# Patient Record
Sex: MALE | Race: WHITE | Employment: OTHER | URBAN - METROPOLITAN AREA
[De-identification: names, ages, dates, MRNs, and addresses within clinical notes are randomized per-mention and may not be internally consistent; named-entity substitution may affect disease eponyms.]

---

## 2017-02-13 ENCOUNTER — TELEPHONE (OUTPATIENT)
Dept: INTERNAL MEDICINE CLINIC | Age: 77
End: 2017-02-13

## 2017-02-13 DIAGNOSIS — E04.1 THYROID NODULE: ICD-10-CM

## 2017-02-13 DIAGNOSIS — E78.00 PURE HYPERCHOLESTEROLEMIA: ICD-10-CM

## 2017-02-13 DIAGNOSIS — R73.01 IFG (IMPAIRED FASTING GLUCOSE): Primary | ICD-10-CM

## 2017-02-13 NOTE — TELEPHONE ENCOUNTER
Wife calling, says pt went to [facility] this morning for labs for upcoming appt. Told no labs in system. Were labs needed? If so please add labs with current date and advise pt.

## 2017-02-13 NOTE — TELEPHONE ENCOUNTER
Labs entered, pt aware. He was not happy and said that he waited at [facility] for an hour before they told him no labs were in the system. I let him know that there were labs in the system but they were , his last OV with RD was in 2015, he was due to come back 2016 so its not our fault he failed to follow up when he was supposed to. He verbalized understanding and will go back to the lab tomorrow.

## 2017-02-14 ENCOUNTER — HOSPITAL ENCOUNTER (OUTPATIENT)
Dept: LAB | Age: 77
Discharge: HOME | End: 2017-02-14
Payer: MEDICARE

## 2017-02-14 DIAGNOSIS — R73.01 IFG (IMPAIRED FASTING GLUCOSE): ICD-10-CM

## 2017-02-14 DIAGNOSIS — E78.00 PURE HYPERCHOLESTEROLEMIA: ICD-10-CM

## 2017-02-14 DIAGNOSIS — E04.1 THYROID NODULE: ICD-10-CM

## 2017-02-14 LAB
ALBUMIN SERPL BCP-MCNC: 4 G/DL (ref 3.4–5)
ALBUMIN/GLOB SERPL: 1.2 {RATIO} (ref 0.8–1.7)
ALP SERPL-CCNC: 58 U/L (ref 45–117)
ALT SERPL-CCNC: 35 U/L (ref 16–61)
ANION GAP BLD CALC-SCNC: 7 MMOL/L (ref 3–18)
AST SERPL W P-5'-P-CCNC: 16 U/L (ref 15–37)
BILIRUB SERPL-MCNC: 0.3 MG/DL (ref 0.2–1)
BUN SERPL-MCNC: 13 MG/DL (ref 7–18)
BUN/CREAT SERPL: 13 (ref 12–20)
CALCIUM SERPL-MCNC: 9.2 MG/DL (ref 8.5–10.1)
CHLORIDE SERPL-SCNC: 105 MMOL/L (ref 100–108)
CHOLEST SERPL-MCNC: 117 MG/DL
CO2 SERPL-SCNC: 28 MMOL/L (ref 21–32)
CREAT SERPL-MCNC: 1 MG/DL (ref 0.6–1.3)
EST. AVERAGE GLUCOSE BLD GHB EST-MCNC: 114 MG/DL
GLOBULIN SER CALC-MCNC: 3.3 G/DL (ref 2–4)
GLUCOSE SERPL-MCNC: 103 MG/DL (ref 74–99)
HBA1C MFR BLD: 5.6 % (ref 4.2–5.6)
HDLC SERPL-MCNC: 49 MG/DL (ref 40–60)
HDLC SERPL: 2.4 {RATIO} (ref 0–5)
LDLC SERPL CALC-MCNC: 49.4 MG/DL (ref 0–100)
LIPID PROFILE,FLP: NORMAL
POTASSIUM SERPL-SCNC: 4.2 MMOL/L (ref 3.5–5.5)
PROT SERPL-MCNC: 7.3 G/DL (ref 6.4–8.2)
SODIUM SERPL-SCNC: 140 MMOL/L (ref 136–145)
TRIGL SERPL-MCNC: 93 MG/DL (ref ?–150)
TSH SERPL DL<=0.05 MIU/L-ACNC: 4.14 UIU/ML (ref 0.36–3.74)
VLDLC SERPL CALC-MCNC: 18.6 MG/DL

## 2017-02-14 PROCEDURE — 80053 COMPREHEN METABOLIC PANEL: CPT | Performed by: INTERNAL MEDICINE

## 2017-02-14 PROCEDURE — 36415 COLL VENOUS BLD VENIPUNCTURE: CPT | Performed by: INTERNAL MEDICINE

## 2017-02-14 PROCEDURE — 84443 ASSAY THYROID STIM HORMONE: CPT | Performed by: INTERNAL MEDICINE

## 2017-02-14 PROCEDURE — 80061 LIPID PANEL: CPT | Performed by: INTERNAL MEDICINE

## 2017-02-14 PROCEDURE — 83036 HEMOGLOBIN GLYCOSYLATED A1C: CPT | Performed by: INTERNAL MEDICINE

## 2017-02-16 PROBLEM — R91.8 PULMONARY NODULES: Status: ACTIVE | Noted: 2017-02-16

## 2017-02-16 PROBLEM — E04.2 MULTINODULAR GOITER: Status: ACTIVE | Noted: 2017-02-16

## 2017-02-17 NOTE — PROGRESS NOTES
This is an subsequent Medicare Annual Wellness Exam (AWV)     I have reviewed the patient's medical history in detail and updated the computerized patient record. History     Past Medical History:   Diagnosis Date    Allergic rhinitis     Arthritis     hands    Asthma     mild obst disease on pfts Dr. Beth Aguilar Pederson's esophagus     Dr. Federico Bryan Bilateral carotid artery stenosis     12/14 BICA 50-69%    BPH (benign prostatic hyperplasia)     BPH without obstruction/lower urinary tract symptoms     CABG x 3 04/24/2014    LIMA to LAD, and saphenous vein grafts to the second diagonal of the LAD, and to the posterior descending branch of the RCA, Dr. David Bradshaw, 4/23/14.  CAD (coronary artery disease)     Cardiac catheterization 04/18/2014    Superdominant RCA. mRCA 90%. pRPDA 60%. LM (modest sized) patent.  p-mLAD 85%. CX < 50%. LVEDP 17 mmHg. EF 60%. No RWMA. CABG recommended.  Cardiac echocardiogram 06/24/2008    EF 70%. Mild conc LVH. Gr 2 DDfx. Mild LAE. Mild MR. PASP 38 mmHg.  Cardiac nuclear imaging study, abnormal 04/15/2014    Mod ischemia in lg area of anterior wall, anteroapex, anteroseptum c/w high-grade prox LAD obstruction. Mod partially transient defect likely ischemia w/very sm infarction in RCA. TID 1.31 suggests 3-vessel CAD. Mod distal anterior hypk. Mild-mod anteroseptal, inferoseptal, anterolateral hypk. EF 46%. Strongly positive EKG on max EST. Ex time 6 min.  Carotid duplex 04/06/2016    Mod 50-69% bilateral ICA stenosis. Probable significant LECA stenosis. Similar to study of 8/12/15. Multiple nodules noted in right thyroid.     Colon polyps 2007    Dr. Federico Bryan Difficulty hearing     Erectile dysfunction     FHx: heart disease     GERD (gastroesophageal reflux disease)     by EGD 2007 Dr. Federico Bryan IFG (impaired fasting glucose) 5/14    Impotence     Lower urinary tract symptoms (LUTS)     Multinodular goiter     2014; 2015; 4/16  Obesity     peak weight 202 lbs, bmi 31.4 from 4/14    Prostate cancer ([facility] [address] 8/10    Dr. Kenisha Dutton, s/p cryo Dr. Inocente Seip Pulmonary nodule 2014 2015    Sleep apnea       Past Surgical History:   Procedure Laterality Date    CARDIAC SURG PROCEDURE UNLIST  11/06    thallium negative    CARDIAC SURG PROCEDURE UNLIST  4/14    3V CABG Dr Marina Butler  2007    mild obst disease on PFTs Dr. Michelle Yoon January HX CATARACT REMOVAL  11/14    bilat Dr Maria E Perez  2004    HX COLONOSCOPY      Dr. Sarabjit Garcia 2007 negative   [facility]      Dr. Waylon Nowak HX TURP      x2 Dr. Cynthia Dubose HX UROLOGICAL  10/2012    [facility], Dr. Rhoda Russo, Cystoscopy and dilation of stricture, cryoablation of prostate    VASCULAR SURGERY PROCEDURE UNLIST  12/14    BICA 50-69% stenosis     Current Outpatient Prescriptions   Medication Sig Dispense Refill    metoprolol succinate (TOPROL-XL) 25 mg XL tablet TAKE 1 TABLET BY MOUTH EVERY DAY 30 Tab 6    HYDROcodone-acetaminophen (NORCO) 5-325 mg per tablet Take 1-2 Tabs by mouth every six (6) hours as needed for Pain. Max Daily Amount: 8 Tabs. 30 Tab 0    atorvastatin (LIPITOR) 20 mg tablet TAKE 1 TABLET BY MOUTH EVERY EVENING 90 Tab 3    nitroglycerin (NITROSTAT) 0.4 mg SL tablet 0.4 mg by SubLINGual route every five (5) minutes as needed for Chest Pain.  omeprazole (PRILOSEC OTC) 20 mg tablet Take 20 mg by mouth daily.  glucosam-chondroit-C-manganese 637-638-48-3 mg cap Take 1 Tab by mouth daily.  cyanocobalamin (VITAMIN B-12) 2,500 mcg sublingual tablet Take 2,500 mcg by mouth daily.  ascorbic acid (VITAMIN C) 1,000 mg tablet Take 1,000 mg by mouth daily.  MULTIVITAMIN W-MINERALS/LUTEIN (CENTRUM SILVER PO) Take 1 Tab by mouth daily.  aspirin 81 mg tablet Take 81 mg by mouth daily.  Indications: MYOCARDIAL INFARCTION PREVENTION       Allergies   Allergen Reactions    Albuterol Rash     seizure    Influenza Virus Vaccine, Specific Other (comments)    Percocet [Oxycodone-Acetaminophen] Other (comments)     Family History   Problem Relation Age of Onset    Heart Disease Father     Stroke Mother     Hypertension Mother     Diabetes Sister      Social History   Substance Use Topics    Smoking status: Former Smoker     Packs/day: 0.50     Years: 4.00     Quit date: 1/1/1957    Smokeless tobacco: Never Used      Comment: Quit in 1957    Alcohol use No     Depression Risk Factor Screening:     PHQ 2 / 9, over the last two weeks 2/22/2017   Little interest or pleasure in doing things Not at all   Feeling down, depressed or hopeless Not at all   Total Score PHQ 2 0     SCREENINGS  Colonoscopy last done 2007 Dr Velma Jurado  Prostate ca being followed by Dr Velma Jurado    Immunization History   Administered Date(s) Administered    Pneumococcal Conjugate (PCV-13) 08/12/2015    Pneumococcal Vaccine (Unspecified Type) 09/11/2008    TD Vaccine 09/10/2008    Tdap 08/12/2015    Zoster 04/24/2008     Alcohol Risk Factor Screening: On any occasion during the past 3 months, have you had more than 4 drinks containing alcohol? No    Do you average more than 14 drinks per week? No    Functional Ability and Level of Safety:     Hearing Loss   normal-to-mild    Vision - no acute complaints and is followed by Dr Destiny Bhatti [facility]   Self-care. Requires assistance with: no ADLs    Fall Risk     Fall Risk Assessment, last 12 mths 2/22/2017   Able to walk? Yes   Fall in past 12 months? No     Abuse Screen   Patient is not abused    Review of Systems   A comprehensive review of systems was negative except for that written in the HPI.     Physical Examination     No exam data present    Evaluation of Cognitive Function:  Mood/affect:  happy  Appearance: age appropriate, overweight, well dressed and within normal Limits  Family member/caregiver input: na    Patient Care Team:  Moreno Avina MD as PCP - General (Internal Medicine)  Saulo Thomas DO (Cardiology)    Advice/Referrals/Counseling   Education and counseling provided:  Are appropriate based on today's review and evaluation  End-of-Life planning (with patient's consent)  Pneumococcal Vaccine  Influenza Vaccine  Prostate cancer screening tests (PSA, covered annually)  Colorectal cancer screening tests  Cardiovascular screening blood test  Diabetes screening test    Assessment/Plan       ICD-10-CM ICD-9-CM    1. Routine general medical examination at a health care facility Z00.00 V70.0    2. Screening for alcoholism Z13.89 V79.1    3. Advanced directives, counseling/discussion Z71.89 V65.49 ADVANCE CARE PLANNING FIRST 30 MINS   4. Screening for depression Z13.89 V79.0 DEPRESSION SCREEN ANNUAL   5. Screen for colon cancer Z12.11 V76.51    6. Screening for diabetes mellitus Z13.1 V77.1    7. Screening for ischemic heart disease Z13.6 V81.0    8. Body mass index 31.0-31.9, adult Z68.31 V85.31    9. [facility] C61 185    10. IFG (impaired fasting glucose) E59.47 236.85 METABOLIC PANEL, COMPREHENSIVE      HEMOGLOBIN A1C W/O EAG   11. Hyperlipidemia, unspecified hyperlipidemia type E78.5 272.4 CBC W/O DIFF      LIPID PANEL   12. Coronary artery disease involving native coronary artery of native heart without angina pectoris I25.10 414.01    13. Pulmonary nodules R91.8 793.19 CT CHEST W CONT   14. Multinodular goiter 2014 E04.2 241.1 TSH 3RD GENERATION      T4, FREE   15. Bilateral carotid artery stenosis 12/14 I65.23 433.10      433.30    16. Colon polyp 2007 Dr. Dontae Shaffer K63.5 211.3    17. Pederson's esophagus without dysplasia K22.70 530.85      current treatment plan is effective, no change in therapy  lab results and schedule of future lab studies reviewed with patient  cardiovascular risk and specific lipid/LDL goals reviewed.   End of life discussion undertaken. He will work on getting medical directive in place  Flu high dose declined  Colonoscopy beyond age?   He will check w Dr Dontae Shaffer

## 2017-02-17 NOTE — ACP (ADVANCE CARE PLANNING)
Advance Care Planning    Advance Care Planning (ACP) Provider Note - Comprehensive     Date of ACP Conversation: 02/22/17  Persons included in Conversation:  patient  Length of ACP Conversation in minutes:  16 minutes    Authorized Decision Maker (if patient is incapable of making informed decisions): This person is: wife  Other Legally Authorized Decision Maker (e.g. Next of Kin)          General ACP for ALL Patients with Decision Making Capacity:   Importance of advance care planning, including choosing a healthcare agent to communicate patient's healthcare decisions if patient lost the ability to make decisions, such as after a sudden illness or accident  Understanding of the healthcare agent role was assessed and information provided  Exploration of values, goals, and preferences if recovery is not expected, even with continued medical treatment in the event of: Imminent death  Severe, permanent brain injury    Review of Existing Advance Directive:  na    For Serious or Chronic Illness:  Understanding of medical condition    Understanding of CPR, goals and expected outcomes, benefits and burdens discussed.     Interventions Provided:  Recommended completion of Advance Directive form after review of ACP materials and conversation with prospective healthcare agent   Recommended communicating the plan and making copies for the healthcare agent, personal physician, and others as appropriate (e.g., health system)  Recommended review of completed ACP document annually or upon change in health status

## 2017-02-17 NOTE — PROGRESS NOTES
This is an Initial Medicare Annual Wellness Exam (AWV) (Performed 12 months after IPPE or effective date of Medicare Part B enrollment, Once in a lifetime)    I have reviewed the patient's medical history in detail and updated the computerized patient record. History     Past Medical History   Diagnosis Date    Allergic rhinitis     Arthritis      hands    Asthma      mild obst disease on pfts Dr. Shanique Collins Pederson's esophagus      Dr. Arely Browning Bilateral carotid artery stenosis      12/14 BICA 50-69%    BPH (benign prostatic hyperplasia)     BPH without obstruction/lower urinary tract symptoms     CABG x 3 04/24/2014     LIMA to LAD, and saphenous vein grafts to the second diagonal of the LAD, and to the posterior descending branch of the RCA, Dr. Alphonso Adams, 4/23/14.  CAD (coronary artery disease)     Cardiac catheterization 04/18/2014     Superdominant RCA. mRCA 90%. pRPDA 60%. LM (modest sized) patent.  p-mLAD 85%. CX < 50%. LVEDP 17 mmHg. EF 60%. No RWMA. CABG recommended.  Cardiac echocardiogram 06/24/2008     EF 70%. Mild conc LVH. Gr 2 DDfx. Mild LAE. Mild MR. PASP 38 mmHg.  Cardiac nuclear imaging study, abnormal 04/15/2014     Mod ischemia in lg area of anterior wall, anteroapex, anteroseptum c/w high-grade prox LAD obstruction. Mod partially transient defect likely ischemia w/very sm infarction in RCA. TID 1.31 suggests 3-vessel CAD. Mod distal anterior hypk. Mild-mod anteroseptal, inferoseptal, anterolateral hypk. EF 46%. Strongly positive EKG on max EST. Ex time 6 min.  Carotid duplex 04/06/2016     Mod 50-69% bilateral ICA stenosis. Probable significant LECA stenosis. Similar to study of 8/12/15. Multiple nodules noted in right thyroid.     Colon polyps 2007     Dr. Arely Browning Difficulty hearing     Erectile dysfunction     FHx: heart disease     GERD (gastroesophageal reflux disease)      by EGD 2007 Dr. Lori Duque    IFG (impaired fasting glucose) 5/14    Impotence     Lower urinary tract symptoms (LUTS)     Obesity      peak weight 202 lbs, bmi 31.4 from 4/14    Prostate cancer ([facility] [address] 8/10     Dr. Bing Munoz, s/p cryo Dr. Kye Silver Sleep apnea       Past Surgical History   Procedure Laterality Date    Hx carpal tunnel release       Dr. Nikki Rosa Hx turp       x2 Dr. Daniel Craig Hx appendectomy      Endoscopy, colon, diagnostic  2007     Dr. Erma Copeland Pr chest surgery procedure unlisted  2007     mild obst disease on PFTs Dr. Pao Germain Hx mohs procedure       Dr. Kenan Onofre Hx cholecystectomy  2004    Hx tonsillectomy      Pr cardiac surg procedure unlist  11/06     thallium negative    Pr cardiac surg procedure unlist  4/14     3V CABG Dr Karina Salgado Vascular surgery procedure unlist  12/14     BICA 50-69% stenosis    Hx cataract removal  11/14     bilat Dr Jose A Montalvo Hx urological  10/2012     [facility], Dr. Quique Ferrell, Cystoscopy and dilation of stricture, cryoablation of prostate     Current Outpatient Prescriptions   Medication Sig Dispense Refill    metoprolol succinate (TOPROL-XL) 25 mg XL tablet TAKE 1 TABLET BY MOUTH EVERY DAY 30 Tab 6    HYDROcodone-acetaminophen (NORCO) 5-325 mg per tablet Take 1-2 Tabs by mouth every six (6) hours as needed for Pain. Max Daily Amount: 8 Tabs. 30 Tab 0    atorvastatin (LIPITOR) 20 mg tablet TAKE 1 TABLET BY MOUTH EVERY EVENING 90 Tab 3    nitroglycerin (NITROSTAT) 0.4 mg SL tablet 0.4 mg by SubLINGual route every five (5) minutes as needed for Chest Pain.  omeprazole (PRILOSEC OTC) 20 mg tablet Take 20 mg by mouth daily.  glucosam-chondroit-C-manganese 140-790-09-3 mg cap Take 1 Tab by mouth daily.  cyanocobalamin (VITAMIN B-12) 2,500 mcg sublingual tablet Take 2,500 mcg by mouth daily.  ascorbic acid (VITAMIN C) 1,000 mg tablet Take 1,000 mg by mouth daily.  MULTIVITAMIN W-MINERALS/LUTEIN (CENTRUM SILVER PO) Take 1 Tab by mouth daily.       aspirin 81 mg tablet Take 81 mg by mouth daily. Indications: MYOCARDIAL INFARCTION PREVENTION       Allergies   Allergen Reactions    Albuterol Rash     seizure    Influenza Virus Vaccine, Specific Other (comments)    Percocet [Oxycodone-Acetaminophen] Other (comments)     Family History   Problem Relation Age of Onset    Heart Disease Father     Stroke Mother     Hypertension Mother     Diabetes Sister      Social History   Substance Use Topics    Smoking status: Former Smoker     Packs/day: 0.50     Years: 4.00     Quit date: 1/1/1957    Smokeless tobacco: Never Used      Comment: Quit in 1957    Alcohol use No     Depression Risk Factor Screening:     PHQ 2 / 9, over the last two weeks 8/30/2016   Little interest or pleasure in doing things Not at all   Feeling down, depressed or hopeless Not at all   Total Score PHQ 2 0     SCREENINGS  Colonoscopy last done 2007 Dr Randa Wiley  Prostate ca being followed by Dr Randa Wiley    Immunization History   Administered Date(s) Administered    Pneumococcal Conjugate (PCV-13) 08/12/2015    Pneumococcal Vaccine (Unspecified Type) 09/11/2008    TD Vaccine 09/10/2008    Tdap 08/12/2015    Zoster 04/24/2008     Alcohol Risk Factor Screening: On any occasion during the past 3 months, have you had more than 4 drinks containing alcohol? No    Do you average more than 14 drinks per week? No    Functional Ability and Level of Safety:     Hearing Loss   normal-to-mild    Vision - no acute complaints and is followed by Dr Jennifer Simmons of [facility]   Self-care. Requires assistance with: no ADLs    Fall Risk     Fall Risk Assessment, last 12 mths 8/30/2016   Able to walk? Yes   Fall in past 12 months? No     Abuse Screen   Patient is not abused    Review of Systems   A comprehensive review of systems was negative except for that written in the HPI.     Physical Examination     No exam data present    Evaluation of Cognitive Function:  Mood/affect:  happy  Appearance: age appropriate, overweight, well dressed and within normal Limits  Family member/caregiver input: na    Patient Care Team:  Ariana Sher MD as PCP - General (Internal Medicine)  Suzan Talamantes DO (Cardiology)    Advice/Referrals/Counseling   Education and counseling provided:  Are appropriate based on today's review and evaluation  End-of-Life planning (with patient's consent)  Pneumococcal Vaccine  Influenza Vaccine  Prostate cancer screening tests (PSA, covered annually)  Colorectal cancer screening tests  Cardiovascular screening blood test  Diabetes screening test    Assessment/Plan     {No Diagnosis Found}  current treatment plan is effective, no change in therapy  lab results and schedule of future lab studies reviewed with patient  cardiovascular risk and specific lipid/LDL goals reviewed. End of life discussion undertaken.   He will work on getting medical directive in place  Flu high dose when in season - given  Tdap - given  Prevnar-13 discussed at length  Colonoscopy to be scheduled 2017

## 2017-02-17 NOTE — PROGRESS NOTES
64 y. o.  AND  male who presents for evaluation. We've not seen him in 1.5 years but he seems to be doing great    He continues to work 4days a week at [employer] more where he walks all day long also and does 'more than the other younger people there'. Pressures are in good range when he checks. Continues to see Dr Anaid Wallace at least yearly. Cardiology is following his carotids also. No neurologic complaints so far    Denies any gi or urinary complaints    Denies polyuria, polydipsia, nocturia, vision change. Not checking sugars at this time. Weight is creeping up some and he could do better w portions    Vitals 2/22/2017 9/14/2016 9/7/2016 8/30/2016 8/26/2016 8/26/2016   Weight 201 lb 197 lb 190 lb 192 lb       Vitals 8/26/2016   Weight 185 lb     No sx referable to the thyroid. The carotid us is following the thyroid nodules also    Past Medical History:   Diagnosis Date    Allergic rhinitis     Arthritis     hands    Asthma     mild obst disease on pfts Dr. Naeem Burgos Pederson's esophagus     Dr. Hilary Sinclair Bilateral carotid artery stenosis     12/14 BICA 50-69%    BPH (benign prostatic hyperplasia)     BPH without obstruction/lower urinary tract symptoms     CABG x 3 04/24/2014    LIMA to LAD, and saphenous vein grafts to the second diagonal of the LAD, and to the posterior descending branch of the RCA, Dr. Jesse Palafox, 4/23/14.  CAD (coronary artery disease)     Cardiac catheterization 04/18/2014    Superdominant RCA. mRCA 90%. pRPDA 60%. LM (modest sized) patent.  p-mLAD 85%. CX < 50%. LVEDP 17 mmHg. EF 60%. No RWMA. CABG recommended.  Cardiac echocardiogram 06/24/2008    EF 70%. Mild conc LVH. Gr 2 DDfx. Mild LAE. Mild MR. PASP 38 mmHg.  Cardiac nuclear imaging study, abnormal 04/15/2014    Mod ischemia in lg area of anterior wall, anteroapex, anteroseptum c/w high-grade prox LAD obstruction.   Mod partially transient defect likely ischemia w/very sm infarction in RCA. TID 1.31 suggests 3-vessel CAD. Mod distal anterior hypk. Mild-mod anteroseptal, inferoseptal, anterolateral hypk. EF 46%. Strongly positive EKG on max EST. Ex time 6 min.  Carotid duplex 04/06/2016    Mod 50-69% bilateral ICA stenosis. Probable significant LECA stenosis. Similar to study of 8/12/15. Multiple nodules noted in right thyroid.     Colon polyps 2007    Dr. Sahara Jones Difficulty hearing     Erectile dysfunction     FHx: heart disease     GERD (gastroesophageal reflux disease)     by EGD 2007 Dr. Sahara Jones IFG (impaired fasting glucose) 5/14    Impotence     Lower urinary tract symptoms (LUTS)     Multinodular goiter     2014; 2015; 4/16    Obesity     peak weight 202 lbs, bmi 31.4 from 4/14    Prostate cancer ([facility] [place] [address] 8/10    Dr. Porsche Maciel, s/p cryo Dr. Corea Handler Pulmonary nodule 2014 2015    Sleep apnea      Past Surgical History:   Procedure Laterality Date    CARDIAC SURG PROCEDURE UNLIST  11/06    thallium negative    CARDIAC SURG PROCEDURE UNLIST  4/14    3V CABG Dr Maricarmen Hoffmann  2007    mild obst disease on PFTs Dr. Jarred Guajardo HX CATARACT REMOVAL  11/14    bilat Dr Bobby Galvez  2004    HX COLONOSCOPY      Dr. Bonny Duckworth 2007 negative   Anant Vital HX TURP      x2 Dr. Kishore Blancas HX UROLOGICAL  10/2012    [facility] Dr. Patrizia Shin, Cystoscopy and dilation of stricture, cryoablation of prostate    VASCULAR SURGERY PROCEDURE UNLIST  12/14    BICA 50-69% stenosis     Social History     Social History    Marital status:      Spouse name: N/A    Number of children: 3    Years of education: N/A     Occupational History    ret engr [employer]     Social History Main Topics    Smoking status: Former Smoker     Packs/day: 0.50     Years: 4.00     Quit date: 1/1/1957    Smokeless tobacco: Never Used      Comment: Quit in 1957    Alcohol use No    Drug use: No    Sexual activity: Not on file     Other Topics Concern    Not on file     Social History Narrative     Allergies   Allergen Reactions    Albuterol Rash     seizure    Influenza Virus Vaccine, Specific Other (comments)    Percocet [Oxycodone-Acetaminophen] Other (comments)     Current Outpatient Prescriptions   Medication Sig    metoprolol succinate (TOPROL-XL) 25 mg XL tablet TAKE 1 TABLET BY MOUTH EVERY DAY    HYDROcodone-acetaminophen (NORCO) 5-325 mg per tablet Take 1-2 Tabs by mouth every six (6) hours as needed for Pain. Max Daily Amount: 8 Tabs.  atorvastatin (LIPITOR) 20 mg tablet TAKE 1 TABLET BY MOUTH EVERY EVENING    nitroglycerin (NITROSTAT) 0.4 mg SL tablet 0.4 mg by SubLINGual route every five (5) minutes as needed for Chest Pain.  omeprazole (PRILOSEC OTC) 20 mg tablet Take 20 mg by mouth daily.  glucosam-chondroit-C-manganese 672-841-29-3 mg cap Take 1 Tab by mouth daily.  cyanocobalamin (VITAMIN B-12) 2,500 mcg sublingual tablet Take 2,500 mcg by mouth daily.  ascorbic acid (VITAMIN C) 1,000 mg tablet Take 1,000 mg by mouth daily.  MULTIVITAMIN W-MINERALS/LUTEIN (CENTRUM SILVER PO) Take 1 Tab by mouth daily.  aspirin 81 mg tablet Take 81 mg by mouth daily. Indications: MYOCARDIAL INFARCTION PREVENTION     No current facility-administered medications for this visit.       LAST MEDICARE WELLNESS EXAM: 8/12/15, 2/22/17  ACP 2/22/17    REVIEW OF SYSTEMS:  [place] 2007 Dr Sarmad Ledesma  no vision change or eye pain  Oral  no mouth pain, tongue or tooth problems  Ears  no hearing loss, ear pain, fullness, no swallowing problems  Cardiac  no CP, PND, orthopnea, edema, palpitations or syncope  Chest  no breast masses  Resp  no wheezing, chronic coughing, dyspnea  GI  no heartburn, nausea, vomiting, change in bowel habits, bleeding, hemorrhoids  Urinary  no dysuria, hematuria, flank pain, urgency, frequency  Psych  denies any anxiety or depression symptoms, no hallucinations or violent ideation  Constitutional  no wt loss, night sweats, unexplained fevers  Neuro  no focal weakness, numbness, paresthesias, incoordination, ataxia, involuntary movements  Endo - no polyuria, polydipsia, nocturia, hot flashes    Visit Vitals    /72    Pulse 60    Temp 97.5 °F (36.4 °C) (Oral)    Ht 5' 6\" (1.676 m)    Wt 201 lb (91.2 kg)    SpO2 97%    BMI 32.44 kg/m2     A&O x3  Affect is appropriate. Mood stable  No apparent distress  Anicteric, no JVD, adenopathy or thyromegaly. No carotid bruits or radiated murmur  Lungs clear to auscultation, no wheezes or rales  Heart showed regular rate and rhythm. No murmur, rubs, gallops  Abdomen soft nontender, no hepatosplenomegaly or masses. Extremities without edema.   Pulses 1-2+ symmetrically    LABS   From 9/10 showed gluc 102, cr 1.00,    alt 41,        chol 190, tg 102, hdl 47, ldl-c 123, wbc 8.3, hb 14.6, plt 222, psa 7.60  From 6/12 showed                        psa 5.78  From 8/12 showed                     b12 400, fol>20  From 9/12 showed gluc 109, cr 1.10, gfr>60,                 wbc 8.1, hb 14.1, plt 232  From 9/13 showed                        psa 0.96  From 3/14 showed                        psa 1.78  From 4/14 showed gluc 107, cr 0.90, gfr>60, alt 25, hba1c 5.5, chol 190, tg 135, hdl 48, ldl-c 115, wbc 8.1, hb 14.2, plt 241, ua neg  From 8/14 showed gluc 97,   cr 0.90, gfr>60, alt 17, hba1c 6.0, chol 109, tg 111, hdl 44, ldl-c 43  From 2/15 showed      hba1c 6.0  From 3/15 showed                      psa 2.13  From 8/15 showed gluc 106, cr 0.98, gfr 75,  alt 20, hba1c 5.9, chol 129, tg 79,   hdl 51, ldl-c 62,   wbc 7.3, hb 14.2, plt 232    Results for orders placed or performed during the hospital encounter of 02/14/17   LIPID PANEL   Result Value Ref Range    LIPID PROFILE          Cholesterol, total 117 <200 MG/DL    Triglyceride 93 <150 MG/DL    HDL Cholesterol 49 40 - 60 MG/DL    LDL, calculated 49.4 0 - 100 MG/DL    VLDL, calculated 18.6 MG/DL    CHOL/HDL Ratio 2.4 0 - 5.0     METABOLIC PANEL, COMPREHENSIVE   Result Value Ref Range    Sodium 140 136 - 145 mmol/L    Potassium 4.2 3.5 - 5.5 mmol/L    Chloride 105 100 - 108 mmol/L    CO2 28 21 - 32 mmol/L    Anion gap 7 3.0 - 18 mmol/L    Glucose 103 (H) 74 - 99 mg/dL    BUN 13 7.0 - 18 MG/DL    Creatinine 1.00 0.6 - 1.3 MG/DL    BUN/Creatinine ratio 13 12 - 20      GFR est AA >60 >60 ml/min/1.73m2    GFR est non-AA >60 >60 ml/min/1.73m2    Calcium 9.2 8.5 - 10.1 MG/DL    Bilirubin, total 0.3 0.2 - 1.0 MG/DL    ALT (SGPT) 35 16 - 61 U/L    AST (SGOT) 16 15 - 37 U/L    Alk. phosphatase 58 45 - 117 U/L    Protein, total 7.3 6.4 - 8.2 g/dL    Albumin 4.0 3.4 - 5.0 g/dL    Globulin 3.3 2.0 - 4.0 g/dL    A-G Ratio 1.2 0.8 - 1.7     HEMOGLOBIN A1C WITH EAG   Result Value Ref Range    Hemoglobin A1c 5.6 4.2 - 5.6 %    Est. average glucose 114 mg/dL   TSH 3RD GENERATION   Result Value Ref Range    TSH 4.14 (H) 0.36 - 3.74 uIU/mL     Patient Active Problem List   Diagnosis Code    Pederson's esophagus K22.70    BPH without obstruction/lower urinary tract symptoms N40.0    Cancer of prostate ([facility] [address] C61    Colon polyp 2007 Dr. Pawan Yao K63.5    Sleep apnea G47.30    S/P CABG x 3 Z95.1    IFG (impaired fasting glucose) R73.01    Hyperlipidemia E78.5    Bilateral carotid artery stenosis 12/14 I65.23    CAD (coronary artery disease) I25.10    Multinodular goiter 2014 E04.2    Pulmonary nodules R91.8     Assessment and plan:  1. Pederson's. Lifelong PPI and f/u gi as directed  2. ED. Prn meds  3. BPH and prostate ca. F/U Dr. Michele Wood  4. Colon polyp. Fiber, colo 2017  5. Asthma. Off meds  6. CHD. F/U Dr. Chioma Arreaga. 7. IFG. Wt loss would be ideal, continue dietary and lifestyle measures, previous long discussion about typical progression of preDM  8. Bilat carotid stenosis.   Cont to follow per  Hazel  9. Lung nodule. CT to be done  10. Thyroid nodules. Follow as above, recheck full panel next draw, asymptomatic at this time        RTC 2/18    Above conditions discussed at length and patient vocalized understanding.   All questions answered to patient satifaction

## 2017-02-17 NOTE — PATIENT INSTRUCTIONS
Medicare Part B Preventive Services Limitations Recommendation Scheduled   Bone Mass Measurement  (age 72 & older, biennial) Requires diagnosis related to osteoporosis or estrogen deficiency. Biennial benefit unless patient has history of long-term glucocorticoid tx or baseline is needed because initial test was by other method     Cardiovascular Screening Blood Tests (every 5 years)  Total cholesterol, HDL, Triglycerides Order as a panel if possible     Colorectal Cancer Screening  -Fecal occult blood test (annual)  -Flexible sigmoidoscopy (5y)  -Screening colonoscopy (10y)  -Barium Enema      Counseling to Prevent Tobacco Use (up to 8 sessions per year)  - Counseling greater than 3 and up to 10 minutes  - Counseling greater than 10 minutes Patients must be asymptomatic of tobacco-related conditions to receive as preventive service     Diabetes Screening Tests (at least every 3 years, Medicare covers annually or at 6-month intervals for prediabetic patients)    Fasting blood sugar (FBS) or glucose tolerance test (GTT) Patient must be diagnosed with one of the following:  -Hypertension, Dyslipidemia, obesity, previous impaired FBS or GTT  Or any two of the following: overweight, FH of diabetes, age ? 72, history of gestational diabetes, birth of baby weighing more than 9 pounds     Diabetes Self-Management Training (DSMT) (no USPSTF recommendation) Requires referral by treating physician for patient with diabetes or renal disease. 10 hours of initial DSMT session of no less than 30 minutes each in a continuous 12-month period. 2 hours of follow-up DSMT in subsequent years.      Glaucoma Screening (no USPSTF recommendation) Diabetes mellitus, family history, , age 48 or over,  American, age 72 or over     Human Immunodeficiency Virus (HIV) Screening (annually for increased risk patients)  HIV-1 and HIV-2 by EIA, INES, rapid antibody test, or oral mucosa transudate Patient must be at increased risk for HIV infection per USPSTF guidelines or pregnant. Tests covered annually for patients at increased risk. Pregnant patients may receive up to 3 test during pregnancy. Medical Nutrition Therapy (MNT) (for diabetes or renal disease not recommended schedule) Requires referral by treating physician for patient with diabetes or renal disease. Can be provided in same year as diabetes self-management training (DSMT), and CMS recommends medical nutrition therapy take place after DSMT. Up to 3 hours for initial year and 2 hours in subsequent years. Prostate Cancer Screening (annually up to age 76)  - Digital rectal exam (MONISHA)  - Prostate specific antigen (PSA) Annually (age 48 or over), MONISHA not paid separately when covered E/M service is provided on same date     Seasonal Influenza Vaccination (annually)      Pneumococcal Vaccination (once after 72)      Hepatitis B Vaccinations (if medium/high risk) Medium/high risk factors:  End-stage renal disease,  Hemophiliacs who received Factor VIII or IX concentrates, Clients of institutions for the mentally retarded, Persons who live in the same house as a HepB virus carrier, Homosexual men, Illicit injectable drug abusers. Screening Mammography (biennial age 54-69)? Annually (age 36 or over)     Screening Pap Tests and Pelvic Examination (up to age 79 and after 79 if unknown history or abnormal study last 10 years) Every 25 months except high risk     Ultrasound Screening for Abdominal Aortic Aneurysm (AAA) (once) Patient must be referred through IPPE and not have had a screening for abdominal aortic aneurysm before under Medicare.   Limited to patients who meet one of the following criteria:  - Men who are 73-68 years old and have smoked more than 100 cigarettes in their lifetime.  -Anyone with a FH of AAA  -Anyone recommended for screening by USPSTF

## 2017-02-22 ENCOUNTER — OFFICE VISIT (OUTPATIENT)
Dept: INTERNAL MEDICINE CLINIC | Age: 77
End: 2017-02-22

## 2017-02-22 VITALS
DIASTOLIC BLOOD PRESSURE: 72 MMHG | WEIGHT: 201 LBS | TEMPERATURE: 97.5 F | OXYGEN SATURATION: 97 % | HEIGHT: 66 IN | HEART RATE: 60 BPM | SYSTOLIC BLOOD PRESSURE: 138 MMHG | BODY MASS INDEX: 32.3 KG/M2

## 2017-02-22 DIAGNOSIS — Z00.00 ROUTINE GENERAL MEDICAL EXAMINATION AT A HEALTH CARE FACILITY: Primary | ICD-10-CM

## 2017-02-22 DIAGNOSIS — Z13.31 SCREENING FOR DEPRESSION: ICD-10-CM

## 2017-02-22 DIAGNOSIS — K22.70 BARRETT'S ESOPHAGUS WITHOUT DYSPLASIA: ICD-10-CM

## 2017-02-22 DIAGNOSIS — Z13.39 SCREENING FOR ALCOHOLISM: ICD-10-CM

## 2017-02-22 DIAGNOSIS — I65.23 BILATERAL CAROTID ARTERY STENOSIS: ICD-10-CM

## 2017-02-22 DIAGNOSIS — Z71.89 ADVANCED DIRECTIVES, COUNSELING/DISCUSSION: ICD-10-CM

## 2017-02-22 DIAGNOSIS — Z12.11 SCREEN FOR COLON CANCER: ICD-10-CM

## 2017-02-22 DIAGNOSIS — Z13.6 SCREENING FOR ISCHEMIC HEART DISEASE: ICD-10-CM

## 2017-02-22 DIAGNOSIS — Z13.1 SCREENING FOR DIABETES MELLITUS: ICD-10-CM

## 2017-02-22 DIAGNOSIS — K63.5 COLON POLYP: ICD-10-CM

## 2017-02-22 DIAGNOSIS — E04.2 MULTINODULAR GOITER: ICD-10-CM

## 2017-02-22 DIAGNOSIS — R73.01 IFG (IMPAIRED FASTING GLUCOSE): ICD-10-CM

## 2017-02-22 DIAGNOSIS — C61 CANCER OF PROSTATE (HCC): ICD-10-CM

## 2017-02-22 DIAGNOSIS — I25.10 CORONARY ARTERY DISEASE INVOLVING NATIVE CORONARY ARTERY OF NATIVE HEART WITHOUT ANGINA PECTORIS: ICD-10-CM

## 2017-02-22 DIAGNOSIS — R91.8 PULMONARY NODULES: ICD-10-CM

## 2017-02-22 DIAGNOSIS — E78.5 HYPERLIPIDEMIA, UNSPECIFIED HYPERLIPIDEMIA TYPE: ICD-10-CM

## 2017-02-22 NOTE — PROGRESS NOTES
1. Have you been to the ER, urgent care clinic or hospitalized since your last visit? NO.     2. Have you seen or consulted any other health care providers outside of the [address] [place] [address] since your last visit (Include any pap smears or colon screening)? NO      Do you have an Advanced Directive? NO    Would you like information on Advanced Directives?  YES

## 2017-02-22 NOTE — MR AVS SNAPSHOT
Visit Information Date & Time Provider Department Dept. Phone Encounter #  
 2/22/2017  9:30 AM Max Martino MD Internist of [address] [place] [address] [phone] Follow-up Instructions Return in about 1 year (around 2/22/2018). Your Appointments 3/15/2017  8:20 AM  
Follow Up with Gerson Laurent DO [facility] Appt Note: 9/14/16 - Return in about 6 months - Wed morning - CB1/10 LMOM to call office for an appt. ..sb  
 [place] [address] [address] [id number] [phone] Nomi Gabriel  
  
    
 2/21/2018 10:30 AM  
PHYSICAL with Max Martino MD  
Internist of [facility]) Appt Note: rpe [address]  
 [address] [facility], [address] [address] [address]  
  
   
 [address], [address] Upcoming Health Maintenance Date Due  
 MEDICARE YEARLY EXAM 2/23/2018 GLAUCOMA SCREENING Q2Y 2/16/2019 DTaP/Tdap/Td series (2 - Td) 8/12/2025 Allergies as of 2/22/2017  Review Complete On: 2/22/2017 By: Max Martino MD  
  
 Severity Noted Reaction Type Reactions Albuterol    Rash  
 seizure Influenza Virus Vaccine, Specific  08/08/2014    Other (comments) Percocet [Oxycodone-acetaminophen]  08/08/2014    Other (comments) Current Immunizations  Reviewed on 2/16/2017 Name Date Pneumococcal Conjugate (PCV-13) 8/12/2015 Pneumococcal Vaccine (Unspecified Type) 9/11/2008 TD Vaccine 9/10/2008 Tdap 8/12/2015 Zoster 4/24/2008 Reviewed by Max Martino MD on 2/16/2017 at 10:17 PM  
You Were Diagnosed With   
  
 Codes Comments Routine general medical examination at a health care facility    -  Primary ICD-10-CM: Z00.00 ICD-9-CM: V70.0 Screening for alcoholism     ICD-10-CM: Z13.89 ICD-9-CM: V79.1 Advanced directives, counseling/discussion     ICD-10-CM: Z71.89 ICD-9-CM: V65.49 Screening for depression     ICD-10-CM: Z13.89 ICD-9-CM: V79.0 Screen for colon cancer     ICD-10-CM: Z12.11 ICD-9-CM: V76.51 Screening for diabetes mellitus     ICD-10-CM: Z13.1 ICD-9-CM: V77.1 Screening for ischemic heart disease     ICD-10-CM: Z13.6 ICD-9-CM: V81.0 Body mass index 31.0-31.9, adult     ICD-10-CM: Z68.31 
ICD-9-CM: V85.31 Vitals BP  
  
  
  
  
  
 148/78 Vitals History BMI and BSA Data Body Mass Index Body Surface Area  
 32.44 kg/m 2 2.06 m 2 Preferred Pharmacy Pharmacy Name Phone [id number] [address] [facility] [phone] Your Updated Medication List  
  
   
This list is accurate as of: 2/22/17 10:43 AM.  Always use your most recent med list.  
  
  
  
  
 aspirin 81 mg tablet Take 81 mg by mouth daily. Indications: MYOCARDIAL INFARCTION PREVENTION  
  
 atorvastatin 20 mg tablet Commonly known as:  LIPITOR  
TAKE 1 TABLET BY MOUTH EVERY EVENING  
  
 CENTRUM SILVER PO Take 1 Tab by mouth daily. glucosam-chondroit-C-manganese 771-603-49-3 mg Cap Take 1 Tab by mouth daily. HYDROcodone-acetaminophen 5-325 mg per tablet Commonly known as:  March Castles Take 1-2 Tabs by mouth every six (6) hours as needed for Pain. Max Daily Amount: 8 Tabs. metoprolol succinate 25 mg XL tablet Commonly known as:  TOPROL-XL  
TAKE 1 TABLET BY MOUTH EVERY DAY  
  
 nitroglycerin 0.4 mg SL tablet Commonly known as:  NITROSTAT  
0.4 mg by SubLINGual route every five (5) minutes as needed for Chest Pain. PriLOSEC OTC 20 mg tablet Generic drug:  omeprazole Take 20 mg by mouth daily. VITAMIN B-12 2,500 mcg sublingual tablet Generic drug:  cyanocobalamin Take 2,500 mcg by mouth daily. VITAMIN C 1,000 mg tablet Generic drug:  ascorbic acid (vitamin C) Take 1,000 mg by mouth daily. We Performed the Following ADVANCE CARE PLANNING FIRST 30 MINS [37966 CPT(R)] Georgie [address] [[id number] [facility]] Follow-up Instructions Return in about 1 year (around 2/22/2018). Patient Instructions Medicare Part B Preventive Services Limitations Recommendation Scheduled Bone Mass Measurement 
(age 72 & older, biennial) Requires diagnosis related to osteoporosis or estrogen deficiency. Biennial benefit unless patient has history of long-term glucocorticoid tx or baseline is needed because initial test was by other method Cardiovascular Screening Blood Tests (every 5 years) Total cholesterol, HDL, Triglycerides Order as a panel if possible Colorectal Cancer Screening 
-Fecal occult blood test (annual) -Flexible sigmoidoscopy (5y) 
-Screening colonoscopy (10y) -Barium Enema Counseling to Prevent Tobacco Use (up to 8 sessions per year) - Counseling greater than 3 and up to 10 minutes - Counseling greater than 10 minutes Patients must be asymptomatic of tobacco-related conditions to receive as preventive service Diabetes Screening Tests (at least every 3 years, Medicare covers annually or at 6-month intervals for prediabetic patients) Fasting blood sugar (FBS) or glucose tolerance test (GTT) Patient must be diagnosed with one of the following: 
-Hypertension, Dyslipidemia, obesity, previous impaired FBS or GTT 
Or any two of the following: overweight, FH of diabetes, age ? 72, history of gestational diabetes, birth of baby weighing more than 9 pounds Diabetes Self-Management Training (DSMT) (no USPSTF recommendation) Requires referral by treating physician for patient with diabetes or renal disease. 10 hours of initial DSMT session of no less than 30 minutes each in a continuous 12-month period. 2 hours of follow-up DSMT in subsequent years.     
Glaucoma Screening (no USPSTF recommendation) Diabetes mellitus, family history, , age 48 or over,  American, age 72 or over Human Immunodeficiency Virus (HIV) Screening (annually for increased risk patients) HIV-1 and HIV-2 by EIA, INES, rapid antibody test, or oral mucosa transudate Patient must be at increased risk for HIV infection per USPSTF guidelines or pregnant. Tests covered annually for patients at increased risk. Pregnant patients may receive up to 3 test during pregnancy. Medical Nutrition Therapy (MNT) (for diabetes or renal disease not recommended schedule) Requires referral by treating physician for patient with diabetes or renal disease. Can be provided in same year as diabetes self-management training (DSMT), and CMS recommends medical nutrition therapy take place after DSMT. Up to 3 hours for initial year and 2 hours in subsequent years. Prostate Cancer Screening (annually up to age 76) - Digital rectal exam (MONISHA) - Prostate specific antigen (PSA) Annually (age 48 or over), MONISHA not paid separately when covered E/M service is provided on same date Seasonal Influenza Vaccination (annually) Pneumococcal Vaccination (once after 65) Hepatitis B Vaccinations (if medium/high risk) Medium/high risk factors:  End-stage renal disease, Hemophiliacs who received Factor VIII or IX concentrates, Clients of institutions for the mentally retarded, Persons who live in the same house as a HepB virus carrier, Homosexual men, Illicit injectable drug abusers. Screening Mammography (biennial age 54-69)? Annually (age 36 or over) Screening Pap Tests and Pelvic Examination (up to age 79 and after 79 if unknown history or abnormal study last 10 years) Every 24 months except high risk Ultrasound Screening for Abdominal Aortic Aneurysm (AAA) (once) Patient must be referred through IPPE and not have had a screening for abdominal aortic aneurysm before under Medicare.   Limited to patients who meet one of the following criteria: 
- Men who are 73-68 years old and have smoked more than 100 cigarettes in their lifetime. 
-Anyone with a FH of AAA 
-Anyone recommended for screening by USPSTF Introducing [address]! Dear Kael Hackett: Thank you for requesting a [employer] account. Our records indicate that you have previously registered for a [employer] account but its currently inactive. Please call our [employer] support line at [phone]. Additional Information If you have questions, please visit the Frequently Asked Questions section of the [employer] website at [email or web address] Remember, [employer] is NOT to be used for urgent needs. For medical emergencies, dial 911. Now available from your iPhone and Android! Please provide this summary of care documentation to your next provider. Your primary care clinician is listed as Janell Hussein. If you have any questions after today's visit, please call [phone].

## 2017-03-01 ENCOUNTER — HOSPITAL ENCOUNTER (OUTPATIENT)
Dept: CT IMAGING | Age: 77
Discharge: HOME | End: 2017-03-01
Attending: INTERNAL MEDICINE
Payer: MEDICARE

## 2017-03-01 DIAGNOSIS — R91.8 PULMONARY NODULES: ICD-10-CM

## 2017-03-01 LAB — CREAT UR-MCNC: 1 MG/DL (ref 0.6–1.3)

## 2017-03-01 PROCEDURE — 74011636320 HC RX REV CODE- 636/320: Performed by: INTERNAL MEDICINE

## 2017-03-01 PROCEDURE — 82565 ASSAY OF CREATININE: CPT

## 2017-03-01 PROCEDURE — 71260 CT THORAX DX C+: CPT

## 2017-03-01 RX ADMIN — IOPAMIDOL 80 ML: 612 INJECTION, SOLUTION INTRAVENOUS at 08:00

## 2017-03-03 ENCOUNTER — TELEPHONE (OUTPATIENT)
Dept: INTERNAL MEDICINE CLINIC | Age: 77
End: 2017-03-03

## 2017-03-08 RX ORDER — METOPROLOL SUCCINATE 25 MG/1
TABLET, EXTENDED RELEASE ORAL
Qty: 30 TAB | Refills: 6 | Status: SHIPPED | OUTPATIENT
Start: 2017-03-08 | End: 2017-11-04 | Stop reason: SDUPTHER

## 2017-03-15 ENCOUNTER — OFFICE VISIT (OUTPATIENT)
Dept: CARDIOLOGY CLINIC | Age: 77
End: 2017-03-15

## 2017-03-15 VITALS
DIASTOLIC BLOOD PRESSURE: 84 MMHG | HEART RATE: 54 BPM | SYSTOLIC BLOOD PRESSURE: 120 MMHG | WEIGHT: 189.6 LBS | RESPIRATION RATE: 18 BRPM | HEIGHT: 66 IN | BODY MASS INDEX: 30.47 KG/M2

## 2017-03-15 DIAGNOSIS — G47.39 OTHER SLEEP APNEA: ICD-10-CM

## 2017-03-15 DIAGNOSIS — I25.10 CORONARY ARTERY DISEASE INVOLVING NATIVE CORONARY ARTERY OF NATIVE HEART WITHOUT ANGINA PECTORIS: Primary | ICD-10-CM

## 2017-03-15 DIAGNOSIS — Z95.1 S/P CABG X 3: Chronic | ICD-10-CM

## 2017-03-15 DIAGNOSIS — R73.01 IFG (IMPAIRED FASTING GLUCOSE): ICD-10-CM

## 2017-03-15 DIAGNOSIS — I65.23 BILATERAL CAROTID ARTERY STENOSIS: ICD-10-CM

## 2017-03-15 DIAGNOSIS — E78.5 HYPERLIPIDEMIA, UNSPECIFIED HYPERLIPIDEMIA TYPE: ICD-10-CM

## 2017-03-15 NOTE — PROGRESS NOTES
HPI: I saw Madonna Shabazz in my office today in cardiovascular evaluation regarding his chronic coronary artery disease.  Mr. Patel is a very pleasant 64 y.o. white male with history of gastroesophageal reflux disease with Pederson's esophagus, asthma, obstructive sleep apnea, cancer of the prostate, significant coronary artery disease with development of exertional angina over a 6-month period of time prompting a nuclear myocardial perfusion study ordered by Dr. Tonya Cordova in April 2014 which was slightly abnormal and ultimately with his symptom complex prompted a cardiac catheterization by Dr. Chavo Aaron demonstrating several three vessel coronary disease with subsequent coronary artery bypass grafting surgery x 3 by Dr. Colonel Canela with the following bypasses:       1. Left internal mammary artery to the LAD. 2. SVG to the second diagonal coronary artery off of the left anterior descending and posterior descending branch of the distal right coronary artery.      He did reasonably well postop. He did have a little sinus tachycardia initially for which we put him on beta blockers. Since the beta blockers caused fatigue with Lopressor 50 mg twice a day, it was decreased to 25 mg twice a day and then ultimately switched to Toprol XL 25 mg daily. He comes in today relates that he is continued to do quite well. He is remaining fairly active for his age and really denies any cardiovascular symptomatology at this time. Encounter Diagnoses   Name Primary?  Coronary artery disease involving native coronary artery of native heart without angina pectoris Yes    S/P CABG x 3     Hyperlipidemia, unspecified hyperlipidemia type     Bilateral carotid artery stenosis 12/14     IFG (impaired fasting glucose)     Sleep apnea        Discussion: This gentleman appears to be doing about as well as we could expect and really have no recommendations for change at this time.   He is not having any symptoms to suggest the development of symptomatic obstructive coronary artery disease or any heart failure issues. His latest lipid profile which was completed on February 13, 2017 showed total cholesterol 117 with triglycerides of 93, HDL 49, LDL of 49.4, and VLDL of 18.6 which I think is very good control on Lipitor 20 mg daily. He does have history of carotid disease and a right carotid bruit, but we got a recent carotid Dopplers completed on October 13, 2016 which demonstrated bilateral internal carotid artery stenoses in the 50-69% range which were unchanged from the study of April 6, 2016. His blood pressure is well-controlled today and his EKG appears to be stable so I will simply plan to see him again in several months or as needed if any new cardiovascular symptoms surface in the interim. PCP: Tommy Taylor MD       Past Medical History:   Diagnosis Date    Allergic rhinitis     Arthritis     hands    Asthma     mild obst disease on pfts Dr. Quynh Coleman Pederson's esophagus     Dr. Erik Price Bilateral carotid artery stenosis     12/14 BICA 50-69%    BPH (benign prostatic hyperplasia)     BPH without obstruction/lower urinary tract symptoms     CABG x 3 04/24/2014    LIMA to LAD, and saphenous vein grafts to the second diagonal of the LAD, and to the posterior descending branch of the RCA, Dr. Apollo Dove, 4/23/14.  CAD (coronary artery disease)     Cardiac catheterization 04/18/2014    Superdominant RCA. mRCA 90%. pRPDA 60%. LM (modest sized) patent.  p-mLAD 85%. CX < 50%. LVEDP 17 mmHg. EF 60%. No RWMA. CABG recommended.  Cardiac echocardiogram 06/24/2008    EF 70%. Mild conc LVH. Gr 2 DDfx. Mild LAE. Mild MR. PASP 38 mmHg.  Cardiac nuclear imaging study, abnormal 04/15/2014    Mod ischemia in lg area of anterior wall, anteroapex, anteroseptum c/w high-grade prox LAD obstruction. Mod partially transient defect likely ischemia w/very sm infarction in RCA.   TID 1.31 suggests 3-vessel CAD. Mod distal anterior hypk. Mild-mod anteroseptal, inferoseptal, anterolateral hypk. EF 46%. Strongly positive EKG on max EST. Ex time 6 min.  Carotid duplex 04/06/2016    Mod 50-69% bilateral ICA stenosis. Probable significant LECA stenosis. Similar to study of 8/12/15. Multiple nodules noted in right thyroid.  Colon polyps 2007    Dr. Izzy Browning Difficulty hearing     Erectile dysfunction     FHx: heart disease     GERD (gastroesophageal reflux disease)     by EGD 2007 Dr. Izzy Browning IFG (impaired fasting glucose) 5/14    Impotence     Lower urinary tract symptoms (LUTS)     Multinodular goiter     2014; 2015; 4/16    Obesity     peak weight 202 lbs, bmi 31.4 from 4/14    Prostate cancer ([facility] [address] 8/10    Dr. Danisha Bejarano, s/p cryo Dr. Isa Valdez Pulmonary nodule 2014 2015    Sleep apnea        Past Surgical History:   Procedure Laterality Date    CARDIAC SURG PROCEDURE UNLIST  11/06    thallium negative    CARDIAC SURG PROCEDURE UNLIST  4/14    3V CABG Dr Leigh Staff  2007    mild obst disease on PFTs Dr. Callie Aden HX CATARACT REMOVAL  11/14    bilat Dr Janet Vazquez  2004   Jeannie Foots      Dr. Krzysztof Bello 2007 negative   Yuliana Posada HX TURP      x2 Dr. Dilan Gomes HX UROLOGICAL  10/2012    [facility], Dr. Patti Graf, Cystoscopy and dilation of stricture, cryoablation of prostate    VASCULAR SURGERY PROCEDURE UNLIST  12/14    BICA 50-69% stenosis       Current Outpatient Rx   Name  Route  Sig  Dispense  Refill    atorvastatin (LIPITOR) 20 mg tablet        TAKE 1 TABLET BY MOUTH EVERY EVENING    30 Tab    1      metoprolol succinate (TOPROL-XL) 25 mg XL tablet        TAKE 1 TABLET BY MOUTH EVERY DAY    30 Tab    6      PROLENSA 0.07 % drop    Both Eyes    Administer 1 drop to both eyes daily.               DUREZOL 0.05 % ophthalmic emulsion    Both Eyes    Administer 1 drop to both eyes two (2) times a day.  nitroglycerin (NITROSTAT) 0.4 mg SL tablet    SubLINGual    0.4 mg by SubLINGual route every five (5) minutes as needed for Chest Pain.  omeprazole (PRILOSEC OTC) 20 mg tablet    Oral    Take 20 mg by mouth daily.  glucosam-chondroit-C-manganese 092-450-21-3 mg cap    Oral    Take 1 Tab by mouth daily.  cyanocobalamin (VITAMIN B-12) 2,500 mcg sublingual tablet    Oral    Take 2,500 mcg by mouth daily.  ascorbic acid (VITAMIN C) 1,000 mg tablet    Oral    Take 1,000 mg by mouth daily.  MULTIVITAMIN W-MINERALS/LUTEIN (CENTRUM SILVER PO)    Oral    Take 1 Tab by mouth daily.  aspirin 81 mg tablet    Oral    Take 81 mg by mouth daily. Indications: MYOCARDIAL INFARCTION PREVENTION                 Allergies   Allergen Reactions    Albuterol Rash     seizure    Influenza Virus Vaccine, Specific Other (comments)    Percocet [Oxycodone-Acetaminophen] Other (comments)       Social History :  Social History   Substance Use Topics    Smoking status: Former Smoker     Packs/day: 0.50     Years: 4.00     Quit date: 1/1/1957    Smokeless tobacco: Never Used      Comment: Quit in 1957    Alcohol use No        Family History: family history includes Diabetes in his sister; Heart Disease in his father; Hypertension in his mother; Stroke in his mother. Review of Systems:    Constitutional: Negative. Respiratory: Negative. Cardiovascular: Negative. Gastrointestinal: Negative. Musculoskeletal: Negative. Physical Exam:    The patient is a cooperative, alert, well developed, well nourished 64 y.o.  male who is in no acute distress at the time of the examination.   Visit Vitals    /84 (BP 1 Location: Right arm, BP Patient Position: Sitting)    Pulse (!) 54    Resp 18    Ht 5' 6\" (1.676 m)    Wt 86 kg (189 lb 9.6 oz)    BMI 30.6 kg/m2       HEENT: Conjuctiva white, mucosa moist, no pallor or cyanosis. NECK: Supple without masses, tenderness or thyromegaly. There was no jugular venous distention. Carotid are full bilaterally with soft bilateral bruits right greater than left vs. radiation of murmur to the neck. CHEST: Symmetrical with good excursion. LUNGS: Clear to auscultation in all fields. HEART: The apex is not displaced. There were no lifts, thrills or heaves. There is a normal S1 and S2. There is a grade I/VI PILAR along the LSB with radiation to the base and lower neck without appreciable diastolic murmurs, rubs, clicks, or gallops auscultated. ABDOMEN: Soft without masses, tenderness or organomegaly. EXTREMITIES: Full peripheral pulses without peripheral edema. Review of Data: See PMH and Cardiology and Imaging sections for cardiac testing  Lab Results   Component Value Date/Time    Cholesterol, total 117 02/14/2017 07:10 AM    HDL Cholesterol 49 02/14/2017 07:10 AM    LDL, calculated 49.4 02/14/2017 07:10 AM    Triglyceride 93 02/14/2017 07:10 AM    CHOL/HDL Ratio 2.4 02/14/2017 07:10 AM       Results for orders placed or performed in visit on 03/15/17   AMB POC EKG ROUTINE W/ 12 LEADS, INTER & REP     Status: None    Narrative    Sinus bradycardia rate 54. First-degree AV block. Minor nonspecific high lateral ST-T changes. Compared to the EKG of September 14, 2016 there was no significant interval change. Gerson Laurent D.O., F.A.C.C. [facility]  [facility]  [address] [place] [address]. [address]    PLEASE NOTE:  This document has been produced using voice recognition software. Unrecognized errors in transcription may be present.

## 2017-03-15 NOTE — MR AVS SNAPSHOT
Visit Information Date & Time Provider Department Dept. Phone Encounter #  
 3/15/2017  8:20 AM Estefania Mccallum DO [facility] [id number] Follow-up Instructions Return in about 6 months (around 9/15/2017), or if symptoms worsen or fail to improve. Your Appointments 2/21/2018 10:30 AM  
PHYSICAL with Brian Aaron MD  
Internist of [address]) Appt Note: rpe [address]  
 [address] [facility], [address] [address] [address]  
  
   
 [address], [address] Upcoming Health Maintenance Date Due  
 MEDICARE YEARLY EXAM 2/23/2018 GLAUCOMA SCREENING Q2Y 2/16/2019 DTaP/Tdap/Td series (2 - Td) 8/12/2025 Allergies as of 3/15/2017  Review Complete On: 3/15/2017 By: Estefania Mccallum DO Severity Noted Reaction Type Reactions Albuterol    Rash  
 seizure Influenza Virus Vaccine, Specific  08/08/2014    Other (comments) Percocet [Oxycodone-acetaminophen]  08/08/2014    Other (comments) Current Immunizations  Reviewed on 2/16/2017 Name Date Pneumococcal Conjugate (PCV-13) 8/12/2015 Pneumococcal Vaccine (Unspecified Type) 9/11/2008 TD Vaccine 9/10/2008 Tdap 8/12/2015 Zoster 4/24/2008 Not reviewed this visit You Were Diagnosed With   
  
 Codes Comments Coronary artery disease involving native coronary artery of native heart without angina pectoris    -  Primary ICD-10-CM: I25.10 ICD-9-CM: 414.01 S/P CABG x 3     ICD-10-CM: Z95.1 ICD-9-CM: V45.81 Hyperlipidemia, unspecified hyperlipidemia type     ICD-10-CM: E78.5 ICD-9-CM: 272.4 Bilateral carotid artery stenosis     ICD-10-CM: I65.23 ICD-9-CM: 433.10, 433.30 IFG (impaired fasting glucose)     ICD-10-CM: R73.01 
ICD-9-CM: 790.21 Other sleep apnea     ICD-10-CM: G47.39 
ICD-9-CM: 327.29 Vitals BP Pulse Resp Height(growth percentile) Weight(growth percentile) BMI  
 120/84 (BP 1 Location: Right arm, BP Patient Position: Sitting) (!) 54 18 5' 6\" (1.676 m) 189 lb 9.6 oz (86 kg) 30.6 kg/m2 Smoking Status Former Smoker Vitals History BMI and BSA Data Body Mass Index Body Surface Area  
 30.6 kg/m 2 2 m 2 Preferred Pharmacy Pharmacy Name Phone [id number] [address] Khan, [address] [facility] [facility] [phone] Your Updated Medication List  
  
   
This list is accurate as of: 3/15/17  9:01 AM.  Always use your most recent med list.  
  
  
  
  
 aspirin 81 mg tablet Take 81 mg by mouth daily. Indications: MYOCARDIAL INFARCTION PREVENTION  
  
 atorvastatin 20 mg tablet Commonly known as:  LIPITOR  
TAKE 1 TABLET BY MOUTH EVERY EVENING  
  
 CENTRUM SILVER PO Take 1 Tab by mouth daily. glucosam-chondroit-C-manganese 809-844-41-3 mg Cap Take 1 Tab by mouth daily. HYDROcodone-acetaminophen 5-325 mg per tablet Commonly known as:  Iven Frost Take 1-2 Tabs by mouth every six (6) hours as needed for Pain. Max Daily Amount: 8 Tabs. metoprolol succinate 25 mg XL tablet Commonly known as:  TOPROL-XL  
TAKE 1 TABLET BY MOUTH EVERY DAY  
  
 nitroglycerin 0.4 mg SL tablet Commonly known as:  NITROSTAT  
0.4 mg by SubLINGual route every five (5) minutes as needed for Chest Pain. PriLOSEC OTC 20 mg tablet Generic drug:  omeprazole Take 20 mg by mouth daily. VITAMIN B-12 2,500 mcg sublingual tablet Generic drug:  cyanocobalamin Take 2,500 mcg by mouth daily. VITAMIN C 1,000 mg tablet Generic drug:  ascorbic acid (vitamin C) Take 1,000 mg by mouth daily. We Performed the Following AMB POC EKG ROUTINE W/ 12 LEADS, INTER & REP [17814 CPT(R)] Follow-up Instructions Return in about 6 months (around 9/15/2017), or if symptoms worsen or fail to improve. Introducing [facility]! Dear Isacc Arrieta: Thank you for requesting a Gen[employer] account. Our records indicate that you have previously registered for a Genym[employer] account but its currently inactive. Please call our Genymobile support line at [phone]. Additional Information If you have questions, please visit the Frequently Asked Questions section of the Gen[employer] website at [email or web address] Remember, Genymobile is NOT to be used for urgent needs. For medical emergencies, dial 911. Now available from your iPhone and Android! Please provide this summary of care documentation to your next provider. Your primary care clinician is listed as Bobby Savage. If you have any questions after today's visit, please call [phone].

## 2017-04-06 RX ORDER — ATORVASTATIN CALCIUM 20 MG/1
TABLET, FILM COATED ORAL
Qty: 90 TAB | Refills: 3 | Status: SHIPPED | OUTPATIENT
Start: 2017-04-06 | End: 2018-05-28 | Stop reason: SDUPTHER

## 2017-04-07 ENCOUNTER — TELEPHONE (OUTPATIENT)
Dept: INTERNAL MEDICINE CLINIC | Age: 77
End: 2017-04-07

## 2017-06-30 ENCOUNTER — OFFICE VISIT (OUTPATIENT)
Dept: INTERNAL MEDICINE CLINIC | Age: 77
End: 2017-06-30

## 2017-06-30 VITALS
RESPIRATION RATE: 17 BRPM | TEMPERATURE: 98.5 F | HEIGHT: 66 IN | BODY MASS INDEX: 30.98 KG/M2 | SYSTOLIC BLOOD PRESSURE: 146 MMHG | WEIGHT: 192.8 LBS | OXYGEN SATURATION: 96 % | HEART RATE: 69 BPM | DIASTOLIC BLOOD PRESSURE: 66 MMHG

## 2017-06-30 DIAGNOSIS — M25.561 ACUTE PAIN OF RIGHT KNEE: Primary | ICD-10-CM

## 2017-06-30 NOTE — PROGRESS NOTES
64 y. o.  OR  male who presents for evaluation. Wife is here for the eval    He is here asking to have his right leg checked out. For the last week or so, it has been hurting primarily in the posterior part of the knee in the popliteal region. He does not recall any injury but he does have known arthritis there. He is concerned because a friend of theirs apparently got a DVT and had similar symptoms. He was on the road to [place] and back about 2 weeks ago, he has not really noted any distal swelling. The pain is actually much better today, pretty much resolved after he started walking around. Denies any chest pain, pleurisy, shortness of breath, erythema, warmth. He does not recall any specific injury, rom is normal to him    Past Medical History:   Diagnosis Date    Allergic rhinitis     Arthritis     hands    Asthma     mild obst disease on pfts Dr. Nancy Barr Pederson's esophagus     Dr. Talya Louis Bilateral carotid artery stenosis     12/14 BICA 50-69%    BPH (benign prostatic hyperplasia)     BPH without obstruction/lower urinary tract symptoms     CABG x 3 04/24/2014    LIMA to LAD, and saphenous vein grafts to the second diagonal of the LAD, and to the posterior descending branch of the RCA, Dr. Mela Ag, 4/23/14.  CAD (coronary artery disease)     Cardiac catheterization 04/18/2014    Superdominant RCA. mRCA 90%. pRPDA 60%. LM (modest sized) patent.  p-mLAD 85%. CX < 50%. LVEDP 17 mmHg. EF 60%. No RWMA. CABG recommended.  Cardiac echocardiogram 06/24/2008    EF 70%. Mild conc LVH. Gr 2 DDfx. Mild LAE. Mild MR. PASP 38 mmHg.  Cardiac nuclear imaging study, abnormal 04/15/2014    Mod ischemia in lg area of anterior wall, anteroapex, anteroseptum c/w high-grade prox LAD obstruction. Mod partially transient defect likely ischemia w/very sm infarction in RCA. TID 1.31 suggests 3-vessel CAD. Mod distal anterior hypk.   Mild-mod anteroseptal, inferoseptal, anterolateral hypk. EF 46%. Strongly positive EKG on max EST. Ex time 6 min.  Carotid duplex 04/06/2016    Mod 50-69% bilateral ICA stenosis. Probable significant LECA stenosis. Similar to study of 8/12/15. Multiple nodules noted in right thyroid.  Colon polyps 2007    Dr. Stephen Luna Difficulty hearing     Erectile dysfunction     FHx: heart disease     GERD (gastroesophageal reflux disease)     by EGD 2007 Dr. Stephen Luna IFG (impaired fasting glucose) 5/14    Impotence     Lower urinary tract symptoms (LUTS)     Multinodular goiter     2014; 2015; 4/16    Obesity     peak weight 202 lbs, bmi 31.4 from 4/14    Prostate cancer ([facility] [address] 8/10    Dr. Cherelle Marshall, s/p cryo Dr. Gabriel Hogue Pulmonary nodule 2014 2015    Sleep apnea      Current Outpatient Prescriptions   Medication Sig    atorvastatin (LIPITOR) 20 mg tablet TAKE 1 TABLET BY MOUTH EVERY EVENING    metoprolol succinate (TOPROL-XL) 25 mg XL tablet TAKE 1 TABLET BY MOUTH EVERY DAY    nitroglycerin (NITROSTAT) 0.4 mg SL tablet 0.4 mg by SubLINGual route every five (5) minutes as needed for Chest Pain.  omeprazole (PRILOSEC OTC) 20 mg tablet Take 20 mg by mouth daily.  glucosam-chondroit-C-manganese 394-584-43-3 mg cap Take 1 Tab by mouth daily.  cyanocobalamin (VITAMIN B-12) 2,500 mcg sublingual tablet Take 2,500 mcg by mouth daily.  ascorbic acid (VITAMIN C) 1,000 mg tablet Take 1,000 mg by mouth daily.  MULTIVITAMIN W-MINERALS/LUTEIN (CENTRUM SILVER PO) Take 1 Tab by mouth daily.  aspirin 81 mg tablet Take 81 mg by mouth daily. Indications: MYOCARDIAL INFARCTION PREVENTION    HYDROcodone-acetaminophen (NORCO) 5-325 mg per tablet Take 1-2 Tabs by mouth every six (6) hours as needed for Pain. Max Daily Amount: 8 Tabs. No current facility-administered medications for this visit.       Allergies   Allergen Reactions    Albuterol Rash     seizure    Influenza Virus Vaccine, Specific Other (comments)    Percocet [Oxycodone-Acetaminophen] Other (comments)     Visit Vitals    /66    Pulse 69    Temp 98.5 °F (36.9 °C) (Oral)    Resp 17    Ht 5' 6\" (1.676 m)    Wt 192 lb 12.8 oz (87.5 kg)    SpO2 96%    BMI 31.12 kg/m2    back showed no CVA tenderness, negative straight leg bilaterally, no pain on manipulation of the hips or palpation with the trochanters. Knee exam revealed normal rom,  no joint line tenderness anywhere. Negative Lachman and ant drawer, normal posterior cruciate test, normal valgus and varus maneuvers. No clear tender areas or fullness or warmth, no visible erythema anywhere in the affected region. He has no distal swelling, pulses were 2+ DP and PT. No bruising or rash    Assessment and plan:  1. Posterior knee pain, possibly arthritis. We discussed possibly getting d-dimer or even doppler to rule out DVT. For now with low index of suspicion, they decided to hold off especially since symptoms are much improved. Call if worsening symptoms        Above conditions discussed at length and patient vocalized understanding.   All questions answered to patient satifaction

## 2017-06-30 NOTE — MR AVS SNAPSHOT
Visit Information Date & Time Provider Department Dept. Phone Encounter #  
 6/30/2017  3:30 PM Sylvia Sneed MD Internist of [address] [place] [address] [phone] [id number] Your Appointments 2/23/2018  1:00 PM  
PHYSICAL with Sylvia Sneed MD  
Internist of [facility]) Appt Note: rpe  
 [address] [facility] [address] [place]  
  
   
 [address], [address] Upcoming Health Maintenance Date Due INFLUENZA AGE 9 TO ADULT 8/1/2017 MEDICARE YEARLY EXAM 2/23/2018 GLAUCOMA SCREENING Q2Y 2/16/2019 DTaP/Tdap/Td series (2 - Td) 8/12/2025 Allergies as of 6/30/2017  Review Complete On: 6/30/2017 By: Gustavo Coker LPN Severity Noted Reaction Type Reactions Albuterol    Rash  
 seizure Influenza Virus Vaccine, Specific  08/08/2014    Other (comments) Percocet [Oxycodone-acetaminophen]  08/08/2014    Other (comments) Current Immunizations  Reviewed on 2/16/2017 Name Date Pneumococcal Conjugate (PCV-13) 8/12/2015 Pneumococcal Vaccine (Unspecified Type) 9/11/2008 TD Vaccine 9/10/2008 Tdap 8/12/2015 Zoster 4/24/2008 Not reviewed this visit Vitals BP Pulse Temp Resp Height(growth percentile) Weight(growth percentile) 146/66 69 98.5 °F (36.9 °C) (Oral) 17 5' 6\" (1.676 m) 192 lb 12.8 oz (87.5 kg) SpO2 BMI Smoking Status 96% 31.12 kg/m2 Former Smoker Vitals History BMI and BSA Data Body Mass Index Body Surface Area  
 31.12 kg/m 2 2.02 m 2 Preferred Pharmacy Pharmacy Name Phone [id number] Rin Lau, [address] [facility] [facility] [phone] Your Updated Medication List  
  
   
This list is accurate as of: 6/30/17  3:57 PM.  Always use your most recent med list.  
  
  
  
  
 aspirin 81 mg tablet Take 81 mg by mouth daily.  Indications: MYOCARDIAL INFARCTION PREVENTION  
  
 atorvastatin 20 mg tablet Commonly known as:  LIPITOR  
TAKE 1 TABLET BY MOUTH EVERY EVENING  
  
 CENTRUM SILVER PO Take 1 Tab by mouth daily. glucosam-chondroit-C-manganese 527-477-23-3 mg Cap Take 1 Tab by mouth daily. HYDROcodone-acetaminophen 5-325 mg per tablet Commonly known as:  Carmen Staple Take 1-2 Tabs by mouth every six (6) hours as needed for Pain. Max Daily Amount: 8 Tabs. metoprolol succinate 25 mg XL tablet Commonly known as:  TOPROL-XL  
TAKE 1 TABLET BY MOUTH EVERY DAY  
  
 nitroglycerin 0.4 mg SL tablet Commonly known as:  NITROSTAT  
0.4 mg by SubLINGual route every five (5) minutes as needed for Chest Pain. PriLOSEC OTC 20 mg tablet Generic drug:  omeprazole Take 20 mg by mouth daily. VITAMIN B-12 2,500 mcg sublingual tablet Generic drug:  cyanocobalamin Take 2,500 mcg by mouth daily. VITAMIN C 1,000 mg tablet Generic drug:  ascorbic acid (vitamin C) Take 1,000 mg by mouth daily. Introducing [facility]! Dear Dc Sales: Thank you for requesting a Tr[employer]ajo[employer] account. Our records indicate that you have previously registered for a Tr[employer]ajo[employer] account but its currently inactive. Please call our Tr[employer] support line at [phone]. Additional Information If you have questions, please visit the Frequently Asked Questions section of the [employer] website at [email or web address] Remember, TrabajoPanel is NOT to be used for urgent needs. For medical emergencies, dial 911. Now available from your iPhone and Android! Please provide this summary of care documentation to your next provider. Your primary care clinician is listed as Maryam Deutsch. If you have any questions after today's visit, please call [phone].

## 2017-06-30 NOTE — PROGRESS NOTES
Chief Complaint   Patient presents with    Knee Pain     x 1 week     1. Have you been to the ER, urgent care clinic since your last visit? Hospitalized since your last visit? No    2. Have you seen or consulted any other health care providers outside of the [facility] since your last visit? Include any pap smears or colon screening.  No

## 2017-07-21 RX ORDER — HYDROGEN PEROXIDE 3 %
20 SOLUTION, NON-ORAL MISCELLANEOUS DAILY
Status: ON HOLD | COMMUNITY
End: 2017-12-19

## 2017-07-26 ENCOUNTER — ANESTHESIA EVENT (OUTPATIENT)
Dept: ENDOSCOPY | Age: 77
End: 2017-07-26
Payer: MEDICARE

## 2017-07-27 ENCOUNTER — HOSPITAL ENCOUNTER (OUTPATIENT)
Age: 77
Setting detail: OUTPATIENT SURGERY
Discharge: HOME | End: 2017-07-27
Attending: INTERNAL MEDICINE | Admitting: INTERNAL MEDICINE
Payer: MEDICARE

## 2017-07-27 ENCOUNTER — ANESTHESIA (OUTPATIENT)
Dept: ENDOSCOPY | Age: 77
End: 2017-07-27
Payer: MEDICARE

## 2017-07-27 VITALS
WEIGHT: 185 LBS | HEIGHT: 67 IN | OXYGEN SATURATION: 99 % | RESPIRATION RATE: 20 BRPM | DIASTOLIC BLOOD PRESSURE: 78 MMHG | SYSTOLIC BLOOD PRESSURE: 122 MMHG | BODY MASS INDEX: 29.03 KG/M2 | TEMPERATURE: 98.1 F | HEART RATE: 49 BPM

## 2017-07-27 PROCEDURE — 88305 TISSUE EXAM BY PATHOLOGIST: CPT | Performed by: INTERNAL MEDICINE

## 2017-07-27 PROCEDURE — 76040000007: Performed by: INTERNAL MEDICINE

## 2017-07-27 PROCEDURE — 74011250636 HC RX REV CODE- 250/636: Performed by: NURSE ANESTHETIST, CERTIFIED REGISTERED

## 2017-07-27 PROCEDURE — 76060000032 HC ANESTHESIA 0.5 TO 1 HR: Performed by: INTERNAL MEDICINE

## 2017-07-27 PROCEDURE — 77030018775 HC LIG MULTBND COOK -C: Performed by: INTERNAL MEDICINE

## 2017-07-27 PROCEDURE — 77030011640 HC PAD GRND REM COVD -A: Performed by: INTERNAL MEDICINE

## 2017-07-27 PROCEDURE — 77030013992 HC SNR POLYP ENDOSC BSC -B: Performed by: INTERNAL MEDICINE

## 2017-07-27 PROCEDURE — 74011000250 HC RX REV CODE- 250

## 2017-07-27 RX ORDER — LIDOCAINE HYDROCHLORIDE 10 MG/ML
0.1 INJECTION, SOLUTION EPIDURAL; INFILTRATION; INTRACAUDAL; PERINEURAL AS NEEDED
Status: DISCONTINUED | OUTPATIENT
Start: 2017-07-27 | End: 2017-07-27 | Stop reason: HOSPADM

## 2017-07-27 RX ORDER — SODIUM CHLORIDE, SODIUM LACTATE, POTASSIUM CHLORIDE, CALCIUM CHLORIDE 600; 310; 30; 20 MG/100ML; MG/100ML; MG/100ML; MG/100ML
INJECTION, SOLUTION INTRAVENOUS
Status: DISCONTINUED | OUTPATIENT
Start: 2017-07-27 | End: 2017-07-27 | Stop reason: HOSPADM

## 2017-07-27 RX ORDER — SODIUM CHLORIDE, SODIUM LACTATE, POTASSIUM CHLORIDE, CALCIUM CHLORIDE 600; 310; 30; 20 MG/100ML; MG/100ML; MG/100ML; MG/100ML
75 INJECTION, SOLUTION INTRAVENOUS CONTINUOUS
Status: DISCONTINUED | OUTPATIENT
Start: 2017-07-27 | End: 2017-07-27 | Stop reason: HOSPADM

## 2017-07-27 RX ORDER — SODIUM CHLORIDE 0.9 % (FLUSH) 0.9 %
5-10 SYRINGE (ML) INJECTION EVERY 8 HOURS
Status: DISCONTINUED | OUTPATIENT
Start: 2017-07-27 | End: 2017-07-27 | Stop reason: HOSPADM

## 2017-07-27 RX ORDER — INSULIN LISPRO 100 [IU]/ML
INJECTION, SOLUTION INTRAVENOUS; SUBCUTANEOUS ONCE
Status: DISCONTINUED | OUTPATIENT
Start: 2017-07-27 | End: 2017-07-27 | Stop reason: HOSPADM

## 2017-07-27 RX ORDER — LIDOCAINE HYDROCHLORIDE 20 MG/ML
INJECTION, SOLUTION EPIDURAL; INFILTRATION; INTRACAUDAL; PERINEURAL AS NEEDED
Status: DISCONTINUED | OUTPATIENT
Start: 2017-07-27 | End: 2017-07-27 | Stop reason: HOSPADM

## 2017-07-27 RX ORDER — SODIUM CHLORIDE 0.9 % (FLUSH) 0.9 %
5-10 SYRINGE (ML) INJECTION AS NEEDED
Status: DISCONTINUED | OUTPATIENT
Start: 2017-07-27 | End: 2017-07-27 | Stop reason: HOSPADM

## 2017-07-27 RX ADMIN — SODIUM CHLORIDE, SODIUM LACTATE, POTASSIUM CHLORIDE, AND CALCIUM CHLORIDE 75 ML/HR: 600; 310; 30; 20 INJECTION, SOLUTION INTRAVENOUS at 09:42

## 2017-07-27 RX ADMIN — SODIUM CHLORIDE, SODIUM LACTATE, POTASSIUM CHLORIDE, CALCIUM CHLORIDE: 600; 310; 30; 20 INJECTION, SOLUTION INTRAVENOUS at 10:43

## 2017-07-27 RX ADMIN — LIDOCAINE HYDROCHLORIDE 80 MG: 20 INJECTION, SOLUTION EPIDURAL; INFILTRATION; INTRACAUDAL; PERINEURAL at 10:54

## 2017-07-27 NOTE — ANESTHESIA POSTPROCEDURE EVALUATION
Post-Anesthesia Evaluation and Assessment    Patient: Tonya Bond MRN: [id number]  SSN: [id number]    YOB: 1940  Age: 64 y.o. Sex: male       Cardiovascular Function/Vital Signs  Visit Vitals    /77    Pulse (!) 57    Temp 36.7 °C (98.1 °F)    Resp 14    Ht 5' 7\" (1.702 m)    Wt 83.9 kg (185 lb)    SpO2 98%    BMI 28.98 kg/m2       Patient is status post MAC anesthesia for Procedure(s):  UPPER ENDOSCOPY with biopsies with EMR  COLONOSCOPY with Polypectomies. Nausea/Vomiting: None    Postoperative hydration reviewed and adequate. Pain:  Pain Scale 1: Numeric (0 - 10) (07/27/17 0934)  Pain Intensity 1: 0 (07/27/17 0934)   Managed    Neurological Status: At baseline    Mental Status and Level of Consciousness: Alert and oriented     Pulmonary Status:   O2 Device: Room air (07/27/17 1123)   Adequate oxygenation and airway patent    Complications related to anesthesia: None    Post-anesthesia assessment completed.  No concerns    Signed By: Brittany Ivan MD     July 27, 2017

## 2017-07-27 NOTE — DISCHARGE INSTRUCTIONS
Colon Polyps: Care Instructions  Your Care Instructions    Colon polyps are growths in the colon or the rectum. The cause of most colon polyps is not known, and most people who get them do not have any problems. But a certain kind can turn into cancer. For this reason, regular testing for colon polyps is important for people age 48 and older and anyone who has an increased risk for colon cancer. Polyps are usually found through routine colon cancer screening tests. Although most colon polyps are not cancerous, they are usually removed and then tested for cancer. Screening for colon cancer saves lives because the cancer can usually be cured if it is caught early. If you have a polyp that is the type that can turn into cancer, you may need more tests to examine your entire colon. The doctor will remove any other polyps that he or she finds, and you will be tested more often. Follow-up care is a key part of your treatment and safety. Be sure to make and go to all appointments, and call your doctor if you are having problems. It's also a good idea to know your test results and keep a list of the medicines you take. How can you care for yourself at home? Regular exams to look for colon polyps are the best way to prevent polyps from turning into colon cancer. These can include stool tests, sigmoidoscopy, colonoscopy, and CT colonography. Talk with your doctor about a testing schedule that is right for you. To prevent polyps  There is no home treatment that can prevent colon polyps. But these steps may help lower your risk for cancer. · Stay active. Being active can help you get to and stay at a healthy weight. Try to exercise on most days of the week. Walking is a good choice. · Eat well. Choose a variety of vegetables, fruits, legumes (such as peas and beans), fish, poultry, and whole grains. · Do not smoke. If you need help quitting, talk to your doctor about stop-smoking programs and medicines.  These can increase your chances of quitting for good. · If you drink alcohol, limit how much you drink. Limit alcohol to 2 drinks a day for men and 1 drink a day for women. When should you call for help? Call your doctor now or seek immediate medical care if:  · You have severe belly pain. · Your stools are maroon or very bloody. Watch closely for changes in your health, and be sure to contact your doctor if:  · You have a fever. · You have nausea or vomiting. · You have a change in bowel habits (new constipation or diarrhea). · Your symptoms get worse or are not improving as expected. Where can you learn more? Go to http://martine-feng.info/. Enter 95 [id number] in the search box to learn more about \"Colon Polyps: Care Instructions. \"  Current as of: May 5, 2017  Content Version: 11.3  © 2520-9690 [employer]. Care instructions adapted under license by [employer] (which disclaims liability or warranty for this information). If you have questions about a medical condition or this instruction, always ask your healthcare professional. [place] [address] any warranty or liability for your use of this information. Upper GI Endoscopy: What to Expect at [address] [place] [address]  After you have an endoscopy, you will stay at the hospital or clinic for 1 to 2 hours. This will allow the medicine to wear off. You will be able to go home after your doctor or nurse checks to make sure you are not having any problems. You may have to stay overnight if you had treatment during the test. You may have a sore throat for a day or two after the test.  This care sheet gives you a general idea about what to expect after the test.  How can you care for yourself at home? Activity  · Rest as much as you need to after you go home.   · You should be able to go back to your usual activities the day after the test.  Diet  · Follow your doctor's directions for eating after the test.  · Drink plenty of fluids (unless your doctor has told you not to). Medications  · If you have a sore throat the day after the test, use an over-the-counter spray to numb your throat. Follow-up care is a key part of your treatment and safety. Be sure to make and go to all appointments, and call your doctor if you are having problems. It's also a good idea to know your test results and keep a list of the medicines you take. When should you call for help? Call 911 anytime you think you may need emergency care. For example, call if:  · You passed out (lost consciousness). · You cough up blood. · You vomit blood or what looks like coffee grounds. · You pass maroon or very bloody stools. Call your doctor now or seek immediate medical care if:  · You have trouble swallowing. · You have belly pain. · Your stools are black and tarlike or have streaks of blood. · You are sick to your stomach or cannot keep fluids down. Watch closely for changes in your health, and be sure to contact your doctor if:  · Your throat still hurts after a day or two. · You do not get better as expected. Where can you learn more? Go to [employer]  Enter J454 in the search box to learn more about \"Upper GI Endoscopy: What to Expect at Home. \"   © 7094-3188 [employer]. Care instructions adapted under license by [facility] (which disclaims liability or warranty for this information). This care instruction is for use with your licensed healthcare professional. If you have questions about a medical condition or this instruction, always ask your healthcare professional. [address] any warranty or liability for your use of this information. Content Version: 57.6.188963; Current as of: November 14, 2014     Hiatal Hernia: Care Instructions  Your Care Instructions  A hiatal hernia occurs when part of the stomach bulges into the chest cavity.   A hiatal hernia may allow stomach acid and juices to back up into the esophagus (acid reflux). This can cause a feeling of burning, warmth, heat, or pain behind the breastbone. This feeling may often occur after you eat, soon after you lie down, or when you bend forward, and it may come and go. You also may have a sour taste in your mouth. These symptoms are commonly known as heartburn or reflux. But not all hiatal hernias cause symptoms. Follow-up care is a key part of your treatment and safety. Be sure to make and go to all appointments, and call your doctor if you are having problems. Its also a good idea to know your test results and keep a list of the medicines you take. How can you care for yourself at home? · Take your medicines exactly as prescribed. Call your doctor if you think you are having a problem with your medicine. · Do not take aspirin or other nonsteroidal anti-inflammatory drugs (NSAIDs), such as ibuprofen (Advil, Motrin) or naproxen (Aleve), unless your doctor says it is okay. Ask your doctor what you can take for pain. · Your doctor may recommend over-the-counter medicine. For mild or occasional indigestion, antacids such as Tums, Gaviscon, Maalox, or Mylanta may help. Your doctor also may recommend over-the-counter acid reducers, such as famotidine (Pepcid AC), cimetidine (Tagamet HB), ranitidine (Zantac 75 and Zantac 150), or omeprazole (Prilosec). Read and follow all instructions on the label. If you use these medicines often, talk with your doctor. · Change your eating habits. ¨ Its best to eat several small meals instead of two or three large meals. ¨ After you eat, wait 2 to 3 hours before you lie down. Late-night snacks arent a good idea. ¨ Chocolate, mint, and alcohol can make heartburn worse. They relax the valve between the esophagus and the stomach. ¨ Spicy foods, foods that have a lot of acid (like tomatoes and oranges), and coffee can make heartburn symptoms worse in some people.  If your symptoms are worse after you eat a certain food, you may want to stop eating that food to see if your symptoms get better. · Do not smoke or chew tobacco.  · If you get heartburn at night, raise the head of your bed 6 to 8 inches by putting the frame on blocks or placing a foam wedge under the head of your mattress. (Adding extra pillows does not work.)  · Do not wear tight clothing around your middle. · Lose weight if you need to. Losing just 5 to 10 pounds can help. When should you call for help? Call 911 anytime you think you may need emergency care. For example, call if:  · You have symptoms of a heart attack such as:  ¨ Chest pain or pressure. ¨ Sweating. ¨ Shortness of breath. ¨ Nausea or vomiting. ¨ Pain that spreads from the chest to the neck, jaw, or one or both shoulders or arms. ¨ Dizziness or lightheadedness. ¨ A fast or uneven pulse. After calling 911, chew 1 adult-strength aspirin. Wait for an ambulance. Do not try to drive yourself. · You vomit blood or what looks like coffee grounds. · You pass maroon or very bloody stools. Call your doctor now or seek immediate medical care if:  · You have new or different belly pain. · Your stools are black and tarlike or have streaks of blood. · Food seems to catch in your throat or chest.  Watch closely for changes in your health, and be sure to contact your doctor if:  · Your symptoms get worse. · Your symptoms have not improved after 2 days. Where can you learn more? Go to http://martine-feng.info/. Enter Y534 in the search box to learn more about \"Hiatal Hernia: Care Instructions. \"  Current as of: August 9, 2016  Content Version: 11.3  © 0504-7401 [employer]. Care instructions adapted under license by [employer] (which disclaims liability or warranty for this information).  If you have questions about a medical condition or this instruction, always ask your healthcare professional. [address]  any warranty or liability for your use of this information. Pederson's Esophagus: Care Instructions  Your Care Instructions    The esophagus is the tube that connects the throat to the stomach. Food and liquids go through this tube. In Pederson's esophagus, the cells that line the tube change. This is usually because of gastroesophageal reflux disease (GERD). GERD causes acid from your stomach to back up into the esophagus. When you have Pederson's esophagus, you are slightly more likely to get cancer of the esophagus. So regular testing is important to watch for signs of this cancer. You can treat GERD to control your symptoms and feel better. Follow-up care is a key part of your treatment and safety. Be sure to make and go to all appointments, and call your doctor if you are having problems. It's also a good idea to know your test results and keep a list of the medicines you take. How can you care for yourself at home? · Take your medicines exactly as prescribed. Call your doctor if you think you are having a problem with your medicine. · If you take over-the-counter medicine, such as antacids or acid reducers, follow all instructions on the label. If you use these medicines often, talk with your doctor. Be careful when you take over-the-counter antacid medicines. Many of these medicines have aspirin in them. Read the label to make sure that you are not taking more than the recommended dose. Too much aspirin can be harmful. · Do not smoke or chew tobacco. Smoking can make GERD worse. If you need help quitting, talk to your doctor about stop-smoking programs and medicines. These can increase your chances of quitting for good. · Chocolate, mint, and alcohol can make GERD worse. They relax the valve between the esophagus and the stomach. · Spicy foods, foods that have a lot of acid (like tomatoes and oranges), and coffee can make GERD symptoms worse in some people.  If your symptoms are worse after you eat a certain food, you may want to stop eating that food to see if your symptoms get better. · Eat smaller meals, and more often. After eating, wait 2 to 3 hours before you lie down. · Raise the head of your bed 6 to 8 inches by putting blocks under the frame or a foam wedge under the head of the mattress. · Do not wear tight clothing around your midsection. · If you are overweight, lose weight. Even losing 5 to 10 pounds can help. When should you call for help? Call 911 if you have symptoms of a heart attack. These may include:  · Chest pain or pressure. · Sweating. · Shortness of breath. · Nausea or vomiting. · Pain, pressure, or a strange feeling in the back, neck, jaw, or upper belly or in one or both shoulders or arms. · Lightheadedness or sudden weakness. · A fast or irregular heartbeat. After you call 911, the  may tell you to chew 1 adult-strength or 2 to 4 low-dose aspirin. Wait for an ambulance. Do not try to drive yourself. Call your doctor now or seek immediate medical care if:  · You have new or different belly pain. · Your stools are black and look like tar, or they have streaks of blood. Watch closely for changes in your health, and be sure to contact your doctor if:  · Your symptoms get worse or are not improving as expected. · You have any pain or difficulty swallowing. · Food seems to catch in your throat or chest.  Where can you learn more? Go to http://martine-feng.info/. Enter L146 in the search box to learn more about \"Pederson's Esophagus: Care Instructions. \"  Current as of: November 16, 2016  Content Version: 11.3  © 8529-0154 [employer]. Care instructions adapted under license by [employer] (which disclaims liability or warranty for this information).  If you have questions about a medical condition or this instruction, always ask your healthcare professional. [address] any warranty or liability for your use of this information. Colonoscopy: What to Expect at [address] [place] [address]  After you have a colonoscopy, you will stay at the clinic for 1 to 2 hours until the medicines wear off. Then you can go home. But you will need to arrange for a ride. Your doctor will tell you when you can eat and do your other usual activities. Your doctor will talk to you about when you will need your next colonoscopy. Your doctor can help you decide how often you need to be checked. This will depend on the results of your test and your risk for colorectal cancer. After the test, you may be bloated or have gas pains. You may need to pass gas. If a biopsy was done or a polyp was removed, you may have streaks of blood in your stool (feces) for a few days. This care sheet gives you a general idea about how long it will take for you to recover. But each person recovers at a different pace. Follow the steps below to get better as quickly as possible. How can you care for yourself at home? Activity  · Rest when you feel tired. · You can do your normal activities when it feels okay to do so. Diet  · Follow your doctor's directions for eating. · Unless your doctor has told you not to, drink plenty of fluids. This helps to replace the fluids that were lost during the colon prep. · Do not drink alcohol. Medicines  · If polyps were removed or a biopsy was done during the test, your doctor may tell you not to take aspirin or other anti-inflammatory medicines for a few days. These include ibuprofen (Advil, Motrin) and naproxen (Aleve). Other instructions  · For your safety, do not drive or operate machinery until the medicine wears off and you can think clearly. Your doctor may tell you not to drive or operate machinery until the day after your test.  · Do not sign legal documents or make major decisions until the medicine wears off and you can think clearly.  The anesthesia can make it hard for you to fully understand what you are agreeing to. Follow-up care is a key part of your treatment and safety. Be sure to make and go to all appointments, and call your doctor if you are having problems. It's also a good idea to know your test results and keep a list of the medicines you take. When should you call for help? Call 911 anytime you think you may need emergency care. For example, call if:  · You passed out (lost consciousness). · You pass maroon or bloody stools. · You have severe belly pain. Call your doctor now or seek immediate medical care if:  · Your stools are black and tarlike. · Your stools have streaks of blood, but you did not have a biopsy or any polyps removed. · You have belly pain, or your belly is swollen and firm. · You vomit. · You have a fever. · You are very dizzy. Watch closely for changes in your health, and be sure to contact your doctor if you have any problems. Where can you learn more? Go to [employer]bender.be  Enter E264 in the search box to learn more about \"Colonoscopy: What to Expect at Home. \"   © 0116-0823 Healthwise, Incorporated. Care instructions adapted under license by [facility] (which disclaims liability or warranty for this information). This care instruction is for use with your licensed healthcare professional. If you have questions about a medical condition or this instruction, always ask your healthcare professional. [place] [address] any warranty or liability for your use of this information. Content Version: 47.7.711376; Current as of: November 14, 2014      DISCHARGE SUMMARY from Nurse     POST-PROCEDURE INSTRUCTIONS:    Call your Physician if you:  ? Observe any excess bleeding. ? Develop a temperature over 100.5o F.  ? Experience abdominal, shoulder or chest pain. ? Notice any signs of decreased circulation or nerve impairment to an extremity such as a change in color, persistent numbness, tingling, coldness or increase in pain. ?  Vomit blood or you have nausea and vomiting lasting longer than 4 hours. ? Are unable to take medications. ? Are unable to urinate within 8 hours after discharge following general anesthesia or intravenous sedation. For the next 24 hours after receiving general anesthesia or intravenous sedation, or while taking prescription Narcotics, limit your activities:  ? Do NOT drive a motor vehicle, operate hazard machinery or power tools, or perform tasks that require coordination. The medication you received during your procedure may have some effect on your mental awareness. ? Do NOT make important personal or business decisions. The medication you received during your procedure may have some effect on your mental awareness. ? Do NOT drink alcoholic beverages. These drinks do not mix well with the medications that have been given to you. ? Upon discharge from the hospital, you must be accompanied by a responsible adult. ? Resume your diet as directed by your physician. ? Resume medications as your physician has prescribed. ? Please give a list of your current medications to your Primary Care Provider. ? Please update this list whenever your medications are discontinued, doses are changed, or new medications (including over-the-counter products) are added. ? Please carry medication information at all times in case of emergency situations. These are general instructions for a healthy lifestyle:    No smoking/ No tobacco products/ Avoid exposure to second hand smoke.  Surgeon General's Warning:  Quitting smoking now greatly reduces serious risk to your health. Obesity, smoking, and a sedentary lifestyle greatly increase your risk for illness.    A healthy diet, regular physical exercise & weight monitoring are important for maintaining a healthy lifestyle   You may be retaining fluid if you have a history of heart failure or if you experience any of the following symptoms:  Weight gain of 3 pounds or more overnight or 5 pounds in a week, increased swelling in our hands or feet or shortness of breath while lying flat in bed. Please call your doctor as soon as you notice any of these symptoms; do not wait until your next office visit. Recognize signs and symptoms of STROKE:  F  -  Face looks uneven  A  -  Arms unable to move or move unevenly  S  -  Speech slurred or non-existent  T  -  Time to call 911 - as soon as signs and symptoms begin - DO NOT go back to bed or wait to see If you get better - TIME IS BRAIN. Colorectal Screening   Colorectal cancer almost always develops from precancerous polyps (abnormal growths) in the colon or rectum. Screening tests can find precancerous polyps, so that they can be removed before they turn into cancer. Screening tests can also find colorectal cancer early, when treatment works best.  Gabby Tate Speak with your physician about when you should begin screening and how often you should be tested. Additional Information    If you have questions, please call [phone]. Remember, [employer] is NOT to be used for urgent needs. For medical emergencies, dial 911. Educational references and/or instructions provided during this visit included:    Hiatal Hernia,       APPOINTMENTS:    Please make a follow-up appointment with your physician. Discharge information has been reviewed with the patient. The patient verbalized understanding.

## 2017-07-27 NOTE — IP AVS SNAPSHOT
Faraz Isaacs 
 
 
 [address] [address] 
[phone] Patient: Rakesh Duran. MRN: [id number] OPS:9/01/[birth year]81 You are allergic to the following Allergen Reactions Albuterol Rash  
 seizure Influenza Virus Vaccine, Specific Other (comments) Percocet (Oxycodone-Acetaminophen) Other (comments) Recent Documentation Height Weight BMI Smoking Status 1.702 m 83.9 kg 28.98 kg/m2 Former Smoker Emergency Contacts Name Discharge Info Relation Home Work Mobile Eve Patel DISCHARGE CAREGIVER [3] Spouse [3] [phone] About your hospitalization You were admitted on:  July 27, 2017 You last received care in the:  [facility] You were discharged on:  July 27, 2017 Unit phone number:  [phone] Why you were hospitalized Your primary diagnosis was:  Not on File Providers Seen During Your Hospitalizations Provider Role Specialty Primary office phone Keshia Armas MD Attending Provider Gastroenterology [phone] Your Primary Care Physician (PCP) Primary Care Physician Office Phone Office Fax Patrickmarylu Curt [phone] Follow-up Information Follow up With Details Comments Contact Info Keshia Armas MD   [address] [place] [address] [address] [facility] 
[phone] Pat Merida MD   [address] [place] [address] 
[phone] Current Discharge Medication List  
  
CONTINUE these medications which have NOT CHANGED Dose & Instructions Dispensing Information Comments Morning Noon Evening Bedtime  
 aspirin 81 mg tablet Your last dose was: Your next dose is:    
   
   
 Dose:  81 mg Take 81 mg by mouth daily. Indications: MYOCARDIAL INFARCTION PREVENTION Refills:  0  
     
   
   
   
  
 atorvastatin 20 mg tablet Commonly known as:  LIPITOR Your last dose was: Your next dose is: TAKE 1 TABLET BY MOUTH EVERY EVENING Quantity:  90 Tab Refills:  3 CENTRUM SILVER PO Your last dose was: Your next dose is:    
   
   
 Dose:  1 Tab Take 1 Tab by mouth daily. Refills:  0  
     
   
   
   
  
 glucosam-chondroit-C-manganese 500-459-33-3 mg Cap Your last dose was: Your next dose is:    
   
   
 Dose:  1 Tab Take 1 Tab by mouth daily. Refills:  0 HYDROcodone-acetaminophen 5-325 mg per tablet Commonly known as:  Kristie Lozano Your last dose was: Your next dose is:    
   
   
 Dose:  1-2 Tab Take 1-2 Tabs by mouth every six (6) hours as needed for Pain. Max Daily Amount: 8 Tabs. Quantity:  30 Tab Refills:  0  
     
   
   
   
  
 metoprolol succinate 25 mg XL tablet Commonly known as:  TOPROL-XL Your last dose was: Your next dose is: TAKE 1 TABLET BY MOUTH EVERY DAY Quantity:  30 Tab Refills:  6 NexIUM 20 mg capsule Generic drug:  esomeprazole Your last dose was: Your next dose is:    
   
   
 Dose:  20 mg Take 20 mg by mouth daily. Refills:  0  
     
   
   
   
  
 nitroglycerin 0.4 mg SL tablet Commonly known as:  NITROSTAT Your last dose was: Your next dose is:    
   
   
 Dose:  0.4 mg  
0.4 mg by SubLINGual route every five (5) minutes as needed for Chest Pain. Refills:  0  
     
   
   
   
  
 VITAMIN B-12 2,500 mcg sublingual tablet Generic drug:  cyanocobalamin Your last dose was: Your next dose is:    
   
   
 Dose:  2500 mcg Take 2,500 mcg by mouth daily. Refills:  0  
     
   
   
   
  
 VITAMIN C 1,000 mg tablet Generic drug:  ascorbic acid (vitamin C) Your last dose was:     
   
Your next dose is:    
   
   
 Dose:  1000 mg  
 Take 1,000 mg by mouth daily. Refills:  0 Discharge Instructions Colon Polyps: Care Instructions Your Care Instructions Colon polyps are growths in the colon or the rectum. The cause of most colon polyps is not known, and most people who get them do not have any problems. But a certain kind can turn into cancer. For this reason, regular testing for colon polyps is important for people age 48 and older and anyone who has an increased risk for colon cancer. Polyps are usually found through routine colon cancer screening tests. Although most colon polyps are not cancerous, they are usually removed and then tested for cancer. Screening for colon cancer saves lives because the cancer can usually be cured if it is caught early. If you have a polyp that is the type that can turn into cancer, you may need more tests to examine your entire colon. The doctor will remove any other polyps that he or she finds, and you will be tested more often. Follow-up care is a key part of your treatment and safety. Be sure to make and go to all appointments, and call your doctor if you are having problems. It's also a good idea to know your test results and keep a list of the medicines you take. How can you care for yourself at home? Regular exams to look for colon polyps are the best way to prevent polyps from turning into colon cancer. These can include stool tests, sigmoidoscopy, colonoscopy, and CT colonography. Talk with your doctor about a testing schedule that is right for you. To prevent polyps There is no home treatment that can prevent colon polyps. But these steps may help lower your risk for cancer. · Stay active. Being active can help you get to and stay at a healthy weight. Try to exercise on most days of the week. Walking is a good choice. · Eat well. Choose a variety of vegetables, fruits, legumes (such as peas and beans), fish, poultry, and whole grains. · Do not smoke. If you need help quitting, talk to your doctor about stop-smoking programs and medicines. These can increase your chances of quitting for good. · If you drink alcohol, limit how much you drink. Limit alcohol to 2 drinks a day for men and 1 drink a day for women. When should you call for help? Call your doctor now or seek immediate medical care if: 
· You have severe belly pain. · Your stools are maroon or very bloody. Watch closely for changes in your health, and be sure to contact your doctor if: 
· You have a fever. · You have nausea or vomiting. · You have a change in bowel habits (new constipation or diarrhea). · Your symptoms get worse or are not improving as expected. Where can you learn more? Go to http://martine-feng.info/. Enter 95 [id number] in the search box to learn more about \"Colon Polyps: Care Instructions. \" Current as of: May 5, 2017 Content Version: 11.3 © 2554-8276 [employer]. Care instructions adapted under license by [employer] (which disclaims liability or warranty for this information). If you have questions about a medical condition or this instruction, always ask your healthcare professional. [place] [address] any warranty or liability for your use of this information. Upper GI Endoscopy: What to Expect at [facility] Your Recovery After you have an endoscopy, you will stay at the hospital or clinic for 1 to 2 hours. This will allow the medicine to wear off. You will be able to go home after your doctor or nurse checks to make sure you are not having any problems. You may have to stay overnight if you had treatment during the test. You may have a sore throat for a day or two after the test. 
This care sheet gives you a general idea about what to expect after the test. 
How can you care for yourself at home? Activity · Rest as much as you need to after you go home. · You should be able to go back to your usual activities the day after the test. 
Diet · Follow your doctor's directions for eating after the test. 
· Drink plenty of fluids (unless your doctor has told you not to). Medications · If you have a sore throat the day after the test, use an over-the-counter spray to numb your throat. Follow-up care is a key part of your treatment and safety. Be sure to make and go to all appointments, and call your doctor if you are having problems. It's also a good idea to know your test results and keep a list of the medicines you take. When should you call for help? Call 911 anytime you think you may need emergency care. For example, call if: 
· You passed out (lost consciousness). · You cough up blood. · You vomit blood or what looks like coffee grounds. · You pass maroon or very bloody stools. Call your doctor now or seek immediate medical care if: 
· You have trouble swallowing. · You have belly pain. · Your stools are black and tarlike or have streaks of blood. · You are sick to your stomach or cannot keep fluids down. Watch closely for changes in your health, and be sure to contact your doctor if: 
· Your throat still hurts after a day or two. · You do not get better as expected. Where can you learn more? Go to [employer].be Enter [phone] in the search box to learn more about \"Upper GI Endoscopy: What to Expect at Home. \"  
© 1672-7456 Healthwise, Incorporated. Care instructions adapted under license by [employer] (which disclaims liability or warranty for this information). This care instruction is for use with your licensed healthcare professional. If you have questions about a medical condition or this instruction, always ask your healthcare professional. [place] [address] any warranty or liability for your use of this information. Content Version: 90.6.231496; Current as of: November 14, 2014 Hiatal Hernia: Care Instructions Your Care Instructions A hiatal hernia occurs when part of the stomach bulges into the chest cavity. A hiatal hernia may allow stomach acid and juices to back up into the esophagus (acid reflux). This can cause a feeling of burning, warmth, heat, or pain behind the breastbone. This feeling may often occur after you eat, soon after you lie down, or when you bend forward, and it may come and go. You also may have a sour taste in your mouth. These symptoms are commonly known as heartburn or reflux. But not all hiatal hernias cause symptoms. Follow-up care is a key part of your treatment and safety. Be sure to make and go to all appointments, and call your doctor if you are having problems. Its also a good idea to know your test results and keep a list of the medicines you take. How can you care for yourself at home? · Take your medicines exactly as prescribed. Call your doctor if you think you are having a problem with your medicine. · Do not take aspirin or other nonsteroidal anti-inflammatory drugs (NSAIDs), such as ibuprofen (Advil, Motrin) or naproxen (Aleve), unless your doctor says it is okay. Ask your doctor what you can take for pain. · Your doctor may recommend over-the-counter medicine. For mild or occasional indigestion, antacids such as Tums, Gaviscon, Maalox, or Mylanta may help. Your doctor also may recommend over-the-counter acid reducers, such as famotidine (Pepcid AC), cimetidine (Tagamet HB), ranitidine (Zantac 75 and Zantac 150), or omeprazole (Prilosec). Read and follow all instructions on the label. If you use these medicines often, talk with your doctor. · Change your eating habits. ¨ Its best to eat several small meals instead of two or three large meals. ¨ After you eat, wait 2 to 3 hours before you lie down. Late-night snacks arent a good idea. ¨ Chocolate, mint, and alcohol can make heartburn worse.  They relax the valve between the esophagus and the stomach. ¨ Spicy foods, foods that have a lot of acid (like tomatoes and oranges), and coffee can make heartburn symptoms worse in some people. If your symptoms are worse after you eat a certain food, you may want to stop eating that food to see if your symptoms get better. · Do not smoke or chew tobacco. 
· If you get heartburn at night, raise the head of your bed 6 to 8 inches by putting the frame on blocks or placing a foam wedge under the head of your mattress. (Adding extra pillows does not work.) · Do not wear tight clothing around your middle. · Lose weight if you need to. Losing just 5 to 10 pounds can help. When should you call for help? Call 911 anytime you think you may need emergency care. For example, call if: 
· You have symptoms of a heart attack such as: ¨ Chest pain or pressure. ¨ Sweating. ¨ Shortness of breath. ¨ Nausea or vomiting. ¨ Pain that spreads from the chest to the neck, jaw, or one or both shoulders or arms. ¨ Dizziness or lightheadedness. ¨ A fast or uneven pulse. After calling 911, chew 1 adult-strength aspirin. Wait for an ambulance. Do not try to drive yourself. · You vomit blood or what looks like coffee grounds. · You pass maroon or very bloody stools. Call your doctor now or seek immediate medical care if: 
· You have new or different belly pain. · Your stools are black and tarlike or have streaks of blood. · Food seems to catch in your throat or chest. 
Watch closely for changes in your health, and be sure to contact your doctor if: 
· Your symptoms get worse. · Your symptoms have not improved after 2 days. Where can you learn more? Go to http://martine-feng.info/. Enter G057 in the search box to learn more about \"Hiatal Hernia: Care Instructions. \" Current as of: August 9, 2016 Content Version: 11.3 © 4537-3276 [employer].  Care instructions adapted under license by [address] (which disclaims liability or warranty for this information). If you have questions about a medical condition or this instruction, always ask your healthcare professional. [address] any warranty or liability for your use of this information. Pederson's Esophagus: Care Instructions Your Care Instructions The esophagus is the tube that connects the throat to the stomach. Food and liquids go through this tube. In Pederson's esophagus, the cells that line the tube change. This is usually because of gastroesophageal reflux disease (GERD). GERD causes acid from your stomach to back up into the esophagus. When you have Pederson's esophagus, you are slightly more likely to get cancer of the esophagus. So regular testing is important to watch for signs of this cancer. You can treat GERD to control your symptoms and feel better. Follow-up care is a key part of your treatment and safety. Be sure to make and go to all appointments, and call your doctor if you are having problems. It's also a good idea to know your test results and keep a list of the medicines you take. How can you care for yourself at home? · Take your medicines exactly as prescribed. Call your doctor if you think you are having a problem with your medicine. · If you take over-the-counter medicine, such as antacids or acid reducers, follow all instructions on the label. If you use these medicines often, talk with your doctor. Be careful when you take over-the-counter antacid medicines. Many of these medicines have aspirin in them. Read the label to make sure that you are not taking more than the recommended dose. Too much aspirin can be harmful. · Do not smoke or chew tobacco. Smoking can make GERD worse. If you need help quitting, talk to your doctor about stop-smoking programs and medicines. These can increase your chances of quitting for good. · Chocolate, mint, and alcohol can make GERD worse. They relax the valve between the esophagus and the stomach. · Spicy foods, foods that have a lot of acid (like tomatoes and oranges), and coffee can make GERD symptoms worse in some people. If your symptoms are worse after you eat a certain food, you may want to stop eating that food to see if your symptoms get better. · Eat smaller meals, and more often. After eating, wait 2 to 3 hours before you lie down. · Raise the head of your bed 6 to 8 inches by putting blocks under the frame or a foam wedge under the head of the mattress. · Do not wear tight clothing around your midsection. · If you are overweight, lose weight. Even losing 5 to 10 pounds can help. When should you call for help? Call 911 if you have symptoms of a heart attack. These may include: · Chest pain or pressure. · Sweating. · Shortness of breath. · Nausea or vomiting. · Pain, pressure, or a strange feeling in the back, neck, jaw, or upper belly or in one or both shoulders or arms. · Lightheadedness or sudden weakness. · A fast or irregular heartbeat. After you call 911, the  may tell you to chew 1 adult-strength or 2 to 4 low-dose aspirin. Wait for an ambulance. Do not try to drive yourself. Call your doctor now or seek immediate medical care if: 
· You have new or different belly pain. · Your stools are black and look like tar, or they have streaks of blood. Watch closely for changes in your health, and be sure to contact your doctor if: 
· Your symptoms get worse or are not improving as expected. · You have any pain or difficulty swallowing. · Food seems to catch in your throat or chest. 
Where can you learn more? Go to http://martine-feng.info/. Enter L146 in the search box to learn more about \"Pederson's Esophagus: Care Instructions. \" Current as of: November 16, 2016 Content Version: 11.3 © 5602-5609 Healthwise, Incorporated. Care instructions adapted under license by [employer] (which disclaims liability or warranty for this information). If you have questions about a medical condition or this instruction, always ask your healthcare professional. [address] any warranty or liability for your use of this information. Colonoscopy: What to Expect at [facility] Your Recovery After you have a colonoscopy, you will stay at the clinic for 1 to 2 hours until the medicines wear off. Then you can go home. But you will need to arrange for a ride. Your doctor will tell you when you can eat and do your other usual activities. Your doctor will talk to you about when you will need your next colonoscopy. Your doctor can help you decide how often you need to be checked. This will depend on the results of your test and your risk for colorectal cancer. After the test, you may be bloated or have gas pains. You may need to pass gas. If a biopsy was done or a polyp was removed, you may have streaks of blood in your stool (feces) for a few days. This care sheet gives you a general idea about how long it will take for you to recover. But each person recovers at a different pace. Follow the steps below to get better as quickly as possible. How can you care for yourself at home? Activity · Rest when you feel tired. · You can do your normal activities when it feels okay to do so. Diet · Follow your doctor's directions for eating. · Unless your doctor has told you not to, drink plenty of fluids. This helps to replace the fluids that were lost during the colon prep. · Do not drink alcohol. Medicines · If polyps were removed or a biopsy was done during the test, your doctor may tell you not to take aspirin or other anti-inflammatory medicines for a few days. These include ibuprofen (Advil, Motrin) and naproxen (Aleve). Other instructions · For your safety, do not drive or operate machinery until the medicine wears off and you can think clearly. Your doctor may tell you not to drive or operate machinery until the day after your test. 
· Do not sign legal documents or make major decisions until the medicine wears off and you can think clearly. The anesthesia can make it hard for you to fully understand what you are agreeing to. Follow-up care is a key part of your treatment and safety. Be sure to make and go to all appointments, and call your doctor if you are having problems. It's also a good idea to know your test results and keep a list of the medicines you take. When should you call for help? Call 911 anytime you think you may need emergency care. For example, call if: 
· You passed out (lost consciousness). · You pass maroon or bloody stools. · You have severe belly pain. Call your doctor now or seek immediate medical care if: 
· Your stools are black and tarlike. · Your stools have streaks of blood, but you did not have a biopsy or any polyps removed. · You have belly pain, or your belly is swollen and firm. · You vomit. · You have a fever. · You are very dizzy. Watch closely for changes in your health, and be sure to contact your doctor if you have any problems. Where can you learn more? Go to [employer].be Enter E264 in the search box to learn more about \"Colonoscopy: What to Expect at Home. \"  
© 2208-8454 Healthwise, Incorporated. Care instructions adapted under license by [facility] (which disclaims liability or warranty for this information). This care instruction is for use with your licensed healthcare professional. If you have questions about a medical condition or this instruction, always ask your healthcare professional. [place] [address] any warranty or liability for your use of this information. Content Version: 83.6.002727; Current as of: November 14, 2014 DISCHARGE SUMMARY from Nurse POST-PROCEDURE INSTRUCTIONS: 
 
Call your Physician if you: 
? Observe any excess bleeding. ? Develop a temperature over 100.5o F. 
? Experience abdominal, shoulder or chest pain. ? Notice any signs of decreased circulation or nerve impairment to an extremity such as a change in color, persistent numbness, tingling, coldness or increase in pain. ? Vomit blood or you have nausea and vomiting lasting longer than 4 hours. ? Are unable to take medications. ? Are unable to urinate within 8 hours after discharge following general anesthesia or intravenous sedation. For the next 24 hours after receiving general anesthesia or intravenous sedation, or while taking prescription Narcotics, limit your activities: 
? Do NOT drive a motor vehicle, operate hazard machinery or power tools, or perform tasks that require coordination. The medication you received during your procedure may have some effect on your mental awareness. ? Do NOT make important personal or business decisions. The medication you received during your procedure may have some effect on your mental awareness. ? Do NOT drink alcoholic beverages. These drinks do not mix well with the medications that have been given to you. ? Upon discharge from the hospital, you must be accompanied by a responsible adult. ? Resume your diet as directed by your physician. ? Resume medications as your physician has prescribed. ? Please give a list of your current medications to your Primary Care Provider. ? Please update this list whenever your medications are discontinued, doses are changed, or new medications (including over-the-counter products) are added. ? Please carry medication information at all times in case of emergency situations. These are general instructions for a healthy lifestyle: No smoking/ No tobacco products/ Avoid exposure to second hand smoke. ? Surgeon General's Warning:  Quitting smoking now greatly reduces serious risk to your health. Obesity, smoking, and a sedentary lifestyle greatly increase your risk for illness. ? A healthy diet, regular physical exercise & weight monitoring are important for maintaining a healthy lifestyle ? You may be retaining fluid if you have a history of heart failure or if you experience any of the following symptoms:  Weight gain of 3 pounds or more overnight or 5 pounds in a week, increased swelling in our hands or feet or shortness of breath while lying flat in bed. Please call your doctor as soon as you notice any of these symptoms; do not wait until your next office visit. Recognize signs and symptoms of STROKE: 
F  -  Face looks uneven A  -  Arms unable to move or move unevenly S  -  Speech slurred or non-existent T  -  Time to call 911 - as soon as signs and symptoms begin - DO NOT go back to bed or wait to see If you get better - TIME IS BRAIN. Colorectal Screening ? Colorectal cancer almost always develops from precancerous polyps (abnormal growths) in the colon or rectum. Screening tests can find precancerous polyps, so that they can be removed before they turn into cancer. Screening tests can also find colorectal cancer early, when treatment works best. 
? Speak with your physician about when you should begin screening and how often you should be tested. Additional Information If you have questions, please call [phone]. Remember, [employer] is NOT to be used for urgent needs. For medical emergencies, dial 911. Educational references and/or instructions provided during this visit included: 
 
Hiatal Hernia, APPOINTMENTS: 
 
Please make a follow-up appointment with your physician. Discharge information has been reviewed with the patient. The patient verbalized understanding. Discharge Orders None ACO Transitions of Care Introducing [employer] [address] Lozano offers a voluntary care coordination program to provide high quality service and care to [facility] fee-for-service beneficiaries. Aneta Nathan was designed to help you enhance your health and well-being through the following services: ? Transitions of Care  support for individuals who are transitioning from one care setting to another (example: Hospital to home). ? Chronic and Complex Care Coordination  support for individuals and caregivers of those with serious or chronic illnesses or with more than one chronic (ongoing) condition and those who take a number of different medications. If you meet specific medical criteria, a [address] [place] [address] Rd may call you directly to coordinate your care with your primary care physician and your other care providers. For questions about the [facility] (formerly [facility]) programs, please, contact your physicians office. For general questions or additional information about Accountable Care Organizations: 
Please visit www.medicare.gov/acos. html or call 1-800-MEDICARE (6-454.570.6721) TTY users should call 1-840.928.2366. Introducing [facility] & HEALTH SERVICES! Dear Mary Grace Patino: Thank you for requesting a QVIVO account. Our records indicate that you have previously registered for a QVIVO account but its currently inactive. Please call our QVIVO support line at [phone]. Additional Information If you have questions, please visit the Frequently Asked Questions section of the QVIVO website at [email or web address] Remember, QVIVO is NOT to be used for urgent needs. For medical emergencies, dial 911. Now available from your iPhone and Android! General Information Please provide this summary of care documentation to your next provider.  
  
  
    
    
 Patient Signature: ____________________________________________________________ Date:  ____________________________________________________________  
  
Connye Brod Provider Signature:  ____________________________________________________________ Date:  ____________________________________________________________

## 2017-07-27 NOTE — ANESTHESIA PREPROCEDURE EVALUATION
Anesthetic History   No history of anesthetic complications            Review of Systems / Medical History  Patient summary reviewed and pertinent labs reviewed    Pulmonary            Asthma        Neuro/Psych   Within defined limits           Cardiovascular              CAD    Exercise tolerance: >4 METS     GI/Hepatic/Renal  Within defined limits              Endo/Other      Hypothyroidism       Other Findings   Comments:   Risk Factors for Postoperative nausea/vomiting:       History of postoperative nausea/vomiting? NO       Female? NO       Motion sickness? NO       Intended opioid administration for postoperative analgesia? NO      Smoking Abstinence  Current Smoker? NO  Elective Surgery? YES  Seen preoperatively by anesthesiologist or proxy prior to day of surgery? YES  Pt abstained from smoking 24 hours prior to anesthesia?  N/A           Physical Exam    Airway  Mallampati: II  TM Distance: 4 - 6 cm  Neck ROM: normal range of motion   Mouth opening: Normal     Cardiovascular  Regular rate and rhythm,  S1 and S2 normal,  no murmur, click, rub, or gallop  Rhythm: regular  Rate: normal         Dental    Dentition: Lower dentition intact and Upper dentition intact     Pulmonary  Breath sounds clear to auscultation               Abdominal  GI exam deferred       Other Findings            Anesthetic Plan    ASA: 3  Anesthesia type: MAC          Induction: Intravenous  Anesthetic plan and risks discussed with: Patient

## 2017-07-27 NOTE — H&P
[facility]   [email or web address] With A Twist      Impression:   1.polyps  2. Pederson's       Plan:     1. [place] egd mac all risks discussed       Chief Complaint: barretts polyps       HPI:  Martha Castro is a 64 y.o. male who is being seen on consult for barretts polyps . PMH:   Past Medical History:   Diagnosis Date    Allergic rhinitis     Arthritis     hands    Asthma     mild obst disease on pfts Dr. Azul Milan Pederson's esophagus     Dr. Little Carney Bilateral carotid artery stenosis     12/14 BICA 50-69%    BPH (benign prostatic hyperplasia)     BPH without obstruction/lower urinary tract symptoms     CABG x 3 04/24/2014    LIMA to LAD, and saphenous vein grafts to the second diagonal of the LAD, and to the posterior descending branch of the RCA, Dr. Salina Chung, 4/23/14.  CAD (coronary artery disease)     Cardiac catheterization 04/18/2014    Superdominant RCA. mRCA 90%. pRPDA 60%. LM (modest sized) patent.  p-mLAD 85%. CX < 50%. LVEDP 17 mmHg. EF 60%. No RWMA. CABG recommended.  Cardiac echocardiogram 06/24/2008    EF 70%. Mild conc LVH. Gr 2 DDfx. Mild LAE. Mild MR. PASP 38 mmHg.  Cardiac nuclear imaging study, abnormal 04/15/2014    Mod ischemia in lg area of anterior wall, anteroapex, anteroseptum c/w high-grade prox LAD obstruction. Mod partially transient defect likely ischemia w/very sm infarction in RCA. TID 1.31 suggests 3-vessel CAD. Mod distal anterior hypk. Mild-mod anteroseptal, inferoseptal, anterolateral hypk. EF 46%. Strongly positive EKG on max EST. Ex time 6 min.  Carotid duplex 04/06/2016    Mod 50-69% bilateral ICA stenosis. Probable significant LECA stenosis. Similar to study of 8/12/15. Multiple nodules noted in right thyroid.     Colon polyps 2007    Dr. Little Carney Difficulty hearing     Erectile dysfunction     FHx: heart disease     GERD (gastroesophageal reflux disease)     by EGD 2007 Dr. Matthew Childress IFG (impaired fasting glucose) 5/14    Impotence     Lower urinary tract symptoms (LUTS)     Multinodular goiter     2014; 2015; 4/16    Obesity     peak weight 202 lbs, bmi 31.4 from 4/14    Prostate cancer ([facility] [address] 8/10    Dr. Carolina Sánchez, s/p cryo Dr. Radha Bush Pulmonary nodule 2014 2015    Sleep apnea        PSH:   Past Surgical History:   Procedure Laterality Date    CARDIAC SURG PROCEDURE UNLIST  11/06    thallium negative    CARDIAC SURG PROCEDURE UNLIST  4/14    3V CABG Dr Zita Tobar  2007    mild obst disease on PFTs Dr. Deangelo Garcia HX CATARACT REMOVAL  11/14    bilat Dr Joseph Nj  2004    HX COLONOSCOPY      Dr. Basilio Ramirez 2007 negative   Heddy Eagles      Dr. Brigid Gupta HX TURP      x2 Dr. Morenita Hernandez HX UROLOGICAL  10/2012    [facility], Dr. 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     Social History Main Topics    Smoking status: Former Smoker     Packs/day: 0.50     Years: 4.00     Quit date: 1/1/1957    Smokeless tobacco: Never Used      Comment: Quit in 1957    Alcohol use No    Drug use: No    Sexual activity: Not on file     Other Topics Concern    Not on file     Social History Narrative       FHX:   Family History   Problem Relation Age of Onset    Heart Disease Father     Stroke Mother     Hypertension Mother     Diabetes Sister        Allergy:   Allergies   Allergen Reactions    Albuterol Rash     seizure    Influenza Virus Vaccine, Specific Other (comments)    Percocet [Oxycodone-Acetaminophen] Other (comments)       Home Medications:     No prescriptions prior to admission. Review of Systems:     Constitutional: No fevers, chills, weight loss, fatigue. Skin: No rashes, pruritis, jaundice, ulcerations, erythema. HENT: No headaches, nosebleeds, sinus pressure, rhinorrhea, sore throat. Eyes: No visual changes, blurred vision, eye pain, photophobia, jaundice. Cardiovascular: No chest pain, heart palpitations. Respiratory: No cough, SOB, wheezing, chest discomfort, orthopnea. Gastrointestinal: Reflux    Genitourinary: No dysuria, bleeding, discharge, pyuria. Musculoskeletal: No weakness, arthralgias, wasting. Endo: No sweats. Heme: No bruising, easy bleeding. Allergies: As noted. Neurological: Cranial nerves intact. Alert and oriented. Gait not assessed. Psychiatric:  No anxiety, depression, hallucinations. Visit Vitals    Ht 5' 7\" (1.702 m)    Wt 83.9 kg (185 lb)    BMI 28.98 kg/m2       Physical Assessment:     constitutional: appearance: well developed, well nourished, normal habitus, no deformities, in no acute distress. skin: inspection: no rashes, ulcers, icterus or other lesions; no clubbing or telangiectasias. palpation: no induration or subcutaneos nodules. eyes: inspection: normal conjunctivae and lids; no jaundice pupils: symmetrical, normoreactive to light, normal accommodation and size. ENMT: mouth: normal oral mucosa,lips and gums; good dentition. oropharynx: normal tongue, hard and soft palate; posterior pharynx without erythema, exudate or lesions. neck: no masses organomegaly or tenderness. respiratory: effort: normal chest excursion; no intercostal retraction or accessory muscle use. cardiovascular: abdominal aorta: normal size and position; no bruits. palpation: PMI of normal size and position; normal rhythm; no thrill or murmurs. abdominal: abdomen: normal consistency; no tenderness or masses. hernias: no hernias appreciated.  liver: normal size and consistency. spleen: not palpable. rectal: hemoccult/guaiac: not performed. musculoskeletal: no deformities or muscle wasting   lymphatic: axilae: not palpable. groin: not palpable. neck: within normal limits. other: not palpable. neurologic: cranial nerves: II-XII normal.   psychiatric: judgement/insight: within normal limits. memory: within normal limits for recent and remote events. mood and affect: no evidence of depression, anxiety or agitation. orientation: oriented to time, space and person. Basic Metabolic Profile   No results for input(s): NA, K, CL, CO2, BUN, GLU, CA, MG, PHOS in the last 72 hours. No lab exists for component: CREAT      CBC w/Diff    No results for input(s): WBC, RBC, HGB, HCT, MCV, MCH, MCHC, RDW, PLT, HGBEXT, HCTEXT, PLTEXT in the last 72 hours. No lab exists for component: MPV No results for input(s): GRANS, LYMPH, EOS, PRO, MYELO, METAS, BLAST in the last 72 hours. No lab exists for component: MONO, BASO     Hepatic Function   No results for input(s): ALB, TP, TBILI, GPT, SGOT, AP, AML, LPSE in the last 72 hours. No lab exists for component: Courtney Ferreira MD, M.D. [facility], [address] [place] [address]  [email or web address] [facility]

## 2017-10-13 ENCOUNTER — ANESTHESIA EVENT (OUTPATIENT)
Dept: ENDOSCOPY | Age: 77
End: 2017-10-13
Payer: MEDICARE

## 2017-10-16 ENCOUNTER — ANESTHESIA (OUTPATIENT)
Dept: ENDOSCOPY | Age: 77
End: 2017-10-16
Payer: MEDICARE

## 2017-10-16 ENCOUNTER — HOSPITAL ENCOUNTER (OUTPATIENT)
Age: 77
Setting detail: OUTPATIENT SURGERY
Discharge: HOME | End: 2017-10-16
Attending: INTERNAL MEDICINE | Admitting: INTERNAL MEDICINE
Payer: MEDICARE

## 2017-10-16 VITALS
BODY MASS INDEX: 28.04 KG/M2 | SYSTOLIC BLOOD PRESSURE: 166 MMHG | WEIGHT: 185 LBS | TEMPERATURE: 98.4 F | DIASTOLIC BLOOD PRESSURE: 48 MMHG | HEART RATE: 52 BPM | RESPIRATION RATE: 14 BRPM | HEIGHT: 68 IN | OXYGEN SATURATION: 98 %

## 2017-10-16 PROCEDURE — 74011000250 HC RX REV CODE- 250

## 2017-10-16 PROCEDURE — 74011250636 HC RX REV CODE- 250/636: Performed by: NURSE ANESTHETIST, CERTIFIED REGISTERED

## 2017-10-16 PROCEDURE — 74011250636 HC RX REV CODE- 250/636

## 2017-10-16 PROCEDURE — 76060000031 HC ANESTHESIA FIRST 0.5 HR: Performed by: INTERNAL MEDICINE

## 2017-10-16 PROCEDURE — 74011000250 HC RX REV CODE- 250: Performed by: INTERNAL MEDICINE

## 2017-10-16 PROCEDURE — C1886 CATHETER, ABLATION: HCPCS | Performed by: INTERNAL MEDICINE

## 2017-10-16 PROCEDURE — 76040000019: Performed by: INTERNAL MEDICINE

## 2017-10-16 RX ORDER — SODIUM CHLORIDE, SODIUM LACTATE, POTASSIUM CHLORIDE, CALCIUM CHLORIDE 600; 310; 30; 20 MG/100ML; MG/100ML; MG/100ML; MG/100ML
INJECTION, SOLUTION INTRAVENOUS
Status: DISCONTINUED | OUTPATIENT
Start: 2017-10-16 | End: 2017-10-16 | Stop reason: HOSPADM

## 2017-10-16 RX ORDER — ACETYLCYSTEINE 200 MG/ML
3 SOLUTION ORAL; RESPIRATORY (INHALATION)
Status: DISCONTINUED | OUTPATIENT
Start: 2017-10-16 | End: 2017-10-16 | Stop reason: HOSPADM

## 2017-10-16 RX ORDER — LIDOCAINE HYDROCHLORIDE 20 MG/ML
INJECTION, SOLUTION EPIDURAL; INFILTRATION; INTRACAUDAL; PERINEURAL AS NEEDED
Status: DISCONTINUED | OUTPATIENT
Start: 2017-10-16 | End: 2017-10-16 | Stop reason: HOSPADM

## 2017-10-16 RX ORDER — HYDROMORPHONE HYDROCHLORIDE 2 MG/ML
INJECTION, SOLUTION INTRAMUSCULAR; INTRAVENOUS; SUBCUTANEOUS
Status: COMPLETED
Start: 2017-10-16 | End: 2017-10-16

## 2017-10-16 RX ORDER — HYDROMORPHONE HYDROCHLORIDE 2 MG/ML
0.5 INJECTION, SOLUTION INTRAMUSCULAR; INTRAVENOUS; SUBCUTANEOUS ONCE
Status: COMPLETED | OUTPATIENT
Start: 2017-10-16 | End: 2017-10-16

## 2017-10-16 RX ORDER — ACETYLCYSTEINE 200 MG/ML
SOLUTION ORAL; RESPIRATORY (INHALATION) AS NEEDED
Status: DISCONTINUED | OUTPATIENT
Start: 2017-10-16 | End: 2017-10-16 | Stop reason: HOSPADM

## 2017-10-16 RX ORDER — PROPOFOL 10 MG/ML
INJECTION, EMULSION INTRAVENOUS AS NEEDED
Status: DISCONTINUED | OUTPATIENT
Start: 2017-10-16 | End: 2017-10-16 | Stop reason: HOSPADM

## 2017-10-16 RX ORDER — SODIUM CHLORIDE, SODIUM LACTATE, POTASSIUM CHLORIDE, CALCIUM CHLORIDE 600; 310; 30; 20 MG/100ML; MG/100ML; MG/100ML; MG/100ML
75 INJECTION, SOLUTION INTRAVENOUS CONTINUOUS
Status: DISCONTINUED | OUTPATIENT
Start: 2017-10-16 | End: 2017-10-16 | Stop reason: HOSPADM

## 2017-10-16 RX ADMIN — HYDROMORPHONE HYDROCHLORIDE: 2 INJECTION, SOLUTION INTRAMUSCULAR; INTRAVENOUS; SUBCUTANEOUS at 14:25

## 2017-10-16 RX ADMIN — PROPOFOL 350 MG: 10 INJECTION, EMULSION INTRAVENOUS at 13:57

## 2017-10-16 RX ADMIN — SODIUM CHLORIDE, SODIUM LACTATE, POTASSIUM CHLORIDE, CALCIUM CHLORIDE: 600; 310; 30; 20 INJECTION, SOLUTION INTRAVENOUS at 13:34

## 2017-10-16 RX ADMIN — SODIUM CHLORIDE, SODIUM LACTATE, POTASSIUM CHLORIDE, AND CALCIUM CHLORIDE 75 ML/HR: 600; 310; 30; 20 INJECTION, SOLUTION INTRAVENOUS at 13:29

## 2017-10-16 RX ADMIN — LIDOCAINE HYDROCHLORIDE 80 MG: 20 INJECTION, SOLUTION EPIDURAL; INFILTRATION; INTRACAUDAL; PERINEURAL at 13:45

## 2017-10-16 NOTE — IP AVS SNAPSHOT
[address] [facility] [address] [address] [address] [address] [phone] Patient: Carlos Weeks. MRN: [id number] TYX:0/84/2998 You are allergic to the following Allergen Reactions Albuterol Rash  
 seizure Influenza Virus Vaccine, Specific Other (comments) Percocet (Oxycodone-Acetaminophen) Other (comments) Recent Documentation Height Weight BMI Smoking Status 1.727 m 83.9 kg 28.13 kg/m2 Former Smoker Emergency Contacts Name Discharge Info Relation Home Work Mobile Eve Patel DISCHARGE CAREGIVER [3] Spouse [3] [phone] About your hospitalization You were admitted on:  October 16, 2017 You last received care in the:  [facility] You were discharged on:  October 16, 2017 Unit phone number:  [phone] Why you were hospitalized Your primary diagnosis was:  Not on File Providers Seen During Your Hospitalizations Provider Role Specialty Primary office phone Bria Aburto MD Attending Provider Gastroenterology [phone] Your Primary Care Physician (PCP) Primary Care Physician Office Phone Office Fax Sarah Radha [phone] Follow-up Information Follow up With Details Comments Contact Info Campos Dumont MD   [address] [facility] 206 200 [facility] 
[phone] Current Discharge Medication List  
  
ASK your doctor about these medications Dose & Instructions Dispensing Information Comments Morning Noon Evening Bedtime  
 aspirin 81 mg tablet Your last dose was: Your next dose is:    
   
   
 Dose:  81 mg Take 81 mg by mouth daily. Indications: MYOCARDIAL INFARCTION PREVENTION Refills:  0  
     
   
   
   
  
 atorvastatin 20 mg tablet Commonly known as:  LIPITOR Your last dose was: Your next dose is: TAKE 1 TABLET BY MOUTH EVERY EVENING Quantity:  90 Tab Refills:  3 CENTRUM SILVER PO Your last dose was: Your next dose is:    
   
   
 Dose:  1 Tab Take 1 Tab by mouth daily. Refills:  0  
     
   
   
   
  
 glucosam-chondroit-C-manganese 850-977-28-3 mg Cap Your last dose was: Your next dose is:    
   
   
 Dose:  1 Tab Take 1 Tab by mouth daily. Refills:  0 HYDROcodone-acetaminophen 5-325 mg per tablet Commonly known as:  Juanetta Rasp Your last dose was: Your next dose is:    
   
   
 Dose:  1-2 Tab Take 1-2 Tabs by mouth every six (6) hours as needed for Pain. Max Daily Amount: 8 Tabs. Quantity:  30 Tab Refills:  0  
     
   
   
   
  
 metoprolol succinate 25 mg XL tablet Commonly known as:  TOPROL-XL Your last dose was: Your next dose is: TAKE 1 TABLET BY MOUTH EVERY DAY Quantity:  30 Tab Refills:  6 NexIUM 20 mg capsule Generic drug:  esomeprazole Your last dose was: Your next dose is:    
   
   
 Dose:  20 mg Take 20 mg by mouth daily. Refills:  0  
     
   
   
   
  
 nitroglycerin 0.4 mg SL tablet Commonly known as:  NITROSTAT Your last dose was: Your next dose is:    
   
   
 Dose:  0.4 mg  
0.4 mg by SubLINGual route every five (5) minutes as needed for Chest Pain. Refills:  0  
     
   
   
   
  
 VITAMIN B-12 2,500 mcg sublingual tablet Generic drug:  cyanocobalamin Your last dose was: Your next dose is:    
   
   
 Dose:  2500 mcg Take 2,500 mcg by mouth daily. Refills:  0  
     
   
   
   
  
 VITAMIN C 1,000 mg tablet Generic drug:  ascorbic acid (vitamin C) Your last dose was: Your next dose is:    
   
   
 Dose:  1000 mg Take 1,000 mg by mouth daily. Refills:  0 Discharge Instructions Upper GI Endoscopy: What to Expect at [facility] Your Recovery After you have an endoscopy, you will stay at the hospital or clinic for 1 to 2 hours. This will allow the medicine to wear off. You will be able to go home after your doctor or nurse checks to make sure you are not having any problems. You may have to stay overnight if you had treatment during the test. You may have a sore throat for a day or two after the test. 
This care sheet gives you a general idea about what to expect after the test. 
How can you care for yourself at home? Activity · Rest as much as you need to after you go home. · You should be able to go back to your usual activities the day after the test. 
Diet · Follow your doctor's directions for eating after the test. 
· Drink plenty of fluids (unless your doctor has told you not to). Medications · If you have a sore throat the day after the test, use an over-the-counter spray to numb your throat. Follow-up care is a key part of your treatment and safety. Be sure to make and go to all appointments, and call your doctor if you are having problems. It's also a good idea to know your test results and keep a list of the medicines you take. When should you call for help? Call 911 anytime you think you may need emergency care. For example, call if: 
· You passed out (lost consciousness). · You cough up blood. · You vomit blood or what looks like coffee grounds. · You pass maroon or very bloody stools. Call your doctor now or seek immediate medical care if: 
· You have trouble swallowing. · You have belly pain. · Your stools are black and tarlike or have streaks of blood. · You are sick to your stomach or cannot keep fluids down. Watch closely for changes in your health, and be sure to contact your doctor if: 
· Your throat still hurts after a day or two. · You do not get better as expected. Where can you learn more? Go to DealExplorer.be Enter [phone] in the search box to learn more about \"Upper GI Endoscopy: What to Expect at Home. \"  
© 2635-5260 Healthwise, Incorporated. Care instructions adapted under license by Swetha Sparks (which disclaims liability or warranty for this information). This care instruction is for use with your licensed healthcare professional. If you have questions about a medical condition or this instruction, always ask your healthcare professional. [place] [address] any warranty or liability for your use of this information. Content Version: 43.3.428805; Current as of: November 14, 2014 DISCHARGE SUMMARY from Nurse POST-PROCEDURE INSTRUCTIONS: 
 
Call your Physician if you: 
? Observe any excess bleeding. ? Develop a temperature over 100.5o F. 
? Experience abdominal, shoulder or chest pain. ? Notice any signs of decreased circulation or nerve impairment to an extremity such as a change in color, persistent numbness, tingling, coldness or increase in pain. ? Vomit blood or you have nausea and vomiting lasting longer than 4 hours. ? Are unable to take medications. ? Are unable to urinate within 8 hours after discharge following general anesthesia or intravenous sedation. For the next 24 hours after receiving general anesthesia or intravenous sedation, or while taking prescription Narcotics, limit your activities: 
? Do NOT drive a motor vehicle, operate hazard machinery or power tools, or perform tasks that require coordination. The medication you received during your procedure may have some effect on your mental awareness. ? Do NOT make important personal or business decisions. The medication you received during your procedure may have some effect on your mental awareness. ? Do NOT drink alcoholic beverages. These drinks do not mix well with the medications that have been given to you. ? Upon discharge from the hospital, you must be accompanied by a responsible adult. ? Resume your diet as directed by your physician. ? Resume medications as your physician has prescribed. ? Please give a list of your current medications to your Primary Care Provider. ? Please update this list whenever your medications are discontinued, doses are changed, or new medications (including over-the-counter products) are added. ? Please carry medication information at all times in case of emergency situations. These are general instructions for a healthy lifestyle: No smoking/ No tobacco products/ Avoid exposure to second hand smoke. ? Surgeon General's Warning:  Quitting smoking now greatly reduces serious risk to your health. Obesity, smoking, and a sedentary lifestyle greatly increase your risk for illness. ? A healthy diet, regular physical exercise & weight monitoring are important for maintaining a healthy lifestyle ? You may be retaining fluid if you have a history of heart failure or if you experience any of the following symptoms:  Weight gain of 3 pounds or more overnight or 5 pounds in a week, increased swelling in our hands or feet or shortness of breath while lying flat in bed. Please call your doctor as soon as you notice any of these symptoms; do not wait until your next office visit. Recognize signs and symptoms of STROKE: 
F  -  Face looks uneven A  -  Arms unable to move or move unevenly S  -  Speech slurred or non-existent T  -  Time to call 911 - as soon as signs and symptoms begin - DO NOT go back to bed or wait to see If you get better - TIME IS BRAIN. Colorectal Screening ? Colorectal cancer almost always develops from precancerous polyps (abnormal growths) in the colon or rectum. Screening tests can find precancerous polyps, so that they can be removed before they turn into cancer. Screening tests can also find colorectal cancer early, when treatment works best. 
?  Speak with your physician about when you should begin screening and how often you should be tested ? Additional Information If you have questions, please call [phone]. Remember, menschmaschine publishing is NOT to be used for urgent needs. For medical emergencies, dial 911. Discharge information has been reviewed with the patient. The patient verbalized understanding. Discharge Orders None ACO Transitions of Care Introducing [facility] [address] Molly Lozano offers a voluntary care coordination program to provide high quality service and care to [facility] fee-for-service beneficiaries. Ellen Cochran was designed to help you enhance your health and well-being through the following services: ? Transitions of Care  support for individuals who are transitioning from one care setting to another (example: Hospital to home). ? Chronic and Complex Care Coordination  support for individuals and caregivers of those with serious or chronic illnesses or with more than one chronic (ongoing) condition and those who take a number of different medications. If you meet specific medical criteria, a [address] [place] [address] Rd may call you directly to coordinate your care with your primary care physician and your other care providers. For questions about the [facility] programs, please, contact your physicians office. For general questions or additional information about Accountable Care Organizations: 
Please visit www.medicare.gov/acos. html or call 1-800-MEDICARE (7-887.512.6700) TTY users should call 1-608.484.7696. Introducing [facility] & HEALTH SERVICES! Dear Deric Zelaya: Thank you for requesting a menschmaschine publishing account. Our records indicate that you have previously registered for a menschmaschine publishing account but its currently inactive. Please call our menschmaschine publishing support line at [phone]. Additional Information If you have questions, please visit the Frequently Asked Questions section of the [employer] website at [email or web address] the [email or web address]/. Remember, MyChart is NOT to be used for urgent needs. For medical emergencies, dial 911. Now available from your iPhone and Android! General Information Please provide this summary of care documentation to your next provider. Patient Signature:  ____________________________________________________________ Date:  ____________________________________________________________  
  
Suzon Mems Provider Signature:  ____________________________________________________________ Date:  ____________________________________________________________

## 2017-10-16 NOTE — ANESTHESIA PREPROCEDURE EVALUATION
Anesthetic History   No history of anesthetic complications            Review of Systems / Medical History  Patient summary reviewed and pertinent labs reviewed    Pulmonary        Sleep apnea    Asthma        Neuro/Psych   Within defined limits           Cardiovascular              CAD    Exercise tolerance: >4 METS     GI/Hepatic/Renal     GERD           Endo/Other      Hypothyroidism       Other Findings   Comments:   Risk Factors for Postoperative nausea/vomiting:       History of postoperative nausea/vomiting? NO       Female? NO       Motion sickness? NO       Intended opioid administration for postoperative analgesia? NO      Smoking Abstinence  Current Smoker? NO  Elective Surgery? YES  Seen preoperatively by anesthesiologist or proxy prior to day of surgery? YES  Pt abstained from smoking 24 hours prior to anesthesia?  N/A           Physical Exam    Airway  Mallampati: II  TM Distance: 4 - 6 cm  Neck ROM: normal range of motion   Mouth opening: Normal     Cardiovascular  Regular rate and rhythm,  S1 and S2 normal,  no murmur, click, rub, or gallop  Rhythm: regular  Rate: normal         Dental    Dentition: Lower dentition intact and Upper dentition intact     Pulmonary  Breath sounds clear to auscultation               Abdominal  GI exam deferred       Other Findings            Anesthetic Plan    ASA: 3  Anesthesia type: MAC          Induction: Intravenous  Anesthetic plan and risks discussed with: Patient

## 2017-10-16 NOTE — ANESTHESIA POSTPROCEDURE EVALUATION
Post-Anesthesia Evaluation and Assessment    Patient: Susan Lilly MRN: [id number]  SSN: [id number]    YOB: 1940  Age: 76 y.o. Sex: male       Cardiovascular Function/Vital Signs  Visit Vitals    /74    Pulse 65    Temp 36.9 °C (98.4 °F)    Resp 23    Ht 5' 8\" (1.727 m)    Wt 83.9 kg (185 lb)    SpO2 98%    BMI 28.13 kg/m2       Patient is status post MAC anesthesia for Procedure(s):  UPPER ENDOSCOPY  /  90 Halo. Nausea/Vomiting: None    Postoperative hydration reviewed and adequate. Pain:  Pain Scale 1: Numeric (0 - 10) (10/16/17 1325)  Pain Intensity 1: 0 (10/16/17 1325)   Managed    Neurological Status: At baseline    Mental Status and Level of Consciousness: Alert and oriented     Pulmonary Status:   O2 Device: Room air (10/16/17 1359)   Adequate oxygenation and airway patent    Complications related to anesthesia: None    Post-anesthesia assessment completed.  No concerns    Signed By: Carmelo Corral MD     October 16, 2017

## 2017-10-16 NOTE — PERIOP NOTES
Pt suctioned orally by Regina BARRAGAN on arrival to recovery, pt responds to voice,although sedated

## 2017-10-16 NOTE — H&P
[facility]   [email or web address]      Impression:   1.dysplastic cunningham's      Plan:     1. egd halo mac all risks discussed       Chief Complaint: dysplastic cunningham's      HPI:  Aime Dawson is a 76 y.o. male who is being seen on consult for dysplastic barretts. PMH:   Past Medical History:   Diagnosis Date    Allergic rhinitis     Arthritis     hands    Asthma     mild obst disease on pfts Dr. Yola Carranza Cunningham's esophagus     Dr. Nabila Mullen; last 8/17, for HALO    Bilateral carotid artery stenosis     12/14 BICA 50-69%    BPH (benign prostatic hyperplasia)     BPH without obstruction/lower urinary tract symptoms     CABG x 3 04/24/2014    LIMA to LAD, and saphenous vein grafts to the second diagonal of the LAD, and to the posterior descending branch of the RCA, Dr. Víctor Casiano, 4/23/14.  CAD (coronary artery disease)     Cardiac catheterization 04/18/2014    Superdominant RCA. mRCA 90%. pRPDA 60%. LM (modest sized) patent.  p-mLAD 85%. CX < 50%. LVEDP 17 mmHg. EF 60%. No RWMA. CABG recommended.  Cardiac echocardiogram 06/24/2008    EF 70%. Mild conc LVH. Gr 2 DDfx. Mild LAE. Mild MR. PASP 38 mmHg.  Cardiac nuclear imaging study, abnormal 04/15/2014    Mod ischemia in lg area of anterior wall, anteroapex, anteroseptum c/w high-grade prox LAD obstruction. Mod partially transient defect likely ischemia w/very sm infarction in RCA. TID 1.31 suggests 3-vessel CAD. Mod distal anterior hypk. Mild-mod anteroseptal, inferoseptal, anterolateral hypk. EF 46%. Strongly positive EKG on max EST. Ex time 6 min.  Carotid duplex 04/06/2016    Mod 50-69% bilateral ICA stenosis. Probable significant LECA stenosis. Similar to study of 8/12/15. Multiple nodules noted in right thyroid.     Colon adenoma 08/2017    Dr Micheal Omer Colon polyps 2007    Dr. Micheal Omer Difficulty hearing     Erectile dysfunction     FHx: heart disease     GERD (gastroesophageal reflux disease)     by EGD 2007 Dr. Melissa Mcgee IFG (impaired fasting glucose) 5/14    Impotence     Lower urinary tract symptoms (LUTS)     Multinodular goiter     2014; 2015; 4/16    Obesity     peak weight 202 lbs, bmi 31.4 from 4/14    Prostate cancer ([facility] [address] 8/10    Dr. Fernando Mo, s/p cryo Dr. Saeed Larger Pulmonary nodule 2014 2015    Sleep apnea        PSH:   Past Surgical History:   Procedure Laterality Date    CARDIAC SURG PROCEDURE UNLIST  11/06    thallium negative    CARDIAC SURG PROCEDURE UNLIST  4/14    3V CABG Dr Katy Vallejo  2007    mild obst disease on PFTs Dr. Freddy Montes N/A 7/27/2017    Dr. Manuel Joseph 2007 negative; 8/17 adenoma     HX Ansley Planjuan josé Estes HX CATARACT REMOVAL  11/14    bilat Dr Francisco Servin  2004    HX Ángel Kym      Dr. Estevan Santos HX TURP      x2 Dr. Belkis Mares HX UROLOGICAL  10/2012    [facility], Dr. Kaylynn Robles, Cystoscopy and dilation of stricture, cryoablation of prostate    VASCULAR SURGERY PROCEDURE UNLIST  12/14    BICA 50-69% stenosis       Social HX:   Social History     Social History    Marital status:      Spouse name: N/A    Number of children: 3    Years of education: N/A     Occupational History    [employer] Ret[employer]     Social History Main Topics    Smoking status: Former Smoker     Packs/day: 0.50     Years: 4.00     Quit date: 1/1/1957    Smokeless tobacco: Never Used      Comment: Quit in 1957    Alcohol use No    Drug use: No    Sexual activity: Not on file     Other Topics Concern    Not on file     Social History Narrative       FHX:   Family History   Problem Relation Age of Onset    Heart Disease Father     Stroke Mother     Hypertension Mother     Diabetes Sister        Allergy:   Allergies   Allergen Reactions    Albuterol Rash     seizure    Influenza Virus Vaccine, Specific Other (comments)    Percocet [Oxycodone-Acetaminophen] Other (comments)       Home Medications:     No prescriptions prior to admission. Review of Systems:     Constitutional: No fevers, chills, weight loss, fatigue. Skin: No rashes, pruritis, jaundice, ulcerations, erythema. HENT: No headaches, nosebleeds, sinus pressure, rhinorrhea, sore throat. Eyes: No visual changes, blurred vision, eye pain, photophobia, jaundice. Cardiovascular: No chest pain, heart palpitations. Respiratory: No cough, SOB, wheezing, chest discomfort, orthopnea. Gastrointestinal:    Genitourinary: No dysuria, bleeding, discharge, pyuria. Musculoskeletal: No weakness, arthralgias, wasting. Endo: No sweats. Heme: No bruising, easy bleeding. Allergies: As noted. Neurological: Cranial nerves intact. Alert and oriented. Gait not assessed. Psychiatric:  No anxiety, depression, hallucinations. Visit Vitals    Ht 5' 8\" (1.727 m)    Wt 83.9 kg (185 lb)    BMI 28.13 kg/m2       Physical Assessment:     constitutional: appearance: well developed, well nourished, normal habitus, no deformities, in no acute distress. skin: inspection: no rashes, ulcers, icterus or other lesions; no clubbing or telangiectasias. palpation: no induration or subcutaneos nodules. eyes: inspection: normal conjunctivae and lids; no jaundice pupils: symmetrical, normoreactive to light, normal accommodation and size. ENMT: mouth: normal oral mucosa,lips and gums; good dentition. oropharynx: normal tongue, hard and soft palate; posterior pharynx without erythema, exudate or lesions. neck: no masses organomegaly or tenderness. respiratory: effort: normal chest excursion; no intercostal retraction or accessory muscle use. cardiovascular: abdominal aorta: normal size and position; no bruits. palpation: PMI of normal size and position; normal rhythm; no thrill or murmurs.    abdominal: abdomen: normal consistency; no tenderness or masses. hernias: no hernias appreciated. liver: normal size and consistency. spleen: not palpable. rectal: hemoccult/guaiac: not performed. musculoskeletal: no deformities or muscle wasting   lymphatic: axilae: not palpable. groin: not palpable. neck: within normal limits. other: not palpable. neurologic: cranial nerves: II-XII normal.   psychiatric: judgement/insight: within normal limits. memory: within normal limits for recent and remote events. mood and affect: no evidence of depression, anxiety or agitation. orientation: oriented to time, space and person. Basic Metabolic Profile   No results for input(s): NA, K, CL, CO2, BUN, GLU, CA, MG, PHOS in the last 72 hours. No lab exists for component: CREAT      CBC w/Diff    No results for input(s): WBC, RBC, HGB, HCT, MCV, MCH, MCHC, RDW, PLT, HGBEXT, HCTEXT, PLTEXT in the last 72 hours. No lab exists for component: MPV No results for input(s): GRANS, LYMPH, EOS, PRO, MYELO, METAS, BLAST in the last 72 hours. No lab exists for component: MONO, BASO     Hepatic Function   No results for input(s): ALB, TP, TBILI, GPT, SGOT, AP, AML, LPSE in the last 72 hours. No lab exists for component: Janet León MD, M.D. [facility], [address] [place] [address]  [email or web address]

## 2017-10-16 NOTE — DISCHARGE INSTRUCTIONS
Upper GI Endoscopy: What to Expect at [address] [place] [address]  After you have an endoscopy, you will stay at the hospital or clinic for 1 to 2 hours. This will allow the medicine to wear off. You will be able to go home after your doctor or nurse checks to make sure you are not having any problems. You may have to stay overnight if you had treatment during the test. You may have a sore throat for a day or two after the test.  This care sheet gives you a general idea about what to expect after the test.  How can you care for yourself at home? Activity  · Rest as much as you need to after you go home. · You should be able to go back to your usual activities the day after the test.  Diet  · Follow your doctor's directions for eating after the test.  · Drink plenty of fluids (unless your doctor has told you not to). Medications  · If you have a sore throat the day after the test, use an over-the-counter spray to numb your throat. Follow-up care is a key part of your treatment and safety. Be sure to make and go to all appointments, and call your doctor if you are having problems. It's also a good idea to know your test results and keep a list of the medicines you take. When should you call for help? Call 911 anytime you think you may need emergency care. For example, call if:  · You passed out (lost consciousness). · You cough up blood. · You vomit blood or what looks like coffee grounds. · You pass maroon or very bloody stools. Call your doctor now or seek immediate medical care if:  · You have trouble swallowing. · You have belly pain. · Your stools are black and tarlike or have streaks of blood. · You are sick to your stomach or cannot keep fluids down. Watch closely for changes in your health, and be sure to contact your doctor if:  · Your throat still hurts after a day or two. · You do not get better as expected. Where can you learn more?    Go to [employer]  Enter J454 in the search box to learn more about \"Upper GI Endoscopy: What to Expect at Home. \"   © 2240-4744 [employer]. Care instructions adapted under license by Linda Delatorre (which disclaims liability or warranty for this information). This care instruction is for use with your licensed healthcare professional. If you have questions about a medical condition or this instruction, always ask your healthcare professional. [address] any warranty or liability for your use of this information. Content Version: 91.5.760416; Current as of: November 14, 2014    DISCHARGE SUMMARY from Nurse     POST-PROCEDURE INSTRUCTIONS:    Call your Physician if you:  ? Observe any excess bleeding. ? Develop a temperature over 100.5o F.  ? Experience abdominal, shoulder or chest pain. ? Notice any signs of decreased circulation or nerve impairment to an extremity such as a change in color, persistent numbness, tingling, coldness or increase in pain. ? Vomit blood or you have nausea and vomiting lasting longer than 4 hours. ? Are unable to take medications. ? Are unable to urinate within 8 hours after discharge following general anesthesia or intravenous sedation. For the next 24 hours after receiving general anesthesia or intravenous sedation, or while taking prescription Narcotics, limit your activities:  ? Do NOT drive a motor vehicle, operate hazard machinery or power tools, or perform tasks that require coordination. The medication you received during your procedure may have some effect on your mental awareness. ? Do NOT make important personal or business decisions. The medication you received during your procedure may have some effect on your mental awareness. ? Do NOT drink alcoholic beverages. These drinks do not mix well with the medications that have been given to you. ? Upon discharge from the hospital, you must be accompanied by a responsible adult. ?  Resume your diet as directed by your physician. ? Resume medications as your physician has prescribed. ? Please give a list of your current medications to your Primary Care Provider. ? Please update this list whenever your medications are discontinued, doses are changed, or new medications (including over-the-counter products) are added. ? Please carry medication information at all times in case of emergency situations. These are general instructions for a healthy lifestyle:    No smoking/ No tobacco products/ Avoid exposure to second hand smoke.  Surgeon General's Warning:  Quitting smoking now greatly reduces serious risk to your health. Obesity, smoking, and a sedentary lifestyle greatly increase your risk for illness.  A healthy diet, regular physical exercise & weight monitoring are important for maintaining a healthy lifestyle   You may be retaining fluid if you have a history of heart failure or if you experience any of the following symptoms:  Weight gain of 3 pounds or more overnight or 5 pounds in a week, increased swelling in our hands or feet or shortness of breath while lying flat in bed. Please call your doctor as soon as you notice any of these symptoms; do not wait until your next office visit. Recognize signs and symptoms of STROKE:  F  -  Face looks uneven  A  -  Arms unable to move or move unevenly  S  -  Speech slurred or non-existent  T  -  Time to call 911 - as soon as signs and symptoms begin - DO NOT go back to bed or wait to see If you get better - TIME IS BRAIN. Colorectal Screening   Colorectal cancer almost always develops from precancerous polyps (abnormal growths) in the colon or rectum. Screening tests can find precancerous polyps, so that they can be removed before they turn into cancer.  Screening tests can also find colorectal cancer early, when treatment works best.  Connor Phelps Speak with your physician about when you should begin screening and how often you should be tested  Connor Phelps   Additional Information    If you have questions, please call [phone]. Remember, MyChart is NOT to be used for urgent needs. For medical emergencies, dial 911. Discharge information has been reviewed with the patient. The patient verbalized understanding.

## 2017-10-17 ENCOUNTER — TELEPHONE (OUTPATIENT)
Dept: INTERNAL MEDICINE CLINIC | Age: 77
End: 2017-10-17

## 2017-10-17 NOTE — TELEPHONE ENCOUNTER
Wife calling, says pt saw Dr. Beveryl Sanchez yesterday for surgery and is in a lot of pain. Says he needs something stronger. Says they can't get through to Dr. Beverly Sanchez office and are asking if Dr. Scot Rosales can give him something. Wants call back asap. Says she doesn't know what else to do.

## 2017-10-17 NOTE — TELEPHONE ENCOUNTER
Called and spoke to patient wife and she stated \"everytime Mr. Marlin Sparks takes a deep breathe that he has to bend down because he is in excruciating pain\"  Patient rep then stated Ludivinadomi Jean Baptiste has been trying to call Dr. Eli Owen office all day from 8:00am and the recording stated \" is on maternity leave and to dial 0 and the line just rings and rings. \"  Offered to try and call Dr. Eli Owen office. Spoke with Joi Nassar and advised her of what patient rep has stated and she stated \" she will have the endo department call the patient.

## 2017-10-17 NOTE — TELEPHONE ENCOUNTER
Can't really call anything in for him as he had procedure  The specialist needs to know if he's having pain so they can decide if this is normal or complication of the procedure

## 2017-11-06 RX ORDER — METOPROLOL SUCCINATE 25 MG/1
TABLET, EXTENDED RELEASE ORAL
Qty: 30 TAB | Refills: 11 | Status: SHIPPED | OUTPATIENT
Start: 2017-11-06 | End: 2018-02-02 | Stop reason: SDUPTHER

## 2017-11-11 ENCOUNTER — ANESTHESIA EVENT (OUTPATIENT)
Dept: ENDOSCOPY | Age: 77
End: 2017-11-11
Payer: MEDICARE

## 2017-11-13 ENCOUNTER — ANESTHESIA (OUTPATIENT)
Dept: ENDOSCOPY | Age: 77
End: 2017-11-13
Payer: MEDICARE

## 2017-11-13 ENCOUNTER — HOSPITAL ENCOUNTER (OUTPATIENT)
Age: 77
Setting detail: OUTPATIENT SURGERY
Discharge: HOME | End: 2017-11-13
Attending: INTERNAL MEDICINE | Admitting: INTERNAL MEDICINE
Payer: MEDICARE

## 2017-11-13 VITALS
WEIGHT: 180 LBS | HEIGHT: 69 IN | BODY MASS INDEX: 26.66 KG/M2 | HEART RATE: 49 BPM | RESPIRATION RATE: 15 BRPM | TEMPERATURE: 98 F | SYSTOLIC BLOOD PRESSURE: 109 MMHG | OXYGEN SATURATION: 99 % | DIASTOLIC BLOOD PRESSURE: 60 MMHG

## 2017-11-13 PROCEDURE — 76060000031 HC ANESTHESIA FIRST 0.5 HR: Performed by: INTERNAL MEDICINE

## 2017-11-13 PROCEDURE — 74011250636 HC RX REV CODE- 250/636

## 2017-11-13 PROCEDURE — 76040000019: Performed by: INTERNAL MEDICINE

## 2017-11-13 PROCEDURE — 74011250636 HC RX REV CODE- 250/636: Performed by: NURSE ANESTHETIST, CERTIFIED REGISTERED

## 2017-11-13 PROCEDURE — C1726 CATH, BAL DIL, NON-VASCULAR: HCPCS | Performed by: INTERNAL MEDICINE

## 2017-11-13 PROCEDURE — 77030031670 HC DEV INFL ENDOTEK BIG60 MRTM -B: Performed by: INTERNAL MEDICINE

## 2017-11-13 RX ORDER — SODIUM CHLORIDE, SODIUM LACTATE, POTASSIUM CHLORIDE, CALCIUM CHLORIDE 600; 310; 30; 20 MG/100ML; MG/100ML; MG/100ML; MG/100ML
INJECTION, SOLUTION INTRAVENOUS
Status: DISCONTINUED | OUTPATIENT
Start: 2017-11-13 | End: 2017-11-13 | Stop reason: HOSPADM

## 2017-11-13 RX ORDER — PROPOFOL 10 MG/ML
INJECTION, EMULSION INTRAVENOUS AS NEEDED
Status: DISCONTINUED | OUTPATIENT
Start: 2017-11-13 | End: 2017-11-13 | Stop reason: HOSPADM

## 2017-11-13 RX ORDER — INSULIN LISPRO 100 [IU]/ML
INJECTION, SOLUTION INTRAVENOUS; SUBCUTANEOUS ONCE
Status: DISCONTINUED | OUTPATIENT
Start: 2017-11-14 | End: 2017-11-13 | Stop reason: HOSPADM

## 2017-11-13 RX ORDER — SODIUM CHLORIDE, SODIUM LACTATE, POTASSIUM CHLORIDE, CALCIUM CHLORIDE 600; 310; 30; 20 MG/100ML; MG/100ML; MG/100ML; MG/100ML
25 INJECTION, SOLUTION INTRAVENOUS CONTINUOUS
Status: DISCONTINUED | OUTPATIENT
Start: 2017-11-14 | End: 2017-11-13 | Stop reason: HOSPADM

## 2017-11-13 RX ADMIN — SODIUM CHLORIDE, SODIUM LACTATE, POTASSIUM CHLORIDE, CALCIUM CHLORIDE: 600; 310; 30; 20 INJECTION, SOLUTION INTRAVENOUS at 10:39

## 2017-11-13 RX ADMIN — PROPOFOL 50 MG: 10 INJECTION, EMULSION INTRAVENOUS at 10:43

## 2017-11-13 RX ADMIN — SODIUM CHLORIDE, SODIUM LACTATE, POTASSIUM CHLORIDE, AND CALCIUM CHLORIDE 25 ML/HR: 600; 310; 30; 20 INJECTION, SOLUTION INTRAVENOUS at 10:12

## 2017-11-13 RX ADMIN — PROPOFOL 100 MG: 10 INJECTION, EMULSION INTRAVENOUS at 10:39

## 2017-11-13 NOTE — H&P
H&P is updated and reviewed, physical exam was performed and medications/allergies were reviewed immediately prior to anesthesia.     Lilia Rudolph MD

## 2017-11-13 NOTE — ANESTHESIA POSTPROCEDURE EVALUATION
Post-Anesthesia Evaluation & Assessment    Visit Vitals    BP 94/71    Pulse (!) 54    Temp 36.7 °C (98 °F)    Resp 18    Ht 5' 8.5\" (1.74 m)    Wt 81.6 kg (180 lb)    SpO2 95%    BMI 26.97 kg/m2       Post-operative hydration adequate. Pain score (VAS): 0 Pain Scale 1: Numeric (0 - 10) (11/13/17 1050)  Pain Intensity 1: 0 (11/13/17 1050)   Managed. Mental status & Level of consciousness: returned to baseline    Neurological status: returned to baseline    Pulmonary status: airway patent, oxygen given as needed. Complications related to anesthesia: none    Patient has met all discharge requirements.     Additional comments:        Thuan Sampson MD  November 13, 2017

## 2017-11-13 NOTE — ANESTHESIA PREPROCEDURE EVALUATION
Anesthetic History   No history of anesthetic complications            Review of Systems / Medical History  Patient summary reviewed and pertinent labs reviewed    Pulmonary        Sleep apnea: CPAP    Asthma : well controlled       Neuro/Psych   Within defined limits           Cardiovascular    Hypertension          CAD and CABG    Exercise tolerance: <4 METS     GI/Hepatic/Renal     GERD: poorly controlled           Endo/Other      Hypothyroidism  Morbid obesity and arthritis     Other Findings   Comments:   Risk Factors for Postoperative nausea/vomiting:       History of postoperative nausea/vomiting? NO       Female? NO       Motion sickness? NO       Intended opioid administration for postoperative analgesia? NO      Smoking Abstinence  Current Smoker? NO  Elective Surgery? YES  Seen preoperatively by anesthesiologist or proxy prior to day of surgery? YES  Pt abstained from smoking 24 hours prior to anesthesia?  N/A         Physical Exam    Airway  Mallampati: II  TM Distance: 4 - 6 cm  Neck ROM: normal range of motion   Mouth opening: Normal     Cardiovascular  Regular rate and rhythm,  S1 and S2 normal,  no murmur, click, rub, or gallop             Dental    Dentition: Poor dentition     Pulmonary  Breath sounds clear to auscultation               Abdominal  GI exam deferred       Other Findings            Anesthetic Plan    ASA: 3  Anesthesia type: MAC          Induction: Intravenous  Anesthetic plan and risks discussed with: Patient

## 2017-11-13 NOTE — DISCHARGE INSTRUCTIONS
Upper GI Endoscopy: What to Expect at [address] [place] [address]  After you have an endoscopy, you will stay at the hospital or clinic for 1 to 2 hours. This will allow the medicine to wear off. You will be able to go home after your doctor or nurse checks to make sure you are not having any problems. You may have to stay overnight if you had treatment during the test. You may have a sore throat for a day or two after the test.  This care sheet gives you a general idea about what to expect after the test.  How can you care for yourself at home? Activity  · Rest as much as you need to after you go home. · You should be able to go back to your usual activities the day after the test.  Diet  · Follow your doctor's directions for eating after the test.  · Drink plenty of fluids (unless your doctor has told you not to). Medications  · If you have a sore throat the day after the test, use an over-the-counter spray to numb your throat. Follow-up care is a key part of your treatment and safety. Be sure to make and go to all appointments, and call your doctor if you are having problems. It's also a good idea to know your test results and keep a list of the medicines you take. When should you call for help? Call 911 anytime you think you may need emergency care. For example, call if:  · You passed out (lost consciousness). · You cough up blood. · You vomit blood or what looks like coffee grounds. · You pass maroon or very bloody stools. Call your doctor now or seek immediate medical care if:  · You have trouble swallowing. · You have belly pain. · Your stools are black and tarlike or have streaks of blood. · You are sick to your stomach or cannot keep fluids down. Watch closely for changes in your health, and be sure to contact your doctor if:  · Your throat still hurts after a day or two. · You do not get better as expected. Where can you learn more?    Go to DealExplorer.be  Enter J454 in the search box to learn more about \"Upper GI Endoscopy: What to Expect at Home. \"   © 1483-5335 Health[employer]. Care instructions adapted under license by [employer] (which disclaims liability or warranty for this information). This care instruction is for use with your licensed healthcare professional. If you have questions about a medical condition or this instruction, always ask your healthcare professional. [address] any warranty or liability for your use of this information. Content Version: 44.5.406008; Current as of: November 14, 2014     Esophageal Dilation: What to Expect at [address] [facility]  After you have esophageal dilation, you will stay at the hospital or surgery center for 1 to 2 hours. This will allow the medicine to wear off. You will be able to go home after your doctor or nurse checks to make sure you are not having any problems. This care sheet gives you a general idea about how long it will take for you to recover. But each person recovers at a different pace. Follow the steps below to get better as quickly as possible. How can you care for yourself at home? Activity  ? · Rest as much as you need to after you go home. ? · You should be able to go back to your usual activities the day after the procedure. Diet  ? · Follow your doctor's directions for eating after the procedure. ? · Drink plenty of fluids (unless your doctor has told you not to). Medicines  ? · Your doctor will tell you if and when you can restart your medicines. He or she will also give you instructions about taking any new medicines. ? · If you take blood thinners, such as warfarin (Coumadin), clopidogrel (Plavix), or aspirin, be sure to talk to your doctor. He or she will tell you if and when to start taking those medicines again. Make sure that you understand exactly what your doctor wants you to do.    ? · If you have a sore throat the day after the procedure, use an over-the-counter spray to numb your throat. Sucking on throat lozenges and gargling with warm salt water may also help relieve your symptoms. Follow-up care is a key part of your treatment and safety. Be sure to make and go to all appointments, and call your doctor if you are having problems. It's also a good idea to know your test results and keep a list of the medicines you take. When should you call for help? Call 911 anytime you think you may need emergency care. For example, call if:  ? · You passed out (lost consciousness). ? · You have trouble breathing. ? · Your stools are maroon or very bloody   ? Call your doctor now or seek immediate medical care if:  ? · You have new or worse belly pain. ? · You have a fever. ? · You have new or more blood in your stools. ? · You are sick to your stomach and cannot drink fluids. ? · You cannot pass stools or gas. ? · You have pain that does not get better after you take pain medicine. ? Watch closely for changes in your health, and be sure to contact your doctor if:  ? · Your throat still hurts after a day or two. ? · You do not get better as expected. Where can you learn more? Go to http://martine-feng.info/. Enter E324 in the search box to learn more about \"Esophageal Dilation: What to Expect at Home. \"  Current as of: May 12, 2017  Content Version: 11.4  © 9034-1703 [employer]. Care instructions adapted under license by [employer] (which disclaims liability or warranty for this information). If you have questions about a medical condition or this instruction, always ask your healthcare professional. [place] [address] any warranty or liability for your use of this information. DISCHARGE SUMMARY from Nurse     POST-PROCEDURE INSTRUCTIONS:    Call your Physician if you:  ? Observe any excess bleeding. ? Develop a temperature over 100.5o F.  ?  Experience abdominal, shoulder or chest pain.  ? Notice any signs of decreased circulation or nerve impairment to an extremity such as a change in color, persistent numbness, tingling, coldness or increase in pain. ? Vomit blood or you have nausea and vomiting lasting longer than 4 hours. ? Are unable to take medications. ? Are unable to urinate within 8 hours after discharge following general anesthesia or intravenous sedation. For the next 24 hours after receiving general anesthesia or intravenous sedation, or while taking prescription Narcotics, limit your activities:  ? Do NOT drive a motor vehicle, operate hazard machinery or power tools, or perform tasks that require coordination. The medication you received during your procedure may have some effect on your mental awareness. ? Do NOT make important personal or business decisions. The medication you received during your procedure may have some effect on your mental awareness. ? Do NOT drink alcoholic beverages. These drinks do not mix well with the medications that have been given to you. ? Upon discharge from the hospital, you must be accompanied by a responsible adult. ? Resume your diet as directed by your physician. ? Resume medications as your physician has prescribed. ? Please give a list of your current medications to your Primary Care Provider. ? Please update this list whenever your medications are discontinued, doses are changed, or new medications (including over-the-counter products) are added. ? Please carry medication information at all times in case of emergency situations. These are general instructions for a healthy lifestyle:    No smoking/ No tobacco products/ Avoid exposure to second hand smoke.  Surgeon General's Warning:  Quitting smoking now greatly reduces serious risk to your health. Obesity, smoking, and a sedentary lifestyle greatly increase your risk for illness.    A healthy diet, regular physical exercise & weight monitoring are important for maintaining a healthy lifestyle   You may be retaining fluid if you have a history of heart failure or if you experience any of the following symptoms:  Weight gain of 3 pounds or more overnight or 5 pounds in a week, increased swelling in our hands or feet or shortness of breath while lying flat in bed. Please call your doctor as soon as you notice any of these symptoms; do not wait until your next office visit. Recognize signs and symptoms of STROKE:  F  -  Face looks uneven  A  -  Arms unable to move or move unevenly  S  -  Speech slurred or non-existent  T  -  Time to call 911 - as soon as signs and symptoms begin - DO NOT go back to bed or wait to see If you get better - TIME IS BRAIN. Colorectal Screening   Colorectal cancer almost always develops from precancerous polyps (abnormal growths) in the colon or rectum. Screening tests can find precancerous polyps, so that they can be removed before they turn into cancer. Screening tests can also find colorectal cancer early, when treatment works best.  Jair Young Speak with your physician about when you should begin screening and how often you should be tested     Additional Information    If you have questions, please call [phone]. Remember, [employer] is NOT to be used for urgent needs. For medical emergencies, dial 911. Educational references and/or instructions provided during this visit included:    Esophageal Dilation      APPOINTMENTS:    Please make a follow-up appointment with your physician. Discharge information has been reviewed with the patient and spouse. The patient and spouse verbalized understanding.

## 2017-12-01 ENCOUNTER — HOSPITAL ENCOUNTER (OUTPATIENT)
Dept: GENERAL RADIOLOGY | Age: 77
Discharge: HOME | End: 2017-12-01
Attending: INTERNAL MEDICINE
Payer: MEDICARE

## 2017-12-01 ENCOUNTER — HOSPITAL ENCOUNTER (OUTPATIENT)
Dept: LAB | Age: 77
Discharge: HOME | End: 2017-12-01
Attending: INTERNAL MEDICINE
Payer: MEDICARE

## 2017-12-01 DIAGNOSIS — K22.719 BARRETT'S ESOPHAGUS WITH DYSPLASIA: ICD-10-CM

## 2017-12-01 LAB
ANION GAP SERPL CALC-SCNC: 7 MMOL/L (ref 3–18)
BUN SERPL-MCNC: 14 MG/DL (ref 7–18)
BUN/CREAT SERPL: 13 (ref 12–20)
CALCIUM SERPL-MCNC: 9 MG/DL (ref 8.5–10.1)
CHLORIDE SERPL-SCNC: 105 MMOL/L (ref 100–108)
CO2 SERPL-SCNC: 28 MMOL/L (ref 21–32)
CREAT SERPL-MCNC: 1.06 MG/DL (ref 0.6–1.3)
GLUCOSE SERPL-MCNC: 101 MG/DL (ref 74–99)
POTASSIUM SERPL-SCNC: 4.2 MMOL/L (ref 3.5–5.5)
SODIUM SERPL-SCNC: 140 MMOL/L (ref 136–145)

## 2017-12-01 PROCEDURE — 74220 X-RAY XM ESOPHAGUS 1CNTRST: CPT

## 2017-12-01 PROCEDURE — 74011000250 HC RX REV CODE- 250: Performed by: INTERNAL MEDICINE

## 2017-12-01 PROCEDURE — 36415 COLL VENOUS BLD VENIPUNCTURE: CPT | Performed by: INTERNAL MEDICINE

## 2017-12-01 PROCEDURE — 80048 BASIC METABOLIC PNL TOTAL CA: CPT | Performed by: INTERNAL MEDICINE

## 2017-12-01 PROCEDURE — 74011000255 HC RX REV CODE- 255: Performed by: INTERNAL MEDICINE

## 2017-12-01 RX ADMIN — BARIUM SULFATE 135 ML: 980 POWDER, FOR SUSPENSION ORAL at 09:00

## 2017-12-01 RX ADMIN — BARIUM SULFATE 700 MG: 700 TABLET ORAL at 09:00

## 2017-12-01 RX ADMIN — ANTACID/ANTIFLATULENT 4 G: 380; 550; 10; 10 GRANULE, EFFERVESCENT ORAL at 09:00

## 2017-12-01 RX ADMIN — BARIUM SULFATE 45 G: 960 POWDER, FOR SUSPENSION ORAL at 09:00

## 2017-12-05 ENCOUNTER — ANESTHESIA EVENT (OUTPATIENT)
Dept: ENDOSCOPY | Age: 77
End: 2017-12-05
Payer: MEDICARE

## 2017-12-06 ENCOUNTER — ANESTHESIA (OUTPATIENT)
Dept: ENDOSCOPY | Age: 77
End: 2017-12-06
Payer: MEDICARE

## 2017-12-06 ENCOUNTER — HOSPITAL ENCOUNTER (OUTPATIENT)
Age: 77
Setting detail: OUTPATIENT SURGERY
Discharge: HOME | End: 2017-12-06
Attending: INTERNAL MEDICINE | Admitting: INTERNAL MEDICINE
Payer: MEDICARE

## 2017-12-06 VITALS
DIASTOLIC BLOOD PRESSURE: 58 MMHG | WEIGHT: 185.13 LBS | TEMPERATURE: 97.3 F | HEART RATE: 60 BPM | HEIGHT: 67 IN | OXYGEN SATURATION: 100 % | SYSTOLIC BLOOD PRESSURE: 121 MMHG | BODY MASS INDEX: 29.06 KG/M2 | RESPIRATION RATE: 16 BRPM

## 2017-12-06 PROCEDURE — 77030008565 HC TBNG SUC IRR ERBE -B: Performed by: INTERNAL MEDICINE

## 2017-12-06 PROCEDURE — 74011000250 HC RX REV CODE- 250: Performed by: NURSE ANESTHETIST, CERTIFIED REGISTERED

## 2017-12-06 PROCEDURE — 76060000031 HC ANESTHESIA FIRST 0.5 HR: Performed by: INTERNAL MEDICINE

## 2017-12-06 PROCEDURE — 77030019988 HC FCPS ENDOSC DISP BSC -B: Performed by: INTERNAL MEDICINE

## 2017-12-06 PROCEDURE — 74011000250 HC RX REV CODE- 250

## 2017-12-06 PROCEDURE — 77030018846 HC SOL IRR STRL H20 ICUM -A: Performed by: INTERNAL MEDICINE

## 2017-12-06 PROCEDURE — 74011250636 HC RX REV CODE- 250/636

## 2017-12-06 PROCEDURE — 74011250636 HC RX REV CODE- 250/636: Performed by: NURSE ANESTHETIST, CERTIFIED REGISTERED

## 2017-12-06 PROCEDURE — 76040000019: Performed by: INTERNAL MEDICINE

## 2017-12-06 RX ORDER — SODIUM CHLORIDE 0.9 % (FLUSH) 0.9 %
5-10 SYRINGE (ML) INJECTION AS NEEDED
Status: DISCONTINUED | OUTPATIENT
Start: 2017-12-06 | End: 2017-12-06 | Stop reason: HOSPADM

## 2017-12-06 RX ORDER — LIDOCAINE HYDROCHLORIDE 10 MG/ML
0.1 INJECTION, SOLUTION EPIDURAL; INFILTRATION; INTRACAUDAL; PERINEURAL AS NEEDED
Status: DISCONTINUED | OUTPATIENT
Start: 2017-12-06 | End: 2017-12-06 | Stop reason: HOSPADM

## 2017-12-06 RX ORDER — PROPOFOL 10 MG/ML
INJECTION, EMULSION INTRAVENOUS AS NEEDED
Status: DISCONTINUED | OUTPATIENT
Start: 2017-12-06 | End: 2017-12-06 | Stop reason: HOSPADM

## 2017-12-06 RX ORDER — FENTANYL CITRATE 50 UG/ML
25 INJECTION, SOLUTION INTRAMUSCULAR; INTRAVENOUS
Status: CANCELLED | OUTPATIENT
Start: 2017-12-06

## 2017-12-06 RX ORDER — SODIUM CHLORIDE, SODIUM LACTATE, POTASSIUM CHLORIDE, CALCIUM CHLORIDE 600; 310; 30; 20 MG/100ML; MG/100ML; MG/100ML; MG/100ML
75 INJECTION, SOLUTION INTRAVENOUS CONTINUOUS
Status: CANCELLED | OUTPATIENT
Start: 2017-12-06

## 2017-12-06 RX ORDER — SODIUM CHLORIDE 0.9 % (FLUSH) 0.9 %
5-10 SYRINGE (ML) INJECTION EVERY 8 HOURS
Status: DISCONTINUED | OUTPATIENT
Start: 2017-12-06 | End: 2017-12-06 | Stop reason: HOSPADM

## 2017-12-06 RX ORDER — LIDOCAINE HYDROCHLORIDE 20 MG/ML
INJECTION, SOLUTION EPIDURAL; INFILTRATION; INTRACAUDAL; PERINEURAL AS NEEDED
Status: DISCONTINUED | OUTPATIENT
Start: 2017-12-06 | End: 2017-12-06 | Stop reason: HOSPADM

## 2017-12-06 RX ORDER — INSULIN LISPRO 100 [IU]/ML
INJECTION, SOLUTION INTRAVENOUS; SUBCUTANEOUS ONCE
Status: DISCONTINUED | OUTPATIENT
Start: 2017-12-06 | End: 2017-12-06 | Stop reason: HOSPADM

## 2017-12-06 RX ORDER — ONDANSETRON 2 MG/ML
4 INJECTION INTRAMUSCULAR; INTRAVENOUS AS NEEDED
Status: CANCELLED | OUTPATIENT
Start: 2017-12-06

## 2017-12-06 RX ORDER — SODIUM CHLORIDE, SODIUM LACTATE, POTASSIUM CHLORIDE, CALCIUM CHLORIDE 600; 310; 30; 20 MG/100ML; MG/100ML; MG/100ML; MG/100ML
75 INJECTION, SOLUTION INTRAVENOUS CONTINUOUS
Status: DISCONTINUED | OUTPATIENT
Start: 2017-12-06 | End: 2017-12-06 | Stop reason: HOSPADM

## 2017-12-06 RX ORDER — SODIUM CHLORIDE 0.9 % (FLUSH) 0.9 %
5-10 SYRINGE (ML) INJECTION AS NEEDED
Status: CANCELLED | OUTPATIENT
Start: 2017-12-06

## 2017-12-06 RX ADMIN — PROPOFOL 30 MG: 10 INJECTION, EMULSION INTRAVENOUS at 10:54

## 2017-12-06 RX ADMIN — PROPOFOL 30 MG: 10 INJECTION, EMULSION INTRAVENOUS at 10:52

## 2017-12-06 RX ADMIN — PROPOFOL 30 MG: 10 INJECTION, EMULSION INTRAVENOUS at 10:48

## 2017-12-06 RX ADMIN — FAMOTIDINE: 10 INJECTION, SOLUTION INTRAVENOUS at 09:43

## 2017-12-06 RX ADMIN — PROPOFOL 20 MG: 10 INJECTION, EMULSION INTRAVENOUS at 10:49

## 2017-12-06 RX ADMIN — PROPOFOL 20 MG: 10 INJECTION, EMULSION INTRAVENOUS at 10:59

## 2017-12-06 RX ADMIN — PROPOFOL 30 MG: 10 INJECTION, EMULSION INTRAVENOUS at 10:47

## 2017-12-06 RX ADMIN — SODIUM CHLORIDE, SODIUM LACTATE, POTASSIUM CHLORIDE, AND CALCIUM CHLORIDE 75 ML/HR: 600; 310; 30; 20 INJECTION, SOLUTION INTRAVENOUS at 09:43

## 2017-12-06 RX ADMIN — SODIUM CHLORIDE, SODIUM LACTATE, POTASSIUM CHLORIDE, AND CALCIUM CHLORIDE: 600; 310; 30; 20 INJECTION, SOLUTION INTRAVENOUS at 10:39

## 2017-12-06 RX ADMIN — LIDOCAINE HYDROCHLORIDE 40 MG: 20 INJECTION, SOLUTION EPIDURAL; INFILTRATION; INTRACAUDAL; PERINEURAL at 10:47

## 2017-12-06 RX ADMIN — PROPOFOL 20 MG: 10 INJECTION, EMULSION INTRAVENOUS at 10:51

## 2017-12-06 RX ADMIN — PROPOFOL 30 MG: 10 INJECTION, EMULSION INTRAVENOUS at 10:56

## 2017-12-06 RX ADMIN — PROPOFOL 30 MG: 10 INJECTION, EMULSION INTRAVENOUS at 10:50

## 2017-12-06 NOTE — ANESTHESIA PREPROCEDURE EVALUATION
Anesthetic History   No history of anesthetic complications            Review of Systems / Medical History  Patient summary reviewed and pertinent labs reviewed    Pulmonary        Sleep apnea: No treatment    Asthma : well controlled       Neuro/Psych   Within defined limits           Cardiovascular              Past MI, CAD, CABG and hyperlipidemia    Exercise tolerance: <4 METS     GI/Hepatic/Renal     GERD: well controlled           Endo/Other      Hypothyroidism: well controlled  Arthritis  Pertinent negatives: No morbid obesity   Other Findings   Comments:   Risk Factors for Postoperative nausea/vomiting:       History of postoperative nausea/vomiting? NO       Female? NO       Motion sickness? NO       Intended opioid administration for postoperative analgesia? NO      Smoking Abstinence  Current Smoker? NO  Elective Surgery? YES  Seen preoperatively by anesthesiologist or proxy prior to day of surgery? YES  Pt abstained from smoking 24 hours prior to anesthesia?  N/A           Physical Exam    Airway  Mallampati: II  TM Distance: 4 - 6 cm  Neck ROM: normal range of motion   Mouth opening: Normal     Cardiovascular  Regular rate and rhythm,  S1 and S2 normal,  no murmur, click, rub, or gallop             Dental  No notable dental hx       Pulmonary  Breath sounds clear to auscultation               Abdominal  GI exam deferred       Other Findings            Anesthetic Plan    ASA: 3  Anesthesia type: MAC          Induction: Intravenous  Anesthetic plan and risks discussed with: Patient and Family

## 2017-12-06 NOTE — ANESTHESIA POSTPROCEDURE EVALUATION
Post-Anesthesia Evaluation and Assessment    Patient: Tolu Conley MRN: [id number]  SSN: [id number]    YOB: 1940  Age: 76 y.o. Sex: male       Cardiovascular Function/Vital Signs  Visit Vitals    /58 (BP 1 Location: Right arm, BP Patient Position: At rest)    Pulse 60    Temp 36.3 °C (97.3 °F)    Resp 16    Ht 5' 7\" (1.702 m)    Wt 84 kg (185 lb 2 oz)    SpO2 100%    BMI 28.99 kg/m2       Patient is status post MAC anesthesia for Procedure(s):  ENDOSCOPY WITH DILATION. Nausea/Vomiting: None    Postoperative hydration reviewed and adequate. Pain:  Pain Scale 1: Numeric (0 - 10) (12/06/17 1143)  Pain Intensity 1: 0 (12/06/17 1143)   Managed    Neurological Status: At baseline    Mental Status and Level of Consciousness: Arousable    Pulmonary Status:   O2 Device: Room air (12/06/17 1143)   Adequate oxygenation and airway patent    Complications related to anesthesia: None    Post-anesthesia assessment completed.  No concerns    Signed By: Chaparrita Adhikari MD     December 6, 2017

## 2017-12-06 NOTE — PROGRESS NOTES
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      Type of Anesthesia: MAC (Monitored anesthesia care)    Description of findings: same as post op dx    Procedure: Procedure(s):  ENDOSCOPY WITH DILATION    :  Dr. Maricarmen Khan MD    Assistant(s): Endoscopy Technician-1: Juan Officer; [address] [place] [address]  Endoscopy RN-1: Mychal Guerrero; Juli Enamorado RN    EBL:None    Specimens: * No specimens in log *    Findings: See printed and scanned procedure note    Complications: None    Dr. Maricarmen Khan MD  12/6/2017  11:18 AM

## 2017-12-06 NOTE — DISCHARGE INSTRUCTIONS
Esophageal Dilation: What to Expect at [address] [place] [address]  After you have esophageal dilation, you will stay at the hospital or surgery center for 1 to 2 hours. This will allow the medicine to wear off. You will be able to go home after your doctor or nurse checks to make sure you are not having any problems. This care sheet gives you a general idea about how long it will take for you to recover. But each person recovers at a different pace. Follow the steps below to get better as quickly as possible. How can you care for yourself at home? Activity  ? · Rest as much as you need to after you go home. ? · You should be able to go back to your usual activities the day after the procedure. Diet  ? · Follow your doctor's directions for eating after the procedure. ? · Drink plenty of fluids (unless your doctor has told you not to). Medicines  ? · Your doctor will tell you if and when you can restart your medicines. He or she will also give you instructions about taking any new medicines. ? · If you take blood thinners, such as warfarin (Coumadin), clopidogrel (Plavix), or aspirin, be sure to talk to your doctor. He or she will tell you if and when to start taking those medicines again. Make sure that you understand exactly what your doctor wants you to do. ? · If you have a sore throat the day after the procedure, use an over-the-counter spray to numb your throat. Sucking on throat lozenges and gargling with warm salt water may also help relieve your symptoms. Follow-up care is a key part of your treatment and safety. Be sure to make and go to all appointments, and call your doctor if you are having problems. It's also a good idea to know your test results and keep a list of the medicines you take. When should you call for help? Call 911 anytime you think you may need emergency care. For example, call if:  ? · You passed out (lost consciousness). ? · You have trouble breathing.    ? · Your stools are maroon or very bloody   ? Call your doctor now or seek immediate medical care if:  ? · You have new or worse belly pain. ? · You have a fever. ? · You have new or more blood in your stools. ? · You are sick to your stomach and cannot drink fluids. ? · You cannot pass stools or gas. ? · You have pain that does not get better after you take pain medicine. ? Watch closely for changes in your health, and be sure to contact your doctor if:  ? · Your throat still hurts after a day or two. ? · You do not get better as expected. Where can you learn more? Go to http://martine-feng.info/. Enter Y266 in the search box to learn more about \"Esophageal Dilation: What to Expect at Home. \"  Current as of: May 12, 2017  Content Version: 11.4  © 4733-2718 [employer]. Care instructions adapted under license by [employer] (which disclaims liability or warranty for this information). If you have questions about a medical condition or this instruction, always ask your healthcare professional. [place] [address] any warranty or liability for your use of this information. Hiatal Hernia: Care Instructions  Your Care Instructions  A hiatal hernia occurs when part of the stomach bulges into the chest cavity. A hiatal hernia may allow stomach acid and juices to back up into the esophagus (acid reflux). This can cause a feeling of burning, warmth, heat, or pain behind the breastbone. This feeling may often occur after you eat, soon after you lie down, or when you bend forward, and it may come and go. You also may have a sour taste in your mouth. These symptoms are commonly known as heartburn or reflux. But not all hiatal hernias cause symptoms. Follow-up care is a key part of your treatment and safety. Be sure to make and go to all appointments, and call your doctor if you are having problems.  It's also a good idea to know your test results and keep a list of the medicines you take. How can you care for yourself at home? · Take your medicines exactly as prescribed. Call your doctor if you think you are having a problem with your medicine. · Do not take aspirin or other nonsteroidal anti-inflammatory drugs (NSAIDs), such as ibuprofen (Advil, Motrin) or naproxen (Aleve), unless your doctor says it is okay. Ask your doctor what you can take for pain. · Your doctor may recommend over-the-counter medicine. For mild or occasional indigestion, antacids such as Tums, Gaviscon, Maalox, or Mylanta may help. Your doctor also may recommend over-the-counter acid reducers, such as famotidine (Pepcid AC), cimetidine (Tagamet HB), ranitidine (Zantac 75 and Zantac 150), or omeprazole (Prilosec). Read and follow all instructions on the label. If you use these medicines often, talk with your doctor. · Change your eating habits. ¨ It's best to eat several small meals instead of two or three large meals. ¨ After you eat, wait 2 to 3 hours before you lie down. Late-night snacks aren't a good idea. ¨ Chocolate, mint, and alcohol can make heartburn worse. They relax the valve between the esophagus and the stomach. ¨ Spicy foods, foods that have a lot of acid (like tomatoes and oranges), and coffee can make heartburn symptoms worse in some people. If your symptoms are worse after you eat a certain food, you may want to stop eating that food to see if your symptoms get better. · Do not smoke or chew tobacco.  · If you get heartburn at night, raise the head of your bed 6 to 8 inches by putting the frame on blocks or placing a foam wedge under the head of your mattress. (Adding extra pillows does not work.)  · Do not wear tight clothing around your middle. · Lose weight if you need to. Losing just 5 to 10 pounds can help. When should you call for help? Call your doctor now or seek immediate medical care if:  ? · You have new or worse belly pain. ? · You are vomiting. ? Watch closely for changes in your health, and be sure to contact your doctor if:  ? · You have new or worse symptoms of indigestion. ? · You have trouble or pain swallowing. ? · You are losing weight. ? · You do not get better as expected. Where can you learn more? Go to http://martine-feng.info/. Enter T077 in the search box to learn more about \"Hiatal Hernia: Care Instructions. \"  Current as of: May 12, 2017  Content Version: 11.4  © 1781-8657 [employer]. Care instructions adapted under license by [employer] (which disclaims liability or warranty for this information). If you have questions about a medical condition or this instruction, always ask your healthcare professional. [address] any warranty or liability for your use of this information. DISCHARGE SUMMARY from Nurse    PATIENT INSTRUCTIONS:    After general anesthesia or intravenous sedation, for 24 hours or while taking prescription Narcotics:  · Limit your activities  · Do not drive and operate hazardous machinery  · Do not make important personal or business decisions  · Do  not drink alcoholic beverages  · If you have not urinated within 8 hours after discharge, please contact your surgeon on call. Report the following to your surgeon:  · Excessive pain, swelling, redness or odor of or around the surgical area  · Temperature over 100.5  · Nausea and vomiting lasting longer than 4 hours or if unable to take medications  · Any signs of decreased circulation or nerve impairment to extremity: change in color, persistent  numbness, tingling, coldness or increase pain  · Any questions      *  Please give a list of your current medications to your Primary Care Provider. *  Please update this list whenever your medications are discontinued, doses are      changed, or new medications (including over-the-counter products) are added.     *  Please carry medication information at all times in case of emergency situations. These are general instructions for a healthy lifestyle:    No smoking/ No tobacco products/ Avoid exposure to second hand smoke  Surgeon General's Warning:  Quitting smoking now greatly reduces serious risk to your health. Obesity, smoking, and sedentary lifestyle greatly increases your risk for illness    A healthy diet, regular physical exercise & weight monitoring are important for maintaining a healthy lifestyle    You may be retaining fluid if you have a history of heart failure or if you experience any of the following symptoms:  Weight gain of 3 pounds or more overnight or 5 pounds in a week, increased swelling in our hands or feet or shortness of breath while lying flat in bed. Please call your doctor as soon as you notice any of these symptoms; do not wait until your next office visit. Recognize signs and symptoms of STROKE:    F-face looks uneven    A-arms unable to move or move unevenly    S-speech slurred or non-existent    T-time-call 911 as soon as signs and symptoms begin-DO NOT go       Back to bed or wait to see if you get better-TIME IS BRAIN. Warning Signs of HEART ATTACK     Call 911 if you have these symptoms:   Chest discomfort. Most heart attacks involve discomfort in the center of the chest that lasts more than a few minutes, or that goes away and comes back. It can feel like uncomfortable pressure, squeezing, fullness, or pain.  Discomfort in other areas of the upper body. Symptoms can include pain or discomfort in one or both arms, the back, neck, jaw, or stomach.  Shortness of breath with or without chest discomfort.  Other signs may include breaking out in a cold sweat, nausea, or lightheadedness. Don't wait more than five minutes to call 911 - MINUTES MATTER! Fast action can save your life. Calling 911 is almost always the fastest way to get lifesaving treatment.  Emergency Medical Services staff can begin treatment when they arrive -- up to an hour sooner than if someone gets to the hospital by car. The discharge information has been reviewed with the patient and spouse. The patient and spouse verbalized understanding. Discharge medications reviewed with the patient and spouse and appropriate educational materials and side effects teaching were provided.   ___________________________________________________________________________________________________________________________________

## 2017-12-06 NOTE — IP AVS SNAPSHOT
Brian Russ 
 
 
 [address] [facility] 61 [facility] Patient: Carlos Weeks. MRN: [id number] IC About your hospitalization You were admitted on:  2017 You last received care in the:  [facility] PHASE 2 RECOVERY You were discharged on:  2017 Why you were hospitalized Your primary diagnosis was:  Not on File Things You Need To Do (next 8 weeks) Follow up with Campos Dumont MD  
  
Phone:  [phone] Where:  [address] [facility], [facility], [address] Follow up with Fernando Shi MD  
Follow up as scheduled Phone:  [phone] Where:  [address] [facility], [address] [facility] [place] [address], [address] [facility]  Follow Up with Mildred Mak DO at 10:20 AM  
Where:  [facility] ([facility]) Discharge Orders None A check sobia indicates which time of day the medication should be taken. My Medications TAKE these medications as instructed Instructions Each Dose to Equal  
 Morning Noon Evening Bedtime  
 aspirin 81 mg tablet Your last dose was: Your next dose is: Take 81 mg by mouth daily. Indications: MYOCARDIAL INFARCTION PREVENTION 81 mg  
    
   
   
   
  
 atorvastatin 20 mg tablet Commonly known as:  LIPITOR Your last dose was: Your next dose is: TAKE 1 TABLET BY MOUTH EVERY EVENING  
     
   
   
   
  
 CENTRUM SILVER PO Your last dose was: Your next dose is: Take 1 Tab by mouth daily. 1 Tab  
    
   
   
   
  
 glucosam-chondroit-C-manganese 670-793-58-3 mg Cap Your last dose was: Your next dose is: Take 1 Tab by mouth daily. 1 Tab HYDROcodone-acetaminophen 5-325 mg per tablet Commonly known as:  Horowitz Minor Your last dose was: Your next dose is: Take 1-2 Tabs by mouth every six (6) hours as needed for Pain. Max Daily Amount: 8 Tabs. 1-2 Tab  
    
   
   
   
  
 metoprolol succinate 25 mg XL tablet Commonly known as:  TOPROL-XL Your last dose was: Your next dose is: TAKE 1 TABLET BY MOUTH DAILY NexIUM 20 mg capsule Generic drug:  esomeprazole Your last dose was: Your next dose is: Take 20 mg by mouth daily. 20 mg  
    
   
   
   
  
 nitroglycerin 0.4 mg SL tablet Commonly known as:  NITROSTAT Your last dose was: Your next dose is: 0.4 mg by SubLINGual route every five (5) minutes as needed for Chest Pain. 0.4 mg  
    
   
   
   
  
 VITAMIN B-12 2,500 mcg sublingual tablet Generic drug:  cyanocobalamin Your last dose was: Your next dose is: Take 2,500 mcg by mouth daily. 2500 mcg VITAMIN C 1,000 mg tablet Generic drug:  ascorbic acid (vitamin C) Your last dose was: Your next dose is: Take 1,000 mg by mouth daily. 1000 mg Discharge Instructions Esophageal Dilation: What to Expect at [facility] Your Recovery After you have esophageal dilation, you will stay at the hospital or surgery center for 1 to 2 hours. This will allow the medicine to wear off. You will be able to go home after your doctor or nurse checks to make sure you are not having any problems. This care sheet gives you a general idea about how long it will take for you to recover. But each person recovers at a different pace. Follow the steps below to get better as quickly as possible. How can you care for yourself at home? Activity ? · Rest as much as you need to after you go home. ? · You should be able to go back to your usual activities the day after the procedure. Diet ? · Follow your doctor's directions for eating after the procedure. ? · Drink plenty of fluids (unless your doctor has told you not to). Medicines ? · Your doctor will tell you if and when you can restart your medicines. He or she will also give you instructions about taking any new medicines. ? · If you take blood thinners, such as warfarin (Coumadin), clopidogrel (Plavix), or aspirin, be sure to talk to your doctor. He or she will tell you if and when to start taking those medicines again. Make sure that you understand exactly what your doctor wants you to do. ? · If you have a sore throat the day after the procedure, use an over-the-counter spray to numb your throat. Sucking on throat lozenges and gargling with warm salt water may also help relieve your symptoms. Follow-up care is a key part of your treatment and safety. Be sure to make and go to all appointments, and call your doctor if you are having problems. It's also a good idea to know your test results and keep a list of the medicines you take. When should you call for help? Call 911 anytime you think you may need emergency care. For example, call if: 
? · You passed out (lost consciousness). ? · You have trouble breathing. ? · Your stools are maroon or very bloody ? Call your doctor now or seek immediate medical care if: 
? · You have new or worse belly pain. ? · You have a fever. ? · You have new or more blood in your stools. ? · You are sick to your stomach and cannot drink fluids. ? · You cannot pass stools or gas. ? · You have pain that does not get better after you take pain medicine. ? Watch closely for changes in your health, and be sure to contact your doctor if: 
? · Your throat still hurts after a day or two. ? · You do not get better as expected. Where can you learn more? Go to http://martine-feng.info/.  
Enter L294 in the search box to learn more about \"Esophageal Dilation: What to Expect at Home. \" Current as of: May 12, 2017 Content Version: 11.4 © 3236-4038 [employer]. Care instructions adapted under license by [employer] (which disclaims liability or warranty for this information). If you have questions about a medical condition or this instruction, always ask your healthcare professional. [address] any warranty or liability for your use of this information. Hiatal Hernia: Care Instructions Your Care Instructions A hiatal hernia occurs when part of the stomach bulges into the chest cavity. A hiatal hernia may allow stomach acid and juices to back up into the esophagus (acid reflux). This can cause a feeling of burning, warmth, heat, or pain behind the breastbone. This feeling may often occur after you eat, soon after you lie down, or when you bend forward, and it may come and go. You also may have a sour taste in your mouth. These symptoms are commonly known as heartburn or reflux. But not all hiatal hernias cause symptoms. Follow-up care is a key part of your treatment and safety. Be sure to make and go to all appointments, and call your doctor if you are having problems. It's also a good idea to know your test results and keep a list of the medicines you take. How can you care for yourself at home? · Take your medicines exactly as prescribed. Call your doctor if you think you are having a problem with your medicine. · Do not take aspirin or other nonsteroidal anti-inflammatory drugs (NSAIDs), such as ibuprofen (Advil, Motrin) or naproxen (Aleve), unless your doctor says it is okay. Ask your doctor what you can take for pain. · Your doctor may recommend over-the-counter medicine. For mild or occasional indigestion, antacids such as Tums, Gaviscon, Maalox, or Mylanta may help.  Your doctor also may recommend over-the-counter acid reducers, such as famotidine (Pepcid AC), cimetidine (Tagamet HB), ranitidine (Zantac 75 and Zantac 150), or omeprazole (Prilosec). Read and follow all instructions on the label. If you use these medicines often, talk with your doctor. · Change your eating habits. ¨ It's best to eat several small meals instead of two or three large meals. ¨ After you eat, wait 2 to 3 hours before you lie down. Late-night snacks aren't a good idea. ¨ Chocolate, mint, and alcohol can make heartburn worse. They relax the valve between the esophagus and the stomach. ¨ Spicy foods, foods that have a lot of acid (like tomatoes and oranges), and coffee can make heartburn symptoms worse in some people. If your symptoms are worse after you eat a certain food, you may want to stop eating that food to see if your symptoms get better. · Do not smoke or chew tobacco. 
· If you get heartburn at night, raise the head of your bed 6 to 8 inches by putting the frame on blocks or placing a foam wedge under the head of your mattress. (Adding extra pillows does not work.) · Do not wear tight clothing around your middle. · Lose weight if you need to. Losing just 5 to 10 pounds can help. When should you call for help? Call your doctor now or seek immediate medical care if: 
? · You have new or worse belly pain. ? · You are vomiting. ? Watch closely for changes in your health, and be sure to contact your doctor if: 
? · You have new or worse symptoms of indigestion. ? · You have trouble or pain swallowing. ? · You are losing weight. ? · You do not get better as expected. Where can you learn more? Go to http://martine-feng.info/. Enter I986 in the search box to learn more about \"Hiatal Hernia: Care Instructions. \" Current as of: May 12, 2017 Content Version: 11.4 © 6847-6574 [employer]. Care instructions adapted under license by [employer] (which disclaims liability or warranty for this information).  If you have questions about a medical condition or this instruction, always ask your healthcare professional. [place] [address] any warranty or liability for your use of this information. DISCHARGE SUMMARY from Nurse PATIENT INSTRUCTIONS: 
 
 
F-face looks uneven A-arms unable to move or move unevenly S-speech slurred or non-existent T-time-call 911 as soon as signs and symptoms begin-DO NOT go Back to bed or wait to see if you get better-TIME IS BRAIN. Warning Signs of HEART ATTACK Call 911 if you have these symptoms: 
? Chest discomfort. Most heart attacks involve discomfort in the center of the chest that lasts more than a few minutes, or that goes away and comes back. It can feel like uncomfortable pressure, squeezing, fullness, or pain. ? Discomfort in other areas of the upper body. Symptoms can include pain or discomfort in one or both arms, the back, neck, jaw, or stomach. ? Shortness of breath with or without chest discomfort. ? Other signs may include breaking out in a cold sweat, nausea, or lightheadedness. Don't wait more than five minutes to call [address]! Fast action can save your life. Calling 911 is almost always the fastest way to get lifesaving treatment. Emergency Medical Services staff can begin treatment when they arrive  up to an hour sooner than if someone gets to the hospital by car. The discharge information has been reviewed with the patient and spouse. The patient and spouse verbalized understanding. Discharge medications reviewed with the patient and spouse and appropriate educational materials and side effects teaching were provided. ___________________________________________________________________________________________________________________________________ ACO Transitions of Care Introducing [employer] [address] offers a voluntary care coordination program to provide high quality service and care to [facility] fee-for-service beneficiaries. Doyle Hawk was designed to help you enhance your health and well-being through the following services: ? Transitions of Care  support for individuals who are transitioning from one care setting to another (example: Hospital to home). ? Chronic and Complex Care Coordination  support for individuals and caregivers of those with serious or chronic illnesses or with more than one chronic (ongoing) condition and those who take a number of different medications. If you meet specific medical criteria, a [address] [place] [address] Rd may call you directly to coordinate your care with your primary care physician and your other care providers. For questions about the [facility] programs, please, contact your physicians office. For general questions or additional information about Accountable Care Organizations: 
Please visit www.medicare.gov/acos. html or call 1-800-MEDICARE (1-997.404.2714) TTY users should call 4-461.460.5540. Introducing [facility]! Dear Gretel Santiago: Thank you for requesting a Porphyrio account. Our records indicate that you have previously registered for a Porphyrio account but its currently inactive. Please call our P[employer]rio support line at [phone]. Additional Information If you have questions, please visit the Frequently Asked Questions section of the [employer] website at [email or web address]. [employer]/. Remember, P[employer]rio is NOT to be used for urgent needs. For medical emergencies, dial 911. Now available from your iPhone and Android! Providers Seen During Your Hospitalization Provider Specialty Primary office phone Christine Thayer MD Gastroenterology [phone] Your Primary Care Physician (PCP) Primary Care Physician Office Phone Office Fax Monae Vaca [phone] You are allergic to the following Allergen Reactions Albuterol Rash  
 seizure Influenza Virus Vaccine, Specific Other (comments) Percocet (Oxycodone-Acetaminophen) Other (comments) Recent Documentation Height Weight BMI Smoking Status 1.702 m 84 kg 28.99 kg/m2 Former Smoker Emergency Contacts Name Discharge Info Relation Home Work Mobile Eve Patel DISCHARGE CAREGIVER [3] Spouse [3] [phone] Patient Belongings The following personal items are in your possession at time of discharge: 
  Dental Appliances: None  Visual Aid: Glasses   Hearing Aids/Status: Does not own Please provide this summary of care documentation to your next provider. Signatures-by signing, you are acknowledging that this After Visit Summary has been reviewed with you and you have received a copy. Patient Signature:  ____________________________________________________________ Date:  ____________________________________________________________  
  
Mimi  Provider Signature:  ____________________________________________________________ Date:  ____________________________________________________________

## 2017-12-06 NOTE — H&P
[email or web address]  [phone]      GASTROENTEROLOGY Pre-Procedure H and P      Impression/Plan:   1. This patient is consented for an EGD for dysphagia, chest pain      Chief Complaint: dysphagia, chest pain      HPI:  Tolu Conley is a 76 y.o. male who is being is having an EGD for dysphagia, chest pain  PMH:   Past Medical History:   Diagnosis Date    Allergic rhinitis     Arthritis     hands    Asthma     mild obst disease on pfts Dr. Sushant White Pederson's esophagus     Dr. Nancy Ackerman; last 8/17, for HALO    Bilateral carotid artery stenosis     12/14 BICA 50-69%    BPH (benign prostatic hyperplasia)     BPH without obstruction/lower urinary tract symptoms     CABG x 3 04/24/2014    LIMA to LAD, and saphenous vein grafts to the second diagonal of the LAD, and to the posterior descending branch of the RCA, Dr. Anne Solano, 4/23/14.  CAD (coronary artery disease)     Cardiac catheterization 04/18/2014    Superdominant RCA. mRCA 90%. pRPDA 60%. LM (modest sized) patent.  p-mLAD 85%. CX < 50%. LVEDP 17 mmHg. EF 60%. No RWMA. CABG recommended.  Cardiac echocardiogram 06/24/2008    EF 70%. Mild conc LVH. Gr 2 DDfx. Mild LAE. Mild MR. PASP 38 mmHg.  Cardiac nuclear imaging study, abnormal 04/15/2014    Mod ischemia in lg area of anterior wall, anteroapex, anteroseptum c/w high-grade prox LAD obstruction. Mod partially transient defect likely ischemia w/very sm infarction in RCA. TID 1.31 suggests 3-vessel CAD. Mod distal anterior hypk. Mild-mod anteroseptal, inferoseptal, anterolateral hypk. EF 46%. Strongly positive EKG on max EST. Ex time 6 min.  Carotid duplex 04/06/2016    Mod 50-69% bilateral ICA stenosis. Probable significant LECA stenosis. Similar to study of 8/12/15. Multiple nodules noted in right thyroid.     Colon adenoma 08/2017    Dr Trevor Gu Colon polyps 2007    Dr. Trevor Gu Difficulty hearing     Erectile dysfunction     FHx: heart disease     GERD (gastroesophageal reflux disease)     by EGD 2007 Dr. Melissa Mcgee IFG (impaired fasting glucose) 5/14    Impotence     Lower urinary tract symptoms (LUTS)     Multinodular goiter     2014; 2015; 4/16    Obesity     peak weight 202 lbs, bmi 31.4 from 4/14    Prostate cancer ([facility] [address] 8/10    Dr. Fernando Mo, s/p cryo Dr. Saeed Larger Pulmonary nodule 2014 2015    Sleep apnea     CPAP not used       PSH:   Past Surgical History:   Procedure Laterality Date    CARDIAC SURG PROCEDURE UNLIST  11/06    thallium negative    CARDIAC SURG PROCEDURE UNLIST  4/14    3V CABG Dr Katy Vallejo  2007    mild obst disease on PFTs Dr. Freddy Montes N/A 7/27/2017    Dr. Manuel Joseph 2007 negative; 8/17 adenoma     HX Ansley Plana      Dr. Radha Estes HX CATARACT REMOVAL  11/14    bilat Dr Francisco Servin  2004    HX Ángel Kym      Dr. Estevan Santos HX TURP      x2 Dr. Belkis Mares HX UROLOGICAL  10/2012    [facility], Dr. Kaylynn Robles, Cystoscopy and dilation of stricture, cryoablation of prostate    VASCULAR SURGERY PROCEDURE UNLIST  12/14    BICA 50-69% stenosis       Social HX:   Social History     Social History    Marital status:      Spouse name: N/A    Number of children: 3    Years of education: N/A     Occupational History    ret en[employer] Retired     Social History Main Topics    Smoking status: Former Smoker     Packs/day: 0.50     Years: 4.00     Quit date: 1/1/1957    Smokeless tobacco: Never Used      Comment: Quit in 1957    Alcohol use No    Drug use: No    Sexual activity: Not on file     Other Topics Concern    Not on file     Social History Narrative       FHX:   Family History   Problem Relation Age of Onset    Heart Disease Father     Stroke Mother     Hypertension Mother     Diabetes Sister        Allergy:   Allergies   Allergen Reactions    Albuterol Rash     seizure    Influenza Virus Vaccine, Specific Other (comments)    Percocet [Oxycodone-Acetaminophen] Other (comments)       Home Medications:     Prescriptions Prior to Admission   Medication Sig    metoprolol succinate (TOPROL-XL) 25 mg XL tablet TAKE 1 TABLET BY MOUTH DAILY (Patient taking differently: TAKE 1 TABLET BY MOUTH HS)    esomeprazole (NEXIUM) 20 mg capsule Take 20 mg by mouth daily.  atorvastatin (LIPITOR) 20 mg tablet TAKE 1 TABLET BY MOUTH EVERY EVENING    glucosam-chondroit-C-manganese 126-086-68-3 mg cap Take 1 Tab by mouth daily.  ascorbic acid (VITAMIN C) 1,000 mg tablet Take 1,000 mg by mouth daily.  MULTIVITAMIN W-MINERALS/LUTEIN (CENTRUM SILVER PO) Take 1 Tab by mouth daily.  HYDROcodone-acetaminophen (NORCO) 5-325 mg per tablet Take 1-2 Tabs by mouth every six (6) hours as needed for Pain. Max Daily Amount: 8 Tabs.  nitroglycerin (NITROSTAT) 0.4 mg SL tablet 0.4 mg by SubLINGual route every five (5) minutes as needed for Chest Pain.  cyanocobalamin (VITAMIN B-12) 2,500 mcg sublingual tablet Take 2,500 mcg by mouth daily.  aspirin 81 mg tablet Take 81 mg by mouth daily. Indications: MYOCARDIAL INFARCTION PREVENTION       Review of Systems:     Constitutional: No fevers, chills, weight loss, fatigue. Skin: No rashes, pruritis, jaundice, ulcerations, erythema. HENT: No headaches, nosebleeds, sinus pressure, rhinorrhea, sore throat. Eyes: No visual changes, blurred vision, eye pain, photophobia, jaundice. Cardiovascular: No chest pain, heart palpitations. Respiratory: No cough, SOB, wheezing, chest discomfort, orthopnea. Gastrointestinal:    Genitourinary: No dysuria, bleeding, discharge, pyuria. Musculoskeletal: No weakness, arthralgias, wasting. Endo: No sweats. Heme: No bruising, easy bleeding. Allergies: As noted. Neurological: Cranial nerves intact. Alert and oriented. Gait not assessed. Psychiatric:  No anxiety, depression, hallucinations.           Visit Vitals  BP (!) 143/97    Pulse 60    Temp 98.6 °F (37 °C)    Resp 16    Ht 5' 7\" (1.702 m)    Wt 84 kg (185 lb 2 oz)    SpO2 97%    BMI 28.99 kg/m2       Physical Assessment:     constitutional: appearance: well developed, well nourished, normal habitus, no deformities, in no acute distress. skin: inspection: no rashes, ulcers, icterus or other lesions; no clubbing or telangiectasias. palpation: no induration or subcutaneos nodules. eyes: inspection: normal conjunctivae and lids; no jaundice pupils: normal  ENMT: mouth: normal oral mucosa,lips and gums; good dentition. oropharynx: normal tongue, hard and soft palate; posterior pharynx without erithema, exudate or lesions. neck: thyroid: normal size, consistency and position; no masses or tenderness. respiratory: effort: normal chest excursion; no intercostal retraction or accessory muscle use. cardiovascular: abdominal aorta: normal size and position; no bruits. palpation: PMI of normal size and position; normal rhythm; no thrill or murmurs. abdominal: abdomen: normal consistency; no tenderness or masses. hernias: no hernias appreciated. liver: normal size and consistency. spleen: not palpable. rectal: hemoccult/guaiac: not performed. musculoskeletal: digits and nails: no clubbing, cyanosis, petechiae or other inflammatory conditions. gait: normal gait and station head and neck: normal range of motion; no pain, crepitation or contracture. spine/ribs/pelvis: normal range of motion; no pain, deformity or contracture. neurologic: cranial nerves: II-XII normal.   psychiatric: judgement/insight: within normal limits. memory: within normal limits for recent and remote events. mood and affect: no evidence of depression, anxiety or agitation. orientation: oriented to time, space and person. Basic Metabolic Profile   No results for input(s): NA, K, CL, CO2, BUN, GLU, CA, MG, PHOS in the last 72 hours.     No lab exists for component: CREAT      CBC w/Diff    No results for input(s): WBC, RBC, HGB, HCT, MCV, MCH, MCHC, RDW, PLT, HGBEXT, HCTEXT, PLTEXT in the last 72 hours. No lab exists for component: MPV No results for input(s): GRANS, LYMPH, EOS, PRO, MYELO, METAS, BLAST in the last 72 hours. No lab exists for component: MONO, BASO     Hepatic Function   No results for input(s): ALB, TP, TBILI, GPT, SGOT, AP, AML, LPSE in the last 72 hours. No lab exists for component: DBILI     Coags   No results for input(s): PTP, INR, APTT in the last 72 hours. No lab exists for component: Tatum Deras MD  [facility] [place] [address], [address] [place] [address]  Cell: [phone]  Direct pager: [phone]  [email or web address]  [email or web address]

## 2017-12-18 ENCOUNTER — ANESTHESIA EVENT (OUTPATIENT)
Dept: ENDOSCOPY | Age: 77
End: 2017-12-18
Payer: MEDICARE

## 2017-12-19 ENCOUNTER — HOSPITAL ENCOUNTER (OUTPATIENT)
Age: 77
Setting detail: OUTPATIENT SURGERY
Discharge: HOME | End: 2017-12-19
Attending: INTERNAL MEDICINE | Admitting: INTERNAL MEDICINE
Payer: MEDICARE

## 2017-12-19 ENCOUNTER — ANESTHESIA (OUTPATIENT)
Dept: ENDOSCOPY | Age: 77
End: 2017-12-19
Payer: MEDICARE

## 2017-12-19 VITALS
DIASTOLIC BLOOD PRESSURE: 67 MMHG | WEIGHT: 180 LBS | HEART RATE: 48 BPM | OXYGEN SATURATION: 98 % | SYSTOLIC BLOOD PRESSURE: 132 MMHG | RESPIRATION RATE: 20 BRPM | BODY MASS INDEX: 27.28 KG/M2 | HEIGHT: 68 IN | TEMPERATURE: 98.1 F

## 2017-12-19 PROCEDURE — 77030009426 HC FCPS BIOP ENDOSC BSC -B: Performed by: INTERNAL MEDICINE

## 2017-12-19 PROCEDURE — 76040000019: Performed by: INTERNAL MEDICINE

## 2017-12-19 PROCEDURE — 74011250636 HC RX REV CODE- 250/636

## 2017-12-19 PROCEDURE — 74011250636 HC RX REV CODE- 250/636: Performed by: NURSE ANESTHETIST, CERTIFIED REGISTERED

## 2017-12-19 PROCEDURE — 88305 TISSUE EXAM BY PATHOLOGIST: CPT | Performed by: INTERNAL MEDICINE

## 2017-12-19 PROCEDURE — 76060000031 HC ANESTHESIA FIRST 0.5 HR: Performed by: INTERNAL MEDICINE

## 2017-12-19 RX ORDER — SODIUM CHLORIDE 0.9 % (FLUSH) 0.9 %
5-10 SYRINGE (ML) INJECTION EVERY 8 HOURS
Status: DISCONTINUED | OUTPATIENT
Start: 2017-12-19 | End: 2017-12-19 | Stop reason: HOSPADM

## 2017-12-19 RX ORDER — OMEPRAZOLE 40 MG/1
40 CAPSULE, DELAYED RELEASE ORAL 2 TIMES DAILY
COMMUNITY
End: 2018-05-24

## 2017-12-19 RX ORDER — SODIUM CHLORIDE, SODIUM LACTATE, POTASSIUM CHLORIDE, CALCIUM CHLORIDE 600; 310; 30; 20 MG/100ML; MG/100ML; MG/100ML; MG/100ML
75 INJECTION, SOLUTION INTRAVENOUS CONTINUOUS
Status: DISCONTINUED | OUTPATIENT
Start: 2017-12-19 | End: 2017-12-19 | Stop reason: HOSPADM

## 2017-12-19 RX ORDER — SODIUM CHLORIDE 0.9 % (FLUSH) 0.9 %
5-10 SYRINGE (ML) INJECTION AS NEEDED
Status: DISCONTINUED | OUTPATIENT
Start: 2017-12-19 | End: 2017-12-19 | Stop reason: HOSPADM

## 2017-12-19 RX ORDER — DEXTROMETHORPHAN/PSEUDOEPHED 2.5-7.5/.8
1.2 DROPS ORAL
Status: CANCELLED | OUTPATIENT
Start: 2017-12-19

## 2017-12-19 RX ORDER — FLUMAZENIL 0.1 MG/ML
0.2 INJECTION INTRAVENOUS
Status: CANCELLED | OUTPATIENT
Start: 2017-12-19 | End: 2017-12-19

## 2017-12-19 RX ORDER — SODIUM CHLORIDE, SODIUM LACTATE, POTASSIUM CHLORIDE, CALCIUM CHLORIDE 600; 310; 30; 20 MG/100ML; MG/100ML; MG/100ML; MG/100ML
INJECTION, SOLUTION INTRAVENOUS
Status: DISCONTINUED | OUTPATIENT
Start: 2017-12-19 | End: 2017-12-19 | Stop reason: HOSPADM

## 2017-12-19 RX ORDER — SODIUM CHLORIDE 0.9 % (FLUSH) 0.9 %
5-10 SYRINGE (ML) INJECTION EVERY 8 HOURS
Status: CANCELLED | OUTPATIENT
Start: 2017-12-19 | End: 2017-12-19

## 2017-12-19 RX ORDER — NALOXONE HYDROCHLORIDE 0.4 MG/ML
0.4 INJECTION, SOLUTION INTRAMUSCULAR; INTRAVENOUS; SUBCUTANEOUS
Status: CANCELLED | OUTPATIENT
Start: 2017-12-19 | End: 2017-12-19

## 2017-12-19 RX ORDER — PROPOFOL 10 MG/ML
INJECTION, EMULSION INTRAVENOUS AS NEEDED
Status: DISCONTINUED | OUTPATIENT
Start: 2017-12-19 | End: 2017-12-19 | Stop reason: HOSPADM

## 2017-12-19 RX ORDER — EPINEPHRINE 0.1 MG/ML
1 INJECTION INTRACARDIAC; INTRAVENOUS
Status: CANCELLED | OUTPATIENT
Start: 2017-12-19 | End: 2017-12-19

## 2017-12-19 RX ORDER — SODIUM CHLORIDE 0.9 % (FLUSH) 0.9 %
5-10 SYRINGE (ML) INJECTION AS NEEDED
Status: CANCELLED | OUTPATIENT
Start: 2017-12-19 | End: 2017-12-19

## 2017-12-19 RX ORDER — ATROPINE SULFATE 0.1 MG/ML
0.5 INJECTION INTRAVENOUS
Status: CANCELLED | OUTPATIENT
Start: 2017-12-19 | End: 2017-12-19

## 2017-12-19 RX ADMIN — PROPOFOL 50 MG: 10 INJECTION, EMULSION INTRAVENOUS at 08:07

## 2017-12-19 RX ADMIN — PROPOFOL 50 MG: 10 INJECTION, EMULSION INTRAVENOUS at 08:12

## 2017-12-19 RX ADMIN — SODIUM CHLORIDE, SODIUM LACTATE, POTASSIUM CHLORIDE, CALCIUM CHLORIDE: 600; 310; 30; 20 INJECTION, SOLUTION INTRAVENOUS at 08:01

## 2017-12-19 RX ADMIN — PROPOFOL 100 MG: 10 INJECTION, EMULSION INTRAVENOUS at 08:01

## 2017-12-19 RX ADMIN — PROPOFOL 50 MG: 10 INJECTION, EMULSION INTRAVENOUS at 08:15

## 2017-12-19 NOTE — PROCEDURES
[place]  [facility], [address]      Brief Procedure Note    Mannie Living.  1940  [id number]    Date of Procedure: 12/19/2017    Preoperative diagnosis: K22.19, Pederson's esophagus with high grade dysplasia  530.3 - K22.2,  Esophageal obstruction  553.3 - K44.9,  Small hiatal hernia  787.20 - R13.19,  Swallowing painful    Postoperative diagnosis:  peptic stricture, hiatal hernia, fundal gland polyps,  barretts patch.  Dilated to 42Fr    Type of Anesthesia: MAC (monitered anesthesia care)    Description of Findings: same as post op dx    Procedure: Procedure(s):  UPPER ENDOSCOPY / dilation    :  Dr. Suzette Moody MD    Assistant(s): [unfilled]    Type of Anesthesia:MAC     EBL:None    Specimens:   ID Type Source Tests Collected by Time Destination   1 : esophagus bx Preservative Esophagus  Suzette Moody MD 12/19/2017 3292 Pathology       Findings: See printed and scanned procedure note    Complications: None    Dr. Suzette Moody MD  12/19/2017  8:16 AM

## 2017-12-19 NOTE — ANESTHESIA PREPROCEDURE EVALUATION
Anesthetic History   No history of anesthetic complications            Review of Systems / Medical History      Pulmonary        Sleep apnea: No treatment    Asthma : well controlled       Neuro/Psych   Within defined limits           Cardiovascular              CAD and CABG    Exercise tolerance: <4 METS     GI/Hepatic/Renal     GERD: poorly controlled           Endo/Other      Hypothyroidism  Arthritis  Pertinent negatives: No morbid obesity   Other Findings   Comments:   Risk Factors for Postoperative nausea/vomiting:       History of postoperative nausea/vomiting? NO       Female? NO       Motion sickness? NO       Intended opioid administration for postoperative analgesia? NO      Smoking Abstinence  Current Smoker? NO  Elective Surgery? YES  Seen preoperatively by anesthesiologist or proxy prior to day of surgery? YES  Pt abstained from smoking 24 hours prior to anesthesia?  N/A           Physical Exam    Airway  Mallampati: II  TM Distance: 4 - 6 cm  Neck ROM: normal range of motion   Mouth opening: Normal     Cardiovascular  Regular rate and rhythm,  S1 and S2 normal,  no murmur, click, rub, or gallop             Dental    Dentition: Poor dentition     Pulmonary  Breath sounds clear to auscultation               Abdominal  GI exam deferred       Other Findings            Anesthetic Plan    ASA: 3  Anesthesia type: MAC          Induction: Intravenous  Anesthetic plan and risks discussed with: Patient

## 2017-12-19 NOTE — ANESTHESIA POSTPROCEDURE EVALUATION
Post-Anesthesia Evaluation & Assessment    Visit Vitals    BP (!) 77/46    Pulse (!) 48    Temp 36.7 °C (98.1 °F)    Resp 12    Ht 5' 8\" (1.727 m)    Wt 81.6 kg (180 lb)    SpO2 96%    BMI 27.37 kg/m2       Post-operative hydration adequate. Pain score (VAS): 0 Pain Scale 1: Numeric (0 - 10) (12/19/17 0830)  Pain Intensity 1: 0 (12/19/17 0830)   Managed. Mental status & Level of consciousness: returned to baseline    Neurological status: returned to baseline    Pulmonary status: airway patent, oxygen given as needed. Complications related to anesthesia: none    Patient has met all discharge requirements.     Additional comments:        Quan Donohue MD  December 19, 2017

## 2017-12-19 NOTE — DISCHARGE INSTRUCTIONS
DISCHARGE SUMMARY from Nurse     POST-PROCEDURE INSTRUCTIONS:    Call your Physician if you:  ? Observe any excess bleeding. ? Develop a temperature over 100.5o F.  ? Experience abdominal, shoulder or chest pain. ? Notice any signs of decreased circulation or nerve impairment to an extremity such as a change in color, persistent numbness, tingling, coldness or increase in pain. ? Vomit blood or you have nausea and vomiting lasting longer than 4 hours. ? Are unable to take medications. ? Are unable to urinate within 8 hours after discharge following general anesthesia or intravenous sedation. For the next 24 hours after receiving general anesthesia or intravenous sedation, or while taking prescription Narcotics, limit your activities:  ? Do NOT drive a motor vehicle, operate hazard machinery or power tools, or perform tasks that require coordination. The medication you received during your procedure may have some effect on your mental awareness. ? Do NOT make important personal or business decisions. The medication you received during your procedure may have some effect on your mental awareness. ? Do NOT drink alcoholic beverages. These drinks do not mix well with the medications that have been given to you. ? Upon discharge from the hospital, you must be accompanied by a responsible adult. ? Resume your diet as directed by your physician. ? Resume medications as your physician has prescribed. ? Please give a list of your current medications to your Primary Care Provider. ? Please update this list whenever your medications are discontinued, doses are changed, or new medications (including over-the-counter products) are added. ? Please carry medication information at all times in case of emergency situations. These are general instructions for a healthy lifestyle:    No smoking/ No tobacco products/ Avoid exposure to second hand smoke.    Surgeon General's Warning:  Quitting smoking now greatly reduces serious risk to your health. Obesity, smoking, and a sedentary lifestyle greatly increase your risk for illness.  A healthy diet, regular physical exercise & weight monitoring are important for maintaining a healthy lifestyle   You may be retaining fluid if you have a history of heart failure or if you experience any of the following symptoms:  Weight gain of 3 pounds or more overnight or 5 pounds in a week, increased swelling in our hands or feet or shortness of breath while lying flat in bed. Please call your doctor as soon as you notice any of these symptoms; do not wait until your next office visit. Recognize signs and symptoms of STROKE:  F  -  Face looks uneven  A  -  Arms unable to move or move unevenly  S  -  Speech slurred or non-existent  T  -  Time to call 911 - as soon as signs and symptoms begin - DO NOT go back to bed or wait to see If you get better - TIME IS BRAIN. Colorectal Screening   Colorectal cancer almost always develops from precancerous polyps (abnormal growths) in the colon or rectum. Screening tests can find precancerous polyps, so that they can be removed before they turn into cancer. Screening tests can also find colorectal cancer early, when treatment works best.  [facility] Speak with your physician about when you should begin screening and how often you should be tested. Upper GI Endoscopy: What to Expect at [address] [place] [address]  After you have an endoscopy, you will stay at the hospital or clinic for 1 to 2 hours. This will allow the medicine to wear off. You will be able to go home after your doctor or nurse checks to make sure you are not having any problems. You may have to stay overnight if you had treatment during the test. You may have a sore throat for a day or two after the test.  This care sheet gives you a general idea about what to expect after the test.  How can you care for yourself at home?   Activity  · Rest as much as you need to after you go home.  · You should be able to go back to your usual activities the day after the test.  Diet  · Follow your doctor's directions for eating after the test.  · Drink plenty of fluids (unless your doctor has told you not to). Medications  · If you have a sore throat the day after the test, use an over-the-counter spray to numb your throat. Follow-up care is a key part of your treatment and safety. Be sure to make and go to all appointments, and call your doctor if you are having problems. It's also a good idea to know your test results and keep a list of the medicines you take. When should you call for help? Call 911 anytime you think you may need emergency care. For example, call if:  · You passed out (lost consciousness). · You cough up blood. · You vomit blood or what looks like coffee grounds. · You pass maroon or very bloody stools. Call your doctor now or seek immediate medical care if:  · You have trouble swallowing. · You have belly pain. · Your stools are black and tarlike or have streaks of blood. · You are sick to your stomach or cannot keep fluids down. Watch closely for changes in your health, and be sure to contact your doctor if:  · Your throat still hurts after a day or two. · You do not get better as expected. Where can you learn more? Go to Wejo.be  Enter J454 in the search box to learn more about \"Upper GI Endoscopy: What to Expect at Home. \"   © 9499-7344 Healthwise, Incorporated. Care instructions adapted under license by [facility] [employer] | [employer] [facility] (which disclaims liability or warranty for this information). This care instruction is for use with your licensed healthcare professional. If you have questions about a medical condition or this instruction, always ask your healthcare professional. [place] [address] any warranty or liability for your use of this information.   Content Version: 85.3.736446; Current as of: November 14, 2014       Esophageal Dilation: What to Expect at [address] [place] [address]  After you have esophageal dilation, you will stay at the hospital or surgery center for 1 to 2 hours. This will allow the medicine to wear off. You will be able to go home after your doctor or nurse checks to make sure you are not having any problems. This care sheet gives you a general idea about how long it will take for you to recover. But each person recovers at a different pace. Follow the steps below to get better as quickly as possible. How can you care for yourself at home? Activity  ? · Rest as much as you need to after you go home. ? · You should be able to go back to your usual activities the day after the procedure. Diet  ? · Follow your doctor's directions for eating after the procedure. ? · Drink plenty of fluids (unless your doctor has told you not to). Medicines  ? · Your doctor will tell you if and when you can restart your medicines. He or she will also give you instructions about taking any new medicines. ? · If you take blood thinners, such as warfarin (Coumadin), clopidogrel (Plavix), or aspirin, be sure to talk to your doctor. He or she will tell you if and when to start taking those medicines again. Make sure that you understand exactly what your doctor wants you to do. ? · If you have a sore throat the day after the procedure, use an over-the-counter spray to numb your throat. Sucking on throat lozenges and gargling with warm salt water may also help relieve your symptoms. Follow-up care is a key part of your treatment and safety. Be sure to make and go to all appointments, and call your doctor if you are having problems. It's also a good idea to know your test results and keep a list of the medicines you take. When should you call for help? Call 911 anytime you think you may need emergency care. For example, call if:  ? · You passed out (lost consciousness). ? · You have trouble breathing.    ? · Your stools are maroon or very bloody   ? Call your doctor now or seek immediate medical care if:  ? · You have new or worse belly pain. ? · You have a fever. ? · You have new or more blood in your stools. ? · You are sick to your stomach and cannot drink fluids. ? · You cannot pass stools or gas. ? · You have pain that does not get better after you take pain medicine. ? Watch closely for changes in your health, and be sure to contact your doctor if:  ? · Your throat still hurts after a day or two. ? · You do not get better as expected. Where can you learn more? Go to http://martine-feng.info/. Enter H511 in the search box to learn more about \"Esophageal Dilation: What to Expect at Home. \"  Current as of: May 12, 2017  Content Version: 11.4  © 3249-9638 linkedÃ¼. Care instructions adapted under license by [employer] (which disclaims liability or warranty for this information). If you have questions about a medical condition or this instruction, always ask your healthcare professional. [place] [address] any warranty or liability for your use of this information. Hiatal Hernia: Care Instructions  Your Care Instructions  A hiatal hernia occurs when part of the stomach bulges into the chest cavity. A hiatal hernia may allow stomach acid and juices to back up into the esophagus (acid reflux). This can cause a feeling of burning, warmth, heat, or pain behind the breastbone. This feeling may often occur after you eat, soon after you lie down, or when you bend forward, and it may come and go. You also may have a sour taste in your mouth. These symptoms are commonly known as heartburn or reflux. But not all hiatal hernias cause symptoms. Follow-up care is a key part of your treatment and safety. Be sure to make and go to all appointments, and call your doctor if you are having problems.  It's also a good idea to know your test results and keep a list of the medicines you take. How can you care for yourself at home? · Take your medicines exactly as prescribed. Call your doctor if you think you are having a problem with your medicine. · Do not take aspirin or other nonsteroidal anti-inflammatory drugs (NSAIDs), such as ibuprofen (Advil, Motrin) or naproxen (Aleve), unless your doctor says it is okay. Ask your doctor what you can take for pain. · Your doctor may recommend over-the-counter medicine. For mild or occasional indigestion, antacids such as Tums, Gaviscon, Maalox, or Mylanta may help. Your doctor also may recommend over-the-counter acid reducers, such as famotidine (Pepcid AC), cimetidine (Tagamet HB), ranitidine (Zantac 75 and Zantac 150), or omeprazole (Prilosec). Read and follow all instructions on the label. If you use these medicines often, talk with your doctor. · Change your eating habits. ¨ It's best to eat several small meals instead of two or three large meals. ¨ After you eat, wait 2 to 3 hours before you lie down. Late-night snacks aren't a good idea. ¨ Chocolate, mint, and alcohol can make heartburn worse. They relax the valve between the esophagus and the stomach. ¨ Spicy foods, foods that have a lot of acid (like tomatoes and oranges), and coffee can make heartburn symptoms worse in some people. If your symptoms are worse after you eat a certain food, you may want to stop eating that food to see if your symptoms get better. · Do not smoke or chew tobacco.  · If you get heartburn at night, raise the head of your bed 6 to 8 inches by putting the frame on blocks or placing a foam wedge under the head of your mattress. (Adding extra pillows does not work.)  · Do not wear tight clothing around your middle. · Lose weight if you need to. Losing just 5 to 10 pounds can help. When should you call for help? Call your doctor now or seek immediate medical care if:  ? · You have new or worse belly pain. ? · You are vomiting. ? Watch closely for changes in your health, and be sure to contact your doctor if:  ? · You have new or worse symptoms of indigestion. ? · You have trouble or pain swallowing. ? · You are losing weight. ? · You do not get better as expected. Where can you learn more? Go to http://martine-feng.info/. Enter S363 in the search box to learn more about \"Hiatal Hernia: Care Instructions. \"  Current as of: May 12, 2017  Content Version: 11.4  © 2364-9212 Healthwise, [employer]. Care instructions adapted under license by [employer] (which disclaims liability or warranty for this information). If you have questions about a medical condition or this instruction, always ask your healthcare professional. [address] any warranty or liability for your use of this information.

## 2018-01-10 ENCOUNTER — HOSPITAL ENCOUNTER (OUTPATIENT)
Dept: LAB | Age: 78
Discharge: HOME | End: 2018-01-10
Payer: MEDICARE

## 2018-01-10 DIAGNOSIS — C61 MALIGNANT NEOPLASM OF PROSTATE (HCC): ICD-10-CM

## 2018-01-10 LAB — PSA SERPL-MCNC: 7.4 NG/ML (ref 0–4)

## 2018-01-10 PROCEDURE — 84153 ASSAY OF PSA TOTAL: CPT | Performed by: UROLOGY

## 2018-01-10 PROCEDURE — 36415 COLL VENOUS BLD VENIPUNCTURE: CPT | Performed by: UROLOGY

## 2018-01-24 ENCOUNTER — OFFICE VISIT (OUTPATIENT)
Dept: CARDIOLOGY CLINIC | Age: 78
End: 2018-01-24

## 2018-01-24 VITALS
DIASTOLIC BLOOD PRESSURE: 74 MMHG | BODY MASS INDEX: 27.89 KG/M2 | WEIGHT: 184 LBS | HEIGHT: 68 IN | SYSTOLIC BLOOD PRESSURE: 130 MMHG | HEART RATE: 53 BPM | OXYGEN SATURATION: 98 %

## 2018-01-24 DIAGNOSIS — R73.01 IFG (IMPAIRED FASTING GLUCOSE): ICD-10-CM

## 2018-01-24 DIAGNOSIS — R09.89 BRUIT: ICD-10-CM

## 2018-01-24 DIAGNOSIS — G47.30 SLEEP APNEA, UNSPECIFIED TYPE: ICD-10-CM

## 2018-01-24 DIAGNOSIS — Z95.1 S/P CABG X 3: Chronic | ICD-10-CM

## 2018-01-24 DIAGNOSIS — I25.10 CORONARY ARTERY DISEASE INVOLVING NATIVE CORONARY ARTERY OF NATIVE HEART WITHOUT ANGINA PECTORIS: Primary | ICD-10-CM

## 2018-01-24 DIAGNOSIS — E78.5 HYPERLIPIDEMIA, UNSPECIFIED HYPERLIPIDEMIA TYPE: ICD-10-CM

## 2018-01-24 NOTE — PROGRESS NOTES
Review of Systems   Constitutional: Negative for chills, fever, malaise/fatigue and weight loss. Respiratory: Negative for cough, sputum production, shortness of breath and wheezing. Cardiovascular: Negative for chest pain, palpitations, orthopnea and leg swelling. Gastrointestinal: Negative. Musculoskeletal: Negative for falls, joint pain and myalgias. Neurological: Negative for dizziness.

## 2018-01-24 NOTE — PROGRESS NOTES
1. Have you been to the ER, urgent care clinic since your last visit? Hospitalized since your last visit?no    2. Have you seen or consulted any other health care providers outside of the [address] [place] [address] since your last visit? Include any pap smears or colon screening.  no

## 2018-01-24 NOTE — MR AVS SNAPSHOT
[address] [id number] [address] [id number] [phone] Patient: Shira Jay. MRN: [id number] XUY:[id number]/53/[id number] Visit Information Date & Time Provider Department Dept. Phone Encounter #  
 1/24/2018 10:20 AM Malachi An DO [facility] [id number] Follow-up Instructions Return in about 8 months (around 9/24/2018). Your Appointments 1/30/2018 11:10 AM  
Nurse Visit with [facility] N[facility]SE [facility] ([address] [place] [address]) Appt Note: psa  
 [address] [facility] [address] [facility] [address]  
[address] [facility] [address] [facility] [address]  
  
    
 2/23/2018  1:00 PM  
PHYSICAL with Josee Massey MD  
[facility] [address]) Appt Note: rpe  
 [address] [facility] [address] [address] [place]  
  
   
 [address] [facility]  
  
    
  
 2/7/2018 11:15 AM  
Any with Yeyo Montoya MD  
[facility] ([address] [place] [address]) Appt Note: annual check up/psa  
 [address] [facility] [address] [facility] [address]  
[address] [facility] [address] [facility] [address] Upcoming Health Maintenance Date Due Influenza Age 5 to Adult 8/1/2017 MEDICARE YEARLY EXAM 2/23/2018 GLAUCOMA SCREENING Q2Y 2/16/2019 DTaP/Tdap/Td series (2 - Td) 8/12/2025 Allergies as of 1/24/2018  Review Complete On: 1/24/2018 By: Malachi An DO Severity Noted Reaction Type Reactions Albuterol    Rash  
 seizure Influenza Virus Vaccine, Specific  08/08/2014    Other (comments) Percocet [Oxycodone-acetaminophen]  08/08/2014    Other (comments) Current Immunizations  Reviewed on 2/16/2017 Name Date Pneumococcal Conjugate (PCV-13) 8/12/2015 TD Vaccine 9/10/2008 Tdap 8/12/2015 ZZZ-RETIRED (DO NOT USE) Pneumococcal Vaccine (Unspecified Type) 9/11/2008 Zoster 4/24/2008 Not reviewed this visit You Were Diagnosed With   
  
 Codes Comments Coronary artery disease involving native coronary artery of native heart without angina pectoris    -  Primary ICD-10-CM: I25.10 ICD-9-CM: 414.01 S/P CABG x 3     ICD-10-CM: Z95.1 ICD-9-CM: V45.81 Hyperlipidemia, unspecified hyperlipidemia type     ICD-10-CM: E78.5 ICD-9-CM: 272.4 IFG (impaired fasting glucose)     ICD-10-CM: R73.01 
ICD-9-CM: 790.21 Sleep apnea, unspecified type     ICD-10-CM: G47.30 ICD-9-CM: 780.57 Bruit     ICD-10-CM: R09.89 ICD-9-CM: 053. 9 Vitals BP Pulse Height(growth percentile) Weight(growth percentile) SpO2 BMI  
 130/74 (!) 53 5' 8\" (1.727 m) 184 lb (83.5 kg) 98% 27.98 kg/m2 Smoking Status Former Smoker BMI and BSA Data Body Mass Index Body Surface Area  
 27.98 kg/m 2 2 m 2 Preferred Pharmacy Pharmacy Name Phone [facility] [address], [address] [facility] [address] [facility] [phone] Your Updated Medication List  
  
   
This list is accurate as of: 1/24/18 11:19 AM.  Always use your most recent med list.  
  
  
  
  
 aspirin 81 mg tablet Take 81 mg by mouth daily. Indications: MYOCARDIAL INFARCTION PREVENTION  
  
 atorvastatin 20 mg tablet Commonly known as:  LIPITOR  
TAKE 1 TABLET BY MOUTH EVERY EVENING  
  
 CENTRUM SILVER PO Take 1 Tab by mouth daily. glucosam-chondroit-C-manganese 321-360-16-3 mg Cap Take 1 Tab by mouth daily. metoprolol succinate 25 mg XL tablet Commonly known as:  TOPROL-XL  
TAKE 1 TABLET BY MOUTH DAILY  
  
 nitroglycerin 0.4 mg SL tablet Commonly known as:  NITROSTAT  
0.4 mg by SubLINGual route every five (5) minutes as needed for Chest Pain. omeprazole 40 mg capsule Commonly known as:  PRILOSEC Take 40 mg by mouth two (2) times a day. VITAMIN B-12 2,500 mcg sublingual tablet Generic drug:  cyanocobalamin Take 2,500 mcg by mouth daily. VITAMIN C 1,000 mg tablet Generic drug:  ascorbic acid (vitamin C) Take 1,000 mg by mouth daily. We Performed the Following AMB POC EKG ROUTINE W/ 12 LEADS, INTER & REP [52668 CPT(R)] Follow-up Instructions Return in about 8 months (around 9/24/2018). To-Do List   
 01/24/2018 Imaging:  DUPLEX CAROTID BILATERAL   
  
 02/24/2018 Lab:  HEPATIC FUNCTION PANEL   
  
 02/24/2018 Lab:  LIPID PANEL Introducing [facility]! Dear Destinee Quiroga: Thank you for requesting a [employer] account. Our records indicate that you have previously registered for a [employer] account but its currently inactive. Please call our [employer] support line at [phone]. Additional Information If you have questions, please visit the Frequently Asked Questions section of the [employer] website at [email or web address] Remember, [employer] is NOT to be used for urgent needs. For medical emergencies, dial 911. Now available from your iPhone and Android! Please provide this summary of care documentation to your next provider. Your primary care clinician is listed as Evelin Shaffer. If you have any questions after today's visit, please call [phone].

## 2018-01-24 NOTE — PROGRESS NOTES
HPI: I saw Ileana Roger in my office today in cardiovascular evaluation regarding his chronic coronary artery disease.  Mr. Patel is a very pleasant 76 y.o. white male with history of gastroesophageal reflux disease with Pederson's esophagus, asthma, obstructive sleep apnea, cancer of the prostate, significant coronary artery disease with development of exertional angina over a 6-month period of time prompting a nuclear myocardial perfusion study ordered by Dr. Israel Barreto in April 2014 which was slightly abnormal and ultimately with his symptom complex prompted a cardiac catheterization by Dr. Agustin Reaves demonstrating several three vessel coronary disease with subsequent coronary artery bypass grafting surgery x 3 by Dr. Michelle Atkins with the following bypasses:       1. Left internal mammary artery to the LAD. 2. SVG to the second diagonal coronary artery off of the left anterior descending and posterior descending branch of the distal right coronary artery.      He did reasonably well postop. He did have a little sinus tachycardia initially for which we put him on beta blockers. Since the beta blockers caused fatigue with Lopressor 50 mg twice a day, it was decreased to 25 mg twice a day and then ultimately switched to Toprol XL 25 mg daily. He comes in today and relates that he is doing quite well. He is staying fairly active and denies any cardiovascular symptomatology at this time. Encounter Diagnoses   Name Primary?  Coronary artery disease involving native coronary artery of native heart without angina pectoris Yes    S/P CABG x 3     Hyperlipidemia, unspecified hyperlipidemia type     IFG (impaired fasting glucose)     Sleep apnea, unspecified type        Discussion: This gentleman appears to be doing about as well as we could expect and really of no recommendations for change at this time. He is remaining fairly active and denies any cardiovascular symptomatology presently.     He does have history of carotid disease, but that has not been checked in over a year so I am going to do carotid Dopplers on him now with further recommendations after reviewing that study. His latest lipid profile was completed on February 14, 2017 showed total cholesterol 117 with triglycerides of 93, HDL of 49, LDL of 49.4, and VLDL of 18.6 which I think is excellent control toward 20 mg daily and I would recommend that we get this repeated again next month. His blood pressure is well-controlled today and his EKG appears to be stable some sublingual plan to see him again in several months or sooner if any new cardiovascular symptoms surface in the interim. PCP: Maurice Aldridge MD       Past Medical History:   Diagnosis Date    Allergic rhinitis     Arthritis     hands    Asthma     mild obst disease on pfts Dr. Eva Looney Pederson's esophagus     Dr. Juancarlos Thurston; last 8/17, for [facility]    Bilateral carotid artery stenosis     12/14 BICA 50-69%    BPH (benign prostatic hyperplasia)     BPH without obstruction/lower urinary tract symptoms     CABG x 3 04/24/2014    LIMA to LAD, and saphenous vein grafts to the second diagonal of the LAD, and to the posterior descending branch of the RCA, Dr. Ros Galindo, 4/23/14.  CAD (coronary artery disease)     Cardiac catheterization 04/18/2014    Superdominant RCA. mRCA 90%. pRPDA 60%. LM (modest sized) patent.  p-mLAD 85%. CX < 50%. LVEDP 17 mmHg. EF 60%. No RWMA. CABG recommended.  Cardiac echocardiogram 06/24/2008    EF 70%. Mild conc LVH. Gr 2 DDfx. Mild LAE. Mild MR. PASP 38 mmHg.  Cardiac nuclear imaging study, abnormal 04/15/2014    Mod ischemia in lg area of anterior wall, anteroapex, anteroseptum c/w high-grade prox LAD obstruction. Mod partially transient defect likely ischemia w/very sm infarction in RCA. TID 1.31 suggests 3-vessel CAD. Mod distal anterior hypk. Mild-mod anteroseptal, inferoseptal, anterolateral hypk. EF 46%.   Strongly positive EKG on max EST. Ex time 6 min.  Carotid duplex 04/06/2016    Mod 50-69% bilateral ICA stenosis. Probable significant LECA stenosis. Similar to study of 8/12/15. Multiple nodules noted in right thyroid.  Colon adenoma 08/2017    Dr Madi Woo Colon polyps 2007    Dr. Madi Woo Difficulty hearing     Erectile dysfunction     FHx: heart disease     GERD (gastroesophageal reflux disease)     by EGD 2007 Dr. Madi Woo IFG (impaired fasting glucose) 5/14    Impotence     Lower urinary tract symptoms (LUTS)     Multinodular goiter     2014; 2015; 4/16    Obesity     peak weight 202 lbs, bmi 31.4 from 4/14    Prostate cancer ([facility] [address] 8/10    Dr. Man Wong, s/p cryo Dr. Raymundo Rodriguez Pulmonary nodule 2014 2015    Sleep apnea     CPAP not used       Past Surgical History:   Procedure Laterality Date    CARDIAC SURG PROCEDURE UNLIST  11/06    thallium negative    CARDIAC SURG PROCEDURE UNLIST  4/14    3V CABG Dr Ellen Becker  2007    mild obst disease on PFTs Dr. Mya Cervantes N/A 7/27/2017    Dr. Alberto Muse 2007 negative; 8/17 adenoma     Lafaye Ganser Dr. Gerald Spell  11/14    bilat Dr Arden Schreiber  2004    Yaniv Jacobo HX TURP      x2 Dr. Ana Hernandez HX UROLOGICAL  10/2012    [facility], Dr. Stuart Bey, Cystoscopy and dilation of stricture, cryoablation of prostate    VASCULAR SURGERY PROCEDURE UNLIST  12/14    BICA 50-69% stenosis       Current Outpatient Rx   Name  Route  Sig  Dispense  Refill    atorvastatin (LIPITOR) 20 mg tablet        TAKE 1 TABLET BY MOUTH EVERY EVENING    30 Tab    1      metoprolol succinate (TOPROL-XL) 25 mg XL tablet        TAKE 1 TABLET BY MOUTH EVERY DAY    30 Tab    6      PROLENSA 0.07 % drop    Both Eyes    Administer 1 drop to both eyes daily.               DUREZOL 0.05 % ophthalmic emulsion Both Eyes    Administer 1 drop to both eyes two (2) times a day.  nitroglycerin (NITROSTAT) 0.4 mg SL tablet    SubLINGual    0.4 mg by SubLINGual route every five (5) minutes as needed for Chest Pain.  omeprazole (PRILOSEC OTC) 20 mg tablet    Oral    Take 20 mg by mouth daily.  glucosam-chondroit-C-manganese 509-550-57-3 mg cap    Oral    Take 1 Tab by mouth daily.  cyanocobalamin (VITAMIN B-12) 2,500 mcg sublingual tablet    Oral    Take 2,500 mcg by mouth daily.  ascorbic acid (VITAMIN C) 1,000 mg tablet    Oral    Take 1,000 mg by mouth daily.  MULTIVITAMIN W-MINERALS/LUTEIN (CENTRUM SILVER PO)    Oral    Take 1 Tab by mouth daily.  aspirin 81 mg tablet    Oral    Take 81 mg by mouth daily. Indications: MYOCARDIAL INFARCTION PREVENTION                 Allergies   Allergen Reactions    Albuterol Rash     seizure    Influenza Virus Vaccine, Specific Other (comments)    Percocet [Oxycodone-Acetaminophen] Other (comments)       Social History :  Social History   Substance Use Topics    Smoking status: Former Smoker     Packs/day: 0.50     Years: 4.00     Quit date: 1/1/1957    Smokeless tobacco: Never Used      Comment: Quit in 1957    Alcohol use No        Family History: family history includes Diabetes in his sister; Heart Disease in his father; Hypertension in his mother; Stroke in his mother. Review of Systems:     Constitutional: Negative for chills, fever, malaise/fatigue and weight loss. Respiratory: Negative for cough, sputum production, shortness of breath and wheezing. Cardiovascular: Negative for chest pain, palpitations, orthopnea and leg swelling. Gastrointestinal: Negative. Musculoskeletal: Negative for falls, joint pain and myalgias. Neurological: Negative for dizziness. Physical Exam:    The patient is a cooperative, alert, well developed, well nourished 76 y.o.   male who is in no acute distress at the time of the examination. Visit Vitals    /74    Pulse (!) 53    Ht 5' 8\" (1.727 m)    Wt 83.5 kg (184 lb)    SpO2 98%    BMI 27.98 kg/m2       HEENT: Conjuctiva white, mucosa moist, no pallor or cyanosis. NECK: Supple without masses, tenderness or thyromegaly. There was no jugular venous distention. Carotid are full bilaterally with soft bilateral bruits right greater than left vs. radiation of murmur to the neck. CHEST: Symmetrical with good excursion. LUNGS: Clear to auscultation in all fields. HEART: The apex is not displaced. There were no lifts, thrills or heaves. There is a normal S1 and S2. There is a grade I/VI PILAR along the LSB with radiation to the base and lower neck without appreciable diastolic murmurs, rubs, clicks, or gallops auscultated. ABDOMEN: Soft without masses, tenderness or organomegaly. EXTREMITIES: Full peripheral pulses with trace peripheral edema. Review of Data: See PMH and Cardiology and Imaging sections for cardiac testing  Lab Results   Component Value Date/Time    Cholesterol, total 117 02/14/2017 07:10 AM    HDL Cholesterol 49 02/14/2017 07:10 AM    LDL, calculated 49.4 02/14/2017 07:10 AM    Triglyceride 93 02/14/2017 07:10 AM    CHOL/HDL Ratio 2.4 02/14/2017 07:10 AM       Results for orders placed or performed in visit on 03/15/17   AMB POC EKG ROUTINE W/ 12 LEADS, INTER & REP     Status: None    Narrative    Sinus bradycardia rate 54. First-degree AV block. Minor nonspecific high lateral ST-T changes. Compared to the EKG of September 14, 2016 there was no significant interval change. Juan Martin D.O., F.A.C.C. [facility]  [facility] [place]  [facility] Hospitalpaul. [address] [place]    PLEASE NOTE:  This document has been produced using voice recognition software. Unrecognized errors in transcription may be present.

## 2018-01-27 ENCOUNTER — ANESTHESIA EVENT (OUTPATIENT)
Dept: ENDOSCOPY | Age: 78
End: 2018-01-27
Payer: MEDICARE

## 2018-01-29 ENCOUNTER — HOSPITAL ENCOUNTER (OUTPATIENT)
Age: 78
Setting detail: OUTPATIENT SURGERY
Discharge: HOME | End: 2018-01-29
Attending: INTERNAL MEDICINE | Admitting: INTERNAL MEDICINE
Payer: MEDICARE

## 2018-01-29 ENCOUNTER — ANESTHESIA (OUTPATIENT)
Dept: ENDOSCOPY | Age: 78
End: 2018-01-29
Payer: MEDICARE

## 2018-01-29 VITALS
DIASTOLIC BLOOD PRESSURE: 53 MMHG | BODY MASS INDEX: 28.56 KG/M2 | SYSTOLIC BLOOD PRESSURE: 128 MMHG | TEMPERATURE: 98.1 F | WEIGHT: 182 LBS | HEART RATE: 48 BPM | RESPIRATION RATE: 17 BRPM | OXYGEN SATURATION: 99 % | HEIGHT: 67 IN

## 2018-01-29 PROCEDURE — 88305 TISSUE EXAM BY PATHOLOGIST: CPT | Performed by: INTERNAL MEDICINE

## 2018-01-29 PROCEDURE — 76040000019: Performed by: INTERNAL MEDICINE

## 2018-01-29 PROCEDURE — 76060000031 HC ANESTHESIA FIRST 0.5 HR: Performed by: INTERNAL MEDICINE

## 2018-01-29 PROCEDURE — 77030009426 HC FCPS BIOP ENDOSC BSC -B: Performed by: INTERNAL MEDICINE

## 2018-01-29 PROCEDURE — 74011250636 HC RX REV CODE- 250/636: Performed by: ANESTHESIOLOGY

## 2018-01-29 PROCEDURE — C1769 GUIDE WIRE: HCPCS | Performed by: INTERNAL MEDICINE

## 2018-01-29 PROCEDURE — 74011250636 HC RX REV CODE- 250/636

## 2018-01-29 RX ORDER — INSULIN LISPRO 100 [IU]/ML
INJECTION, SOLUTION INTRAVENOUS; SUBCUTANEOUS ONCE
Status: DISCONTINUED | OUTPATIENT
Start: 2018-01-29 | End: 2018-01-29 | Stop reason: HOSPADM

## 2018-01-29 RX ORDER — DEXTROMETHORPHAN/PSEUDOEPHED 2.5-7.5/.8
1.2 DROPS ORAL
Status: DISCONTINUED | OUTPATIENT
Start: 2018-01-29 | End: 2018-01-29 | Stop reason: HOSPADM

## 2018-01-29 RX ORDER — ATROPINE SULFATE 0.1 MG/ML
0.5 INJECTION INTRAVENOUS
Status: DISCONTINUED | OUTPATIENT
Start: 2018-01-29 | End: 2018-01-29 | Stop reason: HOSPADM

## 2018-01-29 RX ORDER — EPINEPHRINE 0.1 MG/ML
1 INJECTION INTRACARDIAC; INTRAVENOUS
Status: DISCONTINUED | OUTPATIENT
Start: 2018-01-29 | End: 2018-01-29 | Stop reason: HOSPADM

## 2018-01-29 RX ORDER — SODIUM CHLORIDE 0.9 % (FLUSH) 0.9 %
5-10 SYRINGE (ML) INJECTION EVERY 8 HOURS
Status: DISCONTINUED | OUTPATIENT
Start: 2018-01-29 | End: 2018-01-29 | Stop reason: HOSPADM

## 2018-01-29 RX ORDER — FLUMAZENIL 0.1 MG/ML
0.2 INJECTION INTRAVENOUS
Status: DISCONTINUED | OUTPATIENT
Start: 2018-01-29 | End: 2018-01-29 | Stop reason: HOSPADM

## 2018-01-29 RX ORDER — SODIUM CHLORIDE 0.9 % (FLUSH) 0.9 %
5-10 SYRINGE (ML) INJECTION AS NEEDED
Status: DISCONTINUED | OUTPATIENT
Start: 2018-01-29 | End: 2018-01-29 | Stop reason: HOSPADM

## 2018-01-29 RX ORDER — SODIUM CHLORIDE, SODIUM LACTATE, POTASSIUM CHLORIDE, CALCIUM CHLORIDE 600; 310; 30; 20 MG/100ML; MG/100ML; MG/100ML; MG/100ML
75 INJECTION, SOLUTION INTRAVENOUS CONTINUOUS
Status: DISCONTINUED | OUTPATIENT
Start: 2018-01-29 | End: 2018-01-29 | Stop reason: HOSPADM

## 2018-01-29 RX ORDER — PROPOFOL 10 MG/ML
INJECTION, EMULSION INTRAVENOUS AS NEEDED
Status: DISCONTINUED | OUTPATIENT
Start: 2018-01-29 | End: 2018-01-29 | Stop reason: HOSPADM

## 2018-01-29 RX ORDER — NALOXONE HYDROCHLORIDE 0.4 MG/ML
0.4 INJECTION, SOLUTION INTRAMUSCULAR; INTRAVENOUS; SUBCUTANEOUS
Status: DISCONTINUED | OUTPATIENT
Start: 2018-01-29 | End: 2018-01-29 | Stop reason: HOSPADM

## 2018-01-29 RX ADMIN — PROPOFOL 100 MG: 10 INJECTION, EMULSION INTRAVENOUS at 08:49

## 2018-01-29 RX ADMIN — SODIUM CHLORIDE, SODIUM LACTATE, POTASSIUM CHLORIDE, AND CALCIUM CHLORIDE 75 ML/HR: 600; 310; 30; 20 INJECTION, SOLUTION INTRAVENOUS at 08:27

## 2018-01-29 RX ADMIN — PROPOFOL 50 MG: 10 INJECTION, EMULSION INTRAVENOUS at 08:55

## 2018-01-29 NOTE — PERIOP NOTES
Dr. Ye Pride in to see patient. Patient continues to deny chest pain or shortness of breath. No complaints or distress noted. Dr. Ye Pride instructed RN to discharge the patient. RN verbalized understanding.

## 2018-01-29 NOTE — PROCEDURES
[place]  [facility], [address]      Brief Procedure Note    Ling Coad  1940  [id number]    Date of Procedure: 1/29/2018    Preoperative diagnosis: K22.19, Pederson's esophagus with high grade dysplasia  530.3 - K22.2,  Esophageal obstruction  553.3 - K44.9,  Small hiatal hernia  787.20 - R13.19,  Swallowing painful    Postoperative diagnosis:  Hiatal Hernia. Barretts.  GE junction Stricture      Type of Anesthesia: MAC (monitered anesthesia care)    Description of Findings: same as post op dx, short segment/patchy Eliecer's (bx'd) dilated to 50 Fr over guidewire    Procedure: Procedure(s):  UPPER ENDOSCOPY / Dilation    :  Dr. Olivia Ann MD    Assistant(s): [unfilled]    Type of Anesthesia:MAC     EBL:None    Specimens:   ID Type Source Tests Collected by Time Destination   1 : [facility] GE [address] [place] [address],[address] GE Junction  Olivia Ann MD 1/29/2018 0869 Pathology       Findings: See printed and scanned procedure note    Complications: None    Dr. Olivia Ann MD  1/29/2018  9:07 AM

## 2018-01-29 NOTE — IP AVS SNAPSHOT
[facility] [address] [address] [address] [phone] Patient: Tiffany Cantor. MRN: [id number] WERO:[id number]/[id number] About your hospitalization You were admitted on:  January 29, 2018 You last received care in the:  [facility] ENDOSCOPY You were discharged on:  January 29, 2018 Why you were hospitalized Your primary diagnosis was:  Not on File Follow-up Information Follow up With Details Comments Contact Info Bailee Kaba MD   [address] [place] [address] [address] 706 [facility] 
[phone] Your Scheduled Appointments Tuesday January 30, 2018 11:10 AM EST Nurse Visit with [facility] NURSE [facility] ([address] [place] [address]) [facility] 78 3[address] [address] [facility]  
[phone] Wednesday February 07, 2018  8:00 AM EST  
HR DUPLEX CAROTID with HBV VASCULAR LAB 1 HBV VASCULAR LAB ([facility]) 1212 [facility] D [address] [address] [address] [phone] No preparation is required for this study. Patient can have their meals and take their medications. Patient should not wear a turtleneck or high neck shirt for this study. Please report to the main location @ [address] [place] [address] approximately 30 minutes prior to your appointment time. The entrance is located on the RIGHT side of the street, immediately adjacent to the Emergency Room. 621 [facility] [address], [address] Wednesday February 07, 2018 11:15 AM EST Any with Desiree Ruelas MD  
[facility] ([address] [place] [address]) [facility] [address] [facility]  
[phone] Friday February 23, 2018  1:00 PM EST PHYSICAL with Nilsa Torres MD  
Internists of [address]) [address], [facility] 706 [facility]  
[phone] Discharge Orders None A check sobia indicates which time of day the medication should be taken. My Medications ASK your doctor about these medications Instructions Each Dose to Equal  
 Morning Noon Evening Bedtime  
 aspirin 81 mg tablet Your last dose was: Your next dose is: Take 81 mg by mouth daily. Indications: MYOCARDIAL INFARCTION PREVENTION 81 mg  
    
   
   
   
  
 atorvastatin 20 mg tablet Commonly known as:  LIPITOR Your last dose was: Your next dose is: TAKE 1 TABLET BY MOUTH EVERY EVENING  
     
   
   
   
  
 CENTRUM SILVER PO Your last dose was: Your next dose is: Take 1 Tab by mouth daily. 1 Tab  
    
   
   
   
  
 glucosam-chondroit-C-manganese [phone] mg Cap Your last dose was: Your next dose is: Take 1 Tab by mouth daily. 1 Tab  
    
   
   
   
  
 metoprolol succinate 25 mg XL tablet Commonly known as:  TOPROL-XL Your last dose was: Your next dose is: TAKE 1 TABLET BY MOUTH DAILY  
     
   
   
   
  
 nitroglycerin 0.4 mg SL tablet Commonly known as:  NITROSTAT Your last dose was: Your next dose is: 0.4 mg by SubLINGual route every five (5) minutes as needed for Chest Pain. 0.4 mg  
    
   
   
   
  
 omeprazole 40 mg capsule Commonly known as:  PRILOSEC Your last dose was: Your next dose is: Take 40 mg by mouth two (2) times a day. 40 mg  
    
   
   
   
  
 VITAMIN B-12 2,500 mcg sublingual tablet Generic drug:  cyanocobalamin Your last dose was: Your next dose is: Take 2,500 mcg by mouth daily. 2500 mcg VITAMIN C 1,000 mg tablet Generic drug:  ascorbic acid (vitamin C) Your last dose was: Your next dose is: Take 1,000 mg by mouth daily. 1000 mg Discharge Instructions Upper GI Endoscopy: What to Expect at [facility] Your Recovery After you have an endoscopy, you will stay at the hospital or clinic for 1 to 2 hours. This will allow the medicine to wear off. You will be able to go home after your doctor or nurse checks to make sure you are not having any problems. You may have to stay overnight if you had treatment during the test. You may have a sore throat for a day or two after the test. 
This care sheet gives you a general idea about what to expect after the test. 
How can you care for yourself at home? Activity · Rest as much as you need to after you go home. · You should be able to go back to your usual activities the day after the test. 
Diet · Follow your doctor's directions for eating after the test. 
· Drink plenty of fluids (unless your doctor has told you not to). Medications · If you have a sore throat the day after the test, use an over-the-counter spray to numb your throat. Follow-up care is a key part of your treatment and safety. Be sure to make and go to all appointments, and call your doctor if you are having problems. It's also a good idea to know your test results and keep a list of the medicines you take. When should you call for help? Call 911 anytime you think you may need emergency care. For example, call if: 
· You passed out (lost consciousness). · You cough up blood. · You vomit blood or what looks like coffee grounds. · You pass maroon or very bloody stools. Call your doctor now or seek immediate medical care if: 
· You have trouble swallowing. · You have belly pain. · Your stools are black and tarlike or have streaks of blood. · You are sick to your stomach or cannot keep fluids down. Watch closely for changes in your health, and be sure to contact your doctor if: 
· Your throat still hurts after a day or two. · You do not get better as expected. Where can you learn more? Go to Cell Medica.be Enter [phone] in the search box to learn more about \"Upper GI Endoscopy: What to Expect at Home. \"  
© 6104-4188 Healthwise, Incorporated. Care instructions adapted under license by [employer] (which disclaims liability or warranty for this information). This care instruction is for use with your licensed healthcare professional. If you have questions about a medical condition or this instruction, always ask your healthcare professional. [place] [address] any warranty or liability for your use of this information. Content Version: 96.0.044104; Current as of: November 14, 2014 Hiatal Hernia: Care Instructions Your Care Instructions A hiatal hernia occurs when part of the stomach bulges into the chest cavity. A hiatal hernia may allow stomach acid and juices to back up into the esophagus (acid reflux). This can cause a feeling of burning, warmth, heat, or pain behind the breastbone. This feeling may often occur after you eat, soon after you lie down, or when you bend forward, and it may come and go. You also may have a sour taste in your mouth. These symptoms are commonly known as heartburn or reflux. But not all hiatal hernias cause symptoms. Follow-up care is a key part of your treatment and safety. Be sure to make and go to all appointments, and call your doctor if you are having problems. It's also a good idea to know your test results and keep a list of the medicines you take. How can you care for yourself at home? · Take your medicines exactly as prescribed. Call your doctor if you think you are having a problem with your medicine. · Do not take aspirin or other nonsteroidal anti-inflammatory drugs (NSAIDs), such as ibuprofen (Advil, Motrin) or naproxen (Aleve), unless your doctor says it is okay. Ask your doctor what you can take for pain. · Your doctor may recommend over-the-counter medicine.  For mild or occasional indigestion, antacids such as Tums, Gaviscon, Maalox, or Mylanta may help. Your doctor also may recommend over-the-counter acid reducers, such as famotidine (Pepcid AC), cimetidine (Tagamet HB), ranitidine (Zantac 75 and Zantac 150), or omeprazole (Prilosec). Read and follow all instructions on the label. If you use these medicines often, talk with your doctor. · Change your eating habits. ¨ It's best to eat several small meals instead of two or three large meals. ¨ After you eat, wait 2 to 3 hours before you lie down. Late-night snacks aren't a good idea. ¨ Chocolate, mint, and alcohol can make heartburn worse. They relax the valve between the esophagus and the stomach. ¨ Spicy foods, foods that have a lot of acid (like tomatoes and oranges), and coffee can make heartburn symptoms worse in some people. If your symptoms are worse after you eat a certain food, you may want to stop eating that food to see if your symptoms get better. · Do not smoke or chew tobacco. 
· If you get heartburn at night, raise the head of your bed 6 to 8 inches by putting the frame on blocks or placing a foam wedge under the head of your mattress. (Adding extra pillows does not work.) · Do not wear tight clothing around your middle. · Lose weight if you need to. Losing just 5 to 10 pounds can help. When should you call for help? Call your doctor now or seek immediate medical care if: 
? · You have new or worse belly pain. ? · You are vomiting. ? Watch closely for changes in your health, and be sure to contact your doctor if: 
? · You have new or worse symptoms of indigestion. ? · You have trouble or pain swallowing. ? · You are losing weight. ? · You do not get better as expected. Where can you learn more? Go to http://martine-feng.info/. Enter E896 in the search box to learn more about \"Hiatal Hernia: Care Instructions. \" Current as of: May 12, 2017 Content Version: 11.4 © 8218-4123 [employer]. Care instructions adapted under license by [employer] (which disclaims liability or warranty for this information). If you have questions about a medical condition or this instruction, always ask your healthcare professional. [address] any warranty or liability for your use of this information. Pederson's Esophagus: Care Instructions Your Care Instructions The esophagus is the tube that connects the throat to the stomach. Food and liquids go through this tube. In Pederson's esophagus, the cells that line the tube change. This is usually because of gastroesophageal reflux disease (GERD). GERD causes acid from your stomach to back up into the esophagus. When you have Pederson's esophagus, you are slightly more likely to get cancer of the esophagus. So regular testing is important to watch for signs of this cancer. You can treat GERD to control your symptoms and feel better. Follow-up care is a key part of your treatment and safety. Be sure to make and go to all appointments, and call your doctor if you are having problems. It's also a good idea to know your test results and keep a list of the medicines you take. How can you care for yourself at home? · Take your medicines exactly as prescribed. Call your doctor if you think you are having a problem with your medicine. · If you take over-the-counter medicine, such as antacids or acid reducers, follow all instructions on the label. If you use these medicines often, talk with your doctor. Be careful when you take over-the-counter antacid medicines. Many of these medicines have aspirin in them. Read the label to make sure that you are not taking more than the recommended dose. Too much aspirin can be harmful. · Do not smoke or chew tobacco. Smoking can make GERD worse.  If you need help quitting, talk to your doctor about stop-smoking programs and medicines. These can increase your chances of quitting for good. · Chocolate, mint, and alcohol can make GERD worse. They relax the valve between the esophagus and the stomach. · Spicy foods, foods that have a lot of acid (like tomatoes and oranges), and coffee can make GERD symptoms worse in some people. If your symptoms are worse after you eat a certain food, you may want to stop eating that food to see if your symptoms get better. · Eat smaller meals, and more often. After eating, wait 2 to 3 hours before you lie down. · Raise the head of your bed 6 to 8 inches by putting blocks under the frame or a foam wedge under the head of the mattress. · Do not wear tight clothing around your midsection. · If you are overweight, lose weight. Even losing 5 to 10 pounds can help. When should you call for help? Call your doctor now or seek immediate medical care if: 
? · You have new or worse belly pain. ? · You are vomiting. ? Watch closely for changes in your health, and be sure to contact your doctor if: 
? · You have any pain or difficulty swallowing. ? · You are losing weight. ? · You have new or worse symptoms, such as heartburn. ? · You do not get better as expected. Where can you learn more? Go to http://martine-feng.info/. Enter L146 in the search box to learn more about \"Pederson's Esophagus: Care Instructions. \" Current as of: May 12, 2017 Content Version: 11.4 © 0402-4537 Healthwise, [employer]. Care instructions adapted under license by [employer] (which disclaims liability or warranty for this information). If you have questions about a medical condition or this instruction, always ask your healthcare professional. [place] [address] any warranty or liability for your use of this information. DISCHARGE SUMMARY from Nurse POST-PROCEDURE INSTRUCTIONS: 
 
Call your Physician if you: 
? Observe any excess bleeding. ? Develop a temperature over 100.5o F. 
? Experience abdominal, shoulder or chest pain. ? Notice any signs of decreased circulation or nerve impairment to an extremity such as a change in color, persistent numbness, tingling, coldness or increase in pain. ? Vomit blood or you have nausea and vomiting lasting longer than 4 hours. ? Are unable to take medications. ? Are unable to urinate within 8 hours after discharge following general anesthesia or intravenous sedation. For the next 24 hours after receiving general anesthesia or intravenous sedation, or while taking prescription Narcotics, limit your activities: 
? Do NOT drive a motor vehicle, operate hazard machinery or power tools, or perform tasks that require coordination. The medication you received during your procedure may have some effect on your mental awareness. ? Do NOT make important personal or business decisions. The medication you received during your procedure may have some effect on your mental awareness. ? Do NOT drink alcoholic beverages. These drinks do not mix well with the medications that have been given to you. ? Upon discharge from the hospital, you must be accompanied by a responsible adult. ? Resume your diet as directed by your physician. ? Resume medications as your physician has prescribed. ? Please give a list of your current medications to your Primary Care Provider. ? Please update this list whenever your medications are discontinued, doses are changed, or new medications (including over-the-counter products) are added. ? Please carry medication information at all times in case of emergency situations. These are general instructions for a healthy lifestyle: No smoking/ No tobacco products/ Avoid exposure to second hand smoke. ? Surgeon General's Warning:  Quitting smoking now greatly reduces serious risk to your health.  
 
Obesity, smoking, and a sedentary lifestyle greatly increase your risk for illness. ? A healthy diet, regular physical exercise & weight monitoring are important for maintaining a healthy lifestyle ? You may be retaining fluid if you have a history of heart failure or if you experience any of the following symptoms:  Weight gain of 3 pounds or more overnight or 5 pounds in a week, increased swelling in our hands or feet or shortness of breath while lying flat in bed. Please call your doctor as soon as you notice any of these symptoms; do not wait until your next office visit. Recognize signs and symptoms of STROKE: 
F  -  Face looks uneven A  -  Arms unable to move or move unevenly S  -  Speech slurred or non-existent T  -  Time to call 911 - as soon as signs and symptoms begin - DO NOT go back to bed or wait to see If you get better - TIME IS BRAIN. Colorectal Screening ? Colorectal cancer almost always develops from precancerous polyps (abnormal growths) in the colon or rectum. Screening tests can find precancerous polyps, so that they can be removed before they turn into cancer. Screening tests can also find colorectal cancer early, when treatment works best. 
? Speak with your physician about when you should begin screening and how often you should be tested ? Additional Information If you have questions, please call [phone]. Remember, MyChart is NOT to be used for urgent needs. For medical emergencies, dial 911. Educational references and/or instructions provided during this visit included: 
 
Hiatal Hernia, Pederson's Esophagus APPOINTMENTS: 
 
Please make a follow-up appointment with your physician. Discharge information has been reviewed with the patient. The patient verbalized understanding. ACO Transitions of Care Introducing [employer] [address] Molly Lozano offers a voluntary care coordination program to provide high quality service and care to [facility] fee-for-service beneficiaries. Bharti Garnica was designed to help you enhance your health and well-being through the following services: ? Transitions of Care  support for individuals who are transitioning from one care setting to another (example: Hospital to home). ? Chronic and Complex Care Coordination  support for individuals and caregivers of those with serious or chronic illnesses or with more than one chronic (ongoing) condition and those who take a number of different medications. If you meet specific medical criteria, a [facility] [address] may call you directly to coordinate your care with your primary care physician and your other care providers. For questions about the [facility] CENTER programs, please, contact your physicians office. For general questions or additional information about Accountable Care Organizations: 
Please visit www.medicare.gov/acos. html or call 1-800-MEDICARE (7-921.280.1263) TTY users should call 1-376.288.9086. Introducing [facility] & HEALTH SERVICES! Dear Michele Castillo: Thank you for requesting a [employer] account. Our records indicate that you have previously registered for a [employer] account but its currently inactive. Please call our [employer] support line at [phone]. Additional Information If you have questions, please visit the Frequently Asked Questions section of the [employer] website at [email or web address] Remember, [employer]sun is NOT to be used for urgent needs. For medical emergencies, dial 911. Now available from your iPhone and Android! Providers Seen During Your Hospitalization Provider Specialty Primary office phone Bj Hansen MD Gastroenterology [phone] Your Primary Care Physician (PCP) Primary Care Physician Office Phone Office Fax Jaime Olivier [phone] You are allergic to the following Allergen Reactions Albuterol Rash  
 seizure Influenza Virus Vaccine, Specific Other (comments) Percocet (Oxycodone-Acetaminophen) Other (comments) Pt denies Recent Documentation Height Weight BMI Smoking Status 1.702 m 82.6 kg 28.51 kg/m2 Former Smoker Emergency Contacts Name Discharge Info Relation Home Work Mobile Eve Patel DISCHARGE CAREGIVER [3] Spouse [3] [phone] Enzo Norton  Spouse [3] [phone] Patient Belongings The following personal items are in your possession at time of discharge: 
  Dental Appliances: None  Visual Aid: Glasses Please provide this summary of care documentation to your next provider. Signatures-by signing, you are acknowledging that this After Visit Summary has been reviewed with you and you have received a copy. Patient Signature:  ____________________________________________________________ Date:  ____________________________________________________________  
  
[facility] Current Provider Signature:  ____________________________________________________________ Date:  ____________________________________________________________

## 2018-01-29 NOTE — ANESTHESIA POSTPROCEDURE EVALUATION
Post-Anesthesia Evaluation and Assessment    Patient: Clare Min MRN: [id number]  SSN: [id number]    YOB: 1940  Age: 76 y.o. Sex: male     VS from flow sheet    Cardiovascular Function/Vital Signs  Visit Vitals    /57    Pulse (!) 46    Temp 36.7 °C (98.1 °F)    Resp 13    Ht 5' 7\" (1.702 m)    Wt 82.6 kg (182 lb)    SpO2 97%    BMI 28.51 kg/m2       Patient is status post MAC anesthesia for Procedure(s):  UPPER ENDOSCOPY / Dilation. Nausea/Vomiting: None    Postoperative hydration reviewed and adequate. Pain:  Pain Scale 1: Numeric (0 - 10) (01/29/18 0910)  Pain Intensity 1: 0 (01/29/18 0910)   Managed    Neurological Status: At baseline    Mental Status and Level of Consciousness: Arousable    Pulmonary Status:   O2 Device: Room air (01/29/18 0910)   Adequate oxygenation and airway patent    Complications related to anesthesia: None    Post-anesthesia assessment completed.  No concerns    Signed By: Jeromy Salas MD     January 29, 2018

## 2018-01-29 NOTE — DISCHARGE INSTRUCTIONS
Upper GI Endoscopy: What to Expect at [address] [place] [address]  After you have an endoscopy, you will stay at the hospital or clinic for 1 to 2 hours. This will allow the medicine to wear off. You will be able to go home after your doctor or nurse checks to make sure you are not having any problems. You may have to stay overnight if you had treatment during the test. You may have a sore throat for a day or two after the test.  This care sheet gives you a general idea about what to expect after the test.  How can you care for yourself at home? Activity  · Rest as much as you need to after you go home. · You should be able to go back to your usual activities the day after the test.  Diet  · Follow your doctor's directions for eating after the test.  · Drink plenty of fluids (unless your doctor has told you not to). Medications  · If you have a sore throat the day after the test, use an over-the-counter spray to numb your throat. Follow-up care is a key part of your treatment and safety. Be sure to make and go to all appointments, and call your doctor if you are having problems. It's also a good idea to know your test results and keep a list of the medicines you take. When should you call for help? Call 911 anytime you think you may need emergency care. For example, call if:  · You passed out (lost consciousness). · You cough up blood. · You vomit blood or what looks like coffee grounds. · You pass maroon or very bloody stools. Call your doctor now or seek immediate medical care if:  · You have trouble swallowing. · You have belly pain. · Your stools are black and tarlike or have streaks of blood. · You are sick to your stomach or cannot keep fluids down. Watch closely for changes in your health, and be sure to contact your doctor if:  · Your throat still hurts after a day or two. · You do not get better as expected. Where can you learn more?    Go to DealExplorer.be  Enter J454 in the search box to learn more about \"Upper GI Endoscopy: What to Expect at Home. \"   © 9051-7087 Healthwise, [employer]. Care instructions adapted under license by William Aguilera (which disclaims liability or warranty for this information). This care instruction is for use with your licensed healthcare professional. If you have questions about a medical condition or this instruction, always ask your healthcare professional. [address]  any warranty or liability for your use of this information. Content Version: 63.9.408347; Current as of: November 14, 2014     Hiatal Hernia: Care Instructions  Your Care Instructions  A hiatal hernia occurs when part of the stomach bulges into the chest cavity. A hiatal hernia may allow stomach acid and juices to back up into the esophagus (acid reflux). This can cause a feeling of burning, warmth, heat, or pain behind the breastbone. This feeling may often occur after you eat, soon after you lie down, or when you bend forward, and it may come and go. You also may have a sour taste in your mouth. These symptoms are commonly known as heartburn or reflux. But not all hiatal hernias cause symptoms. Follow-up care is a key part of your treatment and safety. Be sure to make and go to all appointments, and call your doctor if you are having problems. It's also a good idea to know your test results and keep a list of the medicines you take. How can you care for yourself at home? · Take your medicines exactly as prescribed. Call your doctor if you think you are having a problem with your medicine. · Do not take aspirin or other nonsteroidal anti-inflammatory drugs (NSAIDs), such as ibuprofen (Advil, Motrin) or naproxen (Aleve), unless your doctor says it is okay. Ask your doctor what you can take for pain. · Your doctor may recommend over-the-counter medicine. For mild or occasional indigestion, antacids such as Tums, Gaviscon, Maalox, or Mylanta may help.  Your doctor also may recommend over-the-counter acid reducers, such as famotidine (Pepcid AC), cimetidine (Tagamet HB), ranitidine (Zantac 75 and Zantac 150), or omeprazole (Prilosec). Read and follow all instructions on the label. If you use these medicines often, talk with your doctor. · Change your eating habits. ¨ It's best to eat several small meals instead of two or three large meals. ¨ After you eat, wait 2 to 3 hours before you lie down. Late-night snacks aren't a good idea. ¨ Chocolate, mint, and alcohol can make heartburn worse. They relax the valve between the esophagus and the stomach. ¨ Spicy foods, foods that have a lot of acid (like tomatoes and oranges), and coffee can make heartburn symptoms worse in some people. If your symptoms are worse after you eat a certain food, you may want to stop eating that food to see if your symptoms get better. · Do not smoke or chew tobacco.  · If you get heartburn at night, raise the head of your bed 6 to 8 inches by putting the frame on blocks or placing a foam wedge under the head of your mattress. (Adding extra pillows does not work.)  · Do not wear tight clothing around your middle. · Lose weight if you need to. Losing just 5 to 10 pounds can help. When should you call for help? Call your doctor now or seek immediate medical care if:  ? · You have new or worse belly pain. ? · You are vomiting. ? Watch closely for changes in your health, and be sure to contact your doctor if:  ? · You have new or worse symptoms of indigestion. ? · You have trouble or pain swallowing. ? · You are losing weight. ? · You do not get better as expected. Where can you learn more? Go to http://martine-feng.info/. Enter Z818 in the search box to learn more about \"Hiatal Hernia: Care Instructions. \"  Current as of: May 12, 2017  Content Version: 11.4  © 9493-3364 [address].  Care instructions adapted under license by [employer] (which disclaims liability or warranty for this information). If you have questions about a medical condition or this instruction, always ask your healthcare professional. [address] any warranty or liability for your use of this information. Pederson's Esophagus: Care Instructions  Your Care Instructions    The esophagus is the tube that connects the throat to the stomach. Food and liquids go through this tube. In Pederson's esophagus, the cells that line the tube change. This is usually because of gastroesophageal reflux disease (GERD). GERD causes acid from your stomach to back up into the esophagus. When you have Pederson's esophagus, you are slightly more likely to get cancer of the esophagus. So regular testing is important to watch for signs of this cancer. You can treat GERD to control your symptoms and feel better. Follow-up care is a key part of your treatment and safety. Be sure to make and go to all appointments, and call your doctor if you are having problems. It's also a good idea to know your test results and keep a list of the medicines you take. How can you care for yourself at home? · Take your medicines exactly as prescribed. Call your doctor if you think you are having a problem with your medicine. · If you take over-the-counter medicine, such as antacids or acid reducers, follow all instructions on the label. If you use these medicines often, talk with your doctor. Be careful when you take over-the-counter antacid medicines. Many of these medicines have aspirin in them. Read the label to make sure that you are not taking more than the recommended dose. Too much aspirin can be harmful. · Do not smoke or chew tobacco. Smoking can make GERD worse. If you need help quitting, talk to your doctor about stop-smoking programs and medicines. These can increase your chances of quitting for good. · Chocolate, mint, and alcohol can make GERD worse.  They relax the valve between the esophagus and the stomach. · Spicy foods, foods that have a lot of acid (like tomatoes and oranges), and coffee can make GERD symptoms worse in some people. If your symptoms are worse after you eat a certain food, you may want to stop eating that food to see if your symptoms get better. · Eat smaller meals, and more often. After eating, wait 2 to 3 hours before you lie down. · Raise the head of your bed 6 to 8 inches by putting blocks under the frame or a foam wedge under the head of the mattress. · Do not wear tight clothing around your midsection. · If you are overweight, lose weight. Even losing 5 to 10 pounds can help. When should you call for help? Call your doctor now or seek immediate medical care if:  ? · You have new or worse belly pain. ? · You are vomiting. ? Watch closely for changes in your health, and be sure to contact your doctor if:  ? · You have any pain or difficulty swallowing. ? · You are losing weight. ? · You have new or worse symptoms, such as heartburn. ? · You do not get better as expected. Where can you learn more? Go to http://martine-feng.info/. Enter L146 in the search box to learn more about \"Pederson's Esophagus: Care Instructions. \"  Current as of: May 12, 2017  Content Version: 11.4  © 5260-0278 [employer]. Care instructions adapted under license by [employer] (which disclaims liability or warranty for this information). If you have questions about a medical condition or this instruction, always ask your healthcare professional. [place] [address] any warranty or liability for your use of this information. DISCHARGE SUMMARY from Nurse     POST-PROCEDURE INSTRUCTIONS:    Call your Physician if you:  ? Observe any excess bleeding. ? Develop a temperature over 100.5o F.  ? Experience abdominal, shoulder or chest pain. ?  Notice any signs of decreased circulation or nerve impairment to an extremity such as a change in color, persistent numbness, tingling, coldness or increase in pain. ? Vomit blood or you have nausea and vomiting lasting longer than 4 hours. ? Are unable to take medications. ? Are unable to urinate within 8 hours after discharge following general anesthesia or intravenous sedation. For the next 24 hours after receiving general anesthesia or intravenous sedation, or while taking prescription Narcotics, limit your activities:  ? Do NOT drive a motor vehicle, operate hazard machinery or power tools, or perform tasks that require coordination. The medication you received during your procedure may have some effect on your mental awareness. ? Do NOT make important personal or business decisions. The medication you received during your procedure may have some effect on your mental awareness. ? Do NOT drink alcoholic beverages. These drinks do not mix well with the medications that have been given to you. ? Upon discharge from the hospital, you must be accompanied by a responsible adult. ? Resume your diet as directed by your physician. ? Resume medications as your physician has prescribed. ? Please give a list of your current medications to your Primary Care Provider. ? Please update this list whenever your medications are discontinued, doses are changed, or new medications (including over-the-counter products) are added. ? Please carry medication information at all times in case of emergency situations. These are general instructions for a healthy lifestyle:    No smoking/ No tobacco products/ Avoid exposure to second hand smoke.  Surgeon General's Warning:  Quitting smoking now greatly reduces serious risk to your health. Obesity, smoking, and a sedentary lifestyle greatly increase your risk for illness.    A healthy diet, regular physical exercise & weight monitoring are important for maintaining a healthy lifestyle   You may be retaining fluid if you have a history of heart failure or if you experience any of the following symptoms:  Weight gain of 3 pounds or more overnight or 5 pounds in a week, increased swelling in our hands or feet or shortness of breath while lying flat in bed. Please call your doctor as soon as you notice any of these symptoms; do not wait until your next office visit. Recognize signs and symptoms of STROKE:  F  -  Face looks uneven  A  -  Arms unable to move or move unevenly  S  -  Speech slurred or non-existent  T  -  Time to call 911 - as soon as signs and symptoms begin - DO NOT go back to bed or wait to see If you get better - TIME IS BRAIN. Colorectal Screening   Colorectal cancer almost always develops from precancerous polyps (abnormal growths) in the colon or rectum. Screening tests can find precancerous polyps, so that they can be removed before they turn into cancer. Screening tests can also find colorectal cancer early, when treatment works best.  [facility] Speak with your physician about when you should begin screening and how often you should be tested     Additional Information    If you have questions, please call [phone]. Remember, [employer] is NOT to be used for urgent needs. For medical emergencies, dial 911. Educational references and/or instructions provided during this visit included:    Hiatal Hernia, Pederson's Esophagus      APPOINTMENTS:    Please make a follow-up appointment with your physician. Discharge information has been reviewed with the patient. The patient verbalized understanding.

## 2018-01-29 NOTE — PERIOP NOTES
Patient's heart rate is ranging between 45-52. Patient denies chest pain or shortness of breath. Dr Julia Fontana notified and states he will come evaluate the patient. RN verbalized understanding. RN notified patient with verbal understanding noted.

## 2018-01-29 NOTE — ANESTHESIA PREPROCEDURE EVALUATION
Anesthetic History   No history of anesthetic complications            Review of Systems / Medical History  Patient summary reviewed and pertinent labs reviewed    Pulmonary        Sleep apnea    Asthma        Neuro/Psych              Cardiovascular              CAD and CABG         GI/Hepatic/Renal     GERD           Endo/Other      Hypothyroidism  Morbid obesity and arthritis     Other Findings   Comments: Documentation of current medication  Current medications obtained, documented and obtained? YES      Risk Factors for Postoperative nausea/vomiting:       History of postoperative nausea/vomiting? NO       Female? NO       Motion sickness? NO       Intended opioid administration for postoperative analgesia? NO      Smoking Abstinence:  Current Smoker? NO  Elective Surgery? YES  Seen preoperatively by anesthesiologist or proxy prior to day of surgery? YES  Pt abstained from smoking 24 hours prior to anesthesia?  N/A    Preventive care/screening for High Blood Pressure:  Aged 18 years and older: YES  Screened for high blood pressure: YES  Patients with high blood pressure referred to primary care provider   for BP management: YES                 Physical Exam    Airway  Mallampati: III  TM Distance: 4 - 6 cm  Neck ROM: short neck   Mouth opening: Diminished (comment)     Cardiovascular    Rhythm: regular  Rate: normal         Dental    Dentition: Poor dentition     Pulmonary  Breath sounds clear to auscultation               Abdominal  GI exam deferred       Other Findings            Anesthetic Plan    ASA: 3  Anesthesia type: MAC            Anesthetic plan and risks discussed with: Patient

## 2018-01-29 NOTE — H&P
[facility]   [email or web address]      Impression: 1. Eliecer's  2. 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 EGD/Dil , possible RFA with MAC      Chief Complaint: Eliecer's and dysphagia      HPI:  Stephanie Zaidi is a 76 y.o. male who is being seen preop for Eliecer's hx with prior HGD. tx'd with RFA but has had some stricture issues/ last EGD, dilated to about 43 Fr with Nabila Covert. Gracie Paige PMH:   Past Medical History:   Diagnosis Date    Allergic rhinitis     Arthritis     hands    Asthma     mild obst disease on pfts Dr. Toño Leon Pederson's esophagus     Dr. Milagros Kessler; last 8/17, for [facility]    Bilateral carotid artery stenosis     12/14 BICA 50-69%    BPH (benign prostatic hyperplasia)     BPH without obstruction/lower urinary tract symptoms     CABG x 3 04/24/2014    LIMA to LAD, and saphenous vein grafts to the second diagonal of the LAD, and to the posterior descending branch of the RCA, Dr. Antonio Otero, 4/23/14.  CAD (coronary artery disease)     Cardiac catheterization 04/18/2014    Superdominant RCA. mRCA 90%. pRPDA 60%. LM (modest sized) patent.  p-mLAD 85%. CX < 50%. LVEDP 17 mmHg. EF 60%. No RWMA. CABG recommended.  Cardiac echocardiogram 06/24/2008    EF 70%. Mild conc LVH. Gr 2 DDfx. Mild LAE. Mild MR. PASP 38 mmHg.  Cardiac nuclear imaging study, abnormal 04/15/2014    Mod ischemia in lg area of anterior wall, anteroapex, anteroseptum c/w high-grade prox LAD obstruction. Mod partially transient defect likely ischemia w/very sm infarction in RCA. TID 1.31 suggests 3-vessel CAD. Mod distal anterior hypk. Mild-mod anteroseptal, inferoseptal, anterolateral hypk. EF 46%. Strongly positive EKG on max EST. Ex time 6 min.  Carotid duplex 04/06/2016    Mod 50-69% bilateral ICA stenosis. Probable significant LECA stenosis. Similar to study of 8/12/15. Multiple nodules noted in right thyroid.     Colon adenoma 08/2017     Oneil Almanza Colon polyps 2007    Dr. Oneil Almanza Difficulty hearing     Erectile dysfunction     FHx: heart disease     GERD (gastroesophageal reflux disease)     by EGD 2007 Dr. Oneil Almanza IFG (impaired fasting glucose) 5/14    Impotence     Lower urinary tract symptoms (LUTS)     Multinodular goiter     2014; 2015; 4/16    Obesity     peak weight 202 lbs, bmi 31.4 from 4/14    Prostate cancer ([address] 8/10    Dr. Tanvi Toussaint, s/p cryo Dr. Danielle Whittington Pulmonary nodule 2014 2015    Sleep apnea     CPAP not used       PSH:   Past Surgical History:   Procedure Laterality Date    CARDIAC SURG PROCEDURE UNLIST  11/06    thallium negative    CARDIAC SURG PROCEDURE UNLIST  4/14    3V CABG Dr Cheryle Countryman  2007    mild obst disease on PFTs Dr. Zhane Joseph N/A 7/27/2017    Dr. Audie Cabot 2007 negative; 8/17 adenoma     HX Benita Frey HX CATARACT REMOVAL  11/14    bilat Dr Merissa Otero  2004    [facility]      Dr. Barry Toribio HX TURP      x2 Dr. Rakel Heath HX UROLOGICAL  10/2012    [facility], Dr. 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     Social History Main Topics    Smoking status: Former Smoker     Packs/day: 0.50     Years: 4.00     Quit date: 1/1/1957    Smokeless tobacco: Never Used      Comment: Quit in 1957    Alcohol use No    Drug use: No    Sexual activity: Not on file     Other Topics Concern    Not on file     Social History Narrative       FHX:   Family History   Problem Relation Age of Onset    Heart Disease Father     Stroke Mother    [facility] Hypertension Mother     Diabetes Sister        Allergy:   Allergies   Allergen Reactions    Albuterol Rash     seizure    Influenza Virus Vaccine, Specific Other (comments)    Percocet [Oxycodone-Acetaminophen] Other (comments)     Pt denies       Home Medications:     Prescriptions Prior to Admission   Medication Sig    omeprazole (PRILOSEC) 40 mg capsule Take 40 mg by mouth two (2) times a day.  metoprolol succinate (TOPROL-XL) 25 mg XL tablet TAKE 1 TABLET BY MOUTH DAILY (Patient taking differently: TAKE 1 TABLET BY MOUTH HS)    atorvastatin (LIPITOR) 20 mg tablet TAKE 1 TABLET BY MOUTH EVERY EVENING    cyanocobalamin (VITAMIN B-12) 2,500 mcg sublingual tablet Take 2,500 mcg by mouth daily.  ascorbic acid (VITAMIN C) 1,000 mg tablet Take 1,000 mg by mouth daily.  MULTIVITAMIN W-MINERALS/LUTEIN (CENTRUM SILVER PO) Take 1 Tab by mouth daily.  aspirin 81 mg tablet Take 81 mg by mouth daily. Indications: MYOCARDIAL INFARCTION PREVENTION    nitroglycerin (NITROSTAT) 0.4 mg SL tablet 0.4 mg by SubLINGual route every five (5) minutes as needed for Chest Pain.  glucosam-chondroit-C-manganese 017-386-97-3 mg cap Take 1 Tab by mouth daily. Review of Systems:     Constitutional: No fevers, chills, weight loss, fatigue. Cardiovascular: No chest pain, heart palpitations. Respiratory: No cough, SOB, wheezing, chest discomfort, orthopnea. Gastrointestinal: Dysphagia       Musculoskeletal: No weakness, arthralgias, wasting. Allergies: As noted. Visit Vitals    Ht 5' 7\" (1.702 m)    Wt 82.6 kg (182 lb)    BMI 28.51 kg/m2       Physical Assessment:     constitutional: appearance: well developed, well nourished, normal habitus, no deformities, in no acute distress. ENMT: mouth: normal oral mucosa,lips and gums; good dentition. oropharynx: normal tongue, hard and soft palate; posterior pharynx without erithema, exudate or lesions.    respiratory: effort: normal chest excursion; no intercostal retraction or accessory muscle use. cardiovascular: abdominal aorta: normal size and position; no bruits. palpation: PMI of normal size and position; normal rhythm; no thrill or murmurs. abdominal: abdomen: normal consistency; no tenderness or masses. hernias: no hernias appreciated. liver: normal size and consistency. spleen: not palpable. rectal: hemoccult/guaiac: not performed. musculoskeletal: digits and nails: no clubbing, cyanosis, petechiae or other inflammatory conditions. psychiatric: orientation: oriented to time, space and person. Olivia Ann MD MCaterinaD. [facility], [address] [place] [address]  Pager [phone]  [email or web address]

## 2018-02-02 ENCOUNTER — HOSPITAL ENCOUNTER (OUTPATIENT)
Dept: LAB | Age: 78
Discharge: HOME | End: 2018-02-02
Payer: MEDICARE

## 2018-02-02 DIAGNOSIS — R73.01 IFG (IMPAIRED FASTING GLUCOSE): ICD-10-CM

## 2018-02-02 DIAGNOSIS — E78.5 HYPERLIPIDEMIA, UNSPECIFIED HYPERLIPIDEMIA TYPE: ICD-10-CM

## 2018-02-02 DIAGNOSIS — E04.2 MULTINODULAR GOITER: ICD-10-CM

## 2018-02-02 LAB
ALBUMIN SERPL-MCNC: 4.2 G/DL (ref 3.4–5)
ALBUMIN SERPL-MCNC: 4.2 G/DL (ref 3.4–5)
ALBUMIN/GLOB SERPL: 1.4 {RATIO} (ref 0.8–1.7)
ALBUMIN/GLOB SERPL: 1.5 {RATIO} (ref 0.8–1.7)
ALP SERPL-CCNC: 55 U/L (ref 45–117)
ALP SERPL-CCNC: 56 U/L (ref 45–117)
ALT SERPL-CCNC: 27 U/L (ref 16–61)
ALT SERPL-CCNC: 28 U/L (ref 16–61)
ANION GAP SERPL CALC-SCNC: 6 MMOL/L (ref 3–18)
AST SERPL-CCNC: 15 U/L (ref 15–37)
AST SERPL-CCNC: 16 U/L (ref 15–37)
BILIRUB DIRECT SERPL-MCNC: 0.2 MG/DL (ref 0–0.2)
BILIRUB SERPL-MCNC: 0.5 MG/DL (ref 0.2–1)
BILIRUB SERPL-MCNC: 0.6 MG/DL (ref 0.2–1)
BUN SERPL-MCNC: 12 MG/DL (ref 7–18)
BUN/CREAT SERPL: 12 (ref 12–20)
CALCIUM SERPL-MCNC: 8.8 MG/DL (ref 8.5–10.1)
CHLORIDE SERPL-SCNC: 108 MMOL/L (ref 100–108)
CHOLEST SERPL-MCNC: 106 MG/DL
CHOLEST SERPL-MCNC: 112 MG/DL
CO2 SERPL-SCNC: 29 MMOL/L (ref 21–32)
CREAT SERPL-MCNC: 1.01 MG/DL (ref 0.6–1.3)
ERYTHROCYTE [DISTWIDTH] IN BLOOD BY AUTOMATED COUNT: 13.3 % (ref 11.6–14.5)
GLOBULIN SER CALC-MCNC: 2.8 G/DL (ref 2–4)
GLOBULIN SER CALC-MCNC: 2.9 G/DL (ref 2–4)
GLUCOSE SERPL-MCNC: 107 MG/DL (ref 74–99)
HCT VFR BLD AUTO: 40.6 % (ref 36–48)
HDLC SERPL-MCNC: 42 MG/DL (ref 40–60)
HDLC SERPL-MCNC: 42 MG/DL (ref 40–60)
HDLC SERPL: 2.5 {RATIO} (ref 0–5)
HDLC SERPL: 2.7 {RATIO} (ref 0–5)
HGB BLD-MCNC: 13.9 G/DL (ref 13–16)
LDLC SERPL CALC-MCNC: 41.4 MG/DL (ref 0–100)
LDLC SERPL CALC-MCNC: 47.4 MG/DL (ref 0–100)
LIPID PROFILE,FLP: NORMAL
LIPID PROFILE,FLP: NORMAL
MCH RBC QN AUTO: 29.6 PG (ref 24–34)
MCHC RBC AUTO-ENTMCNC: 34.2 G/DL (ref 31–37)
MCV RBC AUTO: 86.4 FL (ref 74–97)
PLATELET # BLD AUTO: 240 K/UL (ref 135–420)
PMV BLD AUTO: 10.9 FL (ref 9.2–11.8)
POTASSIUM SERPL-SCNC: 4 MMOL/L (ref 3.5–5.5)
PROT SERPL-MCNC: 7 G/DL (ref 6.4–8.2)
PROT SERPL-MCNC: 7.1 G/DL (ref 6.4–8.2)
RBC # BLD AUTO: 4.7 M/UL (ref 4.7–5.5)
SODIUM SERPL-SCNC: 143 MMOL/L (ref 136–145)
T4 FREE SERPL-MCNC: 0.9 NG/DL (ref 0.7–1.5)
TRIGL SERPL-MCNC: 113 MG/DL (ref ?–150)
TRIGL SERPL-MCNC: 113 MG/DL (ref ?–150)
TSH SERPL DL<=0.05 MIU/L-ACNC: 3.15 UIU/ML (ref 0.36–3.74)
VLDLC SERPL CALC-MCNC: 22.6 MG/DL
VLDLC SERPL CALC-MCNC: 22.6 MG/DL
WBC # BLD AUTO: 7.9 K/UL (ref 4.6–13.2)

## 2018-02-02 PROCEDURE — 84439 ASSAY OF FREE THYROXINE: CPT | Performed by: INTERNAL MEDICINE

## 2018-02-02 PROCEDURE — 80076 HEPATIC FUNCTION PANEL: CPT | Performed by: INTERNAL MEDICINE

## 2018-02-02 PROCEDURE — 84443 ASSAY THYROID STIM HORMONE: CPT | Performed by: INTERNAL MEDICINE

## 2018-02-02 PROCEDURE — 80053 COMPREHEN METABOLIC PANEL: CPT | Performed by: INTERNAL MEDICINE

## 2018-02-02 PROCEDURE — 80061 LIPID PANEL: CPT | Performed by: INTERNAL MEDICINE

## 2018-02-02 PROCEDURE — 85027 COMPLETE CBC AUTOMATED: CPT | Performed by: INTERNAL MEDICINE

## 2018-02-02 PROCEDURE — 36415 COLL VENOUS BLD VENIPUNCTURE: CPT | Performed by: INTERNAL MEDICINE

## 2018-02-02 RX ORDER — METOPROLOL SUCCINATE 25 MG/1
TABLET, EXTENDED RELEASE ORAL
Qty: 90 TAB | Refills: 3 | Status: SHIPPED | OUTPATIENT
Start: 2018-02-02 | End: 2018-11-30 | Stop reason: SDUPTHER

## 2018-02-05 NOTE — PROGRESS NOTES
Per your last office note, \"His latest lipid profile was completed on February 14, 2017 showed total cholesterol 117 with triglycerides of 93, HDL of 49, LDL of 49.4, and VLDL of 18.6 which I think is excellent control toward 20 mg daily and I would recommend that we get this repeated again next month. \"

## 2018-02-07 ENCOUNTER — HOSPITAL ENCOUNTER (OUTPATIENT)
Dept: VASCULAR SURGERY | Age: 78
Discharge: HOME | End: 2018-02-07
Attending: INTERNAL MEDICINE
Payer: MEDICARE

## 2018-02-07 DIAGNOSIS — R09.89 BRUIT: ICD-10-CM

## 2018-02-07 PROCEDURE — 93880 EXTRACRANIAL BILAT STUDY: CPT

## 2018-02-07 NOTE — PROCEDURES
[facility]  *** FINAL REPORT ***    Name: Hilario Tobin  MRN: [id number]    Outpatient  : 28 Sep 1940  HIS Order #: [id number]  Kenny Visit #: [id number]  Date: 2018    TYPE OF TEST: Cerebrovascular Duplex    REASON FOR TEST    Right Carotid:-             Proximal               Mid                 Distal  cm/s  Systolic  Diastolic  Systolic  Diastolic  Systolic  Diastolic  CCA:    352.9      16.0      165.0      19.0      139.0      26.0  Bulb:  ECA:    142.0       9.0  ICA:    178.0      25.0      152.0      19.0       93.0      19.0  ICA/CCA:  1.3       1.0    ICA Stenosis: 50-69%    Right Vertebral:-  Finding: Antegrade  Sys:       61.0  Sherrill:       11.0    Right Subclavian: Normal    Left Carotid:-            Proximal                Mid                 Distal  cm/s  Systolic  Diastolic  Systolic  Diastolic  Systolic  Diastolic  CCA:    741.4      14.0      165.0      29.0      151.0      25.0  Bulb:  ECA:    262.0      19.0  ICA:    222.0      25.0      142.0      22.0      155.0      22.0  ICA/CCA:  1.3       0.9    ICA Stenosis: 50-69%    Left Vertebral:-  Finding: Antegrade  Sys:       56.0  Sherrill:       16.0    Left Subclavian: Normal    INTERPRETATION/FINDINGS  Duplex images were obtained using 2-D gray scale, color flow, and  spectral Doppler analysis. 1. Bilateral 50-69% stenosis of the internal carotid arteries. 2. Probable hemodynamically significant stenosis in the left external  carotid artery. 3. No significant stenosis in the right external carotid artery. 4. Antegrade flow in both vertebral arteries. 5. Normal flow in both subclavian arteries. Plaque Morphology:  1. Hyperechoic plaque in the bulb and right ICA. 2. Hyperechoic plaque in the bulb and left ICA. ADDITIONAL COMMENTS  No significant change from previous exam done in 10/12/2016. I have personally reviewed the data relevant to the interpretation of  this  study.     TECHNOLOGIST: Yisel De Leon, [facility], RVT/  Signed: 02/07/2018 08:49 AM    PHYSICIAN: Portia Simpson MD  Signed: 02/08/2018 10:05 AM

## 2018-02-08 NOTE — PROGRESS NOTES
Per your last office note, \"He does have history of carotid disease, but that has not been checked in over a year so I am going to do carotid Dopplers on him now with further recommendations after reviewing that study. \"

## 2018-02-12 NOTE — PROGRESS NOTES
This gentleman's lipid profile and LFTs look great and at goal and similar to about a year ago. Please let him know.  ES

## 2018-02-12 NOTE — PROGRESS NOTES
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 Please let the patient know.  ES

## 2018-02-13 ENCOUNTER — TELEPHONE (OUTPATIENT)
Dept: CARDIOLOGY CLINIC | Age: 78
End: 2018-02-13

## 2018-02-13 DIAGNOSIS — R09.89 CAROTID BRUIT, UNSPECIFIED LATERALITY: Primary | ICD-10-CM

## 2018-02-13 DIAGNOSIS — I65.23 BILATERAL CAROTID ARTERY STENOSIS: ICD-10-CM

## 2018-02-13 NOTE — TELEPHONE ENCOUNTER
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 Please let the patient know.  ES

## 2018-02-13 NOTE — TELEPHONE ENCOUNTER
----- Message from Aldair Damon DO sent at 2/12/2018 11:29 AM EST -----  This gentleman's lipid profile and LFTs look great and at goal and similar to about a year ago. Please let him know.  ES

## 2018-02-13 NOTE — TELEPHONE ENCOUNTER
Patient called and made aware of results. Will place order in chart to be done in 8-9 months.       Verbal order and read back per Amie Devlin, DO

## 2018-03-26 ENCOUNTER — ANESTHESIA EVENT (OUTPATIENT)
Dept: ENDOSCOPY | Age: 78
End: 2018-03-26
Payer: MEDICARE

## 2018-03-27 ENCOUNTER — HOSPITAL ENCOUNTER (OUTPATIENT)
Age: 78
Setting detail: OUTPATIENT SURGERY
Discharge: HOME | End: 2018-03-27
Attending: INTERNAL MEDICINE | Admitting: INTERNAL MEDICINE
Payer: MEDICARE

## 2018-03-27 ENCOUNTER — ANESTHESIA (OUTPATIENT)
Dept: ENDOSCOPY | Age: 78
End: 2018-03-27
Payer: MEDICARE

## 2018-03-27 VITALS
HEIGHT: 67 IN | OXYGEN SATURATION: 100 % | RESPIRATION RATE: 17 BRPM | TEMPERATURE: 97.8 F | SYSTOLIC BLOOD PRESSURE: 133 MMHG | DIASTOLIC BLOOD PRESSURE: 55 MMHG | HEART RATE: 59 BPM | BODY MASS INDEX: 29.03 KG/M2 | WEIGHT: 185 LBS

## 2018-03-27 PROCEDURE — 74011000250 HC RX REV CODE- 250: Performed by: INTERNAL MEDICINE

## 2018-03-27 PROCEDURE — 74011250636 HC RX REV CODE- 250/636: Performed by: NURSE ANESTHETIST, CERTIFIED REGISTERED

## 2018-03-27 PROCEDURE — 76060000031 HC ANESTHESIA FIRST 0.5 HR: Performed by: INTERNAL MEDICINE

## 2018-03-27 PROCEDURE — C1886 CATHETER, ABLATION: HCPCS | Performed by: INTERNAL MEDICINE

## 2018-03-27 PROCEDURE — 74011250636 HC RX REV CODE- 250/636

## 2018-03-27 PROCEDURE — 74011000250 HC RX REV CODE- 250

## 2018-03-27 PROCEDURE — 74011000250 HC RX REV CODE- 250: Performed by: NURSE ANESTHETIST, CERTIFIED REGISTERED

## 2018-03-27 PROCEDURE — 76040000019: Performed by: INTERNAL MEDICINE

## 2018-03-27 RX ORDER — SODIUM CHLORIDE, SODIUM LACTATE, POTASSIUM CHLORIDE, CALCIUM CHLORIDE 600; 310; 30; 20 MG/100ML; MG/100ML; MG/100ML; MG/100ML
75 INJECTION, SOLUTION INTRAVENOUS CONTINUOUS
Status: DISCONTINUED | OUTPATIENT
Start: 2018-03-27 | End: 2018-03-27 | Stop reason: HOSPADM

## 2018-03-27 RX ORDER — SODIUM CHLORIDE 0.9 % (FLUSH) 0.9 %
5-10 SYRINGE (ML) INJECTION AS NEEDED
Status: CANCELLED | OUTPATIENT
Start: 2018-03-27 | End: 2018-03-27

## 2018-03-27 RX ORDER — LIDOCAINE HYDROCHLORIDE 20 MG/ML
INJECTION, SOLUTION EPIDURAL; INFILTRATION; INTRACAUDAL; PERINEURAL AS NEEDED
Status: DISCONTINUED | OUTPATIENT
Start: 2018-03-27 | End: 2018-03-27 | Stop reason: HOSPADM

## 2018-03-27 RX ORDER — DEXTROMETHORPHAN/PSEUDOEPHED 2.5-7.5/.8
1.2 DROPS ORAL
Status: CANCELLED | OUTPATIENT
Start: 2018-03-27

## 2018-03-27 RX ORDER — ATROPINE SULFATE 0.1 MG/ML
0.5 INJECTION INTRAVENOUS
Status: CANCELLED | OUTPATIENT
Start: 2018-03-27 | End: 2018-03-27

## 2018-03-27 RX ORDER — GLYCOPYRROLATE 0.2 MG/ML
INJECTION INTRAMUSCULAR; INTRAVENOUS AS NEEDED
Status: DISCONTINUED | OUTPATIENT
Start: 2018-03-27 | End: 2018-03-27 | Stop reason: HOSPADM

## 2018-03-27 RX ORDER — ACETYLCYSTEINE 200 MG/ML
1 SOLUTION ORAL; RESPIRATORY (INHALATION) ONCE
Status: DISCONTINUED | OUTPATIENT
Start: 2018-03-27 | End: 2018-03-27 | Stop reason: HOSPADM

## 2018-03-27 RX ORDER — FLUMAZENIL 0.1 MG/ML
0.2 INJECTION INTRAVENOUS
Status: CANCELLED | OUTPATIENT
Start: 2018-03-27 | End: 2018-03-27

## 2018-03-27 RX ORDER — SODIUM CHLORIDE 0.9 % (FLUSH) 0.9 %
5-10 SYRINGE (ML) INJECTION EVERY 8 HOURS
Status: DISCONTINUED | OUTPATIENT
Start: 2018-03-27 | End: 2018-03-27 | Stop reason: HOSPADM

## 2018-03-27 RX ORDER — NALOXONE HYDROCHLORIDE 0.4 MG/ML
0.4 INJECTION, SOLUTION INTRAMUSCULAR; INTRAVENOUS; SUBCUTANEOUS
Status: CANCELLED | OUTPATIENT
Start: 2018-03-27 | End: 2018-03-27

## 2018-03-27 RX ORDER — SODIUM CHLORIDE, SODIUM LACTATE, POTASSIUM CHLORIDE, CALCIUM CHLORIDE 600; 310; 30; 20 MG/100ML; MG/100ML; MG/100ML; MG/100ML
INJECTION, SOLUTION INTRAVENOUS
Status: DISCONTINUED | OUTPATIENT
Start: 2018-03-27 | End: 2018-03-27 | Stop reason: HOSPADM

## 2018-03-27 RX ORDER — SODIUM CHLORIDE 0.9 % (FLUSH) 0.9 %
5-10 SYRINGE (ML) INJECTION EVERY 8 HOURS
Status: CANCELLED | OUTPATIENT
Start: 2018-03-27 | End: 2018-03-27

## 2018-03-27 RX ORDER — ACETYLCYSTEINE 100 MG/ML
SOLUTION ORAL; RESPIRATORY (INHALATION) AS NEEDED
Status: DISCONTINUED | OUTPATIENT
Start: 2018-03-27 | End: 2018-03-27 | Stop reason: HOSPADM

## 2018-03-27 RX ORDER — PROPOFOL 10 MG/ML
INJECTION, EMULSION INTRAVENOUS AS NEEDED
Status: DISCONTINUED | OUTPATIENT
Start: 2018-03-27 | End: 2018-03-27 | Stop reason: HOSPADM

## 2018-03-27 RX ORDER — EPINEPHRINE 0.1 MG/ML
1 INJECTION INTRACARDIAC; INTRAVENOUS
Status: CANCELLED | OUTPATIENT
Start: 2018-03-27 | End: 2018-03-27

## 2018-03-27 RX ORDER — SODIUM CHLORIDE 0.9 % (FLUSH) 0.9 %
5-10 SYRINGE (ML) INJECTION AS NEEDED
Status: DISCONTINUED | OUTPATIENT
Start: 2018-03-27 | End: 2018-03-27 | Stop reason: HOSPADM

## 2018-03-27 RX ADMIN — PROPOFOL 20 MG: 10 INJECTION, EMULSION INTRAVENOUS at 08:47

## 2018-03-27 RX ADMIN — PROPOFOL 20 MG: 10 INJECTION, EMULSION INTRAVENOUS at 08:40

## 2018-03-27 RX ADMIN — PROPOFOL 50 MG: 10 INJECTION, EMULSION INTRAVENOUS at 08:38

## 2018-03-27 RX ADMIN — PROPOFOL 50 MG: 10 INJECTION, EMULSION INTRAVENOUS at 08:34

## 2018-03-27 RX ADMIN — SODIUM CHLORIDE, SODIUM LACTATE, POTASSIUM CHLORIDE, CALCIUM CHLORIDE: 600; 310; 30; 20 INJECTION, SOLUTION INTRAVENOUS at 08:30

## 2018-03-27 RX ADMIN — SODIUM CHLORIDE, SODIUM LACTATE, POTASSIUM CHLORIDE, AND CALCIUM CHLORIDE 75 ML/HR: 600; 310; 30; 20 INJECTION, SOLUTION INTRAVENOUS at 08:10

## 2018-03-27 RX ADMIN — FAMOTIDINE 20 MG: 10 INJECTION INTRAVENOUS at 08:10

## 2018-03-27 RX ADMIN — LIDOCAINE HYDROCHLORIDE 60 MG: 20 INJECTION, SOLUTION EPIDURAL; INFILTRATION; INTRACAUDAL; PERINEURAL at 08:30

## 2018-03-27 RX ADMIN — PROPOFOL 20 MG: 10 INJECTION, EMULSION INTRAVENOUS at 08:43

## 2018-03-27 RX ADMIN — GLYCOPYRROLATE 0.2 MG: 0.2 INJECTION INTRAMUSCULAR; INTRAVENOUS at 08:30

## 2018-03-27 NOTE — PROCEDURES
[place]  [address] [facility], [address]      Brief Procedure Note    Gavin Muse  1940  [id number]    Date of Procedure: 3/27/2018    Preoperative diagnosis: K22.19, Pederson's esophagus with high grade dysplasia  530.3 - K22.2,  Esophageal obstruction  553.3 - K44.9,  Small hiatal hernia  787.20 - R13.19,  Swallowing painful    Postoperative diagnosis:  Esophageal Stricture, Hiatal Hernia, Pederson's Esophagus tx'd with TTS RFA, 12 applications.     Type of Anesthesia: MAC (monitered anesthesia care)    Description of Findings: same as post op dx    Procedure: Procedure(s):  UPPER ENDOSCOPY / TTS Halo, Dilation 45, 48, 51    :  Dr. Dinesh Burns MD    Assistant(s): [unfilled]    Type of Anesthesia:MAC     EBL:None    Specimens: * No specimens in log *    Findings: See printed and scanned procedure note    Complications: None    Dr. Dinesh Burns MD  3/27/2018  8:53 AM

## 2018-03-27 NOTE — ANESTHESIA PREPROCEDURE EVALUATION
Anesthetic History   No history of anesthetic complications            Review of Systems / Medical History  Patient summary reviewed, nursing notes reviewed and pertinent labs reviewed    Pulmonary                   Neuro/Psych   Within defined limits           Cardiovascular              CAD         GI/Hepatic/Renal                Endo/Other             Other Findings   Comments: Documentation of current medication  Current medications obtained, documented and obtained? YES      Risk Factors for Postoperative nausea/vomiting:       History of postoperative nausea/vomiting? NO       Female? NO       Motion sickness? NO       Intended opioid administration for postoperative analgesia? NO      Smoking Abstinence:  Current Smoker? NO  Elective Surgery? YES  Seen preoperatively by anesthesiologist or proxy prior to day of surgery? YES  Pt abstained from smoking 24 hours prior to anesthesia?  YES    Preventive care/screening for High Blood Pressure:  Aged 18 years and older: YES  Screened for high blood pressure: YES  Patients with high blood pressure referred to primary care provider   for BP management: YES                 Physical Exam    Airway  Mallampati: II  TM Distance: 4 - 6 cm    Mouth opening: Normal     Cardiovascular    Rhythm: regular  Rate: normal         Dental  No notable dental hx       Pulmonary                 Abdominal  GI exam deferred       Other Findings            Anesthetic Plan    ASA: 3  Anesthesia type: MAC          Induction: Intravenous  Anesthetic plan and risks discussed with: Patient

## 2018-03-27 NOTE — IP AVS SNAPSHOT
Chauncey Bautista 
 
 
 [address] [address] [address] [address] [phone] Patient: Julieta Leahy MRN: [id number] WIE:[id number] About your hospitalization You were admitted on:  March 27, 2018 You last received care in the:  [facility] You were discharged on:  March 27, 2018 Why you were hospitalized Your primary diagnosis was:  Not on File Follow-up Information Follow up With Details Comments Contact Info Stefano Roberts MD   [address] [place] [address] 
[phone] Your Scheduled Appointments Tuesday April 03, 2018  2:30 PM EDT Any with Sveta Pedraza MD  
[facility] ([facility]) [address] [place] [address]  
[phone] Thursday April 12, 2018  3:00 PM EDT PHYSICAL with Stefano Roberts MD  
[facility] of [address]  
[phone] Wednesday April 18, 2018 10:00 AM EDT Any with Sveta Pedraza MD  
[facility] ([facility]) [address] [place] [address]  
[phone] Discharge Orders None A check sobia indicates which time of day the medication should be taken. My Medications CONTINUE taking these medications Instructions Each Dose to Equal  
 Morning Noon Evening Bedtime  
 aspirin 81 mg tablet Your last dose was: Your next dose is: Take 81 mg by mouth daily. Indications: MYOCARDIAL INFARCTION PREVENTION 81 mg  
    
   
   
   
  
 atorvastatin 20 mg tablet Commonly known as:  LIPITOR Your last dose was: Your next dose is: TAKE 1 TABLET BY MOUTH EVERY EVENING  
     
   
   
   
  
 CENTRUM SILVER PO Your last dose was: Your next dose is: Take 1 Tab by mouth daily. 1 Tab  
    
   
   
   
  
 glucosam-chondroit-C-manganese 295-892-45-3 mg Cap Your last dose was: Your next dose is: Take 1 Tab by mouth daily. 1 Tab  
    
   
   
   
  
 levoFLOXacin 750 mg tablet Commonly known as:  Kenzie Stalling Your last dose was: Your next dose is: Take 1 Tab by mouth daily. 750 mg  
    
   
   
   
  
 metoprolol succinate 25 mg XL tablet Commonly known as:  TOPROL-XL Your last dose was: Your next dose is: TAKE 1 TABLET BY MOUTH DAILY  
     
   
   
   
  
 nitroglycerin 0.4 mg SL tablet Commonly known as:  NITROSTAT Your last dose was: Your next dose is: 0.4 mg by SubLINGual route every five (5) minutes as needed for Chest Pain. 0.4 mg  
    
   
   
   
  
 * omeprazole 40 mg capsule Commonly known as:  PRILOSEC Your last dose was: Your next dose is: Take 40 mg by mouth two (2) times a day. 40 mg  
    
   
   
   
  
 * omeprazole 20 mg capsule Commonly known as:  PRILOSEC Your last dose was: Your next dose is: VITAMIN B-12 2,500 mcg sublingual tablet Generic drug:  cyanocobalamin Your last dose was: Your next dose is: Take 2,500 mcg by mouth daily. 2500 mcg VITAMIN C 1,000 mg tablet Generic drug:  ascorbic acid (vitamin C) Your last dose was: Your next dose is: Take 1,000 mg by mouth daily. 1000 mg * Notice: This list has 2 medication(s) that are the same as other medications prescribed for you. Read the directions carefully, and ask your doctor or other care provider to review them with you. Opioid Education  Prescription Opioids: What You Need to Know: 
 
Prescription opioids can be used to help relieve moderate-to-severe pain and are often prescribed following a surgery or injury, or for certain health conditions. These medications can be an important part of treatment but also come with serious risks. Opioids are strong pain medicines. Examples include hydrocodone, oxycodone, fentanyl, and morphine. Heroin is an example of an illegal opioid. It is important to work with your health care provider to make sure you are getting the safest, most effective care. WHAT ARE THE RISKS AND SIDE EFFECTS OF OPIOID USE? Prescription opioids carry serious risks of addiction and overdose, especially with prolonged use. An opioid overdose, often marked by slow breathing, can cause sudden death. The use of prescription opioids can have a number of side effects as well, even when taken as directed. · Tolerance-meaning you might need to take more of a medication for the same pain relief · Physical dependence-meaning you have symptoms of withdrawal when the medication is stopped. Withdrawal symptoms can include nausea, sweating, chills, diarrhea, stomach cramps, and muscle aches. Withdrawal can last up to several weeks, depending on which drug you took and how long you took it. · Increased sensitivity to pain · Constipation · Nausea, vomiting, and dry mouth · Sleepiness and dizziness · Confusion · Depression · Low levels of testosterone that can result in lower sex drive, energy, and strength · Itching and sweating RISKS ARE GREATER WITH:      
· History of drug misuse, substance use disorder, or overdose · Mental health conditions (such as depression or anxiety) · Sleep apnea · Older age (72 years or older) · Pregnancy Avoid alcohol while taking prescription opioids. Also, unless specifically advised by your health care provider, medications to avoid include: · Benzodiazepines (such as Xanax or Valium) · Muscle relaxants (such as Soma or Flexeril) · Hypnotics (such as Ambien or Lunesta) · Other prescription opioids KNOW YOUR OPTIONS Talk to your health care provider about ways to manage your pain that don't involve prescription opioids. Some of these options may actually work better and have fewer risks and side effects. Options may include: 
· Pain relievers such as acetaminophen, ibuprofen, and naproxen · Some medications that are also used for depression or seizures · Physical therapy and exercise · Counseling to help patients learn how to cope better with triggers of pain and stress. · Application of heat or cold compress · Massage therapy · Relaxation techniques Be Informed Make sure you know the name of your medication, how much and how often to take it, and its potential risks & side effects. IF YOU ARE PRESCRIBED OPIOIDS FOR PAIN: 
· Never take opioids in greater amounts or more often than prescribed. Remember the goal is not to be pain-free but to manage your pain at a tolerable level. · Follow up with your primary care provider to: · Work together to create a plan on how to manage your pain. · Talk about ways to help manage your pain that don't involve prescription opioids. · Talk about any and all concerns and side effects. · Help prevent misuse and abuse. · Never sell or share prescription opioids · Help prevent misuse and abuse. · Store prescription opioids in a secure place and out of reach of others (this may include visitors, children, friends, and family). · Safely dispose of unused/unwanted prescription opioids: Find your community drug take-back program or your pharmacy mail-back program, or flush them down the toilet, following guidance from the Food and Drug Administration (www.fda.gov/Drugs/ResourcesForYou). · Visit www.cdc.gov/drugoverdose to learn about the risks of opioid abuse and overdose. · If you believe you may be struggling with addiction, tell your health care provider and ask for guidance or call [employer] at 4-844-379-SODB. Discharge Instructions Upper GI Endoscopy: What to Expect at [facility] Your Recovery After you have an endoscopy, you will stay at the hospital or clinic for 1 to 2 hours. This will allow the medicine to wear off. You will be able to go home after your doctor or nurse checks to make sure you are not having any problems. You may have to stay overnight if you had treatment during the test. You may have a sore throat for a day or two after the test. 
This care sheet gives you a general idea about what to expect after the test. 
How can you care for yourself at home? Activity · Rest as much as you need to after you go home. · You should be able to go back to your usual activities the day after the test. 
Diet · Follow your doctor's directions for eating after the test. 
· Drink plenty of fluids (unless your doctor has told you not to). Medications · If you have a sore throat the day after the test, use an over-the-counter spray to numb your throat. Follow-up care is a key part of your treatment and safety. Be sure to make and go to all appointments, and call your doctor if you are having problems. It's also a good idea to know your test results and keep a list of the medicines you take. When should you call for help? Call 911 anytime you think you may need emergency care. For example, call if: 
· You passed out (lost consciousness). · You cough up blood. · You vomit blood or what looks like coffee grounds. · You pass maroon or very bloody stools. Call your doctor now or seek immediate medical care if: 
· You have trouble swallowing. · You have belly pain. · Your stools are black and tarlike or have streaks of blood. · You are sick to your stomach or cannot keep fluids down. Watch closely for changes in your health, and be sure to contact your doctor if: 
· Your throat still hurts after a day or two. · You do not get better as expected. Where can you learn more? Go to Interviewstreet.be Enter [phone] in the search box to learn more about \"Upper GI Endoscopy: What to Expect at Home. \"  
© 3287-1731 Healthwise, Incorporated. Care instructions adapted under license by Breanne Miner (which disclaims liability or warranty for this information). This care instruction is for use with your licensed healthcare professional. If you have questions about a medical condition or this instruction, always ask your healthcare professional. [address] any warranty or liability for your use of this information. Content Version: 23.2.485959; Current as of: November 14, 2014 DISCHARGE SUMMARY from Nurse POST-PROCEDURE INSTRUCTIONS: 
 
Call your Physician if you: 
? Observe any excess bleeding. ? Develop a temperature over 100.5o F. 
? Experience abdominal, shoulder or chest pain. ? Notice any signs of decreased circulation or nerve impairment to an extremity such as a change in color, persistent numbness, tingling, coldness or increase in pain. ? Vomit blood or you have nausea and vomiting lasting longer than 4 hours. ? Are unable to take medications. ? Are unable to urinate within 8 hours after discharge following general anesthesia or intravenous sedation. For the next 24 hours after receiving general anesthesia or intravenous sedation, or while taking prescription Narcotics, limit your activities: 
? Do NOT drive a motor vehicle, operate hazard machinery or power tools, or perform tasks that require coordination. The medication you received during your procedure may have some effect on your mental awareness. ? Do NOT make important personal or business decisions. The medication you received during your procedure may have some effect on your mental awareness. ? Do NOT drink alcoholic beverages.   These drinks do not mix well with the medications that have been given to you. ? Upon discharge from the hospital, you must be accompanied by a responsible adult. ? Resume your diet as directed by your physician. ? Resume medications as your physician has prescribed. ? Please give a list of your current medications to your Primary Care Provider. ? Please update this list whenever your medications are discontinued, doses are changed, or new medications (including over-the-counter products) are added. ? Please carry medication information at all times in case of emergency situations. These are general instructions for a healthy lifestyle: No smoking/ No tobacco products/ Avoid exposure to second hand smoke. ? Surgeon General's Warning:  Quitting smoking now greatly reduces serious risk to your health. Obesity, smoking, and a sedentary lifestyle greatly increase your risk for illness. ? A healthy diet, regular physical exercise & weight monitoring are important for maintaining a healthy lifestyle ? You may be retaining fluid if you have a history of heart failure or if you experience any of the following symptoms:  Weight gain of 3 pounds or more overnight or 5 pounds in a week, increased swelling in our hands or feet or shortness of breath while lying flat in bed. Please call your doctor as soon as you notice any of these symptoms; do not wait until your next office visit. Recognize signs and symptoms of STROKE: 
F  -  Face looks uneven A  -  Arms unable to move or move unevenly S  -  Speech slurred or non-existent T  -  Time to call 911 - as soon as signs and symptoms begin - DO NOT go back to bed or wait to see If you get better - TIME IS BRAIN. Colorectal Screening ? Colorectal cancer almost always develops from precancerous polyps (abnormal growths) in the colon or rectum.   Screening tests can find precancerous polyps, so that they can be removed before they turn into cancer. Screening tests can also find colorectal cancer early, when treatment works best. 
? Speak with your physician about when you should begin screening and how often you should be tested ? Additional Information If you have questions, please call [phone]. Remember, MyChart is NOT to be used for urgent needs. For medical emergencies, dial 911. Educational references and/or instructions provided during this visit included: 
 
Barretts esophagus Pederson's Esophagus: Care Instructions Your Care Instructions The esophagus is the tube that connects the throat to the stomach. Food and liquids go through this tube. In Pederson's esophagus, the cells that line the tube change. This is usually because of gastroesophageal reflux disease (GERD). GERD causes acid from your stomach to back up into the esophagus. When you have Pederson's esophagus, you are slightly more likely to get cancer of the esophagus. So regular testing is important to watch for signs of this cancer. You can treat GERD to control your symptoms and feel better. Follow-up care is a key part of your treatment and safety. Be sure to make and go to all appointments, and call your doctor if you are having problems. It's also a good idea to know your test results and keep a list of the medicines you take. How can you care for yourself at home? · Take your medicines exactly as prescribed. Call your doctor if you think you are having a problem with your medicine. · If you take over-the-counter medicine, such as antacids or acid reducers, follow all instructions on the label. If you use these medicines often, talk with your doctor. Be careful when you take over-the-counter antacid medicines. Many of these medicines have aspirin in them. Read the label to make sure that you are not taking more than the recommended dose. Too much aspirin can be harmful. · Do not smoke or chew tobacco. Smoking can make GERD worse.  If you need help quitting, talk to your doctor about stop-smoking programs and medicines. These can increase your chances of quitting for good. · Chocolate, mint, and alcohol can make GERD worse. They relax the valve between the esophagus and the stomach. · Spicy foods, foods that have a lot of acid (like tomatoes and oranges), and coffee can make GERD symptoms worse in some people. If your symptoms are worse after you eat a certain food, you may want to stop eating that food to see if your symptoms get better. · Eat smaller meals, and more often. After eating, wait 2 to 3 hours before you lie down. · Raise the head of your bed 6 to 8 inches by putting blocks under the frame or a foam wedge under the head of the mattress. · Do not wear tight clothing around your midsection. · If you are overweight, lose weight. Even losing 5 to 10 pounds can help. When should you call for help? Call your doctor now or seek immediate medical care if: 
? · You have new or worse belly pain. ? · You are vomiting. ? Watch closely for changes in your health, and be sure to contact your doctor if: 
? · You have any pain or difficulty swallowing. ? · You are losing weight. ? · You have new or worse symptoms, such as heartburn. ? · You do not get better as expected. Where can you learn more? Go to http://martine-feng.info/. Enter L146 in the search box to learn more about \"Pederson's Esophagus: Care Instructions. \" Current as of: May 12, 2017 Content Version: 11.4 © 6553-3508 Healthwise, Incorporated. Care instructions adapted under license by [employer] (which disclaims liability or warranty for this information). If you have questions about a medical condition or this instruction, always ask your healthcare professional. [place] [address] any warranty or liability for your use of this information. Discharge information has been reviewed with the patient.   The patient verbalized understanding. ACO Transitions of Care Introducing [employer] [address] offers a voluntary care coordination program to provide high quality service and care to [facility] fee-for-service beneficiaries. Jose Handy was designed to help you enhance your health and well-being through the following services: ? Transitions of Care  support for individuals who are transitioning from one care setting to another (example: Hospital to home). ? Chronic and Complex Care Coordination  support for individuals and caregivers of those with serious or chronic illnesses or with more than one chronic (ongoing) condition and those who take a number of different medications. If you meet specific medical criteria, a [address] [place] [address] Rd may call you directly to coordinate your care with your primary care physician and your other care providers. For questions about the [facility] programs, please, contact your physicians office. For general questions or additional information about Accountable Care Organizations: 
Please visit www.medicare.gov/acos. html or call 1-800-MEDICARE (9-171.734.7087) TTY users should call 5-822.600.4608. Introducing hospitals & HEALTH SERVICES! Dear Bassam Scott: Thank you for requesting a [employer] account. Our records indicate that you have previously registered for a [employer] account but its currently inactive. Please call our [employer] support line at [phone]. Additional Information If you have questions, please visit the Frequently Asked Questions section of the [employer] website at [email or web address] Remember, [employer] is NOT to be used for urgent needs. For medical emergencies, dial 911. Now available from your iPhone and Android! Introducing Christ Marrufo As a [facility] patient, I wanted to make you aware of our electronic visit tool called Christ Marrufo. Grouper 24/7 allows you to connect within minutes with a medical provider 24 hours a day, seven days a week via a mobile device or tablet or logging into a secure website from your computer. You can access Linkoveryedvinfin from anywhere in the [place]. A virtual visit might be right for you when you have a simple condition and feel like you just dont want to get out of bed, or cant get away from work for an appointment, when your regular [facility] provider is not available (evenings, weekends or holidays), or when youre out of town and need minor care. Electronic visits cost only $49 and if the [facility] provider determines a prescription is needed to treat your condition, one can be electronically transmitted to a nearby pharmacy*. Please take a moment to enroll today if you have not already done so. The enrollment process is free and takes just a few minutes. To enroll, please download the Anywhere to Go priya to your tablet or phone, or visit [email or web address] to enroll on your computer. And, as an [address] [place] [address] patient with a [employer] account, the results of your visits will be scanned into your electronic medical record and your primary care provider will be able to view the scanned results. We urge you to continue to see your regular [employer] provider for your ongoing medical care. And while your primary care provider may not be the one available when you seek a Christ [employer]als[employer] virtual visit, the peace of mind you get from getting a real diagnosis real time can be priceless. For more information on Christ [employer] Metalssatish, view our Frequently Asked Questions (FAQs) at [email or web address]. Sincerely, 
 
Sierra Polk MD 
Chief Medical Officer [address] *:  certain medications cannot be prescribed via Christ Marrufo Providers Seen During Your Hospitalization Provider Specialty Primary office phone Vance Fitzgerald MD Gastroenterology [phone] Your Primary Care Physician (PCP) Primary Care Physician Office Phone Office Fax Izabel Stanley [phone] You are allergic to the following Allergen Reactions Albuterol Rash  
 seizure Influenza Virus Vaccine, Specific Other (comments) Percocet (Oxycodone-Acetaminophen) Other (comments) Pt denies Recent Documentation Height Weight BMI Smoking Status 1.689 m 83.9 kg 29.41 kg/m2 Former Smoker Emergency Contacts Name Discharge Info Relation Home Work Mobile Eve Patel DISCHARGE CAREGIVER [3] Spouse [3] [phone] Pk Agrawal  Spouse [3] [phone] Patient Belongings The following personal items are in your possession at time of discharge: 
  Dental Appliances: None Please provide this summary of care documentation to your next provider. Signatures-by signing, you are acknowledging that this After Visit Summary has been reviewed with you and you have received a copy. Patient Signature:  ____________________________________________________________ Date:  ____________________________________________________________  
  
[facility] Provider Signature:  ____________________________________________________________ Date:  ____________________________________________________________

## 2018-03-27 NOTE — DISCHARGE INSTRUCTIONS
Upper GI Endoscopy: What to Expect at [address] [place] [address]  After you have an endoscopy, you will stay at the hospital or clinic for 1 to 2 hours. This will allow the medicine to wear off. You will be able to go home after your doctor or nurse checks to make sure you are not having any problems. You may have to stay overnight if you had treatment during the test. You may have a sore throat for a day or two after the test.  This care sheet gives you a general idea about what to expect after the test.  How can you care for yourself at home? Activity  · Rest as much as you need to after you go home. · You should be able to go back to your usual activities the day after the test.  Diet  · Follow your doctor's directions for eating after the test.  · Drink plenty of fluids (unless your doctor has told you not to). Medications  · If you have a sore throat the day after the test, use an over-the-counter spray to numb your throat. Follow-up care is a key part of your treatment and safety. Be sure to make and go to all appointments, and call your doctor if you are having problems. It's also a good idea to know your test results and keep a list of the medicines you take. When should you call for help? Call 911 anytime you think you may need emergency care. For example, call if:  · You passed out (lost consciousness). · You cough up blood. · You vomit blood or what looks like coffee grounds. · You pass maroon or very bloody stools. Call your doctor now or seek immediate medical care if:  · You have trouble swallowing. · You have belly pain. · Your stools are black and tarlike or have streaks of blood. · You are sick to your stomach or cannot keep fluids down. Watch closely for changes in your health, and be sure to contact your doctor if:  · Your throat still hurts after a day or two. · You do not get better as expected. Where can you learn more?    Go to DealExplorer.be  Enter J454 in the search box to learn more about \"Upper GI Endoscopy: What to Expect at Home. \"   © 2892-4985 Healthwise, Incorporated. Care instructions adapted under license by Nicolas Hickman (which disclaims liability or warranty for this information). This care instruction is for use with your licensed healthcare professional. If you have questions about a medical condition or this instruction, always ask your healthcare professional. [address] any warranty or liability for your use of this information. Content Version: 17.4.223917; Current as of: November 14, 2014    DISCHARGE SUMMARY from Nurse     POST-PROCEDURE INSTRUCTIONS:    Call your Physician if you:  ? Observe any excess bleeding. ? Develop a temperature over 100.5o F.  ? Experience abdominal, shoulder or chest pain. ? Notice any signs of decreased circulation or nerve impairment to an extremity such as a change in color, persistent numbness, tingling, coldness or increase in pain. ? Vomit blood or you have nausea and vomiting lasting longer than 4 hours. ? Are unable to take medications. ? Are unable to urinate within 8 hours after discharge following general anesthesia or intravenous sedation. For the next 24 hours after receiving general anesthesia or intravenous sedation, or while taking prescription Narcotics, limit your activities:  ? Do NOT drive a motor vehicle, operate hazard machinery or power tools, or perform tasks that require coordination. The medication you received during your procedure may have some effect on your mental awareness. ? Do NOT make important personal or business decisions. The medication you received during your procedure may have some effect on your mental awareness. ? Do NOT drink alcoholic beverages. These drinks do not mix well with the medications that have been given to you. ? Upon discharge from the hospital, you must be accompanied by a responsible adult. ?  Resume your diet as directed by your physician. ? Resume medications as your physician has prescribed. ? Please give a list of your current medications to your Primary Care Provider. ? Please update this list whenever your medications are discontinued, doses are changed, or new medications (including over-the-counter products) are added. ? Please carry medication information at all times in case of emergency situations. These are general instructions for a healthy lifestyle:    No smoking/ No tobacco products/ Avoid exposure to second hand smoke.  Surgeon General's Warning:  Quitting smoking now greatly reduces serious risk to your health. Obesity, smoking, and a sedentary lifestyle greatly increase your risk for illness.  A healthy diet, regular physical exercise & weight monitoring are important for maintaining a healthy lifestyle   You may be retaining fluid if you have a history of heart failure or if you experience any of the following symptoms:  Weight gain of 3 pounds or more overnight or 5 pounds in a week, increased swelling in our hands or feet or shortness of breath while lying flat in bed. Please call your doctor as soon as you notice any of these symptoms; do not wait until your next office visit. Recognize signs and symptoms of STROKE:  F  -  Face looks uneven  A  -  Arms unable to move or move unevenly  S  -  Speech slurred or non-existent  T  -  Time to call 911 - as soon as signs and symptoms begin - DO NOT go back to bed or wait to see If you get better - TIME IS BRAIN. Colorectal Screening   Colorectal cancer almost always develops from precancerous polyps (abnormal growths) in the colon or rectum. Screening tests can find precancerous polyps, so that they can be removed before they turn into cancer.  Screening tests can also find colorectal cancer early, when treatment works best.  Mellisa Lozano Speak with your physician about when you should begin screening and how often you should be tested  Mellisa Lozano   Additional Information    If you have questions, please call [phone]. Remember, MyChart is NOT to be used for urgent needs. For medical emergencies, dial 911. Educational references and/or instructions provided during this visit included:    Barretts esophagus         Pederson's Esophagus: Care Instructions  Your Care Instructions    The esophagus is the tube that connects the throat to the stomach. Food and liquids go through this tube. In Pedersno's esophagus, the cells that line the tube change. This is usually because of gastroesophageal reflux disease (GERD). GERD causes acid from your stomach to back up into the esophagus. When you have Pederson's esophagus, you are slightly more likely to get cancer of the esophagus. So regular testing is important to watch for signs of this cancer. You can treat GERD to control your symptoms and feel better. Follow-up care is a key part of your treatment and safety. Be sure to make and go to all appointments, and call your doctor if you are having problems. It's also a good idea to know your test results and keep a list of the medicines you take. How can you care for yourself at home? · Take your medicines exactly as prescribed. Call your doctor if you think you are having a problem with your medicine. · If you take over-the-counter medicine, such as antacids or acid reducers, follow all instructions on the label. If you use these medicines often, talk with your doctor. Be careful when you take over-the-counter antacid medicines. Many of these medicines have aspirin in them. Read the label to make sure that you are not taking more than the recommended dose. Too much aspirin can be harmful. · Do not smoke or chew tobacco. Smoking can make GERD worse. If you need help quitting, talk to your doctor about stop-smoking programs and medicines. These can increase your chances of quitting for good. · Chocolate, mint, and alcohol can make GERD worse.  They relax the valve between the esophagus and the stomach. · Spicy foods, foods that have a lot of acid (like tomatoes and oranges), and coffee can make GERD symptoms worse in some people. If your symptoms are worse after you eat a certain food, you may want to stop eating that food to see if your symptoms get better. · Eat smaller meals, and more often. After eating, wait 2 to 3 hours before you lie down. · Raise the head of your bed 6 to 8 inches by putting blocks under the frame or a foam wedge under the head of the mattress. · Do not wear tight clothing around your midsection. · If you are overweight, lose weight. Even losing 5 to 10 pounds can help. When should you call for help? Call your doctor now or seek immediate medical care if:  ? · You have new or worse belly pain. ? · You are vomiting. ? Watch closely for changes in your health, and be sure to contact your doctor if:  ? · You have any pain or difficulty swallowing. ? · You are losing weight. ? · You have new or worse symptoms, such as heartburn. ? · You do not get better as expected. Where can you learn more? Go to http://martine-feng.info/. Enter L146 in the search box to learn more about \"Pederson's Esophagus: Care Instructions. \"  Current as of: May 12, 2017  Content Version: 11.4  © 6122-5573 Healthwise, Incorporated. Care instructions adapted under license by [employer] (which disclaims liability or warranty for this information). If you have questions about a medical condition or this instruction, always ask your healthcare professional. [place] [address] any warranty or liability for your use of this information. Discharge information has been reviewed with the patient. The patient verbalized understanding.

## 2018-03-27 NOTE — H&P
[facility]   [email or web address]      Impression: 1. Eliecer's esophagus  2. Reflux  3. Stricture. Plan:     1. EGD with RFA and dialtion  With MAC      Chief Complaint: Eliecer's. HPI:  Ileana Knott is a 76 y.o. male who is being seen preop for Eliecer's management. He has had prior HGD , treated with HALO. Subsequent dilations have been performed to address his stricture. Last EGD was in January. Will consider dilation as well as RFA of any residual patches. PMH:   Past Medical History:   Diagnosis Date    Allergic rhinitis     Arthritis     hands    Asthma     mild obst disease on pfts Dr. Lynda De Los Santos Pederson's esophagus     Dr. Mcarthur Bosworth; last 8/17, for HALO    Bilateral carotid artery stenosis     12/14 BICA 50-69%    BPH (benign prostatic hyperplasia)     BPH without obstruction/lower urinary tract symptoms     CABG x 3 04/24/2014    LIMA to LAD, and saphenous vein grafts to the second diagonal of the LAD, and to the posterior descending branch of the RCA, Dr. Louie Covarrubias, 4/23/14.  CAD (coronary artery disease)     Cardiac catheterization 04/18/2014    Superdominant RCA. mRCA 90%. pRPDA 60%. LM (modest sized) patent.  p-mLAD 85%. CX < 50%. LVEDP 17 mmHg. EF 60%. No RWMA. CABG recommended.  Cardiac echocardiogram 06/24/2008    EF 70%. Mild conc LVH. Gr 2 DDfx. Mild LAE. Mild MR. PASP 38 mmHg.  Cardiac nuclear imaging study, abnormal 04/15/2014    Mod ischemia in lg area of anterior wall, anteroapex, anteroseptum c/w high-grade prox LAD obstruction. Mod partially transient defect likely ischemia w/very sm infarction in RCA. TID 1.31 suggests 3-vessel CAD. Mod distal anterior hypk. Mild-mod anteroseptal, inferoseptal, anterolateral hypk. EF 46%. Strongly positive EKG on max EST. Ex time 6 min.  Carotid duplex 04/06/2016    Mod 50-69% bilateral ICA stenosis. Probable significant LECA stenosis.   Similar to study of 8/12/15. Multiple nodules noted in right thyroid.     Colon adenoma 08/2017    Dr Purvi Bal Colon polyps 2007    Dr. Purvi Bal Difficulty hearing     Erectile dysfunction     FHx: heart disease     GERD (gastroesophageal reflux disease)     by EGD 2007 Dr. Purvi Bal IFG (impaired fasting glucose) 5/14    Impotence     Lower urinary tract symptoms (LUTS)     Multinodular goiter     2014; 2015; 4/16    Obesity     peak weight 202 lbs, bmi 31.4 from 4/14    Prostate cancer ([facility] [address] 8/10    Dr. Tati Benitez, s/p cryo Dr. Abner Light Pulmonary nodule 2014 2015    Sleep apnea     CPAP not used       PSH:   Past Surgical History:   Procedure Laterality Date    CARDIAC SURG PROCEDURE UNLIST  11/06    thallium negative    CARDIAC SURG PROCEDURE UNLIST  4/14    3V CABG Dr Poli Liu  2007    mild obst disease on PFTs Dr. Aiden Dotson N/A 7/27/2017    Dr. Santo Mariee 2007 negative; 8/17 adenoma     HX Brand Janice      Dr. Kayla Younger HX CATARACT REMOVAL  11/14    bilat Dr Kaylah Olivier  2004    Sonja Punch      Dr. Matt Delaney HX TURP      x2 Dr. Palmer Levine HX UROLOGICAL  10/2012    [facility], Dr. 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     Social History Main Topics    Smoking status: Former Smoker     Packs/day: 0.50     Years: 4.00     Quit date: 1/1/1957    Smokeless tobacco: Never Used      Comment: Quit in 1957    Alcohol use No    Drug use: No    Sexual activity: Not on file     Other Topics Concern    Not on file     Social History Narrative       FHX:   Family History   Problem Relation Age of Onset    Heart Disease Father     Stroke Mother     Hypertension Mother     Diabetes Sister        Allergy:   Allergies   Allergen Reactions    Albuterol Rash     seizure    Influenza Virus Vaccine, Specific Other (comments)    Percocet [Oxycodone-Acetaminophen] Other (comments)     Pt denies       Home Medications:     Prescriptions Prior to Admission   Medication Sig    levoFLOXacin (LEVAQUIN) 750 mg tablet Take 1 Tab by mouth daily.  metoprolol succinate (TOPROL-XL) 25 mg XL tablet TAKE 1 TABLET BY MOUTH DAILY    omeprazole (PRILOSEC) 40 mg capsule Take 40 mg by mouth two (2) times a day.  atorvastatin (LIPITOR) 20 mg tablet TAKE 1 TABLET BY MOUTH EVERY EVENING    glucosam-chondroit-C-manganese 157-022-56-3 mg cap Take 1 Tab by mouth daily.  cyanocobalamin (VITAMIN B-12) 2,500 mcg sublingual tablet Take 2,500 mcg by mouth daily.  ascorbic acid (VITAMIN C) 1,000 mg tablet Take 1,000 mg by mouth daily.  MULTIVITAMIN W-MINERALS/LUTEIN (CENTRUM SILVER PO) Take 1 Tab by mouth daily.  aspirin 81 mg tablet Take 81 mg by mouth daily. Indications: MYOCARDIAL INFARCTION PREVENTION    omeprazole (PRILOSEC) 20 mg capsule     nitroglycerin (NITROSTAT) 0.4 mg SL tablet 0.4 mg by SubLINGual route every five (5) minutes as needed for Chest Pain. Review of Systems:     Constitutional: No fevers, chills, weight loss, fatigue. Cardiovascular: No chest pain, heart palpitations. Respiratory: No cough, SOB, wheezing, chest discomfort, orthopnea. Gastrointestinal: Reflux and dysphagia       Musculoskeletal: No weakness, arthralgias, wasting. Allergies: As noted.                          Visit Vitals    /73    Pulse (!) 55    Temp 97.8 °F (36.6 °C)    Resp 16    Ht 5' 6.5\" (1.689 m)    Wt 83.9 kg (185 lb)    SpO2 100%    BMI 29.41 kg/m2       Physical Assessment:     constitutional: appearance: well developed, well nourished, normal habitus, no deformities, in no acute distress. ENMT: mouth: normal oral mucosa,lips and gums; good dentition. oropharynx: normal tongue, hard and soft palate; posterior pharynx without erithema, exudate or lesions. respiratory: effort: normal chest excursion; no intercostal retraction or accessory muscle use. cardiovascular: abdominal aorta: normal size and position; no bruits. palpation: PMI of normal size and position; normal rhythm; no thrill or murmurs. abdominal: abdomen: normal consistency; no tenderness or masses. hernias: no hernias appreciated. liver: normal size and consistency. spleen: not palpable. rectal: hemoccult/guaiac: not performed. musculoskeletal: digits and nails: no clubbing, cyanosis, petechiae or other inflammatory conditions. psychiatric: orientation: oriented to time, space and person. Mandy Valdez MD, M.D. [facility], [address] [place] [address]  Pager [phone]  [email or web address]

## 2018-03-27 NOTE — ANESTHESIA POSTPROCEDURE EVALUATION
Post-Anesthesia Evaluation and Assessment    Patient: Amberly Davila. MRN: [id number]  SSN: [id number]    YOB: 1940  Age: 76 y.o. Sex: male       Cardiovascular Function/Vital Signs  Visit Vitals    /54    Pulse 62    Temp 36.6 °C (97.8 °F)    Resp 14    Ht 5' 6.5\" (1.689 m)    Wt 83.9 kg (185 lb)    SpO2 100%    BMI 29.41 kg/m2       Patient is status post MAC anesthesia for Procedure(s):  UPPER ENDOSCOPY / TTS Halo, Dilation 45, 48, 51. Nausea/Vomiting: None    Postoperative hydration reviewed and adequate. Pain:  Pain Scale 1: Numeric (0 - 10) (03/27/18 0801)  Pain Intensity 1: 0 (03/27/18 0801)   Managed    Neurological Status: At baseline    Mental Status and Level of Consciousness: Arousable    Pulmonary Status:   O2 Device: Room air (03/27/18 0900)   Adequate oxygenation and airway patent    Complications related to anesthesia: None    Post-anesthesia assessment completed.  No concerns    Signed By: Hoa Sánchez MD     March 27, 2018

## 2018-04-12 ENCOUNTER — OFFICE VISIT (OUTPATIENT)
Dept: INTERNAL MEDICINE CLINIC | Age: 78
End: 2018-04-12

## 2018-04-12 VITALS
HEIGHT: 67 IN | HEART RATE: 56 BPM | SYSTOLIC BLOOD PRESSURE: 160 MMHG | WEIGHT: 191 LBS | BODY MASS INDEX: 29.98 KG/M2 | TEMPERATURE: 97.4 F | OXYGEN SATURATION: 98 % | DIASTOLIC BLOOD PRESSURE: 68 MMHG

## 2018-04-12 DIAGNOSIS — Z13.39 SCREENING FOR ALCOHOLISM: ICD-10-CM

## 2018-04-12 DIAGNOSIS — Z13.1 SCREENING FOR DIABETES MELLITUS: ICD-10-CM

## 2018-04-12 DIAGNOSIS — C61 CANCER OF PROSTATE (HCC): ICD-10-CM

## 2018-04-12 DIAGNOSIS — R73.01 IFG (IMPAIRED FASTING GLUCOSE): ICD-10-CM

## 2018-04-12 DIAGNOSIS — Z71.89 ADVANCED DIRECTIVES, COUNSELING/DISCUSSION: ICD-10-CM

## 2018-04-12 DIAGNOSIS — Z00.00 MEDICARE ANNUAL WELLNESS VISIT, SUBSEQUENT: Primary | ICD-10-CM

## 2018-04-12 DIAGNOSIS — Z95.1 S/P CABG X 3: ICD-10-CM

## 2018-04-12 DIAGNOSIS — K22.719 BARRETT'S ESOPHAGUS WITH DYSPLASIA: ICD-10-CM

## 2018-04-12 DIAGNOSIS — E04.2 MULTINODULAR GOITER: ICD-10-CM

## 2018-04-12 DIAGNOSIS — Z13.6 SCREENING FOR ISCHEMIC HEART DISEASE: ICD-10-CM

## 2018-04-12 DIAGNOSIS — I10 ESSENTIAL HYPERTENSION: ICD-10-CM

## 2018-04-12 DIAGNOSIS — I65.23 BILATERAL CAROTID ARTERY STENOSIS: ICD-10-CM

## 2018-04-12 DIAGNOSIS — R91.8 PULMONARY NODULES: ICD-10-CM

## 2018-04-12 DIAGNOSIS — E78.5 HYPERLIPIDEMIA, UNSPECIFIED HYPERLIPIDEMIA TYPE: ICD-10-CM

## 2018-04-12 DIAGNOSIS — G47.33 OBSTRUCTIVE SLEEP APNEA SYNDROME: ICD-10-CM

## 2018-04-12 DIAGNOSIS — Z12.11 SCREEN FOR COLON CANCER: ICD-10-CM

## 2018-04-12 DIAGNOSIS — I25.10 CORONARY ARTERY DISEASE INVOLVING NATIVE CORONARY ARTERY OF NATIVE HEART WITHOUT ANGINA PECTORIS: ICD-10-CM

## 2018-04-12 DIAGNOSIS — Z13.31 SCREENING FOR DEPRESSION: ICD-10-CM

## 2018-04-12 PROBLEM — E66.9 OBESITY (BMI 30-39.9): Status: ACTIVE | Noted: 2018-04-12

## 2018-04-12 RX ORDER — AMLODIPINE BESYLATE 5 MG/1
5 TABLET ORAL DAILY
Qty: 30 TAB | Refills: 5 | Status: SHIPPED | OUTPATIENT
Start: 2018-04-12 | End: 2018-05-07 | Stop reason: SDUPTHER

## 2018-04-12 NOTE — PROGRESS NOTES
1. Have you been to the ER, urgent care clinic or hospitalized since your last visit? NO.     2. Have you seen or consulted any other health care providers outside of the [address] [place] [address] since your last visit (Include any pap smears or colon screening)? NO      Do you have an Advanced Directive? NO    Would you like information on Advanced Directives?  NO      Chief Complaint   Patient presents with    Cholesterol Problem     physical with labs

## 2018-04-12 NOTE — ACP (ADVANCE CARE PLANNING)
Advance Care Planning    Advance Care Planning (ACP) Provider Note - Comprehensive     Date of ACP Conversation: 04/12/18  Persons included in Conversation:  patient  Length of ACP Conversation in minutes:  16 minutes    Authorized Decision Maker (if patient is incapable of making informed decisions): This person is: wife  Other Legally Authorized Decision Maker (e.g. Next of Kin)          General ACP for ALL Patients with Decision Making Capacity:   Importance of advance care planning, including choosing a healthcare agent to communicate patient's healthcare decisions if patient lost the ability to make decisions, such as after a sudden illness or accident  Understanding of the healthcare agent role was assessed and information provided  Exploration of values, goals, and preferences if recovery is not expected, even with continued medical treatment in the event of: Imminent death  Severe, permanent brain injury    Review of Existing Advance Directive:  na    For Serious or Chronic Illness:  Understanding of medical condition    Understanding of CPR, goals and expected outcomes, benefits and burdens discussed.     Interventions Provided:  Recommended completion of Advance Directive form after review of ACP materials and conversation with prospective healthcare agent   Recommended communicating the plan and making copies for the healthcare agent, personal physician, and others as appropriate (e.g., health system)  Recommended review of completed ACP document annually or upon change in health status

## 2018-04-12 NOTE — MR AVS SNAPSHOT
[address] [facility] 
 
 
 [address] [facility], [facility] 706 [facility] 
[phone] Patient: Fariba Galloway. MRN: [id number] USP:6[birth year] Visit Information Date & Time Provider Department Dept. Phone Encounter #  
 4/12/2018  3:00 PM Andre Evangelista MD [facility] [phone] [id number] Your Appointments 4/13/2018 10:00 AM  
PROCEDURE with Carolin Peres MD  
[facility] ([facility]) Appt Note: TRUS BX Veronica Ves  
 [address] [facility] [address]  
[address] [id number]  
  
    
 8/15/2018 10:40 AM  
Office Visit with Andre Evangelista MD  
[facility]) Appt Note: ov 4mo.; rd  
 [address] [place]  
  
   
 [address], [address]  
  
    
 9/25/2018  9:20 AM  
Follow Up with Phyllis Thompson DO [facility]\"" ([facility]) Appt Note: 8 month follow up [place] [address] [address] [address]  
[phone] [address] [address]  
  
    
  
 5/9/2018  2:15 PM  
Any with Carolin Peres MD  
[facility] ([facility]) Appt Note: 2 wk FU BX results 7[address] [facility] [address]  
  
   
 [address] [facility] [address] [address] [address] Upcoming Health Maintenance Date Due Influenza Age 5 to Adult 8/1/2017 MEDICARE YEARLY EXAM 3/14/2018 GLAUCOMA SCREENING Q2Y 2/16/2019 DTaP/Tdap/Td series (2 - Td) 8/12/2025 Allergies as of 4/12/2018  Review Complete On: 4/12/2018 By: Andre Evangelista MD  
  
 Severity Noted Reaction Type Reactions Albuterol    Rash  
 seizure Influenza Virus Vaccine, Specific  08/08/2014    Other (comments) Percocet [Oxycodone-acetaminophen]  08/08/2014    Other (comments) Pt denies Current Immunizations  Reviewed on 2/16/2017 Name Date Pneumococcal Conjugate (PCV-13) 8/12/2015 TD Vaccine 9/10/2008 Tdap 8/12/2015 ZZZ-RETIRED (DO NOT USE) Pneumococcal Vaccine (Unspecified Type) 9/11/2008 Zoster 4/24/2008 Not reviewed this visit You Were Diagnosed With   
  
 Codes Comments Cancer of prostate [facility])    -  Primary ICD-10-CM: Z71 ICD-9-CM: 896 Multinodular goiter     ICD-10-CM: E04.2 ICD-9-CM: 241.1 Pulmonary nodules     ICD-10-CM: R91.8 ICD-9-CM: 793.19 Coronary artery disease involving native coronary artery of native heart without angina pectoris     ICD-10-CM: I25.10 ICD-9-CM: 414.01 Bilateral carotid artery stenosis     ICD-10-CM: I65.23 ICD-9-CM: 433.10, 433.30 Hyperlipidemia, unspecified hyperlipidemia type     ICD-10-CM: E78.5 ICD-9-CM: 272.4 IFG (impaired fasting glucose)     ICD-10-CM: R73.01 
ICD-9-CM: 790.21 S/P CABG x 3     ICD-10-CM: Z95.1 ICD-9-CM: V45.81 Pederson's esophagus with dysplasia     ICD-10-CM: U24.440 ICD-9-CM: 530.85 Obstructive sleep apnea syndrome     ICD-10-CM: G47.33 
ICD-9-CM: 327.23 Essential hypertension     ICD-10-CM: I10 
ICD-9-CM: 401.9 Vitals BP Pulse Temp Height(growth percentile) Weight(growth percentile) SpO2  
 160/68 (BP 1 Location: Right arm, BP Patient Position: Sitting) (!) 56 97.4 °F (36.3 °C) (Oral) 5' 6.5\" (1.689 m) 191 lb (86.6 kg) 98% BMI Smoking Status 30.37 kg/m2 Former Smoker Vitals History BMI and BSA Data Body Mass Index Body Surface Area  
 30.37 kg/m 2 2.02 m 2 Preferred Pharmacy Pharmacy Name Phone [facility] [address] [facility] [address] [phone] Your Updated Medication List  
  
   
This list is accurate as of 4/12/18  4:00 PM.  Always use your most recent med list.  
  
  
 amLODIPine 5 mg tablet Commonly known as:  Unknown [place] Take 1 Tab by mouth daily. aspirin 81 mg tablet Take 81 mg by mouth daily. Indications: MYOCARDIAL INFARCTION PREVENTION  
  
 atorvastatin 20 mg tablet Commonly known as:  LIPITOR  
TAKE 1 TABLET BY MOUTH EVERY EVENING  
  
 CENTRUM SILVER PO Take 1 Tab by mouth daily. glucosam-chondroit-C-manganese 760-187-11-3 mg Cap Take 1 Tab by mouth daily. levoFLOXacin 750 mg tablet Commonly known as:  Shellia Havers Take 1 Tab by mouth daily. metoprolol succinate 25 mg XL tablet Commonly known as:  TOPROL-XL  
TAKE 1 TABLET BY MOUTH DAILY  
  
 nitroglycerin 0.4 mg SL tablet Commonly known as:  NITROSTAT  
0.4 mg by SubLINGual route every five (5) minutes as needed for Chest Pain. * omeprazole 40 mg capsule Commonly known as:  PRILOSEC Take 40 mg by mouth two (2) times a day. * omeprazole 20 mg capsule Commonly known as:  PRILOSEC  
  
 VITAMIN B-12 2,500 mcg sublingual tablet Generic drug:  cyanocobalamin Take 2,500 mcg by mouth daily. VITAMIN C 1,000 mg tablet Generic drug:  ascorbic acid (vitamin C) Take 1,000 mg by mouth daily. * Notice: This list has 2 medication(s) that are the same as other medications prescribed for you. Read the directions carefully, and ask your doctor or other care provider to review them with you. Prescriptions Sent to Pharmacy Refills  
 amLODIPine (NORVASC) 5 mg tablet 5 Sig: Take 1 Tab by mouth daily. Class: Normal  
 Pharmacy: Arina Virk at [address] [place] [address], [address] [place] [address] Ph #: [phone] Route: Oral  
  
To-Do List   
 09/19/2018 8:00 AM  
  Appointment with [facility] at [facility] ([phone]) No preparation is required for this study. Patient can have their meals and take their medications.   Patient should not wear a turtleneck or high neck shirt for this study. Please report to the main location @ [facility] [place] approximately 30 minutes prior to your appointment time. The entrance is located on the RIGHT side of the street, immediately adjacent to the Emergency Room. Introducing [facility] & HEALTH SERVICES! [employer] introduces [employer] patient portal. Now you can access parts of your medical record, email your doctor's office, and request medication refills online. 1. In your internet browser, go to [email or web address] 2. Click on the First Time User? Click Here link in the Sign In box. You will see the New Member Sign Up page. 3. Enter your [employer] Access Code exactly as it appears below. You will not need to use this code after youve completed the sign-up process. If you do not sign up before the expiration date, you must request a new code. · [employer] Access Code: [email or web address] Expires: 7/11/2018  2:59 PM 
 
4. Enter the last four digits of your Social Security Number (xxxx) and Date of Birth (mm/dd/yyyy) as indicated and click Submit. You will be taken to the next sign-up page. 5. Create a [employer] ID. This will be your [employer] login ID and cannot be changed, so think of one that is secure and easy to remember. 6. Create a [employer] password. You can change your password at any time. 7. Enter your Password Reset Question and Answer. This can be used at a later time if you forget your password. 8. Enter your e-mail address. You will receive e-mail notification when new information is available in [address] 9. Click Sign Up. You can now view and download portions of your medical record. 10. Click the Download Summary menu link to download a portable copy of your medical information. If you have questions, please visit the Frequently Asked Questions section of the [employer] website. Remember, [employer] is NOT to be used for urgent needs. For medical emergencies, dial 911. Now available from your iPhone and Android! Please provide this summary of care documentation to your next provider. Your primary care clinician is listed as Wilian Blood. If you have any questions after today's visit, please call [phone].

## 2018-04-13 PROBLEM — R39.89 HARD, FIRM PROSTATE: Status: ACTIVE | Noted: 2018-04-13

## 2018-04-13 PROBLEM — R97.20 ELEVATED PSA: Status: ACTIVE | Noted: 2018-04-13

## 2018-04-13 NOTE — PATIENT INSTRUCTIONS

## 2018-04-13 NOTE — PROGRESS NOTES
This is an subsequent Medicare Annual Wellness Exam (AWV)     I have reviewed the patient's medical history in detail and updated the computerized patient record. History     Past Medical History:   Diagnosis Date    Allergic rhinitis     Arthritis     hands    Asthma     mild obst disease on pfts Dr. Joaquín Canales Pederson's esophagus     Dr. Ish Harper; last 8/17, for [facility]    Bilateral carotid artery stenosis     12/14 BICA 50-69%    BPH with obstruction/lower urinary tract symptoms     BPH without obstruction/lower urinary tract symptoms     CABG x 3 04/24/2014    LIMA to LAD, and saphenous vein grafts to the second diagonal of the LAD, and to the posterior descending branch of the RCA, Dr. Julieta Velez, 4/23/14.  CAD (coronary artery disease)     Cardiac catheterization 04/18/2014    Superdominant RCA. mRCA 90%. pRPDA 60%. LM (modest sized) patent.  p-mLAD 85%. CX < 50%. LVEDP 17 mmHg. EF 60%. No RWMA. CABG recommended.  Cardiac echocardiogram 06/24/2008    EF 70%. Mild conc LVH. Gr 2 DDfx. Mild LAE. Mild MR. PASP 38 mmHg.  Cardiac nuclear imaging study, abnormal 04/15/2014    Mod ischemia in lg area of anterior wall, anteroapex, anteroseptum c/w high-grade prox LAD obstruction. Mod partially transient defect likely ischemia w/very sm infarction in RCA. TID 1.31 suggests 3-vessel CAD. Mod distal anterior hypk. Mild-mod anteroseptal, inferoseptal, anterolateral hypk. EF 46%. Strongly positive EKG on max EST. Ex time 6 min.  Carotid duplex 04/06/2016    Mod 50-69% bilateral ICA stenosis.   Probable significant LECA stenosis; no change from 2015;  no change 2/17;    Colon adenoma 08/2017    Dr Kan Tran Colon polyps 2007    Dr. Kan Tran Difficulty hearing     Erectile dysfunction     FHx: heart disease     GERD (gastroesophageal reflux disease)     by EGD 2007 Dr. Kan Tran IFG (impaired fasting glucose) 5/14    Multinodular goiter     2014; 2015; 4/16    Obesity peak weight 202 lbs, bmi 31.4 from 4/14    Prostate cancer ([address] 8/10    Dr. Carlos Eduardo León, s/p cryo Dr. Ish Samano Pulmonary nodule 2014    no change 2015, 3/17    Sleep apnea     CPAP not used      Past Surgical History:   Procedure Laterality Date    CARDIAC SURG PROCEDURE UNLIST  11/06    thallium negative    CARDIAC SURG PROCEDURE UNLIST  4/14    3V CABG Dr Dennie Channel  2007    mild obst disease on PFTs Dr. Yeyo Vasquez N/A 7/27/2017    Dr. Bradly Arenas 2007 negative; 8/17 adenoma     HX Catheline Mangalisha Mueller Mock  11/14    bilat Dr Shaina Paul  2004    HX Lawson Ethete      Dr. Xavier Bolton HX TURP      x2 Dr. Love Degree HX UROLOGICAL  10/2012    [facility], Dr. Enzo Wellington, Cystoscopy and dilation of stricture, cryoablation of prostate    VASCULAR SURGERY PROCEDURE UNLIST  12/14    BICA 50-69% stenosis     Current Outpatient Prescriptions   Medication Sig Dispense Refill    amLODIPine (NORVASC) 5 mg tablet Take 1 Tab by mouth daily. 30 Tab 5    metoprolol succinate (TOPROL-XL) 25 mg XL tablet TAKE 1 TABLET BY MOUTH DAILY 90 Tab 3    omeprazole (PRILOSEC) 40 mg capsule Take 40 mg by mouth two (2) times a day.  atorvastatin (LIPITOR) 20 mg tablet TAKE 1 TABLET BY MOUTH EVERY EVENING 90 Tab 3    nitroglycerin (NITROSTAT) 0.4 mg SL tablet 0.4 mg by SubLINGual route every five (5) minutes as needed for Chest Pain.  glucosam-chondroit-C-manganese 479-325-31-3 mg cap Take 1 Tab by mouth daily.  ascorbic acid (VITAMIN C) 1,000 mg tablet Take 1,000 mg by mouth daily.  aspirin 81 mg tablet Take 81 mg by mouth daily. Indications: MYOCARDIAL INFARCTION PREVENTION      omeprazole (PRILOSEC) 20 mg capsule       MULTIVITAMIN W-MINERALS/LUTEIN (CENTRUM SILVER PO) Take 1 Tab by mouth daily.        Allergies   Allergen Reactions    Albuterol Rash     seizure    Influenza Virus Vaccine, Specific Other (comments)    Percocet [Oxycodone-Acetaminophen] Other (comments)     Pt denies     Family History   Problem Relation Age of Onset    Heart Disease Father     Stroke Mother     Hypertension Mother     Diabetes Sister      Social History   Substance Use Topics    Smoking status: Former Smoker     Packs/day: 0.50     Years: 4.00     Quit date: 1/1/1957    Smokeless tobacco: Never Used      Comment: Quit in 1957    Alcohol use No     Depression Risk Factor Screening:     PHQ over the last two weeks 4/12/2018   Little interest or pleasure in doing things Not at all   Feeling down, depressed or hopeless Not at all   Total Score PHQ 2 0     SCREENINGS  Colonoscopy last done 2007 Dr Burt Lewis  Prostate ca being followed by Dr Burt Lewis    Immunization History   Administered Date(s) Administered    Pneumococcal Conjugate (PCV-13) 08/12/2015    TD Vaccine 09/10/2008    Tdap 08/12/2015    ZZZ-RETIRED (DO NOT USE) Pneumococcal Vaccine (Unspecified Type) 09/11/2008    Zoster 04/24/2008     Alcohol Risk Factor Screening: On any occasion during the past 3 months, have you had more than 4 drinks containing alcohol? No    Do you average more than 14 drinks per week? No    Functional Ability and Level of Safety:     Hearing Loss   normal-to-mild    Vision - no acute complaints and is followed by Dr Lukas Waters [facility]   Self-care. Requires assistance with: no ADLs    Fall Risk     Fall Risk Assessment, last 12 mths 4/12/2018   Able to walk? Yes   Fall in past 12 months? No     Abuse Screen   Patient is not abused    Review of Systems   A comprehensive review of systems was negative except for that written in the HPI.     Physical Examination     No exam data present    Evaluation of Cognitive Function:  Mood/affect:  happy  Appearance: age appropriate, overweight, well dressed and within normal Limits  Family member/caregiver input: na    Patient Care Team:  Greg Johnston MD as PCP - General (Internal Medicine)  Debra Sprague DO (Cardiology)    Advice/Referrals/Counseling   Education and counseling provided:  Are appropriate based on today's review and evaluation  End-of-Life planning (with patient's consent)  Pneumococcal Vaccine  Influenza Vaccine  Prostate cancer screening tests (PSA, covered annually)  Colorectal cancer screening tests  Cardiovascular screening blood test  Diabetes screening test    Assessment/Plan       ICD-10-CM ICD-9-CM    1. Medicare annual wellness visit, subsequent Z00.00 V70.0    2. Cancer of prostate ([facility] [address] C61 185    3. Multinodular goiter 2014 E04.2 241.1    4. Pulmonary nodules R91.8 793.19    5. Coronary artery disease involving native coronary artery of native heart without angina pectoris I25.10 414.01    6. Bilateral carotid artery stenosis 12/14 I65.23 433.10      433.30    7. Hyperlipidemia, unspecified hyperlipidemia type E78.5 272.4    8. IFG (impaired fasting glucose) R73.01 790.21    9. S/P CABG x 3 Z95.1 V45.81    10. Pederson's esophagus with dysplasia K22.719 530.85    11. Obstructive sleep apnea syndrome G47.33 327.23    12. Essential hypertension I10 401.9 amLODIPine (NORVASC) 5 mg tablet   13. Advanced directives, counseling/discussion Z71.89 V65.49 ADVANCE CARE PLANNING FIRST 30 MINS   14. Screening for alcoholism Z13.89 V79.1 [place] ANNUAL ALCOHOL SCREEN 15 MIN   15. Screening for depression Z13.89 V79.0 [facility] 68   16. Screen for colon cancer Z12.11 V76.51    17. Screening for diabetes mellitus Z13.1 V77.1    25. Screening for ischemic heart disease Z13.6 V81.0    19. Body mass index 30.0-30.9, adult Z68.30 V85.30      current treatment plan is effective  lab results and schedule of future lab studies reviewed with patient  cardiovascular risk and specific lipid/LDL goals reviewed. End of life discussion undertaken.   He will work on getting medical directive in place  Flu high dose declined repeatedly  Colonoscopy beyond age

## 2018-04-13 NOTE — PROGRESS NOTES
76 y. o.  OR  male who presents for evaluation. He seems to be doing well    He continues to work 4days a week at [employer]. Continues to see Dr Dorisann Curling at least yearly. Cardiology is following his carotids also. He has not been checking his bp but review shows elevated readings although asymptomatic    GI is treating the Pederson's with dilation and RFA per Dr Karlene Gull Dr Denver Kay will be doing biopsy of the prostate for rising psa in the next week or so    Denies polyuria, polydipsia, nocturia, vision change. Not checking sugars at this time. No sx referable to the thyroid. LAST MEDICARE WELLNESS EXAM: 8/12/15, 2/22/17, 4/12/18  ACP 2/22/17, 4/12/18    Past Medical History:   Diagnosis Date    Allergic rhinitis     Arthritis     hands    Asthma     mild obst disease on pfts Dr. Bobby Taylor Pederson's esophagus     Dr. Boom Noe; last 8/17, for HALO    Bilateral carotid artery stenosis     12/14 BICA 50-69%    BPH (benign prostatic hyperplasia)     BPH without obstruction/lower urinary tract symptoms     CABG x 3 04/24/2014    LIMA to LAD, and saphenous vein grafts to the second diagonal of the LAD, and to the posterior descending branch of the RCA, Dr. Joselyn Stephenson, 4/23/14.  CAD (coronary artery disease)     Cardiac catheterization 04/18/2014    Superdominant RCA. mRCA 90%. pRPDA 60%. LM (modest sized) patent.  p-mLAD 85%. CX < 50%. LVEDP 17 mmHg. EF 60%. No RWMA. CABG recommended.  Cardiac echocardiogram 06/24/2008    EF 70%. Mild conc LVH. Gr 2 DDfx. Mild LAE. Mild MR. PASP 38 mmHg.  Cardiac nuclear imaging study, abnormal 04/15/2014    Mod ischemia in lg area of anterior wall, anteroapex, anteroseptum c/w high-grade prox LAD obstruction. Mod partially transient defect likely ischemia w/very sm infarction in RCA. TID 1.31 suggests 3-vessel CAD. Mod distal anterior hypk. Mild-mod anteroseptal, inferoseptal, anterolateral hypk.   EF 46%.  Strongly positive EKG on max EST. Ex time 6 min.  Carotid duplex 04/06/2016    Mod 50-69% bilateral ICA stenosis. Probable significant LECA stenosis. Similar to study of 8/12/15. Multiple nodules noted in right thyroid.     Colon adenoma 08/2017    Dr Mendez Luther Colon polyps 2007    Dr. Mendez Luther Difficulty hearing     Erectile dysfunction     FHx: heart disease     GERD (gastroesophageal reflux disease)     by EGD 2007 Dr. Mendez Luther IFG (impaired fasting glucose) 5/14    Impotence     Lower urinary tract symptoms (LUTS)     Multinodular goiter     2014; 2015; 4/16    Obesity     peak weight 202 lbs, bmi 31.4 from 4/14    Prostate cancer ([facility] [address] 8/10    Dr. Tavo Lutz, s/p cryo Dr. Lanie Ribeiro Pulmonary nodule 2014 2015    Sleep apnea     CPAP not used     Past Surgical History:   Procedure Laterality Date    CARDIAC SURG PROCEDURE UNLIST  11/06    thallium negative    CARDIAC SURG PROCEDURE UNLIST  4/14    3V CABG Dr Fredrick Loaiza  2007    mild obst disease on PFTs Dr. Cayden Mcdermott N/A 7/27/2017    Dr. Daniel Salas 2007 negative; 8/17 adenoma     HX [place] Jesus Manuel Burt HX CATARACT REMOVAL  11/14    bilat Dr Brandon Huntley  2004    Amelie Knappus      Dr. Perez Noe HX TURP      x2 Dr. Lia Mendez HX UROLOGICAL  10/2012    [facility], Dr. Austin Steve, Cystoscopy and dilation of stricture, cryoablation of prostate    VASCULAR SURGERY PROCEDURE UNLIST  12/14    BICA 50-69% stenosis     Social History     Social History    Marital status:      Spouse name: N/A    Number of children: 3    Years of education: N/A     Occupational History    ret engr Valerie Salgado Retired     Social History Main Topics    Smoking status: Former Smoker     Packs/day: 0.50     Years: 4.00     Quit date: 1/1/1957    Smokeless tobacco: Never Used      Comment: Quit in [address] Alcohol use No    Drug use: No    Sexual activity: Not on file     Other Topics Concern    Not on file     Social History Narrative     Allergies   Allergen Reactions    Albuterol Rash     seizure    Influenza Virus Vaccine, Specific Other (comments)    Percocet [Oxycodone-Acetaminophen] Other (comments)     Pt denies     Current Outpatient Prescriptions   Medication Sig    amLODIPine (NORVASC) 5 mg tablet Take 1 Tab by mouth daily.  metoprolol succinate (TOPROL-XL) 25 mg XL tablet TAKE 1 TABLET BY MOUTH DAILY    omeprazole (PRILOSEC) 40 mg capsule Take 40 mg by mouth two (2) times a day.  atorvastatin (LIPITOR) 20 mg tablet TAKE 1 TABLET BY MOUTH EVERY EVENING    nitroglycerin (NITROSTAT) 0.4 mg SL tablet 0.4 mg by SubLINGual route every five (5) minutes as needed for Chest Pain.  glucosam-chondroit-C-manganese 536-714-11-3 mg cap Take 1 Tab by mouth daily.  ascorbic acid (VITAMIN C) 1,000 mg tablet Take 1,000 mg by mouth daily.  aspirin 81 mg tablet Take 81 mg by mouth daily. Indications: MYOCARDIAL INFARCTION PREVENTION    omeprazole (PRILOSEC) 20 mg capsule     levoFLOXacin (LEVAQUIN) 750 mg tablet Take 1 Tab by mouth daily.  cyanocobalamin (VITAMIN B-12) 2,500 mcg sublingual tablet Take 2,500 mcg by mouth daily.  MULTIVITAMIN W-MINERALS/LUTEIN (CENTRUM SILVER PO) Take 1 Tab by mouth daily. No current facility-administered medications for this visit.       REVIEW OF SYSTEMS:  [place] 2017 Dr Tamie Edwards  no vision change or eye pain  Oral  no mouth pain, tongue or tooth problems  Ears  no hearing loss, ear pain, fullness, no swallowing problems  Cardiac  no CP, PND, orthopnea, edema, palpitations or syncope  Chest  no breast masses  Resp  no wheezing, chronic coughing, dyspnea  GI  no heartburn, nausea, vomiting, change in bowel habits, bleeding, hemorrhoids  Urinary  no dysuria, hematuria, flank pain, urgency, frequency  Psych  denies any anxiety or depression symptoms, no hallucinations or violent ideation  Constitutional  no wt loss, night sweats, unexplained fevers  Neuro  no focal weakness, numbness, paresthesias, incoordination, ataxia, involuntary movements  Endo - no polyuria, polydipsia, nocturia, hot flashes    Visit Vitals    /68 (BP 1 Location: Right arm, BP Patient Position: Sitting)    Pulse (!) 56    Temp 97.4 °F (36.3 °C) (Oral)    Ht 5' 6.5\" (1.689 m)    Wt 191 lb (86.6 kg)    SpO2 98%    BMI 30.37 kg/m2     A&O x3  Affect is appropriate. Mood stable  No apparent distress  Anicteric, no JVD, adenopathy or thyromegaly. No carotid bruits or radiated murmur  Lungs clear to auscultation, no wheezes or rales  Heart showed regular rate and rhythm. No murmur, rubs, gallops  Abdomen soft nontender, no hepatosplenomegaly or masses. Extremities without edema.   Pulses 1-2+ symmetrically    LABS   From 9/10 showed gluc 102, cr 1.00,    alt 41,        chol 190, tg 102, hdl 47, ldl-c 123, wbc 8.3, hb 14.6, plt 222, psa 7.60  From 6/12 showed                        psa 5.78  From 8/12 showed                                    b12 400, fol>20  From 9/12 showed gluc 109, cr 1.10, gfr>60,                 wbc 8.1, hb 14.1, plt 232  From 9/13 showed                        psa 0.96  From 3/14 showed                        psa 1.78  From 4/14 showed gluc 107, cr 0.90, gfr>60, alt 25, hba1c 5.5, chol 190, tg 135, hdl 48, ldl-c 115, wbc 8.1, hb 14.2, plt 241, ua neg  From 8/14 showed gluc 97,   cr 0.90, gfr>60, alt 17, hba1c 6.0, chol 109, tg 111, hdl 44, ldl-c 43  From 2/15 showed      hba1c 6.0  From 3/15 showed                      psa 2.13  From 8/15 showed gluc 106, cr 0.98, gfr 75,  alt 20, hba1c 5.9, chol 129, tg 79,   hdl 51, ldl-c 62,   wbc 7.3, hb 14.2, plt 232  From 3/16 showed                      psa 2.90  From 4/16 showed      alt 37, hba1c 5.6, chol 108, tg 103, hdl 46, ldl-c 41  From 2/17 showed gluc 103, cr 1.00, gfr>60, alt 36, hba1c 5.6, chol 117, tg 93, hdl 49, ldl-c 59,                tsh 4.14  From 1/18 showed                      psa 7.40  From 2/18 showed gluc 107, cr 1.01, gfr>60, alt 27,         chol 106, tg 113, hdl 42, ldl-c 41,   wbc 7.9, hb 13.8, plt 240,             tsh 3.15, ft4 0.90    Patient Active Problem List   Diagnosis Code    Pederson's esophagus K22.70    BPH without obstruction/lower urinary tract symptoms N40.0    Cancer of prostate ([facility] [address] C61    Colon polyp 2007 Dr. Tamie Oneal K63.5    Sleep apnea G47.30    S/P CABG x 3 Z95.1    IFG (impaired fasting glucose) R73.01    Hyperlipidemia E78.5    Bilateral carotid artery stenosis 12/14 I65.23    CAD (coronary artery disease) I25.10    Multinodular goiter 2014 E04.2    Pulmonary nodules R91.8    Essential hypertension I10     Assessment and plan:  1. Pederson's. Lifelong PPI and further plans per gi  2. ED. Prn meds  3. BPH and prostate ca. F/U Dr. Mary Ellen Sabillon  4. Colon polyp. Fiber, colo 2022? 5. Asthma. Off meds  6. CHD. F/U Dr. Rebel Prieto. 7. IFG. Wt loss would be ideal, continue dietary and lifestyle measures  8. Bilat carotid stenosis. Cont to follow per Dr Josselin Duvall  9. HTN. Add norvasc 5qd after discussing possible sfx. F/u 4 mos  10. Obesity. Lifestyle and dietary measures. Portion control reiterated. RTC 8/18    Above conditions discussed at length and patient vocalized understanding.   All questions answered to patient satifaction

## 2018-05-07 DIAGNOSIS — I10 ESSENTIAL HYPERTENSION: ICD-10-CM

## 2018-05-07 RX ORDER — AMLODIPINE BESYLATE 5 MG/1
5 TABLET ORAL DAILY
Qty: 90 TAB | Refills: 1 | Status: SHIPPED | OUTPATIENT
Start: 2018-05-07 | End: 2018-11-21

## 2018-05-07 NOTE — TELEPHONE ENCOUNTER
Insurance requires a minimum fill for 90 days. If appropriate, please sign pended medication order. If not, please notify me. Last Visit: 04/12/2018 with MD Starla Lema    Next Appointment: 08/15/2018 with MD Starla Lema     Requested Prescriptions     Pending Prescriptions Disp Refills    amLODIPine (NORVASC) 5 mg tablet 90 Tab 1     Sig: Take 1 Tab by mouth daily.

## 2018-05-28 RX ORDER — ATORVASTATIN CALCIUM 20 MG/1
TABLET, FILM COATED ORAL
Qty: 90 TAB | Refills: 2 | Status: SHIPPED | OUTPATIENT
Start: 2018-05-28 | End: 2019-01-31 | Stop reason: SDUPTHER

## 2018-06-07 ENCOUNTER — HOSPITAL ENCOUNTER (OUTPATIENT)
Dept: CT IMAGING | Age: 78
Discharge: HOME | End: 2018-06-07
Attending: UROLOGY
Payer: MEDICARE

## 2018-06-07 ENCOUNTER — HOSPITAL ENCOUNTER (OUTPATIENT)
Dept: NUCLEAR MEDICINE | Age: 78
Discharge: HOME | End: 2018-06-07
Attending: UROLOGY
Payer: MEDICARE

## 2018-06-07 DIAGNOSIS — C61 CANCER OF PROSTATE (HCC): ICD-10-CM

## 2018-06-07 LAB — CREAT UR-MCNC: 1.2 MG/DL (ref 0.6–1.3)

## 2018-06-07 PROCEDURE — 74011636320 HC RX REV CODE- 636/320: Performed by: UROLOGY

## 2018-06-07 PROCEDURE — 74177 CT ABD & PELVIS W/CONTRAST: CPT

## 2018-06-07 PROCEDURE — 78306 BONE IMAGING WHOLE BODY: CPT

## 2018-06-07 PROCEDURE — 82565 ASSAY OF CREATININE: CPT

## 2018-06-07 RX ADMIN — IOPAMIDOL 80 ML: 612 INJECTION, SOLUTION INTRAVENOUS at 12:44

## 2018-06-20 ENCOUNTER — HOSPITAL ENCOUNTER (OUTPATIENT)
Dept: RADIATION THERAPY | Age: 78
Discharge: HOME | End: 2018-06-20
Payer: MEDICARE

## 2018-06-20 PROCEDURE — 99211 OFF/OP EST MAY X REQ PHY/QHP: CPT

## 2018-06-25 DIAGNOSIS — C61 MALIGNANT NEOPLASM OF PROSTATE (HCC): ICD-10-CM

## 2018-06-25 DIAGNOSIS — R35.89 URINE OUTPUT HIGH: ICD-10-CM

## 2018-06-25 DIAGNOSIS — Z01.818 PRE-OP EXAM: ICD-10-CM

## 2018-06-26 ENCOUNTER — HOSPITAL ENCOUNTER (OUTPATIENT)
Dept: LAB | Age: 78
Discharge: HOME | End: 2018-06-26
Payer: MEDICARE

## 2018-06-26 ENCOUNTER — HOSPITAL ENCOUNTER (OUTPATIENT)
Dept: RADIATION THERAPY | Age: 78
Discharge: HOME | End: 2018-06-26
Payer: MEDICARE

## 2018-06-26 DIAGNOSIS — Z01.818 PRE-OP EXAM: ICD-10-CM

## 2018-06-26 DIAGNOSIS — C61 MALIGNANT NEOPLASM OF PROSTATE (HCC): ICD-10-CM

## 2018-06-26 DIAGNOSIS — R35.89 URINE OUTPUT HIGH: ICD-10-CM

## 2018-06-26 LAB
ANION GAP SERPL CALC-SCNC: 5 MMOL/L (ref 3–18)
ATRIAL RATE: 55 BPM
BASOPHILS # BLD: 0.1 K/UL (ref 0–0.06)
BASOPHILS NFR BLD: 1 % (ref 0–2)
BUN SERPL-MCNC: 12 MG/DL (ref 7–18)
BUN/CREAT SERPL: 11 (ref 12–20)
CALCIUM SERPL-MCNC: 9 MG/DL (ref 8.5–10.1)
CALCULATED P AXIS, ECG09: 0 DEGREES
CALCULATED R AXIS, ECG10: -16 DEGREES
CALCULATED T AXIS, ECG11: 47 DEGREES
CHLORIDE SERPL-SCNC: 108 MMOL/L (ref 100–108)
CO2 SERPL-SCNC: 28 MMOL/L (ref 21–32)
CREAT SERPL-MCNC: 1.13 MG/DL (ref 0.6–1.3)
CREAT UR-MCNC: 134 MG/DL (ref 30–125)
DIAGNOSIS, 93000: NORMAL
DIFFERENTIAL METHOD BLD: ABNORMAL
EOSINOPHIL # BLD: 0.2 K/UL (ref 0–0.4)
EOSINOPHIL NFR BLD: 2 % (ref 0–5)
ERYTHROCYTE [DISTWIDTH] IN BLOOD BY AUTOMATED COUNT: 13.6 % (ref 11.6–14.5)
GLUCOSE SERPL-MCNC: 99 MG/DL (ref 74–99)
HCT VFR BLD AUTO: 39.5 % (ref 36–48)
HGB BLD-MCNC: 13.5 G/DL (ref 13–16)
LYMPHOCYTES # BLD: 2.8 K/UL (ref 0.9–3.6)
LYMPHOCYTES NFR BLD: 32 % (ref 21–52)
MCH RBC QN AUTO: 30 PG (ref 24–34)
MCHC RBC AUTO-ENTMCNC: 34.2 G/DL (ref 31–37)
MCV RBC AUTO: 87.8 FL (ref 74–97)
MICROALBUMIN UR-MCNC: 2.91 MG/DL (ref 0–3)
MICROALBUMIN/CREAT UR-RTO: 22 MG/G (ref 0–30)
MONOCYTES # BLD: 0.9 K/UL (ref 0.05–1.2)
MONOCYTES NFR BLD: 10 % (ref 3–10)
NEUTS SEG # BLD: 4.9 K/UL (ref 1.8–8)
NEUTS SEG NFR BLD: 55 % (ref 40–73)
P-R INTERVAL, ECG05: 222 MS
PLATELET # BLD AUTO: 220 K/UL (ref 135–420)
PMV BLD AUTO: 10.9 FL (ref 9.2–11.8)
POTASSIUM SERPL-SCNC: 3.9 MMOL/L (ref 3.5–5.5)
Q-T INTERVAL, ECG07: 476 MS
QRS DURATION, ECG06: 88 MS
QTC CALCULATION (BEZET), ECG08: 455 MS
RBC # BLD AUTO: 4.5 M/UL (ref 4.7–5.5)
SODIUM SERPL-SCNC: 141 MMOL/L (ref 136–145)
VENTRICULAR RATE, ECG03: 55 BPM
WBC # BLD AUTO: 8.8 K/UL (ref 4.6–13.2)

## 2018-06-26 PROCEDURE — 87086 URINE CULTURE/COLONY COUNT: CPT | Performed by: RADIOLOGY

## 2018-06-26 PROCEDURE — 84154 ASSAY OF PSA FREE: CPT | Performed by: RADIOLOGY

## 2018-06-26 PROCEDURE — 76873 ECHOGRAP TRANS R PROS STUDY: CPT

## 2018-06-26 PROCEDURE — 80048 BASIC METABOLIC PNL TOTAL CA: CPT | Performed by: RADIOLOGY

## 2018-06-26 PROCEDURE — 93005 ELECTROCARDIOGRAM TRACING: CPT

## 2018-06-26 PROCEDURE — 85025 COMPLETE CBC W/AUTO DIFF WBC: CPT | Performed by: RADIOLOGY

## 2018-06-26 PROCEDURE — 36415 COLL VENOUS BLD VENIPUNCTURE: CPT | Performed by: RADIOLOGY

## 2018-06-26 PROCEDURE — 84403 ASSAY OF TOTAL TESTOSTERONE: CPT | Performed by: RADIOLOGY

## 2018-06-26 PROCEDURE — 82043 UR ALBUMIN QUANTITATIVE: CPT | Performed by: RADIOLOGY

## 2018-06-27 LAB
BACTERIA SPEC CULT: NORMAL
PSA FREE MFR SERPL: 5.1 %
PSA FREE SERPL-MCNC: 0.39 NG/ML
PSA SERPL-MCNC: 7.6 NG/ML (ref 0–4)
SERVICE CMNT-IMP: NORMAL
TESTOST SERPL-MCNC: 432 NG/DL (ref 264–916)

## 2018-06-29 ENCOUNTER — TELEPHONE (OUTPATIENT)
Dept: CARDIOLOGY CLINIC | Age: 78
End: 2018-06-29

## 2018-06-29 NOTE — LETTER
7/3/2018 9:31 AM 
 
Mr. Sophia Patel [address] [id number] [place]. was seen in our office on 01/24/2018 for cardiac evaluation. From a cardiac standpoint he is cleared for upcoming prostate surgery on 7/5/18. Please feel free to contact our office if you have any questions regarding this patient.   
 
 
 
 
 
Sincerely, 
 
 
 
Darion Salcedo, DO

## 2018-06-29 NOTE — TELEPHONE ENCOUNTER
Emilie Aguilera with Urology called and states that patient is scheduled to have prostate cancer surgery on 7/5/18. Patient is on Aspirin 81mg daily. Urology needs clearance for general anesthesia. Will forward to Dr Harman Or for review. Emilie Aguilera is aware that it may not be addressed before procedure date and it may have to be rescheduled.

## 2018-07-02 NOTE — TELEPHONE ENCOUNTER
This patient had CABG a little over 4 years ago and no recurrent angina when I saw him in January. If he remains asymptomatic without angina he should be OK for urologic surgery on 7/5/2018. Please just confirm with patient that he is not having chest pain issues or any marked decrease in functional capacity recently. If the answer is no he is OK for surgery and if yes then would need to put off surgery and do a screening Pharm NUC.  ES

## 2018-07-03 NOTE — TELEPHONE ENCOUNTER
Pt called this morning. Reviewed note. . Pt says he has not been having any symptoms of chest pain. Pt denied SOB, dizziness, swelling and palpitations. Per Dr. Rigoberto Joseph, if patient wasn't having any chest pain, he was cleared for surgery. Will fax over or send letter over to Urology.

## 2018-07-05 ENCOUNTER — ANESTHESIA (OUTPATIENT)
Dept: RADIATION THERAPY | Age: 78
End: 2018-07-05
Payer: MEDICARE

## 2018-07-05 ENCOUNTER — ANESTHESIA EVENT (OUTPATIENT)
Dept: RADIATION THERAPY | Age: 78
End: 2018-07-05
Payer: MEDICARE

## 2018-07-05 ENCOUNTER — HOSPITAL ENCOUNTER (OUTPATIENT)
Dept: RADIATION THERAPY | Age: 78
Discharge: HOME | End: 2018-07-05
Payer: MEDICARE

## 2018-07-05 VITALS
TEMPERATURE: 97.4 F | RESPIRATION RATE: 14 BRPM | WEIGHT: 188.06 LBS | OXYGEN SATURATION: 100 % | DIASTOLIC BLOOD PRESSURE: 56 MMHG | SYSTOLIC BLOOD PRESSURE: 134 MMHG | HEART RATE: 57 BPM | BODY MASS INDEX: 29.52 KG/M2 | HEIGHT: 67 IN

## 2018-07-05 PROCEDURE — 77030034850

## 2018-07-05 PROCEDURE — 77030008683 HC TU ET CUF COVD -A: Performed by: RADIOLOGY

## 2018-07-05 PROCEDURE — 74011250636 HC RX REV CODE- 250/636: Performed by: RADIOLOGY

## 2018-07-05 PROCEDURE — C1717 BRACHYTX, NON-STR,HDR IR-192: HCPCS

## 2018-07-05 PROCEDURE — 77030008477 HC STYL SATN SLP COVD -A: Performed by: RADIOLOGY

## 2018-07-05 PROCEDURE — 77772 HDR RDNCL NTRSTL/ICAV BRCHTX: CPT

## 2018-07-05 PROCEDURE — 77030032490 HC SLV COMPR SCD KNE COVD -B

## 2018-07-05 PROCEDURE — 77318 BRACHYTX ISODOSE COMPLEX: CPT

## 2018-07-05 PROCEDURE — 77030013079 HC BLNKT BAIR HGGR 3M -A: Performed by: RADIOLOGY

## 2018-07-05 PROCEDURE — 77030015736 HC BLLN ENDOCAV CIVC -B

## 2018-07-05 PROCEDURE — 73290000071 HC RAD ONC TIME 2 TO 2.5 HR

## 2018-07-05 PROCEDURE — 77030018846 HC SOL IRR STRL H20 ICUM -A

## 2018-07-05 PROCEDURE — 74011250637 HC RX REV CODE- 250/637: Performed by: RADIOLOGY

## 2018-07-05 PROCEDURE — 74011250636 HC RX REV CODE- 250/636

## 2018-07-05 PROCEDURE — 77332 RADIATION TREATMENT AID(S): CPT

## 2018-07-05 PROCEDURE — 74011000250 HC RX REV CODE- 250

## 2018-07-05 PROCEDURE — 76060000035 HC ANESTHESIA 2 TO 2.5 HR

## 2018-07-05 RX ORDER — CEFAZOLIN SODIUM 2 G/50ML
2 SOLUTION INTRAVENOUS ONCE
Status: COMPLETED | OUTPATIENT
Start: 2018-07-05 | End: 2018-07-05

## 2018-07-05 RX ORDER — SUCCINYLCHOLINE CHLORIDE 20 MG/ML
INJECTION INTRAMUSCULAR; INTRAVENOUS AS NEEDED
Status: DISCONTINUED | OUTPATIENT
Start: 2018-07-05 | End: 2018-07-05 | Stop reason: HOSPADM

## 2018-07-05 RX ORDER — ONDANSETRON 2 MG/ML
4 INJECTION INTRAMUSCULAR; INTRAVENOUS
Status: CANCELLED | OUTPATIENT
Start: 2018-07-05 | End: 2018-07-06

## 2018-07-05 RX ORDER — LIDOCAINE HYDROCHLORIDE 20 MG/ML
INJECTION, SOLUTION EPIDURAL; INFILTRATION; INTRACAUDAL; PERINEURAL AS NEEDED
Status: DISCONTINUED | OUTPATIENT
Start: 2018-07-05 | End: 2018-07-05 | Stop reason: HOSPADM

## 2018-07-05 RX ORDER — SODIUM CHLORIDE, SODIUM LACTATE, POTASSIUM CHLORIDE, CALCIUM CHLORIDE 600; 310; 30; 20 MG/100ML; MG/100ML; MG/100ML; MG/100ML
75 INJECTION, SOLUTION INTRAVENOUS CONTINUOUS
Status: CANCELLED | OUTPATIENT
Start: 2018-07-06 | End: 2018-07-06

## 2018-07-05 RX ORDER — SODIUM CHLORIDE, SODIUM LACTATE, POTASSIUM CHLORIDE, CALCIUM CHLORIDE 600; 310; 30; 20 MG/100ML; MG/100ML; MG/100ML; MG/100ML
75 INJECTION, SOLUTION INTRAVENOUS CONTINUOUS
Status: DISCONTINUED | OUTPATIENT
Start: 2018-07-05 | End: 2018-07-05 | Stop reason: ALTCHOICE

## 2018-07-05 RX ORDER — FENTANYL CITRATE 50 UG/ML
INJECTION, SOLUTION INTRAMUSCULAR; INTRAVENOUS AS NEEDED
Status: DISCONTINUED | OUTPATIENT
Start: 2018-07-05 | End: 2018-07-05 | Stop reason: HOSPADM

## 2018-07-05 RX ORDER — SODIUM CHLORIDE 0.9 % (FLUSH) 0.9 %
5-10 SYRINGE (ML) INJECTION ONCE
Status: COMPLETED | OUTPATIENT
Start: 2018-07-05 | End: 2018-07-05

## 2018-07-05 RX ORDER — FAMOTIDINE 20 MG/1
20 TABLET, FILM COATED ORAL ONCE
Status: COMPLETED | OUTPATIENT
Start: 2018-07-05 | End: 2018-07-05

## 2018-07-05 RX ORDER — EPHEDRINE SULFATE 50 MG/ML
INJECTION, SOLUTION INTRAVENOUS AS NEEDED
Status: DISCONTINUED | OUTPATIENT
Start: 2018-07-05 | End: 2018-07-05 | Stop reason: HOSPADM

## 2018-07-05 RX ORDER — PROPOFOL 10 MG/ML
INJECTION, EMULSION INTRAVENOUS AS NEEDED
Status: DISCONTINUED | OUTPATIENT
Start: 2018-07-05 | End: 2018-07-05 | Stop reason: HOSPADM

## 2018-07-05 RX ORDER — VECURONIUM BROMIDE FOR INJECTION 1 MG/ML
INJECTION, POWDER, LYOPHILIZED, FOR SOLUTION INTRAVENOUS AS NEEDED
Status: DISCONTINUED | OUTPATIENT
Start: 2018-07-05 | End: 2018-07-05 | Stop reason: HOSPADM

## 2018-07-05 RX ORDER — FAMOTIDINE 20 MG/1
20 TABLET, FILM COATED ORAL ONCE
Status: CANCELLED | OUTPATIENT
Start: 2018-07-05 | End: 2018-07-05

## 2018-07-05 RX ORDER — FENTANYL CITRATE 50 UG/ML
25 INJECTION, SOLUTION INTRAMUSCULAR; INTRAVENOUS AS NEEDED
Status: CANCELLED | OUTPATIENT
Start: 2018-07-05

## 2018-07-05 RX ORDER — SODIUM CHLORIDE, SODIUM LACTATE, POTASSIUM CHLORIDE, CALCIUM CHLORIDE 600; 310; 30; 20 MG/100ML; MG/100ML; MG/100ML; MG/100ML
50 INJECTION, SOLUTION INTRAVENOUS CONTINUOUS
Status: CANCELLED | OUTPATIENT
Start: 2018-07-05

## 2018-07-05 RX ADMIN — VECURONIUM BROMIDE FOR INJECTION 2 MG: 1 INJECTION, POWDER, LYOPHILIZED, FOR SOLUTION INTRAVENOUS at 09:27

## 2018-07-05 RX ADMIN — CEFAZOLIN SODIUM 2 G: 2 SOLUTION INTRAVENOUS at 08:46

## 2018-07-05 RX ADMIN — Medication 10 ML: at 07:36

## 2018-07-05 RX ADMIN — FENTANYL CITRATE 50 MCG: 50 INJECTION, SOLUTION INTRAMUSCULAR; INTRAVENOUS at 09:26

## 2018-07-05 RX ADMIN — EPHEDRINE SULFATE 10 MG: 50 INJECTION, SOLUTION INTRAVENOUS at 10:27

## 2018-07-05 RX ADMIN — SODIUM CHLORIDE, SODIUM LACTATE, POTASSIUM CHLORIDE, AND CALCIUM CHLORIDE 75 ML/HR: 600; 310; 30; 20 INJECTION, SOLUTION INTRAVENOUS at 07:36

## 2018-07-05 RX ADMIN — VECURONIUM BROMIDE FOR INJECTION 5 MG: 1 INJECTION, POWDER, LYOPHILIZED, FOR SOLUTION INTRAVENOUS at 08:57

## 2018-07-05 RX ADMIN — FAMOTIDINE 20 MG: 20 TABLET, FILM COATED ORAL at 07:43

## 2018-07-05 RX ADMIN — VECURONIUM BROMIDE FOR INJECTION 1 MG: 1 INJECTION, POWDER, LYOPHILIZED, FOR SOLUTION INTRAVENOUS at 10:26

## 2018-07-05 RX ADMIN — VECURONIUM BROMIDE FOR INJECTION 1 MG: 1 INJECTION, POWDER, LYOPHILIZED, FOR SOLUTION INTRAVENOUS at 10:02

## 2018-07-05 RX ADMIN — SUCCINYLCHOLINE CHLORIDE 100 MG: 20 INJECTION INTRAMUSCULAR; INTRAVENOUS at 08:44

## 2018-07-05 RX ADMIN — EPHEDRINE SULFATE 10 MG: 50 INJECTION, SOLUTION INTRAVENOUS at 09:22

## 2018-07-05 RX ADMIN — PROPOFOL 150 MG: 10 INJECTION, EMULSION INTRAVENOUS at 08:44

## 2018-07-05 RX ADMIN — LIDOCAINE HYDROCHLORIDE 40 MG: 20 INJECTION, SOLUTION EPIDURAL; INFILTRATION; INTRACAUDAL; PERINEURAL at 08:44

## 2018-07-05 NOTE — PROGRESS NOTES
PROCEDURE NOTE    Pt arrived to procedure room at 0840, pt placed supine, monitors placed, stout inserted with 0.5ml Optiray & 9.5ml NS in balloon, Drain & then clamped w/ 150ml Sterile water instilled in bladder.      Body aligned SCDs placed HERMES LE, Pt arms on armboard with eggcrate for pressure support, head remains aligned Right & Left Leg placed in Yellow Fin Stirrups, eggcrates & gelpads for pressure points    Time-out performed:0901    Procedure [id number]:3002    Procedure stop:1050

## 2018-07-05 NOTE — IP AVS SNAPSHOT
[address] [place] [id number] 
[phone] Patient: Suzie Vera. MRN: [id number] AOI:[birth year] You are allergic to the following Allergen Reactions Albuterol Rash  
 seizure Influenza Virus Vaccine, Specific Other (comments) Percocet (Oxycodone-Acetaminophen) Other (comments) Pt denies Recent Documentation Height Weight BMI Smoking Status 1.689 m 85.3 kg 29.9 kg/m2 Former Smoker Emergency Contacts  (Rel.) Home Phone Work Phone Mobile Phone Eve Patel (Spouse) [phone] -- --  
 Bao Sneed (Spouse) [phone] -- -- About your hospitalization You were admitted on:  July 5, 2018 [facility] You were discharged on:  July 5, 2018 Why you were hospitalized Your primary diagnosis was:  Not on File Your Primary Care Physician (PCP) Primary Care Physician Office Phone Office Fax Risa David [phone] Follow-up Information None Appointments for Next 14 days 7/19/2018 10:00 AM  [facility] RADIATION THERAPY  
 7/19/2018 10:00 AM  [facility] ANESTHESIA My Medications ASK your physician about these medications Instructions Each Dose to Equal  
 Morning Noon Evening Bedtime  
 amLODIPine 5 mg tablet Commonly known as:  Tien Alstrom Your last dose was: Your next dose is: Take 1 Tab by mouth daily. 5 mg  
    
   
   
   
  
 aspirin 81 mg tablet Your last dose was: Your next dose is: Take 81 mg by mouth daily. Indications: MYOCARDIAL INFARCTION PREVENTION 81 mg  
    
   
   
   
  
 atorvastatin 20 mg tablet Commonly known as:  LIPITOR Your last dose was: Your next dose is: TAKE 1 TABLET BY MOUTH EVERY NIGHT AT BEDTIME CENTRUM SILVER PO Your last dose was: Your next dose is: Take 1 Tab by mouth daily. 1 Tab  
    
   
   
   
  
 glucosam-chondroit-C-manganese 790-892-88-3 mg Cap Your last dose was: Your next dose is: Take 1 Tab by mouth daily. 1 Tab  
    
   
   
   
  
 lisinopril 20 mg tablet Commonly known as:  Cierra Hanna Your last dose was: Your next dose is: Take  by mouth daily. metoprolol succinate 25 mg XL tablet Commonly known as:  TOPROL-XL Your last dose was: Your next dose is: TAKE 1 TABLET BY MOUTH DAILY  
     
   
   
   
  
 nitroglycerin 0.4 mg SL tablet Commonly known as:  NITROSTAT Your last dose was: Your next dose is: 0.4 mg by SubLINGual route every five (5) minutes as needed for Chest Pain. 0.4 mg  
    
   
   
   
  
 omeprazole 20 mg capsule Commonly known as:  PRILOSEC Your last dose was: Your next dose is: VITAMIN C 1,000 mg tablet Generic drug:  ascorbic acid (vitamin C) Your last dose was: Your next dose is: Take 1,000 mg by mouth daily. 1000 mg Discharge Instructions Post-Operative HDR Brachytherapy Instructions During the first few days you may have some bruising on the perineum (the place between your anus and your scrotum) or on the scrotum itself. This is caused by the needles (usually 20-30) which are inserted into the prostate. This will resolve on its own and usually does not cause any discomfort. For about a week after treatment, you may have some pain or swelling in the area between your scrotum and rectum, and your urine may be reddish-brown.   Rarely a larger hematoma may form on the perineum and this will cause some discomfort with sitting. This should be brought to the doctors attention, but it will resolve on its own. You can alternate between an ice pack and heat pack for 10 minutes interval to assist with the reduction of inflammation and pain to perineum. Side Effects Immediately post procedure: blood in the urine, bruising/tenderness/discoloration at the implant site, and/or swelling at the implant site. These symptoms should subside after a few days. Some patients will have trouble passing urine after the catheter is removed due to swelling in the prostate. You should call Dr. Johana Jurado or go to Emergency Room if you cannot pass urine within 6 hours of catheter removal or any time if you are having trouble passing your urine. Other: The following side effects are most likely to occur over the first two months following the procedure: Frequency and/or urgency with urination, burning with urination, a weaker urine stream, as well as frequency and /or urgency of bowel movements. After the initial two months, you should notice a steady decline in these symptoms. Your symptoms should subside completely by the end of six month to a year. Diet:   
Regular, unless you are on a special diet for other reasons. Activity: 
  
Avoid heavy lifting or strenuous physical activity for the first two days after the procedure. At that time you may return to your normal activity level if the urine is clear and you feel fine. If the urine is still bloody you should rest and drink plenty of fluids until it is clear, and then you may resume normal activity. Avoid heavy lifting for 4 to 5 days. Avoid sitting on a hard seat (such as a bicycle) for 2 months. Avoid long periods of sitting, such as in a car or on an airplane without taking leg stretching 
breaks.  
 
Depending on the demands of your job, you may return to work any time during the week after the procedure, noting the precaution about prolonged sitting. You may return to a regular diet as tolerated following the procedure. Do not drink excessive amounts of fluids, as they can make side effects worse. You will experience a decrease in ejaculate following the procedures. This is normal, as the prostate gland is responsible for generating over 80% of the fluid disseminated during 
ejaculation. You will most likely experience a reddish color in your ejaculate for a few weeks following the procedure. This is normal and will improve as time 
passes, if it persists, see your doctor. Follow up Your next appointment is on July 19,2018 at 07:00 am. 
 
Please call us if you have any questions: [phone] Radiation Therapy DISCHARGE SUMMARY from Nurse PATIENT INSTRUCTIONS: 
 
 
F-face looks uneven A-arms unable to move or move unevenly S-speech slurred or non-existent T-time-call 911 as soon as signs and symptoms begin-DO NOT go Back to bed or wait to see if you get better-TIME IS BRAIN. Warning Signs of HEART ATTACK Call 911 if you have these symptoms: 
? Chest discomfort. Most heart attacks involve discomfort in the center of the chest that lasts more than a few minutes, or that goes away and comes back. It can feel like uncomfortable pressure, squeezing, fullness, or pain. ? Discomfort in other areas of the upper body. Symptoms can include pain or discomfort in one or both arms, the back, neck, jaw, or stomach. ? Shortness of breath with or without chest discomfort. ? Other signs may include breaking out in a cold sweat, nausea, or lightheadedness. Don't wait more than five minutes to call [address]! Fast action can save your life. Calling 911 is almost always the fastest way to get lifesaving treatment. Emergency Medical Services staff can begin treatment when they arrive  up to an hour sooner than if someone gets to the hospital by car. The discharge information has been reviewed with the patient and spouse. The patient and spouse verbalized understanding. Discharge medications reviewed with the patient and spouse and appropriate educational materials and side effects teaching were provided. ___________________________________________________________________________________________________________________________________ Discharge Instructions Attachments/References URINARY RETENTION (ENGLISH) CIPROFLOXACIN (BY MOUTH) (ENGLISH) IBUPROFEN (BY MOUTH) (ENGLISH) TAMSULOSIN (BY MOUTH) (ENGLISH) Discharge Orders None ACO Transitions of Care Introducing [employer] [address] offers a voluntary care coordination program to provide high quality service and care to [facility] fee-for-service beneficiaries. Sukhwinder Shaffer was designed to help you enhance your health and well-being through the following services: ? Transitions of Care  support for individuals who are transitioning from one care setting to another (example: Hospital to home). ? Chronic and Complex Care Coordination  support for individuals and caregivers of those with serious or chronic illnesses or with more than one chronic (ongoing) condition and those who take a number of different medications. If you meet specific medical criteria, a [address] [place] [address] Rd may call you directly to coordinate your care with your primary care physician and your other care providers. For questions about the [facility] programs, please, contact your physicians office. For general questions or additional information about Accountable Care Organizations: 
Please visit www.medicare.gov/acos. html or call 1-800-MEDICARE (9-787.503.7201) TTY users should call 8-253.260.6922. Introducing [facility]! Zariadriane Stanley introduces [employer] patient portal. Now you can access parts of your medical record, email your doctor's office, and request medication refills online. 1. In your internet browser, go to [email or web address] 2. Click on the First Time User? Click Here link in the Sign In box. You will see the New Member Sign Up page. 3. Enter your [employer] Access Code exactly as it appears below. You will not need to use this code after youve completed the sign-up process. If you do not sign up before the expiration date, you must request a new code. · [employer] Access Code: [email or web address] Expires: 7/11/2018  2:59 PM 
 
4. Enter the last four digits of your Social Security Number (xxxx) and Date of Birth (mm/dd/yyyy) as indicated and click Submit. You will be taken to the next sign-up page. 5. Create a [employer] ID. This will be your [employer] login ID and cannot be changed, so think of one that is secure and easy to remember. 6. Create a [employer] password. You can change your password at any time. 7. Enter your Password Reset Question and Answer. This can be used at a later time if you forget your password. 8. Enter your e-mail address. You will receive e-mail notification when new information is available in [address]. 9. Click Sign Up. You can now view and download portions of your medical record. 10. Click the Download Summary menu link to download a portable copy of your medical information. If you have questions, please visit the Frequently Asked Questions section of the [employer] website. Remember, [employer] is NOT to be used for urgent needs. For medical emergencies, dial 911. Now available from your iPhone and Android! General Information Please provide this summary of care documentation to your next provider. Patient Signature:  ____________________________________________________________ Date:  ____________________________________________________________  
  
Burlene Brian Provider Signature:  ____________________________________________________________ Date:  ____________________________________________________________

## 2018-07-05 NOTE — ANESTHESIA PREPROCEDURE EVALUATION
Anesthetic History   No history of anesthetic complications            Review of Systems / Medical History  Patient summary reviewed, nursing notes reviewed and pertinent labs reviewed    Pulmonary        Sleep apnea: No treatment    Asthma : well controlled       Neuro/Psych   Within defined limits           Cardiovascular    Hypertension: well controlled          CAD    Exercise tolerance: >4 METS     GI/Hepatic/Renal     GERD: well controlled           Endo/Other      Hypothyroidism: well controlled  Morbid obesity and arthritis     Other Findings              Physical Exam    Airway  Mallampati: III  TM Distance: 4 - 6 cm  Neck ROM: normal range of motion   Mouth opening: Normal     Cardiovascular  Regular rate and rhythm,  S1 and S2 normal,  no murmur, click, rub, or gallop  Rhythm: regular  Rate: normal         Dental      Comments: Missing several molars upper and lower   Pulmonary  Breath sounds clear to auscultation               Abdominal  GI exam deferred       Other Findings            Anesthetic Plan    ASA: 3  Anesthesia type: general          Induction: Intravenous  Anesthetic plan and risks discussed with: Patient and Spouse

## 2018-07-05 NOTE — PROGRESS NOTES
0700: Patient arrived for procedure. A&Ox4. Per patients report bowel prep complete. NPO since midnight. Reviewed Allergies and PTA medications. Consent verified and in chart. Denies pain or any other discomfort. Denies delusions, hallucinations, mood swings, depression, euphoria or suicidal ideation.

## 2018-07-05 NOTE — PROGRESS NOTES
POST PROCEDURE NOTE    Pt arrived to post procedure area A at 1105, pt in supine with head of bed elevated, monitors placed, stout catheter instilled with 150 ml of tad colored urine. Patient voided 150 ml of tad colored urine with light bloody streaks. Bladder scan performed with the patient having 0 ml of residual urine in the bladder.      Patient discharged from procedure area A:1225

## 2018-07-05 NOTE — ANESTHESIA POSTPROCEDURE EVALUATION
Post-Anesthesia Evaluation & Assessment    Visit Vitals    /52 (BP 1 Location: Right arm, BP Patient Position: Head of bed elevated (Comment degrees))    Pulse 62    Temp 36.3 °C (97.4 °F)    Resp 15    Ht 5' 6.5\" (1.689 m)    Wt 85.3 kg (188 lb 1 oz)    SpO2 100%    BMI 29.9 kg/m2       Nausea/Vomiting: no nausea    Post-operative hydration adequate.     Pain score (VAS): 0    Mental status & Level of consciousness: alert and oriented x 3    Neurological status: moves all extremities, sensation grossly intact    Pulmonary status: airway patent, no supplemental oxygen required    Complications related to anesthesia: none    Additional comments:

## 2018-07-05 NOTE — DISCHARGE INSTRUCTIONS
Post-Operative HDR Brachytherapy Instructions    During the first few days you may have some bruising on the perineum (the place between your anus and your scrotum) or on the scrotum itself. This is caused by the needles (usually 20-30) which are inserted into the prostate. This will resolve on its own and usually does not cause any discomfort. For about a week after treatment, you may have some pain or swelling in the area between your scrotum and rectum, and your urine may be reddish-brown. Rarely a larger hematoma may form on the perineum and this will cause some discomfort with sitting. This should be brought to the doctors attention, but it will resolve on its own. You can alternate between an ice pack and heat pack for 10 minutes interval to assist with the reduction of inflammation and pain to perineum. Side Effects    Immediately post procedure: blood in the urine, bruising/tenderness/discoloration at the implant site, and/or swelling at the implant site. These symptoms should subside after a few days. Some patients will have trouble passing urine after the catheter is removed due to swelling in the prostate. You should call Dr. Annabella Lopez or go to Emergency Room if you cannot pass urine within 6 hours of catheter removal or any time if you are having trouble passing your urine. Other: The following side effects are most likely to occur over the first two months following the procedure: Frequency and/or urgency with urination, burning with urination, a weaker urine stream, as well as frequency and /or urgency of bowel movements. After the initial two months, you should notice a steady decline in these symptoms. Your symptoms should subside completely by the end of six month to a year. Diet:    Regular, unless you are on a special diet for other reasons. Activity:     Avoid heavy lifting or strenuous physical activity for the first two days after the procedure.   At that time you may return to your normal activity level if the urine is clear and you feel fine. If the urine is still bloody you should rest and drink plenty of fluids until it is clear, and then you may resume normal activity. Avoid heavy lifting for 4 to 5 days. Avoid sitting on a hard seat (such as a bicycle) for 2 months. Avoid long periods of sitting, such as in a car or on an airplane without taking leg stretching  breaks. Depending on the demands of your job, you may return to work any time during the week after the procedure, noting the precaution about prolonged sitting. You may return to a regular diet as tolerated following the procedure. Do not drink excessive amounts of fluids, as they can make side effects worse. You will experience a decrease in ejaculate following the procedures. This is normal, as the prostate gland is responsible for generating over 80% of the fluid disseminated during  ejaculation. You will most likely experience a reddish color in your ejaculate for a few weeks following the procedure. This is normal and will improve as time  passes, if it persists, see your doctor. Follow up     Your next appointment is on July 19,2018 at 07:00 am.    Please call us if you have any questions: [phone]    Radiation Therapy     DISCHARGE SUMMARY from Nurse    PATIENT INSTRUCTIONS:    After general anesthesia or intravenous sedation, for 24 hours or while taking prescription Narcotics:  · Limit your activities  · Do not drive and operate hazardous machinery  · Do not make important personal or business decisions  · Do  not drink alcoholic beverages  · If you have not urinated within 8 hours after discharge, please contact your surgeon on call.     Report the following to your surgeon:  · Excessive pain, swelling, redness or odor of or around the surgical area  · Temperature over 100.5  · Nausea and vomiting lasting longer than 4 hours or if unable to take medications  · Any signs of decreased circulation or nerve impairment to extremity: change in color, persistent  numbness, tingling, coldness or increase pain  · Any questions    What to do at Home:  Recommended activity: Activity as tolerated and no driving for today,     If you experience any of the following symptoms urinary retention, please follow up with emergency room. *  Please give a list of your current medications to your Primary Care Provider. *  Please update this list whenever your medications are discontinued, doses are      changed, or new medications (including over-the-counter products) are added. *  Please carry medication information at all times in case of emergency situations. These are general instructions for a healthy lifestyle:    No smoking/ No tobacco products/ Avoid exposure to second hand smoke  Surgeon General's Warning:  Quitting smoking now greatly reduces serious risk to your health. Obesity, smoking, and sedentary lifestyle greatly increases your risk for illness    A healthy diet, regular physical exercise & weight monitoring are important for maintaining a healthy lifestyle    You may be retaining fluid if you have a history of heart failure or if you experience any of the following symptoms:  Weight gain of 3 pounds or more overnight or 5 pounds in a week, increased swelling in our hands or feet or shortness of breath while lying flat in bed. Please call your doctor as soon as you notice any of these symptoms; do not wait until your next office visit. Recognize signs and symptoms of STROKE:    F-face looks uneven    A-arms unable to move or move unevenly    S-speech slurred or non-existent    T-time-call 911 as soon as signs and symptoms begin-DO NOT go       Back to bed or wait to see if you get better-TIME IS BRAIN. Warning Signs of HEART ATTACK     Call 911 if you have these symptoms:   Chest discomfort.  Most heart attacks involve discomfort in the center of the chest that lasts more than a few minutes, or that goes away and comes back. It can feel like uncomfortable pressure, squeezing, fullness, or pain.  Discomfort in other areas of the upper body. Symptoms can include pain or discomfort in one or both arms, the back, neck, jaw, or stomach.  Shortness of breath with or without chest discomfort.  Other signs may include breaking out in a cold sweat, nausea, or lightheadedness. Don't wait more than five minutes to call 911 - MINUTES MATTER! Fast action can save your life. Calling 911 is almost always the fastest way to get lifesaving treatment. Emergency Medical Services staff can begin treatment when they arrive -- up to an hour sooner than if someone gets to the hospital by car. The discharge information has been reviewed with the patient and spouse. The patient and spouse verbalized understanding. Discharge medications reviewed with the patient and spouse and appropriate educational materials and side effects teaching were provided.   ___________________________________________________________________________________________________________________________________

## 2018-07-19 ENCOUNTER — ANESTHESIA (OUTPATIENT)
Dept: RADIATION THERAPY | Age: 78
End: 2018-07-19
Payer: MEDICARE

## 2018-07-19 ENCOUNTER — HOSPITAL ENCOUNTER (OUTPATIENT)
Dept: RADIATION THERAPY | Age: 78
Discharge: HOME | End: 2018-07-19
Payer: MEDICARE

## 2018-07-19 ENCOUNTER — ANESTHESIA EVENT (OUTPATIENT)
Dept: RADIATION THERAPY | Age: 78
End: 2018-07-19
Payer: MEDICARE

## 2018-07-19 VITALS
OXYGEN SATURATION: 99 % | DIASTOLIC BLOOD PRESSURE: 65 MMHG | SYSTOLIC BLOOD PRESSURE: 146 MMHG | HEIGHT: 67 IN | RESPIRATION RATE: 18 BRPM | HEART RATE: 62 BPM | BODY MASS INDEX: 29.82 KG/M2 | TEMPERATURE: 97.3 F | WEIGHT: 190 LBS

## 2018-07-19 PROCEDURE — 77332 RADIATION TREATMENT AID(S): CPT

## 2018-07-19 PROCEDURE — 74011250636 HC RX REV CODE- 250/636

## 2018-07-19 PROCEDURE — 77030008683 HC TU ET CUF COVD -A: Performed by: RADIOLOGY

## 2018-07-19 PROCEDURE — 77030032490 HC SLV COMPR SCD KNE COVD -B

## 2018-07-19 PROCEDURE — 76060000035 HC ANESTHESIA 2 TO 2.5 HR

## 2018-07-19 PROCEDURE — C1717 BRACHYTX, NON-STR,HDR IR-192: HCPCS

## 2018-07-19 PROCEDURE — 77030008477 HC STYL SATN SLP COVD -A: Performed by: RADIOLOGY

## 2018-07-19 PROCEDURE — 77030018846 HC SOL IRR STRL H20 ICUM -A

## 2018-07-19 PROCEDURE — 74011250637 HC RX REV CODE- 250/637

## 2018-07-19 PROCEDURE — 74011000250 HC RX REV CODE- 250

## 2018-07-19 PROCEDURE — 77030034850

## 2018-07-19 PROCEDURE — 77772 HDR RDNCL NTRSTL/ICAV BRCHTX: CPT

## 2018-07-19 PROCEDURE — 74011250636 HC RX REV CODE- 250/636: Performed by: NURSE ANESTHETIST, CERTIFIED REGISTERED

## 2018-07-19 PROCEDURE — 77318 BRACHYTX ISODOSE COMPLEX: CPT

## 2018-07-19 PROCEDURE — 73290000072 HC RAD ONC TIME 2.5 TO 3 HR

## 2018-07-19 PROCEDURE — 74011250636 HC RX REV CODE- 250/636: Performed by: RADIOLOGY

## 2018-07-19 PROCEDURE — 77030013079 HC BLNKT BAIR HGGR 3M -A: Performed by: RADIOLOGY

## 2018-07-19 PROCEDURE — 77030015736 HC BLLN ENDOCAV CIVC -B

## 2018-07-19 RX ORDER — METHYLPREDNISOLONE ACETATE 80 MG/ML
80 INJECTION, SUSPENSION INTRA-ARTICULAR; INTRALESIONAL; INTRAMUSCULAR; SOFT TISSUE ONCE
Status: DISPENSED | OUTPATIENT
Start: 2018-07-19 | End: 2018-07-19

## 2018-07-19 RX ORDER — VECURONIUM BROMIDE FOR INJECTION 1 MG/ML
INJECTION, POWDER, LYOPHILIZED, FOR SOLUTION INTRAVENOUS AS NEEDED
Status: DISCONTINUED | OUTPATIENT
Start: 2018-07-19 | End: 2018-07-19 | Stop reason: HOSPADM

## 2018-07-19 RX ORDER — FAMOTIDINE 20 MG/1
20 TABLET, FILM COATED ORAL ONCE
Status: COMPLETED | OUTPATIENT
Start: 2018-07-19 | End: 2018-07-19

## 2018-07-19 RX ORDER — GLYCOPYRROLATE 0.2 MG/ML
INJECTION INTRAMUSCULAR; INTRAVENOUS AS NEEDED
Status: DISCONTINUED | OUTPATIENT
Start: 2018-07-19 | End: 2018-07-19 | Stop reason: HOSPADM

## 2018-07-19 RX ORDER — LIDOCAINE HYDROCHLORIDE 20 MG/ML
INJECTION, SOLUTION EPIDURAL; INFILTRATION; INTRACAUDAL; PERINEURAL AS NEEDED
Status: DISCONTINUED | OUTPATIENT
Start: 2018-07-19 | End: 2018-07-19 | Stop reason: HOSPADM

## 2018-07-19 RX ORDER — CEFAZOLIN SODIUM 2 G/50ML
SOLUTION INTRAVENOUS
Status: COMPLETED
Start: 2018-07-19 | End: 2018-07-19

## 2018-07-19 RX ORDER — SODIUM CHLORIDE 0.9 % (FLUSH) 0.9 %
5-10 SYRINGE (ML) INJECTION ONCE
Status: COMPLETED | OUTPATIENT
Start: 2018-07-19 | End: 2018-07-19

## 2018-07-19 RX ORDER — PROPOFOL 10 MG/ML
INJECTION, EMULSION INTRAVENOUS AS NEEDED
Status: DISCONTINUED | OUTPATIENT
Start: 2018-07-19 | End: 2018-07-19 | Stop reason: HOSPADM

## 2018-07-19 RX ORDER — CEFAZOLIN SODIUM 2 G/50ML
2 SOLUTION INTRAVENOUS ONCE
Status: COMPLETED | OUTPATIENT
Start: 2018-07-19 | End: 2018-07-19

## 2018-07-19 RX ORDER — SODIUM CHLORIDE, SODIUM LACTATE, POTASSIUM CHLORIDE, CALCIUM CHLORIDE 600; 310; 30; 20 MG/100ML; MG/100ML; MG/100ML; MG/100ML
75 INJECTION, SOLUTION INTRAVENOUS CONTINUOUS
Status: DISPENSED | OUTPATIENT
Start: 2018-07-20 | End: 2018-07-20

## 2018-07-19 RX ORDER — DEXAMETHASONE SODIUM PHOSPHATE 4 MG/ML
INJECTION, SOLUTION INTRA-ARTICULAR; INTRALESIONAL; INTRAMUSCULAR; INTRAVENOUS; SOFT TISSUE AS NEEDED
Status: DISCONTINUED | OUTPATIENT
Start: 2018-07-19 | End: 2018-07-19 | Stop reason: HOSPADM

## 2018-07-19 RX ORDER — FAMOTIDINE 20 MG/1
TABLET, FILM COATED ORAL
Status: COMPLETED
Start: 2018-07-19 | End: 2018-07-19

## 2018-07-19 RX ORDER — FENTANYL CITRATE 50 UG/ML
INJECTION, SOLUTION INTRAMUSCULAR; INTRAVENOUS AS NEEDED
Status: DISCONTINUED | OUTPATIENT
Start: 2018-07-19 | End: 2018-07-19 | Stop reason: HOSPADM

## 2018-07-19 RX ORDER — EPHEDRINE SULFATE 50 MG/ML
INJECTION, SOLUTION INTRAVENOUS AS NEEDED
Status: DISCONTINUED | OUTPATIENT
Start: 2018-07-19 | End: 2018-07-19 | Stop reason: HOSPADM

## 2018-07-19 RX ORDER — SUCCINYLCHOLINE CHLORIDE 20 MG/ML
INJECTION INTRAMUSCULAR; INTRAVENOUS AS NEEDED
Status: DISCONTINUED | OUTPATIENT
Start: 2018-07-19 | End: 2018-07-19 | Stop reason: HOSPADM

## 2018-07-19 RX ORDER — ONDANSETRON 2 MG/ML
INJECTION INTRAMUSCULAR; INTRAVENOUS AS NEEDED
Status: DISCONTINUED | OUTPATIENT
Start: 2018-07-19 | End: 2018-07-19 | Stop reason: HOSPADM

## 2018-07-19 RX ADMIN — Medication 10 ML: at 07:35

## 2018-07-19 RX ADMIN — FAMOTIDINE 20 MG: 20 TABLET, FILM COATED ORAL at 07:36

## 2018-07-19 RX ADMIN — EPHEDRINE SULFATE 10 MG: 50 INJECTION, SOLUTION INTRAVENOUS at 09:00

## 2018-07-19 RX ADMIN — SUCCINYLCHOLINE CHLORIDE 100 MG: 20 INJECTION INTRAMUSCULAR; INTRAVENOUS at 08:52

## 2018-07-19 RX ADMIN — VECURONIUM BROMIDE FOR INJECTION 1 MG: 1 INJECTION, POWDER, LYOPHILIZED, FOR SOLUTION INTRAVENOUS at 09:38

## 2018-07-19 RX ADMIN — LIDOCAINE HYDROCHLORIDE 40 MG: 20 INJECTION, SOLUTION EPIDURAL; INFILTRATION; INTRACAUDAL; PERINEURAL at 08:52

## 2018-07-19 RX ADMIN — PROPOFOL 150 MG: 10 INJECTION, EMULSION INTRAVENOUS at 08:52

## 2018-07-19 RX ADMIN — FENTANYL CITRATE 100 MCG: 50 INJECTION, SOLUTION INTRAMUSCULAR; INTRAVENOUS at 08:52

## 2018-07-19 RX ADMIN — VECURONIUM BROMIDE FOR INJECTION 1 MG: 1 INJECTION, POWDER, LYOPHILIZED, FOR SOLUTION INTRAVENOUS at 08:52

## 2018-07-19 RX ADMIN — ONDANSETRON 4 MG: 2 INJECTION INTRAMUSCULAR; INTRAVENOUS at 10:56

## 2018-07-19 RX ADMIN — VECURONIUM BROMIDE FOR INJECTION 4 MG: 1 INJECTION, POWDER, LYOPHILIZED, FOR SOLUTION INTRAVENOUS at 09:00

## 2018-07-19 RX ADMIN — GLYCOPYRROLATE 0.2 MG: 0.2 INJECTION INTRAMUSCULAR; INTRAVENOUS at 08:56

## 2018-07-19 RX ADMIN — EPHEDRINE SULFATE 5 MG: 50 INJECTION, SOLUTION INTRAVENOUS at 09:58

## 2018-07-19 RX ADMIN — CEFAZOLIN SODIUM 2 G: 2 SOLUTION INTRAVENOUS at 08:56

## 2018-07-19 RX ADMIN — SODIUM CHLORIDE, SODIUM LACTATE, POTASSIUM CHLORIDE, AND CALCIUM CHLORIDE 75 ML/HR: 600; 310; 30; 20 INJECTION, SOLUTION INTRAVENOUS at 07:34

## 2018-07-19 RX ADMIN — DEXAMETHASONE SODIUM PHOSPHATE 4 MG: 4 INJECTION, SOLUTION INTRA-ARTICULAR; INTRALESIONAL; INTRAMUSCULAR; INTRAVENOUS; SOFT TISSUE at 08:52

## 2018-07-19 NOTE — ANESTHESIA POSTPROCEDURE EVALUATION
Post-Anesthesia Evaluation & Assessment    Visit Vitals    /65 (BP 1 Location: Right arm, BP Patient Position: Sitting)    Pulse 62    Temp 36.3 °C (97.3 °F)    Resp 18    Ht 5' 6.5\" (1.689 m)    Wt 86.2 kg (190 lb)    SpO2 99%    BMI 30.21 kg/m2       Nausea/Vomiting controlled    Post-operative hydration adequate. Mental status & Level of consciousness: alert and oriented x 3    Neurological status: moves all extremities, sensation grossly intact    Pulmonary status: airway patent, no supplemental oxygen required    Complications related to anesthesia: none    Patient has met all discharge requirements.     Additional comments:        Sania Tesfaye, CRNA

## 2018-07-19 NOTE — DISCHARGE INSTRUCTIONS
Post-Operative HDR Brachytherapy Instructions    During the first few days you may have some bruising on the perineum (the place between your anus and your scrotum) or on the scrotum itself. This is caused by the needles (usually 20-30) which are inserted into the prostate. This will resolve on its own and usually does not cause any discomfort. For about a week after treatment, you may have some pain or swelling in the area between your scrotum and rectum, and your urine may be reddish-brown. Rarely a larger hematoma may form on the perineum and this will cause some discomfort with sitting. This should be brought to the doctors attention, but it will resolve on its own. You can alternate between an ice pack and heat pack for 10 minutes interval to assist with the reduction of inflammation and pain to perineum. Side Effects    Immediately post procedure: blood in the urine, bruising/tenderness/discoloration at the implant site, and/or swelling at the implant site. These symptoms should subside after a few days. Some patients will have trouble passing urine after the catheter is removed due to swelling in the prostate. You should call Dr. Guillermina Montes or go to Emergency Room if you cannot pass urine within 6 hours of catheter removal or any time if you are having trouble passing your urine. Other: The following side effects are most likely to occur over the first two months following the procedure: Frequency and/or urgency with urination, burning with urination, a weaker urine stream, as well as frequency and /or urgency of bowel movements. After the initial two months, you should notice a steady decline in these symptoms. Your symptoms should subside completely by the end of six month to a year. Diet:    Regular, unless you are on a special diet for other reasons. Activity:     Avoid heavy lifting or strenuous physical activity for the first two days after the procedure.   At that time you may return to your normal activity level if the urine is clear and you feel fine. If the urine is still bloody you should rest and drink plenty of fluids until it is clear, and then you may resume normal activity. Avoid heavy lifting for 4 to 5 days. Avoid sitting on a hard seat (such as a bicycle) for 2 months. Avoid long periods of sitting, such as in a car or on an airplane without taking leg stretching  breaks. Depending on the demands of your job, you may return to work any time during the week after the procedure, noting the precaution about prolonged sitting. You may return to a regular diet as tolerated following the procedure. Do not drink excessive amounts of fluids, as they can make side effects worse. You will experience a decrease in ejaculate following the procedures. This is normal, as the prostate gland is responsible for generating over 80% of the fluid disseminated during  ejaculation. You will most likely experience a reddish color in your ejaculate for a few weeks following the procedure. This is normal and will improve as time  passes, if it persists, see your doctor. Follow up     Your follow up imaging will be at [facility]/[address]  on ___________at ___________ am/pm.    Please call us if you have any questions: [phone]    Radiation Therapy       DISCHARGE SUMMARY from Nurse    PATIENT INSTRUCTIONS:    After general anesthesia or intravenous sedation, for 24 hours or while taking prescription Narcotics:  · Limit your activities  · Do not drive and operate hazardous machinery  · Do not make important personal or business decisions  · Do  not drink alcoholic beverages  · If you have not urinated within 8 hours after discharge, please contact your surgeon on call.     Report the following to your surgeon:  · Excessive pain, swelling, redness or odor of or around the surgical area  · Temperature over 100.5  · Nausea and vomiting lasting longer than 4 hours or if unable to take medications  · Any signs of decreased circulation or nerve impairment to extremity: change in color, persistent  numbness, tingling, coldness or increase pain  · Any questions    What to do at Home:  Recommended activity: Activity as tolerated and no driving for today,     If you experience any of the following symptoms, please follow up with Emergency Department. *  Please give a list of your current medications to your Primary Care Provider. *  Please update this list whenever your medications are discontinued, doses are      changed, or new medications (including over-the-counter products) are added. *  Please carry medication information at all times in case of emergency situations. These are general instructions for a healthy lifestyle:    No smoking/ No tobacco products/ Avoid exposure to second hand smoke  Surgeon General's Warning:  Quitting smoking now greatly reduces serious risk to your health. Obesity, smoking, and sedentary lifestyle greatly increases your risk for illness    A healthy diet, regular physical exercise & weight monitoring are important for maintaining a healthy lifestyle    You may be retaining fluid if you have a history of heart failure or if you experience any of the following symptoms:  Weight gain of 3 pounds or more overnight or 5 pounds in a week, increased swelling in our hands or feet or shortness of breath while lying flat in bed. Please call your doctor as soon as you notice any of these symptoms; do not wait until your next office visit. Recognize signs and symptoms of STROKE:    F-face looks uneven    A-arms unable to move or move unevenly    S-speech slurred or non-existent    T-time-call 911 as soon as signs and symptoms begin-DO NOT go       Back to bed or wait to see if you get better-TIME IS BRAIN. Warning Signs of HEART ATTACK     Call 911 if you have these symptoms:   Chest discomfort.  Most heart attacks involve discomfort in the center of the chest that lasts more than a few minutes, or that goes away and comes back. It can feel like uncomfortable pressure, squeezing, fullness, or pain.  Discomfort in other areas of the upper body. Symptoms can include pain or discomfort in one or both arms, the back, neck, jaw, or stomach.  Shortness of breath with or without chest discomfort.  Other signs may include breaking out in a cold sweat, nausea, or lightheadedness. Don't wait more than five minutes to call 911 - MINUTES MATTER! Fast action can save your life. Calling 911 is almost always the fastest way to get lifesaving treatment. Emergency Medical Services staff can begin treatment when they arrive -- up to an hour sooner than if someone gets to the hospital by car. The discharge information has been reviewed with the patient and spouse. The patient and spouse verbalized understanding. Discharge medications reviewed with the patient and spouse and appropriate educational materials and side effects teaching were provided.   ___________________________________________________________________________________________________________________________________

## 2018-07-19 NOTE — ANESTHESIA PREPROCEDURE EVALUATION
Anesthetic History   No history of anesthetic complications            Review of Systems / Medical History  Patient summary reviewed, nursing notes reviewed and pertinent labs reviewed    Pulmonary        Sleep apnea: No treatment      Pertinent negatives: No asthma     Neuro/Psych   Within defined limits           Cardiovascular    Hypertension: well controlled          CAD    Exercise tolerance: >4 METS     GI/Hepatic/Renal     GERD: well controlled           Endo/Other  Within defined limits           Other Findings              Physical Exam    Airway  Mallampati: II  TM Distance: 4 - 6 cm  Neck ROM: normal range of motion   Mouth opening: Normal     Cardiovascular    Rhythm: regular  Rate: normal         Dental  No notable dental hx       Pulmonary  Breath sounds clear to auscultation               Abdominal  GI exam deferred       Other Findings            Anesthetic Plan    ASA: 3  Anesthesia type: general          Induction: Intravenous  Anesthetic plan and risks discussed with: Patient

## 2018-07-19 NOTE — PROGRESS NOTES
7057: Patient arrived for procedure. A&Ox4. Per patients report bowel prep complete. NPO since midnight. Reviewed Allergies and PTA medications. Consent verified and in chart. Denies pain or any other discomfort. Denies delusions, hallucinations, mood swings, depression, euphoria or suicidal ideation.

## 2018-07-19 NOTE — PROGRESS NOTES
PROCEDURE NOTE    Pt arrived to procedure room at 0848, pt placed supine, monitors placed, stout inserted with 10ml NS in balloon.   Body aligned SCDs placed HERMES LE, Pt arms on armboard with eggcrate for pressure support, head remains aligned Right & Left Leg placed in Yellow Fin Stirrups, eggcrates & gelpads for pressure points    Time-out performed: 0909    Procedure Start: 0910    Procedure stop: 4831

## 2018-07-19 NOTE — PROGRESS NOTES
POST PROCEDURE NOTE    Pt arrived to post procedure area A at 1115, pt in supine with head of bed elevated and monitors placed. Patient voided 50ml of reddish tad colored urine. Bladder scan performed with the patient having 158ml of residual urine in the bladder. Patient given water and requested to void one more time prior to discharge. Patient was unable to urinate, 16 Fr. Urinary catheter placed, patient voided 900ml of reddish urine. Given a leg bag and patient discharged home to return tomorrow for post void study.       Patient discharged from procedure area A: 1540

## 2018-07-23 ENCOUNTER — OFFICE VISIT (OUTPATIENT)
Dept: CARDIOLOGY CLINIC | Age: 78
End: 2018-07-23

## 2018-07-23 VITALS
HEART RATE: 56 BPM | SYSTOLIC BLOOD PRESSURE: 165 MMHG | HEIGHT: 67 IN | BODY MASS INDEX: 30.29 KG/M2 | DIASTOLIC BLOOD PRESSURE: 60 MMHG | WEIGHT: 193 LBS | OXYGEN SATURATION: 98 %

## 2018-07-23 DIAGNOSIS — Z95.1 S/P CABG X 3: ICD-10-CM

## 2018-07-23 DIAGNOSIS — I25.10 CORONARY ARTERY DISEASE INVOLVING NATIVE CORONARY ARTERY OF NATIVE HEART WITHOUT ANGINA PECTORIS: Primary | ICD-10-CM

## 2018-07-23 DIAGNOSIS — E78.5 HYPERLIPIDEMIA, UNSPECIFIED HYPERLIPIDEMIA TYPE: ICD-10-CM

## 2018-07-23 DIAGNOSIS — R73.01 IFG (IMPAIRED FASTING GLUCOSE): ICD-10-CM

## 2018-07-23 DIAGNOSIS — R09.89 BILATERAL CAROTID BRUITS: ICD-10-CM

## 2018-07-23 RX ORDER — CIPROFLOXACIN 500 MG/1
TABLET ORAL
COMMUNITY
Start: 2018-07-11 | End: 2018-08-22 | Stop reason: ALTCHOICE

## 2018-07-23 RX ORDER — TAMSULOSIN HYDROCHLORIDE 0.4 MG/1
CAPSULE ORAL
COMMUNITY
Start: 2018-07-19 | End: 2019-05-08

## 2018-07-23 NOTE — PROGRESS NOTES
1. Have you been to the ER, urgent care clinic since your last visit? Hospitalized since your last visit? No    2. Have you seen or consulted any other health care providers outside of the [address] [place] [address] since your last visit? Include any pap smears or colon screening.  No

## 2018-07-23 NOTE — OP NOTES
[facility]  Op Note  Date: 2018          Patient Name: Dunia Yuan  MRN: [id number]      Age, Birth Date, Gender: [address], 1940, M      Surgeon(s): Payam Fair MD      Radiation Oncologist: Natalie Louise MD      Pre-operative Diagnosis:   Prostate cancer      Post-operative Diagnosis: same as preoperative diagnosis      Procedure(s) Performed:  Transrectal ultrasound guided Transperineal HDR Interstitial Prostate Brachytherapy, with Flexible cystoscopy.  Fraction #1.      Anesthesia:  GETA      Findings:  prostate volume -  26.39 cc, 14 needles, V100 of 97.84%, for Activitiy,  dwell positions and times please see Dr. Tiffanie Dickerson report.      Complications: none      Estimated Blood Loss:  Less than 20 cc.      Tubes and Drains: none, 16 fr stout taken out post procedure.      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 He was then positioned in the dorsal  lithotomy position at tail end of the operating table with legs  appropriately positioned in [address] [place] [address]. Care was taken to align the  patient and avoid all pressure points. All pressure points were  appropriately padded with egg crate. The patient hen underwent 16 fr stout catheter placement. The BK transrectal ultrasound probe and stepper attachments were then placed to the operating table, and then the ultrasound transducer probe was carefully  introduced into the rectum.  Appropriate volumetric imaging was performed of the prostate. and a preplanned placement of the needle sources were then generated onto  the preplanned imaging in real time contouring on real time ultrasounography. . Once this was performed, we placed the  high-dose brachytherapy needles in the coordinance on the [employer] ultrasound  grid system. Once this was performed, we were able to place all the needles (16)  appropriately and then switched to the longitudinal position and place the needles at the appropriate depth   At the base of the prostate.  We the locked  the needles in the appropriate position. After this was completed, we then  performed flexible cystoscopy and this was performed with the Olympus  flexible cystoscope and we were performing the cystoscope to rule out any  penetration of the urethra, prostate or bladder mucosa by the brachytherapy  needles. Doing this, I saw no urethral or prostatic encroachment. The needles were locked and prepared to be loaded for the Iridium  vitesse  radiation. At this point, my portion of the procedure was  completed. The patient underwent the high-dose brachytherapy and then the  needles and brachytherapy guide were removed en block  and the procedure was terminated.  The  patient was taken to recovery room in stable condition with stout out for immediate trial of voiding.          Ami De La Fuente MD

## 2018-07-23 NOTE — PROGRESS NOTES
HPI:  I saw Stella Rosario in my office today in cardiovascular evaluation regarding his chronic coronary artery disease.  Mr. Patel is a very pleasant 77 y. o. white male with history of gastroesophageal reflux disease with Pederson's esophagus, asthma, obstructive sleep apnea, cancer of the prostate, significant coronary artery disease with development of exertional angina over a 6-month period of time prompting a nuclear myocardial perfusion study ordered by Dr. Kimberlee Perrin in April 2014 which was slightly abnormal and ultimately with his symptom complex prompted a cardiac catheterization by Dr. Thierry Jean Baptiste demonstrating several three vessel coronary disease with subsequent coronary artery bypass grafting surgery x 3 by Dr. Samantha Sher with the following bypasses:       1. Left internal mammary artery to the LAD. 2. SVG to the second diagonal coronary artery off of the left anterior descending and posterior descending branch of the distal right coronary artery.      He did reasonably well postop. He did have a little sinus tachycardia initially for which we put him on beta blockers. Since the beta blockers caused fatigue with Lopressor 50 mg twice a day, it was decreased to 25 mg twice a day and then ultimately switched to Toprol XL 25 mg daily. He comes in today and relates that he is doing quite well. He apparently has precancerous lesion which is being followed by Dr. Rosie Shi in his esophagus and he is recently undergone brachii therapy for his prostate cancer he is also going to be having a surgery by Dr. Daryn Cardenas for a trigger finger release in the near future. However, from a cardiovascular vantage she is doing quite well without any cardiovascular complaints at this time. Encounter Diagnoses   Name Primary?     Coronary artery disease involving native coronary artery of native heart without angina pectoris Yes    S/P CABG x 3     Hyperlipidemia, unspecified hyperlipidemia type     IFG (impaired fasting glucose)     Bilateral carotid bruits        Discussion: This gentleman appears to be doing about as well as we could expect and really of no recommendations for change at this time. He is not having any symptoms to suggest the development of recurrent symptomatic obstructive coronary artery disease or any heart failure issues. 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 We are going to get that repeated again in September of this year. His latest lipid profile which was completed on February 2, 2018 was really quite good with total cholesterol of 112, triglycerides 113, HDL 42, LDL of 47.4, and VLDL of 22.6 which I think is excellent control on Lipitor 20 mg daily. His blood pressure was a little elevated today and I checked again myself and got 165/60. He is complaining a little bit of his Toprol which he thinks is slowing his heart rate too much and I told him that that medication could potentially be adjusted or discontinued, but I am going to let him follow-up with Dr. Cody Quevedo in for further adjustment of his blood pressure medications moving forward he is currently taking Norvasc 5 mg daily Zestril 20 mg daily and the Toprol-XL 25 mg daily for treatment of this problem. Since he is otherwise doing well I will plan to see him again in several months.     PCP: Moreno Avina MD       Past Medical History:   Diagnosis Date    Allergic rhinitis     Arthritis     hands    Asthma     mild obst disease on pfts Dr. Elton Meigs Pederson's esophagus     Dr. Dontae Shaffer; last 8/17, for [facility]    Bilateral carotid artery stenosis     12/14 BICA 50-69%    BPH with obstruction/lower urinary tract symptoms     BPH without obstruction/lower urinary tract symptoms     CABG x 3 04/24/2014    LIMA to LAD, and saphenous vein grafts to the second diagonal of the LAD, and to the posterior descending branch of the RCA, Dr. Anoop Stauffer, 4/23/14.  CAD (coronary artery disease)     Cardiac catheterization 04/18/2014    Superdominant RCA. mRCA 90%. pRPDA 60%. LM (modest sized) patent.  p-mLAD 85%. CX < 50%. LVEDP 17 mmHg. EF 60%. No RWMA. CABG recommended.  Cardiac echocardiogram 06/24/2008    EF 70%. Mild conc LVH. Gr 2 DDfx. Mild LAE. Mild MR. PASP 38 mmHg.  Cardiac nuclear imaging study, abnormal 04/15/2014    Mod ischemia in lg area of anterior wall, anteroapex, anteroseptum c/w high-grade prox LAD obstruction. Mod partially transient defect likely ischemia w/very sm infarction in RCA. TID 1.31 suggests 3-vessel CAD. Mod distal anterior hypk. Mild-mod anteroseptal, inferoseptal, anterolateral hypk. EF 46%. Strongly positive EKG on max EST. Ex time 6 min.  Carotid duplex 04/06/2016    Mod 50-69% bilateral ICA stenosis.   Probable significant LECA stenosis; no change from 2015;  no change 2/17;    Colon adenoma 08/2017    Dr Rogena Kayser Colon polyps 2007    Dr. Rogena Kayser Difficulty hearing     Erectile dysfunction     FHx: heart disease     GERD (gastroesophageal reflux disease)     by EGD 2007 Dr. Rogena Kayser IFG (impaired fasting glucose) 5/14    Multinodular goiter     2014; 2015; 4/16    Obesity     peak weight 202 lbs, bmi 31.4 from 4/14    Prostate cancer ([facility] [address] 8/10    Dr. Nba Darnell, s/p cryo Dr. Baron Cervantes Pulmonary nodule 2014    no change 2015, 3/17    Sleep apnea     CPAP not used       Past Surgical History:   Procedure Laterality Date    CARDIAC SURG PROCEDURE UNLIST  11/06    thallium negative    CARDIAC SURG PROCEDURE UNLIST  4/14    3V CABG Dr Trever Albarado  2007    mild obst disease on PFTs Dr. Denia Coker N/A 7/27/2017    Dr. Kamilah Reyes 2007 negative; 8/17 adenoma     HX Erenest Pane      Dr. Shannan Bello  HX CATARACT REMOVAL  11/14    bilat Dr Lamine Moralez CHOLECYSTECTOMY  2004   Jfefry Santos HX TURP      x2 Dr. Belkis Mares HX UROLOGICAL  10/2012    [facility], Dr. Kaylynn Robles, Cystoscopy and dilation of stricture, cryoablation of prostate    VASCULAR SURGERY PROCEDURE UNLIST  12/14    BICA 50-69% stenosis       Current Outpatient Rx   Name  Route  Sig  Dispense  Refill    atorvastatin (LIPITOR) 20 mg tablet        TAKE 1 TABLET BY MOUTH EVERY EVENING    30 Tab    1      metoprolol succinate (TOPROL-XL) 25 mg XL tablet        TAKE 1 TABLET BY MOUTH EVERY DAY    30 Tab    6      PROLENSA 0.07 % drop    Both Eyes    Administer 1 drop to both eyes daily.  DUREZOL 0.05 % ophthalmic emulsion    Both Eyes    Administer 1 drop to both eyes two (2) times a day.  nitroglycerin (NITROSTAT) 0.4 mg SL tablet    SubLINGual    0.4 mg by SubLINGual route every five (5) minutes as needed for Chest Pain.  omeprazole (PRILOSEC OTC) 20 mg tablet    Oral    Take 20 mg by mouth daily.  glucosam-chondroit-C-manganese 188-600-77-3 mg cap    Oral    Take 1 Tab by mouth daily.  cyanocobalamin (VITAMIN B-12) 2,500 mcg sublingual tablet    Oral    Take 2,500 mcg by mouth daily.  ascorbic acid (VITAMIN C) 1,000 mg tablet    Oral    Take 1,000 mg by mouth daily.  MULTIVITAMIN W-MINERALS/LUTEIN (CENTRUM SILVER PO)    Oral    Take 1 Tab by mouth daily.  aspirin 81 mg tablet    Oral    Take 81 mg by mouth daily.  Indications: MYOCARDIAL INFARCTION PREVENTION                 Allergies   Allergen Reactions    Albuterol Rash     seizure    Influenza Virus Vaccine, Specific Other (comments)    Percocet [Oxycodone-Acetaminophen] Other (comments)     Pt denies       Social History :  Social History   Substance Use Topics    Smoking status: Former Smoker     Packs/day: 0.50     Years: 4.00 Quit date: 1/1/1957    Smokeless tobacco: Never Used      Comment: Quit in 1957    Alcohol use No        Family History: family history includes Diabetes in his sister; Heart Disease in his father; Hypertension in his mother; Stroke in his mother. Review of Systems:       Constitutional: Negative. Respiratory: Positive for shortness of breath. Negative for cough, hemoptysis and wheezing. Cardiovascular: Negative. Gastrointestinal: Negative. Musculoskeletal: Negative. Neurological: Negative for dizziness. Physical Exam:    The patient is a cooperative, alert, well developed, well nourished 76 y.o.  male who is in no acute distress at the time of the examination. Visit Vitals    /60 (BP 1 Location: Left arm, BP Patient Position: Sitting)    Pulse (!) 56    Ht 5' 6.5\" (1.689 m)    Wt 87.5 kg (193 lb)    SpO2 98%    BMI 30.68 kg/m2       HEENT: Conjuctiva white, mucosa moist, no pallor or cyanosis. NECK: Supple without masses, tenderness or thyromegaly. There was no jugular venous distention. Carotid are full bilaterally with soft bilateral bruits right greater than left vs. radiation of murmur to the neck. CHEST: Symmetrical with good excursion. LUNGS: Clear to auscultation in all fields. HEART: The apex is not displaced. There were no lifts, thrills or heaves. There is a normal S1 and S2. There is a grade I/VI PILAR along the LSB with radiation to the base and lower neck without appreciable diastolic murmurs, rubs, clicks, or gallops auscultated. ABDOMEN: Soft without masses, tenderness or organomegaly. EXTREMITIES: Full peripheral pulses with trace peripheral edema.       Review of Data: See PMH and Cardiology and Imaging sections for cardiac testing  Lab Results   Component Value Date/Time    Cholesterol, total 112 02/02/2018 06:45 AM    Cholesterol, total 106 02/02/2018 06:45 AM    HDL Cholesterol 42 02/02/2018 06:45 AM    HDL Cholesterol 42 02/02/2018 06:45 AM    LDL, calculated 47.4 02/02/2018 06:45 AM    LDL, calculated 41.4 02/02/2018 06:45 AM    Triglyceride 113 02/02/2018 06:45 AM    Triglyceride 113 02/02/2018 06:45 AM    CHOL/HDL Ratio 2.7 02/02/2018 06:45 AM    CHOL/HDL Ratio 2.5 02/02/2018 06:45 AM       Results for orders placed or performed in visit on 01/24/18   AMB POC EKG ROUTINE W/ 12 LEADS, INTER & REP     Status: None    Narrative    Sinus bradycardia rate 53. This EKG is otherwise within normal limits and similar to the EKG of March 15, 2017. Josiane Alberts D.O., F.A.C.C. [facility]  [facility]  [address] [place] [address]. [address]    PLEASE NOTE:  This document has been produced using voice recognition software. Unrecognized errors in transcription may be present.

## 2018-08-10 ENCOUNTER — TELEPHONE (OUTPATIENT)
Dept: INTERNAL MEDICINE CLINIC | Age: 78
End: 2018-08-10

## 2018-08-10 RX ORDER — AMLODIPINE BESYLATE 10 MG/1
10 TABLET ORAL DAILY
Qty: 90 TAB | Refills: 3 | Status: SHIPPED | OUTPATIENT
Start: 2018-08-10 | End: 2019-07-09 | Stop reason: SDUPTHER

## 2018-08-10 NOTE — TELEPHONE ENCOUNTER
Spoke with patients wife she stated patient is requesting an increase on amlodipine to 10mg because BP has been running in the high 140s. Pt's wife stated Kimi Laughter is aware of patients BP running high. If an appointment is needed patient is available only on Wed to come in for OV.

## 2018-08-15 ENCOUNTER — HOSPITAL ENCOUNTER (OUTPATIENT)
Dept: LAB | Age: 78
Discharge: HOME | End: 2018-08-15
Payer: MEDICARE

## 2018-08-15 DIAGNOSIS — Z01.818 PRE-OP EVALUATION: ICD-10-CM

## 2018-08-15 LAB
ALBUMIN SERPL-MCNC: 3.9 G/DL (ref 3.4–5)
ALBUMIN/GLOB SERPL: 1.3 {RATIO} (ref 0.8–1.7)
ALP SERPL-CCNC: 54 U/L (ref 45–117)
ALT SERPL-CCNC: 28 U/L (ref 16–61)
ANION GAP SERPL CALC-SCNC: 8 MMOL/L (ref 3–18)
AST SERPL-CCNC: 16 U/L (ref 15–37)
BILIRUB SERPL-MCNC: 0.3 MG/DL (ref 0.2–1)
BUN SERPL-MCNC: 12 MG/DL (ref 7–18)
BUN/CREAT SERPL: 12 (ref 12–20)
CALCIUM SERPL-MCNC: 8.3 MG/DL (ref 8.5–10.1)
CHLORIDE SERPL-SCNC: 107 MMOL/L (ref 100–108)
CO2 SERPL-SCNC: 26 MMOL/L (ref 21–32)
CREAT SERPL-MCNC: 1.01 MG/DL (ref 0.6–1.3)
ERYTHROCYTE [DISTWIDTH] IN BLOOD BY AUTOMATED COUNT: 13.6 % (ref 11.6–14.5)
GLOBULIN SER CALC-MCNC: 2.9 G/DL (ref 2–4)
GLUCOSE SERPL-MCNC: 97 MG/DL (ref 74–99)
HCT VFR BLD AUTO: 35.6 % (ref 36–48)
HGB BLD-MCNC: 12.2 G/DL (ref 13–16)
MCH RBC QN AUTO: 30.3 PG (ref 24–34)
MCHC RBC AUTO-ENTMCNC: 34.3 G/DL (ref 31–37)
MCV RBC AUTO: 88.3 FL (ref 74–97)
PLATELET # BLD AUTO: 206 K/UL (ref 135–420)
PMV BLD AUTO: 10.6 FL (ref 9.2–11.8)
POTASSIUM SERPL-SCNC: 3.9 MMOL/L (ref 3.5–5.5)
PROT SERPL-MCNC: 6.8 G/DL (ref 6.4–8.2)
RBC # BLD AUTO: 4.03 M/UL (ref 4.7–5.5)
SODIUM SERPL-SCNC: 141 MMOL/L (ref 136–145)
WBC # BLD AUTO: 7.6 K/UL (ref 4.6–13.2)

## 2018-08-15 PROCEDURE — 80053 COMPREHEN METABOLIC PANEL: CPT

## 2018-08-15 PROCEDURE — 93005 ELECTROCARDIOGRAM TRACING: CPT

## 2018-08-15 PROCEDURE — 36415 COLL VENOUS BLD VENIPUNCTURE: CPT

## 2018-08-15 PROCEDURE — 85027 COMPLETE CBC AUTOMATED: CPT

## 2018-08-16 LAB
ATRIAL RATE: 56 BPM
CALCULATED P AXIS, ECG09: 24 DEGREES
CALCULATED R AXIS, ECG10: 11 DEGREES
CALCULATED T AXIS, ECG11: 59 DEGREES
DIAGNOSIS, 93000: NORMAL
P-R INTERVAL, ECG05: 218 MS
Q-T INTERVAL, ECG07: 460 MS
QRS DURATION, ECG06: 90 MS
QTC CALCULATION (BEZET), ECG08: 443 MS
VENTRICULAR RATE, ECG03: 56 BPM

## 2018-08-19 NOTE — PROGRESS NOTES
76 y.o. male who presents for evaluation. He seems to be doing well. Dr Nichole Hill referred him to Dr Sebastian Rosen and he's now getting radiation therapy    Continues to work 4days a week at [employer]. Continues to see Dr Juan Stein at least yearly. Cardiology is following his carotids also. BP has been better controlled    GI is treating the Pederson's with dilation and RFA per Dr Margoth May    Denies polyuria, polydipsia, nocturia, vision change. Not checking sugars at this time. No sx referable to the thyroid. He is asking for an inhaler. It's been about 10 years since he saw Anthony Gayle, Dr CANDELARIO was the last one. He had some issues with albuterol, wants something for rescue. He has some mild sob 2x/mo, usually happening at nighttime. He does not recall exactly what they gave him last time, no records in [facility] and no paper chart available for review    LAST MEDICARE WELLNESS EXAM: 8/12/15, 2/22/17, 4/12/18         ACP 2/22/17, 4/12/18    Past Medical History:   Diagnosis Date    Allergic rhinitis     Arthritis     hands    Asthma     mild obst disease on pfts Dr. Ashton Flick Pederson's esophagus     Dr. Theda Bence; last 8/17, for [facility]    Bilateral carotid artery stenosis     12/14 BICA 50-69%    BPH with obstruction/lower urinary tract symptoms     BPH without obstruction/lower urinary tract symptoms     CABG x 3 04/24/2014    LIMA to LAD, and saphenous vein grafts to the second diagonal of the LAD, and to the posterior descending branch of the RCA, Dr. Marci Finney, 4/23/14.  CAD (coronary artery disease)     Cardiac catheterization 04/18/2014    Superdominant RCA. mRCA 90%. pRPDA 60%. LM (modest sized) patent.  p-mLAD 85%. CX < 50%. LVEDP 17 mmHg. EF 60%. No RWMA. CABG recommended.  Cardiac echocardiogram 06/24/2008    EF 70%. Mild conc LVH. Gr 2 DDfx. Mild LAE. Mild MR. PASP 38 mmHg.       Cardiac nuclear imaging study, abnormal 04/15/2014    Mod ischemia in lg area of anterior wall, anteroapex, anteroseptum c/w high-grade prox LAD obstruction. Mod partially transient defect likely ischemia w/very sm infarction in RCA. TID 1.31 suggests 3-vessel CAD. Mod distal anterior hypk. Mild-mod anteroseptal, inferoseptal, anterolateral hypk. EF 46%. Strongly positive EKG on max EST. Ex time 6 min.  Carotid duplex 04/06/2016    Mod 50-69% bilateral ICA stenosis.   Probable significant LECA stenosis; no change from 2015;  no change 2/17;    Colon adenoma 08/2017    Dr Sixto Cowden Colon polyps 2007    Dr. Sixto Cowden Difficulty hearing     Erectile dysfunction     FHx: heart disease     GERD (gastroesophageal reflux disease)     by EGD 2007 Dr. Sixto Cowden IFG (impaired fasting glucose) 5/14    Multinodular goiter     2014; 2015; 4/16    Obesity     peak weight 202 lbs, bmi 31.4 from 4/14    Prostate CA ([facility] [address] 04/2018    Dr Hillary Rinne stage 2B G1hF3T1, Jj 3+4, P,10, G2; brachytherapy Dr Kj Winston [facility]) 06/2010    Dr. Josee Jorgensen [id number] Jj 3+3, 1/9 cores up to 10% 1 core; cryo Oct 2012 psa went from 7/1 to torsten 1.78    Pulmonary nodule 2014    no change 2015, 3/17    Sleep apnea     CPAP not used     Past Surgical History:   Procedure Laterality Date    CARDIAC SURG PROCEDURE UNLIST  11/06    thallium negative    CARDIAC SURG PROCEDURE UNLIST  4/14    3V CABG Dr Inessa Larsen  2007    mild obst disease on PFTs Dr. Sung Shaffer N/A 7/27/2017    Dr. Haley Garcia 2007 negative; 8/17 adenoma     HX Deneise Hakim      Dr. Ladarius Stevenson  11/14    bilat Dr Rudolph Smith  2004    eYsy Brown HX TURP      x2 Dr. Deric Rosario HX UROLOGICAL  10/2012    [facility], Dr. April Nuñez, Cystoscopy and dilation of stricture, cryoablation of prostate    VASCULAR SURGERY PROCEDURE UNLIST  12/14    BICA 50-69% stenosis     Social History Social History    Marital status:      Spouse name: N/A    Number of children: 3    Years of education: N/A     Occupational History    [employer]     Social History Main Topics    Smoking status: Former Smoker     Packs/day: 0.50     Years: 4.00     Quit date: 1/1/1957    Smokeless tobacco: Never Used      Comment: Quit in 1957    Alcohol use No    Drug use: No    Sexual activity: Not on file     Other Topics Concern    Not on file     Social History Narrative     Allergies   Allergen Reactions    Albuterol Other (comments)    Influenza Virus Vaccine, Specific Other (comments)     Current Outpatient Prescriptions   Medication Sig    levalbuterol tartrate (XOPENEX) 45 mcg/actuation inhaler Take 2 Puffs by inhalation every six (6) hours as needed for Wheezing.  amLODIPine (NORVASC) 10 mg tablet Take 1 Tab by mouth daily.  atorvastatin (LIPITOR) 20 mg tablet TAKE 1 TABLET BY MOUTH EVERY NIGHT AT BEDTIME    lisinopril (PRINIVIL, ZESTRIL) 20 mg tablet Take  by mouth daily.  omeprazole (PRILOSEC) 20 mg capsule     metoprolol succinate (TOPROL-XL) 25 mg XL tablet TAKE 1 TABLET BY MOUTH DAILY    nitroglycerin (NITROSTAT) 0.4 mg SL tablet 0.4 mg by SubLINGual route every five (5) minutes as needed for Chest Pain.  glucosam-chondroit-C-manganese 835-146-52-3 mg cap Take 1 Tab by mouth daily.  ascorbic acid (VITAMIN C) 1,000 mg tablet Take 1,000 mg by mouth daily.  MULTIVITAMIN W-MINERALS/LUTEIN (CENTRUM SILVER PO) Take 1 Tab by mouth daily.  aspirin 81 mg tablet Take 81 mg by mouth daily. Indications: MYOCARDIAL INFARCTION PREVENTION    tamsulosin (FLOMAX) 0.4 mg capsule     amLODIPine (NORVASC) 5 mg tablet Take 1 Tab by mouth daily. No current facility-administered medications for this visit.       REVIEW OF SYSTEMS:  colo 2017 Dr Yamila Scott  Ophtho  no vision change or eye pain  Oral  no mouth pain, tongue or tooth problems  Ears  no hearing loss, ear pain, fullness, no swallowing problems  Cardiac  no CP, PND, orthopnea, edema, palpitations or syncope  Chest  no breast masses  Resp  no wheezing, chronic coughing, dyspnea  GI  no heartburn, nausea, vomiting, change in bowel habits, bleeding, hemorrhoids  Urinary  no dysuria, hematuria, flank pain, urgency, frequency  Psych  denies any anxiety or depression symptoms, no hallucinations or violent ideation  Constitutional  no wt loss, night sweats, unexplained fevers  Neuro  no focal weakness, numbness, paresthesias, incoordination, ataxia, involuntary movements  Endo - no polyuria, polydipsia, nocturia, hot flashes    Visit Vitals    /48    Pulse 60    Temp 98.5 °F (36.9 °C) (Oral)    Resp 14    Ht 5' 6.5\" (1.689 m)    Wt 193 lb (87.5 kg)    SpO2 98%    BMI 30.68 kg/m2     A&O x3  Affect is appropriate. Mood stable  No apparent distress  Anicteric, no JVD, adenopathy or thyromegaly. No carotid bruits or radiated murmur  Lungs clear to auscultation, no wheezes or rales  Heart showed regular rate and rhythm. No murmur, rubs, gallops  Abdomen soft nontender, no hepatosplenomegaly or masses. Extremities without edema.   Pulses 1-2+ symmetrically    LABS   From 9/10 showed gluc 102, cr 1.00,    alt 41,        chol 190, tg 102, hdl 47, ldl-c 123, wbc 8.3, hb 14.6, plt 222, psa 7.60  From 6/12 showed                        psa 5.78  From 8/12 showed                                    b12 400, fol>20  From 9/12 showed gluc 109, cr 1.10, gfr>60,                 wbc 8.1, hb 14.1, plt 232  From 9/13 showed                        psa 0.96  From 3/14 showed                        psa 1.78  From 4/14 showed gluc 107, cr 0.90, gfr>60, alt 25, hba1c 5.5, chol 190, tg 135, hdl 48, ldl-c 115, wbc 8.1, hb 14.2, plt 241, ua neg  From 8/14 showed gluc 97,   cr 0.90, gfr>60, alt 17, hba1c 6.0, chol 109, tg 111, hdl 44, ldl-c 43  From 2/15 showed      hba1c 6.0  From 3/15 showed                      psa 2.13  From 8/15 showed gluc 106, cr 0.98, gfr 75,  alt 20, hba1c 5.9, chol 129, tg 79,   hdl 51, ldl-c 62,   wbc 7.3, hb 14.2, plt 232  From 3/16 showed                      psa 2.90  From 4/16 showed      alt 37, hba1c 5.6, chol 108, tg 103, hdl 46, ldl-c 41  From 2/17 showed gluc 103, cr 1.00, gfr>60, alt 36, hba1c 5.6, chol 117, tg 93,   hdl 49, ldl-c 59,                tsh 4.14  From 1/18 showed                      psa 7.40  From 2/18 showed gluc 107, cr 1.01, gfr>60, alt 27,        chol 106, tg 113, hdl 42, ldl-c 41,   wbc 7.9, hb 13.8, plt 240,             tsh 3.15, ft4 0.90  From 8/18 showed gluc 97,   cr 1.01, gfr>60, alt 28,        wbc 7.6, hb 12.2, plt 206    Patient Active Problem List   Diagnosis Code    Pederson's esophagus K22.70    BPH without obstruction/lower urinary tract symptoms N40.0    Cancer of prostate ([facility] [address] C61    Colon polyp 2007 Dr. Nabila Mullen K63.5    Sleep apnea G47.30    S/P CABG x 3 Z95.1    IFG (impaired fasting glucose) R73.01    Hyperlipidemia E78.5    Bilateral carotid artery stenosis 12/14 I65.23    CAD (coronary artery disease) I25.10    Multinodular goiter 2014 E04.2    Pulmonary nodules R91.8    Essential hypertension I10    Obesity (BMI 30-39. 9) E66.9    Hard, firm prostate R39.89    Elevated PSA R97.20     Assessment and plan:  1. Pederson's. Lifelong PPI and further plans per gi  2. ED. Prn meds  3. BPH and prostate ca. F/U Dr. Andrew Mesa and Dr Darshan Arreguin  4. Colon polyp. Fiber, colo 2022? 5. Asthma. Trial xopenex although no guarantees he won't have similar issues  6. CHD. F/U Dr. Shazia Hair. 7. IFG. Wt loss would be ideal, continue dietary and lifestyle measures  8. Bilat carotid stenosis. Cont to follow per Dr Florencia Martinez  9. HTN. Continue current regimen. 10.  Obesity. Lifestyle and dietary measures. Portion control reiterated. 11.  Anemia. Proceed with labs      RTC 2/19    Above conditions discussed at length and patient vocalized understanding.   All questions answered to patient satisfaction

## 2018-08-22 ENCOUNTER — HOSPITAL ENCOUNTER (OUTPATIENT)
Dept: LAB | Age: 78
Discharge: HOME | End: 2018-08-22
Payer: MEDICARE

## 2018-08-22 ENCOUNTER — OFFICE VISIT (OUTPATIENT)
Dept: INTERNAL MEDICINE CLINIC | Age: 78
End: 2018-08-22

## 2018-08-22 VITALS
HEART RATE: 60 BPM | DIASTOLIC BLOOD PRESSURE: 48 MMHG | BODY MASS INDEX: 30.29 KG/M2 | RESPIRATION RATE: 14 BRPM | HEIGHT: 67 IN | OXYGEN SATURATION: 98 % | SYSTOLIC BLOOD PRESSURE: 120 MMHG | WEIGHT: 193 LBS | TEMPERATURE: 98.5 F

## 2018-08-22 DIAGNOSIS — C61 CANCER OF PROSTATE (HCC): ICD-10-CM

## 2018-08-22 DIAGNOSIS — R73.01 IFG (IMPAIRED FASTING GLUCOSE): ICD-10-CM

## 2018-08-22 DIAGNOSIS — E78.5 HYPERLIPIDEMIA, UNSPECIFIED HYPERLIPIDEMIA TYPE: ICD-10-CM

## 2018-08-22 DIAGNOSIS — G47.33 OBSTRUCTIVE SLEEP APNEA SYNDROME: ICD-10-CM

## 2018-08-22 DIAGNOSIS — I10 ESSENTIAL HYPERTENSION: ICD-10-CM

## 2018-08-22 DIAGNOSIS — R06.2 WHEEZING: ICD-10-CM

## 2018-08-22 DIAGNOSIS — E04.2 MULTINODULAR GOITER: ICD-10-CM

## 2018-08-22 DIAGNOSIS — Z95.1 S/P CABG X 3: ICD-10-CM

## 2018-08-22 DIAGNOSIS — E53.8 B12 DEFICIENCY: ICD-10-CM

## 2018-08-22 DIAGNOSIS — D64.9 ANEMIA, UNSPECIFIED TYPE: Primary | ICD-10-CM

## 2018-08-22 DIAGNOSIS — D64.9 ANEMIA, UNSPECIFIED TYPE: ICD-10-CM

## 2018-08-22 PROBLEM — R39.89 HARD, FIRM PROSTATE: Status: RESOLVED | Noted: 2018-04-13 | Resolved: 2018-08-22

## 2018-08-22 PROBLEM — R97.20 ELEVATED PSA: Status: RESOLVED | Noted: 2018-04-13 | Resolved: 2018-08-22

## 2018-08-22 LAB
FERRITIN SERPL-MCNC: 83 NG/ML (ref 8–388)
FOLATE SERPL-MCNC: >20 NG/ML (ref 3.1–17.5)
IRON SATN MFR SERPL: 32 %
IRON SERPL-MCNC: 94 UG/DL (ref 50–175)
RETICS/RBC NFR AUTO: 1 % (ref 0.5–2.3)
TIBC SERPL-MCNC: 296 UG/DL (ref 250–450)
VIT B12 SERPL-MCNC: 332 PG/ML (ref 211–911)

## 2018-08-22 PROCEDURE — 85045 AUTOMATED RETICULOCYTE COUNT: CPT | Performed by: INTERNAL MEDICINE

## 2018-08-22 PROCEDURE — 83540 ASSAY OF IRON: CPT | Performed by: INTERNAL MEDICINE

## 2018-08-22 PROCEDURE — 82728 ASSAY OF FERRITIN: CPT | Performed by: INTERNAL MEDICINE

## 2018-08-22 PROCEDURE — 82607 VITAMIN B-12: CPT | Performed by: INTERNAL MEDICINE

## 2018-08-22 PROCEDURE — 84155 ASSAY OF PROTEIN SERUM: CPT | Performed by: INTERNAL MEDICINE

## 2018-08-22 RX ORDER — LEVALBUTEROL TARTRATE 45 UG/1
2 AEROSOL, METERED ORAL
Qty: 1 INHALER | Refills: 5 | Status: SHIPPED | OUTPATIENT
Start: 2018-08-22 | End: 2020-08-11 | Stop reason: SDUPTHER

## 2018-08-22 NOTE — PROGRESS NOTES
1. Have you been to the ER, urgent care clinic or hospitalized since your last visit? NO.     2. Have you seen or consulted any other health care providers outside of the [address] [place] [address] since your last visit (Include any pap smears or colon screening)? NO      Do you have an Advanced Directive? NO    Would you like information on Advanced Directives?  NO    Chief Complaint   Patient presents with    Hypertension     4 month follow up

## 2018-08-23 LAB
ALBUMIN SERPL ELPH-MCNC: 4 G/DL (ref 2.9–4.4)
ALBUMIN/GLOB SERPL: 1.4 {RATIO} (ref 0.7–1.7)
ALPHA1 GLOB SERPL ELPH-MCNC: 0.2 G/DL (ref 0–0.4)
ALPHA2 GLOB SERPL ELPH-MCNC: 0.7 G/DL (ref 0.4–1)
B-GLOBULIN SERPL ELPH-MCNC: 0.9 G/DL (ref 0.7–1.3)
GAMMA GLOB SERPL ELPH-MCNC: 1 G/DL (ref 0.4–1.8)
GLOBULIN SER CALC-MCNC: 2.9 G/DL (ref 2.2–3.9)
M PROTEIN SERPL ELPH-MCNC: NORMAL G/DL
PROT SERPL-MCNC: 6.9 G/DL (ref 6–8.5)

## 2018-08-27 ENCOUNTER — TELEPHONE (OUTPATIENT)
Dept: INTERNAL MEDICINE CLINIC | Age: 78
End: 2018-08-27

## 2018-08-28 NOTE — TELEPHONE ENCOUNTER
pls call    Results for orders placed or performed during the hospital encounter of 08/22/18   IRON PROFILE   Result Value Ref Range    Iron 94 50 - 175 ug/dL    TIBC 296 250 - 450 ug/dL    Iron % saturation 32 %   FERRITIN   Result Value Ref Range    Ferritin 83 8 - 388 NG/ML   VITAMIN B12 & FOLATE   Result Value Ref Range    Vitamin B12 332 211 - 911 pg/mL    Folate >20.0 (H) 3.10 - 17.50 ng/mL   PROTEIN ELECTROPHORESIS   Result Value Ref Range    Protein, total 6.9 6.0 - 8.5 g/dL    Albumin 4.0 2.9 - 4.4 g/dL    Alpha-1-globulin 0.2 0.0 - 0.4 g/dL    ALPHA-2 GLOBULIN 0.7 0.4 - 1.0 g/dL    Beta globulin 0.9 0.7 - 1.3 g/dL    Gamma globulin 1.0 0.4 - 1.8 g/dL    M-Branden Not Observed Not Observed g/dL    Globulin, total 2.9 2.2 - 3.9 g/dL    A/G ratio 1.4 0.7 - 1.7     RETICULOCYTE COUNT   Result Value Ref Range    Reticulocyte count 1.0 0.5 - 2.3 %     Anemia w/u neg  Will just recheck at the next visit

## 2018-08-28 NOTE — TELEPHONE ENCOUNTER
Called to speak with patient he was unavailable. Spoke with patients wife and gave message below. She verbalized understanding with no additional questions.

## 2018-08-29 ENCOUNTER — HOSPITAL ENCOUNTER (OUTPATIENT)
Dept: RADIATION THERAPY | Age: 78
Discharge: HOME | End: 2018-08-29
Payer: MEDICARE

## 2018-08-29 PROCEDURE — 99211 OFF/OP EST MAY X REQ PHY/QHP: CPT

## 2018-09-10 ENCOUNTER — ANESTHESIA EVENT (OUTPATIENT)
Dept: ENDOSCOPY | Age: 78
End: 2018-09-10
Payer: MEDICARE

## 2018-09-11 ENCOUNTER — ANESTHESIA (OUTPATIENT)
Dept: ENDOSCOPY | Age: 78
End: 2018-09-11
Payer: MEDICARE

## 2018-09-11 ENCOUNTER — HOSPITAL ENCOUNTER (OUTPATIENT)
Age: 78
Setting detail: OUTPATIENT SURGERY
Discharge: HOME | End: 2018-09-11
Attending: INTERNAL MEDICINE | Admitting: INTERNAL MEDICINE
Payer: MEDICARE

## 2018-09-11 VITALS
RESPIRATION RATE: 17 BRPM | SYSTOLIC BLOOD PRESSURE: 114 MMHG | TEMPERATURE: 97.8 F | WEIGHT: 185 LBS | HEART RATE: 49 BPM | BODY MASS INDEX: 29.03 KG/M2 | OXYGEN SATURATION: 99 % | DIASTOLIC BLOOD PRESSURE: 39 MMHG | HEIGHT: 67 IN

## 2018-09-11 PROCEDURE — 74011250636 HC RX REV CODE- 250/636: Performed by: NURSE ANESTHETIST, CERTIFIED REGISTERED

## 2018-09-11 PROCEDURE — 76060000031 HC ANESTHESIA FIRST 0.5 HR: Performed by: INTERNAL MEDICINE

## 2018-09-11 PROCEDURE — 74011000250 HC RX REV CODE- 250: Performed by: NURSE ANESTHETIST, CERTIFIED REGISTERED

## 2018-09-11 PROCEDURE — 76040000019: Performed by: INTERNAL MEDICINE

## 2018-09-11 PROCEDURE — 74011250636 HC RX REV CODE- 250/636

## 2018-09-11 RX ORDER — PROPOFOL 10 MG/ML
INJECTION, EMULSION INTRAVENOUS AS NEEDED
Status: DISCONTINUED | OUTPATIENT
Start: 2018-09-11 | End: 2018-09-11 | Stop reason: HOSPADM

## 2018-09-11 RX ORDER — NALOXONE HYDROCHLORIDE 0.4 MG/ML
0.4 INJECTION, SOLUTION INTRAMUSCULAR; INTRAVENOUS; SUBCUTANEOUS
Status: DISCONTINUED | OUTPATIENT
Start: 2018-09-11 | End: 2018-09-11 | Stop reason: HOSPADM

## 2018-09-11 RX ORDER — SODIUM CHLORIDE, SODIUM LACTATE, POTASSIUM CHLORIDE, CALCIUM CHLORIDE 600; 310; 30; 20 MG/100ML; MG/100ML; MG/100ML; MG/100ML
75 INJECTION, SOLUTION INTRAVENOUS CONTINUOUS
Status: DISCONTINUED | OUTPATIENT
Start: 2018-09-11 | End: 2018-09-11 | Stop reason: HOSPADM

## 2018-09-11 RX ORDER — DEXTROMETHORPHAN/PSEUDOEPHED 2.5-7.5/.8
1.2 DROPS ORAL
Status: DISCONTINUED | OUTPATIENT
Start: 2018-09-11 | End: 2018-09-11 | Stop reason: HOSPADM

## 2018-09-11 RX ORDER — ACETYLCYSTEINE 200 MG/ML
600 SOLUTION ORAL; RESPIRATORY (INHALATION)
Status: DISCONTINUED | OUTPATIENT
Start: 2018-09-11 | End: 2018-09-11 | Stop reason: HOSPADM

## 2018-09-11 RX ORDER — FLUMAZENIL 0.1 MG/ML
0.2 INJECTION INTRAVENOUS
Status: DISCONTINUED | OUTPATIENT
Start: 2018-09-11 | End: 2018-09-11 | Stop reason: HOSPADM

## 2018-09-11 RX ORDER — SODIUM CHLORIDE 0.9 % (FLUSH) 0.9 %
5-10 SYRINGE (ML) INJECTION AS NEEDED
Status: DISCONTINUED | OUTPATIENT
Start: 2018-09-11 | End: 2018-09-11 | Stop reason: HOSPADM

## 2018-09-11 RX ORDER — EPINEPHRINE 0.1 MG/ML
1 INJECTION INTRACARDIAC; INTRAVENOUS
Status: DISCONTINUED | OUTPATIENT
Start: 2018-09-11 | End: 2018-09-11 | Stop reason: HOSPADM

## 2018-09-11 RX ORDER — ATROPINE SULFATE 0.1 MG/ML
0.5 INJECTION INTRAVENOUS
Status: DISCONTINUED | OUTPATIENT
Start: 2018-09-11 | End: 2018-09-11 | Stop reason: HOSPADM

## 2018-09-11 RX ORDER — SODIUM CHLORIDE 0.9 % (FLUSH) 0.9 %
5-10 SYRINGE (ML) INJECTION EVERY 8 HOURS
Status: DISCONTINUED | OUTPATIENT
Start: 2018-09-11 | End: 2018-09-11 | Stop reason: HOSPADM

## 2018-09-11 RX ORDER — LIDOCAINE HYDROCHLORIDE 20 MG/ML
INJECTION, SOLUTION EPIDURAL; INFILTRATION; INTRACAUDAL; PERINEURAL AS NEEDED
Status: DISCONTINUED | OUTPATIENT
Start: 2018-09-11 | End: 2018-09-11 | Stop reason: HOSPADM

## 2018-09-11 RX ORDER — ACETYLCYSTEINE 100 MG/ML
4 SOLUTION ORAL; RESPIRATORY (INHALATION) ONCE
Status: DISCONTINUED | OUTPATIENT
Start: 2018-09-11 | End: 2018-09-11

## 2018-09-11 RX ADMIN — SODIUM CHLORIDE, SODIUM LACTATE, POTASSIUM CHLORIDE, AND CALCIUM CHLORIDE: 600; 310; 30; 20 INJECTION, SOLUTION INTRAVENOUS at 07:50

## 2018-09-11 RX ADMIN — PROPOFOL 20 MG: 10 INJECTION, EMULSION INTRAVENOUS at 07:58

## 2018-09-11 RX ADMIN — PROPOFOL 100 MG: 10 INJECTION, EMULSION INTRAVENOUS at 07:53

## 2018-09-11 RX ADMIN — LIDOCAINE HYDROCHLORIDE 60 MG: 20 INJECTION, SOLUTION EPIDURAL; INFILTRATION; INTRACAUDAL; PERINEURAL at 07:50

## 2018-09-11 RX ADMIN — PROPOFOL 20 MG: 10 INJECTION, EMULSION INTRAVENOUS at 08:04

## 2018-09-11 RX ADMIN — FAMOTIDINE 20 MG: 10 INJECTION, SOLUTION INTRAVENOUS at 07:11

## 2018-09-11 NOTE — ANESTHESIA POSTPROCEDURE EVALUATION
Post-Anesthesia Evaluation and Assessment Patient: Kristie Garduno MRN: [id number]  SSN: [id number] YOB: 1940  Age: 76 y.o. Sex: male Cardiovascular Function/Vital Signs Visit Vitals  BP (!) 83/38  Pulse (!) 48  Temp 36.6 °C (97.8 °F)  Resp 16  
 Ht 5' 7\" (1.702 m)  Wt 83.9 kg (185 lb)  SpO2 96%  BMI 28.98 kg/m2 Patient is status post MAC anesthesia for Procedure(s): UPPER ENDOSCOPY dilation. Nausea/Vomiting: None Postoperative hydration reviewed and adequate. Pain: 
Pain Scale 1: Numeric (0 - 10) (09/11/18 3868) Pain Intensity 1: 0 (09/11/18 9482) Managed Neurological Status: At baseline Mental Status and Level of Consciousness: Arousable Pulmonary Status:  
O2 Device: Room air (09/11/18 0813) Adequate oxygenation and airway patent Complications related to anesthesia: None Post-anesthesia assessment completed. No concerns Signed By: Sharyle Jock, MD   
 September 11, 2018

## 2018-09-11 NOTE — PROCEDURES
[facility]  [address] [facility], [address]      Brief Procedure Note    Dioni Kelly  1940  [id number]    Date of Procedure: 9/11/2018    Preoperative diagnosis: K22.19, Pederson's esophagus with high grade dysplasia  553.3 - K44.9,  Diaphragmatic hernia without obsruction or gangrene  787.29 - R13.19,  Dysphagia since HALO  V12.72 - Z86.010, Personal history of colonic polyps    Postoperative diagnosis:  esophageal stricture, barretts appears ablated, dilated with Savary to 51 Fr    Type of Anesthesia: MAC (monitered anesthesia care)    Description of Findings: same as post op dx    Procedure: Procedure(s):  UPPER ENDOSCOPY dilation    :  Dr. Suzette Moody MD    Assistant(s): [unfilled]    Type of Anesthesia:MAC     EBL:None    Specimens: * No specimens in log *    Findings: See printed and scanned procedure note    Complications: None    Dr. Suzette Moody MD  9/11/2018  8:16 AM

## 2018-09-11 NOTE — DISCHARGE INSTRUCTIONS
DISCHARGE SUMMARY from Nurse     POST-PROCEDURE INSTRUCTIONS:    Call your Physician if you:  ? Observe any excess bleeding. ? Develop a temperature over 100.5o F.  ? Experience abdominal, shoulder or chest pain. ? Notice any signs of decreased circulation or nerve impairment to an extremity such as a change in color, persistent numbness, tingling, coldness or increase in pain. ? Vomit blood or you have nausea and vomiting lasting longer than 4 hours. ? Are unable to take medications. ? Are unable to urinate within 8 hours after discharge following general anesthesia or intravenous sedation. For the next 24 hours after receiving general anesthesia or intravenous sedation, or while taking prescription Narcotics, limit your activities:  ? Do NOT drive a motor vehicle, operate hazard machinery or power tools, or perform tasks that require coordination. The medication you received during your procedure may have some effect on your mental awareness. ? Do NOT make important personal or business decisions. The medication you received during your procedure may have some effect on your mental awareness. ? Do NOT drink alcoholic beverages. These drinks do not mix well with the medications that have been given to you. ? Upon discharge from the hospital, you must be accompanied by a responsible adult. ? Resume your diet as directed by your physician. ? Resume medications as your physician has prescribed. ? Please give a list of your current medications to your Primary Care Provider. ? Please update this list whenever your medications are discontinued, doses are changed, or new medications (including over-the-counter products) are added. ? Please carry medication information at all times in case of emergency situations. These are general instructions for a healthy lifestyle:    No smoking/ No tobacco products/ Avoid exposure to second hand smoke.    Surgeon General's Warning:  Quitting smoking now greatly reduces serious risk to your health. Obesity, smoking, and a sedentary lifestyle greatly increase your risk for illness.  A healthy diet, regular physical exercise & weight monitoring are important for maintaining a healthy lifestyle   You may be retaining fluid if you have a history of heart failure or if you experience any of the following symptoms:  Weight gain of 3 pounds or more overnight or 5 pounds in a week, increased swelling in our hands or feet or shortness of breath while lying flat in bed. Please call your doctor as soon as you notice any of these symptoms; do not wait until your next office visit. Recognize signs and symptoms of STROKE:  F  -  Face looks uneven  A  -  Arms unable to move or move unevenly  S  -  Speech slurred or non-existent  T  -  Time to call 911 - as soon as signs and symptoms begin - DO NOT go back to bed or wait to see If you get better - TIME IS BRAIN. Colorectal Screening   Colorectal cancer almost always develops from precancerous polyps (abnormal growths) in the colon or rectum. Screening tests can find precancerous polyps, so that they can be removed before they turn into cancer. Screening tests can also find colorectal cancer early, when treatment works best.  Alem Amaya Speak with your physician about when you should begin screening and how often you should be tested. Upper GI Endoscopy: What to Expect at [address] [place] [address]  After you have an endoscopy, you will stay at the hospital or clinic for 1 to 2 hours. This will allow the medicine to wear off. You will be able to go home after your doctor or nurse checks to make sure you are not having any problems. You may have to stay overnight if you had treatment during the test. You may have a sore throat for a day or two after the test.  This care sheet gives you a general idea about what to expect after the test.  How can you care for yourself at home?   Activity  · Rest as much as you need to after you go home.  · You should be able to go back to your usual activities the day after the test.  Diet  · Follow your doctor's directions for eating after the test.  · Drink plenty of fluids (unless your doctor has told you not to). Medications  · If you have a sore throat the day after the test, use an over-the-counter spray to numb your throat. Follow-up care is a key part of your treatment and safety. Be sure to make and go to all appointments, and call your doctor if you are having problems. It's also a good idea to know your test results and keep a list of the medicines you take. When should you call for help? Call 911 anytime you think you may need emergency care. For example, call if:  · You passed out (lost consciousness). · You cough up blood. · You vomit blood or what looks like coffee grounds. · You pass maroon or very bloody stools. Call your doctor now or seek immediate medical care if:  · You have trouble swallowing. · You have belly pain. · Your stools are black and tarlike or have streaks of blood. · You are sick to your stomach or cannot keep fluids down. Watch closely for changes in your health, and be sure to contact your doctor if:  · Your throat still hurts after a day or two. · You do not get better as expected. Where can you learn more? Go to [employer]be  Enter J454 in the search box to learn more about \"Upper GI Endoscopy: What to Expect at Home. \"   © 2158-8858 Healthwise, [employer]orporated. Care instructions adapted under license by [facility] [employer] [facility] (which disclaims liability or warranty for this information). This care instruction is for use with your licensed healthcare professional. If you have questions about a medical condition or this instruction, always ask your healthcare professional. [place] [address] any warranty or liability for your use of this information.   Content Version: 46.8.079133; Current as of: November 14, 2014

## 2018-09-11 NOTE — H&P
[facility], [email or web address] Impression: 1. Eliecer's Esophagus. Plan: 1. EGD with MAC and HALO Chief Complaint: Eliecer's HPI: 
Vera Cobb is a 76 y.o. male who is being seen preop for Eliecer's with previous HGD and dysphagia . He has undergone several EGD's, The last was in March. F/u study to assess for any residual Eliecer's. PMH:  
Past Medical History:  
Diagnosis Date  Allergic rhinitis  Arthritis   
 hands  Asthma   
 mild obst disease on pfts Dr. 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 Romayne Schroeder, 4/23/14.  CAD (coronary artery disease)  Cardiac catheterization 04/18/2014 Superdominant RCA. mRCA 90%. pRPDA 60%. LM (modest sized) patent.  p-mLAD 85%. CX < 50%. LVEDP 17 mmHg. EF 60%. No RWMA. CABG recommended.  Cardiac echocardiogram 06/24/2008 EF 70%. Mild conc LVH. Gr 2 DDfx. Mild LAE. Mild MR. PASP 38 mmHg.  Cardiac nuclear imaging study, abnormal 04/15/2014 Mod ischemia in lg area of anterior wall, anteroapex, anteroseptum c/w high-grade prox LAD obstruction. Mod partially transient defect likely ischemia w/very sm infarction in RCA. TID 1.31 suggests 3-vessel CAD. Mod distal anterior hypk. Mild-mod anteroseptal, inferoseptal, anterolateral hypk. EF 46%. Strongly positive EKG on max EST. Ex time 6 min.  Carotid duplex 04/06/2016 Mod 50-69% bilateral ICA stenosis. Probable significant LECA stenosis; no change from 2015;  no change 2/17;  
 Colon adenoma 08/2017 Dr Pawan Yao  Colon polyps 2007 Dr. Babs Eckert  Difficulty hearing  Erectile dysfunction  FHx: heart disease  GERD (gastroesophageal reflux disease) by EGD 2007 Dr. Babs Eckert  IFG (impaired fasting glucose) 5/14  Multinodular goiter 2014; 2015; 4/16  Obesity   
 peak weight 202 lbs, bmi 31.4 from 4/14  Prostate CA ([facility] 04/2018 Dr Sakina Grant stage 2B M3cN3G6, [place] 3+4, P,10, G2; brachytherapy Dr Jayesh Ramos  Prostate cancer ([facility] 06/2010 Dr. Hermelinda Butler P4wMzOx [place] 3+3, 1/9 cores up to 10% 1 core; cryo Oct 2012 psa went from 7/1 to torsten 1.78  
 Pulmonary nodule 2014  
 no change 2015, 3/17  Sleep apnea CPAP not used PSH:  
Past Surgical History:  
Procedure Laterality Date  CARDIAC SURG PROCEDURE UNLIST  11/06  
 thallium negative  CARDIAC SURG PROCEDURE UNLIST  4/14  
 3V CABG Dr Rod Shoulder  CHEST SURGERY PROCEDURE UNLISTED  2007  
 mild obst disease on PFTs Dr. Kaylin Rodriguez  COLONOSCOPY N/A 7/27/2017 Dr. Babs Eckert 2007 negative; 8/17 adenoma  HX APPENDECTOMY  HX CARPAL TUNNEL RELEASE Dr. Desean Salguero  HX CATARACT REMOVAL  11/14  
 bilat Dr Eliezer Long  HX CHOLECYSTECTOMY  2004  HX MOHS PROCEDURES Dr. Faraz Murray  HX TONSILLECTOMY  HX TURP    
 x2 Dr. Hermelinda Butler  HX UROLOGICAL  10/2012 [facility], Dr. Destini Yepez, Cystoscopy and dilation of stricture, cryoablation of prostate  VASCULAR SURGERY PROCEDURE UNLIST  12/14 BICA 50-69% stenosis Social HX:  
Social History Social History  Marital status:  Spouse name: N/A  
 Number of children: 3  
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  
 Drug use: No  
 Sexual activity: Not on file Other Topics Concern  Not on file Social History Narrative FHX:  
Family History Problem Relation Age of Onset  Heart Disease Father  Stroke Mother  Hypertension Mother  Diabetes Sister Allergy: Allergies Allergen Reactions  Albuterol Other (comments)  Influenza Virus Vaccine, Specific Other (comments) Home Medications:  
 
Prescriptions Prior to Admission Medication Sig  levalbuterol tartrate (XOPENEX) 45 mcg/actuation inhaler Take 2 Puffs by inhalation every six (6) hours as needed for Wheezing.  amLODIPine (NORVASC) 10 mg tablet Take 1 Tab by mouth daily.  tamsulosin (FLOMAX) 0.4 mg capsule  atorvastatin (LIPITOR) 20 mg tablet TAKE 1 TABLET BY MOUTH EVERY NIGHT AT BEDTIME  amLODIPine (NORVASC) 5 mg tablet Take 1 Tab by mouth daily.  lisinopril (PRINIVIL, ZESTRIL) 20 mg tablet Take  by mouth daily.  omeprazole (PRILOSEC) 20 mg capsule  metoprolol succinate (TOPROL-XL) 25 mg XL tablet TAKE 1 TABLET BY MOUTH DAILY  glucosam-chondroit-C-manganese 400-469-96-3 mg cap Take 1 Tab by mouth daily.  ascorbic acid (VITAMIN C) 1,000 mg tablet Take 1,000 mg by mouth daily.  MULTIVITAMIN W-MINERALS/LUTEIN (CENTRUM SILVER PO) Take 1 Tab by mouth daily.  nitroglycerin (NITROSTAT) 0.4 mg SL tablet 0.4 mg by SubLINGual route every five (5) minutes as needed for Chest Pain.  aspirin 81 mg tablet Take 81 mg by mouth daily. Indications: MYOCARDIAL INFARCTION PREVENTION Review of Systems:  
 
Constitutional: No fevers, chills, weight loss, fatigue. Cardiovascular: No chest pain, heart palpitations. Respiratory: No cough, SOB, wheezing, chest discomfort, orthopnea. Gastrointestinal: No GI or oabdominal complaints Musculoskeletal: No weakness, arthralgias, wasting. Allergies: As noted. Visit Vitals  /61  Pulse 60  Temp 97.8 °F (36.6 °C)  Resp 18  Ht 5' 7\" (1.702 m)  Wt 83.9 kg (185 lb)  SpO2 99%  BMI 28.98 kg/m2 Physical Assessment:  
 
constitutional: appearance: well developed, well nourished, normal habitus, no deformities, in no acute distress. ENMT: mouth: normal oral mucosa,lips and gums; good dentition. oropharynx: normal tongue, hard and soft palate; posterior pharynx without erithema, exudate or lesions. respiratory: effort: normal chest excursion; no intercostal retraction or accessory muscle use. cardiovascular: abdominal aorta: normal size and position; no bruits. palpation: PMI of normal size and position; normal rhythm; no thrill or murmurs. abdominal: abdomen: normal consistency; no tenderness or masses. hernias: no hernias appreciated. liver: normal size and consistency. spleen: not palpable. rectal: hemoccult/guaiac: not performed. musculoskeletal: digits and nails: no clubbing, cyanosis, petechiae or other inflammatory conditions. psychiatric: orientation: oriented to time, space and person. Alyson Almeida MD, M.D. [facility], [address] [place] [address] Pager [phone] 
[email or web address]

## 2018-09-11 NOTE — IP AVS SNAPSHOT
[address] [place] [address] [place] [address] [address] [address] [phone] Patient: Nara Cabrera MRN: [id number] DSF:4/86/4471 A check sobia indicates which time of day the medication should be taken. My Medications ASK your doctor about these medications Instructions Each Dose to Equal  
 Morning Noon Evening Bedtime * amLODIPine 5 mg tablet Commonly known as:  [place] Your last dose was: Your next dose is: Take 1 Tab by mouth daily. 5 mg * amLODIPine 10 mg tablet Commonly known as:  [place] Your last dose was: Your next dose is: Take 1 Tab by mouth daily. 10 mg  
    
   
   
   
  
 aspirin 81 mg tablet Your last dose was: Your next dose is: Take 81 mg by mouth daily. Indications: MYOCARDIAL INFARCTION PREVENTION 81 mg  
    
   
   
   
  
 atorvastatin 20 mg tablet Commonly known as:  LIPITOR Your last dose was: Your next dose is: TAKE 1 TABLET BY MOUTH EVERY NIGHT AT BEDTIME CENTRUM SILVER PO Your last dose was: Your next dose is: Take 1 Tab by mouth daily. 1 Tab  
    
   
   
   
  
 glucosam-chondroit-C-manganese 648-258-50-3 mg Cap Your last dose was: Your next dose is: Take 1 Tab by mouth daily. 1 Tab  
    
   
   
   
  
 levalbuterol tartrate 45 mcg/actuation inhaler Commonly known as:  Suyapa Cotter Your last dose was: Your next dose is: Take 2 Puffs by inhalation every six (6) hours as needed for Wheezing. 2 Puff  
    
   
   
   
  
 lisinopril 20 mg tablet Commonly known as:  Thervilma Monique Your last dose was: Your next dose is: Take  by mouth daily. metoprolol succinate 25 mg XL tablet Commonly known as:  TOPROL-XL  
   
 Your last dose was: Your next dose is: TAKE 1 TABLET BY MOUTH DAILY  
     
   
   
   
  
 nitroglycerin 0.4 mg SL tablet Commonly known as:  NITROSTAT Your last dose was: Your next dose is: 0.4 mg by SubLINGual route every five (5) minutes as needed for Chest Pain. 0.4 mg  
    
   
   
   
  
 omeprazole 20 mg capsule Commonly known as:  PRILOSEC Your last dose was: Your next dose is:    
   
   
      
   
   
   
  
 tamsulosin 0.4 mg capsule Commonly known as:  FLOMAX Your last dose was: Your next dose is: VITAMIN C 1,000 mg tablet Generic drug:  ascorbic acid (vitamin C) Your last dose was: Your next dose is: Take 1,000 mg by mouth daily. 1000 mg * Notice: This list has 2 medication(s) that are the same as other medications prescribed for you. Read the directions carefully, and ask your doctor or other care provider to review them with you.

## 2018-09-11 NOTE — IP AVS SNAPSHOT
[facility] 
 
 
 [address] [facility] Lucio Clark [id number] [phone] Patient: Justin Roth. MRN: [id number] TOYA:[id number] About your hospitalization You were admitted on:  September 11, 2018 You last received care in the:  [facility] ENDOSCOPY You were discharged on:  September 11, 2018 Why you were hospitalized Your primary diagnosis was:  Not on File Follow-up Information Follow up With Details Comments Contact Info Patricia Summers MD   [address] [place] [address] [address] [facility] 
[phone] Your Scheduled Appointments Wednesday October 03, 2018  8:00 AM EDT  
(Arrive by 7:30 AM) HR DUPLEX CAROTID with [facility] 1 [facility] ([facility]) [address] [facility] Yareli Clark [id number] [phone] No preparation is required for this study. Patient can have their meals and take their medications. Patient should not wear a turtleneck or high neck shirt for this study. Please report to the main location @ [address] [place] [address] approximately 30 minutes prior to your appointment time. The entrance is located on the RIGHT side of the street, immediately adjacent to the Emergency Room. [address] [facility] [address], [address] Discharge Orders None A check sobia indicates which time of day the medication should be taken. My Medications ASK your doctor about these medications Instructions Each Dose to Equal  
 Morning Noon Evening Bedtime * amLODIPine 5 mg tablet Commonly known as:  Don Sanchez Your last dose was: Your next dose is: Take 1 Tab by mouth daily. 5 mg * amLODIPine 10 mg tablet Commonly known as:  Suzon Salle Your last dose was: Your next dose is: Take 1 Tab by mouth daily. 10 mg  
    
   
   
   
  
 aspirin 81 mg tablet Your last dose was: Your next dose is: Take 81 mg by mouth daily. Indications: MYOCARDIAL INFARCTION PREVENTION 81 mg  
    
   
   
   
  
 atorvastatin 20 mg tablet Commonly known as:  LIPITOR Your last dose was: Your next dose is: TAKE 1 TABLET BY MOUTH EVERY NIGHT AT BEDTIME CENTRUM SILVER PO Your last dose was: Your next dose is: Take 1 Tab by mouth daily. 1 Tab  
    
   
   
   
  
 glucosam-chondroit-C-manganese 814-312-92-3 mg Cap Your last dose was: Your next dose is: Take 1 Tab by mouth daily. 1 Tab  
    
   
   
   
  
 levalbuterol tartrate 45 mcg/actuation inhaler Commonly known as:  Aura Ramírez Your last dose was: Your next dose is: Take 2 Puffs by inhalation every six (6) hours as needed for Wheezing. 2 Puff  
    
   
   
   
  
 lisinopril 20 mg tablet Commonly known as:  Khanh Wellington Your last dose was: Your next dose is: Take  by mouth daily. metoprolol succinate 25 mg XL tablet Commonly known as:  TOPROL-XL Your last dose was: Your next dose is: TAKE 1 TABLET BY MOUTH DAILY  
     
   
   
   
  
 nitroglycerin 0.4 mg SL tablet Commonly known as:  NITROSTAT Your last dose was: Your next dose is: 0.4 mg by SubLINGual route every five (5) minutes as needed for Chest Pain. 0.4 mg  
    
   
   
   
  
 omeprazole 20 mg capsule Commonly known as:  PRILOSEC Your last dose was: Your next dose is:    
   
   
      
   
   
   
  
 tamsulosin 0.4 mg capsule Commonly known as:  FLOMAX Your last dose was: Your next dose is: VITAMIN C 1,000 mg tablet Generic drug:  ascorbic acid (vitamin C) Your last dose was: Your next dose is: Take 1,000 mg by mouth daily. 1000 mg * Notice: This list has 2 medication(s) that are the same as other medications prescribed for you. Read the directions carefully, and ask your doctor or other care provider to review them with you. Discharge Instructions DISCHARGE SUMMARY from Nurse POST-PROCEDURE INSTRUCTIONS: 
 
Call your Physician if you: 
? Observe any excess bleeding. ? Develop a temperature over 100.5o F. 
? Experience abdominal, shoulder or chest pain. ? Notice any signs of decreased circulation or nerve impairment to an extremity such as a change in color, persistent numbness, tingling, coldness or increase in pain. ? Vomit blood or you have nausea and vomiting lasting longer than 4 hours. ? Are unable to take medications. ? Are unable to urinate within 8 hours after discharge following general anesthesia or intravenous sedation. For the next 24 hours after receiving general anesthesia or intravenous sedation, or while taking prescription Narcotics, limit your activities: 
? Do NOT drive a motor vehicle, operate hazard machinery or power tools, or perform tasks that require coordination. The medication you received during your procedure may have some effect on your mental awareness. ? Do NOT make important personal or business decisions. The medication you received during your procedure may have some effect on your mental awareness. ? Do NOT drink alcoholic beverages. These drinks do not mix well with the medications that have been given to you. ? Upon discharge from the hospital, you must be accompanied by a responsible adult. ? Resume your diet as directed by your physician. ? Resume medications as your physician has prescribed. ? Please give a list of your current medications to your Primary Care Provider. ? Please update this list whenever your medications are discontinued, doses are changed, or new medications (including over-the-counter products) are added. ? Please carry medication information at all times in case of emergency situations. These are general instructions for a healthy lifestyle: No smoking/ No tobacco products/ Avoid exposure to second hand smoke. ? Surgeon General's Warning:  Quitting smoking now greatly reduces serious risk to your health. Obesity, smoking, and a sedentary lifestyle greatly increase your risk for illness. ? A healthy diet, regular physical exercise & weight monitoring are important for maintaining a healthy lifestyle ? You may be retaining fluid if you have a history of heart failure or if you experience any of the following symptoms:  Weight gain of 3 pounds or more overnight or 5 pounds in a week, increased swelling in our hands or feet or shortness of breath while lying flat in bed. Please call your doctor as soon as you notice any of these symptoms; do not wait until your next office visit. Recognize signs and symptoms of STROKE: 
F  -  Face looks uneven A  -  Arms unable to move or move unevenly S  -  Speech slurred or non-existent T  -  Time to call 911 - as soon as signs and symptoms begin - DO NOT go back to bed or wait to see If you get better - TIME IS BRAIN. Colorectal Screening ? Colorectal cancer almost always develops from precancerous polyps (abnormal growths) in the colon or rectum. Screening tests can find precancerous polyps, so that they can be removed before they turn into cancer. Screening tests can also find colorectal cancer early, when treatment works best. 
? Speak with your physician about when you should begin screening and how often you should be tested. Upper GI Endoscopy: What to Expect at [facility] Your Recovery After you have an endoscopy, you will stay at the hospital or clinic for 1 to 2 hours. This will allow the medicine to wear off. You will be able to go home after your doctor or nurse checks to make sure you are not having any problems. You may have to stay overnight if you had treatment during the test. You may have a sore throat for a day or two after the test. 
This care sheet gives you a general idea about what to expect after the test. 
How can you care for yourself at home? Activity · Rest as much as you need to after you go home. · You should be able to go back to your usual activities the day after the test. 
Diet · Follow your doctor's directions for eating after the test. 
· Drink plenty of fluids (unless your doctor has told you not to). Medications · If you have a sore throat the day after the test, use an over-the-counter spray to numb your throat. Follow-up care is a key part of your treatment and safety. Be sure to make and go to all appointments, and call your doctor if you are having problems. It's also a good idea to know your test results and keep a list of the medicines you take. When should you call for help? Call 911 anytime you think you may need emergency care. For example, call if: 
· You passed out (lost consciousness). · You cough up blood. · You vomit blood or what looks like coffee grounds. · You pass maroon or very bloody stools. Call your doctor now or seek immediate medical care if: 
· You have trouble swallowing. · You have belly pain. · Your stools are black and tarlike or have streaks of blood. · You are sick to your stomach or cannot keep fluids down. Watch closely for changes in your health, and be sure to contact your doctor if: 
· Your throat still hurts after a day or two. · You do not get better as expected. Where can you learn more? Go to [employer] Enter [phone] in the search box to learn more about \"Upper GI Endoscopy: What to Expect at Home. \"  
 © 1781-7219 Healthwise, Incorporated. Care instructions adapted under license by Kina Krishnan (which disclaims liability or warranty for this information). This care instruction is for use with your licensed healthcare professional. If you have questions about a medical condition or this instruction, always ask your healthcare professional. [address] any warranty or liability for your use of this information. Content Version: 44.8.974215; Current as of: November 14, 2014 ACO Transitions of Care Introducing [employer] [address] Molly Lozano offers a voluntary care coordination program to provide high quality service and care to [facility] fee-for-service beneficiaries. Georgiana Bocanegra was designed to help you enhance your health and well-being through the following services: ? Transitions of Care  support for individuals who are transitioning from one care setting to another (example: Hospital to home). ? Chronic and Complex Care Coordination  support for individuals and caregivers of those with serious or chronic illnesses or with more than one chronic (ongoing) condition and those who take a number of different medications. If you meet specific medical criteria, a [address] [place] [address] Rd may call you directly to coordinate your care with your primary care physician and your other care providers. For questions about the [facility] programs, please, contact your physicians office. For general questions or additional information about Accountable Care Organizations: 
Please visit www.medicare.gov/acos. html or call 1-800-MEDICARE (7-781.607.8215) TTY users should call 4-849.394.2254. Introducing hospitals & HEALTH SERVICES!    
 Kina Krishnan introduces [employer] patient portal. Now you can access parts of your medical record, email your doctor's office, and request medication refills online. 1. In your internet browser, go to [email or web address] 2. Click on the First Time User? Click Here link in the Sign In box. You will see the New Member Sign Up page. 3. Enter your Epoq Access Code exactly as it appears below. You will not need to use this code after youve completed the sign-up process. If you do not sign up before the expiration date, you must request a new code. · Epoq Access Code: [email or web address] Expires: 10/17/2018  6:50 AM 
 
4. Enter the last four digits of your Social Security Number (xxxx) and Date of Birth (mm/dd/yyyy) as indicated and click Submit. You will be taken to the next sign-up page. 5. Create a Epoq ID. This will be your Epoq login ID and cannot be changed, so think of one that is secure and easy to remember. 6. Create a Epoq password. You can change your password at any time. 7. Enter your Password Reset Question and Answer. This can be used at a later time if you forget your password. 8. Enter your e-mail address. You will receive e-mail notification when new information is available in [address] 9. Click Sign Up. You can now view and download portions of your medical record. 10. Click the Download Summary menu link to download a portable copy of your medical information. If you have questions, please visit the Frequently Asked Questions section of the Epoq website. Remember, Epoq is NOT to be used for urgent needs. For medical emergencies, dial 911. Now available from your iPhone and Android! Introducing Christ Marrufo As a [facility] patient, I wanted to make you aware of our electronic visit tool called Christ Marrufo. [facility] 24/7 allows you to connect within minutes with a medical provider 24 hours a day, seven days a week via a mobile device or tablet or logging into a secure website from your computer. You can access [employer] from anywhere in the [place]. A virtual visit might be right for you when you have a simple condition and feel like you just dont want to get out of bed, or cant get away from work for an appointment, when your [facility] provider is not available (evenings, weekends or holidays), or when youre out of town and need minor care. Electronic visits cost only $49 and if the [facility] 24/7 provider determines a prescription is needed to treat your condition, one can be electronically transmitted to a nearby pharmacy*. Please take a moment to enroll today if you have not already done so. The enrollment process is free and takes just a few minutes. To enroll, please download the [facility] 24/7 priya to your tablet or phone, or visit [email or web address] to enroll on your computer. And, as an [address] [place] [address] patient with a First[employer] account, the results of your visits will be scanned into your electronic medical record and your primary care provider will be able to view the scanned results. We urge you to continue to see your regular [facility] provider for your ongoing medical care. And while your primary care provider may not be the one available when you seek a [employer] virtual visit, the peace of mind you get from getting a real diagnosis real time can be priceless. For more information on [employer], view our Frequently Asked Questions (FAQs) at [email or web address]. Sincerely, 
 
Ofelia Martins MD 
Chief Medical Officer [facility] Molly Lozano *:  certain medications cannot be prescribed via [employer] Providers Seen During Your Hospitalization Provider Specialty Primary office phone Suzette Moody MD Gastroenterology [phone] Your Primary Care Physician (PCP) Primary Care Physician Office Phone Office [facility]ax [facility] [phone] You are allergic to the following Allergen Reactions Albuterol Other (comments) Influenza Virus Vaccine, Specific Other (comments) Recent Documentation Height Weight BMI Smoking Status 1.702 m 83.9 kg 28.98 kg/m2 Former Smoker Emergency Contacts Name Discharge Info Relation Home Work Mobile Eve Patel DISCHARGE CAREGIVER [3] Spouse [3] [phone] Yahaira Guthrie  Spouse [3] [phone] Patient Belongings The following personal items are in your possession at time of discharge: 
  Dental Appliances: None Please provide this summary of care documentation to your next provider. Signatures-by signing, you are acknowledging that this After Visit Summary has been reviewed with you and you have received a copy. Patient Signature:  ____________________________________________________________ Date:  ____________________________________________________________  
  
Michellensmanuel Wil Provider Signature:  ____________________________________________________________ Date:  ____________________________________________________________

## 2018-09-11 NOTE — ANESTHESIA PREPROCEDURE EVALUATION
Anesthetic History No history of anesthetic complications Review of Systems / Medical History Patient summary reviewed, nursing notes reviewed and pertinent labs reviewed Pulmonary Sleep apnea Asthma Neuro/Psych Cardiovascular Hypertension CAD 
 
 
  
GI/Hepatic/Renal 
  
GERD Endo/Other Hypothyroidism Morbid obesity and arthritis Other Findings Comments: Documentation of current medication Current medications obtained, documented and obtained? YES Risk Factors for Postoperative nausea/vomiting: 
     History of postoperative nausea/vomiting? NO Female? NO Motion sickness? NO Intended opioid administration for postoperative analgesia? NO Smoking Abstinence: 
Current Smoker? NO Elective Surgery? YES Seen preoperatively by anesthesiologist or proxy prior to day of surgery? YES Pt abstained from smoking 24 hours prior to anesthesia? YES Preventive care/screening for High Blood Pressure: 
Aged 18 years and older: YES Screened for high blood pressure: YES Patients with high blood pressure referred to primary care provider 
 for BP management: YES Physical Exam 
 
Airway Mallampati: II 
TM Distance: 4 - 6 cm Neck ROM: normal range of motion Mouth opening: Normal 
 
 Cardiovascular Rhythm: regular Rate: normal 
 
 
 
 Dental 
No notable dental hx Pulmonary Breath sounds clear to auscultation Abdominal 
GI exam deferred Other Findings Anesthetic Plan ASA: 3 Anesthesia type: MAC Induction: Intravenous Anesthetic plan and risks discussed with: Patient

## 2018-10-03 ENCOUNTER — HOSPITAL ENCOUNTER (OUTPATIENT)
Dept: VASCULAR SURGERY | Age: 78
Discharge: HOME | End: 2018-10-03
Attending: INTERNAL MEDICINE
Payer: MEDICARE

## 2018-10-03 DIAGNOSIS — R09.89 CAROTID BRUIT, UNSPECIFIED LATERALITY: ICD-10-CM

## 2018-10-03 DIAGNOSIS — I65.23 BILATERAL CAROTID ARTERY STENOSIS: ICD-10-CM

## 2018-10-03 PROCEDURE — 93880 EXTRACRANIAL BILAT STUDY: CPT

## 2018-10-04 LAB
LEFT CCA DIST DIAS: 22.27 CM/S
LEFT CCA DIST SYS: 132.66 CM/S
LEFT CCA MID DIAS: 19.87 CM/S
LEFT CCA MID SYS: 111.94 CM/S
LEFT CCA PROX DIAS: 22.39 CM/S
LEFT CCA PROX SYS: 112.98 CM/S
LEFT ICA DIST DIAS: 39.26 CM/S
LEFT ICA DIST SYS: 143.81 CM/S
LEFT ICA MID DIAS: 25.32 CM/S
LEFT ICA MID SYS: 132.2 CM/S
LEFT ICA PROX DIAS: 16.96 CM/S
LEFT ICA PROX SYS: 170.3 CM/S
LEFT ICA/CCA SYS: 1.28
LEFT OUTFLOW VESSEL EDV: 0 CM/S
LEFT OUTFLOW VESSEL EDV: 0 CM/S
LEFT SUBCLAVIAN SYS: 155 CM/S
LEFT VERTEBRAL DIAS: 17.15 CM/S
LEFT VERTEBRAL SYS: 78.7 CM/S
RIGHT CCA DIST DIAS: 9.07 CM/S
RIGHT CCA DIST SYS: 123.39 CM/S
RIGHT CCA MID DIAS: 10.66 CM/S
RIGHT CCA MID SYS: 130.19 CM/S
RIGHT CCA PROX DIAS: 10.66 CM/S
RIGHT CCA PROX SYS: 114.03 CM/S
RIGHT ICA DIST DIAS: 24.63 CM/S
RIGHT ICA DIST SYS: 108.27 CM/S
RIGHT ICA MID DIAS: 24.63 CM/S
RIGHT ICA MID SYS: 136.15 CM/S
RIGHT ICA PROX DIAS: 24.63 CM/S
RIGHT ICA PROX SYS: 177.97 CM/S
RIGHT ICA/CCA SYS: 1.44
RIGHT SUBCLAVIAN SYS: 400 CM/S
RIGHT VERTEBRAL DIAS: 10.61 CM/S
RIGHT VERTEBRAL SYS: 48.33 CM/S

## 2018-10-08 NOTE — PROGRESS NOTES
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 We are going to get that repeated again in September of this year.

## 2018-10-13 NOTE — PROGRESS NOTES
He has moderate carotid stenoses bilaterally similar to the study back in February 2018. Please let him know and get him scheduled for repeat studies in 6 months.  ES

## 2018-10-18 ENCOUNTER — APPOINTMENT (OUTPATIENT)
Dept: GENERAL RADIOLOGY | Age: 78
End: 2018-10-18
Attending: EMERGENCY MEDICINE
Payer: MEDICARE

## 2018-10-18 ENCOUNTER — TELEPHONE (OUTPATIENT)
Dept: CARDIOLOGY CLINIC | Age: 78
End: 2018-10-18

## 2018-10-18 ENCOUNTER — HOSPITAL ENCOUNTER (EMERGENCY)
Age: 78
Discharge: HOME | End: 2018-10-18
Attending: EMERGENCY MEDICINE
Payer: MEDICARE

## 2018-10-18 VITALS
TEMPERATURE: 98.7 F | DIASTOLIC BLOOD PRESSURE: 59 MMHG | BODY MASS INDEX: 29.03 KG/M2 | HEIGHT: 67 IN | SYSTOLIC BLOOD PRESSURE: 175 MMHG | RESPIRATION RATE: 18 BRPM | OXYGEN SATURATION: 100 % | WEIGHT: 185 LBS | HEART RATE: 61 BPM

## 2018-10-18 DIAGNOSIS — M79.645 FINGER PAIN, LEFT: ICD-10-CM

## 2018-10-18 DIAGNOSIS — I25.10 CORONARY ARTERY DISEASE INVOLVING NATIVE CORONARY ARTERY OF NATIVE HEART WITHOUT ANGINA PECTORIS: Primary | ICD-10-CM

## 2018-10-18 DIAGNOSIS — S61.209A AVULSION OF FINGER TIP, INITIAL ENCOUNTER: Primary | ICD-10-CM

## 2018-10-18 PROCEDURE — 96372 THER/PROPH/DIAG INJ SC/IM: CPT

## 2018-10-18 PROCEDURE — 90471 IMMUNIZATION ADMIN: CPT

## 2018-10-18 PROCEDURE — 99284 EMERGENCY DEPT VISIT MOD MDM: CPT

## 2018-10-18 PROCEDURE — 74011000250 HC RX REV CODE- 250: Performed by: EMERGENCY MEDICINE

## 2018-10-18 PROCEDURE — 74011250636 HC RX REV CODE- 250/636: Performed by: EMERGENCY MEDICINE

## 2018-10-18 PROCEDURE — 90715 TDAP VACCINE 7 YRS/> IM: CPT | Performed by: EMERGENCY MEDICINE

## 2018-10-18 PROCEDURE — 74011250637 HC RX REV CODE- 250/637: Performed by: EMERGENCY MEDICINE

## 2018-10-18 PROCEDURE — 73140 X-RAY EXAM OF FINGER(S): CPT

## 2018-10-18 RX ORDER — AMOXICILLIN AND CLAVULANATE POTASSIUM 875; 125 MG/1; MG/1
1 TABLET, FILM COATED ORAL 2 TIMES DAILY
Qty: 14 TAB | Refills: 0 | Status: SHIPPED | OUTPATIENT
Start: 2018-10-18 | End: 2018-10-25

## 2018-10-18 RX ORDER — OXYCODONE AND ACETAMINOPHEN 5; 325 MG/1; MG/1
1 TABLET ORAL
Status: COMPLETED | OUTPATIENT
Start: 2018-10-18 | End: 2018-10-18

## 2018-10-18 RX ORDER — BACITRACIN ZINC 500 UNIT/G
OINTMENT (GRAM) TOPICAL
Status: COMPLETED | OUTPATIENT
Start: 2018-10-18 | End: 2018-10-18

## 2018-10-18 RX ORDER — CEFAZOLIN SODIUM 1 G/3ML
500 INJECTION, POWDER, FOR SOLUTION INTRAMUSCULAR; INTRAVENOUS
Status: COMPLETED | OUTPATIENT
Start: 2018-10-18 | End: 2018-10-18

## 2018-10-18 RX ORDER — OXYCODONE AND ACETAMINOPHEN 5; 325 MG/1; MG/1
1 TABLET ORAL
Qty: 12 TAB | Refills: 0 | Status: SHIPPED | OUTPATIENT
Start: 2018-10-18 | End: 2019-01-05

## 2018-10-18 RX ADMIN — CEFAZOLIN 500 MG: 1 INJECTION, POWDER, FOR SOLUTION INTRAMUSCULAR; INTRAVENOUS at 08:29

## 2018-10-18 RX ADMIN — BACITRACIN ZINC: 500 OINTMENT TOPICAL at 08:49

## 2018-10-18 RX ADMIN — OXYCODONE HYDROCHLORIDE AND ACETAMINOPHEN 1 TABLET: 5; 325 TABLET ORAL at 08:29

## 2018-10-18 RX ADMIN — TETANUS TOXOID, REDUCED DIPHTHERIA TOXOID AND ACELLULAR PERTUSSIS VACCINE, ADSORBED 0.5 ML: 5; 2.5; 8; 8; 2.5 SUSPENSION INTRAMUSCULAR at 08:29

## 2018-10-18 NOTE — DISCHARGE INSTRUCTIONS
Hand Pain: Care Instructions  Your Care Instructions    Common causes of hand pain are overuse and injuries, such as might happen during sports or home repair projects. Everyday wear and tear, especially as you get older, also can cause hand pain. Most minor hand injuries will heal on their own, and home treatment is usually all you need to do. If you have sudden and severe pain, you may need tests and treatment. Follow-up care is a key part of your treatment and safety. Be sure to make and go to all appointments, and call your doctor if you are having problems. It's also a good idea to know your test results and keep a list of the medicines you take. How can you care for yourself at home? · Take pain medicines exactly as directed. ? If the doctor gave you a prescription medicine for pain, take it as prescribed. ? If you are not taking a prescription pain medicine, ask your doctor if you can take an over-the-counter medicine. · Rest and protect your hand. Take a break from any activity that may cause pain. · Put ice or a cold pack on your hand for 10 to 20 minutes at a time. Put a thin cloth between the ice and your skin. · Prop up the sore hand on a pillow when you ice it or anytime you sit or lie down during the next 3 days. Try to keep it above the level of your heart. This will help reduce swelling. · If your doctor recommends a sling, splint, or elastic bandage to support your hand, wear it as directed. When should you call for help? Call 911 anytime you think you may need emergency care. For example, call if:    · Your hand turns cool or pale or changes color.    Call your doctor now or seek immediate medical care if:    · You cannot move your hand.     · Your hand pops, moves out of its normal position, and then returns to its normal position.     · You have signs of infection, such as:  ? Increased pain, swelling, warmth, or redness. ? Red streaks leading from the sore area.   ? Pus draining from a place on your hand. ? A fever.     · Your hand feels numb or tingly.    Watch closely for changes in your health, and be sure to contact your doctor if:    · Your hand feels unstable when you try to use it.     · You do not get better as expected.     · You have any new symptoms, such as swelling.     · Bruises from an injury to your hand last longer than 2 weeks. Where can you learn more? Go to http://martine-feng.info/. Enter R273 in the search box to learn more about \"Hand Pain: Care Instructions. \"  Current as of: November 20, 2017  Content Version: 11.8  © 0235-7372 [employer]. Care instructions adapted under license by [facility] (which disclaims liability or warranty for this information). If you have questions about a medical condition or this instruction, always ask your healthcare professional. [place] [address] any warranty or liability for your use of this information. Cuts: Care Instructions  Your Care Instructions  A cut can happen anywhere on your body. Stitches, staples, skin adhesives, or pieces of tape called Steri-Strips are sometimes used to keep the edges of a cut together and help it heal. Steri-Strips can be used by themselves or with stitches or staples. Sometimes cuts are left open. If the cut went deep and through the skin, the doctor may have closed the cut in two layers. A deeper layer of stitches brings the deep part of the cut together. These stitches will dissolve and don't need to be removed. The upper layer closure, which could be stitches, staples, Steri-Strips, or adhesive, is what you see on the cut. A cut is often covered by a bandage. The doctor has checked you carefully, but problems can develop later. If you notice any problems or new symptoms, get medical treatment right away. Follow-up care is a key part of your treatment and safety.  Be sure to make and go to all appointments, and call your doctor if you are having problems. It's also a good idea to know your test results and keep a list of the medicines you take. How can you care for yourself at home? If a cut is open or closed  · Prop up the sore area on a pillow anytime you sit or lie down during the next 3 days. Try to keep it above the level of your heart. This will help reduce swelling. · Keep the cut dry for the first 24 to 48 hours. After this, you can shower if your doctor okays it. Pat the cut dry. · Don't soak the cut, such as in a bathtub. Your doctor will tell you when it's safe to get the cut wet. · After the first 24 to 48 hours, clean the cut with soap and water 2 times a day unless your doctor gives you different instructions. ? Don't use hydrogen peroxide or alcohol, which can slow healing. ? You may cover the cut with a thin layer of petroleum jelly and a nonstick bandage. ? If the doctor put a bandage over the cut, put on a new bandage after cleaning the cut or if the bandage gets wet or dirty. · Avoid any activity that could cause your cut to reopen. · Be safe with medicines. Read and follow all instructions on the label. ? If the doctor gave you a prescription medicine for pain, take it as prescribed. ? If you are not taking a prescription pain medicine, ask your doctor if you can take an over-the-counter medicine. If the cut is closed with stitches, staples, or Steri-Strips  · Follow the above instructions for open or closed cuts. · Do not remove the stitches or staples on your own. Your doctor will tell you when to come back to have the stitches or staples removed. · Leave Steri-Strips on until they fall off. If the cut is closed with a skin adhesive  · Follow the above instructions for open or closed cuts. · Leave the skin adhesive on your skin until it falls off on its own. This may take 5 to 10 days. · Do not scratch, rub, or pick at the adhesive.   · Do not put the sticky part of a bandage directly on the adhesive. · Do not put any kind of ointment, cream, or lotion over the area. This can make the adhesive fall off too soon. Do not use hydrogen peroxide or alcohol, which can slow healing. When should you call for help? Call your doctor now or seek immediate medical care if:    · You have new pain, or your pain gets worse.     · The skin near the cut is cold or pale or changes color.     · You have tingling, weakness, or numbness near the cut.     · The cut starts to bleed, and blood soaks through the bandage. Oozing small amounts of blood is normal.     · You have trouble moving the area near the cut.     · You have symptoms of infection, such as:  ? Increased pain, swelling, warmth, or redness around the cut.  ? Red streaks leading from the cut.  ? Pus draining from the cut.  ? A fever.    Watch closely for changes in your health, and be sure to contact your doctor if:    · The cut reopens.     · You do not get better as expected. Where can you learn more? Go to http://martine-feng.info/. Enter M735 in the search box to learn more about \"Cuts: Care Instructions. \"  Current as of: November 20, 2017  Content Version: 11.8  © 4072-4459 [employer]. Care instructions adapted under license by [employer] (which disclaims liability or warranty for this information). If you have questions about a medical condition or this instruction, always ask your healthcare professional. [place] [address] any warranty or liability for your use of this information.

## 2018-10-18 NOTE — ED PROVIDER NOTES
EMERGENCY DEPARTMENT HISTORY AND PHYSICAL EXAM 
 
7:43 AM 
 
Date: 10/18/2018 Patient Name: Adán Lopez. History of Presenting Illness Chief Complaint Patient presents with  Laceration History Provided By: Patient Chief Complaint: Finger Pain Duration: \"this morning\" Hours Timing:  Acute Location: Left finger Quality: Aching Severity: Moderate Modifying Factors: worse after injury Associated Symptoms: wound Additional History (Context): Adán Parker is a [de-identified] y.o. male with PMHx of GERD, asthma, cardiac cath, CABG, CAD, pulmonary nodule, and prostate cancer who presents with an acute onset of aching moderate left finger pain that started \"this morning\". The pain is worse after the injury and pt has a wound on his finger. Pt states he accidentally cut his left index finger tip with a saw. States it \"slipped\". He came to the ED because he is \"not UTD on Tetanus injection\". Pt is right handed. He was followed by a hand surgeon I the past, Dr. Peg Watts (Plastic surgeon). Denies nausea, numbness, any other injury. Denies any further complaints or symptoms at the moment. PCP: Vicente Tran MD 
 
 
Past History Past Medical History: 
Past Medical History:  
Diagnosis Date  Allergic rhinitis  Arthritis   
 hands  Asthma   
 mild obst disease on pfts Dr. Tacy Crigler  Pederson's esophagus Dr. Luzmaria Payton; last 8/17, for HALO  Bilateral carotid artery stenosis 12/14 BICA 50-69%  BPH with obstruction/lower urinary tract symptoms  BPH without obstruction/lower urinary tract symptoms  CABG x 3 04/24/2014 LIMA to LAD, and saphenous vein grafts to the second diagonal of the LAD, and to the posterior descending branch of the RCA, Dr. Sarah Lopez, 4/23/14.  CAD (coronary artery disease)  Cardiac catheterization 04/18/2014 Superdominant RCA. mRCA 90%. pRPDA 60%. LM (modest sized) patent. p-mLAD 85%. CX < 50%. LVEDP 17 mmHg. EF 60%. No RWMA. CABG recommended.  Cardiac echocardiogram 06/24/2008 EF 70%. Mild conc LVH. Gr 2 DDfx. Mild LAE. Mild MR. PASP 38 mmHg.  Cardiac nuclear imaging study, abnormal 04/15/2014 Mod ischemia in lg area of anterior wall, anteroapex, anteroseptum c/w high-grade prox LAD obstruction. Mod partially transient defect likely ischemia w/very sm infarction in RCA. TID 1.31 suggests 3-vessel CAD. Mod distal anterior hypk. Mild-mod anteroseptal, inferoseptal, anterolateral hypk. EF 46%. Strongly positive EKG on max EST. Ex time 6 min.  Carotid duplex 04/06/2016 Mod 50-69% bilateral ICA stenosis. Probable significant LECA stenosis; no change from 2015;  no change 2/17;  
 Colon adenoma 08/2017 Dr Tafoya Speak  Colon polyps 2007 Dr. Lottie King  Difficulty hearing  Erectile dysfunction  FHx: heart disease  GERD (gastroesophageal reflux disease) by EGD 2007 Dr. Tafoya Speak  IFG (impaired fasting glucose) 5/14  Multinodular goiter 2014; 2015; 4/16  Obesity   
 peak weight 202 lbs, bmi 31.4 from 4/14  Prostate CA ([facility] [address] 04/2018 Dr Francesco Patricio stage 2B [id number], Jj 3+4, P,10, G2; brachytherapy Dr Rosy Aaron  Prostate cancer ([facility] [address] 06/2010 Dr. Casey Encarnacion [id number] Jj 3+3, 1/9 cores up to 10% 1 core; cryo Oct 2012 psa went from 7/1 to torsten 1.78  
 Pulmonary nodule 2014  
 no change 2015, 3/17  Sleep apnea CPAP not used Past Surgical History: 
Past Surgical History:  
Procedure Laterality Date  CARDIAC SURG PROCEDURE UNLIST  11/06  
 thallium negative  CARDIAC SURG PROCEDURE UNLIST  4/14  
 3V CABG Dr Mona Mayo  CHEST SURGERY PROCEDURE UNLISTED  2007  
 mild obst disease on PFTs Dr. Roni Loja  HX APPENDECTOMY  HX CARPAL TUNNEL RELEASE Dr. Quinn Milton  HX CATARACT REMOVAL  11/14  
 bilat Dr Rowdy Santana  HX CHOLECYSTECTOMY  2004  HX MOHS PROCEDURES Dr. Micaela Dakin  HX TONSILLECTOMY  HX TURP    
 x2 Dr. Idris Saldaña  HX UROLOGICAL  10/2012 [facility], Dr. Reynold Hartmann, Cystoscopy and dilation of stricture, cryoablation of prostate  VASCULAR SURGERY PROCEDURE UNLIST   BICA 50-69% stenosis Family History: 
Family History Problem Relation Age of Onset  Heart Disease Father  Stroke Mother  Hypertension Mother  Diabetes Sister Social History: 
Social History Tobacco Use  Smoking status: Former Smoker Packs/day: 0.50 Years: 4.00 Pack years: 2.00 Last attempt to quit: 1957 Years since quittin.8  Smokeless tobacco: Never Used  Tobacco comment: Quit in  Substance Use Topics  Alcohol use: No  
 Drug use: No  
 
 
Allergies: Allergies Allergen Reactions  Albuterol Other (comments)  Influenza Virus Vaccine, Specific Other (comments) Review of Systems Review of Systems Constitutional: Negative for fever. HENT: Negative for sore throat. Eyes: Negative for redness and visual disturbance. Respiratory: Negative for shortness of breath and wheezing. Cardiovascular: Negative for chest pain. Gastrointestinal: Negative for abdominal pain and nausea. Endocrine: Negative for polyuria. Genitourinary: Negative for dysuria. Musculoskeletal: Negative for arthralgias and neck stiffness. Positive for finger pain Skin: Positive for wound. Negative for rash. Neurological: Negative for numbness and headaches. All other systems reviewed and are negative. Physical Exam  
 
Visit Vitals /59 (BP 1 Location: Left arm, BP Patient Position: At rest) Pulse 61 Temp 98.7 °F (37.1 °C) Resp 18 Ht 5' 7\" (1.702 m) Wt 83.9 kg (185 lb) SpO2 100% BMI 28.98 kg/m² Physical Exam  
Constitutional: He is oriented to person, place, and time. He appears well-developed and well-nourished. No distress. HENT:  
Head: Normocephalic and atraumatic. Mouth/Throat: Oropharynx is clear and moist.  
Eyes: Conjunctivae are normal. Pupils are equal, round, and reactive to light. No scleral icterus. Neck: Normal range of motion. Neck supple. Cardiovascular: Normal rate and intact distal pulses. Capillary refill < 2 seconds Good radial pulse. Pulmonary/Chest: Effort normal and breath sounds normal. No respiratory distress. He has no wheezes. Abdominal: Soft. Bowel sounds are normal. He exhibits no distension. There is no tenderness. Musculoskeletal: Normal range of motion. He exhibits no edema. Pt has injury to pulp of the finger. No bone is seen. Lymphadenopathy:  
  He has no cervical adenopathy. Neurological: He is alert and oriented to person, place, and time. No cranial nerve deficit. Hand strength is 5/5. Pt can bend DIP of injured finger. Full ROM. Sensation is intact. Skin: Skin is warm and dry. He is not diaphoretic. The distal tip of the left index finger is avulsed. The bleeding is controlled. The fingernail is intact. Nursing note and vitals reviewed. Diagnostic Study Results Labs - No results found for this or any previous visit (from the past 12 hour(s)). Radiologic Studies -  
XR 2ND FINGER LT MIN 2 V Final Result IMPRESSION: 
As discussed. Injury to distal tip noted. No foreign body or bony tip noted. Medical Decision Making I am the first provider for this patient. I reviewed the vital signs, available nursing notes, past medical history, past surgical history, family history and social history. Vital Signs-Reviewed the patient's vital signs. Pulse Oximetry Analysis -  100 % on RA, normal  
 
Records Reviewed: Nursing Notes (Time of Review: 7:43 AM) Provider Notes (Medical Decision Making): finger tip pulp avulsion. Nail intact. Bleeding controlled. Cleaned wound thouroghly, give tdap, analgesia, ancef, get xray. Nothing to repair. Tip of skin is gone.  
 
 
MDM 
 Number of Diagnoses or Management Options Avulsion of finger tip, initial encounter:  
Finger pain, left:  
 
 
Medications diph,Pertuss(AC),Tet Vac-PF (BOOSTRIX) suspension 0.5 mL (0.5 mL IntraMUSCular Given 10/18/18 0829) oxyCODONE-acetaminophen (PERCOCET) 5-325 mg per tablet 1 Tab (1 Tab Oral Given 10/18/18 0829) ceFAZolin (ANCEF) injection 500 mg (500 mg IntraMUSCular Given 10/18/18 0829)  
bacitracin zinc (BACITRACIN) 500 unit/gram ointment ( Topical Given 10/18/18 0849) ED Course: Progress Notes, Reevaluation, and Consults: 
Apply dressing. He wants to fu his plastic surgeon who does hand. I have reassessed the patient. I have discussed the workup, results and plan with the patient and patient is in agreement. Patient is feeling better. Patient will be prescribed percocet, augmentin. Patient was discharge in stable condition. Patient was given outpatient follow up. Patient is to return to emergency department if any new or worsening condition. Diagnosis Clinical Impression: 1. Avulsion of finger tip, initial encounter 2. Finger pain, left Disposition: discharged. Follow-up Information Follow up With Specialties Details Why Contact Info Екатерина Oreilly MD Plastic Surgery Schedule an appointment as soon as possible for a visit today  [address] [facility] [address] 1949 [address] [facility] 
[phone] [address] [place] EMERGENCY DEPT Emergency Medicine  As needed, If symptoms worsen [address] \[facility] [place] [facility]\"" [id number] [phone] Medication List  
  
START taking these medications   
amoxicillin-clavulanate 875-125 mg per tablet Commonly known as:  AUGMENTIN Take 1 Tab by mouth two (2) times a day for 7 days. oxyCODONE-acetaminophen 5-325 mg per tablet Commonly known as:  PERCOCET Take 1 Tab by mouth every six (6) hours as needed for Pain. Max Daily Amount: 4 Tabs. CONTINUE taking these medications * amLODIPine 5 mg tablet Commonly known as:  [place] Take 1 Tab by mouth daily. * amLODIPine 10 mg tablet Commonly known as:  [place] Take 1 Tab by mouth daily. atorvastatin 20 mg tablet Commonly known as:  LIPITOR 
TAKE 1 TABLET BY MOUTH EVERY NIGHT AT BEDTIME 
  
levalbuterol tartrate 45 mcg/actuation inhaler Commonly known as:  [place] Take 2 Puffs by inhalation every six (6) hours as needed for Wheezing. omeprazole 20 mg capsule Commonly known as:  PRILOSEC 
  
tamsulosin 0.4 mg capsule Commonly known as:  FLOMAX * This list has 2 medication(s) that are the same as other medications prescribed for you. Read the directions carefully, and ask your doctor or other care provider to review them with you. ASK your doctor about these medications   
aspirin 81 mg tablet CENTRUM SILVER PO 
  
glucosam-chondroit-C-manganese 098-426-26-3 mg Cap 
  
lisinopril 20 mg tablet Commonly known as:  PRINIVIL, ZESTRIL 
  
metoprolol succinate 25 mg XL tablet Commonly known as:  TOPROL-XL 
TAKE 1 TABLET BY MOUTH DAILY 
  
nitroglycerin 0.4 mg SL tablet Commonly known as:  NITROSTAT 
  
VITAMIN C 1,000 mg tablet Generic drug:  ascorbic acid (vitamin C) Where to Get Your Medications Information about where to get these medications is not yet available Ask your nurse or doctor about these medications · amoxicillin-clavulanate 875-125 mg per tablet · oxyCODONE-acetaminophen 5-325 mg per tablet 
  
 
_______________________________ Attestations: 
Scribe Attestation Curt Mcclendon, acting as a scribe for and in the presence of Neftali Ireland DO October 18, 2018 at 7:43 AM 
    
Provider Attestation:     
I personally performed the services described in the documentation, reviewed the documentation, as recorded by the scribe in my presence, and it accurately and completely records my words and actions.  October 18, 2018 at 7:43  - Steffen Vega, DO   
_______________________________

## 2018-10-18 NOTE — ED TRIAGE NOTES
Patient states he was putting his saw away yesterday and the base of the saw landed on his left index finger, cutting part of the tip of his finger off. He has had continued bleeding this morning. No active bleeding at this time.

## 2018-10-18 NOTE — TELEPHONE ENCOUNTER
----- Message from Kailyn Yi DO sent at 10/13/2018 12:31 PM EDT ----- He has moderate carotid stenoses bilaterally similar to the study back in February 2018. Please let him know and get him scheduled for repeat studies in 6 months.  ES

## 2018-11-21 ENCOUNTER — OFFICE VISIT (OUTPATIENT)
Dept: CARDIOLOGY CLINIC | Age: 78
End: 2018-11-21

## 2018-11-21 VITALS
HEIGHT: 67 IN | SYSTOLIC BLOOD PRESSURE: 124 MMHG | DIASTOLIC BLOOD PRESSURE: 66 MMHG | HEART RATE: 61 BPM | OXYGEN SATURATION: 98 % | BODY MASS INDEX: 30.29 KG/M2 | WEIGHT: 193 LBS

## 2018-11-21 DIAGNOSIS — Z95.1 S/P CABG X 3: ICD-10-CM

## 2018-11-21 DIAGNOSIS — I10 ESSENTIAL HYPERTENSION: ICD-10-CM

## 2018-11-21 DIAGNOSIS — E78.5 HYPERLIPIDEMIA, UNSPECIFIED HYPERLIPIDEMIA TYPE: ICD-10-CM

## 2018-11-21 DIAGNOSIS — I25.10 CORONARY ARTERY DISEASE INVOLVING NATIVE CORONARY ARTERY OF NATIVE HEART WITHOUT ANGINA PECTORIS: Primary | ICD-10-CM

## 2018-11-21 DIAGNOSIS — I65.23 BILATERAL CAROTID ARTERY STENOSIS: ICD-10-CM

## 2018-11-21 NOTE — PROGRESS NOTES
Review of Systems Constitutional: Negative. Respiratory: Positive for shortness of breath. Negative for cough, hemoptysis, sputum production and wheezing. Cardiovascular: Positive for leg swelling. Negative for chest pain, palpitations, orthopnea, claudication and PND. Gastrointestinal: Negative. Musculoskeletal: Negative.

## 2018-11-21 NOTE — PROGRESS NOTES
HPI:  I saw Carlyn Santos in my office today in cardiovascular evaluation regarding his chronic coronary artery disease.  Mr. Ptael is a very pleasant 78 y. o. white male with history of gastroesophageal reflux disease with Pederson's esophagus, asthma, obstructive sleep apnea, cancer of the prostate, significant coronary artery disease with development of exertional angina over a 6-month period of time prompting a nuclear myocardial perfusion study ordered by Dr. Alicia Monsalve in April 2014 which was slightly abnormal and ultimately with his symptom complex prompted a cardiac catheterization by Dr. Maico Nick demonstrating several three vessel coronary disease with subsequent coronary artery bypass grafting surgery x 3 by Dr. Danial Gannon with the following bypasses:  
   
1. Left internal mammary artery to the LAD. 2. SVG to the second diagonal coronary artery off of the left anterior descending and posterior descending branch of the distal right coronary artery. 
   
He did reasonably well postop. He did have a little sinus tachycardia initially for which we put him on beta blockers. Since the beta blockers caused fatigue with Lopressor 50 mg twice a day, it was decreased to 25 mg twice a day and then ultimately switched to Toprol XL 25 mg daily. 
  
He comes in today and relates that he is doing reasonably well except for the fact that he has some mild shortness of breath with exertion which is been stable and a little peripheral edema which seems to come on throughout the day and go away overnight to suggest is from venous insufficiency. Landry Slaughter Encounter Diagnoses Name Primary?  Coronary artery disease involving native coronary artery of native heart without angina pectoris Yes  S/P CABG x 3   
 Essential hypertension  Hyperlipidemia, unspecified hyperlipidemia type  Bilateral carotid artery stenosis 12/14 Discussion:  This gentleman appears to be doing about as well as we could expect and I really have no recommendations for change at this time. He is not having any symptoms to suggest the development of recurrent symptomatic obstructive coronary artery disease or any heart failure issues. He does have carotid disease with bilateral carotid bruits but we have been following his carotids with serial carotid duplex studies last of which we got on October 3, 2018 and that demonstrated that he had moderate 50-69% internal carotid artery obstructions which were stable and I would like to repeat this in 6-8 months. His lipid profile has been doing very well over the years on only Lipitor 20 mg daily and his latest lipid profile which was done on February 2, 2018 was really excellent with total cholesterol 112, triglycerides of 113, HDL of 42, LDL of 47.4, and VLDL of 22.6 which I think is excellent control and I would make any recommendations for change. His blood pressure is well controlled today and his EKG though showing one PVC is otherwise stable so I am simply going to plan to see him again in several months or sooner if any new cardiovascular symptoms should develop in the interim. PCP: Conor Chawla MD 
 
  
Past Medical History:  
Diagnosis Date  Allergic rhinitis  Arthritis   
 hands  Asthma   
 mild obst disease on pfts Dr. Melissa Sampson  Pederson's esophagus Dr. Radha Rogers; last 8/17, for HALO  Bilateral carotid artery stenosis 12/14 BICA 50-69%  BPH with obstruction/lower urinary tract symptoms  BPH without obstruction/lower urinary tract symptoms  CABG x 3 04/24/2014 LIMA to LAD, and saphenous vein grafts to the second diagonal of the LAD, and to the posterior descending branch of the RCA, Dr. Ezequiel Roach, 4/23/14.  CAD (coronary artery disease)  Cardiac catheterization 04/18/2014 Superdominant RCA. mRCA 90%. pRPDA 60%. LM (modest sized) patent.  p-mLAD 85%. CX < 50%. LVEDP 17 mmHg. EF 60%. No RWMA.   CABG recommended.  Cardiac echocardiogram 06/24/2008 EF 70%. Mild conc LVH. Gr 2 DDfx. Mild LAE. Mild MR. PASP 38 mmHg.  Cardiac nuclear imaging study, abnormal 04/15/2014 Mod ischemia in lg area of anterior wall, anteroapex, anteroseptum c/w high-grade prox LAD obstruction. Mod partially transient defect likely ischemia w/very sm infarction in RCA. TID 1.31 suggests 3-vessel CAD. Mod distal anterior hypk. Mild-mod anteroseptal, inferoseptal, anterolateral hypk. EF 46%. Strongly positive EKG on max EST. Ex time 6 min.  Carotid duplex 04/06/2016 Mod 50-69% bilateral ICA stenosis. Probable significant LECA stenosis; no change from 2015;  no change 2/17;  
 Colon adenoma 08/2017 Dr Bandar Aguirre  Colon polyps 2007 Dr. Bandar Aguirre  Difficulty hearing  Erectile dysfunction  FHx: heart disease  GERD (gastroesophageal reflux disease) by EGD 2007 Dr. Bandar Aguirre  IFG (impaired fasting glucose) 5/14  Multinodular goiter 2014; 2015; 4/16  Obesity   
 peak weight 202 lbs, bmi 31.4 from 4/14  Prostate CA ([facility] [address] 04/2018 Dr Stephane Fuller stage 2B [id number], Jj 3+4, P,10, G2; brachytherapy Dr Toni Valle  Prostate cancer ([facility] [address] 06/2010 Dr. Ivett Rizzo [id number] Jj 3+3, 1/9 cores up to 10% 1 core; cryo Oct 2012 psa went from 7/1 to torsten 1.78  
 Pulmonary nodule 2014  
 no change 2015, 3/17  Sleep apnea CPAP not used Past Surgical History:  
Procedure Laterality Date  CARDIAC SURG PROCEDURE UNLIST  11/06  
 thallium negative  CARDIAC SURG PROCEDURE UNLIST  4/14  
 3V CABG Dr Denzel Mock  CHEST SURGERY PROCEDURE UNLISTED  2007  
 mild obst disease on PFTs Dr. Duy Dominguez  HX APPENDECTOMY  HX CARPAL TUNNEL RELEASE Dr. Gopi Ratliff  HX CATARACT REMOVAL  11/14  
 bilat Dr Linda Irving  HX CHOLECYSTECTOMY  2004  HX MOHS PROCEDURES Dr. Florentin Maria  HX TONSILLECTOMY  HX TURP    
 x2 Dr. Ivett Rizzo  HX UROLOGICAL  10/2012 [facility], Dr. Lola García, Cystoscopy and dilation of stricture, cryoablation of prostate  VASCULAR SURGERY PROCEDURE UNLIST   BICA 50-69% stenosis Current Outpatient Medications Medication Sig  
 oxyCODONE-acetaminophen (PERCOCET) 5-325 mg per tablet Take 1 Tab by mouth every six (6) hours as needed for Pain. Max Daily Amount: 4 Tabs.  levalbuterol tartrate (XOPENEX) 45 mcg/actuation inhaler Take 2 Puffs by inhalation every six (6) hours as needed for Wheezing.  amLODIPine (NORVASC) 10 mg tablet Take 1 Tab by mouth daily.  tamsulosin (FLOMAX) 0.4 mg capsule  atorvastatin (LIPITOR) 20 mg tablet TAKE 1 TABLET BY MOUTH EVERY NIGHT AT BEDTIME  lisinopril (PRINIVIL, ZESTRIL) 20 mg tablet Take  by mouth daily.  omeprazole (PRILOSEC) 20 mg capsule  metoprolol succinate (TOPROL-XL) 25 mg XL tablet TAKE 1 TABLET BY MOUTH DAILY  nitroglycerin (NITROSTAT) 0.4 mg SL tablet 0.4 mg by SubLINGual route every five (5) minutes as needed for Chest Pain.  glucosam-chondroit-C-manganese 167-548-20-3 mg cap Take 1 Tab by mouth daily.  ascorbic acid (VITAMIN C) 1,000 mg tablet Take 1,000 mg by mouth daily.  MULTIVITAMIN W-MINERALS/LUTEIN (CENTRUM SILVER PO) Take 1 Tab by mouth daily.  aspirin 81 mg tablet Take 81 mg by mouth daily. Indications: MYOCARDIAL INFARCTION PREVENTION No current facility-administered medications for this visit. Allergies Allergen Reactions  Albuterol Other (comments)  Influenza Virus Vaccine, Specific Other (comments) Social History : 
Social History Tobacco Use  Smoking status: Former Smoker Packs/day: 0.50 Years: 4.00 Pack years: 2.00 Last attempt to quit: 1957 Years since quittin.9  Smokeless tobacco: Never Used  Tobacco comment: Quit in  Substance Use Topics  Alcohol use: No  
  
 
Family History: family history includes Diabetes in his sister;  Heart Disease in his father; Hypertension in his mother; Stroke in his mother. Review of Systems: 
Constitutional: Negative. Respiratory: Positive for shortness of breath. Negative for cough, hemoptysis, sputum production and wheezing. Cardiovascular: Positive for leg swelling. Negative for chest pain, palpitations, orthopnea, claudication and PND. Gastrointestinal: Negative. Musculoskeletal: Negative. Physical Exam:   
The patient is a cooperative, alert, well developed, well nourished [de-identified] y.o.  male who is in no acute distress at the time of the examination. Visit Vitals /66 Pulse 61 Ht 5' 7\" (1.702 m) Wt 87.5 kg (193 lb) SpO2 98% BMI 30.23 kg/m² HEENT: Conjuctiva white, mucosa moist, no pallor or cyanosis. NECK: Supple without masses, tenderness or thyromegaly. There was no jugular venous distention. Carotid are full bilaterally with soft bilateral bruits right greater than left vs. radiation of murmur to the neck. CHEST: Symmetrical with good excursion. LUNGS: Clear to auscultation in all fields. HEART: The apex is not displaced. There were no lifts, thrills or heaves. There is a normal S1 and S2. There is a grade I/VI PILAR along the LSB with radiation to the base and lower neck without appreciable diastolic murmurs, rubs, clicks, or gallops auscultated. ABDOMEN: Soft without masses, tenderness or organomegaly. EXTREMITIES: Full peripheral pulses with trace peripheral edema. Review of Data: See PMH and Cardiology and Imaging sections for cardiac testing Lab Results Component Value Date/Time  Cholesterol, total 112 02/02/2018 06:45 AM  
 Cholesterol, total 106 02/02/2018 06:45 AM  
 HDL Cholesterol 42 02/02/2018 06:45 AM  
 HDL Cholesterol 42 02/02/2018 06:45 AM  
 LDL, calculated 47.4 02/02/2018 06:45 AM  
 LDL, calculated 41.4 02/02/2018 06:45 AM  
 Triglyceride 113 02/02/2018 06:45 AM  
 Triglyceride 113 02/02/2018 06:45 AM  
 CHOL/HDL Ratio 2.7 02/02/2018 06:45 AM  
 CHOL/HDL Ratio 2.5 02/02/2018 06:45 AM  
 
 
Results for orders placed or performed in visit on 11/21/18 AMB POC EKG ROUTINE W/ 12 LEADS, INTER & REP     Status: None Narrative Normal sinus rhythm rate 61. First-degree AV block. One interpolated PVC. Minor nonspecific inferior and high lateral ST changes. Compared to the EKG of August 15, 2018 there was little interval change except that the PVC is new. Shelly Robles D.O., F.A.C.C. [facility] [place] [facility] [address] [facility] [address] [place] [phone] PLEASE NOTE:  This document has been produced using voice recognition software. Unrecognized errors in transcription may be present.

## 2018-11-26 ENCOUNTER — OFFICE VISIT (OUTPATIENT)
Dept: INTERNAL MEDICINE CLINIC | Age: 78
End: 2018-11-26

## 2018-11-26 VITALS
OXYGEN SATURATION: 98 % | HEART RATE: 74 BPM | BODY MASS INDEX: 29.98 KG/M2 | HEIGHT: 67 IN | DIASTOLIC BLOOD PRESSURE: 66 MMHG | SYSTOLIC BLOOD PRESSURE: 132 MMHG | RESPIRATION RATE: 14 BRPM | TEMPERATURE: 98.1 F | WEIGHT: 191 LBS

## 2018-11-26 DIAGNOSIS — J30.9 ALLERGIC RHINITIS, UNSPECIFIED SEASONALITY, UNSPECIFIED TRIGGER: Primary | ICD-10-CM

## 2018-11-26 RX ORDER — FLUTICASONE PROPIONATE 50 MCG
SPRAY, SUSPENSION (ML) NASAL
Qty: 1 BOTTLE | Refills: 12 | Status: ON HOLD | OUTPATIENT
Start: 2018-11-26 | End: 2019-10-29

## 2018-11-26 NOTE — PROGRESS NOTES
[de-identified] y. o.  OR  male who presents for evaluation. About 2 weeks ago, he was working in the shop and he quickly turned around and managed to rj his right chest into the end of 2 x 4. There has been bruising and swelling there since although seems to be resolving, the pain is actually much less now. For the last 2-3 weeks, he has been having issues with mostly sinus congestion. Clear drainage is noted, no fevers, facial pain, ear pain, minimal sore throat. Coughing minimally and bringing up mostly clear material, no increased wheezing or dyspnea. He has not used any meds outside of (?) Actifed as needed Past Medical History:  
Diagnosis Date  Allergic rhinitis  Arthritis   
 hands  Asthma   
 mild obst disease on pfts Dr. Arabella Rhodes  Pederson's esophagus Dr. Malcolm Xavier; last 8/17, for HALO  Bilateral carotid artery stenosis 12/14 BICA 50-69%  BPH with obstruction/lower urinary tract symptoms  CABG x 3 04/24/2014 LIMA to LAD, and saphenous vein grafts to the second diagonal of the LAD, and to the posterior descending branch of the RCA, Dr. Judy Ojeda, 4/23/14.  CAD (coronary artery disease)  Cardiac catheterization 04/18/2014 Superdominant RCA. mRCA 90%. pRPDA 60%. LM (modest sized) patent.  p-mLAD 85%. CX < 50%. LVEDP 17 mmHg. EF 60%. No RWMA. CABG recommended.  Cardiac echocardiogram 06/24/2008 EF 70%. Mild conc LVH. Gr 2 DDfx. Mild LAE. Mild MR. PASP 38 mmHg.  Cardiac nuclear imaging study, abnormal 04/15/2014 Mod ischemia in lg area of anterior wall, anteroapex, anteroseptum c/w high-grade prox LAD obstruction. Mod partially transient defect likely ischemia w/very sm infarction in RCA. TID 1.31 suggests 3-vessel CAD. Mod distal anterior hypk. Mild-mod anteroseptal, inferoseptal, anterolateral hypk. EF 46%. Strongly positive EKG on max EST. Ex time 6 min.  Carotid duplex 04/06/2016 Mod 50-69% bilateral ICA stenosis. Probable significant LECA stenosis; no change from 2015;  no change 2/17;  
 Colon adenoma 08/2017 Dr Bernardo Stahl  Colon polyps 2007 Dr. Bernardo Stahl  Difficulty hearing  Erectile dysfunction  FHx: heart disease  GERD (gastroesophageal reflux disease) by EGD 2007 Dr. Bernardo Stahl  IFG (impaired fasting glucose) 5/14  Multinodular goiter 2014; 2015; 4/16  Obesity   
 peak weight 202 lbs, bmi 31.4 from 4/14  Prostate CA ([facility] 04/2018 Dr Ada Wylie stage 2B [id number], [place] 3+4, P,10, G2; brachytherapy Dr Reagan Fischer  [facility] 06/2010 Dr. Balta Vicente [id number] Lenora 3+3, 1/9 cores up to 10% 1 core; cryo Oct 2012 psa went from 7/1 to torsten 1.78  
 Pulmonary nodule 2014  
 no change 2015, 3/17  Sleep apnea CPAP not used Current Outpatient Medications Medication Sig  
 fluticasone (FLONASE) 50 mcg/actuation nasal spray 2 sprays each nostril daily  oxyCODONE-acetaminophen (PERCOCET) 5-325 mg per tablet Take 1 Tab by mouth every six (6) hours as needed for Pain. Max Daily Amount: 4 Tabs.  levalbuterol tartrate (XOPENEX) 45 mcg/actuation inhaler Take 2 Puffs by inhalation every six (6) hours as needed for Wheezing.  amLODIPine (NORVASC) 10 mg tablet Take 1 Tab by mouth daily.  tamsulosin (FLOMAX) 0.4 mg capsule  atorvastatin (LIPITOR) 20 mg tablet TAKE 1 TABLET BY MOUTH EVERY NIGHT AT BEDTIME  lisinopril (PRINIVIL, ZESTRIL) 20 mg tablet Take  by mouth daily.  omeprazole (PRILOSEC) 20 mg capsule  metoprolol succinate (TOPROL-XL) 25 mg XL tablet TAKE 1 TABLET BY MOUTH DAILY  nitroglycerin (NITROSTAT) 0.4 mg SL tablet 0.4 mg by SubLINGual route every five (5) minutes as needed for Chest Pain.  glucosam-chondroit-C-manganese 129-276-73-3 mg cap Take 1 Tab by mouth daily.  ascorbic acid (VITAMIN C) 1,000 mg tablet Take 1,000 mg by mouth daily.  MULTIVITAMIN W-MINERALS/LUTEIN (CENTRUM SILVER PO) Take 1 Tab by mouth daily.  aspirin 81 mg tablet Take 81 mg by mouth daily. Indications: MYOCARDIAL INFARCTION PREVENTION No current facility-administered medications for this visit. Allergies Allergen Reactions  Albuterol Other (comments)  Influenza Virus Vaccine, Specific Other (comments) Visit Vitals /66 Pulse 74 Temp 98.1 °F (36.7 °C) (Oral) Resp 14 Ht 5' 7\" (1.702 m) Wt 191 lb (86.6 kg) SpO2 98% BMI 29.91 kg/m² Tympanic membranes were normal.  Sinuses were nontender with mildly boggy mucosa, oropharynx otherwise benign, no adenopathy. Lungs are clear with good breath sounds. Heart showed regular rate and rhythm. There was a 1.25 inch hematoma in the right anterior upper chest wall, bruising appears to be improved and resolving just inferior to that Assessment and plan: 1. Chest wall hematoma. Resolving 2. Allergies versus URI. We will give Flonase, told him to buy OTC antihistamine. Call with an update 3. Overweight. Lifestyle and dietary measures. Portion control reiterated. Above conditions discussed at length and patient vocalized understanding. All questions answered to patient satisfaction

## 2018-11-26 NOTE — PROGRESS NOTES
1. Have you been to the ER, urgent care clinic or hospitalized since your last visit? NO.  
 
2. Have you seen or consulted any other health care providers outside of the [address] [place] [address] since your last visit (Include any pap smears or colon screening)? NO Do you have an Advanced Directive? NO Would you like information on Advanced Directives? NO Chief Complaint Patient presents with  Cold Symptoms  
  drainage and congestion  Other  
  pt stated he walked into a 2x4 that his him in shoulder. hard raised area on right side of chest x 2 weeks

## 2018-11-30 RX ORDER — METOPROLOL SUCCINATE 25 MG/1
TABLET, EXTENDED RELEASE ORAL
Qty: 90 TAB | Refills: 3 | Status: SHIPPED | OUTPATIENT
Start: 2018-11-30 | End: 2019-12-14 | Stop reason: SDUPTHER

## 2019-01-05 ENCOUNTER — HOSPITAL ENCOUNTER (EMERGENCY)
Age: 79
Discharge: HOME | End: 2019-01-05
Attending: EMERGENCY MEDICINE | Admitting: EMERGENCY MEDICINE
Payer: MEDICARE

## 2019-01-05 ENCOUNTER — APPOINTMENT (OUTPATIENT)
Dept: GENERAL RADIOLOGY | Age: 79
End: 2019-01-05
Attending: EMERGENCY MEDICINE
Payer: MEDICARE

## 2019-01-05 VITALS
HEIGHT: 66 IN | BODY MASS INDEX: 29.25 KG/M2 | WEIGHT: 182 LBS | SYSTOLIC BLOOD PRESSURE: 145 MMHG | HEART RATE: 68 BPM | RESPIRATION RATE: 18 BRPM | OXYGEN SATURATION: 98 % | TEMPERATURE: 99.4 F | DIASTOLIC BLOOD PRESSURE: 60 MMHG

## 2019-01-05 DIAGNOSIS — J10.1 INFLUENZA A: Primary | ICD-10-CM

## 2019-01-05 LAB
FLUAV AG NPH QL IA: NEGATIVE
FLUBV AG NOSE QL IA: NEGATIVE

## 2019-01-05 PROCEDURE — 87804 INFLUENZA ASSAY W/OPTIC: CPT

## 2019-01-05 PROCEDURE — 71046 X-RAY EXAM CHEST 2 VIEWS: CPT

## 2019-01-05 PROCEDURE — 99282 EMERGENCY DEPT VISIT SF MDM: CPT

## 2019-01-05 RX ORDER — OSELTAMIVIR PHOSPHATE 75 MG/1
75 CAPSULE ORAL 2 TIMES DAILY
Qty: 10 CAP | Refills: 0 | Status: SHIPPED | OUTPATIENT
Start: 2019-01-05 | End: 2019-01-10

## 2019-01-05 NOTE — ED PROVIDER NOTES
EMERGENCY DEPARTMENT HISTORY AND PHYSICAL EXAM 
 
7:18 AM 
 
 
Date: 1/5/2019 Patient Name: Nicole Wilkinson. History of Presenting Illness Chief Complaint Patient presents with  Cough  Generalized Body Aches History Provided By: Patient Chief Complaint: Cough; Generalized Body Aches Duration:  1 Day Timing:  Constant Location: N/A Quality: N/A Severity: N/A Modifying Factors: None Associated Symptoms: denies any other associated signs or symptoms Additional History (Context): Nicole Lozano is a [de-identified] y.o. male with PMHx of CAD, asthma, and prostate cancer presenting to the ED c/o constant cough and generalized body aches since yesterday. Reports no modifying factors for his symptoms. Denies smoking, alcohol use, fever, and any other symptoms or complaints. Patient wife was dx with influenza A this am. 
 
PCP: Asif Anders MD 
 
Current Outpatient Medications Medication Sig Dispense Refill  oseltamivir (TAMIFLU) 75 mg capsule Take 1 Cap by mouth two (2) times a day for 5 days. 10 Cap 0  
 metoprolol succinate (TOPROL-XL) 25 mg XL tablet TAKE 1 TABLET BY MOUTH DAILY 90 Tab 3  
 fluticasone (FLONASE) 50 mcg/actuation nasal spray 2 sprays each nostril daily 1 Bottle 12  
 levalbuterol tartrate (XOPENEX) 45 mcg/actuation inhaler Take 2 Puffs by inhalation every six (6) hours as needed for Wheezing. 1 Inhaler 5  
 amLODIPine (NORVASC) 10 mg tablet Take 1 Tab by mouth daily. 90 Tab 3  
 tamsulosin (FLOMAX) 0.4 mg capsule  atorvastatin (LIPITOR) 20 mg tablet TAKE 1 TABLET BY MOUTH EVERY NIGHT AT BEDTIME 90 Tab 2  
 lisinopril (PRINIVIL, ZESTRIL) 20 mg tablet Take  by mouth daily.  omeprazole (PRILOSEC) 20 mg capsule  nitroglycerin (NITROSTAT) 0.4 mg SL tablet 0.4 mg by SubLINGual route every five (5) minutes as needed for Chest Pain.  glucosam-chondroit-C-manganese 454-665-19-3 mg cap Take 1 Tab by mouth daily.  ascorbic acid (VITAMIN C) 1,000 mg tablet Take 1,000 mg by mouth daily.  MULTIVITAMIN W-MINERALS/LUTEIN (CENTRUM SILVER PO) Take 1 Tab by mouth daily.  aspirin 81 mg tablet Take 81 mg by mouth daily. Indications: MYOCARDIAL INFARCTION PREVENTION Past History Past Medical History: 
Past Medical History:  
Diagnosis Date  Allergic rhinitis  Arthritis   
 hands  Asthma   
 mild obst disease on pfts Dr. Hensley Siemens  Pederson's esophagus Dr. Levi Camarillo; last 8/17, for HALO  Bilateral carotid artery stenosis 12/14 BICA 50-69%  BPH with obstruction/lower urinary tract symptoms  CABG x 3 04/24/2014 LIMA to LAD, and saphenous vein grafts to the second diagonal of the LAD, and to the posterior descending branch of the RCA, Dr. Fermin Lin, 4/23/14.  CAD (coronary artery disease)  Cardiac catheterization 04/18/2014 Superdominant RCA. mRCA 90%. pRPDA 60%. LM (modest sized) patent.  p-mLAD 85%. CX < 50%. LVEDP 17 mmHg. EF 60%. No RWMA. CABG recommended.  Cardiac echocardiogram 06/24/2008 EF 70%. Mild conc LVH. Gr 2 DDfx. Mild LAE. Mild MR. PASP 38 mmHg.  Cardiac nuclear imaging study, abnormal 04/15/2014 Mod ischemia in lg area of anterior wall, anteroapex, anteroseptum c/w high-grade prox LAD obstruction. Mod partially transient defect likely ischemia w/very sm infarction in RCA. TID 1.31 suggests 3-vessel CAD. Mod distal anterior hypk. Mild-mod anteroseptal, inferoseptal, anterolateral hypk. EF 46%. Strongly positive EKG on max EST. Ex time 6 min.  Carotid duplex 04/06/2016 Mod 50-69% bilateral ICA stenosis. Probable significant LECA stenosis; no change from 2015;  no change 2/17;  
 Colon adenoma 08/2017 Dr Levi Camarillo  Colon polyps 2007 Dr. Levi Camarillo  Difficulty hearing  Erectile dysfunction  FHx: heart disease  GERD (gastroesophageal reflux disease) by EGD 2007 Dr. Levi Camarillo  IFG (impaired fasting glucose)   Multinodular goiter ; ;   Obesity   
 peak weight 202 lbs, bmi 31.4 from   Prostate CA ([facility] 2018 Dr Nikolas David stage 2B [id number], [place] 3+4, P,10, G2; brachytherapy Dr Karon Pitts  [facility] 2010 Dr. Tavo Lutz [id number] [place] 3+3, 1/9 cores up to 10% 1 core; cryo Oct 2012 psa went from  to torsten 1.78  
 Pulmonary nodule   
 no change , 3/17  Sleep apnea CPAP not used Past Surgical History: 
Past Surgical History:  
Procedure Laterality Date  CARDIAC SURG PROCEDURE UNLIST    
 thallium negative  CARDIAC SURG PROCEDURE UNLIST    
 3V CABG Dr Munira Anaya  CHEST SURGERY PROCEDURE UNLISTED    
 mild obst disease on PFTs Dr. Abraham Pugh  COLONOSCOPY N/A 2017 Dr. Daniel Salas  negative;  adenoma  HX APPENDECTOMY  HX CARPAL TUNNEL RELEASE Dr. Michael Eagle  HX CATARACT REMOVAL    
 bilat Dr Carla Ackerman  HX CHOLECYSTECTOMY  2004  HX MOHS PROCEDURES Dr. Johnnie Mcgee  HX TONSILLECTOMY  HX TURP    
 x2 Dr. Tavo Lutz  HX UROLOGICAL  10/2012 [facility], Dr. Austin Steve, Cystoscopy and dilation of stricture, cryoablation of prostate  VASCULAR SURGERY PROCEDURE UNLIST   BICA 50-69% stenosis Family History: 
Family History Problem Relation Age of Onset  Heart Disease Father  Stroke Mother  Hypertension Mother  Diabetes Sister Social History: 
Social History Tobacco Use  Smoking status: Former Smoker Packs/day: 0.50 Years: 4.00 Pack years: 2.00 Last attempt to quit: 1957 Years since quittin.0  Smokeless tobacco: Never Used  Tobacco comment: Quit in  Substance Use Topics  Alcohol use: No  
 Drug use: No  
 
 
Allergies: Allergies Allergen Reactions  Albuterol Other (comments)  Influenza Virus Vaccine, Specific Other (comments) Review of Systems Review of Systems Constitutional: Negative. Negative for fever. HENT: Negative. Eyes: Negative. Respiratory: Positive for cough. Cardiovascular: Negative. Gastrointestinal: Negative. Endocrine: Negative. Genitourinary: Negative. Musculoskeletal:  
     + Generalized body aches Skin: Negative. Allergic/Immunologic: Negative. Neurological: Negative. Hematological: Negative. Psychiatric/Behavioral: Negative. All other systems reviewed and are negative. Physical Exam  
 
Visit Vitals /60 (BP 1 Location: Left arm, BP Patient Position: At rest) Pulse 68 Temp 99.4 °F (37.4 °C) Resp 18 Ht 5' 6\" (1.676 m) Wt 82.6 kg (182 lb) SpO2 98% BMI 29.38 kg/m² Physical Exam  
Constitutional: He is oriented to person, place, and time. He appears well-developed and well-nourished. No distress. HENT:  
Head: Normocephalic. Mouth/Throat: Oropharynx is clear and moist.  
Eyes: Conjunctivae and EOM are normal. Pupils are equal, round, and reactive to light. Neck: Normal range of motion. Neck supple. Cardiovascular: Normal rate, regular rhythm, normal heart sounds and intact distal pulses. No murmur heard. Pulmonary/Chest: Effort normal and breath sounds normal. No respiratory distress. He has no wheezes. He has no rales. He exhibits no tenderness. Abdominal: Soft. Bowel sounds are normal. He exhibits no distension. There is no tenderness. There is no rebound. Musculoskeletal: Normal range of motion. He exhibits no edema or tenderness. Neurological: He is alert and oriented to person, place, and time. No cranial nerve deficit. He exhibits normal muscle tone. Coordination normal.  
Skin: Skin is warm and dry. No rash noted. Psychiatric: He has a normal mood and affect. His behavior is normal. Judgment and thought content normal.  
Nursing note and vitals reviewed. Diagnostic Study Results Labs - Recent Results (from the past 12 hour(s)) INFLUENZA A & B AG (RAPID TEST) Collection Time: 01/05/19  6:15 AM  
Result Value Ref Range Influenza A Antigen NEGATIVE  NEG Influenza B Antigen NEGATIVE  NEG Radiologic Studies -  
XR CHEST PA LAT Final Result IMPRESSION:  
  
No evidence of acute pulmonary disease. Medical Decision Making I am the first provider for this patient. I reviewed the vital signs, available nursing notes, past medical history, past surgical history, family history and social history. Vital Signs-Reviewed the patient's vital signs. Records Reviewed: Nursing Notes and Old Medical Records (Time of Review: 7:18 AM) ED Course: Progress Notes, Reevaluation, and Consults:  Patient remained stable. Provider Notes (Medical Decision Making): flu, viral syndrome, flu exposure Diagnosis Clinical Impression:  
1. Influenza A Disposition: Discharged Follow-up Information Follow up With Specialties Details Why Contact Info Bethel Amanda MD Internal Medicine Schedule an appointment as soon as possible for a visit in 2 days  [address] [facility] [address] [place] [address] 
[phone] [address] [facility] EMERGENCY DEPT Emergency Medicine  As needed, If symptoms worsen [address] [address] [phone] Medication List  
  
START taking these medications   
oseltamivir 75 mg capsule Commonly known as:  TAMIFLU Take 1 Cap by mouth two (2) times a day for 5 days. ASK your doctor about these medications   
amLODIPine 10 mg tablet Commonly known as:  Orval Raya Take 1 Tab by mouth daily. aspirin 81 mg tablet 
  
atorvastatin 20 mg tablet Commonly known as:  LIPITOR 
TAKE 1 TABLET BY MOUTH EVERY NIGHT AT BEDTIME CENTRUM SILVER PO 
  
fluticasone 50 mcg/actuation nasal spray Commonly known as:  FLONASE 
2 sprays each nostril daily 
  
glucosam-chondroit-C-manganese 181-768-35-3 mg Cap levalbuterol tartrate 45 mcg/actuation inhaler Commonly known as:  [place] Octavio Take 2 Puffs by inhalation every six (6) hours as needed for Wheezing. lisinopril 20 mg tablet Commonly known as:  PRINIVIL, ZESTRIL 
  
metoprolol succinate 25 mg XL tablet Commonly known as:  TOPROL-XL 
TAKE 1 TABLET BY MOUTH DAILY 
  
nitroglycerin 0.4 mg SL tablet Commonly known as:  NITROSTAT 
  
omeprazole 20 mg capsule Commonly known as:  PRILOSEC 
  
tamsulosin 0.4 mg capsule Commonly known as:  FLOMAX 
  
VITAMIN C 1,000 mg tablet Generic drug:  ascorbic acid (vitamin C) Where to Get Your Medications Information about where to get these medications is not yet available Ask your nurse or doctor about these medications · oseltamivir 75 mg capsule 
  
 
_______________________________ Scribe Attestation Cora Auguste acting as a scribe for and in the presence of Maame Erazo MD     
January 05, 2019 at 7:18 AM 
    
Provider Attestation:     
I personally performed the services described in the documentation, reviewed the documentation, as recorded by the scribe in my presence, and it accurately and completely records my words and actions. January 05, 2019 at 7:18 AM - Maame Erazo MD   
 
_______________________________

## 2019-01-05 NOTE — DISCHARGE INSTRUCTIONS
Patient Education        Influenza (Flu): Care Instructions  Your Care Instructions    Influenza (flu) is an infection in the lungs and breathing passages. It is caused by the influenza virus. There are different strains, or types, of the flu virus from year to year. Unlike the common cold, the flu comes on suddenly and the symptoms, such as a cough, congestion, fever, chills, fatigue, aches, and pains, are more severe. These symptoms may last up to 10 days. Although the flu can make you feel very sick, it usually doesn't cause serious health problems. Home treatment is usually all you need for flu symptoms. But your doctor may prescribe antiviral medicine to prevent other health problems, such as pneumonia, from developing. Older people and those who have a long-term health condition, such as lung disease, are most at risk for having pneumonia or other health problems. Follow-up care is a key part of your treatment and safety. Be sure to make and go to all appointments, and call your doctor if you are having problems. It's also a good idea to know your test results and keep a list of the medicines you take. How can you care for yourself at home? · Get plenty of rest.  · Drink plenty of fluids, enough so that your urine is light yellow or clear like water. If you have kidney, heart, or liver disease and have to limit fluids, talk with your doctor before you increase the amount of fluids you drink. · Take an over-the-counter pain medicine if needed, such as acetaminophen (Tylenol), ibuprofen (Advil, Motrin), or naproxen (Aleve), to relieve fever, headache, and muscle aches. Read and follow all instructions on the label. No one younger than 20 should take aspirin. It has been linked to Reye syndrome, a serious illness. · Do not smoke. Smoking can make the flu worse. If you need help quitting, talk to your doctor about stop-smoking programs and medicines.  These can increase your chances of quitting for good.  · Breathe moist air from a hot shower or from a sink filled with hot water to help clear a stuffy nose. · Before you use cough and cold medicines, check the label. These medicines may not be safe for young children or for people with certain health problems. · If the skin around your nose and lips becomes sore, put some petroleum jelly on the area. · To ease coughing:  ? Drink fluids to soothe a scratchy throat. ? Suck on cough drops or plain hard candy. ? Take an over-the-counter cough medicine that contains dextromethorphan to help you get some sleep. Read and follow all instructions on the label. ? Raise your head at night with an extra pillow. This may help you rest if coughing keeps you awake. · Take any prescribed medicine exactly as directed. Call your doctor if you think you are having a problem with your medicine. To avoid spreading the flu  · Wash your hands regularly, and keep your hands away from your face. · Stay home from school, work, and other public places until you are feeling better and your fever has been gone for at least 24 hours. The fever needs to have gone away on its own without the help of medicine. · Ask people living with you to talk to their doctors about preventing the flu. They may get antiviral medicine to keep from getting the flu from you. · To prevent the flu in the future, get a flu vaccine every fall. Encourage people living with you to get the vaccine. · Cover your mouth when you cough or sneeze. When should you call for help? Call 911 anytime you think you may need emergency care.  For example, call if:    · You have severe trouble breathing.    Call your doctor now or seek immediate medical care if:    · You have new or worse trouble breathing.     · You seem to be getting much sicker.     · You feel very sleepy or confused.     · You have a new or higher fever.     · You get a new rash.    Watch closely for changes in your health, and be sure to contact your doctor if:    · You begin to get better and then get worse.     · You are not getting better after 1 week. Where can you learn more? Go to http://martine-feng.info/. Enter L662 in the search box to learn more about \"Influenza (Flu): Care Instructions. \"  Current as of: December 6, 2017  Content Version: 11.8  © 2370-2858 [employer]. Care instructions adapted under license by [employer] (which disclaims liability or warranty for this information). If you have questions about a medical condition or this instruction, always ask your healthcare professional. [place] [address] any warranty or liability for your use of this information.

## 2019-01-05 NOTE — ED NOTES
Assumed care of pt. Report received from [facility]. Dr. Anthony Niño in to see pt

## 2019-01-05 NOTE — ED TRIAGE NOTES
Pr. Complains of a cough since yesterday, he reports a member in his [facility] had pneumonia and  as a result. Pt denies any fever or difficulty breathing and states he wants to be checked for pneumonia, he also states he would like his blood pressure checked for high blood pressure. Pt also reports body aches and flu like symptoms.

## 2019-01-19 ENCOUNTER — HOSPITAL ENCOUNTER (OUTPATIENT)
Dept: LAB | Age: 79
Discharge: HOME | End: 2019-01-19
Payer: MEDICARE

## 2019-01-19 DIAGNOSIS — C61 MALIGNANT NEOPLASM OF PROSTATE (HCC): ICD-10-CM

## 2019-01-19 LAB — PSA SERPL-MCNC: 1.5 NG/ML (ref 0–4)

## 2019-01-19 PROCEDURE — 36415 COLL VENOUS BLD VENIPUNCTURE: CPT

## 2019-01-19 PROCEDURE — 84154 ASSAY OF PSA FREE: CPT

## 2019-01-19 PROCEDURE — 84403 ASSAY OF TOTAL TESTOSTERONE: CPT

## 2019-01-20 LAB — TESTOST SERPL-MCNC: 435 NG/DL (ref 264–916)

## 2019-01-22 LAB
PSA FREE MFR SERPL: 2.9 %
PSA FREE SERPL-MCNC: 0.04 NG/ML
PSA SERPL-MCNC: 1.4 NG/ML (ref 0–4)

## 2019-01-23 ENCOUNTER — HOSPITAL ENCOUNTER (OUTPATIENT)
Dept: RADIATION THERAPY | Age: 79
Discharge: HOME | End: 2019-01-23
Payer: MEDICARE

## 2019-01-23 PROCEDURE — 99211 OFF/OP EST MAY X REQ PHY/QHP: CPT

## 2019-05-08 ENCOUNTER — HOSPITAL ENCOUNTER (OUTPATIENT)
Dept: LAB | Age: 79
Discharge: HOME | End: 2019-05-08
Payer: MEDICARE

## 2019-05-08 ENCOUNTER — OFFICE VISIT (OUTPATIENT)
Dept: INTERNAL MEDICINE CLINIC | Age: 79
End: 2019-05-08

## 2019-05-08 VITALS
DIASTOLIC BLOOD PRESSURE: 82 MMHG | WEIGHT: 191 LBS | HEART RATE: 65 BPM | HEIGHT: 66 IN | TEMPERATURE: 96.5 F | BODY MASS INDEX: 30.7 KG/M2 | SYSTOLIC BLOOD PRESSURE: 145 MMHG | OXYGEN SATURATION: 96 % | RESPIRATION RATE: 14 BRPM

## 2019-05-08 DIAGNOSIS — Z13.1 SCREENING FOR DIABETES MELLITUS: ICD-10-CM

## 2019-05-08 DIAGNOSIS — Z71.89 ADVANCED DIRECTIVES, COUNSELING/DISCUSSION: ICD-10-CM

## 2019-05-08 DIAGNOSIS — C61 CANCER OF PROSTATE (HCC): ICD-10-CM

## 2019-05-08 DIAGNOSIS — D64.9 ANEMIA, UNSPECIFIED TYPE: ICD-10-CM

## 2019-05-08 DIAGNOSIS — I10 ESSENTIAL HYPERTENSION: ICD-10-CM

## 2019-05-08 DIAGNOSIS — R60.9 EDEMA, UNSPECIFIED TYPE: ICD-10-CM

## 2019-05-08 DIAGNOSIS — Z95.1 S/P CABG X 3: ICD-10-CM

## 2019-05-08 DIAGNOSIS — Z13.39 SCREENING FOR ALCOHOLISM: ICD-10-CM

## 2019-05-08 DIAGNOSIS — R60.0 PEDAL EDEMA: ICD-10-CM

## 2019-05-08 DIAGNOSIS — Z13.31 SCREENING FOR DEPRESSION: ICD-10-CM

## 2019-05-08 DIAGNOSIS — Z00.00 MEDICARE ANNUAL WELLNESS VISIT, SUBSEQUENT: Primary | ICD-10-CM

## 2019-05-08 LAB
ALBUMIN SERPL-MCNC: 4.4 G/DL (ref 3.4–5)
ALBUMIN/GLOB SERPL: 1.5 {RATIO} (ref 0.8–1.7)
ALP SERPL-CCNC: 66 U/L (ref 45–117)
ALT SERPL-CCNC: 29 U/L (ref 16–61)
ANION GAP SERPL CALC-SCNC: 9 MMOL/L (ref 3–18)
AST SERPL-CCNC: 19 U/L (ref 15–37)
BASOPHILS # BLD: 0.1 K/UL (ref 0–0.1)
BASOPHILS NFR BLD: 1 % (ref 0–2)
BILIRUB SERPL-MCNC: 0.4 MG/DL (ref 0.2–1)
BILIRUB UR QL STRIP: NEGATIVE
BUN SERPL-MCNC: 12 MG/DL (ref 7–18)
BUN/CREAT SERPL: 12 (ref 12–20)
CALCIUM SERPL-MCNC: 9.3 MG/DL (ref 8.5–10.1)
CHLORIDE SERPL-SCNC: 105 MMOL/L (ref 100–108)
CO2 SERPL-SCNC: 25 MMOL/L (ref 21–32)
CREAT SERPL-MCNC: 1 MG/DL (ref 0.6–1.3)
DIFFERENTIAL METHOD BLD: ABNORMAL
EOSINOPHIL # BLD: 0.4 K/UL (ref 0–0.4)
EOSINOPHIL NFR BLD: 5 % (ref 0–5)
ERYTHROCYTE [DISTWIDTH] IN BLOOD BY AUTOMATED COUNT: 14 % (ref 11.6–14.5)
GLOBULIN SER CALC-MCNC: 3 G/DL (ref 2–4)
GLUCOSE SERPL-MCNC: 100 MG/DL (ref 74–99)
GLUCOSE UR-MCNC: NEGATIVE MG/DL
HCT VFR BLD AUTO: 40.1 % (ref 36–48)
HGB BLD-MCNC: 13.2 G/DL (ref 13–16)
KETONES P FAST UR STRIP-MCNC: NEGATIVE MG/DL
LYMPHOCYTES # BLD: 2.5 K/UL (ref 0.9–3.6)
LYMPHOCYTES NFR BLD: 27 % (ref 21–52)
MCH RBC QN AUTO: 30 PG (ref 24–34)
MCHC RBC AUTO-ENTMCNC: 32.9 G/DL (ref 31–37)
MCV RBC AUTO: 91.1 FL (ref 74–97)
MONOCYTES # BLD: 0.9 K/UL (ref 0.05–1.2)
MONOCYTES NFR BLD: 10 % (ref 3–10)
NEUTS SEG # BLD: 5.4 K/UL (ref 1.8–8)
NEUTS SEG NFR BLD: 57 % (ref 40–73)
PH UR STRIP: 6 [PH] (ref 4.6–8)
PLATELET # BLD AUTO: 214 K/UL (ref 135–420)
PMV BLD AUTO: 10.1 FL (ref 9.2–11.8)
POTASSIUM SERPL-SCNC: 4.1 MMOL/L (ref 3.5–5.5)
PROT SERPL-MCNC: 7.4 G/DL (ref 6.4–8.2)
PROT UR QL STRIP: NEGATIVE
RBC # BLD AUTO: 4.4 M/UL (ref 4.7–5.5)
SODIUM SERPL-SCNC: 139 MMOL/L (ref 136–145)
SP GR UR STRIP: 1.01 (ref 1–1.03)
UA UROBILINOGEN AMB POC: NORMAL (ref 0.2–1)
URINALYSIS CLARITY POC: CLEAR
URINALYSIS COLOR POC: YELLOW
URINE BLOOD POC: NEGATIVE
URINE LEUKOCYTES POC: NEGATIVE
URINE NITRITES POC: NEGATIVE
WBC # BLD AUTO: 9.4 K/UL (ref 4.6–13.2)

## 2019-05-08 PROCEDURE — 80053 COMPREHEN METABOLIC PANEL: CPT

## 2019-05-08 PROCEDURE — 85025 COMPLETE CBC W/AUTO DIFF WBC: CPT

## 2019-05-08 RX ORDER — TRIAMTERENE AND HYDROCHLOROTHIAZIDE 37.5; 25 MG/1; MG/1
1 CAPSULE ORAL
Qty: 30 CAP | Refills: 3 | Status: SHIPPED | OUTPATIENT
Start: 2019-05-08 | End: 2019-07-12 | Stop reason: SDUPTHER

## 2019-05-08 NOTE — PROGRESS NOTES
Nyla Kiley. presents today for Chief Complaint Patient presents with  Swelling  
  bilateral ankle and feet swelling that comes and goes. x 1 year no redness or warmth. Patient spoke with John Alvarado who told him the swelling was normal, wife wanted another opinion Depression Screening: 
3 most recent PHQ Screens 5/8/2019 Little interest or pleasure in doing things Not at all Feeling down, depressed, irritable, or hopeless Not at all Total Score PHQ 2 0 Learning Assessment: 
Learning Assessment 5/8/2019 PRIMARY LEARNER Patient HIGHEST LEVEL OF EDUCATION - PRIMARY LEARNER  SOME COLLEGE  
BARRIERS PRIMARY LEARNER HEARING  
CO-LEARNER CAREGIVER No  
PRIMARY LANGUAGE ENGLISH  
LEARNER PREFERENCE PRIMARY DEMONSTRATION  
ANSWERED BY patient RELATIONSHIP SELF Abuse Screening: 
Abuse Screening Questionnaire 5/8/2019 Do you ever feel afraid of your partner? Danika Ores Are you in a relationship with someone who physically or mentally threatens you? Danika Ores Is it safe for you to go home? Meir Guardian Fall Risk Fall Risk Assessment, last 12 mths 5/8/2019 Able to walk? Yes Fall in past 12 months? No  
 
 
 
 
Coordination of Care: 1. Have you been to the ER, urgent care clinic since your last visit? Hospitalized since your last visit? NO 
 
2. Have you seen or consulted any other health care providers outside of the [address] [place] [address] since your last visit? Include any pap smears or colon screening.  NO

## 2019-05-08 NOTE — PATIENT INSTRUCTIONS
Medicare Wellness Visit, Male The best way to live healthy is to have a lifestyle where you eat a well-balanced diet, exercise regularly, limit alcohol use, and quit all forms of tobacco/nicotine, if applicable. Regular preventive services are another way to keep healthy. Preventive services (vaccines, screening tests, monitoring & exams) can help personalize your care plan, which helps you manage your own care. Screening tests can find health problems at the earliest stages, when they are easiest to treat. [address] [facility] follows the current, evidence-based guidelines published by the [facility] Kenan Nora ([facility]) when recommending preventive services for our patients. Because we follow these guidelines, sometimes recommendations change over time as research supports it. (For example, a prostate screening blood test is no longer routinely recommended for men with no symptoms.) Of course, you and your doctor may decide to screen more often for some diseases, based on your risk and co-morbidities (chronic disease you are already diagnosed with). Preventive services for you include: - Medicare offers their members a free annual wellness visit, which is time for you and your primary care provider to discuss and plan for your preventive service needs. Take advantage of this benefit every year! 
-All adults over age 72 should receive the recommended pneumonia vaccines. Current USPSTF guidelines recommend a series of two vaccines for the best pneumonia protection.  
-All adults should have a flu vaccine yearly and an ECG.  All adults age 61 and older should receive a shingles vaccine once in their lifetime.   
-All adults age 38-68 who are overweight should have a diabetes screening test once every three years.  
-Other screening tests & preventive services for persons with diabetes include: an eye exam to screen for diabetic retinopathy, a kidney function test, a foot exam, and stricter control over your cholesterol.  
-Cardiovascular screening for adults with routine risk involves an electrocardiogram (ECG) at intervals determined by the provider.  
-Colorectal cancer screening should be done for adults age 54-65 with no increased risk factors for colorectal cancer. There are a number of acceptable methods of screening for this type of cancer. Each test has its own benefits and drawbacks. Discuss with your provider what is most appropriate for you during your annual wellness visit. The different tests include: colonoscopy (considered the best screening method), a fecal occult blood test, a fecal DNA test, and sigmoidoscopy. 
-All adults born between [facility] should be screened once for Hepatitis C. 
-An Abdominal Aortic Aneurysm (AAA) Screening is recommended for men age 73-68 who has ever smoked in their lifetime. Here is a list of your current Health Maintenance items (your personalized list of preventive services) with a due date: 
Health Maintenance Due Topic Date Due  Shingles Vaccine (1 of 2) 09/28/1988  Glaucoma Screening   02/16/2019 [facility] Annual Well Visit  04/13/2019

## 2019-05-08 NOTE — ACP (ADVANCE CARE PLANNING)
Advance Directive:  1. Do you have an advance directive in place? Patient Reply:NO    2. If not, would you like material regarding how to put one in place? Patient Reply: NO    Pt is working on advance directive.

## 2019-05-08 NOTE — ACP (ADVANCE CARE PLANNING)
Advance Care Planning    Advance Care Planning (ACP) Provider Note - Comprehensive     Date of ACP Conversation: 05/08/19  Persons included in Conversation:  patient  Length of ACP Conversation in minutes:  16 minutes    Authorized Decision Maker (if patient is incapable of making informed decisions): This person is: wife  Other Legally Authorized Decision Maker (e.g. Next of Kin)          General ACP for ALL Patients with Decision Making Capacity:   Importance of advance care planning, including choosing a healthcare agent to communicate patient's healthcare decisions if patient lost the ability to make decisions, such as after a sudden illness or accident  Understanding of the healthcare agent role was assessed and information provided    Review of Existing Advance Directive:  na    For Serious or Chronic Illness:  Understanding of medical condition    Understanding of CPR, goals and expected outcomes, benefits and burdens discussed.     Interventions Provided:  Recommended completion of Advance Directive form after review of ACP materials and conversation with prospective healthcare agent   Recommended communicating the plan and making copies for the healthcare agent, personal physician, and others as appropriate (e.g., health system)  Recommended review of completed ACP document annually or upon change in health status

## 2019-05-08 NOTE — PROGRESS NOTES
[de-identified] y.o. male who presents for evaluation. He is here primarily because his wife wanted him checked out for edema. He actually mentioned it to Dr Florencia Martinez at their last visit 11/18 who felt it was more from CVI and no specific recs given. Still no sob, pnd, orthopnea and he continues to work 4days a week at [employer] everybody'. No cp, sob, he is off the inhaler LAST MEDICARE WELLNESS EXAM: 8/12/15, 2/22/17, 4/12/18, 5/8/19 Past Medical History:  
Diagnosis Date  Allergic rhinitis  Arthritis   
 hands  Asthma   
 mild obst disease on pfts Dr. Lima Mcneal  Pederson's esophagus Dr. Nabila Mullen; last 8/17, for HALO  Bilateral carotid artery stenosis 12/14 BICA 50-69%  BPH with obstruction/lower urinary tract symptoms  CABG x 3 04/24/2014 LIMA to LAD, and saphenous vein grafts to the second diagonal of the LAD, and to the posterior descending branch of the RCA, Dr. Víctor Casiano, 4/23/14.  Cardiac catheterization 04/18/2014 Superdominant RCA. mRCA 90%. pRPDA 60%. LM (modest sized) patent.  p-mLAD 85%. CX < 50%. LVEDP 17 mmHg. EF 60%. No RWMA. CABG recommended.  Cardiac echocardiogram 06/24/2008 EF 70%. Mild conc LVH. Gr 2 DDfx. Mild LAE. Mild MR. PASP 38 mmHg.  Cardiac nuclear imaging study, abnormal 04/15/2014 Mod ischemia in lg area of anterior wall, anteroapex, anteroseptum c/w high-grade prox LAD obstruction. Mod partially transient defect likely ischemia w/very sm infarction in RCA. TID 1.31 suggests 3-vessel CAD. Mod distal anterior hypk. Mild-mod anteroseptal, inferoseptal, anterolateral hypk. EF 46%. Strongly positive EKG on max EST. Ex time 6 min.  Carotid duplex 04/06/2016 Mod 50-69% bilateral ICA stenosis. Probable significant LECA stenosis; no change from 2015;  no change 2/17;  
 Colon adenoma 08/2017 Dr Nabila Mullen  Colon polyps 2007 Dr. Nabila Mullen  Difficulty hearing  Erectile dysfunction  GERD (gastroesophageal reflux disease) by EGD  Dr. Kamilah Reyes  IFG (impaired fasting glucose)   Multinodular goiter ; ;   Obesity   
 peak weight 202 lbs, bmi 31.4 from   Prostate CA ([facility] 2018 Dr Kristyn Hollins stage 2B [id number], [place] 3+4, P,10, G2; brachytherapy Dr Kerry Kingsley  Prostate cancer ([facility] 2010 Dr. Nba Darnell [id number] Jj 3+3, 1/9 cores up to 10% 1 core; cryo Oct 2012 psa went from  to torsten 1.78  
 Pulmonary nodule   
 no change , 3/17  Sleep apnea CPAP not used Past Surgical History:  
Procedure Laterality Date  CARDIAC SURG PROCEDURE UNLIST    
 thallium negative  CARDIAC SURG PROCEDURE UNLIST    
 3V CABG Dr Anoop Stauffer  CHEST SURGERY PROCEDURE UNLISTED    
 mild obst disease on PFTs Dr. Adrianna Carranza  COLONOSCOPY N/A 2017 Dr. Kamilah Reyes  negative;  adenoma  HX APPENDECTOMY  HX CARPAL TUNNEL RELEASE Dr. Shannan Bello  HX CATARACT REMOVAL    
 bilat Dr Radha Zee  HX CHOLECYSTECTOMY  2004  HX MOHS PROCEDURES Dr. Carlos Mendez  HX TONSILLECTOMY  HX TURP    
 x2 Dr. Nba Darnell  HX UROLOGICAL  10/2012 [facility], Dr. Reyna Tam, Cystoscopy and dilation of stricture, cryoablation of prostate  VASCULAR SURGERY PROCEDURE UNLIST   BICA 50-69% stenosis Social History Socioeconomic History  Marital status:  Spouse name: Not on file  Number of children: 3  
 Years of education: Not on file  Highest education level: Not on file Occupational History  Occupation: [employer] Employer: RETIRED Social Needs  Financial resource strain: Not on file  Food insecurity:  
  Worry: Not on file Inability: Not on file  Transportation needs:  
  Medical: Not on file Non-medical: Not on file Tobacco Use  Smoking status: Former Smoker Packs/day: 0.50 Years: 4.00 Pack years: 2.00 Last attempt to quit: 1957 Years since quittin.3  Smokeless tobacco: Never Used  Tobacco comment: Quit in  Substance and Sexual Activity  Alcohol use: No  
 Drug use: No  
 Sexual activity: Not on file Lifestyle  Physical activity:  
  Days per week: Not on file Minutes per session: Not on file  Stress: Not on file Relationships  Social connections:  
  Talks on phone: Not on file Gets together: Not on file Attends [employer]ism service: Not on file Active member of club or organization: Not on file Attends meetings of clubs or organizations: Not on file Relationship status: Not on file  Intimate partner violence:  
  Fear of current or ex partner: Not on file Emotionally abused: Not on file Physically abused: Not on file Forced sexual activity: Not on file Other Topics Concern  Not on file Social History Narrative  Not on file Allergies Allergen Reactions  Albuterol Other (comments)  Influenza Virus Vaccine, Specific Other (comments) Current Outpatient Medications Medication Sig  
 triamterene-hydroCHLOROthiazide (DYAZIDE) 37.5-25 mg per capsule Take 1 Cap by mouth daily as needed (for swelling).  atorvastatin (LIPITOR) 20 mg tablet TAKE 1 TABLET BY MOUTH EVERY NIGHT AT BEDTIME  metoprolol succinate (TOPROL-XL) 25 mg XL tablet TAKE 1 TABLET BY MOUTH DAILY  fluticasone (FLONASE) 50 mcg/actuation nasal spray 2 sprays each nostril daily  amLODIPine (NORVASC) 10 mg tablet Take 1 Tab by mouth daily.  omeprazole (PRILOSEC) 20 mg capsule  nitroglycerin (NITROSTAT) 0.4 mg SL tablet 0.4 mg by SubLINGual route every five (5) minutes as needed for Chest Pain.  glucosam-chondroit-C-manganese 425-399-05-3 mg cap Take 1 Tab by mouth daily.  ascorbic acid (VITAMIN C) 1,000 mg tablet Take 1,000 mg by mouth daily.  MULTIVITAMIN W-MINERALS/LUTEIN (CENTRUM SILVER PO) Take 1 Tab by mouth daily.  levalbuterol tartrate (XOPENEX) 45 mcg/actuation inhaler Take 2 Puffs by inhalation every six (6) hours as needed for Wheezing. (Patient taking differently: Take 2 Puffs by inhalation every six (6) hours as needed for Wheezing. Indications: NOT TAKING) No current facility-administered medications for this visit. REVIEW OF SYSTEMS:  colo 2017 Dr Kayleigh Chase Visit Vitals /82 Pulse 65 Temp 96.5 °F (35.8 °C) (Oral) Resp 14 Ht 5' 6\" (1.676 m) Wt 191 lb (86.6 kg) SpO2 96% BMI 30.83 kg/m² A&O x3 Affect is appropriate. Mood stable No apparent distress Anicteric, no JVD, adenopathy or thyromegaly. No carotid bruits or radiated murmur Lungs clear to auscultation, no wheezes or rales Heart showed regular rate and rhythm. No murmur, rubs, gallops Abdomen soft nontender, no hepatosplenomegaly or masses. Extremities with trace pedal edema. Venous varicosities noted. Pulses 1-2+ symmetrically LABS From 9/10 showed gluc 102, cr 1.00,    alt 41,        chol 190, tg 102, hdl 47, ldl-c 123, wbc 8.3, hb 14.6, plt 222, psa 7.60 From 6/12 showed                        psa 5.78 From 8/12 showed                                    b12 400, fol>20 From 9/12 showed gluc 109, cr 1.10, gfr>60,                 wbc 8.1, hb 14.1, plt 232 From 9/13 showed                        psa 0.96 From 3/14 showed                        psa 1.78 From 4/14 showed gluc 107, cr 0.90, gfr>60, alt 25, hba1c 5.5, chol 190, tg 135, hdl 48, ldl-c 115, wbc 8.1, hb 14.2, plt 241, ua neg From 8/14 showed gluc 97,   cr 0.90, gfr>60, alt 17, hba1c 6.0, chol 109, tg 111, hdl 44, ldl-c 43 From 2/15 showed      hba1c 6.0 From 3/15 showed                      psa 2.13 From 8/15 showed gluc 106, cr 0.98, gfr 75,  alt 20, hba1c 5.9, chol 129, tg 79,   hdl 51, ldl-c 62,   wbc 7.3, hb 14.2, plt 232 From 3/16 showed                      psa 2.90 From 4/16 showed      alt 37, hba1c 5.6, chol 108, tg 103, hdl 46, ldl-c 41 
From 2/17 showed gluc 103, cr 1.00, gfr>60, alt 36, hba1c 5.6, chol 117, tg 93,   hdl 49, ldl-c 59,                tsh 4.14 From 1/18 showed                      psa 7.40 From 2/18 showed gluc 107, cr 1.01, gfr>60, alt 27,        chol 106, tg 113, hdl 42, ldl-c 41,   wbc 7.9, hb 13.8, plt 240,             tsh 3.15, ft4 0.90 From 8/18 showed gluc 97,   cr 1.01, gfr>60, alt 28,        wbc 7.6, hb 12.2, plt 206, fe 94, %sat 32, ferritin 83, b12 332, fol>20, spep neg, retic 1.0 Results for orders placed or performed in visit on 05/08/19 AMB POC URINALYSIS DIP STICK AUTO W/O MICRO Result Value Ref Range Color (UA POC) Yellow Clarity (UA POC) Clear Glucose (UA POC) Negative Negative Bilirubin (UA POC) Negative Negative Ketones (UA POC) Negative Negative Specific gravity (UA POC) 1.010 1.001 - 1.035 Blood (UA POC) Negative Negative pH (UA POC) 6.0 4.6 - 8.0 Protein (UA POC) Negative Negative Urobilinogen (UA POC) 0.2 mg/dL 0.2 - 1 Nitrites (UA POC) Negative Negative Leukocyte esterase (UA POC) Negative Negative Patient Active Problem List  
Diagnosis Code  Pederson's esophagus K22.70  BPH without obstruction/lower urinary tract symptoms N40.0  Cancer of prostate [facility]) C61  
 Colon polyp 2007 Dr. Lori Duque K63.5  Sleep apnea G47.30  
 S/P CABG x 3 Z95.1  IFG (impaired fasting glucose) R73.01  
 Hyperlipidemia E78.5  Bilateral carotid artery stenosis 12/14 I65.23  
 CAD (coronary artery disease) I25.10  Multinodular goiter 2014 E04.2  Pulmonary nodules R91.8  Essential hypertension I10  
 Obesity (BMI 30-39. 9) E66.9 Assessment and plan: 1. Pederson's. Lifelong PPI and further plans per gi 2. ED. Prn meds 3. BPH and prostate ca. F/U Dr. Abi Garcia and Dr Cat Ortiz 4. Colon polyp. Fiber, colo 2022? 5. Asthma. Off meds at this time 6. CHD. F/U Dr. Dorota Mahajan 7. IFG. Wt loss would be ideal, continue dietary and lifestyle measures 8. Bilat carotid stenosis. Cont to follow per Dr Umang Huertas 9. HTN. Continue current regimen. 10.  Obesity. Lifestyle and dietary measures. Portion control reiterated. 11.  Anemia. eval neg at last visit, will recheck 12. Edema. Probable dependent edema and we discussed the pathophysiology of that. Check labs, elevation, discussed stockings, trtial dyazide prn, f/u in August. We'll decide on amlodipine at that visit and depending on his response to measures above RTC 8/19 Above conditions discussed at length and patient vocalized understanding. All questions answered to patient satisfaction

## 2019-05-08 NOTE — PROGRESS NOTES
This is an subsequent Gerard Exam (AWV) I have reviewed the patient's medical history in detail and updated the computerized patient record. History Past Medical History:  
Diagnosis Date  Allergic rhinitis  Arthritis   
 hands  Asthma   
 mild obst disease on pfts Dr. Kita Gomez  Pederson's esophagus Dr. Audrey Arriaga; last 8/17, for HALO  Bilateral carotid artery stenosis 12/14 BICA 50-69%  BPH with obstruction/lower urinary tract symptoms  CABG x 3 04/24/2014 LIMA to LAD, and saphenous vein grafts to the second diagonal of the LAD, and to the posterior descending branch of the RCA, Dr. Kevin Torres, 4/23/14.  Cardiac catheterization 04/18/2014 Superdominant RCA. mRCA 90%. pRPDA 60%. LM (modest sized) patent.  p-mLAD 85%. CX < 50%. LVEDP 17 mmHg. EF 60%. No RWMA. CABG recommended.  Cardiac echocardiogram 06/24/2008 EF 70%. Mild conc LVH. Gr 2 DDfx. Mild LAE. Mild MR. PASP 38 mmHg.  Cardiac nuclear imaging study, abnormal 04/15/2014 Mod ischemia in lg area of anterior wall, anteroapex, anteroseptum c/w high-grade prox LAD obstruction. Mod partially transient defect likely ischemia w/very sm infarction in RCA. TID 1.31 suggests 3-vessel CAD. Mod distal anterior hypk. Mild-mod anteroseptal, inferoseptal, anterolateral hypk. EF 46%. Strongly positive EKG on max EST. Ex time 6 min.  Carotid duplex 04/06/2016 Mod 50-69% bilateral ICA stenosis. Probable significant LECA stenosis; no change from 2015;  no change 2/17;  
 Colon adenoma 08/2017 Dr Audrey Arriaga  Colon polyps 2007 Dr. Audrey Arriaga  Difficulty hearing  Erectile dysfunction  GERD (gastroesophageal reflux disease) by EGD 2007 Dr. Audrey Arriaga  IFG (impaired fasting glucose) 5/14  Multinodular goiter 2014; 2015; 4/16  Obesity   
 peak weight 202 lbs, bmi 31.4 from 4/14  Prostate CA ([facility] 04/2018 Dr Osmar Crump stage 2B [id number], Jj 3+4, P,10, G2; brachytherapy Dr Brian Odom  Prostate cancer ([facility] 06/2010 Dr. Renay Stanley [id number] Cobb 3+3, 1/9 cores up to 10% 1 core; cryo Oct 2012 psa went from 7/1 to torsten 1.78  
 Pulmonary nodule 2014  
 no change 2015, 3/17  Sleep apnea CPAP not used Past Surgical History:  
Procedure Laterality Date  CARDIAC SURG PROCEDURE UNLIST  11/06  
 thallium negative  CARDIAC SURG PROCEDURE UNLIST  4/14  
 3V CABG Dr Susan Guevara  CHEST SURGERY PROCEDURE UNLISTED  2007  
 mild obst disease on PFTs Dr. Gildardo Barakat  COLONOSCOPY N/A 7/27/2017 Dr. Tami Godoy 2007 negative; 8/17 adenoma  HX APPENDECTOMY  HX CARPAL TUNNEL RELEASE Dr. Adriana Barton  HX CATARACT REMOVAL  11/14  
 bilat Dr Bethany Myrick  HX CHOLECYSTECTOMY  2004  HX MOHS PROCEDURES Dr. Elise Rdz  HX TONSILLECTOMY  HX TURP    
 x2 Dr. Renay Stanley  HX UROLOGICAL  10/2012  [facility], Dr. Lima Chen, Cystoscopy and dilation of stricture, cryoablation of prostate  VASCULAR SURGERY PROCEDURE UNLIST  12/14 BICA 50-69% stenosis Current Outpatient Medications Medication Sig Dispense Refill  triamterene-hydroCHLOROthiazide (DYAZIDE) 37.5-25 mg per capsule Take 1 Cap by mouth daily as needed (for swelling). 30 Cap 3  
 atorvastatin (LIPITOR) 20 mg tablet TAKE 1 TABLET BY MOUTH EVERY NIGHT AT BEDTIME 90 Tab 3  
 metoprolol succinate (TOPROL-XL) 25 mg XL tablet TAKE 1 TABLET BY MOUTH DAILY 90 Tab 3  
 fluticasone (FLONASE) 50 mcg/actuation nasal spray 2 sprays each nostril daily 1 Bottle 12  
 amLODIPine (NORVASC) 10 mg tablet Take 1 Tab by mouth daily. 90 Tab 3  
 omeprazole (PRILOSEC) 20 mg capsule  nitroglycerin (NITROSTAT) 0.4 mg SL tablet 0.4 mg by SubLINGual route every five (5) minutes as needed for Chest Pain.  glucosam-chondroit-C-manganese 575-426-73-3 mg cap Take 1 Tab by mouth daily.  ascorbic acid (VITAMIN C) 1,000 mg tablet Take 1,000 mg by mouth daily.  MULTIVITAMIN W-MINERALS/LUTEIN (CENTRUM SILVER PO) Take 1 Tab by mouth daily.  levalbuterol tartrate (XOPENEX) 45 mcg/actuation inhaler Take 2 Puffs by inhalation every six (6) hours as needed for Wheezing. (Patient taking differently: Take 2 Puffs by inhalation every six (6) hours as needed for Wheezing. Indications: NOT TAKING) 1 Inhaler 5 Allergies Allergen Reactions  Albuterol Other (comments)  Influenza Virus Vaccine, Specific Other (comments) Family History Problem Relation Age of Onset  Heart Disease Father  Stroke Mother  Hypertension Mother  Diabetes Sister Social History Tobacco Use  Smoking status: Former Smoker Packs/day: 0.50 Years: 4.00 Pack years: 2.00 Last attempt to quit: 1957 Years since quittin.3  Smokeless tobacco: Never Used  Tobacco comment: Quit in  Substance Use Topics  Alcohol use: No  
 
Depression Risk Factor Screening:  
 
3 most recent PHQ Screens 2019 Little interest or pleasure in doing things Not at all Feeling down, depressed, irritable, or hopeless Not at all Total Score PHQ 2 0 SCREENINGS Colonoscopy last done  Dr Sakina Grant Prostate ca being followed by Dr Sakina Grant Immunization History Administered Date(s) Administered  (RETIRED) Pneumococcal Vaccine (Unspecified Type) 2008  Pneumococcal Conjugate (PCV-13) 2015  TD Vaccine 09/10/2008  Tdap 2015, 10/18/2018  Zoster 2008 Alcohol Risk Factor Screening: On any occasion during the past 3 months, have you had more than 4 drinks containing alcohol? No 
 
Do you average more than 14 drinks per week? No 
 
Functional Ability and Level of Safety:  
 
Hearing Loss  
normal-to-mild Vision - no acute complaints and is followed by Dr Eliezer Long Activities of Daily Living Self-care. Requires assistance with: no ADLs Fall Risk Fall Risk Assessment, last 12 mths 5/8/2019 Able to walk? Yes Fall in past 12 months? No  
 
Abuse Screen Patient is not abused Review of Systems A comprehensive review of systems was negative except for that written in the HPI. Physical Examination No exam data present Evaluation of Cognitive Function: 
Mood/affect:  happy Appearance: age appropriate, overweight, well dressed and within normal Limits Family member/caregiver input: na 
 
Patient Care Team: 
Hira Garcia MD as PCP - General (Internal Medicine) Timothy Osborne DO (Cardiology) Prem Blancas MD (Radiation Oncology) Advice/Referrals/Counseling Education and counseling provided: 
Are appropriate based on today's review and evaluation End-of-Life planning (with patient's consent) Pneumococcal Vaccine Influenza Vaccine Prostate cancer screening tests (PSA, covered annually) Colorectal cancer screening tests Cardiovascular screening blood test 
Diabetes screening test 
 
Assessment/Plan ICD-10-CM ICD-9-CM 1. Medicare annual wellness visit, subsequent Z00.00 V70.0 2. Anemia, unspecified type Y31.1 363.6 METABOLIC PANEL, COMPREHENSIVE  
   CBC WITH AUTOMATED DIFF AMB POC URINALYSIS DIP STICK AUTO W/O MICRO 3. Edema, unspecified type H88.9 396.6 METABOLIC PANEL, COMPREHENSIVE  
   CBC WITH AUTOMATED DIFF AMB POC URINALYSIS DIP STICK AUTO W/O MICRO 4. S/P CABG x 3 Z95.1 V45.81   
5. Essential hypertension I10 401.9 6. Cancer of prostate ([facility] [address] [facility] C61 185 7. Pedal edema R60.0 782.3 triamterene-hydroCHLOROthiazide (DYAZIDE) 37.5-25 mg per capsule 8. Advanced directives, counseling/discussion Z71.89 V65.49 ADVANCE CARE PLANNING FIRST 30 MINS 9. Screening for alcoholism Z13.39 V79.1 PA ANNUAL ALCOHOL SCREEN 15 MIN 10. Screening for depression Z13.31 V79.0 Georgie Etienne 11. Screening for diabetes mellitus Z13.1 V77.1 current treatment plan is effective 
lab results and schedule of future lab studies reviewed with patient 
cardiovascular risk and specific lipid/LDL goals reviewed. End of life discussion undertaken. He will work on getting medical directive in place Flu high dose declined repeatedly Colonoscopy beyond age 
shingrix advocated

## 2019-06-19 ENCOUNTER — HOSPITAL ENCOUNTER (OUTPATIENT)
Dept: LAB | Age: 79
Discharge: HOME | End: 2019-06-19
Payer: MEDICARE

## 2019-06-19 DIAGNOSIS — C61 MALIGNANT NEOPLASM OF PROSTATE (HCC): ICD-10-CM

## 2019-06-19 PROCEDURE — 36415 COLL VENOUS BLD VENIPUNCTURE: CPT

## 2019-06-19 PROCEDURE — 84154 ASSAY OF PSA FREE: CPT

## 2019-06-19 PROCEDURE — 84403 ASSAY OF TOTAL TESTOSTERONE: CPT

## 2019-06-20 LAB
PSA FREE MFR SERPL: 1.7 %
PSA FREE SERPL-MCNC: 0.01 NG/ML
PSA SERPL-MCNC: 0.6 NG/ML (ref 0–4)
TESTOST SERPL-MCNC: 391 NG/DL (ref 264–916)

## 2019-07-10 ENCOUNTER — HOSPITAL ENCOUNTER (OUTPATIENT)
Dept: VASCULAR SURGERY | Age: 79
Discharge: HOME | End: 2019-07-10
Attending: INTERNAL MEDICINE
Payer: MEDICARE

## 2019-07-10 DIAGNOSIS — I25.10 CORONARY ARTERY DISEASE INVOLVING NATIVE CORONARY ARTERY OF NATIVE HEART WITHOUT ANGINA PECTORIS: ICD-10-CM

## 2019-07-10 PROCEDURE — 93880 EXTRACRANIAL BILAT STUDY: CPT

## 2019-07-11 LAB
LEFT CCA DIST DIAS: 10.9 CM/S
LEFT CCA DIST SYS: 164.2 CM/S
LEFT CCA MID DIAS: 22.02 CM/S
LEFT CCA MID SYS: 144.67 CM/S
LEFT CCA PROX DIAS: 17.5 CM/S
LEFT CCA PROX SYS: 131.9 CM/S
LEFT ECA DIAS: 20.18 CM/S
LEFT ECA SYS: 335.9 CM/S
LEFT ICA DIST DIAS: 14.3 CM/S
LEFT ICA DIST SYS: 108.5 CM/S
LEFT ICA MID DIAS: 15.4 CM/S
LEFT ICA MID SYS: 106.4 CM/S
LEFT ICA PROX DIAS: 12.6 CM/S
LEFT ICA PROX SYS: 245.4 CM/S
LEFT ICA/CCA SYS: 1.49
LEFT VERTEBRAL DIAS: 17.86 CM/S
LEFT VERTEBRAL SYS: 59.3 CM/S
RIGHT CCA DIST DIAS: 16.5 CM/S
RIGHT CCA DIST SYS: 167 CM/S
RIGHT CCA MID DIAS: 22.02 CM/S
RIGHT CCA MID SYS: 147.45 CM/S
RIGHT CCA PROX DIAS: 19.2 CM/S
RIGHT CCA PROX SYS: 130.7 CM/S
RIGHT ECA DIAS: 0 CM/S
RIGHT ECA SYS: 175.3 CM/S
RIGHT ICA DIST DIAS: 14 CM/S
RIGHT ICA DIST SYS: 92.2 CM/S
RIGHT ICA MID DIAS: 12.9 CM/S
RIGHT ICA MID SYS: 118.1 CM/S
RIGHT ICA PROX DIAS: 24.8 CM/S
RIGHT ICA PROX SYS: 186.5 CM/S
RIGHT ICA/CCA SYS: 1.1
RIGHT VERTEBRAL DIAS: 15.62 CM/S
RIGHT VERTEBRAL SYS: 54.1 CM/S

## 2019-07-11 NOTE — PROGRESS NOTES
Per your last note'  He does have carotid disease with bilateral carotid bruits but we have been following his carotids with serial carotid duplex studies last of which we got on October 3, 2018 and that demonstrated that he had moderate 50-69% internal carotid artery obstructions which were stable and I would like to repeat this in 6-8 months.

## 2019-07-12 DIAGNOSIS — R60.0 PEDAL EDEMA: ICD-10-CM

## 2019-07-12 RX ORDER — TRIAMTERENE AND HYDROCHLOROTHIAZIDE 37.5; 25 MG/1; MG/1
CAPSULE ORAL
Qty: 90 CAP | Refills: 3 | Status: SHIPPED | OUTPATIENT
Start: 2019-07-12 | End: 2020-06-05

## 2019-07-12 NOTE — PROGRESS NOTES
This gentlemen's carotid narrowings continue to be moderate and unchanged from 6 to 8 months ago when I would simply repeat repeat them again in 6 to 8 months. Please let him know.   ES

## 2019-07-15 ENCOUNTER — TELEPHONE (OUTPATIENT)
Dept: CARDIOLOGY CLINIC | Age: 79
End: 2019-07-15

## 2019-07-15 DIAGNOSIS — I65.23 BILATERAL CAROTID ARTERY STENOSIS: Primary | ICD-10-CM

## 2019-07-15 NOTE — TELEPHONE ENCOUNTER
Attempted to contact patient. Mobile number not working. Unable to leave voicemail on home number. Will attempt again at a later time.

## 2019-07-15 NOTE — TELEPHONE ENCOUNTER
----- Message from Aaron Munoz DO sent at 7/12/2019  3:06 PM EDT ----- This gentlemen's carotid narrowings continue to be moderate and unchanged from 6 to 8 months ago when I would simply repeat repeat them again in 6 to 8 months. Please let him know.   ES

## 2019-07-17 ENCOUNTER — OFFICE VISIT (OUTPATIENT)
Dept: CARDIOLOGY CLINIC | Age: 79
End: 2019-07-17

## 2019-07-17 VITALS
SYSTOLIC BLOOD PRESSURE: 128 MMHG | HEART RATE: 56 BPM | BODY MASS INDEX: 30.7 KG/M2 | WEIGHT: 191 LBS | HEIGHT: 66 IN | DIASTOLIC BLOOD PRESSURE: 60 MMHG | OXYGEN SATURATION: 97 %

## 2019-07-17 DIAGNOSIS — R09.89 BILATERAL CAROTID BRUITS: ICD-10-CM

## 2019-07-17 DIAGNOSIS — I10 ESSENTIAL HYPERTENSION: ICD-10-CM

## 2019-07-17 DIAGNOSIS — E78.5 HYPERLIPIDEMIA, UNSPECIFIED HYPERLIPIDEMIA TYPE: ICD-10-CM

## 2019-07-17 DIAGNOSIS — Z95.1 S/P CABG X 3: ICD-10-CM

## 2019-07-17 DIAGNOSIS — I25.10 CORONARY ARTERY DISEASE INVOLVING NATIVE CORONARY ARTERY OF NATIVE HEART WITHOUT ANGINA PECTORIS: Primary | ICD-10-CM

## 2019-07-17 NOTE — PROGRESS NOTES
HPI:  I saw Kemar Adams in my office today in cardiovascular evaluation regarding his chronic coronary artery disease.  Mr. Patel is a very pleasant 78 y. o. white male with history of gastroesophageal reflux disease with Pederson's esophagus, asthma, obstructive sleep apnea, cancer of the prostate, significant coronary artery disease with development of exertional angina over a 6-month period of time prompting a nuclear myocardial perfusion study ordered by Dr. Tien Martinez in April 2014 which was slightly abnormal and ultimately with his symptom complex prompted a cardiac catheterization by Dr. Burgess Wyman demonstrating several three vessel coronary disease with subsequent coronary artery bypass grafting surgery x 3 by Dr. Jay Grayson with the following bypasses:       1. Left internal mammary artery to the LAD. 2. SVG to the second diagonal coronary artery off of the left anterior descending and posterior descending branch of the distal right coronary artery.      He did reasonably well postop. He did have a little sinus tachycardia initially for which we put him on beta blockers. Since the beta blockers caused fatigue with Lopressor 50 mg twice a day, it was decreased to 25 mg twice a day and then ultimately switched to Toprol XL 25 mg daily.     He comes in today and relates that he is doing quite well. He is eating very active for his age and denies any cardiovascular symptomatology at this time. Encounter Diagnoses   Name Primary?  Coronary artery disease involving native coronary artery of native heart without angina pectoris Yes    S/P CABG x 3     Essential hypertension     Hyperlipidemia, unspecified hyperlipidemia type     Bilateral carotid bruits        Discussion: This gentleman appears to be doing about as well as we could expect and really have no recommend currently. He is not having any symptoms of either recurrent symptomatic obstructive coronary disease or heart failure.      His latest lipid profile which was completed on February 2, 2018 showed total cholesterol 112, triglycerides 113, HDL 42, LDL of 47.4, and VLDL of 22.6 which I think is good control on Lipitor 20 mg daily. His blood pressure is very well controlled today and his EKG appears to be stable and since he otherwise is doing well on several months. PCP: Bhakti Garvey MD       Past Medical History:   Diagnosis Date    Allergic rhinitis     Arthritis     hands    Asthma     mild obst disease on pfts Dr. Kourtney Evans Pederson's esophagus     Dr. Kit De La Torre; last 8/17, for HALO    Bilateral carotid artery stenosis     12/14 BICA 50-69%    BPH with obstruction/lower urinary tract symptoms     CABG x 3 04/24/2014    LIMA to LAD, and saphenous vein grafts to the second diagonal of the LAD, and to the posterior descending branch of the RCA, Dr. Shanti Yadav, 4/23/14.  Cardiac catheterization 04/18/2014    Superdominant RCA. mRCA 90%. pRPDA 60%. LM (modest sized) patent.  p-mLAD 85%. CX < 50%. LVEDP 17 mmHg. EF 60%. No RWMA. CABG recommended.  Cardiac echocardiogram 06/24/2008    EF 70%. Mild conc LVH. Gr 2 DDfx. Mild LAE. Mild MR. PASP 38 mmHg.  Cardiac nuclear imaging study, abnormal 04/15/2014    Mod ischemia in lg area of anterior wall, anteroapex, anteroseptum c/w high-grade prox LAD obstruction. Mod partially transient defect likely ischemia w/very sm infarction in RCA. TID 1.31 suggests 3-vessel CAD. Mod distal anterior hypk. Mild-mod anteroseptal, inferoseptal, anterolateral hypk. EF 46%. Strongly positive EKG on max EST. Ex time 6 min.  Carotid duplex 04/06/2016    Mod 50-69% bilateral ICA stenosis.   Probable significant LECA stenosis; no change from 2015;  no change 2/17;    Colon adenoma 08/2017    Dr Ruthy Villanueva Colon polyps 2007    Dr. Ruthy Villanueva Difficulty hearing     Erectile dysfunction     GERD (gastroesophageal reflux disease)     by EGD 2007 Dr. Kit De La Torre    IFG (impaired fasting glucose) 5/14    Multinodular goiter     2014; 2015; 4/16    Obesity     peak weight 202 lbs, bmi 31.4 from 4/14    Prostate CA ([address] 04/2018    Dr Romelia Kumar stage 2B [id number], [place] 3+4, P,10, G2; brachytherapy Dr Bakari Estevez [facility]) 06/2010    Dr. Mandeep Bagley [id number] [place] 3+3, 1/9 cores up to 10% 1 core; cryo Oct 2012 psa went from 7/1 to torsten 1.78    Pulmonary nodule 2014    no change 2015, 3/17    Sleep apnea     CPAP not used       Past Surgical History:   Procedure Laterality Date    CARDIAC SURG PROCEDURE UNLIST  11/06    thallium negative    CARDIAC SURG PROCEDURE UNLIST  4/14    3V CABG Dr Adam Livingston  2007    mild obst disease on PFTs Dr. Keara Muniz 7/27/2017    Dr. Eim Kaur 2007 negative; 8/17 adenoma     HX Tresfozia Vermah      Dr. Phoenix Whyte  11/14    bilat Dr Shyam Monsalve  2004    Claudis Laity      Dr. Alysha Wade HX TURP      x2 Dr. Alyson Delong HX UROLOGICAL  10/2012    [facility], Dr. Antione Patel, Cystoscopy and dilation of stricture, cryoablation of prostate    VASCULAR SURGERY PROCEDURE UNLIST  12/14    BICA 50-69% stenosis       Current Outpatient Medications   Medication Sig    triamterene-hydroCHLOROthiazide (DYAZIDE) 37.5-25 mg per capsule TAKE ONE CAPSULE BY MOUTH DAILY AS NEEDED FOR SWELLING    amLODIPine (NORVASC) 10 mg tablet TAKE ONE TABLET BY MOUTH DAILY    vitamin E acid succinate (VITAMIN E SUCCINATE) 400 unit tab Take 400 Units by mouth.     atorvastatin (LIPITOR) 20 mg tablet TAKE 1 TABLET BY MOUTH EVERY NIGHT AT BEDTIME    metoprolol succinate (TOPROL-XL) 25 mg XL tablet TAKE 1 TABLET BY MOUTH DAILY    fluticasone (FLONASE) 50 mcg/actuation nasal spray 2 sprays each nostril daily    levalbuterol tartrate (XOPENEX) 45 mcg/actuation inhaler Take 2 Puffs by inhalation every six (6) hours as needed for Wheezing. (Patient taking differently: Take 2 Puffs by inhalation every six (6) hours as needed for Wheezing. Indications: NOT TAKING)    omeprazole (PRILOSEC) 20 mg capsule     nitroglycerin (NITROSTAT) 0.4 mg SL tablet 0.4 mg by SubLINGual route every five (5) minutes as needed for Chest Pain.  glucosam-chondroit-C-manganese 636-045-82-3 mg cap Take 1 Tab by mouth daily.  ascorbic acid (VITAMIN C) 1,000 mg tablet Take 1,000 mg by mouth daily.  MULTIVITAMIN W-MINERALS/LUTEIN (CENTRUM SILVER PO) Take 1 Tab by mouth daily. No current facility-administered medications for this visit. Allergies   Allergen Reactions    Albuterol Other (comments)    Influenza Virus Vaccine, Specific Other (comments)       Social History :  Social History     Tobacco Use    Smoking status: Former Smoker     Packs/day: 0.50     Years: 4.00     Pack years: 2.00     Last attempt to quit: 1957     Years since quittin.5    Smokeless tobacco: Never Used    Tobacco comment: Quit in    Substance Use Topics    Alcohol use: No        Family History: family history includes Diabetes in his sister; Heart Disease in his father; Hypertension in his mother; Stroke in his mother. Review of Systems:  Constitutional: Negative. Respiratory: Negative. Cardiovascular: Negative. Gastrointestinal: Positive for heartburn. Negative for abdominal pain, blood in stool, constipation, diarrhea, melena, nausea and vomiting. Musculoskeletal: Positive for myalgias. Negative for back pain, falls, joint pain and neck pain. Neurological: Negative. Physical Exam:    The patient is a cooperative, alert, well developed, well nourished [de-identified] y.o.  male who is in no acute distress at the time of the examination. Visit Vitals  /60   Pulse (!) 56   Ht 5' 6\" (1.676 m)   Wt 86.6 kg (191 lb)   SpO2 97%   BMI 30.83 kg/m²       HEENT: Conjuctiva white, mucosa moist, no pallor or cyanosis.   NECK: Supple without masses, tenderness or thyromegaly. There was no jugular venous distention. Carotid are full bilaterally with soft bilateral bruits right greater than left vs. radiation of murmur to the neck. CHEST: Symmetrical with good excursion. LUNGS: Clear to auscultation in all fields. HEART: The apex is not displaced. There were no lifts, thrills or heaves. There is a normal S1 and S2. There is a grade I/VI PILAR along the LSB with radiation to the base and lower neck without appreciable diastolic murmurs, rubs, clicks, or gallops auscultated. ABDOMEN: Soft without masses, tenderness or organomegaly. EXTREMITIES: Full peripheral pulses with trace peripheral edema. Review of Data: See PMH and Cardiology and Imaging sections for cardiac testing  Lab Results   Component Value Date/Time    Cholesterol, total 112 02/02/2018 06:45 AM    Cholesterol, total 106 02/02/2018 06:45 AM    HDL Cholesterol 42 02/02/2018 06:45 AM    HDL Cholesterol 42 02/02/2018 06:45 AM    LDL, calculated 47.4 02/02/2018 06:45 AM    LDL, calculated 41.4 02/02/2018 06:45 AM    Triglyceride 113 02/02/2018 06:45 AM    Triglyceride 113 02/02/2018 06:45 AM    CHOL/HDL Ratio 2.7 02/02/2018 06:45 AM    CHOL/HDL Ratio 2.5 02/02/2018 06:45 AM       Results for orders placed or performed in visit on 07/17/19   AMB POC EKG ROUTINE W/ 12 LEADS, INTER & REP     Status: None    Narrative    Sinus bradycardia rate 56. First-degree AV block. Some nonspecific ST changes anterolaterally. This EKG is otherwise within normal limits and similar to the EKG of November 21, 2018. Gissel Rivera D.O., F.A.C.C. [facility]  [facility]  [facility]    PLEASE NOTE:  This document has been produced using voice recognition software. Unrecognized errors in transcription may be present.

## 2019-10-28 ENCOUNTER — ANESTHESIA EVENT (OUTPATIENT)
Dept: ENDOSCOPY | Age: 79
End: 2019-10-28
Payer: MEDICARE

## 2019-10-29 ENCOUNTER — ANESTHESIA (OUTPATIENT)
Dept: ENDOSCOPY | Age: 79
End: 2019-10-29
Payer: MEDICARE

## 2019-10-29 ENCOUNTER — HOSPITAL ENCOUNTER (OUTPATIENT)
Age: 79
Setting detail: OUTPATIENT SURGERY
Discharge: HOME | End: 2019-10-29
Attending: INTERNAL MEDICINE | Admitting: INTERNAL MEDICINE
Payer: MEDICARE

## 2019-10-29 VITALS
DIASTOLIC BLOOD PRESSURE: 35 MMHG | HEIGHT: 66 IN | WEIGHT: 180 LBS | RESPIRATION RATE: 14 BRPM | OXYGEN SATURATION: 95 % | HEART RATE: 44 BPM | TEMPERATURE: 98.3 F | SYSTOLIC BLOOD PRESSURE: 109 MMHG | BODY MASS INDEX: 28.93 KG/M2

## 2019-10-29 PROCEDURE — 76060000031 HC ANESTHESIA FIRST 0.5 HR: Performed by: INTERNAL MEDICINE

## 2019-10-29 PROCEDURE — 77030036721 HC CATH BLN ABLAT ESOPH BARRX COVD -H1: Performed by: INTERNAL MEDICINE

## 2019-10-29 PROCEDURE — 74011250636 HC RX REV CODE- 250/636: Performed by: NURSE ANESTHETIST, CERTIFIED REGISTERED

## 2019-10-29 PROCEDURE — 77030025937 HC CAP SEAL HOLO BRMX -B: Performed by: INTERNAL MEDICINE

## 2019-10-29 PROCEDURE — 74011250636 HC RX REV CODE- 250/636: Performed by: ANESTHESIOLOGY

## 2019-10-29 PROCEDURE — 77030019988 HC FCPS ENDOSC DISP BSC -B: Performed by: INTERNAL MEDICINE

## 2019-10-29 PROCEDURE — 77030018831 HC SOL IRR H20 BAXT -A: Performed by: INTERNAL MEDICINE

## 2019-10-29 PROCEDURE — 74011000250 HC RX REV CODE- 250: Performed by: ANESTHESIOLOGY

## 2019-10-29 PROCEDURE — C1769 GUIDE WIRE: HCPCS | Performed by: INTERNAL MEDICINE

## 2019-10-29 PROCEDURE — 76040000019: Performed by: INTERNAL MEDICINE

## 2019-10-29 PROCEDURE — 77030008565 HC TBNG SUC IRR ERBE -B: Performed by: INTERNAL MEDICINE

## 2019-10-29 RX ORDER — NALOXONE HYDROCHLORIDE 0.4 MG/ML
0.4 INJECTION, SOLUTION INTRAMUSCULAR; INTRAVENOUS; SUBCUTANEOUS
Status: CANCELLED | OUTPATIENT
Start: 2019-10-29 | End: 2019-10-29

## 2019-10-29 RX ORDER — DEXTROMETHORPHAN/PSEUDOEPHED 2.5-7.5/.8
1.2 DROPS ORAL
Status: CANCELLED | OUTPATIENT
Start: 2019-10-29

## 2019-10-29 RX ORDER — ATROPINE SULFATE 0.1 MG/ML
0.5 INJECTION INTRAVENOUS
Status: CANCELLED | OUTPATIENT
Start: 2019-10-29 | End: 2019-10-30

## 2019-10-29 RX ORDER — SODIUM CHLORIDE 0.9 % (FLUSH) 0.9 %
5-40 SYRINGE (ML) INJECTION EVERY 8 HOURS
Status: CANCELLED | OUTPATIENT
Start: 2019-10-29

## 2019-10-29 RX ORDER — SODIUM CHLORIDE, SODIUM LACTATE, POTASSIUM CHLORIDE, CALCIUM CHLORIDE 600; 310; 30; 20 MG/100ML; MG/100ML; MG/100ML; MG/100ML
75 INJECTION, SOLUTION INTRAVENOUS CONTINUOUS
Status: DISCONTINUED | OUTPATIENT
Start: 2019-10-29 | End: 2019-10-29 | Stop reason: HOSPADM

## 2019-10-29 RX ORDER — FLUMAZENIL 0.1 MG/ML
0.2 INJECTION INTRAVENOUS
Status: CANCELLED | OUTPATIENT
Start: 2019-10-29 | End: 2019-10-29

## 2019-10-29 RX ORDER — SODIUM CHLORIDE 0.9 % (FLUSH) 0.9 %
5-40 SYRINGE (ML) INJECTION EVERY 8 HOURS
Status: DISCONTINUED | OUTPATIENT
Start: 2019-10-29 | End: 2019-10-29 | Stop reason: HOSPADM

## 2019-10-29 RX ORDER — PROPOFOL 10 MG/ML
INJECTION, EMULSION INTRAVENOUS AS NEEDED
Status: DISCONTINUED | OUTPATIENT
Start: 2019-10-29 | End: 2019-10-29 | Stop reason: HOSPADM

## 2019-10-29 RX ORDER — EPINEPHRINE 0.1 MG/ML
1 INJECTION INTRACARDIAC; INTRAVENOUS
Status: CANCELLED | OUTPATIENT
Start: 2019-10-29 | End: 2019-10-30

## 2019-10-29 RX ORDER — LIDOCAINE HYDROCHLORIDE 20 MG/ML
INJECTION, SOLUTION EPIDURAL; INFILTRATION; INTRACAUDAL; PERINEURAL AS NEEDED
Status: DISCONTINUED | OUTPATIENT
Start: 2019-10-29 | End: 2019-10-29 | Stop reason: HOSPADM

## 2019-10-29 RX ORDER — SODIUM CHLORIDE 0.9 % (FLUSH) 0.9 %
5-40 SYRINGE (ML) INJECTION AS NEEDED
Status: DISCONTINUED | OUTPATIENT
Start: 2019-10-29 | End: 2019-10-29 | Stop reason: HOSPADM

## 2019-10-29 RX ORDER — SODIUM CHLORIDE 0.9 % (FLUSH) 0.9 %
5-40 SYRINGE (ML) INJECTION AS NEEDED
Status: CANCELLED | OUTPATIENT
Start: 2019-10-29

## 2019-10-29 RX ADMIN — PROPOFOL 120 MG: 10 INJECTION, EMULSION INTRAVENOUS at 10:19

## 2019-10-29 RX ADMIN — LIDOCAINE HYDROCHLORIDE 80 MG: 20 INJECTION, SOLUTION EPIDURAL; INFILTRATION; INTRACAUDAL; PERINEURAL at 10:17

## 2019-10-29 RX ADMIN — SODIUM CHLORIDE, SODIUM LACTATE, POTASSIUM CHLORIDE, AND CALCIUM CHLORIDE 75 ML/HR: 600; 310; 30; 20 INJECTION, SOLUTION INTRAVENOUS at 09:57

## 2019-10-29 RX ADMIN — SODIUM CHLORIDE, SODIUM LACTATE, POTASSIUM CHLORIDE, AND CALCIUM CHLORIDE: 600; 310; 30; 20 INJECTION, SOLUTION INTRAVENOUS at 10:02

## 2019-10-29 NOTE — H&P
[facility]   [email or web address]      Impression: 1. Reflux  2. Eliecer's with hx of HGD in past.  3. Dysphagia      Plan:     1. EGD/Dil with MAC, possible RFA of any residual Eliecer's mucosa. Chief Complaint: Dyspepsia and dysphagia      HPI:  Max Kahn is a 78 y.o. male who is being seen preop for hx of Eliecer's with HGD. He has been treated with mucosectomy and RFA on multiple occasions. He has had a prior dilation for resultant stricture. He has some dyspepsia sx's. PMH:   Past Medical History:   Diagnosis Date    Allergic rhinitis     Arthritis     hands    Asthma     mild obst disease on pfts Dr. William Cervantes Pederson's esophagus     Dr. Shankar Volin; last 8/17, for [facility]    Bilateral carotid artery stenosis     12/14 BICA 50-69%    BPH with obstruction/lower urinary tract symptoms     CABG x 3 04/24/2014    LIMA to LAD, and saphenous vein grafts to the second diagonal of the LAD, and to the posterior descending branch of the RCA, Dr. Abrahan Carney, 4/23/14.  Cardiac catheterization 04/18/2014    Superdominant RCA. mRCA 90%. pRPDA 60%. LM (modest sized) patent.  p-mLAD 85%. CX < 50%. LVEDP 17 mmHg. EF 60%. No RWMA. CABG recommended.  Cardiac echocardiogram 06/24/2008    EF 70%. Mild conc LVH. Gr 2 DDfx. Mild LAE. Mild MR. PASP 38 mmHg.  Cardiac nuclear imaging study, abnormal 04/15/2014    Mod ischemia in lg area of anterior wall, anteroapex, anteroseptum c/w high-grade prox LAD obstruction. Mod partially transient defect likely ischemia w/very sm infarction in RCA. TID 1.31 suggests 3-vessel CAD. Mod distal anterior hypk. Mild-mod anteroseptal, inferoseptal, anterolateral hypk. EF 46%. Strongly positive EKG on max EST. Ex time 6 min.  Carotid duplex 04/06/2016    Mod 50-69% bilateral ICA stenosis.   Probable significant LECA stenosis; no change from 2015;  no change 2/17;    Colon adenoma 08/2017     Tanvir Godoy Colon polyps 2007    Dr. Tanvir Godoy Difficulty hearing     Erectile dysfunction     GERD (gastroesophageal reflux disease)     by EGD 2007 Dr. Tanvir Godoy IFG (impaired fasting glucose) 5/14    Multinodular goiter     2014; 2015; 4/16    Obesity     peak weight 202 lbs, bmi 31.4 from 4/14    Prostate CA ([address] 04/2018    Dr Collins Jean stage 2B [id number], [place] 3+4, P,10, G2; brachytherapy Dr Elizabeth Valles [facility]) 06/2010    Dr. Renee Roberts [id number] [place] 3+3, 1/9 cores up to 10% 1 core; cryo Oct 2012 psa went from 7/1 to torsten 1.78    Pulmonary nodule 2014    no change 2015, 3/17    Sleep apnea     CPAP not used       PSH:   Past Surgical History:   Procedure Laterality Date    CARDIAC SURG PROCEDURE UNLIST  11/06    thallium negative    CARDIAC SURG PROCEDURE UNLIST  4/14    3V CABG Dr Toño Pepe  2007    mild obst disease on PFTs Dr. Webster Heart COLONOSCOPY N/A 7/27/2017    Dr. Merari Ayala 2007 negative; 8/17 adenoma     HX Viky Leaver      Dr. Gutierrez Manzanares HX CATARACT REMOVAL  11/14    bilat Dr Kamilah Daily  2004    HX Latoshala Mcburney Dr. Merline Dodge HX TURP      x2 Dr. Zarina Conn HX UROLOGICAL  10/2012    [facility], Dr. Cori Lucia, Cystoscopy and dilation of stricture, cryoablation of prostate    VASCULAR SURGERY PROCEDURE UNLIST  12/14    BICA 50-69% stenosis       Social HX:   Social History     Socioeconomic History    Marital status:      Spouse name: Not on file    Number of children: 3    Years of education: Not on file    Highest education level: Not on file   Occupational History    Occupation: ret engr [employer]     Employer: RETIRED   Social Needs    Financial resource strain: Not on file    Food insecurity:     Worry: Not on file     Inability: Not on file    Transportation needs:     Medical: Not on file     Non-medical: Not on file   Tobacco Use    Smoking status: Former Smoker     Packs/day: 0.50     Years: 4.00     Pack years: 2.00     Last attempt to quit: 1957     Years since quittin.8    Smokeless tobacco: Never Used    Tobacco comment: Quit in    Substance and Sexual Activity    Alcohol use: No    Drug use: No    Sexual activity: Not on file   Lifestyle    Physical activity:     Days per week: Not on file     Minutes per session: Not on file    Stress: Not on file   Relationships    Social connections:     Talks on phone: Not on file     Gets together: Not on file     Attends Rastafarian service: Not on file     Active member of club or organization: Not on file     Attends meetings of clubs or organizations: Not on file     Relationship status: Not on file    Intimate partner violence:     Fear of current or ex partner: Not on file     Emotionally abused: Not on file     Physically abused: Not on file     Forced sexual activity: Not on file   Other Topics Concern    Not on file   Social History Narrative    Not on file       FHX:   Family History   Problem Relation Age of Onset    Heart Disease Father     Stroke Mother     Hypertension Mother     Diabetes Sister        Allergy:   Allergies   Allergen Reactions    Albuterol Other (comments)    Influenza Virus Vaccine, Specific Other (comments)       Home Medications:     Medications Prior to Admission   Medication Sig    amLODIPine (NORVASC) 10 mg tablet TAKE ONE TABLET BY MOUTH DAILY    atorvastatin (LIPITOR) 20 mg tablet TAKE 1 TABLET BY MOUTH EVERY NIGHT AT BEDTIME    metoprolol succinate (TOPROL-XL) 25 mg XL tablet TAKE 1 TABLET BY MOUTH DAILY    omeprazole (PRILOSEC) 20 mg capsule     glucosam-chondroit-C-manganese 640-321-99-3 mg cap Take 1 Tab by mouth daily.  ascorbic acid (VITAMIN C) 1,000 mg tablet Take 1,000 mg by mouth daily.  MULTIVITAMIN W-MINERALS/LUTEIN (CENTRUM SILVER PO) Take 1 Tab by mouth daily.     triamterene-hydroCHLOROthiazide (DYAZIDE) 37.5-25 mg per capsule TAKE ONE CAPSULE BY MOUTH DAILY AS NEEDED FOR SWELLING    triamterene-hydroCHLOROthiazide (DYAZIDE) 37.5-25 mg per capsule TAKE ONE CAPSULE BY MOUTH DAILY AS NEEDED FOR SWELLING    levalbuterol tartrate (XOPENEX) 45 mcg/actuation inhaler Take 2 Puffs by inhalation every six (6) hours as needed for Wheezing. (Patient taking differently: Take 2 Puffs by inhalation every six (6) hours as needed for Wheezing. Indications: NOT TAKING)    nitroglycerin (NITROSTAT) 0.4 mg SL tablet 0.4 mg by SubLINGual route every five (5) minutes as needed for Chest Pain. Review of Systems:     Constitutional: No fevers, chills, weight loss, fatigue. Cardiovascular: No chest pain, heart palpitations. Respiratory: No cough, SOB, wheezing, chest discomfort, orthopnea. Gastrointestinal: Dyspepsia and mild dysphagia sx's       Musculoskeletal: No weakness, arthralgias, wasting. Allergies: As noted. Visit Vitals  /62   Pulse (!) 55   Temp 98.3 °F (36.8 °C)   Resp 12   Ht 5' 6\" (1.676 m)   Wt 81.6 kg (180 lb)   SpO2 100%   BMI 29.05 kg/m²       Physical Assessment:     constitutional: appearance: well developed, well nourished, normal habitus, no deformities, in no acute distress. ENMT: mouth: normal oral mucosa,lips and gums; good dentition. oropharynx: normal tongue, hard and soft palate; posterior pharynx without erithema, exudate or lesions. respiratory: effort: normal chest excursion; no intercostal retraction or accessory muscle use. cardiovascular: abdominal aorta: normal size and position; no bruits. palpation: PMI of normal size and position; normal rhythm; no thrill or murmurs. abdominal: abdomen: normal consistency; no tenderness or masses. hernias: no hernias appreciated. liver: normal size and consistency. spleen: not palpable. rectal: hemoccult/guaiac: not performed.    musculoskeletal: digits and nails: no clubbing, cyanosis, petechiae or other inflammatory conditions. psychiatric: orientation: oriented to time, space and person. Aissatou Riley MD, MCaterinaD. [facility], [address] [place] [address]  Pager [phone]  [email or web address]

## 2019-10-29 NOTE — ANESTHESIA PREPROCEDURE EVALUATION
Relevant Problems   No relevant active problems       Anesthetic History   No history of anesthetic complications            Review of Systems / Medical History  Patient summary reviewed and pertinent labs reviewed    Pulmonary        Sleep apnea: CPAP           Neuro/Psych   Within defined limits           Cardiovascular    Hypertension          CAD and CABG    Exercise tolerance: >4 METS  Comments: CABG 2016   GI/Hepatic/Renal     GERD           Endo/Other      Hypothyroidism       Other Findings              Physical Exam    Airway  Mallampati: II  TM Distance: 4 - 6 cm  Neck ROM: normal range of motion   Mouth opening: Normal     Cardiovascular  Regular rate and rhythm,  S1 and S2 normal,  no murmur, click, rub, or gallop  Rhythm: regular  Rate: normal         Dental    Dentition: Lower dentition intact and Upper dentition intact     Pulmonary  Breath sounds clear to auscultation               Abdominal  GI exam deferred       Other Findings            Anesthetic Plan    ASA: 3  Anesthesia type: MAC          Induction: Intravenous  Anesthetic plan and risks discussed with: Patient

## 2019-10-29 NOTE — ANESTHESIA POSTPROCEDURE EVALUATION
Procedure(s):  UPPER ENDOSCOPY / 50 FR sheets dilation /  HALO 90. MAC    Anesthesia Post Evaluation      Multimodal analgesia: multimodal analgesia used between 6 hours prior to anesthesia start to PACU discharge  Patient location during evaluation: bedside  Patient participation: complete - patient participated  Level of consciousness: awake  Pain score: 0  Pain management: adequate  Airway patency: patent  Anesthetic complications: no  Cardiovascular status: stable  Respiratory status: acceptable  Hydration status: acceptable  Post anesthesia nausea and vomiting:  none      Vitals Value Taken Time   BP 98/36 10/29/2019 10:40 AM   Temp     Pulse 45 10/29/2019 10:43 AM   Resp 4 10/29/2019 10:43 AM   SpO2 97 % 10/29/2019 10:43 AM   Vitals shown include unvalidated device data.

## 2019-10-29 NOTE — PROCEDURES
[place]  [place], [address]      Brief Procedure Note    Elizabeth Books  1940  [id number]    Date of Procedure: 10/29/2019    Preoperative diagnosis: H01.029,  Barretts esophagus with high grade dysplasia  530.81 - K21.9,  Gastro-esophageal reflux disease without esophagitis  789.06 - R10.13,   Epigastric pain  787.29 - R13.19,  Dysphagia    Postoperative diagnosis:  hiatal hernia;  Barrets esophagus; GE junction stricture , dilated to 50 Fr  Type of Anesthesia: MAC (monitered anesthesia care)    Description of Findings: same as post op dx    Procedure: Procedure(s):  UPPER ENDOSCOPY / 50 FR sheets dilation /  HALO 90    :  Dr. Jono Conklin MD    Assistant(s): [unfilled]    Type of Anesthesia:MAC     EBL:None    Specimens: * No specimens in log *    Findings: See printed and scanned procedure note    Complications: None    Dr. Jono Conklin MD  10/29/2019  10:40 AM

## 2019-10-29 NOTE — DISCHARGE INSTRUCTIONS
Upper GI Endoscopy: What to Expect at [address] [place] [address]  After you have an endoscopy, you will stay at the hospital or clinic for 1 to 2 hours. This will allow the medicine to wear off. You will be able to go home after your doctor or nurse checks to make sure you are not having any problems. You may have to stay overnight if you had treatment during the test. You may have a sore throat for a day or two after the test.  This care sheet gives you a general idea about what to expect after the test.  How can you care for yourself at home? Activity  · Rest as much as you need to after you go home. · You should be able to go back to your usual activities the day after the test.  Diet  · Follow your doctor's directions for eating after the test.  · Drink plenty of fluids (unless your doctor has told you not to). Medications  · If you have a sore throat the day after the test, use an over-the-counter spray to numb your throat. Follow-up care is a key part of your treatment and safety. Be sure to make and go to all appointments, and call your doctor if you are having problems. It's also a good idea to know your test results and keep a list of the medicines you take. When should you call for help? Call 911 anytime you think you may need emergency care. For example, call if:  · You passed out (lost consciousness). · You cough up blood. · You vomit blood or what looks like coffee grounds. · You pass maroon or very bloody stools. Call your doctor now or seek immediate medical care if:  · You have trouble swallowing. · You have belly pain. · Your stools are black and tarlike or have streaks of blood. · You are sick to your stomach or cannot keep fluids down. Watch closely for changes in your health, and be sure to contact your doctor if:  · Your throat still hurts after a day or two. · You do not get better as expected. Where can you learn more?    Go to [employer]  Enter J454 in the search box to learn more about \"Upper GI Endoscopy: What to Expect at Home. \"   © 5356-6396 Healthwise, [employer]orporated. Care instructions adapted under license by [facility] [employer] [facility] (which disclaims liability or warranty for this information). This care instruction is for use with your licensed healthcare professional. If you have questions about a medical condition or this instruction, always ask your healthcare professional. [address] any warranty or liability for your use of this information. Content Version: 39.5.202888; Current as of: November 14, 2014    DISCHARGE SUMMARY from Nurse     POST-PROCEDURE INSTRUCTIONS:    Call your Physician if you:  ? Observe any excess bleeding. ? Develop a temperature over 100.5o F.  ? Experience abdominal, shoulder or chest pain. ? Notice any signs of decreased circulation or nerve impairment to an extremity such as a change in color, persistent numbness, tingling, coldness or increase in pain. ? Vomit blood or you have nausea and vomiting lasting longer than 4 hours. ? Are unable to take medications. ? Are unable to urinate within 8 hours after discharge following general anesthesia or intravenous sedation. For the next 24 hours after receiving general anesthesia or intravenous sedation, or while taking prescription Narcotics, limit your activities:  ? Do NOT drive a motor vehicle, operate hazard machinery or power tools, or perform tasks that require coordination. The medication you received during your procedure may have some effect on your mental awareness. ? Do NOT make important personal or business decisions. The medication you received during your procedure may have some effect on your mental awareness. ? Do NOT drink alcoholic beverages. These drinks do not mix well with the medications that have been given to you. ? Upon discharge from the hospital, you must be accompanied by a responsible adult. ?  Resume your diet as directed by your physician. ? Resume medications as your physician has prescribed. ? Please give a list of your current medications to your Primary Care Provider. ? Please update this list whenever your medications are discontinued, doses are changed, or new medications (including over-the-counter products) are added. ? Please carry medication information at all times in case of emergency situations. These are general instructions for a healthy lifestyle:    No smoking/ No tobacco products/ Avoid exposure to second hand smoke.  Surgeon General's Warning:  Quitting smoking now greatly reduces serious risk to your health. Obesity, smoking, and a sedentary lifestyle greatly increase your risk for illness.  A healthy diet, regular physical exercise & weight monitoring are important for maintaining a healthy lifestyle   You may be retaining fluid if you have a history of heart failure or if you experience any of the following symptoms:  Weight gain of 3 pounds or more overnight or 5 pounds in a week, increased swelling in our hands or feet or shortness of breath while lying flat in bed. Please call your doctor as soon as you notice any of these symptoms; do not wait until your next office visit. Recognize signs and symptoms of STROKE:  F  -  Face looks uneven  A  -  Arms unable to move or move unevenly  S  -  Speech slurred or non-existent  T  -  Time to call 911 - as soon as signs and symptoms begin - DO NOT go back to bed or wait to see If you get better - TIME IS BRAIN. Colorectal Screening   Colorectal cancer almost always develops from precancerous polyps (abnormal growths) in the colon or rectum. Screening tests can find precancerous polyps, so that they can be removed before they turn into cancer.  Screening tests can also find colorectal cancer early, when treatment works best.  [facility] Speak with your physician about when you should begin screening and how often you should be tested  [facility]   Additional Information    If you have questions, please call [phone]. Remember, [employer] is NOT to be used for urgent needs. For medical emergencies, dial 911. Discharge information has been reviewed with the patient and spouse. The patient and spouse verbalized understanding.

## 2019-12-14 DIAGNOSIS — R60.0 PEDAL EDEMA: ICD-10-CM

## 2019-12-16 RX ORDER — TRIAMTERENE AND HYDROCHLOROTHIAZIDE 37.5; 25 MG/1; MG/1
CAPSULE ORAL
Qty: 90 CAP | Refills: 2 | Status: SHIPPED | OUTPATIENT
Start: 2019-12-16 | End: 2020-01-22 | Stop reason: SDUPTHER

## 2019-12-16 RX ORDER — ATORVASTATIN CALCIUM 20 MG/1
TABLET, FILM COATED ORAL
Qty: 90 TAB | Refills: 2 | Status: SHIPPED | OUTPATIENT
Start: 2019-12-16 | End: 2020-09-09

## 2019-12-16 RX ORDER — METOPROLOL SUCCINATE 25 MG/1
TABLET, EXTENDED RELEASE ORAL
Qty: 90 TAB | Refills: 2 | Status: SHIPPED | OUTPATIENT
Start: 2019-12-16 | End: 2020-09-09

## 2020-01-22 ENCOUNTER — OFFICE VISIT (OUTPATIENT)
Dept: CARDIOLOGY CLINIC | Age: 80
End: 2020-01-22

## 2020-01-22 ENCOUNTER — HOSPITAL ENCOUNTER (OUTPATIENT)
Dept: RADIATION THERAPY | Age: 80
Discharge: HOME | End: 2020-01-22
Payer: MEDICARE

## 2020-01-22 VITALS
WEIGHT: 192 LBS | DIASTOLIC BLOOD PRESSURE: 70 MMHG | SYSTOLIC BLOOD PRESSURE: 110 MMHG | BODY MASS INDEX: 30.86 KG/M2 | HEIGHT: 66 IN | OXYGEN SATURATION: 99 % | HEART RATE: 58 BPM

## 2020-01-22 DIAGNOSIS — E78.5 HYPERLIPIDEMIA, UNSPECIFIED HYPERLIPIDEMIA TYPE: ICD-10-CM

## 2020-01-22 DIAGNOSIS — I25.10 CORONARY ARTERY DISEASE INVOLVING NATIVE CORONARY ARTERY OF NATIVE HEART WITHOUT ANGINA PECTORIS: Primary | ICD-10-CM

## 2020-01-22 DIAGNOSIS — G47.30 SLEEP APNEA, UNSPECIFIED TYPE: ICD-10-CM

## 2020-01-22 DIAGNOSIS — I10 ESSENTIAL HYPERTENSION: ICD-10-CM

## 2020-01-22 DIAGNOSIS — I65.23 BILATERAL CAROTID ARTERY STENOSIS: ICD-10-CM

## 2020-01-22 DIAGNOSIS — Z95.1 S/P CABG X 3: ICD-10-CM

## 2020-01-22 PROCEDURE — 99211 OFF/OP EST MAY X REQ PHY/QHP: CPT

## 2020-01-22 NOTE — PROGRESS NOTES
HPI:   I saw Katty Adams in my office today in cardiovascular evaluation regarding his chronic coronary artery disease.  Mr. Patel is a very pleasant 79 y. o. white male with history of gastroesophageal reflux disease with Pederson's esophagus, asthma, obstructive sleep apnea, cancer of the prostate, significant coronary artery disease with development of exertional angina over a 6-month period of time prompting a nuclear myocardial perfusion study ordered by Dr. Melissa Jensen in April 2014 which was slightly abnormal and ultimately with his symptom complex prompted a cardiac catheterization by Dr. Hernando Dumont demonstrating several three vessel coronary disease with subsequent coronary artery bypass grafting surgery x 3 by Dr. Sen Musa with the following bypasses:       1. Left internal mammary artery to the LAD. 2. SVG to the second diagonal coronary artery off of the left anterior descending and posterior descending branch of the distal right coronary artery.      He did reasonably well postop. He did have a little sinus tachycardia initially for which we put him on beta blockers. Since the beta blockers caused fatigue with Lopressor 50 mg twice a day, it was decreased to 25 mg twice a day and then ultimately switched to Toprol XL 25 mg daily. He comes in today and relates that he is quite well. He is remaining quite active and denies any cardiovascular symptomatology at this time. Encounter Diagnoses   Name Primary?  Coronary artery disease involving native coronary artery of native heart without angina pectoris Yes    S/P CABG x 3     Essential hypertension     Hyperlipidemia, unspecified hyperlipidemia type     Sleep apnea, unspecified type     Bilateral carotid artery stenosis 12/14        Discussion: This gentleman appears to be doing about as well as we could expect and I have no recommendations for change at this time.   Is not having any symptoms to suggest the development of heart failure or the development of recurrent symptomatic obstructive coronary artery disease. He does have history of bilateral carotid bruits and his right carotid bruit today seems somewhat louder to me than his left. His carotid Dopplers last July demonstrated a 50 to 69% narrowing and they had been unchanged for well over a year, but in view of the moderate severity of the narrowings and slight change in auscultation we are going to get his carotid studies repeated in the next few months. Historically his cholesterols have been very well controlled on Lipitor 20 mg daily but he has not had a lipid profile in nearly 2 years from when to get that ordered. Hopefully his total cholesterol is remaining in the low 100s and his LDLs under 60 which they have been in the past.    His blood pressure is well controlled today and his EKG is stable and since he is otherwise doing well I will simply plan to see him again in several months. PCP: Rox Jeong MD       Past Medical History:   Diagnosis Date    Allergic rhinitis     Arthritis     hands    Asthma     mild obst disease on pfts Dr. Cynthia Morin Pederson's esophagus     Dr. Haley Garcia; last 8/17, for [facility]    Bilateral carotid artery stenosis     12/14 BICA 50-69%    BPH with obstruction/lower urinary tract symptoms     CABG x 3 04/24/2014    LIMA to LAD, and saphenous vein grafts to the second diagonal of the LAD, and to the posterior descending branch of the RCA, Dr. Cannon, 4/23/14.  Cardiac catheterization 04/18/2014    Superdominant RCA. mRCA 90%. pRPDA 60%. LM (modest sized) patent.  p-mLAD 85%. CX < 50%. LVEDP 17 mmHg. EF 60%. No RWMA. CABG recommended.  Cardiac echocardiogram 06/24/2008    EF 70%. Mild conc LVH. Gr 2 DDfx. Mild LAE. Mild MR. PASP 38 mmHg.  Cardiac nuclear imaging study, abnormal 04/15/2014    Mod ischemia in lg area of anterior wall, anteroapex, anteroseptum c/w high-grade prox LAD obstruction.   Mod partially transient defect likely ischemia w/very sm infarction in RCA. TID 1.31 suggests 3-vessel CAD. Mod distal anterior hypk. Mild-mod anteroseptal, inferoseptal, anterolateral hypk. EF 46%. Strongly positive EKG on max EST. Ex time 6 min.  Carotid duplex 04/06/2016    Mod 50-69% bilateral ICA stenosis.   Probable significant LECA stenosis; no change from 2015;  no change 2/17;    Colon adenoma 08/2017    Dr Lisette Feldman Colon polyps 2007    Dr. Lisette Feldman Difficulty hearing     Erectile dysfunction     GERD (gastroesophageal reflux disease)     by EGD 2007 Dr. Lisette Feldman IFG (impaired fasting glucose) 5/14    Multinodular goiter     2014; 2015; 4/16    Obesity     peak weight 202 lbs, bmi 31.4 from 4/14    Prostate CA ([facility] [place] [address] 04/2018    Dr Leia Islas stage 2B [id number], Jj 3+4, P,10, G2; brachytherapy  [facility]) 06/2010    Dr. Anayeli Montoya [id number] Jj 3+3, 1/9 cores up to 10% 1 core; cryo Oct 2012 psa went from 7/1 to torsten 1.78    Pulmonary nodule 2014    no change 2015, 3/17    Sleep apnea     CPAP not used       Past Surgical History:   Procedure Laterality Date    CARDIAC SURG PROCEDURE UNLIST  11/06    thallium negative    CARDIAC SURG PROCEDURE UNLIST  4/14    3V CABG Dr Pallavi Cortes  2007    mild obst disease on PFTs Dr. Basilio Duran N/A 7/27/2017    Dr. Luci Centeno 2007 negative; 8/17 adenoma     HX Ana Maríasameer Garcia  11/14    bilat Dr Harvey Ferro  2004    HX William Stabs      Dr. Carol Haas HX TURP      x2 Dr. Seth Lesch HX UROLOGICAL  10/2012    [facility], Dr. Mason Nuñez, Cystoscopy and dilation of stricture, cryoablation of prostate    VASCULAR SURGERY PROCEDURE UNLIST  12/14    BICA 50-69% stenosis       Current Outpatient Medications   Medication Sig    metoprolol succinate (TOPROL-XL) 25 mg XL tablet TAKE ONE TABLET BY MOUTH DAILY    atorvastatin (LIPITOR) 20 mg tablet TAKE ONE TABLET BY MOUTH EVERY NIGHT AT BEDTIME    amLODIPine (NORVASC) 10 mg tablet TAKE ONE TABLET BY MOUTH DAILY    omeprazole (PRILOSEC) 20 mg capsule     nitroglycerin (NITROSTAT) 0.4 mg SL tablet 0.4 mg by SubLINGual route every five (5) minutes as needed for Chest Pain.  glucosam-chondroit-C-manganese 857-288-15-3 mg cap Take 1 Tab by mouth daily.  ascorbic acid (VITAMIN C) 1,000 mg tablet Take 1,000 mg by mouth daily.  MULTIVITAMIN W-MINERALS/LUTEIN (CENTRUM SILVER PO) Take 1 Tab by mouth daily.  triamterene-hydroCHLOROthiazide (DYAZIDE) 37.5-25 mg per capsule TAKE ONE CAPSULE BY MOUTH DAILY AS NEEDED FOR SWELLING    levalbuterol tartrate (XOPENEX) 45 mcg/actuation inhaler Take 2 Puffs by inhalation every six (6) hours as needed for Wheezing. (Patient taking differently: Take 2 Puffs by inhalation every six (6) hours as needed for Wheezing. Indications: NOT TAKING)     No current facility-administered medications for this visit. Allergies   Allergen Reactions    Albuterol Other (comments)    Influenza Virus Vaccine, Specific Other (comments)       Social History :  Social History     Tobacco Use    Smoking status: Former Smoker     Packs/day: 0.50     Years: 4.00     Pack years: 2.00     Last attempt to quit: 1957     Years since quittin.0    Smokeless tobacco: Never Used    Tobacco comment: Quit in    Substance Use Topics    Alcohol use: No        Family History: family history includes Diabetes in his sister; Heart Disease in his father; Hypertension in his mother; Stroke in his mother. Review of Systems:      Physical Exam:    The patient is a cooperative, alert, well developed, well nourished 78 y.o.  male who is in no acute distress at the time of the examination.   Visit Vitals  /70 (BP 1 Location: Right arm, BP Patient Position: Sitting)   Pulse (!) 58   Ht 5' 6\" (1.676 m)   Wt 87.1 kg (192 lb)   SpO2 99%   BMI 30.99 kg/m²       HEENT: Conjuctiva white, mucosa moist, no pallor or cyanosis. NECK: Supple without masses, tenderness or thyromegaly. There was no jugular venous distention. Carotid are full bilaterally with bilateral bruits right greater than left vs. radiation of murmur to the neck. CHEST: Symmetrical with good excursion. LUNGS: Clear to auscultation in all fields. HEART: The apex is not displaced. There were no lifts, thrills or heaves. There is a normal S1 and S2. There is a grade I/VI PILAR along the LSB with radiation to the base and lower neck without appreciable diastolic murmurs, rubs, clicks, or gallops auscultated. ABDOMEN: Soft without masses, tenderness or organomegaly. EXTREMITIES: Full peripheral pulses with trace peripheral edema. Review of Data: See PMH and Cardiology and Imaging sections for cardiac testing  Lab Results   Component Value Date/Time    Cholesterol, total 112 02/02/2018 06:45 AM    Cholesterol, total 106 02/02/2018 06:45 AM    HDL Cholesterol 42 02/02/2018 06:45 AM    HDL Cholesterol 42 02/02/2018 06:45 AM    LDL, calculated 47.4 02/02/2018 06:45 AM    LDL, calculated 41.4 02/02/2018 06:45 AM    Triglyceride 113 02/02/2018 06:45 AM    Triglyceride 113 02/02/2018 06:45 AM    CHOL/HDL Ratio 2.7 02/02/2018 06:45 AM    CHOL/HDL Ratio 2.5 02/02/2018 06:45 AM       Results for orders placed or performed in visit on 01/22/20   AMB POC EKG ROUTINE W/ 12 LEADS, INTER & REP     Status: None    Narrative    Sinus bradycardia, rate 58. First-degree AV block. Mild diffuse anterolateral and high lateral ST-T flattening. Appears EKG of July 17, 2019 there was little interval change. Pipo Lemos D.O., F.A.C.C. [facility]  [address] [place] [address] and [facility]  [address] [place] [address]. [address] [address]    PLEASE NOTE:  This document has been produced using voice recognition software. Unrecognized errors in transcription may be present.

## 2020-01-22 NOTE — PATIENT INSTRUCTIONS
If you have not heard from the central scheduler to schedule your testing July, 2020, please call [phone].

## 2020-01-30 ENCOUNTER — HOSPITAL ENCOUNTER (OUTPATIENT)
Dept: VASCULAR SURGERY | Age: 80
Discharge: HOME | End: 2020-01-30
Attending: INTERNAL MEDICINE
Payer: MEDICARE

## 2020-01-30 DIAGNOSIS — I65.23 BILATERAL CAROTID ARTERY STENOSIS: ICD-10-CM

## 2020-01-30 LAB
LEFT CCA DIST DIAS: 17.7 CM/S
LEFT CCA DIST SYS: 154.7 CM/S
LEFT CCA MID DIAS: 20.68 CM/S
LEFT CCA MID SYS: 178.66 CM/S
LEFT CCA PROX DIAS: 19.2 CM/S
LEFT CCA PROX SYS: 117.8 CM/S
LEFT ECA DIAS: 16.33 CM/S
LEFT ECA SYS: 302.1 CM/S
LEFT ICA DIST DIAS: 24 CM/S
LEFT ICA DIST SYS: 96.4 CM/S
LEFT ICA MID DIAS: 18.4 CM/S
LEFT ICA MID SYS: 121.5 CM/S
LEFT ICA PROX DIAS: 25.6 CM/S
LEFT ICA PROX SYS: 274.2 CM/S
LEFT ICA/CCA SYS: 1.77
LEFT VERTEBRAL DIAS: 10.77 CM/S
LEFT VERTEBRAL SYS: 72.8 CM/S
RIGHT CCA DIST DIAS: 18.4 CM/S
RIGHT CCA DIST SYS: 146.6 CM/S
RIGHT CCA MID DIAS: 18.4 CM/S
RIGHT CCA MID SYS: 149.4 CM/S
RIGHT CCA PROX DIAS: 10 CM/S
RIGHT CCA PROX SYS: 113.2 CM/S
RIGHT ECA DIAS: 9.74 CM/S
RIGHT ECA SYS: 174.8 CM/S
RIGHT ICA DIST DIAS: 15.2 CM/S
RIGHT ICA DIST SYS: 72 CM/S
RIGHT ICA MID DIAS: 21.2 CM/S
RIGHT ICA MID SYS: 129.9 CM/S
RIGHT ICA PROX DIAS: 29.6 CM/S
RIGHT ICA PROX SYS: 185.6 CM/S
RIGHT ICA/CCA SYS: 1.3
RIGHT VERTEBRAL DIAS: 12.45 CM/S
RIGHT VERTEBRAL SYS: 58.5 CM/S

## 2020-01-30 PROCEDURE — 93880 EXTRACRANIAL BILAT STUDY: CPT

## 2020-01-31 ENCOUNTER — TELEPHONE (OUTPATIENT)
Dept: CARDIOLOGY CLINIC | Age: 80
End: 2020-01-31

## 2020-01-31 DIAGNOSIS — R09.89 CAROTID BRUIT, UNSPECIFIED LATERALITY: Primary | ICD-10-CM

## 2020-01-31 NOTE — TELEPHONE ENCOUNTER
Verbal order and read back per Gerson Laurent, DO 
Carotid dopplers ordered for July. Central scheduling will contact patient to schedule.

## 2020-01-31 NOTE — TELEPHONE ENCOUNTER
Called and talked to the patient's wife and let her know that the patient's carotid Dopplers demonstrated moderate bilateral internal carotid artery stenoses, but they were unchanged from the study back in July and at this time I would simply recommend that we repeat them again in 6 to 8 months. Please get them rescheduled in July, August or September.  ES

## 2020-02-07 ENCOUNTER — HOSPITAL ENCOUNTER (OUTPATIENT)
Dept: LAB | Age: 80
Discharge: HOME | End: 2020-02-07
Payer: MEDICARE

## 2020-02-07 DIAGNOSIS — E78.5 HYPERLIPIDEMIA, UNSPECIFIED HYPERLIPIDEMIA TYPE: ICD-10-CM

## 2020-02-07 DIAGNOSIS — Z95.1 S/P CABG X 3: ICD-10-CM

## 2020-02-07 DIAGNOSIS — I25.10 CORONARY ARTERY DISEASE INVOLVING NATIVE CORONARY ARTERY OF NATIVE HEART WITHOUT ANGINA PECTORIS: ICD-10-CM

## 2020-02-07 LAB
ALBUMIN SERPL-MCNC: 4 G/DL (ref 3.4–5)
ALBUMIN/GLOB SERPL: 1.3 {RATIO} (ref 0.8–1.7)
ALP SERPL-CCNC: 58 U/L (ref 45–117)
ALT SERPL-CCNC: 29 U/L (ref 16–61)
AST SERPL-CCNC: 17 U/L (ref 10–38)
BILIRUB DIRECT SERPL-MCNC: 0.2 MG/DL (ref 0–0.2)
BILIRUB SERPL-MCNC: 0.7 MG/DL (ref 0.2–1)
CHOLEST SERPL-MCNC: 116 MG/DL
GLOBULIN SER CALC-MCNC: 3 G/DL (ref 2–4)
HDLC SERPL-MCNC: 43 MG/DL (ref 40–60)
HDLC SERPL: 2.7 {RATIO} (ref 0–5)
LDLC SERPL CALC-MCNC: 57.2 MG/DL (ref 0–100)
LIPID PROFILE,FLP: NORMAL
PROT SERPL-MCNC: 7 G/DL (ref 6.4–8.2)
TRIGL SERPL-MCNC: 79 MG/DL (ref ?–150)
VLDLC SERPL CALC-MCNC: 15.8 MG/DL

## 2020-02-07 PROCEDURE — 80061 LIPID PANEL: CPT

## 2020-02-07 PROCEDURE — 36415 COLL VENOUS BLD VENIPUNCTURE: CPT

## 2020-02-07 PROCEDURE — 80076 HEPATIC FUNCTION PANEL: CPT

## 2020-02-07 NOTE — PROGRESS NOTES
This gentlemen's lipid profile looks excellent as to his LFTs. I would make any changes in his medications. Please let him know.  ES

## 2020-02-10 ENCOUNTER — TELEPHONE (OUTPATIENT)
Dept: CARDIOLOGY CLINIC | Age: 80
End: 2020-02-10

## 2020-02-10 DIAGNOSIS — E78.5 HYPERLIPIDEMIA, UNSPECIFIED HYPERLIPIDEMIA TYPE: Primary | ICD-10-CM

## 2020-02-10 NOTE — TELEPHONE ENCOUNTER
----- Message from Mariusz Sinha DO sent at 2/7/2020  2:12 PM EST ----- This gentlemen's lipid profile looks excellent as to his LFTs. I would make any changes in his medications. Please let him know.  ES

## 2020-02-13 ENCOUNTER — TELEPHONE (OUTPATIENT)
Dept: CARDIOLOGY CLINIC | Age: 80
End: 2020-02-13

## 2020-02-13 NOTE — TELEPHONE ENCOUNTER
----- Message from Iraida Chamorro DO sent at 2/7/2020  2:12 PM EST ----- This gentlemen's lipid profile looks excellent as to his LFTs. I would make any changes in his medications. Please let him know.  ES

## 2020-06-06 ENCOUNTER — ANESTHESIA EVENT (OUTPATIENT)
Dept: ENDOSCOPY | Age: 80
End: 2020-06-06
Payer: MEDICARE

## 2020-06-06 RX ORDER — FAMOTIDINE 20 MG/1
20 TABLET, FILM COATED ORAL ONCE
Status: CANCELLED | OUTPATIENT
Start: 2020-06-06 | End: 2020-06-06

## 2020-06-08 ENCOUNTER — HOSPITAL ENCOUNTER (OUTPATIENT)
Age: 80
Setting detail: OUTPATIENT SURGERY
Discharge: HOME | End: 2020-06-08
Attending: INTERNAL MEDICINE | Admitting: INTERNAL MEDICINE
Payer: MEDICARE

## 2020-06-08 ENCOUNTER — ANESTHESIA (OUTPATIENT)
Dept: ENDOSCOPY | Age: 80
End: 2020-06-08
Payer: MEDICARE

## 2020-06-08 VITALS
SYSTOLIC BLOOD PRESSURE: 137 MMHG | RESPIRATION RATE: 16 BRPM | WEIGHT: 185.8 LBS | BODY MASS INDEX: 29.16 KG/M2 | OXYGEN SATURATION: 98 % | HEART RATE: 56 BPM | TEMPERATURE: 96.9 F | DIASTOLIC BLOOD PRESSURE: 56 MMHG | HEIGHT: 67 IN

## 2020-06-08 PROCEDURE — 74011250636 HC RX REV CODE- 250/636: Performed by: NURSE ANESTHETIST, CERTIFIED REGISTERED

## 2020-06-08 PROCEDURE — 88305 TISSUE EXAM BY PATHOLOGIST: CPT

## 2020-06-08 PROCEDURE — 77030008565 HC TBNG SUC IRR ERBE -B: Performed by: INTERNAL MEDICINE

## 2020-06-08 PROCEDURE — 76040000019: Performed by: INTERNAL MEDICINE

## 2020-06-08 PROCEDURE — 77030018846 HC SOL IRR STRL H20 ICUM -A: Performed by: INTERNAL MEDICINE

## 2020-06-08 PROCEDURE — 76060000031 HC ANESTHESIA FIRST 0.5 HR: Performed by: INTERNAL MEDICINE

## 2020-06-08 PROCEDURE — 77030019988 HC FCPS ENDOSC DISP BSC -B: Performed by: INTERNAL MEDICINE

## 2020-06-08 PROCEDURE — 74011000250 HC RX REV CODE- 250: Performed by: NURSE ANESTHETIST, CERTIFIED REGISTERED

## 2020-06-08 RX ORDER — SODIUM CHLORIDE 0.9 % (FLUSH) 0.9 %
5-40 SYRINGE (ML) INJECTION AS NEEDED
Status: DISCONTINUED | OUTPATIENT
Start: 2020-06-08 | End: 2020-06-08 | Stop reason: HOSPADM

## 2020-06-08 RX ORDER — PROPOFOL 10 MG/ML
INJECTION, EMULSION INTRAVENOUS AS NEEDED
Status: DISCONTINUED | OUTPATIENT
Start: 2020-06-08 | End: 2020-06-08 | Stop reason: HOSPADM

## 2020-06-08 RX ORDER — SODIUM CHLORIDE, SODIUM LACTATE, POTASSIUM CHLORIDE, CALCIUM CHLORIDE 600; 310; 30; 20 MG/100ML; MG/100ML; MG/100ML; MG/100ML
75 INJECTION, SOLUTION INTRAVENOUS CONTINUOUS
Status: DISCONTINUED | OUTPATIENT
Start: 2020-06-08 | End: 2020-06-08 | Stop reason: HOSPADM

## 2020-06-08 RX ORDER — SODIUM CHLORIDE 0.9 % (FLUSH) 0.9 %
5-40 SYRINGE (ML) INJECTION EVERY 8 HOURS
Status: DISCONTINUED | OUTPATIENT
Start: 2020-06-08 | End: 2020-06-08 | Stop reason: HOSPADM

## 2020-06-08 RX ORDER — FAMOTIDINE 10 MG/ML
20 INJECTION INTRAVENOUS ONCE
Status: COMPLETED | OUTPATIENT
Start: 2020-06-08 | End: 2020-06-08

## 2020-06-08 RX ORDER — LIDOCAINE HYDROCHLORIDE 10 MG/ML
0.1 INJECTION, SOLUTION EPIDURAL; INFILTRATION; INTRACAUDAL; PERINEURAL AS NEEDED
Status: DISCONTINUED | OUTPATIENT
Start: 2020-06-08 | End: 2020-06-08 | Stop reason: HOSPADM

## 2020-06-08 RX ORDER — SODIUM CHLORIDE, SODIUM LACTATE, POTASSIUM CHLORIDE, CALCIUM CHLORIDE 600; 310; 30; 20 MG/100ML; MG/100ML; MG/100ML; MG/100ML
50 INJECTION, SOLUTION INTRAVENOUS CONTINUOUS
Status: DISCONTINUED | OUTPATIENT
Start: 2020-06-08 | End: 2020-06-08 | Stop reason: HOSPADM

## 2020-06-08 RX ORDER — LIDOCAINE HYDROCHLORIDE 20 MG/ML
INJECTION, SOLUTION EPIDURAL; INFILTRATION; INTRACAUDAL; PERINEURAL AS NEEDED
Status: DISCONTINUED | OUTPATIENT
Start: 2020-06-08 | End: 2020-06-08 | Stop reason: HOSPADM

## 2020-06-08 RX ADMIN — PROPOFOL 80 MG: 10 INJECTION, EMULSION INTRAVENOUS at 08:46

## 2020-06-08 RX ADMIN — PROPOFOL 70 MG: 10 INJECTION, EMULSION INTRAVENOUS at 08:48

## 2020-06-08 RX ADMIN — SODIUM CHLORIDE, SODIUM LACTATE, POTASSIUM CHLORIDE, AND CALCIUM CHLORIDE 75 ML/HR: 600; 310; 30; 20 INJECTION, SOLUTION INTRAVENOUS at 07:56

## 2020-06-08 RX ADMIN — FAMOTIDINE 20 MG: 10 INJECTION, SOLUTION INTRAVENOUS at 07:58

## 2020-06-08 RX ADMIN — LIDOCAINE HYDROCHLORIDE 100 MG: 20 INJECTION, SOLUTION EPIDURAL; INFILTRATION; INTRACAUDAL; PERINEURAL at 08:48

## 2020-06-08 RX ADMIN — Medication 10 ML: at 07:59

## 2020-06-08 NOTE — ANESTHESIA PREPROCEDURE EVALUATION
Relevant Problems   No relevant active problems       Anesthetic History   No history of anesthetic complications            Review of Systems / Medical History  Patient summary reviewed and nursing notes reviewed    Pulmonary    COPD: mild    Sleep apnea: No treatment    Asthma : well controlled       Neuro/Psych              Cardiovascular    Hypertension: well controlled          CAD         GI/Hepatic/Renal     GERD           Endo/Other      Hypothyroidism  Arthritis     Other Findings              Physical Exam    Airway  Mallampati: II  TM Distance: 4 - 6 cm  Neck ROM: normal range of motion   Mouth opening: Normal     Cardiovascular    Rhythm: regular  Rate: normal         Dental  No notable dental hx       Pulmonary  Breath sounds clear to auscultation               Abdominal         Other Findings            Anesthetic Plan    ASA: 3  Anesthesia type: MAC          Induction: Intravenous  Anesthetic plan and risks discussed with: Patient

## 2020-06-08 NOTE — H&P
[facility]    [email or web address]      Impression: 1. Pederson's HGD      Plan:     1. egd bx cautery thermal therapy mac all risks benfits and aalt discussed       Chief Complaint: Pederson's      HPI:  Barbara Gant is a 78 y.o. male who is being seen on consult for Pederson's. PMH:   Past Medical History:   Diagnosis Date    Allergic rhinitis     Arthritis     hands    Asthma     mild obst disease on pfts Dr. Franck Castanon Pederson's esophagus     Dr. Anne Waller; last 8/17, for [facility]    Bilateral carotid artery stenosis     12/14 BICA 50-69%    BPH with obstruction/lower urinary tract symptoms     CABG x 3 04/24/2014    LIMA to LAD, and saphenous vein grafts to the second diagonal of the LAD, and to the posterior descending branch of the RCA, Dr. Oral Garrett, 4/23/14.  Cardiac catheterization 04/18/2014    Superdominant RCA. mRCA 90%. pRPDA 60%. LM (modest sized) patent.  p-mLAD 85%. CX < 50%. LVEDP 17 mmHg. EF 60%. No RWMA. CABG recommended.  Cardiac echocardiogram 06/24/2008    EF 70%. Mild conc LVH. Gr 2 DDfx. Mild LAE. Mild MR. PASP 38 mmHg.  Cardiac nuclear imaging study, abnormal 04/15/2014    Mod ischemia in lg area of anterior wall, anteroapex, anteroseptum c/w high-grade prox LAD obstruction. Mod partially transient defect likely ischemia w/very sm infarction in RCA. TID 1.31 suggests 3-vessel CAD. Mod distal anterior hypk. Mild-mod anteroseptal, inferoseptal, anterolateral hypk. EF 46%. Strongly positive EKG on max EST. Ex time 6 min.  Carotid duplex 04/06/2016    Mod 50-69% bilateral ICA stenosis.   Probable significant LECA stenosis; no change from 2015;  no change 2/17;    Chronic obstructive pulmonary disease ([address]     Colon adenoma 08/2017    Dr Mark Aguilar Colon polyps 2007    Dr. Mark Aguilar Difficulty hearing     Erectile dysfunction     GERD (gastroesophageal reflux disease)     by EGD 2007 Dr. Mark Aguilar Hypertension     IFG (impaired fasting glucose)     Multinodular goiter     ; ;     Obesity     peak weight 202 lbs, bmi 31.4 from     Prostate CA ([address] 2018    Dr Fredi Hobson stage 2B [id number], [place] 3+4, P,10, G2; brachytherapy Dr Calloway [facility]) 2010    Dr. John Wilkes W2oSsWg Jj 3+3, 1/9 cores up to 10% 1 core; cryo Oct 2012 psa went from  to torsten 1.78    Pulmonary nodule     no change , 3/17    Sleep apnea     CPAP not used       PSH:   Past Surgical History:   Procedure Laterality Date    CARDIAC SURG PROCEDURE UNLIST      thallium negative    CARDIAC SURG PROCEDURE UNLIST      3V CABG Dr Anneliese Lombardi      mild obst disease on PFTs Dr. Nomi Pena N/A 2017    Dr. Medardo Belcher  negative;  adenoma     HX Torri Juan A      Dr. Marissa Padron HX CATARACT REMOVAL      bilat Dr Zachary Matthews  2004    HX Nilsa Justice      Dr. Conner Nieves HX TURP      x2 Dr. Claudio Haynes HX UROLOGICAL  10/2012    [facility], Dr. Sanchez Left, Cystoscopy and dilation of stricture, cryoablation of prostate    VASCULAR SURGERY PROCEDURE UNLIST      BICA 50-69% stenosis       Social HX:   Social History     Socioeconomic History    Marital status:      Spouse name: Not on file    Number of children: 3    Years of education: Not on file    Highest education level: Not on file   Occupational History    Occupation: ret engr [employer]     Employer: RETIRED   Social Needs    Financial resource strain: Not on file    Food insecurity     Worry: Not on file     Inability: Not on file   [employer] needs     Medical: Not on file     Non-medical: Not on file   Tobacco Use    Smoking status: Former Smoker     Packs/day: 0.50     Years: 4.00     Pack years: 2.00     Last attempt to quit: 1957     Years since quittin.4  Smokeless tobacco: Never Used    Tobacco comment: Quit in 1957   Substance and Sexual Activity    Alcohol use: No    Drug use: No    Sexual activity: Not on file   Lifestyle    Physical activity     Days per week: Not on file     Minutes per session: Not on file    Stress: Not on file   Relationships    Social connections     Talks on phone: Not on file     Gets together: Not on file     Attends Yazidism service: Not on file     Active member of club or organization: Not on file     Attends meetings of clubs or organizations: Not on file     Relationship status: Not on file    Intimate partner violence     Fear of current or ex partner: Not on file     Emotionally abused: Not on file     Physically abused: Not on file     Forced sexual activity: Not on file   Other Topics Concern    Not on file   Social History Narrative    Not on file       FHX:   Family History   Problem Relation Age of Onset    Heart Disease Father     Stroke Mother     Hypertension Mother     Diabetes Sister        Allergy:   Allergies   Allergen Reactions    Albuterol Other (comments)    Influenza Virus Vaccine, Specific Other (comments)       Home Medications:     Medications Prior to Admission   Medication Sig    metoprolol succinate (TOPROL-XL) 25 mg XL tablet TAKE ONE TABLET BY MOUTH DAILY    atorvastatin (LIPITOR) 20 mg tablet TAKE ONE TABLET BY MOUTH EVERY NIGHT AT BEDTIME    amLODIPine (NORVASC) 10 mg tablet TAKE ONE TABLET BY MOUTH DAILY    levalbuterol tartrate (XOPENEX) 45 mcg/actuation inhaler Take 2 Puffs by inhalation every six (6) hours as needed for Wheezing. (Patient taking differently: Take 2 Puffs by inhalation every six (6) hours as needed for Wheezing. Indications: NOT TAKING)    omeprazole (PRILOSEC) 20 mg capsule     glucosam-chondroit-C-manganese 413-326-95-3 mg cap Take 1 Tab by mouth daily.  ascorbic acid (VITAMIN C) 1,000 mg tablet Take 1,000 mg by mouth daily.     MULTIVITAMIN W-MINERALS/LUTEIN (CENTRUM SILVER PO) Take 1 Tab by mouth daily.  nitroglycerin (NITROSTAT) 0.4 mg SL tablet 0.4 mg by SubLINGual route every five (5) minutes as needed for Chest Pain. Review of Systems:     Constitutional: No fevers, chills, weight loss, fatigue. Skin: No rashes, pruritis, jaundice, ulcerations, erythema. HENT: No headaches, nosebleeds, sinus pressure, rhinorrhea, sore throat. Eyes: No visual changes, blurred vision, eye pain, photophobia, jaundice. Cardiovascular: No chest pain, heart palpitations. Respiratory: No cough, SOB, wheezing, chest discomfort, orthopnea. Gastrointestinal:    Genitourinary: No dysuria, bleeding, discharge, pyuria. Musculoskeletal: No weakness, arthralgias, wasting. Endo: No sweats. Heme: No bruising, easy bleeding. Allergies: As noted. Neurological: Cranial nerves intact. Alert and oriented. Gait not assessed. Psychiatric:  No anxiety, depression, hallucinations. Visit Vitals  Ht 5' 7\" (1.702 m)   Wt 83.5 kg (184 lb)   BMI 28.82 kg/m²       Physical Assessment:     constitutional: appearance: well developed, well nourished, normal habitus, no deformities, in no acute distress. skin: inspection: no rashes, ulcers, icterus or other lesions; no clubbing or telangiectasias. palpation: no induration or subcutaneos nodules. eyes: inspection: normal conjunctivae and lids; no jaundice pupils: symmetrical, normoreactive to light, normal accommodation and size. ENMT: mouth: normal oral mucosa,lips and gums; good dentition. oropharynx: normal tongue, hard and soft palate; posterior pharynx without erythema, exudate or lesions. neck: no masses organomegaly or tenderness. respiratory: effort: normal chest excursion; no intercostal retraction or accessory muscle use. cardiovascular: abdominal aorta: normal size and position; no bruits. palpation: PMI of normal size and position; normal rhythm; no thrill or murmurs. abdominal: abdomen: normal consistency; no tenderness or masses. hernias: no hernias appreciated. liver: normal size and consistency. spleen: not palpable. rectal: hemoccult/guaiac: not performed. musculoskeletal: no deformities or muscle wasting   lymphatic: axilae: not palpable. groin: not palpable. neck: within normal limits. other: not palpable. neurologic: cranial nerves: II-XII normal.   psychiatric: judgement/insight: within normal limits. memory: within normal limits for recent and remote events. mood and affect: no evidence of depression, anxiety or agitation. orientation: oriented to time, space and person. Basic Metabolic Profile   No results for input(s): NA, K, CL, CO2, BUN, GLU, CA, MG, PHOS in the last 72 hours. No lab exists for component: CREAT      CBC w/Diff    No results for input(s): WBC, RBC, HGB, HCT, MCV, MCH, MCHC, RDW, PLT, HGBEXT, HCTEXT, PLTEXT in the last 72 hours. No lab exists for component: MPV No results for input(s): GRANS, LYMPH, EOS, PRO, MYELO, METAS, BLAST in the last 72 hours. No lab exists for component: MONO, BASO     Hepatic Function   No results for input(s): ALB, TP, TBILI, AP, AML, LPSE in the last 72 hours. No lab exists for component: DBILI, GPT, SGOT       Berenice Shane MD, M.D. [facility], [address] [place] [address]  [email or web address]. [email or web address]

## 2020-06-08 NOTE — ANESTHESIA POSTPROCEDURE EVALUATION
Procedure(s):  UPPER ENDOSCOPY w bx's. MAC    Anesthesia Post Evaluation      Multimodal analgesia: multimodal analgesia used between 6 hours prior to anesthesia start to PACU discharge  Patient location during evaluation: PACU  Patient participation: complete - patient participated  Level of consciousness: awake and alert  Pain management: adequate  Airway patency: patent  Anesthetic complications: no  Cardiovascular status: acceptable  Respiratory status: acceptable  Hydration status: acceptable  Post anesthesia nausea and vomiting:  controlled  Final Post Anesthesia Temperature Assessment:  Normothermia (36.0-37.5 degrees C)      INITIAL Post-op Vital signs:   Vitals Value Taken Time   /56 6/8/2020  9:13 AM   Temp 36.4 °C (97.6 °F) 6/8/2020  9:03 AM   Pulse 46 6/8/2020  9:18 AM   Resp 11 6/8/2020  9:18 AM   SpO2 100 % 6/8/2020  9:18 AM   Vitals shown include unvalidated device data.

## 2020-06-08 NOTE — DISCHARGE INSTRUCTIONS
Patient Education        Hiatal Hernia: Care Instructions  Your Care Instructions  A hiatal hernia occurs when part of the stomach bulges into the chest cavity. A hiatal hernia may allow stomach acid and juices to back up into the esophagus (acid reflux). This can cause a feeling of burning, warmth, heat, or pain behind the breastbone. This feeling may often occur after you eat, soon after you lie down, or when you bend forward, and it may come and go. You also may have a sour taste in your mouth. These symptoms are commonly known as heartburn or reflux. But not all hiatal hernias cause symptoms. Follow-up care is a key part of your treatment and safety. Be sure to make and go to all appointments, and call your doctor if you are having problems. It's also a good idea to know your test results and keep a list of the medicines you take. How can you care for yourself at home? · Take your medicines exactly as prescribed. Call your doctor if you think you are having a problem with your medicine. · Do not take aspirin or other nonsteroidal anti-inflammatory drugs (NSAIDs), such as ibuprofen (Advil, Motrin) or naproxen (Aleve), unless your doctor says it is okay. Ask your doctor what you can take for pain. · Your doctor may recommend over-the-counter medicine. For mild or occasional indigestion, antacids such as Tums, Gaviscon, Maalox, or Mylanta may help. Your doctor also may recommend over-the-counter acid reducers, such as famotidine (Pepcid AC), cimetidine (Tagamet HB), or omeprazole (Prilosec). Read and follow all instructions on the label. If you use these medicines often, talk with your doctor. · Change your eating habits. ? It's best to eat several small meals instead of two or three large meals. ? After you eat, wait 2 to 3 hours before you lie down. Late-night snacks aren't a good idea. ? Chocolate, mint, and alcohol can make heartburn worse.  They relax the valve between the esophagus and the stomach. ? Spicy foods, foods that have a lot of acid (like tomatoes and oranges), and coffee can make heartburn symptoms worse in some people. If your symptoms are worse after you eat a certain food, you may want to stop eating that food to see if your symptoms get better. · Do not smoke or chew tobacco.  · If you get heartburn at night, raise the head of your bed 6 to 8 inches by putting the frame on blocks or placing a foam wedge under the head of your mattress. (Adding extra pillows does not work.)  · Do not wear tight clothing around your middle. · Lose weight if you need to. Losing just 5 to 10 pounds can help. When should you call for help? Call your doctor now or seek immediate medical care if:  · You have new or worse belly pain. · You are vomiting. Watch closely for changes in your health, and be sure to contact your doctor if:  · You have new or worse symptoms of indigestion. · You have trouble or pain swallowing. · You are losing weight. · You do not get better as expected. Where can you learn more? Go to http://martine-feng.info/  Enter T074 in the search box to learn more about \"Hiatal Hernia: Care Instructions. \"  Current as of: August 12, 2019               Content Version: 12.5  © 7292-1344 Healthwise, [employer]. Care instructions adapted under license by [employer] (which disclaims liability or warranty for this information). If you have questions about a medical condition or this instruction, always ask your healthcare professional. [place] [address] any warranty or liability for your use of this information. Upper GI Endoscopy: What to Expect at [facility] had an upper GI endoscopy. Your doctor used a thin, lighted tube that bends to look at the inside of your esophagus, your stomach, and the first part of the small intestine, called the duodenum.   After you have an endoscopy, you will stay at the hospital or clinic for 1 to 2 hours. This will allow the medicine to wear off. You will be able to go home after your doctor or nurse checks to make sure that you're not having any problems. You may have to stay overnight if you had treatment during the test. You may have a sore throat for a day or two after the test.  This care sheet gives you a general idea about what to expect after the test.  How can you care for yourself at home? Activity   · Rest as much as you need to after you go home. · You should be able to go back to your usual activities the day after the test.  Diet   · Follow your doctor's directions for eating after the test.  · Drink plenty of fluids (unless your doctor has told you not to). Medications   · If you have a sore throat the day after the test, use an over-the-counter spray to numb your throat. Follow-up care is a key part of your treatment and safety. Be sure to make and go to all appointments, and call your doctor if you are having problems. It's also a good idea to know your test results and keep a list of the medicines you take. When should you call for help? [id number] anytime you think you may need emergency care. For example, call if:  · You passed out (loses consciousness). · You have trouble breathing. · You pass maroon or bloody stools. Call your doctor now or seek immediate medical care if:  · You have pain that does not get better after your take pain medicine. · You have new or worse belly pain. · You have blood in your stools. · You are sick to your stomach and cannot keep fluids down. · You have a fever. · You cannot pass stools or gas. Watch closely for changes in your health, and be sure to contact your doctor if:  · Your throat still hurts after a day or two. · You do not get better as expected. Where can you learn more?   Go to http://martine-feng.info/  Enter J454 in the search box to learn more about \"Upper GI Endoscopy: What to Expect at Home.\"  Current as of: August 12, 2019               Content Version: 12.5  © 1091-5780 [employer]. Care instructions adapted under license by [employer] (which disclaims liability or warranty for this information). If you have questions about a medical condition or this instruction, always ask your healthcare professional. [address] any warranty or liability for your use of this information. DISCHARGE SUMMARY from Nurse    PATIENT INSTRUCTIONS:    After general anesthesia or intravenous sedation, for 24 hours or while taking prescription Narcotics:  · Limit your activities  · Do not drive and operate hazardous machinery  · Do not make important personal or business decisions  · Do  not drink alcoholic beverages  · If you have not urinated within 8 hours after discharge, please contact your surgeon on call. Report the following to your surgeon:  · Excessive pain, swelling, redness or odor of or around the surgical area  · Temperature over 100.5  · Nausea and vomiting lasting longer than 4 hours or if unable to take medications  · Any signs of decreased circulation or nerve impairment to extremity: change in color, persistent  numbness, tingling, coldness or increase pain  · Any questions    What to do at Home:  Recommended activity: Activity as tolerated and no driving for today. *  Please give a list of your current medications to your Primary Care Provider. *  Please update this list whenever your medications are discontinued, doses are      changed, or new medications (including over-the-counter products) are added. *  Please carry medication information at all times in case of emergency situations. These are general instructions for a healthy lifestyle:    No smoking/ No tobacco products/ Avoid exposure to second hand smoke  Surgeon General's Warning:  Quitting smoking now greatly reduces serious risk to your health.     Obesity, smoking, and sedentary lifestyle greatly increases your risk for illness    A healthy diet, regular physical exercise & weight monitoring are important for maintaining a healthy lifestyle    You may be retaining fluid if you have a history of heart failure or if you experience any of the following symptoms:  Weight gain of 3 pounds or more overnight or 5 pounds in a week, increased swelling in our hands or feet or shortness of breath while lying flat in bed. Please call your doctor as soon as you notice any of these symptoms; do not wait until your next office visit. The discharge information has been reviewed with the patient. The patient verbalized understanding. Discharge medications reviewed with the patient and appropriate educational materials and side effects teaching were provided.   ___________________________________________________________________________________________________________________________________

## 2020-07-21 ENCOUNTER — HOSPITAL ENCOUNTER (OUTPATIENT)
Dept: VASCULAR SURGERY | Age: 80
Discharge: HOME | End: 2020-07-21
Attending: INTERNAL MEDICINE
Payer: MEDICARE

## 2020-07-21 DIAGNOSIS — R09.89 CAROTID BRUIT, UNSPECIFIED LATERALITY: ICD-10-CM

## 2020-07-21 LAB
LEFT CCA DIST DIAS: 20.7 CM/S
LEFT CCA DIST SYS: 120.6 CM/S
LEFT CCA MID DIAS: 20.68 CM/S
LEFT CCA MID SYS: 139.17 CM/S
LEFT CCA PROX DIAS: 18.4 CM/S
LEFT CCA PROX SYS: 109 CM/S
LEFT ECA DIAS: 0 CM/S
LEFT ECA SYS: 203.4 CM/S
LEFT ICA DIST DIAS: 17.6 CM/S
LEFT ICA DIST SYS: 98.6 CM/S
LEFT ICA MID DIAS: 18.4 CM/S
LEFT ICA MID SYS: 132.2 CM/S
LEFT ICA PROX DIAS: 22.3 CM/S
LEFT ICA PROX SYS: 190.4 CM/S
LEFT ICA/CCA SYS: 1.58
LEFT SUBCLAVIAN DIAS: 10.05 CM/S
LEFT SUBCLAVIAN SYS: 100.3 CM/S
LEFT VERTEBRAL DIAS: 17.99 CM/S
LEFT VERTEBRAL SYS: 87.9 CM/S
RIGHT CCA DIST DIAS: 16 CM/S
RIGHT CCA DIST SYS: 127.6 CM/S
RIGHT CCA MID DIAS: 16.03 CM/S
RIGHT CCA MID SYS: 136.84 CM/S
RIGHT CCA PROX DIAS: 0 CM/S
RIGHT CCA PROX SYS: 104.8 CM/S
RIGHT ECA DIAS: 0 CM/S
RIGHT ECA SYS: 139 CM/S
RIGHT ICA DIST DIAS: 17.2 CM/S
RIGHT ICA DIST SYS: 79 CM/S
RIGHT ICA MID DIAS: 17.2 CM/S
RIGHT ICA MID SYS: 127.2 CM/S
RIGHT ICA PROX DIAS: 22.3 CM/S
RIGHT ICA PROX SYS: 203.4 CM/S
RIGHT ICA/CCA SYS: 1.6
RIGHT SUBCLAVIAN DIAS: 0 CM/S
RIGHT SUBCLAVIAN SYS: 96.6 CM/S
RIGHT VERTEBRAL DIAS: 4.19 CM/S
RIGHT VERTEBRAL SYS: 37.5 CM/S

## 2020-07-21 PROCEDURE — 93880 EXTRACRANIAL BILAT STUDY: CPT

## 2020-07-21 NOTE — PROGRESS NOTES
Per your last results note \" Called and talked to the patient's wife and let her know that the patient's carotid Dopplers demonstrated moderate bilateral internal carotid artery stenoses, but they were unchanged from the study back in July and at this time I would simply recommend that we repeat them again in 6 to 8 months. Please get them rescheduled in July, August or September.  ES

## 2020-07-27 ENCOUNTER — OFFICE VISIT (OUTPATIENT)
Dept: CARDIOLOGY CLINIC | Age: 80
End: 2020-07-27

## 2020-07-27 VITALS
SYSTOLIC BLOOD PRESSURE: 124 MMHG | OXYGEN SATURATION: 98 % | DIASTOLIC BLOOD PRESSURE: 56 MMHG | HEART RATE: 52 BPM | HEIGHT: 67 IN | WEIGHT: 193 LBS | BODY MASS INDEX: 30.29 KG/M2

## 2020-07-27 DIAGNOSIS — E78.5 HYPERLIPIDEMIA, UNSPECIFIED HYPERLIPIDEMIA TYPE: ICD-10-CM

## 2020-07-27 DIAGNOSIS — I25.10 CORONARY ARTERY DISEASE INVOLVING NATIVE CORONARY ARTERY OF NATIVE HEART WITHOUT ANGINA PECTORIS: Primary | ICD-10-CM

## 2020-07-27 DIAGNOSIS — Z95.1 S/P CABG X 3: ICD-10-CM

## 2020-07-27 DIAGNOSIS — I10 ESSENTIAL HYPERTENSION: ICD-10-CM

## 2020-07-27 DIAGNOSIS — R09.89 BILATERAL CAROTID BRUITS: ICD-10-CM

## 2020-07-27 NOTE — PROGRESS NOTES
HPI:   I saw Poli Mari in my office today in cardiovascular evaluation regarding his chronic coronary artery disease.  Mr. Patel is a very pleasant 79 y. o. white male with history of gastroesophageal reflux disease with Pederson's esophagus, asthma, obstructive sleep apnea, cancer of the prostate, significant coronary artery disease with development of exertional angina over a 6-month period of time prompting a nuclear myocardial perfusion study ordered by Dr. Andreia Strickland in April 2014 which was slightly abnormal and ultimately with his symptom complex prompted a cardiac catheterization by Dr. Natalie Lynch demonstrating severe three vessel coronary disease with subsequent coronary artery bypass grafting surgery x 3 by Dr. Sawyer Mckenna with the following bypasses:       1. Left internal mammary artery to the LAD. 2. SVG to the second diagonal coronary artery off of the left anterior descending and posterior descending branch of the distal right coronary artery.      He did reasonably well postop. He did have a little sinus tachycardia initially for which we put him on beta blockers. Since the beta blockers caused fatigue with Lopressor 50 mg twice a day, it was decreased to 25 mg twice a day and then ultimately switched to Toprol XL 25 mg daily. He comes in today and relates that he is quite well. He is remaining quite active and his only new problem is he has had a little shortness of breath with exertion from time to time which he thinks might be related to pulmonary issues. Encounter Diagnoses   Name Primary?  Coronary artery disease involving native coronary artery of native heart without angina pectoris Yes    S/P CABG x 3     Essential hypertension     Hyperlipidemia, unspecified hyperlipidemia type     Bilateral carotid bruits        Discussion:  This gentleman appears to be doing well from a cardiovascular vantage except for some very mild shortness of breath with exertion which is begun to develop recently. He is going to follow this and if it does not appear to be lung related to his primary care team that he will keep us posted and he may need to be considered for a pharmacologic myocardial perfusion study to rule out coronary ischemia as the etiology. He does have history of bilateral carotid bruits and we recently did carotid Dopplers completed on July 21, 2020 showing moderate 50 to 69% stenosis in both the right and left internal carotid arteries. There was some right vertebral resistance suggesting a distal occlusion but the vertebrals were both antegrade. This was similar to past studies and I would just recommend that we repeat this in 6 to 8 months. His latest lipid profile which was completed on February 7, 2020 showed total cholesterol of 116 with triglycerides of 79, HDL of 43, LDL of 57.2 and VLDL of 15.8 which I think is good control on Lipitor 20 mg daily. His blood pressure is well controlled today and his EKG is stable so I am simply get a plan to see him again in several months. PCP: Silas Carrion MD       Past Medical History:   Diagnosis Date    Allergic rhinitis     Arthritis     hands    Asthma     mild obst disease on pfts Dr. Troncoso Sin Pederson's esophagus     Dr. Stephanie Andino; last 8/17, for [facility]    Bilateral carotid artery stenosis     12/14 BICA 50-69%    BPH with obstruction/lower urinary tract symptoms     CABG x 3 04/24/2014    LIMA to LAD, and saphenous vein grafts to the second diagonal of the LAD, and to the posterior descending branch of the RCA, Dr. Ander Jj, 4/23/14.  Cardiac catheterization 04/18/2014    Superdominant RCA. mRCA 90%. pRPDA 60%. LM (modest sized) patent.  p-mLAD 85%. CX < 50%. LVEDP 17 mmHg. EF 60%. No RWMA. CABG recommended.  Cardiac echocardiogram 06/24/2008    EF 70%. Mild conc LVH. Gr 2 DDfx. Mild LAE. Mild MR. PASP 38 mmHg.       Cardiac nuclear imaging study, abnormal 04/15/2014    Mod ischemia in lg area of anterior wall, anteroapex, anteroseptum c/w high-grade prox LAD obstruction. Mod partially transient defect likely ischemia w/very sm infarction in RCA. TID 1.31 suggests 3-vessel CAD. Mod distal anterior hypk. Mild-mod anteroseptal, inferoseptal, anterolateral hypk. EF 46%. Strongly positive EKG on max EST. Ex time 6 min.  Carotid duplex 04/06/2016    Mod 50-69% bilateral ICA stenosis.   Probable significant LECA stenosis; no change from 2015;  no change 2/17;    Chronic obstructive pulmonary disease ([facility] [address]     Colon adenoma 08/2017    Dr Jaida Simeon Colon polyps 2007    Dr. Jaida Simeon Difficulty hearing     Erectile dysfunction     GERD (gastroesophageal reflux disease)     by EGD 2007 Dr. Jaida Simeon Hypertension     IFG (impaired fasting glucose) 5/14    Multinodular goiter     2014; 2015; 4/16    Obesity     peak weight 202 lbs, bmi 31.4 from 4/14    Pro[facility] CA ([facility] [place] 04/2018    Dr Herrera Jensen stage 2B [id number], Jj 3+4, P,10, G2; brachytherapy Dr Álvarez  [facility]) 06/2010    Dr. Alma Rosa Navarro [id number] [place] 3+3, 1/9 cores up to 10% 1 core; cryo Oct 2012 psa went from 7/1 to torsten 1.78    Pulmonary nodule 2014    no change 2015, 3/17    Sleep apnea     CPAP not used       Past Surgical History:   Procedure Laterality Date    CARDIAC SURG PROCEDURE UNLIST  11/06    thallium negative    CARDIAC SURG PROCEDURE UNLIST  4/14    3V CABG Dr Kary Stokes  2007    mild obst disease on PFTs Dr. Josesito Peters N/A 7/27/2017    Dr. Edmar Dixon 2007 negative; 8/17 adenoma     HX Atilio Eppersoner      Dr. Emi Quiroga  11/14    bilat Dr Marce Corcoran  2004    Marval Drown      Dr. Risa Barajas HX TURP      x2 Dr. Shashi Morales HX UROLOGICAL  10/2012    [facility], Dr. Lorena Wiggins, Cystoscopy and dilation of stricture, cryoablation of prostate    VASCULAR SURGERY PROCEDURE UNLIST      BICA 50-69% stenosis       Current Outpatient Medications   Medication Sig    amLODIPine (NORVASC) 10 mg tablet TAKE ONE TABLET BY MOUTH DAILY    metoprolol succinate (TOPROL-XL) 25 mg XL tablet TAKE ONE TABLET BY MOUTH DAILY    atorvastatin (LIPITOR) 20 mg tablet TAKE ONE TABLET BY MOUTH EVERY NIGHT AT BEDTIME    levalbuterol tartrate (XOPENEX) 45 mcg/actuation inhaler Take 2 Puffs by inhalation every six (6) hours as needed for Wheezing. (Patient taking differently: Take 2 Puffs by inhalation every six (6) hours as needed for Wheezing. Indications: NOT TAKING)    omeprazole (PRILOSEC) 20 mg capsule     nitroglycerin (NITROSTAT) 0.4 mg SL tablet 0.4 mg by SubLINGual route every five (5) minutes as needed for Chest Pain.  glucosam-chondroit-C-manganese 188-960-35-3 mg cap Take 1 Tab by mouth daily.  ascorbic acid (VITAMIN C) 1,000 mg tablet Take 1,000 mg by mouth daily.  MULTIVITAMIN W-MINERALS/LUTEIN (CENTRUM SILVER PO) Take 1 Tab by mouth daily. No current facility-administered medications for this visit. Allergies   Allergen Reactions    Albuterol Other (comments)    Influenza Virus Vaccine, Specific Other (comments)       Social History :  Social History     Tobacco Use    Smoking status: Former Smoker     Packs/day: 0.50     Years: 4.00     Pack years: 2.00     Last attempt to quit: 1957     Years since quittin.6    Smokeless tobacco: Never Used    Tobacco comment: Quit in    Substance Use Topics    Alcohol use: No        Family History: family history includes Diabetes in his sister; Heart Disease in his father; Hypertension in his mother; Stroke in his mother. Review of Systems:      Physical Exam:    The patient is a cooperative, alert, well developed, well nourished 78 y.o.  male who is in no acute distress at the time of the examination.   Visit Vitals  /56   Pulse (!) 52   Ht 5' 7\" (1.702 m)   Wt 193 lb (87.5 kg)   SpO2 98%   BMI 30.23 kg/m²       HEENT: Conjuctiva white, mucosa moist, no pallor or cyanosis. NECK: Supple without masses, tenderness or thyromegaly. There was no jugular venous distention. Carotid are full bilaterally with bilateral bruits right greater than left vs. radiation of murmur to the neck. CHEST: Symmetrical with good excursion. LUNGS: Clear to auscultation in all fields. HEART: The apex is not displaced. There were no lifts, thrills or heaves. There is a normal S1 and S2. There is a grade I/VI PILAR along the LSB with radiation to the base and lower neck without appreciable diastolic murmurs, rubs, clicks, or gallops auscultated. ABDOMEN: Soft without masses, tenderness or organomegaly. EXTREMITIES: Full peripheral pulses with trace peripheral edema. Review of Data: See PMH and Cardiology and Imaging sections for cardiac testing  Lab Results   Component Value Date/Time    Cholesterol, total 116 02/07/2020 06:45 AM    HDL Cholesterol 43 02/07/2020 06:45 AM    LDL, calculated 57.2 02/07/2020 06:45 AM    Triglyceride 79 02/07/2020 06:45 AM    CHOL/HDL Ratio 2.7 02/07/2020 06:45 AM       Results for orders placed or performed in visit on 07/27/20   AMB POC EKG ROUTINE W/ 12 LEADS, INTER & REP     Status: None    Narrative    Sinus bradycardia rate 52. First-degree AV block. This EKG is otherwise within normal limits and similar to the EKG of January 22, 2020. Luis Lee D.O., F.A.C.C. Cardiovascular Specialists  [facility] [place]  [address] [place]    PLEASE NOTE:  This document has been produced using voice recognition software. Unrecognized errors in transcription may be present.

## 2020-07-30 ENCOUNTER — HOSPITAL ENCOUNTER (OUTPATIENT)
Dept: LAB | Age: 80
Discharge: HOME | End: 2020-07-30
Payer: MEDICARE

## 2020-07-30 DIAGNOSIS — E78.5 HYPERLIPIDEMIA, UNSPECIFIED HYPERLIPIDEMIA TYPE: ICD-10-CM

## 2020-07-30 LAB
ALBUMIN SERPL-MCNC: 4 G/DL (ref 3.4–5)
ALBUMIN/GLOB SERPL: 1.3 {RATIO} (ref 0.8–1.7)
ALP SERPL-CCNC: 63 U/L (ref 45–117)
ALT SERPL-CCNC: 34 U/L (ref 16–61)
AST SERPL-CCNC: 17 U/L (ref 10–38)
BILIRUB DIRECT SERPL-MCNC: 0.1 MG/DL (ref 0–0.2)
BILIRUB SERPL-MCNC: 0.6 MG/DL (ref 0.2–1)
CHOLEST SERPL-MCNC: 123 MG/DL
GLOBULIN SER CALC-MCNC: 3.2 G/DL (ref 2–4)
HDLC SERPL-MCNC: 44 MG/DL (ref 40–60)
HDLC SERPL: 2.8 {RATIO} (ref 0–5)
LDLC SERPL CALC-MCNC: 56.6 MG/DL (ref 0–100)
LIPID PROFILE,FLP: NORMAL
PROT SERPL-MCNC: 7.2 G/DL (ref 6.4–8.2)
TRIGL SERPL-MCNC: 112 MG/DL (ref ?–150)
VLDLC SERPL CALC-MCNC: 22.4 MG/DL

## 2020-07-30 PROCEDURE — 80061 LIPID PANEL: CPT

## 2020-07-30 PROCEDURE — 36415 COLL VENOUS BLD VENIPUNCTURE: CPT

## 2020-07-30 PROCEDURE — 80076 HEPATIC FUNCTION PANEL: CPT

## 2020-08-11 ENCOUNTER — TELEPHONE (OUTPATIENT)
Dept: INTERNAL MEDICINE CLINIC | Age: 80
End: 2020-08-11

## 2020-08-11 DIAGNOSIS — R06.2 WHEEZING: ICD-10-CM

## 2020-08-11 NOTE — TELEPHONE ENCOUNTER
Last Visit: 5/8/19 with MD Geremias Denson Next Appointment: Advised to follow-up 8/19 Previous Refill Encounter(s): 8/22/18 #1 with 5 refills Requested Prescriptions Pending Prescriptions Disp Refills  levalbuterol tartrate (XOPENEX) 45 mcg/actuation inhaler 1 Inhaler 5 Sig: Take 2 Puffs by inhalation every six (6) hours as needed for Wheezing.

## 2020-08-12 RX ORDER — LEVALBUTEROL TARTRATE 45 UG/1
2 AEROSOL, METERED ORAL
Qty: 1 INHALER | Refills: 5 | Status: SHIPPED | OUTPATIENT
Start: 2020-08-12

## 2020-10-19 ENCOUNTER — OFFICE VISIT (OUTPATIENT)
Dept: CARDIOLOGY CLINIC | Age: 80
End: 2020-10-19
Payer: MEDICARE

## 2020-10-19 VITALS
WEIGHT: 191 LBS | HEIGHT: 67 IN | BODY MASS INDEX: 29.98 KG/M2 | DIASTOLIC BLOOD PRESSURE: 68 MMHG | HEART RATE: 50 BPM | SYSTOLIC BLOOD PRESSURE: 160 MMHG | OXYGEN SATURATION: 98 %

## 2020-10-19 DIAGNOSIS — I25.10 CORONARY ARTERY DISEASE INVOLVING NATIVE CORONARY ARTERY OF NATIVE HEART WITHOUT ANGINA PECTORIS: Primary | ICD-10-CM

## 2020-10-19 DIAGNOSIS — R09.89 BILATERAL CAROTID BRUITS: ICD-10-CM

## 2020-10-19 DIAGNOSIS — I25.10 CORONARY ARTERY DISEASE INVOLVING NATIVE CORONARY ARTERY OF NATIVE HEART WITHOUT ANGINA PECTORIS: ICD-10-CM

## 2020-10-19 DIAGNOSIS — E78.5 HYPERLIPIDEMIA, UNSPECIFIED HYPERLIPIDEMIA TYPE: ICD-10-CM

## 2020-10-19 DIAGNOSIS — Z95.1 S/P CABG X 3: ICD-10-CM

## 2020-10-19 DIAGNOSIS — R06.02 SOB (SHORTNESS OF BREATH) ON EXERTION: ICD-10-CM

## 2020-10-19 DIAGNOSIS — I10 ESSENTIAL HYPERTENSION: ICD-10-CM

## 2020-10-19 DIAGNOSIS — G47.30 SLEEP APNEA, UNSPECIFIED TYPE: ICD-10-CM

## 2020-10-19 PROCEDURE — G8753 SYS BP > OR = 140: HCPCS | Performed by: INTERNAL MEDICINE

## 2020-10-19 PROCEDURE — G8536 NO DOC ELDER MAL SCRN: HCPCS | Performed by: INTERNAL MEDICINE

## 2020-10-19 PROCEDURE — G8754 DIAS BP LESS 90: HCPCS | Performed by: INTERNAL MEDICINE

## 2020-10-19 PROCEDURE — 99214 OFFICE O/P EST MOD 30 MIN: CPT | Performed by: INTERNAL MEDICINE

## 2020-10-19 PROCEDURE — G8417 CALC BMI ABV UP PARAM F/U: HCPCS | Performed by: INTERNAL MEDICINE

## 2020-10-19 PROCEDURE — G8432 DEP SCR NOT DOC, RNG: HCPCS | Performed by: INTERNAL MEDICINE

## 2020-10-19 PROCEDURE — 93000 ELECTROCARDIOGRAM COMPLETE: CPT | Performed by: INTERNAL MEDICINE

## 2020-10-19 PROCEDURE — G8427 DOCREV CUR MEDS BY ELIG CLIN: HCPCS | Performed by: INTERNAL MEDICINE

## 2020-10-19 RX ORDER — NITROGLYCERIN 0.4 MG/1
0.4 TABLET SUBLINGUAL
Qty: 1 BOTTLE | Refills: 3 | Status: SHIPPED | OUTPATIENT
Start: 2020-10-19

## 2020-10-19 RX ORDER — GUAIFENESIN 100 MG/5ML
81 LIQUID (ML) ORAL DAILY
COMMUNITY

## 2020-10-19 RX ORDER — ASCORBIC ACID 125 MG
TABLET,CHEWABLE ORAL
COMMUNITY
End: 2021-05-07

## 2020-10-19 RX ORDER — MELATONIN
DAILY
COMMUNITY
End: 2021-05-07

## 2020-10-19 NOTE — PATIENT INSTRUCTIONS
If you have not heard from the central scheduler to schedule your testing in 48 hours, please call [phone].

## 2020-10-19 NOTE — PROGRESS NOTES
HPI:   I saw Katelynn Escobar in my office today in cardiovascular evaluation regarding his chronic coronary artery disease.  Mr. Patel is a very pleasant 80 y. o. white male with history of gastroesophageal reflux disease with Pederson's esophagus, asthma, obstructive sleep apnea, cancer of the prostate, significant coronary artery disease with development of exertional angina over a 6-month period of time prompting a nuclear myocardial perfusion study ordered by Dr. Bailee Riley in April 2014 which was slightly abnormal and ultimately with his symptom complex prompted a cardiac catheterization by Dr. Feli Boo demonstrating severe three vessel coronary disease with subsequent coronary artery bypass grafting surgery x 3 by Dr. Mag Ryan with the following bypasses:       1. Left internal mammary artery to the LAD. 2. SVG to the second diagonal coronary artery off of the left anterior descending and posterior descending branch of the distal right coronary artery.      He did reasonably well postop. He did have a little sinus tachycardia initially for which we put him on beta blockers. Since the beta blockers caused fatigue with Lopressor 50 mg twice a day, it was decreased to 25 mg twice a day and then ultimately switched to Toprol XL 25 mg daily. He comes in today and relates that he is doing fair, but he has been bothered by shortness of breath with exertion and some fatigue now for a few months and he mentioned this to me about 3 months ago but it is getting worse. He denies any anginal type chest discomfort or really any failure symptomatology. He does have some component of COPD but has not been evaluated for this problem recently. Encounter Diagnoses   Name Primary?     SOB (shortness of breath) on exertion, possible anginal equivalent     Coronary artery disease involving native coronary artery of native heart without angina pectoris Yes    S/P CABG x 3     Hyperlipidemia, unspecified hyperlipidemia type     Essential hypertension     Bilateral carotid bruits     Sleep apnea, unspecified type        Discussion: This gentleman appears to be doing fair but his shortness of breath with exertion and fatigue issues are worsening and he is concerned that maybe he is having some issues with his bypasses as am I, so I am going to order a pharmacologic myocardial perfusion study to rule out any ongoing ischemia. He does have history of bilateral carotid bruits and we recently did carotid Dopplers completed on July 21, 2020 showing moderate 50 to 69% stenosis in both the right and left internal carotid arteries. There was some right vertebral resistance suggesting a distal occlusion but the vertebrals were both antegrade. This was similar to past studies and I am going to get this repeated in March of 2021. His latest lipid profile which was completed on July 30, 2020 and demonstrated total cholesterol 123, triglycerides 112, HDL of 44, LDL of 56.6, and VLDL of 22.4 which I think is excellent control on Lipitor 20 mg daily. His EKG showing a sinus bradycardia, but is stable. Blood pressure was somewhat elevated at 160/62 when checked again myself and got 150/60 so I have asked him to check his blood pressure 3 or 4 times a week for a few weeks and if it staying above 140 primarily to follow-up Dr. Jeff Mathews. Since he is otherwise doing well, I will simply have him follow-up with my associate Dr. Cecilia Rivers in 6 months.     PCP: Gentry Wilkinson MD       Past Medical History:   Diagnosis Date    Allergic rhinitis     Arthritis     hands    Asthma     mild obst disease on pfts Dr. Garcias Eis Pederson's esophagus     Dr. Giancarlo Reyes; last 8/17, for [facility]    Bilateral carotid artery stenosis     12/14 BICA 50-69%    BPH with obstruction/lower urinary tract symptoms     CABG x 3 04/24/2014    LIMA to LAD, and saphenous vein grafts to the second diagonal of the LAD, and to the posterior descending branch of the RCA, Dr. Michelle Black, 4/23/14.  Cardiac catheterization 04/18/2014    Superdominant RCA. mRCA 90%. pRPDA 60%. LM (modest sized) patent.  p-mLAD 85%. CX < 50%. LVEDP 17 mmHg. EF 60%. No RWMA. CABG recommended.  Cardiac echocardiogram 06/24/2008    EF 70%. Mild conc LVH. Gr 2 DDfx. Mild LAE. Mild MR. PASP 38 mmHg.  Cardiac nuclear imaging study, abnormal 04/15/2014    Mod ischemia in lg area of anterior wall, anteroapex, anteroseptum c/w high-grade prox LAD obstruction. Mod partially transient defect likely ischemia w/very sm infarction in RCA. TID 1.31 suggests 3-vessel CAD. Mod distal anterior hypk. Mild-mod anteroseptal, inferoseptal, anterolateral hypk. EF 46%. Strongly positive EKG on max EST. Ex time 6 min.  Carotid duplex 04/06/2016    Mod 50-69% bilateral ICA stenosis.   Probable significant LECA stenosis; no change from 2015;  no change 2/17;    Chronic obstructive pulmonary disease ([facility] [address]     Colon adenoma 08/2017    Dr Mary Boo Colon polyps 2007    Dr. Mary Boo Difficulty hearing     Erectile dysfunction     GERD (gastroesophageal reflux disease)     by EGD 2007 Dr. Mary Boo Hypertension     IFG (impaired fasting glucose) 5/14    Multinodular goiter     2014; 2015; 4/16    Obesity     peak weight 202 lbs, bmi 31.4 from 4/14    Prostate CA ([facility] [address] 04/2018    Dr Hawk Frias stage 2B [id number], [place] 3+4, P,10, G2; brachytherapy Dr [facility]) 06/2010    Dr. John Hobbs [id number] Jj 3+3, 1/9 cores up to 10% 1 core; cryo Oct 2012 psa went from 7/1 to torsten 1.78    Pulmonary nodule 2014    no change 2015, 3/17    Sleep apnea     CPAP not used       Past Surgical History:   Procedure Laterality Date    CARDIAC SURG PROCEDURE UNLIST  11/06    thallium negative    CARDIAC SURG PROCEDURE UNLIST  4/14    3V CABG Dr Analy rBiggs  2007    mild obst disease on PFTs Dr. Macy Norton N/A 7/27/2017     Evan Barajas  negative;  adenoma     HX APPENDECTOMY      HX Manuel Jimenes HX CATARACT REMOVAL      bilat Dr John Bedoya     [facility]      Dr. Dorene Mckeon HX TURP      x2 Dr. Janie Franklin HX UROLOGICAL  10/2012    [address], Dr. Halle Page, Cystoscopy and dilation of stricture, cryoablation of prostate    VASCULAR SURGERY PROCEDURE UNLIST      BICA 50-69% stenosis       Current Outpatient Medications   Medication Sig    aspirin 81 mg chewable tablet Take 81 mg by mouth daily.  cholecalciferol (Vitamin D3) (1000 Units /25 mcg) tablet Take  by mouth daily.  cyanocobalamin, vitamin B-12, 5,000 mcg cap Take  by mouth.  nitroglycerin (NITROSTAT) 0.4 mg SL tablet 1 Tab by SubLINGual route every five (5) minutes as needed for Chest Pain.  metoprolol succinate (TOPROL-XL) 25 mg XL tablet TAKE ONE TABLET BY MOUTH DAILY    atorvastatin (LIPITOR) 20 mg tablet TAKE ONE TABLET BY MOUTH EVERY NIGHT AT BEDTIME    levalbuterol tartrate (XOPENEX) 45 mcg/actuation inhaler Take 2 Puffs by inhalation every six (6) hours as needed for Wheezing.  amLODIPine (NORVASC) 10 mg tablet TAKE ONE TABLET BY MOUTH DAILY    omeprazole (PRILOSEC) 20 mg capsule     glucosam-chondroit-C-manganese 225-516-40-3 mg cap Take 1 Tab by mouth daily.  ascorbic acid (VITAMIN C) 1,000 mg tablet Take 1,000 mg by mouth daily.  MULTIVITAMIN W-MINERALS/LUTEIN (CENTRUM SILVER PO) Take 1 Tab by mouth daily. No current facility-administered medications for this visit.           Allergies   Allergen Reactions    Albuterol Other (comments)    Influenza Virus Vaccine, Specific Other (comments)       Social History :  Social History     Tobacco Use    Smoking status: Former Smoker     Packs/day: 0.50     Years: 4.00     Pack years: 2.00     Last attempt to quit: 1957     Years since quittin.8    Smokeless tobacco: Never Used    Tobacco comment: Quit in 1957   Substance Use Topics    Alcohol use: No        Family History: family history includes Diabetes in his sister; Heart Disease in his father; Hypertension in his mother; Stroke in his mother. Review of Systems:      Physical Exam:    The patient is a cooperative, alert, well developed, well nourished 78 y.o.  male who is in no acute distress at the time of the examination. Visit Vitals  BP (!) 160/68   Pulse (!) 50   Ht 5' 7\" (1.702 m)   Wt 86.6 kg (191 lb)   SpO2 98%   BMI 29.91 kg/m²       HEENT: Conjuctiva white, mucosa moist, no pallor or cyanosis. NECK: Supple without masses, tenderness or thyromegaly. There was no jugular venous distention. Carotid are full bilaterally with bilateral bruits right greater than left vs. radiation of murmur to the neck. CHEST: Symmetrical with good excursion. LUNGS: Clear to auscultation in all fields. HEART: The apex is not displaced. There were no lifts, thrills or heaves. There is a normal S1 and S2. There is a grade I/VI PILAR along the LSB with radiation to the base and lower neck without appreciable diastolic murmurs, rubs, clicks, or gallops auscultated. ABDOMEN: Soft without masses, tenderness or organomegaly. EXTREMITIES: Full peripheral pulses with trace peripheral edema. Review of Data: See PMH and Cardiology and Imaging sections for cardiac testing  Lab Results   Component Value Date/Time    Cholesterol, total 123 07/30/2020 08:41 AM    HDL Cholesterol 44 07/30/2020 08:41 AM    LDL, calculated 56.6 07/30/2020 08:41 AM    Triglyceride 112 07/30/2020 08:41 AM    CHOL/HDL Ratio 2.8 07/30/2020 08:41 AM       Results for orders placed or performed in visit on 10/19/20   AMB POC EKG ROUTINE W/ 12 LEADS, INTER & REP     Status: None    Narrative    Sinus bradycardia, rate 50. First-degree AV block. EKG is otherwise within normal limits and similar to the EKG of July 27, 2020.          Nancy Mcneal D.O., ALICIA MOBLEY. Cardiovascular Specialists  [facility] [place]  [address]    PLEASE NOTE:  This document has been produced using voice recognition software. Unrecognized errors in transcription may be present.

## 2020-11-02 ENCOUNTER — TELEPHONE (OUTPATIENT)
Dept: CARDIOLOGY CLINIC | Age: 80
End: 2020-11-02

## 2020-11-02 DIAGNOSIS — R06.02 SOB (SHORTNESS OF BREATH) ON EXERTION: Primary | ICD-10-CM

## 2020-11-02 LAB
STRESS BASELINE DIAS BP: 60 MMHG
STRESS BASELINE HR: 51 BPM
STRESS BASELINE SYS BP: 110 MMHG
STRESS PEAK DIAS BP: 60 MMHG
STRESS PEAK SYS BP: 120 MMHG
STRESS PERCENT HR ACHIEVED: 40 %
STRESS POST PEAK HR: 55 BPM
STRESS RATE PRESSURE PRODUCT: 6600 BPM*MMHG
STRESS ST DEPRESSION: 0 MM
STRESS ST ELEVATION: 0 MM
STRESS STAGE 1 BP: NORMAL MMHG
STRESS STAGE 1 DURATION: 3 MIN:SEC
STRESS STAGE 1 HR: 55 BPM
STRESS STAGE RECOVERY 1 BP: NORMAL MMHG
STRESS STAGE RECOVERY 1 DURATION: 1 MIN:SEC
STRESS STAGE RECOVERY 1 HR: 69 BPM
STRESS TARGET HR: 138 BPM

## 2020-11-02 NOTE — TELEPHONE ENCOUNTER
Called and talked to the patient about his nuclear myocardial perfusion study. His nuclear myocardial perfusion study did suggest that he had ischemia in the inferior wall. In view of the fact that he has ischemia in the inferior wall and has a sequential bypass to the second diagonal coronary artery and his right coronary artery, it would appear that he has some blockage in this graft and I would recommend a cardiac catheterization. Please schedule him for cardiac catheterization with Dr. Kojo Olsen later this week or early next week. Please call and let the patient know tomorrow regarding the timing.  ES

## 2020-11-03 ENCOUNTER — TELEPHONE (OUTPATIENT)
Dept: CARDIOLOGY CLINIC | Age: 80
End: 2020-11-03

## 2020-11-03 DIAGNOSIS — R06.02 SOB (SHORTNESS OF BREATH): ICD-10-CM

## 2020-11-03 DIAGNOSIS — I25.10 CORONARY ARTERY DISEASE INVOLVING NATIVE CORONARY ARTERY OF NATIVE HEART WITHOUT ANGINA PECTORIS: Primary | ICD-10-CM

## 2020-11-03 DIAGNOSIS — I25.10 CORONARY ARTERY DISEASE INVOLVING NATIVE CORONARY ARTERY OF NATIVE HEART WITHOUT ANGINA PECTORIS: ICD-10-CM

## 2020-11-03 RX ORDER — LORAZEPAM 2 MG/ML
1 INJECTION INTRAMUSCULAR ONCE
Status: CANCELLED | OUTPATIENT
Start: 2020-11-03 | End: 2020-11-03

## 2020-11-03 RX ORDER — SODIUM CHLORIDE 0.9 % (FLUSH) 0.9 %
5-40 SYRINGE (ML) INJECTION AS NEEDED
Status: CANCELLED | OUTPATIENT
Start: 2020-11-03

## 2020-11-03 RX ORDER — SODIUM CHLORIDE 0.9 % (FLUSH) 0.9 %
5-40 SYRINGE (ML) INJECTION EVERY 8 HOURS
Status: CANCELLED | OUTPATIENT
Start: 2020-11-03

## 2020-11-03 RX ORDER — DEXTROSE MONOHYDRATE AND SODIUM CHLORIDE 5; .45 G/100ML; G/100ML
150 INJECTION, SOLUTION INTRAVENOUS CONTINUOUS
Status: CANCELLED | OUTPATIENT
Start: 2020-11-03 | End: 2020-11-03

## 2020-11-03 RX ORDER — GUAIFENESIN 100 MG/5ML
162 LIQUID (ML) ORAL
Status: CANCELLED | OUTPATIENT
Start: 2020-11-03 | End: 2020-11-03

## 2020-11-03 NOTE — TELEPHONE ENCOUNTER
Patient is scheduled for a Left Cath on 11/9/20 @ 9:15am with Dr. Ruben Spicer. Karoline Navarrete can you please call him with his instructions. Quinton Galicia. Male, 78 y.o., 1940 Weight:  
191 lb (86.6 kg) MRN:  
[id number] Phone:  
[phone] Danilo Cuello PCP:  
Cristina Keller MD 
Primary Cvg:  
[facility] (MEDICARE)/[facility]: [place] GBA - ALL OTHER COUNT Last Appt With Me Next Appt With Me 
None Next Appt 
12/21/20 - [facility] NURSE - [facility] PM [place] 
 schedule cath Received: Today Message Contents PRAVIN Diego Caller: Unspecified (Yesterday,  5:53 PM)   
  
    
Previous Messages Patient Call Erika Post LPN routed conversation to You 39 minutes ago (8:58 AM) Horace Forde DO routed conversation to Erika Post LPN; Tod Santana, RN 15 hours ago (5:58 PM) Horace Forde DO 15 hours ago (5:58 PM) Called and talked to the patient about his nuclear myocardial perfusion study. His nuclear myocardial perfusion study did suggest that he had ischemia in the inferior wall. In view of the fact that he has ischemia in the inferior wall and has a sequential bypass to the second diagonal coronary artery and his right coronary artery, it would appear that he has some blockage in this graft and I would recommend a cardiac catheterization. Please schedule him for cardiac catheterization with Dr. Ruben Spicer later this week or early next week. Please call and let the patient know tomorrow regarding the timing. ES Documentation Larraine Cea, DO  Tariq Patel.

## 2020-11-03 NOTE — TELEPHONE ENCOUNTER
Instructions Patients Name:  Kim Hernandez are scheduled to have a Left heart cath on 11/9/2020  at 09:15am Please check in at 08:15am  . 2. [facility] to [facility] and park in the outpatient parking lot that is located around to the back of the hospital and enter through the [facility] building. Once you enter through the [facility] check in with the  there. The  will either give you directions or assist you in getting to the cath holding area. 3. You are not to eat or drink anything after midnight the night before your procedure. Small sips of water to take your medications is ok. 4. If you are diabetic, do not take your insulin/sugar pill the morning of the procedure. 5. MEDICATION INSTRUCTIONS:   Please take your morning medications with the following special instructions: 
 
[x]          Please make sure to take your Blood pressure medication  
 
[x]          Take your Aspirin Ricka Dist[address] 6. We encourage families to wait in the waiting room on the first floor while the procedure is being done. The Doctor will come out and talk with you as soon as the procedure is over. 7. There is the possibility that you may spend the night in the hospital, depending on the results of the procedure. This will be determined after the procedure is done. If angioplasty or stent is planned, you will stay at least one day. 8. If you or your family have any questions, please call our office Monday Friday, 9:00 a. m.4:30 p.m.,  At [phone], and ask to speak to one of the nurses. Patient call and instructions for heart cath given. Aware of need for lab work and chest x-ray today or tomorrow. This has been fully explained to the patient, who indicates understanding.

## 2020-11-04 ENCOUNTER — HOSPITAL ENCOUNTER (OUTPATIENT)
Dept: GENERAL RADIOLOGY | Age: 80
Discharge: HOME | End: 2020-11-04
Payer: MEDICARE

## 2020-11-04 ENCOUNTER — HOSPITAL ENCOUNTER (OUTPATIENT)
Dept: LAB | Age: 80
Discharge: HOME | End: 2020-11-04
Payer: MEDICARE

## 2020-11-04 DIAGNOSIS — R06.02 SOB (SHORTNESS OF BREATH): ICD-10-CM

## 2020-11-04 DIAGNOSIS — I25.10 CORONARY ARTERY DISEASE INVOLVING NATIVE CORONARY ARTERY OF NATIVE HEART WITHOUT ANGINA PECTORIS: ICD-10-CM

## 2020-11-04 LAB
ALBUMIN SERPL-MCNC: 4.1 G/DL (ref 3.4–5)
ALBUMIN/GLOB SERPL: 1.3 {RATIO} (ref 0.8–1.7)
ALP SERPL-CCNC: 65 U/L (ref 45–117)
ALT SERPL-CCNC: 28 U/L (ref 16–61)
ANION GAP SERPL CALC-SCNC: 4 MMOL/L (ref 3–18)
AST SERPL-CCNC: 16 U/L (ref 10–38)
BASOPHILS # BLD: 0.1 K/UL (ref 0–0.1)
BASOPHILS NFR BLD: 1 % (ref 0–2)
BILIRUB SERPL-MCNC: 0.4 MG/DL (ref 0.2–1)
BUN SERPL-MCNC: 15 MG/DL (ref 7–18)
BUN/CREAT SERPL: 15 (ref 12–20)
CALCIUM SERPL-MCNC: 9.5 MG/DL (ref 8.5–10.1)
CHLORIDE SERPL-SCNC: 109 MMOL/L (ref 100–111)
CO2 SERPL-SCNC: 29 MMOL/L (ref 21–32)
CREAT SERPL-MCNC: 1.03 MG/DL (ref 0.6–1.3)
DIFFERENTIAL METHOD BLD: ABNORMAL
EOSINOPHIL # BLD: 0.3 K/UL (ref 0–0.4)
EOSINOPHIL NFR BLD: 4 % (ref 0–5)
ERYTHROCYTE [DISTWIDTH] IN BLOOD BY AUTOMATED COUNT: 13.8 % (ref 11.6–14.5)
GLOBULIN SER CALC-MCNC: 3.1 G/DL (ref 2–4)
GLUCOSE SERPL-MCNC: 108 MG/DL (ref 74–99)
HCT VFR BLD AUTO: 40.1 % (ref 36–48)
HGB BLD-MCNC: 13.6 G/DL (ref 13–16)
INR PPP: 1 (ref 0.8–1.2)
LYMPHOCYTES # BLD: 3.3 K/UL (ref 0.9–3.6)
LYMPHOCYTES NFR BLD: 40 % (ref 21–52)
MCH RBC QN AUTO: 30 PG (ref 24–34)
MCHC RBC AUTO-ENTMCNC: 33.9 G/DL (ref 31–37)
MCV RBC AUTO: 88.3 FL (ref 74–97)
MONOCYTES # BLD: 0.8 K/UL (ref 0.05–1.2)
MONOCYTES NFR BLD: 10 % (ref 3–10)
NEUTS SEG # BLD: 3.8 K/UL (ref 1.8–8)
NEUTS SEG NFR BLD: 45 % (ref 40–73)
PLATELET # BLD AUTO: 253 K/UL (ref 135–420)
PMV BLD AUTO: 10.6 FL (ref 9.2–11.8)
POTASSIUM SERPL-SCNC: 3.9 MMOL/L (ref 3.5–5.5)
PROT SERPL-MCNC: 7.2 G/DL (ref 6.4–8.2)
PROTHROMBIN TIME: 13.4 SEC (ref 11.5–15.2)
RBC # BLD AUTO: 4.54 M/UL (ref 4.7–5.5)
SODIUM SERPL-SCNC: 142 MMOL/L (ref 136–145)
WBC # BLD AUTO: 8.2 K/UL (ref 4.6–13.2)

## 2020-11-04 PROCEDURE — 36415 COLL VENOUS BLD VENIPUNCTURE: CPT

## 2020-11-04 PROCEDURE — 85610 PROTHROMBIN TIME: CPT

## 2020-11-04 PROCEDURE — 80053 COMPREHEN METABOLIC PANEL: CPT

## 2020-11-04 PROCEDURE — 85025 COMPLETE CBC W/AUTO DIFF WBC: CPT

## 2020-11-04 PROCEDURE — 71046 X-RAY EXAM CHEST 2 VIEWS: CPT

## 2020-11-06 RX ORDER — LORAZEPAM 2 MG/ML
1 INJECTION INTRAMUSCULAR ONCE
Status: CANCELLED | OUTPATIENT
Start: 2020-11-06 | End: 2020-11-06

## 2020-11-06 RX ORDER — SODIUM CHLORIDE 0.9 % (FLUSH) 0.9 %
5-40 SYRINGE (ML) INJECTION AS NEEDED
Status: CANCELLED | OUTPATIENT
Start: 2020-11-06

## 2020-11-06 RX ORDER — SODIUM CHLORIDE 0.9 % (FLUSH) 0.9 %
5-40 SYRINGE (ML) INJECTION EVERY 8 HOURS
Status: CANCELLED | OUTPATIENT
Start: 2020-11-06

## 2020-11-06 RX ORDER — DEXTROSE MONOHYDRATE AND SODIUM CHLORIDE 5; .45 G/100ML; G/100ML
150 INJECTION, SOLUTION INTRAVENOUS CONTINUOUS
Status: CANCELLED | OUTPATIENT
Start: 2020-11-06 | End: 2020-11-06

## 2020-11-06 RX ORDER — GUAIFENESIN 100 MG/5ML
162 LIQUID (ML) ORAL
Status: CANCELLED | OUTPATIENT
Start: 2020-11-06 | End: 2020-11-06

## 2020-11-09 ENCOUNTER — HOSPITAL ENCOUNTER (OUTPATIENT)
Age: 80
Discharge: HOME | End: 2020-11-10
Attending: INTERNAL MEDICINE | Admitting: INTERNAL MEDICINE
Payer: MEDICARE

## 2020-11-09 DIAGNOSIS — R94.39 ABNORMAL NUCLEAR STRESS TEST: ICD-10-CM

## 2020-11-09 DIAGNOSIS — I25.10 CORONARY ARTERY DISEASE INVOLVING NATIVE CORONARY ARTERY OF NATIVE HEART WITHOUT ANGINA PECTORIS: ICD-10-CM

## 2020-11-09 PROCEDURE — 74011250636 HC RX REV CODE- 250/636: Performed by: INTERNAL MEDICINE

## 2020-11-09 PROCEDURE — C1725 CATH, TRANSLUMIN NON-LASER: HCPCS | Performed by: INTERNAL MEDICINE

## 2020-11-09 PROCEDURE — 74011000258 HC RX REV CODE- 258: Performed by: INTERNAL MEDICINE

## 2020-11-09 PROCEDURE — C1769 GUIDE WIRE: HCPCS | Performed by: INTERNAL MEDICINE

## 2020-11-09 PROCEDURE — C1874 STENT, COATED/COV W/DEL SYS: HCPCS | Performed by: INTERNAL MEDICINE

## 2020-11-09 PROCEDURE — 93458 L HRT ARTERY/VENTRICLE ANGIO: CPT | Performed by: INTERNAL MEDICINE

## 2020-11-09 PROCEDURE — 99153 MOD SED SAME PHYS/QHP EA: CPT | Performed by: INTERNAL MEDICINE

## 2020-11-09 PROCEDURE — C1760 CLOSURE DEV, VASC: HCPCS | Performed by: INTERNAL MEDICINE

## 2020-11-09 PROCEDURE — 77030013744: Performed by: INTERNAL MEDICINE

## 2020-11-09 PROCEDURE — 77030028790 HC ACC ST CTRL VLV COPLT ABBT -B: Performed by: INTERNAL MEDICINE

## 2020-11-09 PROCEDURE — 74011000250 HC RX REV CODE- 250: Performed by: INTERNAL MEDICINE

## 2020-11-09 PROCEDURE — 99218 HC RM OBSERVATION: CPT

## 2020-11-09 PROCEDURE — 74011250637 HC RX REV CODE- 250/637: Performed by: INTERNAL MEDICINE

## 2020-11-09 PROCEDURE — 99152 MOD SED SAME PHYS/QHP 5/>YRS: CPT | Performed by: INTERNAL MEDICINE

## 2020-11-09 PROCEDURE — 77030004558 HC CATH ANGI DX SUPR TORQ CARD -A: Performed by: INTERNAL MEDICINE

## 2020-11-09 PROCEDURE — 99218 PR INITIAL OBSERVATION CARE/DAY 30 MINUTES: CPT | Performed by: INTERNAL MEDICINE

## 2020-11-09 PROCEDURE — 92928 PRQ TCAT PLMT NTRAC ST 1 LES: CPT | Performed by: INTERNAL MEDICINE

## 2020-11-09 PROCEDURE — 92929 HC PLC DE STNT +/-PTA MAJOR COR VESL/BRNCH  ADD RC: CPT | Performed by: INTERNAL MEDICINE

## 2020-11-09 PROCEDURE — C1894 INTRO/SHEATH, NON-LASER: HCPCS | Performed by: INTERNAL MEDICINE

## 2020-11-09 PROCEDURE — 74011000636 HC RX REV CODE- 636: Performed by: INTERNAL MEDICINE

## 2020-11-09 PROCEDURE — 77030013797 HC KT TRNSDUC PRSSR EDWD -A: Performed by: INTERNAL MEDICINE

## 2020-11-09 PROCEDURE — C1887 CATHETER, GUIDING: HCPCS | Performed by: INTERNAL MEDICINE

## 2020-11-09 DEVICE — XIENCE SIERRA™ EVEROLIMUS ELUTING CORONARY STENT SYSTEM 2.25 MM X 08 MM / RAPID-EXCHANGE
Type: IMPLANTABLE DEVICE | Status: FUNCTIONAL
Brand: XIENCE SIERRA™

## 2020-11-09 DEVICE — XIENCE SIERRA™ EVEROLIMUS ELUTING CORONARY STENT SYSTEM 2.75 MM X 18 MM / RAPID-EXCHANGE
Type: IMPLANTABLE DEVICE | Status: FUNCTIONAL
Brand: XIENCE SIERRA™

## 2020-11-09 DEVICE — XIENCE SIERRA™ EVEROLIMUS ELUTING CORONARY STENT SYSTEM 3.00 MM X 23 MM / RAPID-EXCHANGE
Type: IMPLANTABLE DEVICE | Status: FUNCTIONAL
Brand: XIENCE SIERRA™

## 2020-11-09 DEVICE — XIENCE SIERRA™ EVEROLIMUS ELUTING CORONARY STENT SYSTEM 3.25 MM X 18 MM / RAPID-EXCHANGE
Type: IMPLANTABLE DEVICE | Status: FUNCTIONAL
Brand: XIENCE SIERRA™

## 2020-11-09 RX ORDER — SODIUM CHLORIDE 450 MG/100ML
150 INJECTION, SOLUTION INTRAVENOUS CONTINUOUS
Status: DISCONTINUED | OUTPATIENT
Start: 2020-11-09 | End: 2020-11-09

## 2020-11-09 RX ORDER — HEPARIN SODIUM 1000 [USP'U]/ML
INJECTION, SOLUTION INTRAVENOUS; SUBCUTANEOUS AS NEEDED
Status: DISCONTINUED | OUTPATIENT
Start: 2020-11-09 | End: 2020-11-09 | Stop reason: HOSPADM

## 2020-11-09 RX ORDER — SODIUM CHLORIDE 0.9 % (FLUSH) 0.9 %
5-40 SYRINGE (ML) INJECTION EVERY 8 HOURS
Status: DISCONTINUED | OUTPATIENT
Start: 2020-11-09 | End: 2020-11-09 | Stop reason: HOSPADM

## 2020-11-09 RX ORDER — GUAIFENESIN 100 MG/5ML
81 LIQUID (ML) ORAL
Status: COMPLETED | OUTPATIENT
Start: 2020-11-09 | End: 2020-11-09

## 2020-11-09 RX ORDER — GUAIFENESIN 100 MG/5ML
162 LIQUID (ML) ORAL
Status: DISCONTINUED | OUTPATIENT
Start: 2020-11-09 | End: 2020-11-09

## 2020-11-09 RX ORDER — SODIUM CHLORIDE 450 MG/100ML
150 INJECTION, SOLUTION INTRAVENOUS CONTINUOUS
Status: DISPENSED | OUTPATIENT
Start: 2020-11-09 | End: 2020-11-09

## 2020-11-09 RX ORDER — ACETAMINOPHEN 325 MG/1
325 TABLET ORAL ONCE
Status: COMPLETED | OUTPATIENT
Start: 2020-11-09 | End: 2020-11-09

## 2020-11-09 RX ORDER — ONDANSETRON 2 MG/ML
4 INJECTION INTRAMUSCULAR; INTRAVENOUS ONCE
Status: COMPLETED | OUTPATIENT
Start: 2020-11-09 | End: 2020-11-09

## 2020-11-09 RX ORDER — NITROGLYCERIN 400 UG/1
1 SPRAY ORAL
Status: ACTIVE | OUTPATIENT
Start: 2020-11-09 | End: 2020-11-10

## 2020-11-09 RX ORDER — THERA TABS 400 MCG
1 TAB ORAL DAILY
Status: DISCONTINUED | OUTPATIENT
Start: 2020-11-10 | End: 2020-11-10 | Stop reason: HOSPADM

## 2020-11-09 RX ORDER — LIDOCAINE HYDROCHLORIDE 10 MG/ML
INJECTION, SOLUTION EPIDURAL; INFILTRATION; INTRACAUDAL; PERINEURAL AS NEEDED
Status: DISCONTINUED | OUTPATIENT
Start: 2020-11-09 | End: 2020-11-09 | Stop reason: HOSPADM

## 2020-11-09 RX ORDER — DEXTROSE MONOHYDRATE AND SODIUM CHLORIDE 5; .45 G/100ML; G/100ML
150 INJECTION, SOLUTION INTRAVENOUS CONTINUOUS
Status: DISPENSED | OUTPATIENT
Start: 2020-11-09 | End: 2020-11-09

## 2020-11-09 RX ORDER — LORAZEPAM 2 MG/ML
1 INJECTION INTRAMUSCULAR ONCE
Status: ACTIVE | OUTPATIENT
Start: 2020-11-09 | End: 2020-11-09

## 2020-11-09 RX ORDER — GUAIFENESIN 100 MG/5ML
81 LIQUID (ML) ORAL DAILY
Status: DISCONTINUED | OUTPATIENT
Start: 2020-11-10 | End: 2020-11-10 | Stop reason: HOSPADM

## 2020-11-09 RX ORDER — AMLODIPINE BESYLATE 10 MG/1
10 TABLET ORAL DAILY
Status: DISCONTINUED | OUTPATIENT
Start: 2020-11-10 | End: 2020-11-10 | Stop reason: HOSPADM

## 2020-11-09 RX ORDER — SODIUM CHLORIDE 0.9 % (FLUSH) 0.9 %
5-40 SYRINGE (ML) INJECTION AS NEEDED
Status: DISCONTINUED | OUTPATIENT
Start: 2020-11-09 | End: 2020-11-09 | Stop reason: HOSPADM

## 2020-11-09 RX ORDER — FENTANYL CITRATE 50 UG/ML
INJECTION, SOLUTION INTRAMUSCULAR; INTRAVENOUS AS NEEDED
Status: DISCONTINUED | OUTPATIENT
Start: 2020-11-09 | End: 2020-11-09 | Stop reason: HOSPADM

## 2020-11-09 RX ORDER — NITROGLYCERIN 0.4 MG/1
0.4 TABLET SUBLINGUAL
Status: DISCONTINUED | OUTPATIENT
Start: 2020-11-09 | End: 2020-11-10 | Stop reason: HOSPADM

## 2020-11-09 RX ORDER — SODIUM CHLORIDE 0.9 % (FLUSH) 0.9 %
5-40 SYRINGE (ML) INJECTION AS NEEDED
Status: DISCONTINUED | OUTPATIENT
Start: 2020-11-09 | End: 2020-11-10 | Stop reason: HOSPADM

## 2020-11-09 RX ORDER — LEVALBUTEROL TARTRATE 45 UG/1
2 AEROSOL, METERED ORAL
Status: DISCONTINUED | OUTPATIENT
Start: 2020-11-09 | End: 2020-11-10 | Stop reason: HOSPADM

## 2020-11-09 RX ORDER — SODIUM CHLORIDE 0.9 % (FLUSH) 0.9 %
5-40 SYRINGE (ML) INJECTION EVERY 8 HOURS
Status: DISCONTINUED | OUTPATIENT
Start: 2020-11-09 | End: 2020-11-10 | Stop reason: HOSPADM

## 2020-11-09 RX ORDER — MIDAZOLAM HYDROCHLORIDE 1 MG/ML
INJECTION, SOLUTION INTRAMUSCULAR; INTRAVENOUS AS NEEDED
Status: DISCONTINUED | OUTPATIENT
Start: 2020-11-09 | End: 2020-11-09 | Stop reason: HOSPADM

## 2020-11-09 RX ORDER — METOPROLOL SUCCINATE 25 MG/1
25 TABLET, EXTENDED RELEASE ORAL DAILY
Status: DISCONTINUED | OUTPATIENT
Start: 2020-11-10 | End: 2020-11-10 | Stop reason: HOSPADM

## 2020-11-09 RX ORDER — PANTOPRAZOLE SODIUM 20 MG/1
20 TABLET, DELAYED RELEASE ORAL
Status: DISCONTINUED | OUTPATIENT
Start: 2020-11-10 | End: 2020-11-10 | Stop reason: HOSPADM

## 2020-11-09 RX ORDER — BIVALIRUDIN 250 MG/5ML
INJECTION, POWDER, LYOPHILIZED, FOR SOLUTION INTRAVENOUS AS NEEDED
Status: DISCONTINUED | OUTPATIENT
Start: 2020-11-09 | End: 2020-11-09 | Stop reason: HOSPADM

## 2020-11-09 RX ORDER — ATORVASTATIN CALCIUM 20 MG/1
20 TABLET, FILM COATED ORAL
Status: DISCONTINUED | OUTPATIENT
Start: 2020-11-09 | End: 2020-11-10 | Stop reason: HOSPADM

## 2020-11-09 RX ADMIN — TICAGRELOR 90 MG: 90 TABLET ORAL at 23:28

## 2020-11-09 RX ADMIN — ATORVASTATIN CALCIUM 20 MG: 20 TABLET, FILM COATED ORAL at 21:48

## 2020-11-09 RX ADMIN — ASPIRIN 81 MG CHEWABLE TABLET 81 MG: 81 TABLET CHEWABLE at 09:00

## 2020-11-09 RX ADMIN — ACETAMINOPHEN 325 MG: 325 TABLET ORAL at 13:48

## 2020-11-09 RX ADMIN — Medication 10 ML: at 22:00

## 2020-11-09 RX ADMIN — SODIUM CHLORIDE 150 ML/HR: 450 INJECTION, SOLUTION INTRAVENOUS at 12:00

## 2020-11-09 RX ADMIN — ONDANSETRON 4 MG: 2 INJECTION INTRAMUSCULAR; INTRAVENOUS at 14:58

## 2020-11-09 NOTE — PROGRESS NOTES
Patient provided with beverage and snack. Discussed bedrest with patient. Patient verbalized understanding.

## 2020-11-09 NOTE — Clinical Note
Lesion: Located in the Mid RCA. Stent inserted. Stent deployed. First inflation pressure = 17 neelam; inflation time: 20 sec.

## 2020-11-09 NOTE — Clinical Note
Lesion located in the Distal RCA. Balloon balloon re-inflated. Lesion #1: Pressure = 10 neelam; Duration = 6 sec.

## 2020-11-09 NOTE — PROGRESS NOTES
TRANSFER - IN REPORT:    Verbal report received from ISIDORO Whelan RN(name) on Quinton Patel .  being received from cath lab(unit) for routine post - op      Report consisted of patients Situation, Background, Assessment and   Recommendations(SBAR). Information from the following report(s) SBAR, Procedure Summary, MAR, Accordion and Recent Results was reviewed with the receiving nurse. Opportunity for questions and clarification was provided. Assessment completed upon patients arrival to unit and care assumed.      r groin angioseal

## 2020-11-09 NOTE — Clinical Note
Lesion located in the R PDA. Balloon inserted. Balloon inflated using multiple inflations inflation technique. Lesion #1: Pressure = 6 neelam; Duration = 5 sec. Inflation 2: Pressure = 6 neelam; Duration = 3 sec. Inflation 3: Pressure = 8 neelam; Duration = 7 sec.

## 2020-11-09 NOTE — ROUTINE PROCESS
Cath holding summary 
  
Patient escorted to cath holding from waiting area ambulatory, alert and oriented x 4, voicing no complaints.  Changed into gown and placed on monitor.   NPO since MN.  Lab results, med rec and H&P reviewed on chart.  PIV x 2 inserted without difficulty.

## 2020-11-09 NOTE — Clinical Note
Lesion: Located in the [place] RCA. Stent inserted. Stent deployed. Multiple inflations used. First inflation pressure = 14 neelam; inflation time: 19 sec. Second inflation pressure: 20 neelam; inflation time: 10 sec. Third inflation pressure: 13 neelam; inflation time: 10 sec.

## 2020-11-09 NOTE — Clinical Note
Lesion located in the Mid RCA. Balloon balloon re-inflated. Lesion #1: Pressure = 10 neelam; Duration = 5 sec.

## 2020-11-09 NOTE — Clinical Note
TRANSFER - OUT REPORT:     Verbal report given to: Renee García, . Report consisted of patient's Situation, Background, Assessment and   Recommendations(SBAR). Opportunity for questions and clarification was provided. Patient transported with a Cardiac Cath Tech / Patient Care Tech. Patient transported to: [address]

## 2020-11-09 NOTE — Clinical Note
TRANSFER - IN REPORT:     Verbal report received from: Evangelina Keller RN. Report consisted of patient's Situation, Background, Assessment and   Recommendations(SBAR). Opportunity for questions and clarification was provided. Assessment completed upon patient's arrival to unit and care assumed. Patient transported with a Cardiac Cath Tech / Patient Care Tech.

## 2020-11-09 NOTE — H&P
Addendum:    Patient with known history of CAD with bypass surgery in 2014 including LIMA to LAD, SVG to second diagonal, sequential to RPDA. His nuclear scan in 10/30/2020 showed EF of 63% with mostly reversible inferior defect. We will proceed with coronary angiography. Santino Galarza  11/9/2020    HPI:   I saw April Rogers in my office today in cardiovascular evaluation regarding his chronic coronary artery disease.  Mr. Patel is a very pleasant 80 y. o. white male with history of gastroesophageal reflux disease with Pederson's esophagus, asthma, obstructive sleep apnea, cancer of the prostate, significant coronary artery disease with development of exertional angina over a 6-month period of time prompting a nuclear myocardial perfusion study ordered by Dr. Destiny Vicente in April 2014 which was slightly abnormal and ultimately with his symptom complex prompted a cardiac catheterization by Dr. Ramy Tsai demonstrating severe three vessel coronary disease with subsequent coronary artery bypass grafting surgery x 3 by Dr. Ricardo Mcclain with the following bypasses:       1. Left internal mammary artery to the LAD. 2. SVG to the second diagonal coronary artery off of the left anterior descending and posterior descending branch of the distal right coronary artery.      He did reasonably well postop. He did have a little sinus tachycardia initially for which we put him on beta blockers. Since the beta blockers caused fatigue with Lopressor 50 mg twice a day, it was decreased to 25 mg twice a day and then ultimately switched to Toprol XL 25 mg daily.       He comes in today and relates that he is doing fair, but he has been bothered by shortness of breath with exertion and some fatigue now for a few months and he mentioned this to me about 3 months ago but it is getting worse. He denies any anginal type chest discomfort or really any failure symptomatology.   He does have some component of COPD but has not been evaluated for this problem recently.          Encounter Diagnoses   Name Primary?  SOB (shortness of breath) on exertion, possible anginal equivalent      Coronary artery disease involving native coronary artery of native heart without angina pectoris Yes    S/P CABG x 3      Hyperlipidemia, unspecified hyperlipidemia type      Essential hypertension      Bilateral carotid bruits      Sleep apnea, unspecified type           Discussion: This gentleman appears to be doing fair but his shortness of breath with exertion and fatigue issues are worsening and he is concerned that maybe he is having some issues with his bypasses as am I, so I am going to order a pharmacologic myocardial perfusion study to rule out any ongoing ischemia.       He does have history of bilateral carotid bruits and we recently did carotid Dopplers completed on July 21, 2020 showing moderate 50 to 69% stenosis in both the right and left internal carotid arteries. There was some right vertebral resistance suggesting a distal occlusion but the vertebrals were both antegrade. This was similar to past studies and I am going to get this repeated in March of 2021.      His latest lipid profile which was completed on July 30, 2020 and demonstrated total cholesterol 123, triglycerides 112, HDL of 44, LDL of 56.6, and VLDL of 22.4 which I think is excellent control on Lipitor 20 mg daily.     His EKG showing a sinus bradycardia, but is stable.   Blood pressure was somewhat elevated at 160/62 when checked again myself and got 150/60 so I have asked him to check his blood pressure 3 or 4 times a week for a few weeks and if it staying above 140 primarily to follow-up Dr. Brandi Ba.       Since he is otherwise doing well, I will simply have him follow-up with my associate Dr. Saurabh White in 6 months.     PCP: Rick Ramos MD             Past Medical History:   Diagnosis Date    Allergic rhinitis      Arthritis       hands    Asthma       mild obst disease on pfts Dr. Wisam Pederson's Dyllan Manifold Dr. Anne Perez; last 8/17, for [facility]    Bilateral carotid artery stenosis       12/14 BICA 50-69%    BPH with obstruction/lower urinary tract symptoms      CABG x 3 04/24/2014     LIMA to LAD, and saphenous vein grafts to the second diagonal of the LAD, and to the posterior descending branch of the RCA, Dr. Barber Flow, 4/23/14.  Cardiac catheterization 04/18/2014     Superdominant RCA. mRCA 90%. pRPDA 60%. LM (modest sized) patent.  p-mLAD 85%. CX < 50%. LVEDP 17 mmHg. EF 60%. No RWMA. CABG recommended.  Cardiac echocardiogram 06/24/2008     EF 70%. Mild conc LVH. Gr 2 DDfx. Mild LAE. Mild MR. PASP 38 mmHg.  Cardiac nuclear imaging study, abnormal 04/15/2014     Mod ischemia in lg area of anterior wall, anteroapex, anteroseptum c/w high-grade prox LAD obstruction. Mod partially transient defect likely ischemia w/very sm infarction in RCA. TID 1.31 suggests 3-vessel CAD. Mod distal anterior hypk. Mild-mod anteroseptal, inferoseptal, anterolateral hypk. EF 46%. Strongly positive EKG on max EST. Ex time 6 min.  Carotid duplex 04/06/2016     Mod 50-69% bilateral ICA stenosis.   Probable significant LECA stenosis; no change from 2015;  no change 2/17;    Chronic obstructive pulmonary disease [facility])      Colon adenoma 08/2017     Dr Jurado Medicine Colon polyps 2007     Dr. Jurado Medicine Difficulty hearing      Erectile dysfunction      GERD (gastroesophageal reflux disease)       by EGD 2007 Dr. Jurado Medicine Hypertension      IFG (impaired fasting glucose) 5/14    Multinodular goiter       2014; 2015; 4/16    Obesity       peak weight 202 lbs, bmi 31.4 from 4/14    Prostate CA ([address] 04/2018     Dr Grant Malone stage 2B [id number], [place] 3+4, P,10, G2; brachytherapy Dr Melissa De León [facility]) 06/2010     Dr. Marquita Escoto [id number] Jj 3+3, 1/9 cores up to 10% 1 core; cryo Oct 2012 psa went from 7/1 to torsten 1.78    Pulmonary nodule 2014     no change 2015, 3/17    Sleep apnea       CPAP not used               Past Surgical History:   Procedure Laterality Date    CARDIAC SURG PROCEDURE UNLIST   11/06     thallium negative    CARDIAC SURG PROCEDURE UNLIST   4/14     3V CABG Dr Melony Zapata   2007     mild obst disease on PFTs Dr. Wisam Contreras COLONOSCOPY N/A 7/27/2017     Dr. Anne Perez 2007 negative; 8/17 adenoma     HX APPENDECTOMY        HX Nathaneil Degree         Dr. Valencia Abts    HX CATARACT REMOVAL   11/14     bilat Dr Wilber Olvera   2004    [facility]IL[facility]        HX TURP         x2 Dr. Derrick Roland HX UROLOGICAL   10/2012     [facility], Dr. Carina Galvez, Cystoscopy and dilation of stricture, cryoablation of prostate    VASCULAR SURGERY PROCEDURE UNLIST   12/14     BICA 50-69% stenosis              Current Outpatient Medications   Medication Sig    aspirin 81 mg chewable tablet Take 81 mg by mouth daily.  cholecalciferol (Vitamin D3) (1000 Units /25 mcg) tablet Take  by mouth daily.  cyanocobalamin, vitamin B-12, 5,000 mcg cap Take  by mouth.  nitroglycerin (NITROSTAT) 0.4 mg SL tablet 1 Tab by SubLINGual route every five (5) minutes as needed for Chest Pain.  metoprolol succinate (TOPROL-XL) 25 mg XL tablet TAKE ONE TABLET BY MOUTH DAILY    atorvastatin (LIPITOR) 20 mg tablet TAKE ONE TABLET BY MOUTH EVERY NIGHT AT BEDTIME    levalbuterol tartrate (XOPENEX) 45 mcg/actuation inhaler Take 2 Puffs by inhalation every six (6) hours as needed for Wheezing.  amLODIPine (NORVASC) 10 mg tablet TAKE ONE TABLET BY MOUTH DAILY    omeprazole (PRILOSEC) 20 mg capsule      glucosam-chondroit-C-manganese 562-138-74-3 mg cap Take 1 Tab by mouth daily.  ascorbic acid (VITAMIN C) 1,000 mg tablet Take 1,000 mg by mouth daily.     MULTIVITAMIN W-MINERALS/LUTEIN (CENTRUM SILVER PO) Take 1 Tab by mouth daily.      No current facility-administered medications for this visit.                  Allergies   Allergen Reactions    Albuterol Other (comments)    Influenza Virus Vaccine, Specific Other (comments)         Social History :  Social History            Tobacco Use    Smoking status: Former Smoker       Packs/day: 0.50       Years: 4.00       Pack years: 2.00       Last attempt to quit: 1957       Years since quittin.8    Smokeless tobacco: Never Used    Tobacco comment: Quit in    Substance Use Topics    Alcohol use: No         Family History: family history includes Diabetes in his sister; Heart Disease in his father; Hypertension in his mother; Stroke in his mother.       Review of Systems:        Physical Exam:    The patient is a cooperative, alert, well developed, well nourished 78 y.o.  male who is in no acute distress at the time of the examination. Visit Vitals  BP (!) 160/68   Pulse (!) 50   Ht 5' 7\" (1.702 m)   Wt 86.6 kg (191 lb)   SpO2 98%   BMI 29.91 kg/m²       HEENT: Conjuctiva white, mucosa moist, no pallor or cyanosis. NECK: Supple without masses, tenderness or thyromegaly. There was no jugular venous distention. Carotid are full bilaterally with bilateral bruits right greater than left vs. radiation of murmur to the neck. CHEST: Symmetrical with good excursion. LUNGS: Clear to auscultation in all fields. HEART: The apex is not displaced. There were no lifts, thrills or heaves. There is a normal S1 and S2. There is a grade I/VI PILAR along the LSB with radiation to the base and lower neck without appreciable diastolic murmurs, rubs, clicks, or gallops auscultated. ABDOMEN: Soft without masses, tenderness or organomegaly.   EXTREMITIES: Full peripheral pulses with trace peripheral edema.        Review of Data: See PMH and Cardiology and Imaging sections for cardiac testing        Lab Results   Component Value Date/Time     Cholesterol, total 123 2020 08:41 AM     HDL Cholesterol 44 2020 08:41 AM   LDL, calculated 56.6 07/30/2020 08:41 AM     Triglyceride 112 07/30/2020 08:41 AM     CHOL/HDL Ratio 2.8 07/30/2020 08:41 AM             Results for orders placed or performed in visit on 10/19/20   AMB POC EKG ROUTINE W/ 12 LEADS, INTER & REP     Status: None     Narrative     Sinus bradycardia, rate 50. First-degree AV block. EKG is otherwise within normal limits and similar to the EKG of July 27, 2020.

## 2020-11-09 NOTE — PROGRESS NOTES
TRANSFER - OUT REPORT:    Verbal report given to Emelia RN(name) on [facility].  being transferred to CVT SD(unit) for routine progression of care       Report consisted of patients Situation, Background, Assessment and   Recommendations(SBAR). Information from the following report(s) SBAR, Kardex, Procedure Summary, MAR, Recent Results, Pre Procedure Checklist and Procedure Verification was reviewed with the receiving nurse. Lines:   Peripheral IV 11/09/20 Right Antecubital (Active)        Opportunity for questions and clarification was provided.       Patient transported with:   [place]

## 2020-11-09 NOTE — Clinical Note
Lesion: Located in the Distal RCA. Stent inserted. Stent deployed. Multiple inflations used. First inflation pressure = 10 neelam; inflation time: 12 sec. Second inflation pressure: 15 neelam; inflation time: 9 sec. Third inflation pressure: 18 neelam; inflation time: 7 sec.

## 2020-11-09 NOTE — Clinical Note
Lesion: Located in the R PDA. Stent inserted. Stent deployed. Multiple inflations used. First inflation pressure = 13 neelam; inflation time: 19 sec. Second inflation pressure: 16 neelam; inflation time: 14 sec.

## 2020-11-09 NOTE — Clinical Note
Contrast Dose Calculator:   Patient's age: 78.   Patient's sex: Male. Patient weight (kg) = 87. Creatinine level (mg/dL) = 1.03.   Creatinine clearance (mL/min): 68.04. Contrast concentration (mg/mL) = 300. MACD = 300 mL. Max Contrast dose per Creatinine Cl calculator = 153.09 mL.

## 2020-11-09 NOTE — PROGRESS NOTES
TRANSFER - OUT REPORT:    Verbal report given to jr(name) on Quinton Patel .  being transferred to cath lab(unit) for ordered procedure       Report consisted of patients Situation, Background, Assessment and   Recommendations(SBAR). Information from the following report(s) SBAR, Intake/Output, MAR, Recent Results, Pre Procedure Checklist and Procedure Verification was reviewed with the receiving nurse. Lines:       Opportunity for questions and clarification was provided.       Patient transported with:   [place]

## 2020-11-10 VITALS
SYSTOLIC BLOOD PRESSURE: 129 MMHG | BODY MASS INDEX: 29.25 KG/M2 | HEIGHT: 66 IN | WEIGHT: 182 LBS | DIASTOLIC BLOOD PRESSURE: 64 MMHG | OXYGEN SATURATION: 96 % | RESPIRATION RATE: 16 BRPM | HEART RATE: 54 BPM | TEMPERATURE: 98.8 F

## 2020-11-10 LAB
ATRIAL RATE: 62 BPM
CALCULATED P AXIS, ECG09: 77 DEGREES
CALCULATED R AXIS, ECG10: -19 DEGREES
CALCULATED T AXIS, ECG11: 58 DEGREES
DIAGNOSIS, 93000: NORMAL
P-R INTERVAL, ECG05: 224 MS
Q-T INTERVAL, ECG07: 460 MS
QRS DURATION, ECG06: 94 MS
QTC CALCULATION (BEZET), ECG08: 466 MS
VENTRICULAR RATE, ECG03: 62 BPM

## 2020-11-10 PROCEDURE — 93005 ELECTROCARDIOGRAM TRACING: CPT

## 2020-11-10 PROCEDURE — 99218 HC RM OBSERVATION: CPT

## 2020-11-10 PROCEDURE — 74011250637 HC RX REV CODE- 250/637: Performed by: INTERNAL MEDICINE

## 2020-11-10 PROCEDURE — 99238 HOSP IP/OBS DSCHRG MGMT 30/<: CPT | Performed by: INTERNAL MEDICINE

## 2020-11-10 RX ADMIN — METOPROLOL SUCCINATE 25 MG: 25 TABLET, EXTENDED RELEASE ORAL at 09:44

## 2020-11-10 RX ADMIN — TICAGRELOR 90 MG: 90 TABLET ORAL at 09:44

## 2020-11-10 RX ADMIN — THERA TABS 1 TABLET: TAB at 09:44

## 2020-11-10 RX ADMIN — Medication 10 ML: at 06:00

## 2020-11-10 RX ADMIN — AMLODIPINE BESYLATE 10 MG: 10 TABLET ORAL at 09:44

## 2020-11-10 RX ADMIN — Medication 10 ML: at 13:21

## 2020-11-10 RX ADMIN — PANTOPRAZOLE SODIUM 20 MG: 20 TABLET, DELAYED RELEASE ORAL at 09:45

## 2020-11-10 RX ADMIN — ASPIRIN 81 MG CHEWABLE TABLET 81 MG: 81 TABLET CHEWABLE at 09:44

## 2020-11-10 NOTE — ROUTINE PROCESS
Bedside and Verbal shift change report given to LAUREL Kapoor (oncoming nurse) by Alexus Anguiano RN (offgoing nurse). Report included the following information Kardex, Intake/Output and MAR.

## 2020-11-10 NOTE — DISCHARGE SUMMARY
[facility] - Discharge Summary      Discharge Summary     Patient: Reginald Moya MRN: [id number]  SSN: [id number]    YOB: 1940  Age: 78 y.o. Sex: male       Admit Date: 11/9/2020    Discharge Date: 11/10/2020      Admission Diagnoses: Abnormal nuclear stress test [R94.39]  CAD (coronary artery disease) [I25.10]    Discharge Diagnoses:   Problem List as of 11/10/2020 Date Reviewed: 10/19/2020          Codes Class Noted - Resolved    Essential hypertension ICD-10-CM: I10  ICD-9-CM: 401.9  4/12/2018 - Present        Obesity (BMI 30-39. 9) ICD-10-CM: E66.9  ICD-9-CM: 278.00  4/12/2018 - Present        Multinodular goiter 2014 ICD-10-CM: E04.2  ICD-9-CM: 241.1  2/16/2017 - Present        Pulmonary nodules ICD-10-CM: R91.8  ICD-9-CM: 793.19  2/16/2017 - Present        CAD (coronary artery disease) ICD-10-CM: I25.10  ICD-9-CM: 414.00  Unknown - Present        Bilateral carotid artery stenosis 12/14 ICD-10-CM: I65.23  ICD-9-CM: 433.10, 433.30  2/10/2015 - Present        Hyperlipidemia ICD-10-CM: E78.5  ICD-9-CM: 272.4  8/8/2014 - Present        IFG (impaired fasting glucose) ICD-10-CM: R73.01  ICD-9-CM: 790.21  5/5/2014 - Present        S/P CABG x 3 ICD-10-CM: Z95.1  ICD-9-CM: V45.81  4/24/2014 - Present    Overview Signed 4/24/2014  1:46 PM by ALMAS Goins to LAD, and saphenous vein grafts to the second diagonal of the LAD, and to the posterior descending branch of the RCA, Dr. Floyd Samson, 4/23/14.                Sleep apnea ICD-10-CM: G47.30  ICD-9-CM: 780.57  Unknown - Present        Colon polyp 2007 Dr. Little Horne ICD-10-CM: K63.5  ICD-9-CM: 211.3  8/16/2012 - Present        Cancer of prostate ([facility] [address] ICD-10-CM: Swetha Dennison  ICD-9-CM: 727  8/15/2012 - Present        Pederson's esophagus ICD-10-CM: K22.70  ICD-9-CM: 530.85  8/14/2012 - Present        Hypertrophy of prostate without urinary obstruction and other lower urinary tract symptoms (LUTS) ICD-10-CM: N40.0  ICD-9-CM: 600.00 5/29/2008 - Present    Overview Signed 12/27/2019  1:59 PM by Thaddeus Winter     Overview:   IDENTIFIED 12/28/2005             Elevated prostate specific antigen (PSA) ICD-10-CM: R97.20  ICD-9-CM: 790.93  5/29/2008 - Present    Overview Signed 12/27/2019  1:59 PM by Thaddeus Winter     Overview:   IDENTIFIED 02/27/2001  Overview:   IDENTIFIED 07/02/2004             Acute cystitis ICD-10-CM: N30.00  ICD-9-CM: 595.0  5/29/2008 - Present    Overview Signed 12/27/2019  1:59 PM by Thaddeus Winter     Overview:   IDENTIFIED 01/06/2006             Impotence of organic origin ICD-10-CM: N52.9  ICD-9-CM: 607.84  5/29/2008 - Present    Overview Signed 12/27/2019  1:59 PM by Thaddeus Winter     Overview:   IDENTIFIED 05/21/2003             Slowing of urinary stream ICD-10-CM: R39.198  ICD-9-CM: 788.62  5/29/2008 - Present    Overview Signed 12/27/2019  1:59 PM by Thaddeus Winter     Overview:   IDENTIFIED 12/07/2005             Urinary symptom or sign ICD-10-CM: R39.9  ICD-9-CM: 788.99  5/29/2008 - Present    Overview Signed 12/27/2019  1:59 PM by Thaddeus Winter     Overview:   IDENTIFIED 02/27/2002             RESOLVED: Hard, firm prostate ICD-10-CM: R39.89  ICD-9-CM: 600.10  4/13/2018 - 8/22/2018               I saw, evaluated, interviewed and examined the patient personally. I agree with the findings and plan of care as documented below with PA-C note  Patient underwent placement of 4 separate coronary stent to his native RCA without any complication  Right groin access site without any hematoma or bleeding. Discussed with patient regarding importance of dual antiplatelet agent.   Continue statin, beta-blocker  Cardiac rehabilitation discussed with the patient and will refer as outpatient  Follow-up with primary cardiologist in 3 weeks    Nabil Rothman MD         Discharge Condition: Stable    Hospital Course: Tati Pryor. is a 78 y.o. male who presented to the hospital for coronary angiography due to known Hx CAD with recent nuclear stress test that revealed EF 63% with mostly reversible inferior defect. He is now s/p planned procedure, which revealed super dominant RCA RCA with ostial/proximal 95% stenosis followed by mid segment 90-95% stenosis followed by distal segment long 85-95% stenosis, and RPDA proximal 95% stenosis. He underwent PCI of 4 separate RCA lesions and stenting using LENNOX ranging in size from 3.25 mm at ostium and 2.25 mm in RPDA with 0% residual stenosis post-intervention. He has been monitored overnight and is stable for discharge today. Consults: None    Significant Diagnostic Studies: angiography:   Diagnostic   Dominance: Right   Left Anterior Descending    Prox LAD to Mid LAD lesion, 99% stenosed. Second Diagonal Branch    2nd Diag lesion, 95% stenosed. Right Coronary Artery    Ost RCA to Prox RCA lesion, 95% stenosed. Lesion is the culprit lesion. The lesion is eccentric. The lesion is severely calcified. The lesion was not previously treated. The stenosis was measured by a visual reading. Prox RCA to Mid RCA lesion, 90% stenosed. Lesion is the culprit lesion. The lesion is concentric. The lesion is moderately calcified. The stenosis was measured by a visual reading. Dist RCA lesion, 90% stenosed. Lesion is the culprit lesion. The lesion is calcified. The stenosis was measured by a visual reading. Right Posterior Descending Artery    RPDA lesion, 95% stenosed. Lesion is the culprit lesion. The lesion is concentric. The lesion is moderately calcified. The stenosis was measured by a visual reading. Sequential Graft to 2nd Diag, RPDA    Origin to Prox Graft lesion between 2nd Diag and RPDA, 100% stenosed. LIMA Graft to Dist LAD    Intervention     Ost RCA to Prox RCA lesion    Stent    A single stent was placed. Drug-eluting stent was successfully placed. The stent used was a STENT COR L18MM DIA3. 25MM RAP EXCHG EVEROLIMUS ELUT XIENCE. The strut is well apposed. Post-Intervention Lesion Assessment    The intervention was successful. The guidewire crossed the lesion. Device was deployed. The pre-interventional distal flow is normal (MARY 3). Post-intervention MARY flow is 3. There were no complications. There is a 0% residual stenosis post intervention. Prox RCA to Mid RCA lesion    Stent    A single stent was placed. Drug-eluting stent was successfully placed. The stent used was a STENT COR L23MM DIA3MM RAP EXCHG EVEROLIMUS ELUT XIENCE. The strut is well apposed. Post-Intervention Lesion Assessment    The intervention was successful. The guidewire crossed the lesion. Device was deployed. The pre-interventional distal flow is normal (MARY 3). Post-intervention MARY flow is 3. There were no complications. There is a 0% residual stenosis post intervention. Dist RCA lesion    Stent    A single stent was placed. Drug-eluting stent was successfully placed. The stent used was a STENT CORONARY RX 2.75X18 MM COAT DRUG ELUT XIENCE NAOMIE. The strut is well apposed. Post-Intervention Lesion Assessment    The intervention was successful. The guidewire crossed the lesion. Device was deployed. The pre-interventional distal flow is normal (MARY 3). Post-intervention MARY flow is 3. There were no complications. There is a 0% residual stenosis post intervention. RPDA lesion    Stent    A single stent was placed. Drug-eluting stent was successfully placed. The stent used was a STENT  Se [address]. 25MM EVEROLIMUS ELUT SLIM FLX. The strut is well apposed. Post-Intervention Lesion Assessment    The intervention was successful. The guidewire crossed the lesion. Device was deployed. The pre-interventional distal flow is normal (MARY 3). Post-intervention MARY flow is 3. There were no complications. There is a 0% residual stenosis post intervention.           Disposition: home    Discharge Medications:      My Medications      START taking these medications Instructions Each Dose to Equal Morning Noon Evening Bedtime   ticagrelor 90 mg tablet  Commonly known as:  BRILINTA    Your last dose was: Your next dose is: Take 1 Tab by mouth two (2) times a day. 90 mg                    CONTINUE taking these medications      Instructions Each Dose to Equal Morning Noon Evening Bedtime   amLODIPine 10 mg tablet  Commonly known as:  NORVASC    Your last dose was: Your next dose is:          TAKE ONE TABLET BY MOUTH DAILY                  aspirin 81 mg chewable tablet    Your last dose was: Your next dose is: Take 81 mg by mouth daily. 81 mg                 atorvastatin 20 mg tablet  Commonly known as:  LIPITOR    Your last dose was: Your next dose is:          TAKE ONE TABLET BY MOUTH EVERY NIGHT AT BEDTIME                  CENTRUM SILVER PO    Your last dose was: Your next dose is: Take 1 Tab by mouth daily. 1 Tab                 cyanocobalamin (vitamin B-12) 5,000 mcg Cap    Your last dose was: Your next dose is: Take  by mouth. glucosam-chondroit-C-manganese 039-726-27-3 mg Cap    Your last dose was: Your next dose is: Take 1 Tab by mouth daily. 1 Tab                 levalbuterol tartrate 45 mcg/actuation inhaler  Commonly known as:  Shi Fruits    Your last dose was: Your next dose is: Take 2 Puffs by inhalation every six (6) hours as needed for Wheezing. 2 Puff                 metoprolol succinate 25 mg XL tablet  Commonly known as:  TOPROL-XL    Your last dose was: Your next dose is:          TAKE ONE TABLET BY MOUTH DAILY                  nitroglycerin 0.4 mg SL tablet  Commonly known as:  NITROSTAT    Your last dose was: Your next dose is:          1 Tab by SubLINGual route every five (5) minutes as needed for Chest Pain. 0.4 mg                 omeprazole 20 mg capsule  Commonly known as:  PRILOSEC    Your last dose was:       Your next dose is: Vitamin C 1,000 mg tablet  Generic drug:  ascorbic acid (vitamin C)    Your last dose was: Your next dose is: Take 1,000 mg by mouth daily. 1,000 mg                 Vitamin D3 (1000 Units /25 mcg) tablet  Generic drug:  cholecalciferol    Your last dose was: Your next dose is: Take  by mouth daily. Where to Get Your Medications      These medications were sent to [address], [address] [id number]    Phone:  [phone]   · ticagrelor 90 mg tablet         Activity: As directed  Diet: Cardiac Diet  Wound Care: As directed    Follow-up Appointments   Procedures    FOLLOW UP VISIT Appointment in: Other (Specify) Follow-up with Dr. Jeromy Tucker in 4 weeks. Follow-up with Dr. Jeromy Tucker in 4 weeks. Standing Status:   Standing     Number of Occurrences:   1     Order Specific Question:   Appointment in     Answer:    Other (Specify)       Signed By: Marisol Reinoso PA-C     November 10, 2020

## 2020-11-10 NOTE — PROGRESS NOTES
Discharge order noted for today. Orders reviewed. No needs identified at this time.  remains available if needed. Pt to be transported home by his wife. Observation notice provided in writing to patient and/or caregiver as well as verbal explanation of the policy. Patients who are in outpatient status also receive the Observation notice. Reason for Admission:  Abnormal nuclear stress test [R94.39]  CAD (coronary artery disease) [I25.10]                 RUR Score:    N/a             Plan for utilizing home health:    no                      Likelihood of Readmission:   LOW                         Transition of Care Plan:              Initial assessment completed with patient. Cognitive status of patient: oriented to time, place, person and situation. Face sheet information confirmed:  yes. The patient designates his wife Albert Robledo to participate in his discharge plan and to receive any needed information. This patient lives in a single family home with wife. Patient is able to navigate steps as needed. Prior to hospitalization, patient was considered to be independent with ADLs/IADLS : yes . Patient has a current ACP document on file: no  The wife will be available to transport patient home upon discharge. The patient already has no medical equipment available in the home. Patient is not currently active with home health. Patient has not stayed in a skilled nursing facility or rehab. This patient is on dialysis :no    Currently, the discharge plan is Home. The patient states that he can obtain his medications from the pharmacy, and take his medications as directed. Patient's current insurance is Medicare and [employer]. Care Management Interventions  PCP Verified by CM: Yes  Mode of Transport at Discharge:  Other (see comment)(wife will take home)  Transition of Care Consult (CM Consult): Discharge Planning  Current Support Network: Lives with Spouse  Confirm Follow Up Transport: Family  Discharge Location  Discharge Placement: Home with family assistance        Thaddeus Shaw RN BSN  Care Manager  [phone]

## 2020-11-10 NOTE — PROGRESS NOTES
Bedside shift change report given to Emelia RN (oncoming nurse) by Prince Malagon RN (offgoing nurse). Report included the following information SBAR, Kardex, Intake/Output, MAR, Recent Results and Cardiac Rhythm Sinus Dougie Payne. Groin site clean, dry, intact.

## 2020-11-10 NOTE — PROGRESS NOTES
Problem: Falls - Risk of  Goal: *Absence of Falls  Description: Document Inocencio Real Fall Risk and appropriate interventions in the flowsheet. Outcome: Progressing Towards Goal  Note: Fall Risk Interventions:            Medication Interventions: Assess postural VS orthostatic hypotension, Teach patient to arise slowly    Elimination Interventions: Call light in reach, Patient to call for help with toileting needs              Problem: Patient Education: Go to Patient Education Activity  Goal: Patient/Family Education  Outcome: Progressing Towards Goal     Problem: Pressure Injury - Risk of  Goal: *Prevention of pressure injury  Description: Document Cesar Scale and appropriate interventions in the flowsheet.   Outcome: Progressing Towards Goal  Note: Pressure Injury Interventions:  Sensory Interventions: Assess changes in LOC         Activity Interventions: Pressure redistribution bed/mattress(bed type), Increase time out of bed    Mobility Interventions: HOB 30 degrees or less    Nutrition Interventions: Document food/fluid/supplement intake    Friction and Shear Interventions: HOB 30 degrees or less                Problem: Patient Education: Go to Patient Education Activity  Goal: Patient/Family Education  Outcome: Progressing Towards Goal

## 2020-11-10 NOTE — PROGRESS NOTES
Bedside shift change report given to Charly Pope RN (oncoming nurse) by Chucho Glover RN (offgoing nurse). Report included the following information SBAR, Kardex, Intake/Output, MAR, Recent Results and Cardiac Rhythm NSR.

## 2020-11-10 NOTE — PROGRESS NOTES
Problem: Falls - Risk of  Goal: *Absence of Falls  Description: Document Suzie Barrera Fall Risk and appropriate interventions in the flowsheet. 11/10/2020 0953 by Frankey Brooke  Outcome: Resolved/Met  11/10/2020 0951 by Frankey Brooke  Outcome: Progressing Towards Goal  Note: Fall Risk Interventions:            Medication Interventions: Assess postural VS orthostatic hypotension, Teach patient to arise slowly    Elimination Interventions: Call light in reach, Patient to call for help with toileting needs              Problem: Patient Education: Go to Patient Education Activity  Goal: Patient/Family Education  11/10/2020 0953 by Frankey Brooke  Outcome: Resolved/Met  11/10/2020 0951 by Frankey Brooke  Outcome: Progressing Towards Goal     Problem: Pressure Injury - Risk of  Goal: *Prevention of pressure injury  Description: Document Cesar Scale and appropriate interventions in the flowsheet.   11/10/2020 0953 by Frankey Brooke  Outcome: Resolved/Met  11/10/2020 0951 by Frankey Brooke  Outcome: Progressing Towards Goal  Note: Pressure Injury Interventions:  Sensory Interventions: Assess changes in LOC         Activity Interventions: Pressure redistribution bed/mattress(bed type), Increase time out of bed    Mobility Interventions: HOB 30 degrees or less    Nutrition Interventions: Document food/fluid/supplement intake    Friction and Shear Interventions: HOB 30 degrees or less                Problem: Patient Education: Go to Patient Education Activity  Goal: Patient/Family Education  11/10/2020 0953 by Frankey Brooke  Outcome: Resolved/Met  11/10/2020 0951 by Frankey Brooke  Outcome: Progressing Towards Goal

## 2020-11-11 ENCOUNTER — PATIENT OUTREACH (OUTPATIENT)
Dept: CASE MANAGEMENT | Age: 80
End: 2020-11-11

## 2020-11-11 NOTE — PROGRESS NOTES
11/11/2020 11:58 AM     CTN attempted to contact patient for hospital follow up. Patient admitted to [facility] on 11/9/2020 and discharged on 11/10/2020 for CAD- PCI with stent placement. Message unable to be left. One number just rang and rang- no voice mail attached and the other number did not have voice mail set up yet. Emergency contact number was same as patient. Will call patient back at a later time. Will follow.

## 2020-11-12 ENCOUNTER — PATIENT OUTREACH (OUTPATIENT)
Dept: CASE MANAGEMENT | Age: 80
End: 2020-11-12

## 2020-11-12 NOTE — PROGRESS NOTES
This is an subsequent Medicare Annual Wellness Exam (AWV)     I have reviewed the patient's medical history in detail and updated the computerized patient record. History     Past Medical History:   Diagnosis Date    Allergic rhinitis     Asthma     mild obst disease on pfts Dr. Despina Denver Pederson's esophagus     Dr. Delia Domingo; last 8/17, for HALO    BPH with obstruction/lower urinary tract symptoms     CABG x 3 04/24/2014    LIMA to LAD, and saphenous vein grafts to the second diagonal of the LAD, and to the posterior descending branch of the RCA, Dr. Michelle Black, 4/23/14.  Cardiac catheterization 04/18/2014    Superdominant RCA. mRCA 90%. pRPDA 60%. LM (modest sized) patent.  p-mLAD 85%. CX < 50%. LVEDP 17 mmHg. EF 60%. No RWMA. CABG recommended.  Carotid stenosis 12/2014    BICA 50-69%; no change 2016, 4/16, 2/17, 10/18, 7/19, 7/20    Chronic obstructive pulmonary disease ([facility]     Colon adenoma 08/2017    Dr Mary Boo Colon polyps 2007    Dr. Mary Boo Erectile dysfunction     GERD (gastroesophageal reflux disease)     by EGD 2007 Dr. Mary Boo H/O cardiovascular stress test     Mod ischemia in lg area of anterior wall, anteroapex, anteroseptum c/w high-grade prox LAD obstruction. Mod partially transient defect likely ischemia w/very sm infarction in RCA. TID 1.31 suggests 3-vessel CAD. Mod distal anterior hypk. Mild-mod anteroseptal, inferoseptal, anterolateral hypk. EF 46% (4/14); NST showed ef 63%, TID 1.08, mod basal/inf reversible defect c/w isch (10/20)    H/O echocardiogram 06/24/2008    EF 70%. Mild conc LVH. Gr 2 DDfx. Mild LAE. Mild MR. PASP 38 mmHg.       Hearing loss     Hypertension     IFG (impaired fasting glucose) 5/14    Multinodular goiter     2014; 2015; 4/16    Obesity     peak weight 202 lbs, bmi 31.4 from 4/14    BLAIR (obstructive sleep apnea)     CPAP not used    Osteoarthritis     hands    Prostate cancer ([facility] [address] [address] 06/2010    Dr. John Hobbs [id number] Jj 3+3, 1/9 cores up to 10% 1 core; cryo Oct 2012 psa went from 7/1 to torsten 1.78    Prostate cancer ([facility] [address] 04/2018    Dr Mag Allen stage 2B [id number], Jj 3+4, P,10, G2; brachytherapy Dr Ellie Tirado Pulmonary nodule 2014    no change 2015, 3/17    S/P cardiac cath 10/2020    Dr Jeromy Tucker patent LIMA toLAD and SVG to DMs, occluded sequential to RPDA; 4 separate lesions RCA which were stented      Past Surgical History:   Procedure Laterality Date    CARDIAC SURG PROCEDURE UNLIST  11/06    thallium negative    CARDIAC SURG PROCEDURE UNLIST  04/2014    3V CABG; Dr Selin LEAVITT to LAD, SVG to DM2 and sequential to RPDA    CHEST SURGERY PROCEDURE UNLISTED  2007    mild obst disease on PFTs Dr. Chioma Gutierrez N/A 7/27/2017    Dr. Orion San 2007 negative; 8/17 adenoma     Zohrehold Olrandos      Dr. Michele Rankin    HX CATARACT REMOVAL  11/2014    Dr Keyona De La Rosa HX CHOLECYSTECTOMY  2004   Arsh Fontaine HX TURP      x2 Dr. Dayna Maynard HX UROLOGICAL  10/2012    [facility], Dr. Jm Pearson, Cystoscopy and dilation of stricture, cryoablation of prostate     Current Outpatient Medications   Medication Sig Dispense Refill    benazepriL (LOTENSIN) 10 mg tablet Take 1 Tab by mouth daily. 30 Tab 5    ticagrelor (BRILINTA) 90 mg tablet Take 1 Tab by mouth two (2) times a day. 60 Tab 11    aspirin 81 mg chewable tablet Take 81 mg by mouth daily.  cholecalciferol (Vitamin D3) (1000 Units /25 mcg) tablet Take  by mouth daily.  cyanocobalamin, vitamin B-12, 5,000 mcg cap Take  by mouth.  nitroglycerin (NITROSTAT) 0.4 mg SL tablet 1 Tab by SubLINGual route every five (5) minutes as needed for Chest Pain.  1 Bottle 3    metoprolol succinate (TOPROL-XL) 25 mg XL tablet TAKE ONE TABLET BY MOUTH DAILY 90 Tab 2    atorvastatin (LIPITOR) 20 mg tablet TAKE ONE TABLET BY MOUTH EVERY NIGHT AT BEDTIME 90 Tab 3    levalbuterol tartrate (XOPENEX) 45 mcg/actuation inhaler Take 2 Puffs by inhalation every six (6) hours as needed for Wheezing. 1 Inhaler 5    amLODIPine (NORVASC) 10 mg tablet TAKE ONE TABLET BY MOUTH DAILY 90 Tab 3    omeprazole (PRILOSEC) 20 mg capsule       glucosam-chondroit-C-manganese 707-067-48-3 mg cap Take 1 Tab by mouth daily.  ascorbic acid (VITAMIN C) 1,000 mg tablet Take 1,000 mg by mouth daily.  MULTIVITAMIN W-MINERALS/LUTEIN (CENTRUM SILVER PO) Take 1 Tab by mouth daily. Allergies   Allergen Reactions    Albuterol Other (comments)    Influenza Virus Vaccine, Specific Other (comments)     Family History   Problem Relation Age of Onset    Heart Disease Father     Stroke Mother     Hypertension Mother     Diabetes Sister      Social History     Tobacco Use    Smoking status: Former Smoker     Packs/day: 0.50     Years: 4.00     Pack years: 2.00     Last attempt to quit: 1957     Years since quittin.9    Smokeless tobacco: Never Used    Tobacco comment: Quit in    Substance Use Topics    Alcohol use: No     Depression Risk Factor Screening:     3 most recent PHQ Screens 2020   Little interest or pleasure in doing things Not at all   Feeling down, depressed, irritable, or hopeless Not at all   Total Score PHQ 2 0     SCREENINGS  Colonoscopy last done  Dr Celestino Boogie  Prostate ca being followed by Dr Celestino Boogie    Immunization History   Administered Date(s) Administered    (RETIRED) Pneumococcal Vaccine (Unspecified Type) 2008    Pneumococcal Conjugate (PCV-13) 2015    TD Vaccine 09/10/2008    Tdap 2015, 10/18/2018    Zoster 2008     Alcohol Risk Factor Screening: On any occasion during the past 3 months, have you had more than 4 drinks containing alcohol? No    Do you average more than 14 drinks per week? No    Functional Ability and Level of Safety:     Hearing Loss   normal-to-mild    Vision - no acute complaints and is followed by Dr Katy Rosales [facility]   Self-care. Requires assistance with: no ADLs    Fall Risk     Fall Risk Assessment, last 12 mths 1/22/2020   Able to walk? Yes   Fall in past 12 months? No     Abuse Screen   Patient is not abused    Review of Systems   A comprehensive review of systems was negative except for that written in the HPI. Physical Examination     No exam data present    Evaluation of Cognitive Function:  Mood/affect:  happy  Appearance: age appropriate, overweight, well dressed and within normal Limits  Family member/caregiver input: na    Patient Care Team:  Luh Camarillo MD as PCP - General (Internal Medicine)  Luh Camarillo MD as PCP - [address] [place] [address]  [facility] Provider  Mike Hollis DO (Cardiology)  Mark Anthony Garcia MD (Radiation Oncology)  Amira Howe RN as Care Transitions Nurse    Advice/Referrals/Counseling   Education and counseling provided:  Are appropriate based on today's review and evaluation  End-of-Life planning (with patient's consent)  Pneumococcal Vaccine  Influenza Vaccine  Prostate cancer screening tests (PSA, covered annually)  Colorectal cancer screening tests  Cardiovascular screening blood test  Diabetes screening test    Assessment/Plan       ICD-10-CM ICD-9-CM    1. Medicare annual wellness visit, subsequent  Z00.00 V70.0    2. Essential hypertension  I10 401.9 benazepriL (LOTENSIN) 10 mg tablet      METABOLIC PANEL, BASIC   3. Bilateral carotid artery stenosis 12/14  I65.23 433.10      433.30    4. Coronary artery disease involving native coronary artery of native heart without angina pectoris  I25.10 414.01    5. Pederson's esophagus with dysplasia  K22.719 530.85    6. Hyperplastic colonic polyp, unspecified part of colon  K63.5 211.3    7. IFG (impaired fasting glucose)  Q99.34 664.41 METABOLIC PANEL, BASIC      HEMOGLOBIN A1C W/O EAG   8. Obstructive sleep apnea syndrome  G47.33 327.23    9. S/P CABG x 3  Z95.1 V45.81    10.  Hyperlipidemia, unspecified hyperlipidemia type  E78.5 272.4 LIPID PANEL   11. Advanced directives, counseling/discussion  Z71.89 V65.49    12. Screening for depression  Z13.31 V79.0 CANCELED: Georgie Etienne   13. Screening for diabetes mellitus  Z13.1 V77.1      current treatment plan is effective  lab results and schedule of future lab studies reviewed with patient  cardiovascular risk and specific lipid/LDL goals reviewed. End of life discussion undertaken.   He will work on getting medical directive in place  Flu high dose declined repeatedly  Colonoscopy beyond age  shingrix advocated

## 2020-11-12 NOTE — PROGRESS NOTES
Patient was admitted to [facility] on 2020 and discharged on 11/10/2020 for CAD- PCI and stent placement. Outreach made within 2 business days of discharge: Yes    Top Discharge Challenges to be reviewed by the provider   Additional needs identified to be addressed with provider no  none  Discussed COVID-19 related testing which was not done at this time. Test results were not done. Patient informed of results, if available? n/a   Method of communication with provider : none       Advance Care Planning:   Does patient have an Advance Directive:  health care decision makers updated    Inpatient Readmission Risk score: 3%  Was this a readmission? no   Patient stated reason for the admission: abnormal stress test- requiring intervention  Patients top risk factors for readmission: medical condition and support system  Interventions to address risk factors: patient will have follow up appointments    Care Transition Nurse (CTN) contacted the patient by telephone to perform post hospital discharge assessment. Verified name and  with patient as identifiers. Provided introduction to self, and explanation of the CTN role. CTN reviewed discharge instructions, medical action plan and red flags with patient who verbalized understanding. Patient given an opportunity to ask questions and does not have any further questions or concerns at this time. The patient agrees to contact the PCP office for questions related to their healthcare. Medication reconciliation was performed with patient, who verbalizes understanding of administration of home medications. Advised obtaining a 90-day supply of all daily and as-needed medications.    Referral to Pharm D needed: no     Home Health/Outpatient orders at discharge: [address]: n/a  Date of initial visit: [facility] ordered at discharge: [facility] Vickeykatina Schradervard received: n/a    Covid Risk Education    Patient has following risk factors of: HTn, obesity, CAD, pulmonary nodules, ^lipids, hx CABG x3; Age. Education provided regarding infection prevention, and signs and symptoms of COVID-19 and when to seek medical attention with patient who verbalized understanding. Discussed exposure protocols and quarantine From CDC: Are you at higher risk for severe illness?  and given an opportunity for questions and concerns. The patient agrees to contact the COVID-19 hotline [phone] or PCP office for questions related to COVID-19. For more information on steps you can take to protect yourself, see CDC's How to Protect Yourself     Patient/family/caregiver given information for GetWell Loop and agrees to enroll yes  Patient's preferred e-mail: [email or web address]  Patient's preferred phone number: [phone]    Discussed follow-up appointments. If no appointment was previously scheduled, appointment scheduling offered: yes  [facility]   Future Appointments   Date Time Provider Kristian German   11/17/2020  9:20 AM Dalila Lezama MD [facility] BS AMB   12/14/2020  8:40 AM Daniella Kim DO [facility] BS AMB   12/21/2020  8:40 AM [facility] NURSE [facility]   1/4/2021  9:30 AM Loco Velasquez MD [facility]   1/20/2021  9:15 AM [facility]   4/20/2021  9:20 AM Michelle Merida MD [facility] BS AMB     Non-[facility] follow up appointment(s): n/a  Plan for follow-up call in 5-7 days based on severity of symptoms and risk factors. CTN provided contact information for future needs. Goals Addressed                 This Visit's Progress     Prevent complications post hospitalization. 1. CTN will monitor X 4 weeks    2. Ensure provider appt is scheduled within 7 days post-discharge 11/12/2020 Patient stated that he missed his appt. He will call to reschedule. 3. Confirm patient attended post-discharge provider apt     4.  Complete post-visit call to confirm attendance and update care needs  11/12/2020 Patient stated that he is feeling good. No care needs at present. 5. Review/educate common or potential \"red flags\" of condition worsening 11/12/2020 Reviewed signs and symptoms of fatigue, weakness, shortness of breath, light headed, chest pain, irregular heart beat or bluish color of hands and feet. Patient stated that he is aware of symptoms and when to seek medical attention. 6. Evaluate adherence to medications and priority barriers to resolve   11/12/2020 Patient has all meds and is taking as prescribed. 7. Instruct on adherence to medications as ordered and assess for therapeutic response and side-effects   11/12/2020 patient is aware of  why he takes each med and knows risks to look for and when to call doctor. 8. Discuss and evaluate ADL performance. Provide recommendations on energy conservation, particularly related to transition home from an inpatient admission. 11/12/2020 patient is feeling good and he is doing well enough to be back to work today.

## 2020-11-12 NOTE — PROGRESS NOTES
78 y.o. male who presents for evaluation. He was admitted to [facility] electively for cath after he presented with dodge and stress test came back abn showing reversible inf defect. Cath by Dr Luciana William resulted in PCI of 4 separate RCA lesions and stenting using LENNOX w 0 residual stenosis. He has done well since then and sx resolved. He has noted persistently elev bp and is open to starting another med    Prior to above, he was doing well. No gi or gu sx    LAST MEDICARE WELLNESS EXAM: 8/12/15, 2/22/17, 4/12/18, 5/8/19, 11/17/20    Past Medical History:   Diagnosis Date    Allergic rhinitis     Asthma     mild obst disease on pfts Dr. Eva Garza Pederson's esophagus     Dr. Bryan Rider; last 8/17, for HALO    BPH with obstruction/lower urinary tract symptoms     CABG x 3 04/24/2014    LIMA to LAD, and saphenous vein grafts to the second diagonal of the LAD, and to the posterior descending branch of the RCA, Dr. Jesús Reeder, 4/23/14.  Cardiac catheterization 04/18/2014    Superdominant RCA. mRCA 90%. pRPDA 60%. LM (modest sized) patent.  p-mLAD 85%. CX < 50%. LVEDP 17 mmHg. EF 60%. No RWMA. CABG recommended.  Carotid stenosis 12/2014    BICA 50-69%; no change 2016, 4/16, 2/17, 10/18, 7/19, 7/20    Chronic obstructive pulmonary disease ([place] [address]     Colon adenoma 08/2017    Dr Jennifer Kramer Colon polyps 2007    Dr. Jennifer Kramer Erectile dysfunction     GERD (gastroesophageal reflux disease)     by EGD 2007 Dr. Jennifer Kramer H/O cardiovascular stress test     Mod ischemia in lg area of anterior wall, anteroapex, anteroseptum c/w high-grade prox LAD obstruction. Mod partially transient defect likely ischemia w/very sm infarction in RCA. TID 1.31 suggests 3-vessel CAD. Mod distal anterior hypk. Mild-mod anteroseptal, inferoseptal, anterolateral hypk. EF 46% (4/14); NST showed ef 63%, TID 1.08, mod basal/inf reversible defect c/w isch (10/20)    H/O echocardiogram 06/24/2008    EF 70%. Mild conc LVH. Gr 2 DDfx. Mild LAE. Mild MR. PASP 38 mmHg.       Hearing loss     Hypertension     IFG (impaired fasting glucose) 5/14    Multinodular goiter     2014; 2015; 4/16    Obesity     peak weight 202 lbs, bmi 31.4 from 4/14    BLAIR (obstructive sleep apnea)     CPAP not used    Osteoarthritis     hands    [facility] 06[facility]    Dr. Sauer Devoid [id number] [place] 3+3, 1/9 cores up to 10% 1 core; cryo Oct 2012 psa went from 7/1 to torsten 1.78    Prostate cancer ([facility] 04/2018    Dr Freddie Garnica stage 2B [id number], [place] 3+4, P,10, G2; brachytherapy Dr Sarah Day Pulmonary nodule 2014    no change 2015, 3/17     Past Surgical History:   Procedure Laterality Date    CARDIAC SURG PROCEDURE UNLIST  11/06    thallium negative    CARDIAC SURG PROCEDURE UNLIST  04/2014    3V CABG; Dr Ilana LEAVITT to LAD, SVG to DM2 and sequential to RPDA    CHEST SURGERY PROCEDURE UNLISTED  2007    mild obst disease on PFTs Dr. Torri Early COLONOSCOPY N/A 7/27/2017    Dr. Landry Necessary 2007 negative; 8/17 adenoma     HX APPENDECTOMY      HX Joel Cabrera    HX CATARACT REMOVAL  11/2014    Dr Silas Lamar HX CHOLECYSTECTOMY  2004   Sandra Lieberman HX TURP      x2 Dr. Mike Rodrigez HX UROLOGICAL  10/2012     [facility], Dr. Sacha Dorsey, Cystoscopy and dilation of stricture, cryoablation of prostate     Social History     Socioeconomic History    Marital status:      Spouse name: Not on file    Number of children: 3    Years of education: Not on file    Highest education level: Not on file   Occupational History    Occupation: ret engr [employer]     Employer: RETIRED   Social Needs    Financial resource strain: Not on file    Food insecurity     Worry: Not on file     Inability: Not on file    Transportation needs     Medical: Not on file     Non-medical: Not on file   Tobacco Use    Smoking status: Former Smoker     Packs/day: 0.50     Years: 4.00     Pack years: 2.00     Last attempt to quit: 1/1/1957 Years since quittin.8    Smokeless tobacco: Never Used    Tobacco comment: Quit in 34   Substance and Sexual Activity    Alcohol use: No    Drug use: No    Sexual activity: Not on file   Lifestyle    Physical activity     Days per week: Not on file     Minutes per session: Not on file    Stress: Not on file   Relationships    Social connections     Talks on phone: Not on file     Gets together: Not on file     Attends Congregation service: Not on file     Active member of club or organization: Not on file     Attends meetings of clubs or organizations: Not on file     Relationship status: Not on file    Intimate partner violence     Fear of current or ex partner: Not on file     Emotionally abused: Not on file     Physically abused: Not on file     Forced sexual activity: Not on file   Other Topics Concern    Not on file   Social History Narrative    Not on file     Allergies   Allergen Reactions    Albuterol Other (comments)    Influenza Virus Vaccine, Specific Other (comments)     Current Outpatient Medications   Medication Sig    benazepriL (LOTENSIN) 10 mg tablet Take 1 Tab by mouth daily.  ticagrelor (BRILINTA) 90 mg tablet Take 1 Tab by mouth two (2) times a day.  aspirin 81 mg chewable tablet Take 81 mg by mouth daily.  cholecalciferol (Vitamin D3) (1000 Units /25 mcg) tablet Take  by mouth daily.  cyanocobalamin, vitamin B-12, 5,000 mcg cap Take  by mouth.  nitroglycerin (NITROSTAT) 0.4 mg SL tablet 1 Tab by SubLINGual route every five (5) minutes as needed for Chest Pain.  metoprolol succinate (TOPROL-XL) 25 mg XL tablet TAKE ONE TABLET BY MOUTH DAILY    atorvastatin (LIPITOR) 20 mg tablet TAKE ONE TABLET BY MOUTH EVERY NIGHT AT BEDTIME    levalbuterol tartrate (XOPENEX) 45 mcg/actuation inhaler Take 2 Puffs by inhalation every six (6) hours as needed for Wheezing.     amLODIPine (NORVASC) 10 mg tablet TAKE ONE TABLET BY MOUTH DAILY    omeprazole (PRILOSEC) 20 mg capsule     glucosam-chondroit-C-manganese 975-604-30-3 mg cap Take 1 Tab by mouth daily.  ascorbic acid (VITAMIN C) 1,000 mg tablet Take 1,000 mg by mouth daily.  MULTIVITAMIN W-MINERALS/LUTEIN (CENTRUM SILVER PO) Take 1 Tab by mouth daily. No current facility-administered medications for this visit. REVIEW OF SYSTEMS:  colo 2017 Dr Lenny Sandy    Visit Vitals  BP (!) 159/74   Pulse (!) 55   Temp 97.9 °F (36.6 °C) (Temporal)   Resp 16   Ht 5' 6\" (1.676 m)   Wt 185 lb (83.9 kg)   SpO2 99%   BMI 29.86 kg/m²     A&O x3  Affect is appropriate. Mood stable  No apparent distress  Anicteric, no JVD, adenopathy or thyromegaly. No carotid bruits or radiated murmur  Lungs clear to auscultation, no wheezes or rales  Heart showed regular rate and rhythm. No murmur, rubs, gallops  Abdomen soft nontender, no hepatosplenomegaly or masses. Extremities with trace pedal edema. Venous varicosities noted.  Pulses 1-2+ symmetrically    LABS   From 9/10 showed gluc 102, cr 1.00,    alt 41,        chol 190, tg 102, hdl 47, ldl-c 123, wbc 8.3, hb 14.6, plt 222, psa 7.60  From 6/12 showed                        psa 5.78  From 8/12 showed                                            b12 400, fol>20  From 9/12 showed gluc 109, cr 1.10, gfr>60,                 wbc 8.1, hb 14.1, plt 232  From 9/13 showed                        psa 0.96  From 3/14 showed                        psa 1.78  From 4/14 showed gluc 107, cr 0.90, gfr>60, alt 25, hba1c 5.5, chol 190, tg 135, hdl 48, ldl-c 115, wbc 8.1, hb 14.2, plt 241, ua neg  From 8/14 showed gluc 97,   cr 0.90, gfr>60, alt 17, hba1c 6.0, chol 109, tg 111, hdl 44, ldl-c 43  From 2/15 showed      hba1c 6.0  From 3/15 showed                      psa 2.13  From 8/15 showed gluc 106, cr 0.98, gfr 75,  alt 20, hba1c 5.9, chol 129, tg 79,   hdl 51, ldl-c 62,   wbc 7.3, hb 14.2, plt 232  From 3/16 showed                      psa 2.90  From 4/16 showed      alt 37, hba1c 5.6, chol 108, tg 103, hdl 46, ldl-c 41  From 2/17 showed gluc 103, cr 1.00, gfr>60, alt 36, hba1c 5.6, chol 117, tg 93,   hdl 49, ldl-c 59,                tsh 4.14  From 1/18 showed                      psa 7.40  From 2/18 showed gluc 107, cr 1.01, gfr>60, alt 27,        chol 106, tg 113, hdl 42, ldl-c 41,   wbc 7.9, hb 13.8, plt 240,             tsh 3.15, ft4 0.90  From 8/18 showed gluc 97,   cr 1.01, gfr>60, alt 28,        wbc 7.6, hb 12.2, plt 206,             fe 94, %sat 32, ferritin 83, b12 332, fol>20, spep neg, retic 1.0  From 5/19 showed gluc 100, cr 1.00, gfr>60, alt 29,        wbc 9.4, hb 13.4, plt 214  From 1/20 showed                      psa 0.23  From 2/20 showed             chol 116, tg 79,   hdl 43, ldl-c 57  From 7/20 showed     alt 34,         chol 123, tg 112, hdl 44, ldl-c 57  From 11/20 showed glu 108, cr 1.03, gfr>60, alt 28,        wbc 8.2, hb 13.6, plt 253    Patient Active Problem List   Diagnosis Code    Pederson's esophagus K22.70    Hypertrophy of prostate without urinary obstruction and other lower urinary tract symptoms (LUTS) N40.0    Cancer of prostate ([facility] C61    Colon polyp 2007 Dr. Orion San K63.5    Sleep apnea G47.30    S/P CABG x 3 Z95.1    IFG (impaired fasting glucose) R73.01    Hyperlipidemia E78.5    Bilateral carotid artery stenosis 12/14 I65.23    CAD (coronary artery disease) I25.10    Multinodular goiter 2014 E04.2    Pulmonary nodules R91.8    Essential hypertension I10    Obesity (BMI 30-39. 9) E66.9    Elevated prostate specific antigen (PSA) R97.20    Impotence of organic origin N52.9     Assessment and plan:  1. Pederson's. Lifelong PPI and further plans per gi  2. ED. Prn meds  3. BPH and prostate ca. F/U Dr. Mag Allen and Dr Isha Cruz  4. Colon polyp. Fiber  5. Asthma. Prn inhalers  6. CHD. F/U Dr. Migdalia Claudio   7. IFG. Wt loss would be ideal, continue dietary and lifestyle measures  8. Bilat carotid stenosis. Cont to follow per Dr Migdalia Claudio  9. HTN.   Will add benazepril after discussing possible sfx, he will call in readings  10. Obesity. Lifestyle and dietary measures. Portion control reiterated. RTC 2/21    Above conditions discussed at length and patient vocalized understanding.   All questions answered to patient satisfaction

## 2020-11-17 ENCOUNTER — OFFICE VISIT (OUTPATIENT)
Dept: INTERNAL MEDICINE CLINIC | Age: 80
End: 2020-11-17
Payer: MEDICARE

## 2020-11-17 VITALS
HEART RATE: 55 BPM | OXYGEN SATURATION: 99 % | WEIGHT: 185 LBS | DIASTOLIC BLOOD PRESSURE: 74 MMHG | HEIGHT: 66 IN | RESPIRATION RATE: 16 BRPM | TEMPERATURE: 97.9 F | SYSTOLIC BLOOD PRESSURE: 159 MMHG | BODY MASS INDEX: 29.73 KG/M2

## 2020-11-17 DIAGNOSIS — Z13.1 SCREENING FOR DIABETES MELLITUS: ICD-10-CM

## 2020-11-17 DIAGNOSIS — G47.33 OBSTRUCTIVE SLEEP APNEA SYNDROME: ICD-10-CM

## 2020-11-17 DIAGNOSIS — R73.01 IFG (IMPAIRED FASTING GLUCOSE): ICD-10-CM

## 2020-11-17 DIAGNOSIS — Z00.00 MEDICARE ANNUAL WELLNESS VISIT, SUBSEQUENT: Primary | ICD-10-CM

## 2020-11-17 DIAGNOSIS — I10 ESSENTIAL HYPERTENSION: ICD-10-CM

## 2020-11-17 DIAGNOSIS — Z13.31 SCREENING FOR DEPRESSION: ICD-10-CM

## 2020-11-17 DIAGNOSIS — K22.719 BARRETT'S ESOPHAGUS WITH DYSPLASIA: ICD-10-CM

## 2020-11-17 DIAGNOSIS — K63.5 HYPERPLASTIC COLONIC POLYP, UNSPECIFIED PART OF COLON: ICD-10-CM

## 2020-11-17 DIAGNOSIS — I65.23 BILATERAL CAROTID ARTERY STENOSIS: ICD-10-CM

## 2020-11-17 DIAGNOSIS — E78.5 HYPERLIPIDEMIA, UNSPECIFIED HYPERLIPIDEMIA TYPE: ICD-10-CM

## 2020-11-17 DIAGNOSIS — Z95.1 S/P CABG X 3: ICD-10-CM

## 2020-11-17 DIAGNOSIS — Z71.89 ADVANCED DIRECTIVES, COUNSELING/DISCUSSION: ICD-10-CM

## 2020-11-17 DIAGNOSIS — I25.10 CORONARY ARTERY DISEASE INVOLVING NATIVE CORONARY ARTERY OF NATIVE HEART WITHOUT ANGINA PECTORIS: ICD-10-CM

## 2020-11-17 PROCEDURE — 99497 ADVNCD CARE PLAN 30 MIN: CPT | Performed by: INTERNAL MEDICINE

## 2020-11-17 PROCEDURE — 99214 OFFICE O/P EST MOD 30 MIN: CPT | Performed by: INTERNAL MEDICINE

## 2020-11-17 PROCEDURE — G8417 CALC BMI ABV UP PARAM F/U: HCPCS | Performed by: INTERNAL MEDICINE

## 2020-11-17 PROCEDURE — G8432 DEP SCR NOT DOC, RNG: HCPCS | Performed by: INTERNAL MEDICINE

## 2020-11-17 PROCEDURE — G8754 DIAS BP LESS 90: HCPCS | Performed by: INTERNAL MEDICINE

## 2020-11-17 PROCEDURE — G8536 NO DOC ELDER MAL SCRN: HCPCS | Performed by: INTERNAL MEDICINE

## 2020-11-17 PROCEDURE — 1101F PT FALLS ASSESS-DOCD LE1/YR: CPT | Performed by: INTERNAL MEDICINE

## 2020-11-17 PROCEDURE — G8427 DOCREV CUR MEDS BY ELIG CLIN: HCPCS | Performed by: INTERNAL MEDICINE

## 2020-11-17 PROCEDURE — G0439 PPPS, SUBSEQ VISIT: HCPCS | Performed by: INTERNAL MEDICINE

## 2020-11-17 PROCEDURE — G0463 HOSPITAL OUTPT CLINIC VISIT: HCPCS | Performed by: INTERNAL MEDICINE

## 2020-11-17 PROCEDURE — G8753 SYS BP > OR = 140: HCPCS | Performed by: INTERNAL MEDICINE

## 2020-11-17 RX ORDER — BENAZEPRIL HYDROCHLORIDE 10 MG/1
10 TABLET ORAL DAILY
Qty: 30 TAB | Refills: 5 | Status: SHIPPED | OUTPATIENT
Start: 2020-11-17 | End: 2020-11-20 | Stop reason: SDUPTHER

## 2020-11-17 NOTE — PROGRESS NOTES
Brook Epperson. presents today for   Chief Complaint   Patient presents with    Coronary Artery Disease     Admitted on 11-9-20 discharged on 11-10-20 [address]                  Coordination of Care:  1. Have you been to the ER, urgent care clinic since your last visit? Hospitalized since your last visit? YES    2. Have you seen or consulted any other health care providers outside of the [address] [place] [address] since your last visit? Include any pap smears or colon screening.  NO

## 2020-11-18 NOTE — PATIENT INSTRUCTIONS
Medicare Wellness Visit, Male The best way to live healthy is to have a lifestyle where you eat a well-balanced diet, exercise regularly, limit alcohol use, and quit all forms of tobacco/nicotine, if applicable. Regular preventive services are another way to keep healthy. Preventive services (vaccines, screening tests, monitoring & exams) can help personalize your care plan, which helps you manage your own care. Screening tests can find health problems at the earliest stages, when they are easiest to treat. [facility] follows the current, evidence-based guidelines published by the [facility] Kenan Nora ([facility]) when recommending preventive services for our patients. Because we follow these guidelines, sometimes recommendations change over time as research supports it. (For example, a prostate screening blood test is no longer routinely recommended for men with no symptoms). Of course, you and your doctor may decide to screen more often for some diseases, based on your risk and co-morbidities (chronic disease you are already diagnosed with). Preventive services for you include: - Medicare offers their members a free annual wellness visit, which is time for you and your primary care provider to discuss and plan for your preventive service needs. Take advantage of this benefit every year! 
-All adults over age 72 should receive the recommended pneumonia vaccines. Current USPSTF guidelines recommend a series of two vaccines for the best pneumonia protection.  
-All adults should have a flu vaccine yearly and tetanus vaccine every 10 years. 
-All adults age 48 and older should receive the shingles vaccines (series of two vaccines).       
-All adults age 38-68 who are overweight should have a diabetes screening test once every three years.  
-Other screening tests & preventive services for persons with diabetes include: an eye exam to screen for diabetic retinopathy, a kidney function test, a foot exam, and stricter control over your cholesterol.  
-Cardiovascular screening for adults with routine risk involves an electrocardiogram (ECG) at intervals determined by the provider.  
-Colorectal cancer screening should be done for adults age 54-65 with no increased risk factors for colorectal cancer. There are a number of acceptable methods of screening for this type of cancer. Each test has its own benefits and drawbacks. Discuss with your provider what is most appropriate for you during your annual wellness visit. The different tests include: colonoscopy (considered the best screening method), a fecal occult blood test, a fecal DNA test, and sigmoidoscopy. 
-All adults born between [facility] should be screened once for Hepatitis C. 
-An Abdominal Aortic Aneurysm (AAA) Screening is recommended for men age 73-68 who has ever smoked in their lifetime. Here is a list of your current Health Maintenance items (your personalized list of preventive services) with a due date: 
Health Maintenance Due Topic Date Due  Shingles Vaccine (1 of 2) 09/28/1988  Glaucoma Screening   02/16/2019 Ramya Vicente Annual Well Visit  05/08/2020

## 2020-11-18 NOTE — ACP (ADVANCE CARE PLANNING)
Advance Care Planning       Advance Care Planning (ACP) Physician/NP/PA (Provider) Conversation        Date of ACP Conversation: 11/17/2020    Conversation Conducted with:   Patient with Decision Making [address] Maker:    Current Designated Health Care Decision Maker:   Primary Decision Maker: Eve Patel - Spouse - [phone]  (If there is a [address] named in the [address] Choice Makers\" box in the ACP activity, but it is not visible above, be sure to open that field and then select the health care decision maker relationship (ie \"primary\") in the blank space to the right of the name.)    Note: Assess and validate information in current ACP documents, as indicated. If no Authorized Decision Maker has previously been identified, then patient chooses [place]:  \"Who would you like to name as your primary health care decision-maker? \"    Name: Derian Murcia   Relationship: wife Phone number:   Jae Abts this person be reached easily? \" YES  \"Who would you like to name as your back-up decision maker? \"   Name:   Relationship:  Phone number:   \"Can this person be reached easily? \" NO    Note: If the relationship of these Decision-Makers to the patient does NOT follow your state's Next of Kin hierarchy, recommend that patient complete ACP document that meets state-specific requirements to allow them to act on the patient's behalf when appropriate. Care Preferences:    Hospitalization: \"If your health worsens and it becomes clear that your chance of recovery is unlikely, what would your preference be regarding hospitalization? \"  The patient would prefer hospitalization. Ventilation: \"If you were in your present state of health and suddenly became very ill and were unable to breathe on your own, what would your preference be about the use of a ventilator (breathing machine) if it was available to you? \"    The patient would desire the use of a ventilator. \"If your health worsens and it becomes clear that your chance of recovery is unlikely, what would your preference be about the use of a ventilator (breathing machine) if it was available to you? \"   yes      Resuscitation:  \"CPR works best to restart the heart when there is a sudden event, like a heart attack, in someone who is otherwise healthy. Unfortunately, CPR does not typically restart the heart for people who have serious health conditions or who are very sick. \"    \"In the event your heart stopped as a result of an underlying serious health condition, would you want attempts to be made to restart your heart (answer \"yes\" for attempt to resuscitate) or would you prefer a natural death (answer \"no\" for do not attempt to resuscitate)? \"   Yes, attempt to resuscitate. NOTE: If the patient has a valid advance directive AND provides care preference(s) that are inconsistent with that prior directive, advise the patient to consider either: creating a new advance directive that complies with state-specific requirements; or, if that is not possible, orally revoking that prior directive in accordance with state-specific requirements, which must be documented in the EHR.     Conversation Outcomes / Follow-Up Plan:   ACP in process - information provided, considering goals and options      Length of Voluntary ACP Conversation in minutes:  16 minutes      Masoud Soto MD

## 2020-11-19 ENCOUNTER — PATIENT OUTREACH (OUTPATIENT)
Dept: CASE MANAGEMENT | Age: 80
End: 2020-11-19

## 2020-11-19 NOTE — PROGRESS NOTES
Transitions of Care Coordination  Follow-Up    Date/Time:  2020 10:49 AM     CTN (Care Transitions Nurse) contacted patient for Transitions of Care Coordination  follow up. Verified 2 patient identifiers (name and ). Spoke to patient. Introduced self/role and reason for call. Patient reported:   Patient is doing well. He has all meds and is taking as directed. He is back to work and doing well. No complaints. Pertinent negatives:  None at present. Specialist appointments since last outreach? no  If so, specialist and date: n/a    Medications:   New medications since last outreach: no  Does patient need refills on any medications: no  Medication changes since last outreach (dose adjustments or discontinued meds): no    Home Health company: none  Date of discharge: n/a    Barriers to care? medical condition, support system        Patient reminded that there are physicians on call 24 hours a day / 7 days a week (M-F 5pm to 8am and from Friday 5pm until Monday 8a for the weekend) should the patient have questions or concerns. Future Appointments   Date Time Provider Kristian German   2020  8:40 AM [facility] NURSE [facility]   2020  8:20 AM Gerri Halsted, DO [facility] BS AMB   2020  9:35 AM [facility] NURSE VISIT [facility] BS AMB   2021  9:30 AM Peter Mckeon MD [facility]   2021  8:00 AM Merlinda Radon, MD [facility] BS AMB   2021  9:15 AM [facility]   2021  9:20 AM Gurpreet Lewis MD [facility] AMB        Other upcoming appointments:  n/a    Goals      Prevent complications post hospitalization. 1. CTN will monitor X 4 weeks    2. Ensure provider appt is scheduled within 7 days post-discharge 2020 Patient stated that he missed his appt. He will call to reschedule. Rescheduled to 2020 at 9:20 AM    3. Confirm patient attended post-discharge provider apt 2020 Patient saw  2020    4.  Complete post-visit call to confirm attendance and update care needs  11/12/2020 Patient stated that he is feeling good. No care needs at present. 11/19/2020 Patient continues to do well. No care needs at present. 5. Review/educate common or potential \"red flags\" of condition worsening 11/12/2020 Reviewed signs and symptoms of fatigue, weakness, shortness of breath, light headed, chest pain, irregular heart beat or bluish color of hands and feet. Patient stated that he is aware of symptoms and when to seek medical attention. 6. Evaluate adherence to medications and priority barriers to resolve   11/12/2020 Patient has all meds and is taking as prescribed. 11/19/2020 Patient stated that he has all meds and is taking as directed. 7. Instruct on adherence to medications as ordered and assess for therapeutic response and side-effects   11/12/2020 patient is aware of  why he takes each med and knows risks to look for and when to call doctor. 11/19/2020 Patient has no concerns about meds. 8. Discuss and evaluate ADL performance. Provide recommendations on energy conservation, particularly related to transition home from an inpatient admission. 11/12/2020 patient is feeling good and he is doing well enough to be back to work today. 11/19/2020 Patient continues to do well. No complaints.

## 2020-11-20 DIAGNOSIS — I10 ESSENTIAL HYPERTENSION: ICD-10-CM

## 2020-11-20 RX ORDER — BENAZEPRIL HYDROCHLORIDE 10 MG/1
10 TABLET ORAL DAILY
Qty: 30 TAB | Refills: 5 | Status: SHIPPED | OUTPATIENT
Start: 2020-11-20 | End: 2021-05-07

## 2020-11-20 NOTE — TELEPHONE ENCOUNTER
Pt wife called she said at pt appt 11/17- pt said Dr mcraecribed patient BENAZEPRIL  It was called into wrong pharmacy it was [facility] [place] [facility]   Please resend

## 2020-11-28 ENCOUNTER — PATIENT OUTREACH (OUTPATIENT)
Dept: CASE MANAGEMENT | Age: 80
End: 2020-11-28

## 2020-11-28 NOTE — PROGRESS NOTES
Transitions of Care Coordination  Follow-Up    Date/Time:  2020 12:54 PM     CTN (Care Transitions Nurse) contacted patient for Transitions of Care Coordination  follow up. Verified 2 patient identifiers (name and ). Spoke to patient. Introduced self/role and reason for call. Patient reported:   Doing well. Back to all activities without difficulty. Patient has all meds and is taking as prescribed. Pertinent negatives:  None at present. Specialist appointments since last outreach? no  If so, specialist and date: n/a    Medications:   New medications since last outreach: no  Does patient need refills on any medications: no  Medication changes since last outreach (dose adjustments or discontinued meds): no    Home Health company: n/a  Date of discharge: n/a    Barriers to care? medical condition, support system        Patient reminded that there are physicians on call 24 hours a day / 7 days a week (M-F 5pm to 8am and from Friday 5pm until Monday 8a for the weekend) should the patient have questions or concerns. Future Appointments   Date Time Provider Kristian German   2020  8:40 AM [facility] NURSE [facility]   2020  8:20 AM Gerri Halsted, DO [facility] BS AMB   2020  9:35 AM [facility] NURSE VISIT [facility] BS AMB   2021  9:30 AM Peter Mckeon MD [facility] Open[facility]   2021  8:00 AM Merlinda Radon, MD [facility] BS AMB   2021  9:15 AM [facility]   2021  9:20 AM Gurpreet Lewis MD [facility] BS AMB        Other upcoming appointments:  n/a    Goals      Prevent complications post hospitalization. 1. CTN will monitor X 4 weeks    2. Ensure provider appt is scheduled within 7 days post-discharge 2020 Patient stated that he missed his appt. He will call to reschedule. Rescheduled to 2020 at 9:20 AM    3. Confirm patient attended post-discharge provider apt 2020 Patient saw  2020    4.  Complete post-visit call to confirm attendance and update care needs  11/12/2020 Patient stated that he is feeling good. No care needs at present. 11/19/2020 Patient continues to do well. No care needs at present. 11/28/2020 Patient doing well. No care needs    5. Review/educate common or potential \"red flags\" of condition worsening 11/12/2020 Reviewed signs and symptoms of fatigue, weakness, shortness of breath, light headed, chest pain, irregular heart beat or bluish color of hands and feet. Patient stated that he is aware of symptoms and when to seek medical attention. 6. Evaluate adherence to medications and priority barriers to resolve   11/12/2020 Patient has all meds and is taking as prescribed. 11/19/2020 Patient stated that he has all meds and is taking as directed. 11/28/2020 Patient continues to have all meds and is taking per doctor orders. 7. Instruct on adherence to medications as ordered and assess for therapeutic response and side-effects   11/12/2020 patient is aware of  why he takes each med and knows risks to look for and when to call doctor. 11/19/2020 Patient has no concerns about meds. 11/28/2020 No medication concerns today. 8. Discuss and evaluate ADL performance. Provide recommendations on energy conservation, particularly related to transition home from an inpatient admission. 11/12/2020 patient is feeling good and he is doing well enough to be back to work today. 11/19/2020 Patient continues to do well. No complaints. 11/28/2020 No complaints. Doing well. Back to all activities.

## 2020-11-30 ENCOUNTER — TELEPHONE (OUTPATIENT)
Dept: CARDIAC REHAB | Age: 80
End: 2020-11-30

## 2020-11-30 NOTE — TELEPHONE ENCOUNTER
Card[facility] Rehab called patient and left a message on his home phone. Additional attempts at contact will be made. Thank you, Alireza Alanis

## 2020-12-03 ENCOUNTER — PATIENT OUTREACH (OUTPATIENT)
Dept: CASE MANAGEMENT | Age: 80
End: 2020-12-03

## 2020-12-03 NOTE — PROGRESS NOTES
12/2/2020 3:35 PM    Attempted to call patient for routine follow up. He was unavailable at time of call. Left a voicemail message for him to return my call. CTN contact information given in message. Will follow.

## 2020-12-11 ENCOUNTER — PATIENT OUTREACH (OUTPATIENT)
Dept: CASE MANAGEMENT | Age: 80
End: 2020-12-11

## 2020-12-11 ENCOUNTER — TELEPHONE (OUTPATIENT)
Dept: CARDIOLOGY CLINIC | Age: 80
End: 2020-12-11

## 2020-12-11 RX ORDER — CLOPIDOGREL BISULFATE 75 MG/1
TABLET ORAL
Qty: 34 TAB | Refills: 6 | Status: SHIPPED | OUTPATIENT
Start: 2020-12-11 | End: 2021-02-16 | Stop reason: SDUPTHER

## 2020-12-11 NOTE — TELEPHONE ENCOUNTER
Patient was recently in hospital and had a cath by Dr Thuy Alejandro , was discharged with Christine Porter and now it is costly copay of 200 . Patient has one pill left and is going out of town tomorrow Verbal order and read back per Dr Taylor Otoole in absence of Dr Crissy Omer Ok to switch to Plavix , will need to take Plavix 300 mg on first day and then 75 mg daily thereafter

## 2020-12-11 NOTE — PROGRESS NOTES
Patient has graduated from the Transitions of Care Coordination  program on 12/11/2020. Patient's symptoms are stable at this time. Patient/family has the ability to self-manage. Care management goals have been completed at this time. No further care transitions nurse follow up scheduled. Goals Addressed                 This Visit's Progress     Prevent complications post hospitalization. 1. CTN will monitor X 4 weeks    2. Ensure provider appt is scheduled within 7 days post-discharge 11/12/2020 Patient stated that he missed his appt. He will call to reschedule. Rescheduled to 11/17/2020 at 9:20 AM    3. Confirm patient attended post-discharge provider apt 11/19/2020 Patient saw  11/17/2020    4. Complete post-visit call to confirm attendance and update care needs  11/12/2020 Patient stated that he is feeling good. No care needs at present. 11/19/2020 Patient continues to do well. No care needs at present. 11/28/2020 Patient doing well. No care needs 12/11/2020 Patient continues to do well. No care needs noted    5. Review/educate common or potential \"red flags\" of condition worsening 11/12/2020 Reviewed signs and symptoms of fatigue, weakness, shortness of breath, light headed, chest pain, irregular heart beat or bluish color of hands and feet. Patient stated that he is aware of symptoms and when to seek medical attention. 6. Evaluate adherence to medications and priority barriers to resolve   11/12/2020 Patient has all meds and is taking as prescribed. 11/19/2020 Patient stated that he has all meds and is taking as directed. 11/28/2020 Patient continues to have all meds and is taking per doctor orders. 12/11/2020 Patient has all meds and is taking as he was told. 7. Instruct on adherence to medications as ordered and assess for therapeutic response and side-effects   11/12/2020 patient is aware of  why he takes each med and knows risks to look for and when to call doctor.  11/19/2020 Patient has no concerns about meds. 11/28/2020 No medication concerns today. 12/11/2020 No medication concerns. 8. Discuss and evaluate ADL performance. Provide recommendations on energy conservation, particularly related to transition home from an inpatient admission. 11/12/2020 patient is feeling good and he is doing well enough to be back to work today. 11/19/2020 Patient continues to do well. No complaints. 11/28/2020 No complaints. Doing well. Back to all activities. 12/11/2020 Patient is back to all normal activities. No complaints. Patient has care transitions nurse contact information for any further questions, concerns, or needs.   Patient's upcoming visits:    Future Appointments   Date Time Provider Kristian German   12/21/2020  8:40 AM [facility] NURSE [facility] Open[facility]   12/22/2020  8:20 AM Singh Juarez DO [facility] BS AMB   12/31/2020  9:35 AM [facility] NURSE VISIT [facility] BS AMB   1/4/2021  9:30 AM Donovan May MD [facility] OpenPM   1/14/2021  8:00 AM Brando Epps MD [facility] BS AMB   1/20/2021  9:15 AM [facility]   4/20/2021  9:20 AM Priyanka Kenyon MD [facility] BS AMB

## 2020-12-15 ENCOUNTER — TELEPHONE (OUTPATIENT)
Dept: CARDIAC REHAB | Age: 80
End: 2020-12-15

## 2020-12-15 NOTE — TELEPHONE ENCOUNTER
Cardiac Rehab called patient, but was unable to reach or leave a message on his home or cell numbers. Additional attempts at contact will be made. Thank you, Nidhi Norton

## 2020-12-16 NOTE — IP AVS SNAPSHOT
[address] [place] [id number] [phone] Patient: Julio C Woodall. MRN: [id number]:[id number] You are allergic to the following Allergen Reactions Albuterol Rash  
 seizure Influenza Virus Vaccine, Specific Other (comments) Percocet (Oxycodone-Acetaminophen) Other (comments) Pt denies Recent Documentation Height Weight BMI Smoking Status 1.689 m 86.2 kg 30.21 kg/m2 Former Smoker Emergency Contacts  (Rel.) Home Phone Work Phone Mobile Phone Eve Patel (Spouse) [phone] -- --  
 Rachel Hal (Spouse) [phone] -- -- About your hospitalization You were admitted on:  July 19, 2018 [facility] You were discharged on:  July 19, 2018 Why you were hospitalized Your primary diagnosis was:  Not on File Your Primary Care Physician (PCP) Primary Care Physician Office Phone Office Fax Roman Torres [phone] Follow-up Information None Appointments for Next 14 days 7/23/2018  9:20 AM  [facility] My Medications ASK your physician about these medications Instructions Each Dose to Equal  
 Morning Noon Evening Bedtime  
 amLODIPine 5 mg tablet Commonly known as:  Voncile [place] Your last dose was: Your next dose is: Take 1 Tab by mouth daily. 5 mg  
    
   
   
   
  
 aspirin 81 mg tablet Your last dose was: Your next dose is: Take 81 mg by mouth daily. Indications: MYOCARDIAL INFARCTION PREVENTION 81 mg  
    
   
   
   
  
 atorvastatin 20 mg tablet Commonly known as:  LIPITOR Your last dose was: Your next dose is:  TAKE 1 TABLET BY MOUTH EVERY NIGHT AT BEDTIME  
     
   
 [FreeTextEntry1] : These images and findings were reviewed and discussed with the patient.  It was recommended that the patient have an MRI of the lumbar spine to better evaluate for stenosis.  He is to return to the office to see Dr. Darren Monteiro with the images for review. CENTRUM SILVER PO Your last dose was: Your next dose is: Take 1 Tab by mouth daily. 1 Tab  
    
   
   
   
  
 glucosam-chondroit-C-manganese 035-760-55-3 mg Cap Your last dose was: Your next dose is: Take 1 Tab by mouth daily. 1 Tab  
    
   
   
   
  
 lisinopril 20 mg tablet Commonly known as:  Price Lacie Your last dose was: Your next dose is: Take  by mouth daily. metoprolol succinate 25 mg XL tablet Commonly known as:  TOPROL-XL Your last dose was: Your next dose is: TAKE 1 TABLET BY MOUTH DAILY  
     
   
   
   
  
 nitroglycerin 0.4 mg SL tablet Commonly known as:  NITROSTAT Your last dose was: Your next dose is: 0.4 mg by SubLINGual route every five (5) minutes as needed for Chest Pain. 0.4 mg  
    
   
   
   
  
 omeprazole 20 mg capsule Commonly known as:  PRILOSEC Your last dose was: Your next dose is: VITAMIN C 1,000 mg tablet Generic drug:  ascorbic acid (vitamin C) Your last dose was: Your next dose is: Take 1,000 mg by mouth daily. 1000 mg Discharge Instructions Post-Operative HDR Brachytherapy Instructions During the first few days you may have some bruising on the perineum (the place between your anus and your scrotum) or on the scrotum itself. This is caused by the needles (usually 20-30) which are inserted into the prostate. This will resolve on its own and usually does not cause any discomfort. For about a week after treatment, you may have some pain or swelling in the area between your scrotum and rectum, and your urine may be reddish-brown. Rarely a larger hematoma may form on the perineum and this will cause some discomfort with sitting.  This should be brought to the doctors attention, but it will resolve on its own. You can alternate between an ice pack and heat pack for 10 minutes interval to assist with the reduction of inflammation and pain to perineum. Side Effects Immediately post procedure: blood in the urine, bruising/tenderness/discoloration at the implant site, and/or swelling at the implant site. These symptoms should subside after a few days. Some patients will have trouble passing urine after the catheter is removed due to swelling in the prostate. You should call Dr. Guillermina Montes or go to Emergency Room if you cannot pass urine within 6 hours of catheter removal or any time if you are having trouble passing your urine. Other: The following side effects are most likely to occur over the first two months following the procedure: Frequency and/or urgency with urination, burning with urination, a weaker urine stream, as well as frequency and /or urgency of bowel movements. After the initial two months, you should notice a steady decline in these symptoms. Your symptoms should subside completely by the end of six month to a year. Diet:   
Regular, unless you are on a special diet for other reasons. Activity: 
  
Avoid heavy lifting or strenuous physical activity for the first two days after the procedure. At that time you may return to your normal activity level if the urine is clear and you feel fine. If the urine is still bloody you should rest and drink plenty of fluids until it is clear, and then you may resume normal activity. Avoid heavy lifting for 4 to 5 days. Avoid sitting on a hard seat (such as a bicycle) for 2 months. Avoid long periods of sitting, such as in a car or on an airplane without taking leg stretching 
breaks. Depending on the demands of your job, you may return to work any time during the week after the procedure, noting the precaution about prolonged sitting. You may return to a regular diet as tolerated following the procedure. Do not drink excessive amounts of fluids, as they can make side effects worse. You will experience a decrease in ejaculate following the procedures. This is normal, as the prostate gland is responsible for generating over 80% of the fluid disseminated during 
ejaculation. You will most likely experience a reddish color in your ejaculate for a few weeks following the procedure. This is normal and will improve as time 
passes, if it persists, see your doctor. Follow up Your follow up imaging will be at [address] [place] [address] Dr ricki [employer] 
on ___________at ___________ am/pm. 
 
Please call us if you have any questions: [phone] Radiation Therapy DISCHARGE SUMMARY from Nurse PATIENT INSTRUCTIONS: 
 
After general anesthesia or intravenous sedation, for 24 hours or while taking prescription Narcotics: · Limit your activities · Do not drive and operate hazardous machinery · Do not make important personal or business decisions · Do  not drink alcoholic beverages · If you have not urinated within 8 hours after discharge, please contact your surgeon on call. Report the following to your surgeon: 
· Excessive pain, swelling, redness or odor of or around the surgical area · Temperature over 100.5 · Nausea and vomiting lasting longer than 4 hours or if unable to take medications · Any signs of decreased circulation or nerve impairment to extremity: change in color, persistent  numbness, tingling, coldness or increase pain · Any questions What to do at Home: 
Recommended activity: Activity as tolerated and no driving for today, If you experience any of the following symptoms, please follow up with Emergency Department. *  Please give a list of your current medications to your Primary Care Provider.  
 
*  Please update this list whenever your medications are discontinued, doses are 
 changed, or new medications (including over-the-counter products) are added. *  Please carry medication information at all times in case of emergency situations. These are general instructions for a healthy lifestyle: No smoking/ No tobacco products/ Avoid exposure to second hand smoke Surgeon General's Warning:  Quitting smoking now greatly reduces serious risk to your health. Obesity, smoking, and sedentary lifestyle greatly increases your risk for illness A healthy diet, regular physical exercise & weight monitoring are important for maintaining a healthy lifestyle You may be retaining fluid if you have a history of heart failure or if you experience any of the following symptoms:  Weight gain of 3 pounds or more overnight or 5 pounds in a week, increased swelling in our hands or feet or shortness of breath while lying flat in bed. Please call your doctor as soon as you notice any of these symptoms; do not wait until your next office visit. Recognize signs and symptoms of STROKE: 
 
F-face looks uneven A-arms unable to move or move unevenly S-speech slurred or non-existent T-time-call 911 as soon as signs and symptoms begin-DO NOT go Back to bed or wait to see if you get better-TIME IS BRAIN. Warning Signs of HEART ATTACK Call 911 if you have these symptoms: 
? Chest discomfort. Most heart attacks involve discomfort in the center of the chest that lasts more than a few minutes, or that goes away and comes back. It can feel like uncomfortable pressure, squeezing, fullness, or pain. ? Discomfort in other areas of the upper body. Symptoms can include pain or discomfort in one or both arms, the back, neck, jaw, or stomach. ? Shortness of breath with or without chest discomfort. ? Other signs may include breaking out in a cold sweat, nausea, or lightheadedness. Don't wait more than five minutes to call [address]!  Fast action can save your life. Calling 911 is almost always the fastest way to get lifesaving treatment. Emergency Medical Services staff can begin treatment when they arrive  up to an hour sooner than if someone gets to the hospital by car. The discharge information has been reviewed with the patient and spouse. The patient and spouse verbalized understanding. Discharge medications reviewed with the patient and spouse and appropriate educational materials and side effects teaching were provided. ___________________________________________________________________________________________________________________________________ Discharge Instructions Attachments/References CIPROFLOXACIN (BY MOUTH) (ENGLISH) IBUPROFEN (BY MOUTH) (ENGLISH) TAMSULOSIN (BY MOUTH) (ENGLISH) URINARY RETENTION (ENGLISH) Discharge Orders None ACO Transitions of Care Introducing [employer] [address] offers a voluntary care coordination program to provide high quality service and care to [facility] fee-for-service beneficiaries. Dayne Noyolau was designed to help you enhance your health and well-being through the following services: ? Transitions of Care  support for individuals who are transitioning from one care setting to another (example: Hospital to home). ? Chronic and Complex Care Coordination  support for individuals and caregivers of those with serious or chronic illnesses or with more than one chronic (ongoing) condition and those who take a number of different medications. If you meet specific medical criteria, a [address] [place] [address] Rd may call you directly to coordinate your care with your primary care physician and your other care providers. For questions about the [facility] programs, please, contact your physicians office. For general questions or additional information about Accountable Care Organizations: Please visit www.medicare.gov/acos. html or call 1-800-MEDICARE (8-189.139.3264) TTY users should call 5-378.279.2228. Introducing [facility] SERVICES! [employer] introduces [employer] patient portal. Now you can access parts of your medical record, email your doctor's office, and request medication refills online. 1. In your internet browser, go to [email or web address] 2. Click on the First Time User? Click Here link in the Sign In box. You will see the New Member Sign Up page. 3. Enter your [employer] Access Code exactly as it appears below. You will not need to use this code after youve completed the sign-up process. If you do not sign up before the expiration date, you must request a new code. · [employer] Access Code: [email or web address] Expires: 10/17/2018  6:50 AM 
 
4. Enter the last four digits of your Social Security Number (xxxx) and Date of Birth (mm/dd/yyyy) as indicated and click Submit. You will be taken to the next sign-up page. 5. Create a [employer] ID. This will be your [employer] login ID and cannot be changed, so think of one that is secure and easy to remember. 6. Create a [employer] password. You can change your password at any time. 7. Enter your Password Reset Question and Answer. This can be used at a later time if you forget your password. 8. Enter your e-mail address. You will receive e-mail notification when new information is available in [address] 9. Click Sign Up. You can now view and download portions of your medical record. 10. Click the Download Summary menu link to download a portable copy of your medical information. If you have questions, please visit the Frequently Asked Questions section of the [employer] website. Remember, [employer] is NOT to be used for urgent needs. For medical emergencies, dial 911. Now available from your iPhone and Android! General Information Please provide this summary of care documentation to your next provider. Patient Signature:  ____________________________________________________________ Date:  ____________________________________________________________  
  
Daylin Moulding Provider Signature:  ____________________________________________________________ Date:  ____________________________________________________________

## 2020-12-22 ENCOUNTER — OFFICE VISIT (OUTPATIENT)
Dept: CARDIOLOGY CLINIC | Age: 80
End: 2020-12-22
Payer: MEDICARE

## 2020-12-22 VITALS
OXYGEN SATURATION: 98 % | DIASTOLIC BLOOD PRESSURE: 58 MMHG | BODY MASS INDEX: 29.15 KG/M2 | SYSTOLIC BLOOD PRESSURE: 140 MMHG | WEIGHT: 181.4 LBS | HEIGHT: 66 IN | HEART RATE: 49 BPM

## 2020-12-22 DIAGNOSIS — R09.89 BILATERAL CAROTID BRUITS: ICD-10-CM

## 2020-12-22 DIAGNOSIS — Z95.1 S/P CABG X 3: ICD-10-CM

## 2020-12-22 DIAGNOSIS — I25.10 CORONARY ARTERY DISEASE INVOLVING NATIVE CORONARY ARTERY OF NATIVE HEART WITHOUT ANGINA PECTORIS: Primary | ICD-10-CM

## 2020-12-22 DIAGNOSIS — R06.02 SOB (SHORTNESS OF BREATH): ICD-10-CM

## 2020-12-22 DIAGNOSIS — I10 ESSENTIAL HYPERTENSION: ICD-10-CM

## 2020-12-22 DIAGNOSIS — G47.30 SLEEP APNEA, UNSPECIFIED TYPE: ICD-10-CM

## 2020-12-22 PROCEDURE — G8753 SYS BP > OR = 140: HCPCS | Performed by: INTERNAL MEDICINE

## 2020-12-22 PROCEDURE — G8754 DIAS BP LESS 90: HCPCS | Performed by: INTERNAL MEDICINE

## 2020-12-22 PROCEDURE — G8417 CALC BMI ABV UP PARAM F/U: HCPCS | Performed by: INTERNAL MEDICINE

## 2020-12-22 PROCEDURE — G8536 NO DOC ELDER MAL SCRN: HCPCS | Performed by: INTERNAL MEDICINE

## 2020-12-22 PROCEDURE — 93000 ELECTROCARDIOGRAM COMPLETE: CPT | Performed by: INTERNAL MEDICINE

## 2020-12-22 PROCEDURE — 1101F PT FALLS ASSESS-DOCD LE1/YR: CPT | Performed by: INTERNAL MEDICINE

## 2020-12-22 PROCEDURE — G8510 SCR DEP NEG, NO PLAN REQD: HCPCS | Performed by: INTERNAL MEDICINE

## 2020-12-22 PROCEDURE — G8427 DOCREV CUR MEDS BY ELIG CLIN: HCPCS | Performed by: INTERNAL MEDICINE

## 2020-12-22 PROCEDURE — 99214 OFFICE O/P EST MOD 30 MIN: CPT | Performed by: INTERNAL MEDICINE

## 2020-12-22 NOTE — PROGRESS NOTES
HPI:   I saw Agnieszka Francisco in my office today in cardiovascular evaluation regarding his chronic coronary artery disease after recent cardiac catheterization.  Mr. Patel is a very pleasant 80 y. o. white male with history of gastroesophageal reflux disease with Pederson's esophagus, asthma, obstructive sleep apnea, cancer of the prostate, significant coronary artery disease with development of exertional angina over a 6-month period of time prompting a nuclear myocardial perfusion study ordered by Dr. Huang Wilbrun in April 2014 which was slightly abnormal and ultimately with his symptom complex prompted a cardiac catheterization by Dr. Kyler Austin demonstrating severe three vessel coronary disease with subsequent coronary artery bypass grafting surgery x 3 by Dr. Kenan Guzman with the following bypasses:       1. Left internal mammary artery to the LAD. 2. SVG to the second diagonal coronary artery off of the left anterior descending and posterior descending branch of the distal right coronary artery.      He did reasonably well postop. He did have a little sinus tachycardia initially for which we put him on beta blockers. Since the beta blockers caused fatigue with Lopressor 50 mg twice a day, it was decreased to 25 mg twice a day and then ultimately switched to Toprol XL 25 mg daily. When I saw him back on October 19, 2020 he was complaining of increased shortness of breath with exertion and I was concerned that this might be an anginal equivalent. He subsequently had a nuclear myocardial perfusion study on October 30, 2020 which was abnormal and suggested a moderate amount of reversible ischemia in the inferior wall to suggest the right coronary lesion and this prompted a cardiac catheterization which was completed by Dr. Kyler Austin on November 9, 2020 with the following findings:    · Normal LV function with EF 60%. · LIMA to LAD is patent. · SVG to second diagonal branch has proximal 30-40% lesion.  The sequential segment to RPDA is occluded. · Left main is patent. It is moderate caliber. · LAD has proximal 95% stenosis noted. · Circumflex has diffuse 30% stenosis noted. · Super dominant RCA has ostial/proximal 95% stenosis followed by mid segment 90-95% stenosis followed by distal segment long 85-95% stenosis. RPDA has proximal 95% stenosis. No competitive flow into the SVG is noted. · The 4 separate RCA lesions were stented using LENNOX ranging in size from 3.25 mm at the ostium and 2.25 mm in the RPDA. He did very well with that procedure and comes in today relating that his shortness of breath issues have completely resolved and he really denies any cardiovascular symptomatology at this time. Encounter Diagnoses   Name Primary?  Coronary artery disease involving native coronary artery of native heart without angina pectoris Yes    S/P CABG x 3     SOB (shortness of breath) on exertion resolved status post stenting of the right coronary artery on November 9, 2020.  Essential hypertension     Bilateral carotid bruits     Sleep apnea, unspecified type        Discussion: This patient appears to be doing about as well as we could expect and I have no recommendations for change at this time. He has had complete resolution of his shortness of breath with exertion following his stenting procedure of the right coronary artery as described above and seems to be doing quite well from a cardiovascular vantage at this time. His latest lipid profile was completed in July 30, 2020 and was really excellent with a total cholesterol of 123, triglycerides of 112, HDL of 44, LDL of 56.6, and VLDL of 22.4 which I think is good control on Lipitor 20 mg daily. His blood pressure is borderline elevated today at 140/88, but his primary care physician to [facility] has been been adjusting his blood medications recently and I would leave management of that problem to him.      This gentleman does have carotid disease and we are to be checking his carotid Dopplers again in March 2021 and since the patient is otherwise doing well I am simply can have him follow-up in about 6 months with my associate Dr. Javier Sanchez who did his recent stenting procedure since I am going to be retiring at the end of this month.     PCP: Tiffanie Garrison MD       Past Medical History:   Diagnosis Date    Allergic rhinitis     Asthma     mild obst disease on pfts Dr. Belcher Reasons Pederson's esophagus     Dr. Iris Holley; last 8/17, for HALO    BPH with obstruction/lower urinary tract symptoms     CABG x 3 04/24/2014    LIMA to LAD, and saphenous vein grafts to the second diagonal of the LAD, and to the posterior descending branch of the RCA, Dr. Maryann Odom, 4/23/14.  Cardiac catheterization 04/18/2014    Superdominant RCA. mRCA 90%. pRPDA 60%. LM (modest sized) patent.  p-mLAD 85%. CX < 50%. LVEDP 17 mmHg. EF 60%. No RWMA. CABG recommended.  Carotid stenosis 12/2014    BICA 50-69%; no change 2016, 4/16, 2/17, 10/18, 7/19, 7/20    Chronic obstructive pulmonary disease ([address]     Colon adenoma 08/2017    Dr Myrtle Piper Colon polyps 2007    Dr. Myrtle Piper Erectile dysfunction     GERD (gastroesophageal reflux disease)     by EGD 2007 Dr. Myrtle Piper H/O cardiovascular stress test     Mod ischemia in lg area of anterior wall, anteroapex, anteroseptum c/w high-grade prox LAD obstruction. Mod partially transient defect likely ischemia w/very sm infarction in RCA. TID 1.31 suggests 3-vessel CAD. Mod distal anterior hypk. Mild-mod anteroseptal, inferoseptal, anterolateral hypk. EF 46% (4/14); NST showed ef 63%, TID 1.08, mod basal/inf reversible defect c/w isch (10/20)    H/O echocardiogram 06/24/2008    EF 70%. Mild conc LVH. Gr 2 DDfx. Mild LAE. Mild MR. PASP 38 mmHg.       Hearing loss     Hypertension     IFG (impaired fasting glucose) 5/14    Multinodular goiter     2014; 2015; 4/16    Obesity     peak weight 202 lbs, bmi 31.4 from 4/14    BLAIR (obstructive sleep apnea)     CPAP not used    Osteoarthritis     hands    Prostate cancer ([facility] 06/2010    Dr. Ambika Brooks [id number] [place] 3+3, 1/9 cores up to 10% 1 core; cryo Oct 2012 psa went from 7/1 to torsten 1.78    Prostate cancer ([facility] [address] 04/2018    Dr Antoinette Tucker stage 2B [id number], [place] 3+4, P,10, G2; brachytherapy Dr Winnie Pena Pulmonary nodule 2014    no change 2015, 3/17    S/P cardiac cath 10/2020    Dr Aydee Mendez patent LIMA toLAD and SVG to DMs, occluded sequential to RPDA; 4 separate lesions RCA which were stented       Past Surgical History:   Procedure Laterality Date    CARDIAC SURG PROCEDURE UNLIST  11/06    thallium negative    CARDIAC SURG PROCEDURE UNLIST  04/2014    3V CABG; Dr Queen Alan LEAVITT to LAD, SVG to DM2 and sequential to RPDA    CHEST SURGERY PROCEDURE UNLISTED  2007    mild obst disease on PFTs Dr. Caren Sanderson N/A 7/27/2017    Dr. Jesus Fuentes 2007 negative; 8/17 adenoma     Geronimo Leas      Dr. Villasenor Spine    HX CATARACT REMOVAL  11/2014    Dr Esvin Castorena HX CHOLECYSTECTOMY  2004   Ruslan Pretzel HX TURP      x2 Dr. Tolu Abdi HX UROLOGICAL  10/2012    [facility], Dr. Phong Balderrama, Cystoscopy and dilation of stricture, cryoablation of prostate       Current Outpatient Medications   Medication Sig    clopidogreL (Plavix) 75 mg tab 4 tablets on first day then take 1 tablet daily thereafter    benazepriL (LOTENSIN) 10 mg tablet Take 1 Tab by mouth daily.  aspirin 81 mg chewable tablet Take 81 mg by mouth daily.  cholecalciferol (Vitamin D3) (1000 Units /25 mcg) tablet Take  by mouth daily.  cyanocobalamin, vitamin B-12, 5,000 mcg cap Take  by mouth.  nitroglycerin (NITROSTAT) 0.4 mg SL tablet 1 Tab by SubLINGual route every five (5) minutes as needed for Chest Pain.     metoprolol succinate (TOPROL-XL) 25 mg XL tablet TAKE ONE TABLET BY MOUTH DAILY    atorvastatin (LIPITOR) 20 mg tablet TAKE ONE TABLET BY MOUTH EVERY NIGHT AT BEDTIME    levalbuterol tartrate (XOPENEX) 45 mcg/actuation inhaler Take 2 Puffs by inhalation every six (6) hours as needed for Wheezing.  amLODIPine (NORVASC) 10 mg tablet TAKE ONE TABLET BY MOUTH DAILY    omeprazole (PRILOSEC) 20 mg capsule     glucosam-chondroit-C-manganese [id number] mg cap Take 1 Tab by mouth daily.  ascorbic acid (VITAMIN C) 1,000 mg tablet Take 1,000 mg by mouth daily.  MULTIVITAMIN W-MINERALS/LUTEIN (CENTRUM SILVER PO) Take 1 Tab by mouth daily. No current facility-administered medications for this visit. Allergies   Allergen Reactions    Albuterol Other (comments)    Influenza Virus Vaccine, Specific Other (comments)       Social History :  Social History     Tobacco Use    Smoking status: Former Smoker     Packs/day: 0.50     Years: 4.00     Pack years: 2.00     Quit date: 1957     Years since quittin.0    Smokeless tobacco: Never Used    Tobacco comment: Quit in    Substance Use Topics    Alcohol use: No        Family History: family history includes Diabetes in his sister; Heart Disease in his father; Hypertension in his mother; Stroke in his mother. Review of Systems:  Constitutional: Positive for weight loss. Negative for chills, fever and malaise/fatigue. Respiratory: Negative. Cardiovascular: Negative. Gastrointestinal: Positive for heartburn. Negative for abdominal pain, blood in stool, constipation, diarrhea, nausea and vomiting. Musculoskeletal: Positive for joint pain. Negative for back pain, falls and neck pain. Neurological: Negative for dizziness. Physical Exam:    The patient is a cooperative, alert, well developed, well nourished 78 y.o.  male who is in no acute distress at the time of the examination.   Visit Vitals  BP (!) 140/58 (BP 1 Location: Left arm, BP Patient Position: Sitting)   Pulse (!) 49   Ht 5' 6\" (1.676 m)   Wt 82.3 kg (181 lb 6.4 oz)   SpO2 98%   BMI 29.28 kg/m² HEENT: Conjuctiva white, mucosa moist, no pallor or cyanosis. NECK: Supple without masses, tenderness or thyromegaly. There was no jugular venous distention. Carotid are full bilaterally with bilateral bruits right greater than left vs. radiation of murmur to the neck. CHEST: Symmetrical with good excursion. LUNGS: Clear to auscultation in all fields. HEART: The apex is not displaced. There were no lifts, thrills or heaves. There is a normal S1 and S2. There is a grade I/VI PILAR along the LSB with radiation to the base and lower neck without appreciable diastolic murmurs, rubs, clicks, or gallops auscultated. ABDOMEN: Soft without masses, tenderness or organomegaly. EXTREMITIES: Full peripheral pulses with trace peripheral edema. Review of Data: See PMH and Cardiology and Imaging sections for cardiac testing  Lab Results   Component Value Date/Time    Cholesterol, total 123 07/30/2020 08:41 AM    HDL Cholesterol 44 07/30/2020 08:41 AM    LDL, calculated 56.6 07/30/2020 08:41 AM    Triglyceride 112 07/30/2020 08:41 AM    CHOL/HDL Ratio 2.8 07/30/2020 08:41 AM       Results for orders placed or performed in visit on 12/22/20   AMB POC EKG ROUTINE W/ 12 LEADS, INTER & REP     Status: None    Narrative    Sinus bradycardia, rate 49. First-degree AV block. RSR prime normal variant in V2. This EKG is otherwise within normal limits and similar to the EKG of October 19, 2020. Nidia Dow D.O., F.A.C.C. [facility]  [facility] [place]  [address] [place], [place] [address]. [address] [facility]    PLEASE NOTE:  This document has been produced using voice recognition software. Unrecognized errors in transcription may be present.

## 2020-12-22 NOTE — PROGRESS NOTES
Review of Systems Constitutional: Positive for weight loss. Negative for chills, fever and malaise/fatigue. Respiratory: Negative. Cardiovascular: Negative. Gastrointestinal: Positive for heartburn. Negative for abdominal pain, blood in stool, constipation, diarrhea, nausea and vomiting. Musculoskeletal: Positive for joint pain. Negative for back pain, falls and neck pain. Neurological: Negative for dizziness.

## 2020-12-22 NOTE — PROGRESS NOTES
Juan A Antony presents today for   Chief Complaint   Patient presents with    Follow-up     post cath        Juan A Antony preferred language for health care discussion is english/other. Is someone accompanying this pt? no    Is the patient using any DME equipment during [address]? no    Depression Screening:  3 most recent PHQ Screens 12/22/2020   Little interest or pleasure in doing things Not at all   Feeling down, depressed, irritable, or hopeless Not at all   Total Score PHQ 2 0       Learning Assessment:  Learning Assessment 5/8/2019   PRIMARY LEARNER Patient   HIGHEST LEVEL OF EDUCATION - PRIMARY LEARNER  [place] [address] PRIMARY LEARNER HEARING   CO-LEARNER CAREGIVER No   PRIMARY LANGUAGE ENGLISH   LEARNER PREFERENCE PRIMARY DEMONSTRATION   ANSWERED BY patient   RELATIONSHIP SELF       Abuse Screening:  Abuse Screening Questionnaire 12/22/2020   Do you ever feel afraid of your partner? N   Are you in a relationship with someone who physically or mentally threatens you? N   Is it safe for you to go home? Y       Fall Risk  Fall Risk Assessment, last 12 mths 12/22/2020   Able to walk? Yes   Fall in past 12 months? No       Pt currently taking Anticoagulant therapy? no    Coordination of Care:  1. Have you been to the ER, urgent care clinic since your last visit? Hospitalized since your last visit? yes    2. Have you seen or consulted any other health care providers outside of the [address] [place] [address] since your last visit? Include any pap smears or colon screening.  no

## 2020-12-31 ENCOUNTER — HOSPITAL ENCOUNTER (OUTPATIENT)
Dept: LAB | Age: 80
Discharge: HOME | End: 2020-12-31
Payer: MEDICARE

## 2020-12-31 ENCOUNTER — APPOINTMENT (OUTPATIENT)
Dept: INTERNAL MEDICINE CLINIC | Age: 80
End: 2020-12-31

## 2020-12-31 DIAGNOSIS — R73.01 IFG (IMPAIRED FASTING GLUCOSE): ICD-10-CM

## 2020-12-31 DIAGNOSIS — E78.5 HYPERLIPIDEMIA, UNSPECIFIED HYPERLIPIDEMIA TYPE: ICD-10-CM

## 2020-12-31 LAB
ANION GAP SERPL CALC-SCNC: 6 MMOL/L (ref 3–18)
BUN SERPL-MCNC: 14 MG/DL (ref 7–18)
BUN/CREAT SERPL: 14 (ref 12–20)
CALCIUM SERPL-MCNC: 9.5 MG/DL (ref 8.5–10.1)
CHLORIDE SERPL-SCNC: 109 MMOL/L (ref 100–111)
CHOLEST SERPL-MCNC: 128 MG/DL
CO2 SERPL-SCNC: 27 MMOL/L (ref 21–32)
CREAT SERPL-MCNC: 1.01 MG/DL (ref 0.6–1.3)
GLUCOSE SERPL-MCNC: 94 MG/DL (ref 74–99)
HBA1C MFR BLD: 5.6 % (ref 4.2–5.6)
HDLC SERPL-MCNC: 49 MG/DL (ref 40–60)
HDLC SERPL: 2.6 {RATIO} (ref 0–5)
LDLC SERPL CALC-MCNC: 57.4 MG/DL (ref 0–100)
LIPID PROFILE,FLP: NORMAL
POTASSIUM SERPL-SCNC: 4.5 MMOL/L (ref 3.5–5.5)
SODIUM SERPL-SCNC: 142 MMOL/L (ref 136–145)
TRIGL SERPL-MCNC: 108 MG/DL (ref ?–150)
VLDLC SERPL CALC-MCNC: 21.6 MG/DL

## 2020-12-31 PROCEDURE — 80061 LIPID PANEL: CPT

## 2020-12-31 PROCEDURE — 80048 BASIC METABOLIC PNL TOTAL CA: CPT

## 2020-12-31 PROCEDURE — 83036 HEMOGLOBIN GLYCOSYLATED A1C: CPT

## 2021-01-12 ENCOUNTER — HOSPITAL ENCOUNTER (EMERGENCY)
Age: 81
Discharge: HOME | End: 2021-01-12
Attending: EMERGENCY MEDICINE
Payer: MEDICARE

## 2021-01-12 VITALS
OXYGEN SATURATION: 96 % | WEIGHT: 170 LBS | TEMPERATURE: 101.4 F | HEIGHT: 67 IN | HEART RATE: 89 BPM | SYSTOLIC BLOOD PRESSURE: 137 MMHG | RESPIRATION RATE: 20 BRPM | BODY MASS INDEX: 26.68 KG/M2 | DIASTOLIC BLOOD PRESSURE: 79 MMHG

## 2021-01-12 DIAGNOSIS — R50.9 FEVER IN ADULT: Primary | ICD-10-CM

## 2021-01-12 DIAGNOSIS — J06.9 ACUTE URI: ICD-10-CM

## 2021-01-12 DIAGNOSIS — Z20.822 ENCOUNTER FOR LABORATORY TESTING FOR COVID-19 VIRUS: ICD-10-CM

## 2021-01-12 LAB
FLUAV AG NPH QL IA: NEGATIVE
FLUBV AG NOSE QL IA: NEGATIVE

## 2021-01-12 PROCEDURE — 87804 INFLUENZA ASSAY W/OPTIC: CPT

## 2021-01-12 PROCEDURE — 99283 EMERGENCY DEPT VISIT LOW MDM: CPT

## 2021-01-12 PROCEDURE — 87635 SARS-COV-2 COVID-19 AMP PRB: CPT

## 2021-01-12 PROCEDURE — 99284 EMERGENCY DEPT VISIT MOD MDM: CPT

## 2021-01-12 PROCEDURE — 74011250637 HC RX REV CODE- 250/637: Performed by: EMERGENCY MEDICINE

## 2021-01-12 RX ORDER — AZITHROMYCIN 250 MG/1
TABLET, FILM COATED ORAL
Qty: 6 TAB | Refills: 0 | Status: SHIPPED | OUTPATIENT
Start: 2021-01-12 | End: 2021-01-25

## 2021-01-12 RX ORDER — ACETAMINOPHEN 500 MG
1000 TABLET ORAL
Status: COMPLETED | OUTPATIENT
Start: 2021-01-12 | End: 2021-01-12

## 2021-01-12 RX ADMIN — ACETAMINOPHEN 1000 MG: 500 TABLET ORAL at 14:24

## 2021-01-12 NOTE — LETTER
[address] [place] [address] Dr [facility] EMER[facility] DEPT 
[address] [facility] [id number] 
[phone] Work/School Note Date: 1/12/2021 To Whom It May concern: 
 
Brandi Rucker. was seen and treated today in the emergency room by the following provider(s): 
Attending Provider: Radha Steve MD. Brandi Rucker. Special Instructions: Patient is being sent home to quarantine until the results of his COVID-19 test are back Sincerely, 
 
 
 
 
Madelyn Mckeon MD

## 2021-01-12 NOTE — ED PROVIDER NOTES
HPI patient is brought in emergency room by his wife and they both expressed concern for possible coronavirus infection. Patient works at a local [employer] and says he has been wearing a mask but he is not aware of if he has been around a COVID-19 positive person recently. Patient complains of 2 to 3 days of malaise weakness and body aches. He also complains of some stuffy nose and congestion. He denies any chest pain or shortness of breath or cough. Patient was unaware that he was running a fever until he came to emergency room today. No other complaints given at this time. Past Medical History:   Diagnosis Date    Allergic rhinitis     Asthma     mild obst disease on pfts Dr. Franck Castanon Pederson's esophagus     Dr. Anne Waller; last 8/17, for HALO    BPH with obstruction/lower urinary tract symptoms     CABG x 3 04/24/2014    LIMA to LAD, and saphenous vein grafts to the second diagonal of the LAD, and to the posterior descending branch of the RCA, Dr. Oral Garrett, 4/23/14.  Cardiac catheterization 04/18/2014    Superdominant RCA. mRCA 90%. pRPDA 60%. LM (modest sized) patent.  p-mLAD 85%. CX < 50%. LVEDP 17 mmHg. EF 60%. No RWMA. CABG recommended.  Carotid stenosis 12/2014    BICA 50-69%; no change 2016, 4/16, 2/17, 10/18, 7/19, 7/20    Chronic obstructive pulmonary disease ([address]     Colon adenoma 08/2017    Dr Mark Aguilar Colon polyps 2007    Dr. Mark Aguilar Erectile dysfunction     GERD (gastroesophageal reflux disease)     by EGD 2007 Dr. Mark Aguilar H/O cardiovascular stress test     Mod ischemia in lg area of anterior wall, anteroapex, anteroseptum c/w high-grade prox LAD obstruction. Mod partially transient defect likely ischemia w/very sm infarction in RCA. TID 1.31 suggests 3-vessel CAD. Mod distal anterior hypk. Mild-mod anteroseptal, inferoseptal, anterolateral hypk.   EF 46% (4/14); NST showed ef 63%, TID 1.08, mod basal/inf reversible defect c/w isch (10/20)    H/O echocardiogram 06/24/2008    EF 70%. Mild conc LVH. Gr 2 DDfx. Mild LAE. Mild MR. PASP 38 mmHg.       Hearing loss     Hypertension     IFG (impaired fasting glucose) 5/14    Multinodular goiter     2014; 2015; 4/16    Obesity     peak weight 202 lbs, bmi 31.4 from 4/14    BLAIR (obstructive sleep apnea)     CPAP not used    Osteoarthritis     hands    Prostate cancer ([facility] [address] 06/2010    Dr. Romel Carlos [id number] [place] 3+3, 1/9 cores up to 10% 1 core; cryo Oct 2012 psa went from 7/1 to torsten 1.78    Prostate cancer ([facility] [address] 04/2018    Dr Vivian Mathew stage 2B [id number], [place] 3+4, P,10, G2; brachytherapy Dr Jason Olivia Pulmonary nodule 2014    no change 2015, 3/17    S/P cardiac cath 10/2020    Dr Sulaiman Lincoln patent LIMA toLAD and SVG to DMs, occluded sequential to RPDA; 4 separate lesions RCA which were stented       Past Surgical History:   Procedure Laterality Date    COLONOSCOPY N/A 7/27/2017    Dr. Gary Smart 2007 negative; 8/17 adenoma     HX Edmund Shelly Roberson    HX CATARACT REMOVAL  11/2014    Dr Buddy Pena HX CHOLECYSTECTOMY  2004   Tammy Likens HX TURP      x2 Dr. Carissa Bañuelos HX UROLOGICAL  10/2012    [facility], Dr. Connie Quintanilla, Cystoscopy and dilation of stricture, cryoablation of prostate    [place] CARDIAC SURG PROCEDURE UNLIST  11/06    thallium negative    [place] CARDIAC SURG PROCEDURE UNLIST  04/2014    3V CABG; Dr Maria Esther CAA to LAD, SVG to DM2 and sequential to RPDA    [place] CHEST SURGERY PROCEDURE UNLISTED  2007    mild obst disease on PFTs Dr. Dalila Pires         Family History:   Problem Relation Age of Onset    Heart Disease Father     Stroke Mother     Hypertension Mother     Diabetes Sister        Social History     Socioeconomic History    Marital status:      Spouse name: Not on file    Number of children: 3    Years of education: Not on file    Highest education level: Not on file   Occupational History    Occupation: ret engr [employer]     Employer: RETIRED   Social Needs    Financial resource strain: Not on file    Food insecurity     Worry: Not on file     Inability: Not on file    Transportation needs     Medical: Not on file     Non-medical: Not on file   Tobacco Use    Smoking status: Former Smoker     Packs/day: 0.50     Years: 4.00     Pack years: 2.00     Quit date: 1957     Years since quittin.0    Smokeless tobacco: Never Used    Tobacco comment: Quit in    Substance and Sexual Activity    Alcohol use: No    Drug use: No    Sexual activity: Not on file   Lifestyle    Physical activity     Days per week: Not on file     Minutes per session: Not on file    Stress: Not on file   Relationships    Social connections     Talks on phone: Not on file     Gets together: Not on file     Attends Confucianism service: Not on file     Active member of club or organization: Not on file     Attends meetings of clubs or organizations: Not on file     Relationship status: Not on file    Intimate partner violence     Fear of current or ex partner: Not on file     Emotionally abused: Not on file     Physically abused: Not on file     Forced sexual activity: Not on file   Other Topics Concern    Not on file   Social History Narrative    Not on file         ALLERGIES: Albuterol and Influenza virus vaccine, specific    Review of Systems   Constitutional: Positive for fatigue and fever. HENT: Positive for congestion. Eyes: Negative. Respiratory: Negative. Cardiovascular: Negative. Gastrointestinal: Negative. Genitourinary: Negative. Musculoskeletal: Negative. Neurological: Negative. Psychiatric/Behavioral: Negative. Vitals:    21 1356   BP: (!) 141/67   Pulse: 93   Resp: 24   Temp: (!) 102.9 °F (39.4 °C)   SpO2: 94%   Weight: 77.1 kg (170 lb)   Height: 5' 7\" (1.702 m)            Physical Exam  Vitals signs and nursing note reviewed. Constitutional:       Appearance: He is well-developed. HENT:      Head: Normocephalic and atraumatic. Nose: Nose normal.      Mouth/Throat:      Mouth: Mucous membranes are moist.   Eyes:      Pupils: Pupils are equal, round, and reactive to light. Neck:      Musculoskeletal: Neck supple. Cardiovascular:      Rate and Rhythm: Normal rate and regular rhythm. Pulmonary:      Effort: Pulmonary effort is normal.      Breath sounds: Normal breath sounds. Abdominal:      Palpations: Abdomen is soft. Musculoskeletal: Normal range of motion. Skin:     General: Skin is warm and dry. Neurological:      Mental Status: He is alert and oriented to person, place, and time. Psychiatric:         Mood and Affect: Mood normal.          MDM  Number of Diagnoses or Management Options  Diagnosis management comments: I have discussed with the patient and his wife that I suspect COVID-19 and we will be testing him for this today. They agree to go home and quarantine together until the results of his testing are back. Additionally, they agree to return should symptoms change or worsen.          Procedures

## 2021-01-13 ENCOUNTER — PATIENT OUTREACH (OUTPATIENT)
Dept: CASE MANAGEMENT | Age: 81
End: 2021-01-13

## 2021-01-13 NOTE — PROGRESS NOTES
Patient contacted regarding [id number] suspect. Discussed COVID-19 related testing which was pending at this time. Test results were pending. Patient informed of results, if available? pending     LPN Care Coordinator contacted the patient by telephone to perform post discharge assessment. Call within 2 business days of discharge: Yes Verified name and  with patient as identifiers. Provided introduction to self, and explanation of the CTN/ACM/LPN role, and reason for call due to risk factors for infection and/or exposure to COVID-19. Symptoms reviewed with patient who verbalized the following symptoms: fatigue, no new symptoms and no worsening symptoms      Due to no new or worsening symptoms encounter was not routed to provider for escalation. Discussed follow-up appointments. If no appointment was previously scheduled, appointment scheduling offered:  [facility] follow up appointment(s):   Future Appointments   Date Time Provider Kristian German   2021  9:15 AM [facility] RADIATION ONCOLOGY [facility]   2/15/2021 11:00 AM HBV VASCULAR LAB 1 [facility]   3/9/2021  8:20 AM Ezequiel Melgar MD [facility] BS AMB   2021  8:40 AM [facility] NURSE Sonali   1/3/2022  8:40 AM AL[facility]     Non-[facility] follow up appointment(s): n/a       Patient has following risk factors of: immunocompromised and htn. CTN/ACM/LPN reviewed discharge instructions, medical action plan and red flags such as increased shortness of breath, increasing fever and signs of decompensation with patient who verbalized understanding. Discussed exposure protocols and quarantine with CDC Guidelines What to do if you are sick with coronavirus disease .  Patient was given an opportunity for questions and concerns. The patient agrees to contact the local health department [facility] [address]  ([phone] and PCP office for questions related to their healthcare.  CTN/ACM/LPN provided contact information for future needs. Reviewed and educated patient on any new and changed medications related to discharge diagnosis     Patient/family/caregiver given information for [facility] Loop and agrees to enroll decline  Patient's preferred e-mail: n/a   Patient's preferred phone number: n/a  Based on Loop alert triggers, patient will be contacted by nurse care manager for worsening symptoms. Plan for follow-up call in 1-2 days based on severity of symptoms and risk factors.

## 2021-01-14 ENCOUNTER — PATIENT OUTREACH (OUTPATIENT)
Dept: CASE MANAGEMENT | Age: 81
End: 2021-01-14

## 2021-01-14 LAB — SARS-COV-2, COV2NT: NOT DETECTED

## 2021-01-14 NOTE — PROGRESS NOTES
Date/Time:  1/14/2021 3:04 PM   Call within 2 business days of discharge: Yes   Attempted to reach patient by telephone. Unable to leave HIPPA compliant message requesting a return call. Will attempt to reach patient again. Covid test negative.

## 2021-01-19 NOTE — PROGRESS NOTES
78 y.o. male who presents for evaluation. Dr Rhiannon Alberto is now following the prostatic issues     Continues to work 4days a week at [employer].  we added benazepril in mid Nov and he's getting readings in the 120s over 60-70s at home. cmp ok in Dec as below. He's nopt sure yet who is taking over for Dr Crystal Jacques is treating the Pederson's with dilation and RFA per Dr German Wall although he will be changing providers with the former's official [facility]     Denies polyuria, polydipsia, nocturia, vision change. Not checking sugars at this time.       No sx referable to the thyroid. He was in the ER earlier in the month with concerns about Covid, this did come back negative, along with flu testing, he was sent home on Z-Bill. Everything has resolved from that point of view. Lastly, he is asking for a letter to be sent to [facility] where he is requesting that he be allowed some accommodations for his persistent right knee issues    LAST MEDICARE WELLNESS EXAM: 8/12/15, 2/22/17, 4/12/18, 5/8/19, 11/17/20    Past Medical History:   Diagnosis Date    Allergic rhinitis     Asthma     mild obst disease on pfts Dr. Nikolas Catherine Pederson's esophagus     Dr. Sandro Stapleton; last 8/17, for HALO    BPH with obstruction/lower urinary tract symptoms     CABG x 3 04/24/2014    LIMA to LAD, and saphenous vein grafts to the second diagonal of the LAD, and to the posterior descending branch of the RCA, Dr. Ariadna Rebolledo, 4/23/14.  Cardiac catheterization 04/18/2014    Superdominant RCA. mRCA 90%. pRPDA 60%. LM (modest sized) patent.  p-mLAD 85%. CX < 50%. LVEDP 17 mmHg. EF 60%. No RWMA. CABG recommended.     Carotid stenosis 12/2014    BICA 50-69%; no change 2016, 4/16, 2/17, 10/18, 7/19, 7/20    Chronic obstructive pulmonary disease ([facility]     Colon adenoma 08/2017    Dr Shellie Lucia Colon polyps 2007    Dr. Shellie Lucia Erectile dysfunction     GERD (gastroesophageal reflux disease)     by EGD 2007  Theta Brome H/O cardiovascular stress test     Mod ischemia in lg area of anterior wall, anteroapex, anteroseptum c/w high-grade prox LAD obstruction. Mod partially transient defect likely ischemia w/very sm infarction in RCA. TID 1.31 suggests 3-vessel CAD. Mod distal anterior hypk. Mild-mod anteroseptal, inferoseptal, anterolateral hypk. EF 46% (4/14); NST showed ef 63%, TID 1.08, mod basal/inf reversible defect c/w isch (10/20)    H/O echocardiogram 06/24/2008    EF 70%. Mild conc LVH. Gr 2 DDfx. Mild LAE. Mild MR. PASP 38 mmHg.       Hearing loss     Hypertension     IFG (impaired fasting glucose) 5/14    Multinodular goiter     2014; 2015; 4/16    Obesity     peak weight 202 lbs, bmi 31.4 from 4/14    BLAIR (obstructive sleep apnea)     CPAP not used    Osteoarthritis     hands    Prostate cancer ([facility] 06/2010    Dr. Sabino Martins [id number] [place] 3+3, 1/9 cores up to 10% 1 core; cryo Oct 2012 psa went from 7/1 to torsten 1.78    Prostate cancer ([facility] [address] 04/2018    Dr Palacios Second stage 2B [id number], Jj 3+4, P,10, G2; brachytherapy Dr Deandra Grant Pulmonary nodule 2014    no change 2015, 3/17    S/P cardiac cath 10/2020    Dr Jose Pearson patent LIMA toLAD and SVG to DMs, occluded sequential to RPDA; 4 separate lesions RCA which were stented     Past Surgical History:   Procedure Laterality Date    COLONOSCOPY N/A 7/27/2017    Dr. Hyacinth Pak 2007 negative; 8/17 adenoma     HX Opal Pnedleton HX CATARACT REMOVAL  11/2014    Dr Alverto Younger  2004    HX TONSILLECTOMY      HX TURP      x2 Dr. Freddy Kay HX UROLOGICAL  10/2012    [facility], Dr. Patrick Sellers, Cystoscopy and dilation of stricture, cryoablation of prostate    [place] CARDIAC SURG PROCEDURE UNLIST  11/06    thallium negative    [place] CARDIAC SURG PROCEDURE UNLIST  04/2014    3V CABG; Dr Prasad Hernadez LEAVITT to LAD, SVG to DM2 and sequential to [address]  2007    mild obst disease on PFTs Dr. Blaine Lynch Marital status:      Spouse name: Not on file    Number of children: 3    Years of education: Not on file    Highest education level: Not on file   Occupational History    Occupation: ret engr [employer]     Employer: RETIRED   Social Needs    Financial resource strain: Not on file    Food insecurity     Worry: Not on file     Inability: Not on file   [employer] needs     Medical: Not on file     Non-medical: Not on file   Tobacco Use    Smoking status: Former Smoker     Packs/day: 0.50     Years: 4.00     Pack years: 2.00     Quit date: 1957     Years since quittin.1    Smokeless tobacco: Never Used    Tobacco comment: Quit in    Substance and Sexual Activity    Alcohol use: No    Drug use: No    Sexual activity: Not on file   Lifestyle    Physical activity     Days per week: Not on file     Minutes per session: Not on file    Stress: Not on file   Relationships    Social connections     Talks on phone: Not on file     Gets together: Not on file     Attends Orthodox service: Not on file     Active member of club or organization: Not on file     Attends meetings of clubs or organizations: Not on file     Relationship status: Not on file    Intimate partner violence     Fear of current or ex partner: Not on file     Emotionally abused: Not on file     Physically abused: Not on file     Forced sexual activity: Not on file   Other Topics Concern    Not on file   Social History Narrative    Not on file     Allergies   Allergen Reactions    Albuterol Other (comments)    Influenza Virus Vaccine, Specific Other (comments)     Current Outpatient Medications   Medication Sig    triamcinolone acetonide (KENALOG-40) 40 mg/mL injection     clopidogreL (Plavix) 75 mg tab 4 tablets on first day then take 1 tablet daily thereafter    benazepriL (LOTENSIN) 10 mg tablet Take 1 Tab by mouth daily.     aspirin 81 mg chewable tablet Take 81 mg by mouth daily.  cholecalciferol (Vitamin D3) (1000 Units /25 mcg) tablet Take  by mouth daily.  cyanocobalamin, vitamin B-12, 5,000 mcg cap Take  by mouth.  nitroglycerin (NITROSTAT) 0.4 mg SL tablet 1 Tab by SubLINGual route every five (5) minutes as needed for Chest Pain.  metoprolol succinate (TOPROL-XL) 25 mg XL tablet TAKE ONE TABLET BY MOUTH DAILY    atorvastatin (LIPITOR) 20 mg tablet TAKE ONE TABLET BY MOUTH EVERY NIGHT AT BEDTIME    levalbuterol tartrate (XOPENEX) 45 mcg/actuation inhaler Take 2 Puffs by inhalation every six (6) hours as needed for Wheezing.  amLODIPine (NORVASC) 10 mg tablet TAKE ONE TABLET BY MOUTH DAILY    omeprazole (PRILOSEC) 20 mg capsule     glucosam-chondroit-C-manganese 349-870-74-3 mg cap Take 1 Tab by mouth daily.  ascorbic acid (VITAMIN C) 1,000 mg tablet Take 1,000 mg by mouth daily.  MULTIVITAMIN W-MINERALS/LUTEIN (CENTRUM SILVER PO) Take 1 Tab by mouth daily. No current facility-administered medications for this visit. REVIEW OF SYSTEMS:  colo 2017 Dr Mildred Angel  Ophtho  no vision change or eye pain  Oral  no mouth pain, tongue or tooth problems  Ears  no hearing loss, ear pain, fullness, no swallowing problems  Cardiac  no CP, PND, orthopnea, edema, palpitations or syncope  Chest  no breast masses  Resp  no wheezing, chronic coughing, dyspnea  GI  no heartburn, nausea, vomiting, change in bowel habits, bleeding, hemorrhoids  Urinary  no dysuria, hematuria, flank pain, urgency, frequency    Visit Vitals  /64   Pulse 66   Temp 98.1 °F (36.7 °C) (Temporal)   Resp 14   Ht 5' 6\" (1.676 m)   Wt 180 lb (81.6 kg)   SpO2 99%   BMI 29.05 kg/m²     A&O x3  Affect is appropriate. Mood stable  No apparent distress  Anicteric, no JVD, adenopathy or thyromegaly. No carotid bruits or radiated murmur  Lungs clear to auscultation, no wheezes or rales  Heart showed regular rate and rhythm.  No murmur, rubs, gallops  Abdomen soft nontender, no hepatosplenomegaly or masses. Extremities with trace pedal edema. Venous varicosities noted.  Pulses 1-2+ symmetrically    LABS   From 9/10 showed gluc 102, cr 1.00,    alt 41,        chol 190, tg 102, hdl 47, ldl-c 123, wbc 8.3, hb 14.6, plt 222, psa 7.60  From 6/12 showed                        psa 5.78  From 8/12 showed                                            b12 400, fol>20  From 9/12 showed gluc 109, cr 1.10, gfr>60,                 wbc 8.1, hb 14.1, plt 232  From 9/13 showed                        psa 0.96  From 3/14 showed                        psa 1.78  From 4/14 showed gluc 107, cr 0.90, gfr>60, alt 25, hba1c 5.5, chol 190, tg 135, hdl 48, ldl-c 115, wbc 8.1, hb 14.2, plt 241, ua neg  From 8/14 showed gluc 97,   cr 0.90, gfr>60, alt 17, hba1c 6.0, chol 109, tg 111, hdl 44, ldl-c 43  From 2/15 showed      hba1c 6.0  From 3/15 showed                      psa 2.13  From 8/15 showed gluc 106, cr 0.98, gfr 75,  alt 20, hba1c 5.9, chol 129, tg 79,   hdl 51, ldl-c 62,   wbc 7.3, hb 14.2, plt 232  From 3/16 showed                      psa 2.90  From 4/16 showed      alt 37, hba1c 5.6, chol 108, tg 103, hdl 46, ldl-c 41  From 2/17 showed gluc 103, cr 1.00, gfr>60, alt 36, hba1c 5.6, chol 117, tg 93,   hdl 49, ldl-c 59,                tsh 4.14  From 1/18 showed                      psa 7.40  From 2/18 showed gluc 107, cr 1.01, gfr>60, alt 27,        chol 106, tg 113, hdl 42, ldl-c 41,   wbc 7.9, hb 13.8, plt 240,             tsh 3.15, ft4 0.90  From 8/18 showed gluc 97,   cr 1.01, gfr>60, alt 28,        wbc 7.6, hb 12.2, plt 206,             fe 94, %sat 32, ferritin 83, b12 332, fol>20, spep neg, retic 1.0  From 5/19 showed gluc 100, cr 1.00, gfr>60, alt 29,        wbc 9.4, hb 13.4, plt 214  From 1/20 showed                      psa 0.23  From 2/20 showed             chol 116, tg 79,   hdl 43, ldl-c 57  From 7/20 showed     alt 34,         chol 123, tg 112, hdl 44, ldl-c 57  From 11/20 showed glu 108, cr 1.03, gfr>60, alt 28,        wbc 8.2, hb 13.6, plt 253  From 12/20 showed gluc 94, cr 1.01, gfr>60,  hba1c 5.6, chol 128, tg 108, hdl 49, ldl-c 57  From 1/21 showed                      psa 0.10, covid neg, flu neg    We reviewed the patient's labs from the last several visits to point out trends in the numbers          Patient Active Problem List   Diagnosis Code    Pederson's esophagus K22.70    BPH with obstruction/lower urinary tract symptoms N40.1, N13.8    Cancer of prostate ([facility] [address] [facility] C61    Colon polyp 2007 Dr. Ace Shaw K63.5    Sleep apnea G47.30    S/P CABG x 3 Z95.1    IFG (impaired fasting glucose) R73.01    Hyperlipidemia E78.5    Bilateral carotid artery stenosis 12/14 I65.23    CAD (coronary artery disease) I25.10    Multinodular goiter 2014 E04.2    Pulmonary nodules R91.8    Essential hypertension I10    Obesity (BMI 30-39. 9) E66.9    Impotence of organic origin N52.9     Assessment and plan:  1. Pederson's. Lifelong PPI and further plans per gi  2. ED. Prn meds  3. BPH and prostate ca. F/U Dr. Patrick Fitch  4. Colon polyp. Fiber  5. Asthma. Prn inhalers  6. CHD. F/U Dr. Kingston munoz  7. IFG. Wt loss would be ideal, continue dietary and lifestyle measures  8. Bilat carotid stenosis. Cont to follow per Dr Aaron Gallego  9. HTN. Continue current regimen. 10.  Obesity. Lifestyle and dietary measures. Portion control reiterated. 6.  Letter dictated        RTC 5/21    Above conditions discussed at length and patient vocalized understanding.   All questions answered to patient satisfaction

## 2021-01-20 ENCOUNTER — TRANSCRIBE ORDER (OUTPATIENT)
Dept: RADIATION THERAPY | Age: 81
End: 2021-01-20

## 2021-01-20 ENCOUNTER — APPOINTMENT (OUTPATIENT)
Dept: RADIATION THERAPY | Age: 81
End: 2021-01-20

## 2021-01-20 DIAGNOSIS — C61 MALIGNANT NEOPLASM OF PROSTATE (HCC): Primary | ICD-10-CM

## 2021-01-22 ENCOUNTER — HOSPITAL ENCOUNTER (OUTPATIENT)
Dept: LAB | Age: 81
Discharge: HOME | End: 2021-01-22
Payer: MEDICARE

## 2021-01-22 DIAGNOSIS — C61 MALIGNANT NEOPLASM OF PROSTATE (HCC): ICD-10-CM

## 2021-01-22 PROCEDURE — 84403 ASSAY OF TOTAL TESTOSTERONE: CPT

## 2021-01-22 PROCEDURE — 84154 ASSAY OF PSA FREE: CPT

## 2021-01-22 PROCEDURE — 36415 COLL VENOUS BLD VENIPUNCTURE: CPT

## 2021-01-24 LAB
PSA FREE MFR SERPL: 20 %
PSA FREE SERPL-MCNC: 0.02 NG/ML
PSA SERPL-MCNC: 0.1 NG/ML (ref 0–4)
TESTOST SERPL-MCNC: 437 NG/DL (ref 264–916)

## 2021-01-25 ENCOUNTER — OFFICE VISIT (OUTPATIENT)
Dept: INTERNAL MEDICINE CLINIC | Age: 81
End: 2021-01-25
Payer: MEDICARE

## 2021-01-25 VITALS
SYSTOLIC BLOOD PRESSURE: 132 MMHG | RESPIRATION RATE: 14 BRPM | WEIGHT: 180 LBS | HEIGHT: 66 IN | BODY MASS INDEX: 28.93 KG/M2 | DIASTOLIC BLOOD PRESSURE: 64 MMHG | TEMPERATURE: 98.1 F | HEART RATE: 66 BPM | OXYGEN SATURATION: 99 %

## 2021-01-25 DIAGNOSIS — I65.23 BILATERAL CAROTID ARTERY STENOSIS: Primary | ICD-10-CM

## 2021-01-25 DIAGNOSIS — I25.10 CORONARY ARTERY DISEASE INVOLVING NATIVE CORONARY ARTERY OF NATIVE HEART WITHOUT ANGINA PECTORIS: ICD-10-CM

## 2021-01-25 DIAGNOSIS — C61 CANCER OF PROSTATE (HCC): ICD-10-CM

## 2021-01-25 DIAGNOSIS — Z95.1 S/P CABG X 3: ICD-10-CM

## 2021-01-25 DIAGNOSIS — K22.719 BARRETT'S ESOPHAGUS WITH DYSPLASIA: ICD-10-CM

## 2021-01-25 DIAGNOSIS — E78.5 HYPERLIPIDEMIA, UNSPECIFIED HYPERLIPIDEMIA TYPE: ICD-10-CM

## 2021-01-25 DIAGNOSIS — I10 ESSENTIAL HYPERTENSION: ICD-10-CM

## 2021-01-25 DIAGNOSIS — R73.01 IFG (IMPAIRED FASTING GLUCOSE): ICD-10-CM

## 2021-01-25 DIAGNOSIS — G47.33 OBSTRUCTIVE SLEEP APNEA SYNDROME: ICD-10-CM

## 2021-01-25 PROCEDURE — G8752 SYS BP LESS 140: HCPCS | Performed by: INTERNAL MEDICINE

## 2021-01-25 PROCEDURE — G8754 DIAS BP LESS 90: HCPCS | Performed by: INTERNAL MEDICINE

## 2021-01-25 PROCEDURE — G8510 SCR DEP NEG, NO PLAN REQD: HCPCS | Performed by: INTERNAL MEDICINE

## 2021-01-25 PROCEDURE — G8417 CALC BMI ABV UP PARAM F/U: HCPCS | Performed by: INTERNAL MEDICINE

## 2021-01-25 PROCEDURE — G0463 HOSPITAL OUTPT CLINIC VISIT: HCPCS | Performed by: INTERNAL MEDICINE

## 2021-01-25 PROCEDURE — 99214 OFFICE O/P EST MOD 30 MIN: CPT | Performed by: INTERNAL MEDICINE

## 2021-01-25 PROCEDURE — 1101F PT FALLS ASSESS-DOCD LE1/YR: CPT | Performed by: INTERNAL MEDICINE

## 2021-01-25 PROCEDURE — G8427 DOCREV CUR MEDS BY ELIG CLIN: HCPCS | Performed by: INTERNAL MEDICINE

## 2021-01-25 PROCEDURE — G8536 NO DOC ELDER MAL SCRN: HCPCS | Performed by: INTERNAL MEDICINE

## 2021-01-25 NOTE — PROGRESS NOTES
Pedro Orosco presents today for No chief complaint on file. Depression Screening:  3 most recent PHQ Screens 12/22/2020   Little interest or pleasure in doing things Not at all   Feeling down, depressed, irritable, or hopeless Not at all   Total Score PHQ 2 0       Learning Assessment:  Learning Assessment 5/8/2019   PRIMARY LEARNER Patient   HIGHEST LEVEL OF EDUCATION - PRIMARY LEARNER  [facility] [address] PRIMARY LEARNER HEARING   CO-LEARNER CAREGIVER No   PRIMARY LANGUAGE ENGLISH   LEARNER PREFERENCE PRIMARY DEMONSTRATION   ANSWERED BY patient   RELATIONSHIP SELF       Abuse Screening:  Abuse Screening Questionnaire 12/22/2020   Do you ever feel afraid of your partner? N   Are you in a relationship with someone who physically or mentally threatens you? N   Is it safe for you to go home? Y       Fall Risk  Fall Risk Assessment, last 12 mths 12/22/2020   Able to walk? Yes   Fall in past 12 months? No           Coordination of Care:  1. Have you been to the ER, urgent care clinic since your last visit? Hospitalized since your last visit? Yes 1/12/21 [facility]    2. Have you seen or consulted any other health care providers outside of the [address] [place] [address] since your last visit? Include any pap smears or colon screening.  no

## 2021-01-27 ENCOUNTER — PATIENT OUTREACH (OUTPATIENT)
Dept: CASE MANAGEMENT | Age: 81
End: 2021-01-27

## 2021-01-27 NOTE — PROGRESS NOTES
Patient resolved from [address] Transitions episode on 1/27/2021  Discussed COVID-19 related testing which was available at this time. Test results were negative. Patient informed of results, if available? yes     Patient/family has been provided the following resources and education related to COVID-19:                         Signs, symptoms and red flags related to COVID-19            [facility] exposure and quarantine guidelines            [facility] exposure contact - [phone]            Contact for their local Department of Health                 Patient currently reports that the following symptoms have improved:  no symptoms. No further outreach scheduled with this CTN/ACM/LPN/HC/ MA. Episode of Care resolved. Patient has this CTN/ACM/LPN/HC/MA contact information if future needs arise.

## 2021-01-29 ENCOUNTER — HOSPITAL ENCOUNTER (OUTPATIENT)
Dept: RADIATION THERAPY | Age: 81
Discharge: HOME | End: 2021-01-29
Payer: MEDICARE

## 2021-01-29 PROCEDURE — 99211 OFF/OP EST MAY X REQ PHY/QHP: CPT

## 2021-02-02 ENCOUNTER — TELEPHONE (OUTPATIENT)
Dept: INTERNAL MEDICINE CLINIC | Age: 81
End: 2021-02-02

## 2021-02-02 NOTE — TELEPHONE ENCOUNTER
I did not say it was temporary Nothing in the letter says it's temporary Is the employer insisting on that wording to be in there?

## 2021-02-02 NOTE — TELEPHONE ENCOUNTER
Pt wife called back stating the letter for pt's employer needs to say he needs to have a stool permanently  at work . Also asking for letter to be mailed to his house

## 2021-02-02 NOTE — TELEPHONE ENCOUNTER
Pt's wife calling on his behalf. Stated the letter your wrote re: his employment, arthritis and being allowed a stool at work needs correction. Stated please use the same letter but add that the condition is permanent not temporary. This is requested per his employer They are requesting this letter be mailed to the home address

## 2021-02-05 NOTE — TELEPHONE ENCOUNTER
Pt dropped off paperwork that his employer is req his pcp to fill out , and also asking instead of mailing revised letter could you leave it with completed form ,pt will  when ready

## 2021-02-09 ENCOUNTER — TELEPHONE (OUTPATIENT)
Dept: INTERNAL MEDICINE CLINIC | Age: 81
End: 2021-02-09

## 2021-02-09 NOTE — TELEPHONE ENCOUNTER
Pt' wife calling about letter and form that needed to be completed (called 2/2/21 and 2/5/21). Sathya So it needs to be done by Friday, pt needs to pick it up

## 2021-02-15 ENCOUNTER — HOSPITAL ENCOUNTER (OUTPATIENT)
Dept: VASCULAR SURGERY | Age: 81
Discharge: HOME | End: 2021-02-15
Attending: INTERNAL MEDICINE
Payer: MEDICARE

## 2021-02-15 DIAGNOSIS — R09.89 BILATERAL CAROTID BRUITS: ICD-10-CM

## 2021-02-15 PROCEDURE — 93880 EXTRACRANIAL BILAT STUDY: CPT

## 2021-02-16 LAB
LEFT BULB EDV: 17.9 CM/S
LEFT BULB PSV: 200.4 CM/S
LEFT CCA DIST DIAS: 15 CM/S
LEFT CCA DIST SYS: 131.2 CM/S
LEFT CCA MID DIAS: 13 CM/S
LEFT CCA MID SYS: 143.04 CM/S
LEFT CCA PROX DIAS: 13 CM/S
LEFT CCA PROX SYS: 127.3 CM/S
LEFT ECA DIAS: 0 CM/S
LEFT ECA SYS: 161.7 CM/S
LEFT ICA DIST DIAS: 9.5 CM/S
LEFT ICA DIST SYS: 53.5 CM/S
LEFT ICA MID DIAS: 14.8 CM/S
LEFT ICA MID SYS: 88.6 CM/S
LEFT ICA PROX DIAS: 14 CM/S
LEFT ICA PROX SYS: 134.3 CM/S
LEFT ICA/CCA SYS: 1.53
LEFT SUBCLAVIAN DIAS: 0 CM/S
LEFT SUBCLAVIAN SYS: 127.9 CM/S
LEFT VERTEBRAL DIAS: 6.83 CM/S
LEFT VERTEBRAL SYS: 40.3 CM/S
RIGHT BULB EDV: 27.8 CM/S
RIGHT BULB PSV: 166.8 CM/S
RIGHT CCA DIST DIAS: 13 CM/S
RIGHT CCA DIST SYS: 159.4 CM/S
RIGHT CCA MID DIAS: 11.04 CM/S
RIGHT CCA MID SYS: 139.16 CM/S
RIGHT CCA PROX DIAS: 0 CM/S
RIGHT CCA PROX SYS: 99.5 CM/S
RIGHT ECA DIAS: 0 CM/S
RIGHT ECA SYS: 134.5 CM/S
RIGHT ICA DIST DIAS: 11.1 CM/S
RIGHT ICA DIST SYS: 74.5 CM/S
RIGHT ICA MID DIAS: 20 CM/S
RIGHT ICA MID SYS: 126.9 CM/S
RIGHT ICA PROX DIAS: 24.6 CM/S
RIGHT ICA PROX SYS: 212.1 CM/S
RIGHT ICA/CCA SYS: 1.3
RIGHT SUBCLAVIAN DIAS: 0 CM/S
RIGHT SUBCLAVIAN SYS: 104.3 CM/S
RIGHT VERTEBRAL DIAS: 16.31 CM/S
RIGHT VERTEBRAL SYS: 59 CM/S

## 2021-02-16 RX ORDER — CLOPIDOGREL BISULFATE 75 MG/1
75 TABLET ORAL DAILY
Qty: 90 TAB | Refills: 3 | Status: SHIPPED | OUTPATIENT
Start: 2021-02-16 | End: 2021-08-02 | Stop reason: SDUPTHER

## 2021-05-06 ENCOUNTER — HOSPITAL ENCOUNTER (OUTPATIENT)
Dept: LAB | Age: 81
Discharge: HOME | End: 2021-05-06
Payer: MEDICARE

## 2021-05-06 PROCEDURE — U0003 INFECTIOUS AGENT DETECTION BY NUCLEIC ACID (DNA OR RNA); SEVERE ACUTE RESPIRATORY SYNDROME CORONAVIRUS 2 (SARS-COV-2) (CORONAVIRUS DISEASE [COVID-19]), AMPLIFIED PROBE TECHNIQUE, MAKING USE OF HIGH THROUGHPUT TECHNOLOGIES AS DESCRIBED BY CMS-2020-01-R: HCPCS

## 2021-05-07 LAB — SARS-COV-2, COV2NT: NOT DETECTED

## 2021-05-07 RX ORDER — LISINOPRIL 20 MG/1
20 TABLET ORAL DAILY
COMMUNITY
End: 2021-05-07

## 2021-05-07 RX ORDER — BENAZEPRIL HYDROCHLORIDE 10 MG/1
10 TABLET ORAL DAILY
COMMUNITY
End: 2021-05-19

## 2021-05-10 ENCOUNTER — HOSPITAL ENCOUNTER (OUTPATIENT)
Dept: LAB | Age: 81
Discharge: HOME | End: 2021-05-10
Payer: MEDICARE

## 2021-05-10 PROCEDURE — U0005 INFEC AGEN DETEC AMPLI PROBE: HCPCS

## 2021-05-11 LAB — SARS-COV-2, COV2NT: NOT DETECTED

## 2021-05-13 ENCOUNTER — ANESTHESIA EVENT (OUTPATIENT)
Dept: ENDOSCOPY | Age: 81
End: 2021-05-13
Payer: MEDICARE

## 2021-05-14 ENCOUNTER — ANESTHESIA (OUTPATIENT)
Dept: ENDOSCOPY | Age: 81
End: 2021-05-14
Payer: MEDICARE

## 2021-05-14 ENCOUNTER — HOSPITAL ENCOUNTER (OUTPATIENT)
Age: 81
Setting detail: OUTPATIENT SURGERY
Discharge: HOME | End: 2021-05-14
Attending: INTERNAL MEDICINE | Admitting: INTERNAL MEDICINE
Payer: MEDICARE

## 2021-05-14 VITALS
HEIGHT: 67 IN | RESPIRATION RATE: 16 BRPM | OXYGEN SATURATION: 98 % | BODY MASS INDEX: 28.82 KG/M2 | WEIGHT: 183.6 LBS | TEMPERATURE: 97.6 F | HEART RATE: 50 BPM | DIASTOLIC BLOOD PRESSURE: 61 MMHG | SYSTOLIC BLOOD PRESSURE: 144 MMHG

## 2021-05-14 PROCEDURE — 74011250636 HC RX REV CODE- 250/636: Performed by: NURSE ANESTHETIST, CERTIFIED REGISTERED

## 2021-05-14 PROCEDURE — 2709999900 HC NON-CHARGEABLE SUPPLY: Performed by: INTERNAL MEDICINE

## 2021-05-14 PROCEDURE — 99100 ANES PT EXTEME AGE<1 YR&>70: CPT | Performed by: NURSE ANESTHETIST, CERTIFIED REGISTERED

## 2021-05-14 PROCEDURE — 74011250637 HC RX REV CODE- 250/637: Performed by: NURSE ANESTHETIST, CERTIFIED REGISTERED

## 2021-05-14 PROCEDURE — 88305 TISSUE EXAM BY PATHOLOGIST: CPT

## 2021-05-14 PROCEDURE — 76040000019: Performed by: INTERNAL MEDICINE

## 2021-05-14 PROCEDURE — 00731 ANES UPR GI NDSC PX NOS: CPT | Performed by: ANESTHESIOLOGY

## 2021-05-14 PROCEDURE — 77030008565 HC TBNG SUC IRR ERBE -B: Performed by: INTERNAL MEDICINE

## 2021-05-14 PROCEDURE — 76060000031 HC ANESTHESIA FIRST 0.5 HR: Performed by: INTERNAL MEDICINE

## 2021-05-14 PROCEDURE — 74011000250 HC RX REV CODE- 250: Performed by: NURSE ANESTHETIST, CERTIFIED REGISTERED

## 2021-05-14 PROCEDURE — 99100 ANES PT EXTEME AGE<1 YR&>70: CPT | Performed by: ANESTHESIOLOGY

## 2021-05-14 PROCEDURE — 00731 ANES UPR GI NDSC PX NOS: CPT | Performed by: NURSE ANESTHETIST, CERTIFIED REGISTERED

## 2021-05-14 PROCEDURE — 77030019988 HC FCPS ENDOSC DISP BSC -B: Performed by: INTERNAL MEDICINE

## 2021-05-14 RX ORDER — SODIUM CHLORIDE, SODIUM LACTATE, POTASSIUM CHLORIDE, CALCIUM CHLORIDE 600; 310; 30; 20 MG/100ML; MG/100ML; MG/100ML; MG/100ML
50 INJECTION, SOLUTION INTRAVENOUS CONTINUOUS
Status: DISCONTINUED | OUTPATIENT
Start: 2021-05-15 | End: 2021-05-14 | Stop reason: HOSPADM

## 2021-05-14 RX ORDER — SODIUM CHLORIDE 0.9 % (FLUSH) 0.9 %
5-40 SYRINGE (ML) INJECTION AS NEEDED
Status: DISCONTINUED | OUTPATIENT
Start: 2021-05-14 | End: 2021-05-14 | Stop reason: HOSPADM

## 2021-05-14 RX ORDER — FAMOTIDINE 20 MG/1
20 TABLET, FILM COATED ORAL ONCE
Status: COMPLETED | OUTPATIENT
Start: 2021-05-14 | End: 2021-05-14

## 2021-05-14 RX ORDER — SODIUM CHLORIDE 0.9 % (FLUSH) 0.9 %
5-40 SYRINGE (ML) INJECTION EVERY 8 HOURS
Status: DISCONTINUED | OUTPATIENT
Start: 2021-05-14 | End: 2021-05-14 | Stop reason: HOSPADM

## 2021-05-14 RX ORDER — SODIUM CHLORIDE, SODIUM LACTATE, POTASSIUM CHLORIDE, CALCIUM CHLORIDE 600; 310; 30; 20 MG/100ML; MG/100ML; MG/100ML; MG/100ML
75 INJECTION, SOLUTION INTRAVENOUS CONTINUOUS
Status: DISCONTINUED | OUTPATIENT
Start: 2021-05-14 | End: 2021-05-14 | Stop reason: HOSPADM

## 2021-05-14 RX ORDER — PROPOFOL 10 MG/ML
INJECTION, EMULSION INTRAVENOUS AS NEEDED
Status: DISCONTINUED | OUTPATIENT
Start: 2021-05-14 | End: 2021-05-14 | Stop reason: HOSPADM

## 2021-05-14 RX ORDER — LIDOCAINE HYDROCHLORIDE 20 MG/ML
INJECTION, SOLUTION EPIDURAL; INFILTRATION; INTRACAUDAL; PERINEURAL AS NEEDED
Status: DISCONTINUED | OUTPATIENT
Start: 2021-05-14 | End: 2021-05-14 | Stop reason: HOSPADM

## 2021-05-14 RX ORDER — ONDANSETRON 2 MG/ML
4 INJECTION INTRAMUSCULAR; INTRAVENOUS ONCE
Status: DISCONTINUED | OUTPATIENT
Start: 2021-05-14 | End: 2021-05-14 | Stop reason: HOSPADM

## 2021-05-14 RX ORDER — SODIUM CHLORIDE, SODIUM LACTATE, POTASSIUM CHLORIDE, CALCIUM CHLORIDE 600; 310; 30; 20 MG/100ML; MG/100ML; MG/100ML; MG/100ML
INJECTION, SOLUTION INTRAVENOUS
Status: DISCONTINUED | OUTPATIENT
Start: 2021-05-14 | End: 2021-05-14 | Stop reason: HOSPADM

## 2021-05-14 RX ADMIN — FAMOTIDINE 20 MG: 20 TABLET, FILM COATED ORAL at 09:00

## 2021-05-14 RX ADMIN — LIDOCAINE HYDROCHLORIDE 100 MG: 20 INJECTION, SOLUTION EPIDURAL; INFILTRATION; INTRACAUDAL; PERINEURAL at 09:12

## 2021-05-14 RX ADMIN — PROPOFOL 50 MG: 10 INJECTION, EMULSION INTRAVENOUS at 09:12

## 2021-05-14 RX ADMIN — SODIUM CHLORIDE, SODIUM LACTATE, POTASSIUM CHLORIDE, AND CALCIUM CHLORIDE: 600; 310; 30; 20 INJECTION, SOLUTION INTRAVENOUS at 09:04

## 2021-05-14 RX ADMIN — SODIUM CHLORIDE, SODIUM LACTATE, POTASSIUM CHLORIDE, AND CALCIUM CHLORIDE 50 ML/HR: 600; 310; 30; 20 INJECTION, SOLUTION INTRAVENOUS at 08:57

## 2021-05-14 NOTE — H&P
[facility]    [email or web address]      Impression:   1.barretts  2. reflux      Plan:     1. egd dil halo mac all risks benefits and alt discussed       Chief Complaint: reflux barretts       HPI:  Cinthia Padron is a 78 y.o. male who is being seen on consult for reflux and dysplastic barretts . PMH:   Past Medical History:   Diagnosis Date    Allergic rhinitis     Pederson's esophagus     Dr. Sia Louis; last 8/17, for HALO    CABG x 3 04/24/2014    LIMA to LAD, and saphenous vein grafts to the second diagonal of the LAD, and to the posterior descending branch of the RCA, Dr. Henrik Brady, 4/23/14.      CAD (coronary artery disease)     Chronic obstructive pulmonary disease (HCC)     Colon adenoma 08/2017    Dr Leanna Cano Colon polyps 2007    Dr. Leanna Cano Erectile dysfunction     GERD (gastroesophageal reflux disease)     by EGD 2007 Dr. Leanna Cano Hearing loss     Hypertension     BLAIR (obstructive sleep apnea) 2011    CPAP not used    Osteoarthritis     hands    Prostate cancer ([address] 06/2010    Dr. Kristyn Hart [id number] [place] 3+3, 1/9 cores up to 10% 1 core; cryo Oct 2012 psa went from 7/1 to torsten 1.78    Prostate cancer ([address] 04/2018    Dr Becca Brown stage 2B [id number], [place] 3+4, P,10, G2; brachytherapy Dr Kimberly Wise Pulmonary nodule 2014    no change 2015, 3/17       PSH:   Past Surgical History:   Procedure Laterality Date    COLONOSCOPY N/A 7/27/2017    Dr. Sia Louis 2007 negative; 8/17 adenoma     HX Jong Shaw    HX CATARACT REMOVAL  11/2014    Dr Beth Cao  2004    HX HEART CATHETERIZATION  2020    Stent    HX TONSILLECTOMY     [facility] HX TURP      x2 Dr. Tiffanie Bender HX UROLOGICAL  10/2012    [facility], Dr. Jeff Andrea, Cystoscopy and dilation of stricture, cryoablation of prostate    CT CABG, ARTERY-VEIN, THREE  04/2014    3V CABG; Dr Henrik Brady LEAVITT to LAD, SVG to DM2 and sequential to RPDA    OR CHEST SURGERY PROCEDURE UNLISTED      mild obst disease on PFTs Dr. Kendra Novoa HX:   Social History     Socioeconomic History    Marital status:      Spouse name: Not on file    Number of children: 3    Years of education: Not on file    Highest education level: Not on file   Occupational History    Occupation: ret engr [employer]     Employer: RETIRED   Social Needs    Financial resource strain: Not on file    Food insecurity     Worry: Not on file     Inability: Not on file   [employer] needs     Medical: Not on file     Non-medical: Not on file   Tobacco Use    Smoking status: Former Smoker     Packs/day: 0.50     Years: 4.00     Pack years: 2.00     Quit date: 1957     Years since quittin.4    Smokeless tobacco: Never Used    Tobacco comment: Quit in    Substance and Sexual Activity    Alcohol use: No    Drug use: Never    Sexual activity: Not on file   Lifestyle    Physical activity     Days per week: Not on file     Minutes per session: Not on file    Stress: Not on file   Relationships    Social connections     Talks on phone: Not on file     Gets together: Not on file     Attends Amish service: Not on file     Active member of club or organization: Not on file     Attends meetings of clubs or organizations: Not on file     Relationship status: Not on file    Intimate partner violence     Fear of current or ex partner: Not on file     Emotionally abused: Not on file     Physically abused: Not on file     Forced sexual activity: Not on file   Other Topics Concern    Not on file   Social History Narrative    Not on file       FHX:   Family History   Problem Relation Age of Onset    Heart Disease Father     Stroke Mother     Hypertension Mother     Diabetes Sister        Allergy:   Allergies   Allergen Reactions    Albuterol Palpitations    Influenza Virus Vaccine, Specific Other (comments)       Home Medications:     Medications Prior to Admission   Medication Sig    benazepriL (LOTENSIN) 10 mg tablet Take 10 mg by mouth daily.  clopidogreL (Plavix) 75 mg tab Take 1 Tab by mouth daily. 4 tablets on first day then take 1 tablet daily thereafter    aspirin 81 mg chewable tablet Take 81 mg by mouth daily.  metoprolol succinate (TOPROL-XL) 25 mg XL tablet TAKE ONE TABLET BY MOUTH DAILY    levalbuterol tartrate (XOPENEX) 45 mcg/actuation inhaler Take 2 Puffs by inhalation every six (6) hours as needed for Wheezing.  amLODIPine (NORVASC) 10 mg tablet TAKE ONE TABLET BY MOUTH DAILY    omeprazole (PRILOSEC) 20 mg capsule Take 20 mg by mouth two (2) times a day.  ascorbic acid (VITAMIN C) 1,000 mg tablet Take 1,000 mg by mouth daily.  MULTIVITAMIN W-MINERALS/LUTEIN (CENTRUM SILVER PO) Take 1 Tab by mouth daily.  triamcinolone acetonide (KENALOG-40) 40 mg/mL injection     nitroglycerin (NITROSTAT) 0.4 mg SL tablet 1 Tab by SubLINGual route every five (5) minutes as needed for Chest Pain.  atorvastatin (LIPITOR) 20 mg tablet TAKE ONE TABLET BY MOUTH EVERY NIGHT AT BEDTIME       Review of Systems:     Constitutional: No fevers, chills, weight loss, fatigue. Skin: No rashes, pruritis, jaundice, ulcerations, erythema. HENT: No headaches, nosebleeds, sinus pressure, rhinorrhea, sore throat. Eyes: No visual changes, blurred vision, eye pain, photophobia, jaundice. Cardiovascular: No chest pain, heart palpitations. Respiratory: No cough, SOB, wheezing, chest discomfort, orthopnea. Gastrointestinal: Reflux    Genitourinary: No dysuria, bleeding, discharge, pyuria. Musculoskeletal: No weakness, arthralgias, wasting. Endo: No sweats. Heme: No bruising, easy bleeding. Allergies: As noted. Neurological: Cranial nerves intact. Alert and oriented. Gait not assessed. Psychiatric:  No anxiety, depression, hallucinations.                  Visit Vitals  Ht 5' 7\" (1.702 m)   Wt 80.7 kg (178 lb)   BMI 27.88 kg/m² Physical Assessment:     constitutional: appearance: well developed, well nourished, normal habitus, no deformities, in no acute distress. skin: inspection: no rashes, ulcers, icterus or other lesions; no clubbing or telangiectasias. palpation: no induration or subcutaneos nodules. eyes: inspection: normal conjunctivae and lids; no jaundice pupils: symmetrical, normoreactive to light, normal accommodation and size. ENMT: mouth: normal oral mucosa,lips and gums; good dentition. oropharynx: normal tongue, hard and soft palate; posterior pharynx without erythema, exudate or lesions. neck: no masses organomegaly or tenderness. respiratory: effort: normal chest excursion; no intercostal retraction or accessory muscle use. cardiovascular: abdominal aorta: normal size and position; no bruits. palpation: PMI of normal size and position; normal rhythm; no thrill or murmurs. abdominal: abdomen: normal consistency; no tenderness or masses. hernias: no hernias appreciated. liver: normal size and consistency. spleen: not palpable. rectal: hemoccult/guaiac: not performed. musculoskeletal: no deformities or muscle wasting   lymphatic: axilae: not palpable. groin: not palpable. neck: within normal limits. other: not palpable. neurologic: cranial nerves: II-XII normal.   psychiatric: judgement/insight: within normal limits. memory: within normal limits for recent and remote events. mood and affect: no evidence of depression, anxiety or agitation. orientation: oriented to time, space and person. Basic Metabolic Profile   No results for input(s): NA, K, CL, CO2, BUN, GLU, CA, MG, PHOS in the last 72 hours. No lab exists for component: CREAT      CBC w/Diff    No results for input(s): WBC, RBC, HGB, HCT, MCV, MCH, MCHC, RDW, PLT, HGBEXT, HCTEXT, PLTEXT in the last 72 hours.     No lab exists for component: MPV No results for input(s): GRANS, LYMPH, EOS, PRO, MYELO, METAS, BLAST in the last 72 hours.    No lab exists for component: MONO, BASO     Hepatic Function   No results for input(s): ALB, TP, TBILI, AP, AML, LPSE in the last 72 hours. No lab exists for component: DBILI, GPT, SGOT       Jose Zarco MD, M.D. [facility] [address] [place] [place] 1949, [address] [facility]  [email or web address]

## 2021-05-14 NOTE — DISCHARGE INSTRUCTIONS
Patient Education        Esophageal Dilation: What to Expect at [address] had an esophageal dilation. This procedure can open up narrow areas of the esophagus. After the procedure, you will stay at the hospital or surgery center for 1 to 2 hours. This will allow the medicine to wear off. You will be able to go home after your doctor or nurse checks to make sure that you're not having any problems. This care sheet gives you a general idea about how long it will take for you to recover. But each person recovers at a different pace. Follow the steps below to get better as quickly as possible. How can you care for yourself at home? Activity    · Rest as much as you need to after you go home.     · You should be able to go back to your usual activities the day after the procedure. Diet    · Follow your doctor's directions for eating after the procedure.     · Drink plenty of fluids (unless your doctor has told you not to). Medicines    · Your doctor will tell you if and when you can restart your medicines. He or she will also give you instructions about taking any new medicines.     · If you take aspirin or some other blood thinner, ask your doctor if and when to start taking it again. Make sure that you understand exactly what your doctor wants you to do.     · If you have a sore throat the day after the procedure, use an over-the-counter spray to numb your throat. Sucking on throat lozenges and gargling with warm salt water may also help relieve your symptoms. Follow-up care is a key part of your treatment and safety. Be sure to make and go to all appointments, and call your doctor if you are having problems. It's also a good idea to know your test results and keep a list of the medicines you take. When should you call for help? Call 911 anytime you think you may need emergency care.  For example, call if:    · You passed out (lost consciousness).     · You have trouble breathing.     · Your stools are maroon or very bloody   Call your doctor now or seek immediate medical care if:    · You have new or worse belly pain.     · You have a fever.     · You have new or more blood in your stools.     · You are sick to your stomach and cannot drink fluids.     · You cannot pass stools or gas.     · You have pain that does not get better after you take pain medicine. Watch closely for changes in your health, and be sure to contact your doctor if:    · Your throat still hurts after a day or two.     · You do not get better as expected. Where can you learn more? Go to http://www.gray.com/  Enter J014 in the search box to learn more about \"Esophageal Dilation: What to Expect at Home. \"  Current as of: April 15, 2020               Content Version: 12.8  © 1731-0954 [employer]. Care instructions adapted under license by [employer] (which disclaims liability or warranty for this information). If you have questions about a medical condition or this instruction, always ask your healthcare professional. [place] [address] any warranty or liability for your use of this information. Patient Education        Learning About Swallowing Problems  What are swallowing problems? Certain health problems that affect the nervous system can cause trouble swallowing. These conditions include stroke, ALS (also known as Ashely Gehrig's disease), Parkinson's disease, and multiple sclerosis. The muscles and nerves that help move food through the throat and esophagus may not work right. Growths, such as cancer, and other problems with your esophagus can also make it hard to swallow. The esophagus is the tube that leads from your throat to your stomach. How are swallowing problems diagnosed? A doctor or speech therapist will examine you to check for swallowing problems. You may get swallowing tests to check how well your throat muscles work.  For these tests, you swallow a special liquid that helps the doctor see your throat and esophagus on an X-ray or video screen. Other tests use a thin, flexible tube called a scope to check for problems with your esophagus. The doctor puts the scope in your mouth and down your throat to look at your esophagus. What are the symptoms? Symptoms of swallowing problems may include:  · Trouble getting food or liquids to go down on the first try. · Gagging, choking, or coughing when you swallow. · Having food or liquids come back up through your throat, mouth, or nose after you swallow. · Feeling like foods or liquids are stuck in some part of your throat or chest.  · Pain when you swallow. How are swallowing problems treated? How swallowing problems are treated depends on the cause. The main goals of treatment will be to help you eat and swallow safely and get good nutrition. This is important for your health and quality of life. You may learn exercises to train your throat muscles to work together so you're able to swallow better. Learning certain ways to put food in your mouth or to position your head while eating may also help. Your doctor or a speech therapist may recommend changes to your diet to help make it easier to swallow. You may need to avoid certain foods or liquids. You also may need to change the thickness of foods or liquids in your diet. To eat and swallow safely, follow any instructions you get from your doctor or therapist. These ideas may help:  · Sit upright when eating, drinking, and taking pills. · Take small bites of food. Chew completely and swallow before taking another bite. · Take small sips of liquids. · If eating makes you tired, eat smaller but more frequent meals. · Tip your chin down when there is food in your mouth. Where can you learn more? Go to http://www.gray.com/  Enter D018 in the search box to learn more about \"Learning About Swallowing Problems. \"  Current as of: February 26, 2020               Content Version: 12.8  © 9680-2720 [employer]. Care instructions adapted under license by [employer] (which disclaims liability or warranty for this information). If you have questions about a medical condition or this instruction, always ask your healthcare professional. [address] any warranty or liability for your use of this information.        .Patient armband removed and shredded

## 2021-05-14 NOTE — PERIOP NOTES
Assumed care of pt from ENDO via stretcher. Attached to monitor. VSS. ENDO and anesthesia report appreciated. Will monitor.

## 2021-05-14 NOTE — ANESTHESIA PREPROCEDURE EVALUATION
Relevant Problems   No relevant active problems       Anesthetic History   No history of anesthetic complications            Review of Systems / Medical History  Patient summary reviewed and nursing notes reviewed    Pulmonary    COPD: mild    Sleep apnea: No treatment    Asthma : well controlled       Neuro/Psych              Cardiovascular    Hypertension: well controlled          CAD    Exercise tolerance: >4 METS     GI/Hepatic/Renal     GERD           Endo/Other      Hypothyroidism  Arthritis     Other Findings              Physical Exam    Airway  Mallampati: II  TM Distance: 4 - 6 cm  Neck ROM: normal range of motion   Mouth opening: Normal     Cardiovascular    Rhythm: regular  Rate: normal         Dental  No notable dental hx       Pulmonary  Breath sounds clear to auscultation               Abdominal         Other Findings            Anesthetic Plan    ASA: 3  Anesthesia type: MAC          Induction: Intravenous  Anesthetic plan and risks discussed with: Patient

## 2021-05-14 NOTE — ANESTHESIA POSTPROCEDURE EVALUATION
Procedure(s):  UPPER ENDOSCOPY with bx's, with dilation. MAC    Anesthesia Post Evaluation      Multimodal analgesia: multimodal analgesia used between 6 hours prior to anesthesia start to PACU discharge  Patient location during evaluation: bedside  Patient participation: complete - patient participated  Level of consciousness: awake  Pain management: adequate  Airway patency: patent  Anesthetic complications: no  Cardiovascular status: stable  Respiratory status: acceptable  Hydration status: acceptable  Post anesthesia nausea and vomiting:  controlled  Final Post Anesthesia Temperature Assessment:  Normothermia (36.0-37.5 degrees C)      INITIAL Post-op Vital signs:   Vitals Value Taken Time   /79 05/14/21 0941   Temp 36.3 °C (97.3 °F) 05/14/21 0922   Pulse 50 05/14/21 0948   Resp 13 05/14/21 0948   SpO2 98 % 05/14/21 0948   Vitals shown include unvalidated device data.

## 2021-05-17 NOTE — PROGRESS NOTES
78 y.o. male who presents for evaluation. Dr Jen Rosario continues to follow the prostatic issues     Continues to work 4d/week at [employer]. BP has been controlled, his care has been turned over to Dr Donovan Vences whom he will see next month with Dr Juliette Ruby skilled nursing    Dr Amanda Reina recently did egd again confirming Pederson's, no alarm complaints     Denies polyuria, polydipsia, nocturia, vision change. Not checking sugars at this time.       No sx referable to the thyroid. The allergies are flaring badly with him starting back up the lawn cutting. LAST MEDICARE WELLNESS EXAM: 8/12/15, 2/22/17, 4/12/18, 5/8/19, 11/17/20    Past Medical History:   Diagnosis Date    Allergic rhinitis     Pederson's esophagus     Dr. Amanda Reina; last 8/17, for [facility]    CABG x 3 04/24/2014    LIMA to LAD, and saphenous vein grafts to the second diagonal of the LAD, and to the posterior descending branch of the RCA, Dr. Barbara Garcia, 4/23/14.      CAD (coronary artery disease)     Colon adenoma 08/2017    Dr Will Isaacs Colon polyps 2007    Dr. Will Isaacs COPD (chronic obstructive pulmonary disease) ([facility] [address]     mild obst disease on PFTs Dr. Balbir Villafuerte (2007)    Erectile dysfunction     GERD (gastroesophageal reflux disease)     by EGD 2007 Dr. Amanda Reina; EGD 5/14/21 hh and s/p dilation stricture    Hearing loss     Hypertension     BLAIR (obstructive sleep apnea) 2011    CPAP not used    Osteoarthritis of both hands     Prostate cancer ([facility] [address] 06/2010    Dr. Kayode Rainey [id number] 3+3, 1/9 cores up to 10% 1 core; cryo Oct 2012 psa went from 7/1 to torsten 1.78    Prostate cancer ([facility] [address] 04/2018    Dr Jayleen Brooks stage 2B [id number], Jj 3+4, P,10, G2; brachytherapy Dr Lizette Ferro Pulmonary nodule 2014    no change 2015, 3/17     Past Surgical History:   Procedure Laterality Date    COLONOSCOPY N/A 7/27/2017    Dr. Amanda Reina 2007 negative; 8/17 adenoma     Rebecca Brenner    HX CATARACT REMOVAL Bilateral 2014    Dr Eliezer William     [facility] CATHETER[facility]  2020    Stent    HX TONSILLECTOMY      HX TURP      x2 Dr. Karl Puentes HX UROLOGICAL  10/2012    [facility], Dr. Kamar Vera, Cystoscopy and dilation of stricture, cryoablation of prostate    PA CABG, ARTERY-VEIN, THREE  2014    3V CABG; Dr Kulwant Mathews LEAVITT to LAD, SVG to DM2 and sequential to RPDA     Social History     Socioeconomic History    Marital status:      Spouse name: Not on file    Number of children: 3    Years of education: Not on file    Highest education level: Not on file   Occupational History    Occupation: ret engr [employer]     Employer: RETIRED   Tobacco Use    Smoking status: Former Smoker     Packs/day: 0.50     Years: 4.00     Pack years: 2.00     Quit date: 1957     Years since quittin.4    Smokeless tobacco: Never Used    Tobacco comment: Quit in    Vaping Use    Vaping Use: Never used   Substance and Sexual Activity    Alcohol use: No    Drug use: Never    Sexual activity: Not on file   Other Topics Concern    Not on file   Social History Narrative    Not on file     Social Determinants of Health     Financial Resource Strain:     Difficulty of Paying Living Expenses:    Food Insecurity:     Worried About [address] in the Last Year:     [address] [facility] [address] in the Last Year:    Transportation Needs:     Lack of Transportation (Medical):      Lack of Transportation (Non-Medical):    Physical Activity:     Days of Exercise per Week:     Minutes of Exercise per Session:    Stress:     Feeling of Stress :    Social Connections:     Frequency of Communication with Friends and Family:     Frequency of Social Gatherings with Friends and Family:     Attends Evangelical Services:     Active Member of Clubs or Organizations:     Attends Club or Organization Meetings:     Marital Status:    Intimate Partner Violence:     Fear of Current or Ex-Partner:     Emotionally Abused:     Physically Abused:     Sexually Abused: Allergies   Allergen Reactions    Albuterol Palpitations    Influenza Virus Vaccine, Specific Other (comments)     Current Outpatient Medications   Medication Sig    benazepriL (LOTENSIN) 10 mg tablet TAKE ONE TABLET BY MOUTH DAILY    clopidogreL (Plavix) 75 mg tab Take 1 Tab by mouth daily. 4 tablets on first day then take 1 tablet daily thereafter    triamcinolone acetonide (KENALOG-40) 40 mg/mL injection     aspirin 81 mg chewable tablet Take 81 mg by mouth daily.  nitroglycerin (NITROSTAT) 0.4 mg SL tablet 1 Tab by SubLINGual route every five (5) minutes as needed for Chest Pain.  metoprolol succinate (TOPROL-XL) 25 mg XL tablet TAKE ONE TABLET BY MOUTH DAILY    atorvastatin (LIPITOR) 20 mg tablet TAKE ONE TABLET BY MOUTH EVERY NIGHT AT BEDTIME    levalbuterol tartrate (XOPENEX) 45 mcg/actuation inhaler Take 2 Puffs by inhalation every six (6) hours as needed for Wheezing.  amLODIPine (NORVASC) 10 mg tablet TAKE ONE TABLET BY MOUTH DAILY    omeprazole (PRILOSEC) 20 mg capsule Take 20 mg by mouth two (2) times a day.  ascorbic acid (VITAMIN C) 1,000 mg tablet Take 1,000 mg by mouth daily.  MULTIVITAMIN W-MINERALS/LUTEIN (CENTRUM SILVER PO) Take 1 Tab by mouth daily. No current facility-administered medications for this visit.      REVIEW OF SYSTEMS:  colo 2017 Dr Shelton Poster  Ophtho  no vision change or eye pain  Oral  no mouth pain, tongue or tooth problems  Ears  no hearing loss, ear pain, fullness, no swallowing problems  Cardiac  no CP, PND, orthopnea, edema, palpitations or syncope  Chest  no breast masses  Resp  no wheezing, chronic coughing, dyspnea  GI  no heartburn, nausea, vomiting, change in bowel habits, bleeding, hemorrhoids  Urinary  no dysuria, hematuria, flank pain, urgency, frequency    Visit Vitals  BP (!) 118/58 (BP 1 Location: Right arm, BP Patient Position: Sitting)   Pulse 65   Temp 98 °F (36.7 °C) (Temporal)   Resp 16   Ht 5' 7\" (1.702 m)   Wt 183 lb (83 kg)   SpO2 97%   BMI 28.66 kg/m²     A&O x3  Affect is appropriate. Mood stable  No apparent distress  Anicteric, no JVD, adenopathy or thyromegaly. No carotid bruits or radiated murmur  Lungs clear to auscultation, no wheezes or rales  Heart showed regular rate and rhythm. No murmur, rubs, gallops  Abdomen soft nontender, no hepatosplenomegaly or masses. Extremities with trace pedal edema. Venous varicosities noted.  Pulses 1-2+ symmetrically    LABS   From 9/10 showed gluc 102, cr 1.00,    alt 41,        chol 190, tg 102, hdl 47, ldl-c 123, wbc 8.3, hb 14.6, plt 222, psa 7.60  From 6/12 showed                        psa 5.78  From 8/12 showed                                            b12 400, fol>20  From 9/12 showed gluc 109, cr 1.10, gfr>60,                 wbc 8.1, hb 14.1, plt 232  From 9/13 showed                        psa 0.96  From 3/14 showed                        psa 1.78  From 4/14 showed gluc 107, cr 0.90, gfr>60, alt 25, hba1c 5.5, chol 190, tg 135, hdl 48, ldl-c 115, wbc 8.1, hb 14.2, plt 241, ua neg  From 8/14 showed gluc 97,   cr 0.90, gfr>60, alt 17, hba1c 6.0, chol 109, tg 111, hdl 44, ldl-c 43  From 2/15 showed      hba1c 6.0  From 3/15 showed                      psa 2.13  From 8/15 showed gluc 106, cr 0.98, gfr 75,  alt 20, hba1c 5.9, chol 129, tg 79,   hdl 51, ldl-c 62,   wbc 7.3, hb 14.2, plt 232  From 3/16 showed                      psa 2.90  From 4/16 showed      alt 37, hba1c 5.6, chol 108, tg 103, hdl 46, ldl-c 41  From 2/17 showed gluc 103, cr 1.00, gfr>60, alt 36, hba1c 5.6, chol 117, tg 93,   hdl 49, ldl-c 59,                tsh 4.14  From 1/18 showed                      psa 7.40  From 2/18 showed gluc 107, cr 1.01, gfr>60, alt 27,        chol 106, tg 113, hdl 42, ldl-c 41,   wbc 7.9, hb 13.8, plt 240,             tsh 3.15, ft4 0.90  From 8/18 showed gluc 97,   cr 1.01, gfr>60, alt 28,        wbc 7.6, hb 12.2, plt 206, fe 94, %sat 32, ferritin 83, b12 332, fol>20, spep neg, retic 1.0  From 5/19 showed gluc 100, cr 1.00, gfr>60, alt 29,        wbc 9.4, hb 13.4, plt 214  From 1/20 showed                      psa 0.23  From 2/20 showed             chol 116, tg 79,   hdl 43, ldl-c 57  From 7/20 showed     alt 34,         chol 123, tg 112, hdl 44, ldl-c 57  From 11/20 showed glu 108, cr 1.03, gfr>60, alt 28,        wbc 8.2, hb 13.6, plt 253  From 12/20 showed gluc 94, cr 1.01, gfr>60,  hba1c 5.6, chol 128, tg 108, hdl 49, ldl-c 57  From 1/21 showed                      psa 0.10, covid neg, flu neg    We reviewed the patient's labs from the last several visits to point out trends in the numbers        Patient Active Problem List   Diagnosis Code    Pederson's esophagus K22.70    BPH with obstruction/lower urinary tract symptoms N40.1, N13.8    Cancer of prostate ([facility] [address] C61    Colon polyp 2007 Dr. Margo Moore K63.5    BLAIR (obstructive sleep apnea) G47.33    S/P CABG x 3 Z95.1    IFG (impaired fasting glucose) R73.01    Hyperlipidemia E78.5    Bilateral carotid artery stenosis 12/14 I65.23    CAD (coronary artery disease) I25.10    Multinodular goiter 2014 E04.2    Pulmonary nodules R91.8    Essential hypertension I10    Impotence of organic origin N52.9    Overweight (BMI 25.0-29. 9) E66.3     Assessment and plan:  1. Pederson's. Lifelong PPI and f/u Dr Margo Moore  2. ED. Prn meds  3. BPH and prostate ca. F/U Dr. Lulu Pérez  4. Colon polyp. Fiber  5. Asthma. Prn inhalers  6. CHD. F/U Dr. Felicity Bolton as scheduled  7. IFG. Wt loss would be ideal, continue dietary and lifestyle measures  8. Bilat carotid stenosis. Cont to follow per cardiology  9. HTN. Continue current regimen. 10.  Overweight. Lifestyle and dietary measures. Portion control reiterated. 11.  Allergies. flonase sent in        RTC 11/21    Above conditions discussed at length and patient vocalized understanding.   All questions answered to patient satisfaction

## 2021-05-19 RX ORDER — BENAZEPRIL HYDROCHLORIDE 10 MG/1
TABLET ORAL
Qty: 90 TABLET | Refills: 4 | Status: SHIPPED | OUTPATIENT
Start: 2021-05-19 | End: 2022-08-10

## 2021-05-24 ENCOUNTER — OFFICE VISIT (OUTPATIENT)
Dept: INTERNAL MEDICINE CLINIC | Age: 81
End: 2021-05-24
Payer: MEDICARE

## 2021-05-24 VITALS
HEIGHT: 67 IN | SYSTOLIC BLOOD PRESSURE: 118 MMHG | WEIGHT: 183 LBS | TEMPERATURE: 98 F | BODY MASS INDEX: 28.72 KG/M2 | RESPIRATION RATE: 16 BRPM | OXYGEN SATURATION: 97 % | HEART RATE: 65 BPM | DIASTOLIC BLOOD PRESSURE: 58 MMHG

## 2021-05-24 DIAGNOSIS — G47.33 OSA (OBSTRUCTIVE SLEEP APNEA): ICD-10-CM

## 2021-05-24 DIAGNOSIS — I65.23 BILATERAL CAROTID ARTERY STENOSIS: Primary | ICD-10-CM

## 2021-05-24 DIAGNOSIS — I25.118 ATHEROSCLEROTIC HEART DISEASE OF NATIVE CORONARY ARTERY WITH OTHER FORMS OF ANGINA PECTORIS (HCC): ICD-10-CM

## 2021-05-24 DIAGNOSIS — K22.719 BARRETT'S ESOPHAGUS WITH DYSPLASIA: ICD-10-CM

## 2021-05-24 DIAGNOSIS — C61 CANCER OF PROSTATE (HCC): ICD-10-CM

## 2021-05-24 DIAGNOSIS — I25.10 CORONARY ARTERY DISEASE INVOLVING NATIVE CORONARY ARTERY OF NATIVE HEART WITHOUT ANGINA PECTORIS: ICD-10-CM

## 2021-05-24 DIAGNOSIS — R73.01 IFG (IMPAIRED FASTING GLUCOSE): ICD-10-CM

## 2021-05-24 DIAGNOSIS — J30.2 SEASONAL ALLERGIC RHINITIS, UNSPECIFIED TRIGGER: ICD-10-CM

## 2021-05-24 DIAGNOSIS — E78.5 HYPERLIPIDEMIA, UNSPECIFIED HYPERLIPIDEMIA TYPE: ICD-10-CM

## 2021-05-24 DIAGNOSIS — Z95.1 S/P CABG X 3: ICD-10-CM

## 2021-05-24 DIAGNOSIS — I10 ESSENTIAL HYPERTENSION: ICD-10-CM

## 2021-05-24 DIAGNOSIS — E04.2 MULTINODULAR GOITER: ICD-10-CM

## 2021-05-24 PROBLEM — E66.9 OBESITY (BMI 30-39.9): Status: RESOLVED | Noted: 2018-04-12 | Resolved: 2021-05-24

## 2021-05-24 PROBLEM — E66.3 OVERWEIGHT (BMI 25.0-29.9): Status: ACTIVE | Noted: 2021-05-24

## 2021-05-24 PROCEDURE — G8510 SCR DEP NEG, NO PLAN REQD: HCPCS | Performed by: INTERNAL MEDICINE

## 2021-05-24 PROCEDURE — 1101F PT FALLS ASSESS-DOCD LE1/YR: CPT | Performed by: INTERNAL MEDICINE

## 2021-05-24 PROCEDURE — G8536 NO DOC ELDER MAL SCRN: HCPCS | Performed by: INTERNAL MEDICINE

## 2021-05-24 PROCEDURE — 99214 OFFICE O/P EST MOD 30 MIN: CPT | Performed by: INTERNAL MEDICINE

## 2021-05-24 PROCEDURE — G0463 HOSPITAL OUTPT CLINIC VISIT: HCPCS | Performed by: INTERNAL MEDICINE

## 2021-05-24 PROCEDURE — G8752 SYS BP LESS 140: HCPCS | Performed by: INTERNAL MEDICINE

## 2021-05-24 PROCEDURE — G8417 CALC BMI ABV UP PARAM F/U: HCPCS | Performed by: INTERNAL MEDICINE

## 2021-05-24 PROCEDURE — G8427 DOCREV CUR MEDS BY ELIG CLIN: HCPCS | Performed by: INTERNAL MEDICINE

## 2021-05-24 PROCEDURE — G8754 DIAS BP LESS 90: HCPCS | Performed by: INTERNAL MEDICINE

## 2021-05-24 RX ORDER — FLUTICASONE PROPIONATE 50 MCG
SPRAY, SUSPENSION (ML) NASAL
Qty: 1 BOTTLE | Refills: 12 | Status: SHIPPED | OUTPATIENT
Start: 2021-05-24

## 2021-05-24 NOTE — PROGRESS NOTES
1. Have you been to the ER, urgent care clinic or hospitalized since your last visit? NO.     2. Have you seen or consulted any other health care providers outside of the [address] [place] [address] since your last visit (Include any pap smears or colon screening)?  NO

## 2021-06-01 ENCOUNTER — OFFICE VISIT (OUTPATIENT)
Dept: CARDIOLOGY CLINIC | Age: 81
End: 2021-06-01
Payer: MEDICARE

## 2021-06-01 VITALS
HEIGHT: 67 IN | DIASTOLIC BLOOD PRESSURE: 64 MMHG | WEIGHT: 184 LBS | OXYGEN SATURATION: 98 % | BODY MASS INDEX: 28.88 KG/M2 | HEART RATE: 51 BPM | SYSTOLIC BLOOD PRESSURE: 138 MMHG

## 2021-06-01 DIAGNOSIS — I25.10 CORONARY ARTERY DISEASE INVOLVING NATIVE CORONARY ARTERY OF NATIVE HEART WITHOUT ANGINA PECTORIS: Primary | ICD-10-CM

## 2021-06-01 PROCEDURE — G8417 CALC BMI ABV UP PARAM F/U: HCPCS | Performed by: INTERNAL MEDICINE

## 2021-06-01 PROCEDURE — G8754 DIAS BP LESS 90: HCPCS | Performed by: INTERNAL MEDICINE

## 2021-06-01 PROCEDURE — G8510 SCR DEP NEG, NO PLAN REQD: HCPCS | Performed by: INTERNAL MEDICINE

## 2021-06-01 PROCEDURE — G8536 NO DOC ELDER MAL SCRN: HCPCS | Performed by: INTERNAL MEDICINE

## 2021-06-01 PROCEDURE — 93000 ELECTROCARDIOGRAM COMPLETE: CPT | Performed by: INTERNAL MEDICINE

## 2021-06-01 PROCEDURE — 1101F PT FALLS ASSESS-DOCD LE1/YR: CPT | Performed by: INTERNAL MEDICINE

## 2021-06-01 PROCEDURE — G8752 SYS BP LESS 140: HCPCS | Performed by: INTERNAL MEDICINE

## 2021-06-01 PROCEDURE — G8427 DOCREV CUR MEDS BY ELIG CLIN: HCPCS | Performed by: INTERNAL MEDICINE

## 2021-06-01 PROCEDURE — 99214 OFFICE O/P EST MOD 30 MIN: CPT | Performed by: INTERNAL MEDICINE

## 2021-06-01 NOTE — PROGRESS NOTES
John Agrawal. presents today for   Chief Complaint   Patient presents with    Follow-up     prev [facility] pt, 6 month f/u       John Agrawal. preferred language for health care discussion is english/other. Is someone accompanying this pt? no    Is the patient using any DME equipment during [address]? no    Depression Screening:  3 most recent PHQ Screens 6/1/2021   Little interest or pleasure in doing things Not at all   Feeling down, depressed, irritable, or hopeless Not at all   Total Score PHQ 2 0       Learning Assessment:  Learning Assessment 5/8/2019   PRIMARY LEARNER Patient   HIGHEST LEVEL OF EDUCATION - PRIMARY LEARNER  SOME [address] PRIMARY LEARNER HEARING   CO-LEARNER CAREGIVER No   PRIMARY LANGUAGE ENGLISH   LEARNER PREFERENCE PRIMARY DEMONSTRATION   ANSWERED BY patient   RELATIONSHIP SELF       Abuse Screening:  Abuse Screening Questionnaire 6/1/2021   Do you ever feel afraid of your partner? N   Are you in a relationship with someone who physically or mentally threatens you? N   Is it safe for you to go home? Y       Fall Risk  Fall Risk Assessment, last 12 mths 6/1/2021   Able to walk? Yes   Fall in past 12 months? 0   Do you feel unsteady? 0   Are you worried about falling 0       Pt currently taking Anticoagulant therapy? Plavix 75 mg    Coordination of Care:  1. Have you been to the ER, urgent care clinic since your last visit? Hospitalized since your last visit? yes    2. Have you seen or consulted any other health care providers outside of the [address] [place] [address] since your last visit? Include any pap smears or colon screening.  no

## 2021-06-01 NOTE — PROGRESS NOTES
HISTORY OF PRESENT ILLNESS  Jorge Higginbotham is a 78 y.o. male. ASSESSMENT and PLAN    Mr. Terri Espinoza has known history of CAD. He developed exertional angina in 2014. He ultimately underwent bypass surgery after his coronary angiography revealed CAD. He had LIMA to LAD as well as SVG to diagonal and sequential to PDA. Because of bradycardia, he is only on Toprol-XL 25 mg daily. In October 2020, he had worsening dyspnea on exertion. He underwent repeat coronary angiography which revealed normal LV function with EF 60%. His LIMA to LAD is patent; SVG to second diagonal has 30 to 40% lesion with sequential segment occluded. Left main is patent with LAD proximal 95%. Circumflex has only mild diffuse disease. Super dominant RCA has ostial/proximal 95% stenosis followed by mid segment 90-95% stenosis followed by distal segment along 85-95% stenosis. RPDA has proximal 95% stenosis. The 4 separate RCA lesions were stented using LENNOX ranging in size from 3.25 mm at the ostium down to 2.25 mm in the RPDA. Since that time, his breathing has returned to baseline. He also has history of BLAIR but does not use CPAP. He has history of prostate carcinoma. He has history of Pederson's esophagus as well as colon adenoma. He still uses inhalers for his COPD. From cardiac standpoint, he is doing well. He has not noted any changes in his exercise capacity. He is breathing has returned to baseline. His blood pressure is well controlled. His rhythm shows asymptomatic sinus bradycardia. He is still able to tolerate low-dose beta-blockade. There is no evidence of decompensated CHF noted. His weight today is 184 pounds. This is near his baseline. His target LDL is less than 70. He remains on Lipitor 20 mg daily. He remains on baby aspirin and Plavix. I would continue his current medication regimen. I will see him back in 6 months. Thank you. Encounter Diagnoses   Name Primary?     Coronary artery disease involving native coronary artery of native heart without angina pectoris Yes     current treatment plan is effective, no change in therapy  lab results and schedule of future lab studies reviewed with patient  reviewed diet, exercise and weight control  cardiovascular risk and specific lipid/LDL goals reviewed  use of aspirin to prevent MI and TIA's discussed      HPI   Today, Mr. Romel Ugalde has no complaints of chest pains, increased shortness of breath or decreased exercise capacity. He has baseline dyspnea on exertion because of COPD. He still works at [employer]. He has no significant limitation to his activity level. He denies any orthopnea or PND. He denies any palpitations or dizziness. Review of Systems   Respiratory: Positive for shortness of breath. Cardiovascular: Negative for chest pain, palpitations, orthopnea, claudication, leg swelling and PND. All other systems reviewed and are negative. Physical Exam  Vitals and nursing note reviewed. Constitutional:       Appearance: Normal appearance. HENT:      Head: Normocephalic. Eyes:      Conjunctiva/sclera: Conjunctivae normal.   Neck:      Vascular: No carotid bruit. Cardiovascular:      Rate and Rhythm: Regular rhythm. Bradycardia present. Pulmonary:      Breath sounds: Normal breath sounds. Abdominal:      Palpations: Abdomen is soft. Musculoskeletal:         General: No swelling. Cervical back: No rigidity. Skin:     General: Skin is warm and dry. Neurological:      General: No focal deficit present. Mental Status: He is alert and oriented to person, place, and time.    Psychiatric:         Mood and Affect: Mood normal.         Behavior: Behavior normal.         PCP: Tiffanie Veras MD    Past Medical History:   Diagnosis Date    Allergic rhinitis     Pederson's esophagus     Dr. Simeon Chambers; last 8/17, for [facility]    CABG x 3 04/24/2014    LIMA to LAD, and saphenous vein grafts to the second diagonal of the LAD, and to the posterior descending branch of the RCA, Dr. Lizzeth Abraham, 4/23/14.  CAD (coronary artery disease)     Colon adenoma 08/2017    Dr Broderick Rivera Colon polyps 2007    Dr. Broderick Rivera COPD (chronic obstructive pulmonary disease) ([facility]     mild obst disease on PFTs Dr. Lorena Atkins (2007)    Erectile dysfunction     GERD (gastroesophageal reflux disease)     by EGD 2007 Dr. Teddy Unger; EGD 5/14/21 hh and s/p dilation stricture    Hearing loss     Hypertension     BLAIR (obstructive sleep apnea) 2011    CPAP not used    Osteoarthritis of both hands     Prostate cancer ([facility] 06/2010    Dr. Ilene Aschoff [id number] [place] 3+3, 1/9 cores up to 10% 1 core; cryo Oct 2012 psa went from 7/1 to torsten 1.78    Prostate cancer ([facility] 04/2018    Dr Hilalry Wing stage 2B [id number], [place] 3+4, P,10, G2; brachytherapy Dr Jose L Ojeda Pulmonary nodule 2014    no change 2015, 3/17       Past Surgical History:   Procedure Laterality Date    COLONOSCOPY N/A 7/27/2017    Dr. Teddy nUger 2007 negative; 8/17 adenoma     HX Stephanie Telles    HX CATARACT REMOVAL Bilateral 11/2014    Dr Nakia Rosenberg HX CHOLECYSTECTOMY  2004    HX HEART CATHETERIZATION  2020    Stent    HX TONSILLECTOMY      HX TURP      x2 Dr. Latrell Hood HX UROLOGICAL  10/2012    [facility], Dr. Deedee Fontaine, Cystoscopy and dilation of stricture, cryoablation of prostate    FL CABG, ARTERY-VEIN, THREE  04/2014    3V CABG; Dr Lizzeth LEAVITT to LAD, SVG to DM2 and sequential to RPDA       Current Outpatient Medications   Medication Sig Dispense Refill    fluticasone propionate (FLONASE) 50 mcg/actuation nasal spray 2 sprays each nostril daily 1 Bottle 12    benazepriL (LOTENSIN) 10 mg tablet TAKE ONE TABLET BY MOUTH DAILY 90 Tablet 4    clopidogreL (Plavix) 75 mg tab Take 1 Tab by mouth daily. 4 tablets on first day then take 1 tablet daily thereafter 90 Tab 3    aspirin 81 mg chewable tablet Take 81 mg by mouth daily.       nitroglycerin (NITROSTAT) 0.4 mg SL tablet 1 Tab by SubLINGual route every five (5) minutes as needed for Chest Pain. 1 Bottle 3    metoprolol succinate (TOPROL-XL) 25 mg XL tablet TAKE ONE TABLET BY MOUTH DAILY 90 Tab 2    atorvastatin (LIPITOR) 20 mg tablet TAKE ONE TABLET BY MOUTH EVERY NIGHT AT BEDTIME 90 Tab 3    levalbuterol tartrate (XOPENEX) 45 mcg/actuation inhaler Take 2 Puffs by inhalation every six (6) hours as needed for Wheezing. 1 Inhaler 5    amLODIPine (NORVASC) 10 mg tablet TAKE ONE TABLET BY MOUTH DAILY 90 Tab 3    omeprazole (PRILOSEC) 20 mg capsule Take 20 mg by mouth two (2) times a day.  ascorbic acid (VITAMIN C) 1,000 mg tablet Take 1,000 mg by mouth daily.  MULTIVITAMIN W-MINERALS/LUTEIN (CENTRUM SILVER PO) Take 1 Tab by mouth daily.  triamcinolone acetonide (KENALOG-40) 40 mg/mL injection  (Patient not taking: Reported on 2021)         The patient has a family history of    Social History     Tobacco Use    Smoking status: Former Smoker     Packs/day: 0.50     Years: 4.00     Pack years: 2.00     Quit date: 1957     Years since quittin.4    Smokeless tobacco: Never Used    Tobacco comment: Quit in [address] Use: Never used   Substance Use Topics    Alcohol use: No    Drug use: Never       Lab Results   Component Value Date/Time    Cholesterol, total 128 2020 09:26 AM    HDL Cholesterol 49 2020 09:26 AM    LDL, calculated 57.4 2020 09:26 AM    Triglyceride 108 2020 09:26 AM    CHOL/HDL Ratio 2.6 2020 09:26 AM        BP Readings from Last 3 Encounters:   21 138/64   21 (!) 118/58   21 (!) 144/61        Pulse Readings from Last 3 Encounters:   21 (!) 51   21 65   21 (!) 50       Wt Readings from Last 3 Encounters:   21 83.5 kg (184 lb)   21 83 kg (183 lb)   21 83.3 kg (183 lb 9.6 oz)         EKG: unchanged from previous tracings, sinus bradycardia.

## 2021-06-28 ENCOUNTER — HOSPITAL ENCOUNTER (OUTPATIENT)
Dept: LAB | Age: 81
Discharge: HOME | End: 2021-06-28
Payer: MEDICARE

## 2021-06-28 ENCOUNTER — TRANSCRIBE ORDER (OUTPATIENT)
Dept: RADIATION THERAPY | Age: 81
End: 2021-06-28

## 2021-06-28 DIAGNOSIS — C61 MALIGNANT NEOPLASM OF PROSTATE (HCC): ICD-10-CM

## 2021-06-28 DIAGNOSIS — C61 MALIGNANT NEOPLASM OF PROSTATE (HCC): Primary | ICD-10-CM

## 2021-06-28 LAB — PSA SERPL-MCNC: 0.1 NG/ML (ref 0–4)

## 2021-06-28 PROCEDURE — 84153 ASSAY OF PSA TOTAL: CPT

## 2021-06-28 PROCEDURE — 84403 ASSAY OF TOTAL TESTOSTERONE: CPT

## 2021-06-28 PROCEDURE — 36415 COLL VENOUS BLD VENIPUNCTURE: CPT

## 2021-06-29 LAB
COMMENT, TESC2: NORMAL
TESTOST SERPL-MCNC: 498 NG/DL (ref 264–916)

## 2021-07-03 NOTE — IP AVS SNAPSHOT
[facility] [address] [address] [address] [address] [phone] Patient: Maritza Younger. MRN: [id number] About your hospitalization You were admitted on:  November 13, 2017 You last received care in the:  [facility] You were discharged on:  November 13, 2017 Why you were hospitalized Your primary diagnosis was:  Not on File Things You Need To Do (next 8 weeks) Follow up with Chetna Beltre MD  
  
Phone:  [phone] Where:  [address], [facility] [facility] Critical access hospital Follow up with Sonja Umaña MD  
  
Phone:  [phone] Where:  [address], [address] [facility] [address], [facility] Marisol Spear [facility] 1949 , [address] [address] [address] Discharge Orders None A check sobia indicates which time of day the medication should be taken. My Medications TAKE these medications as instructed Instructions Each Dose to Equal  
 Morning Noon Evening Bedtime  
 aspirin 81 mg tablet Your last dose was: Your next dose is: Take 81 mg by mouth daily. Indications: MYOCARDIAL INFARCTION PREVENTION 81 mg  
    
   
   
   
  
 atorvastatin 20 mg tablet Commonly known as:  LIPITOR Your last dose was: Your next dose is: TAKE 1 TABLET BY MOUTH EVERY EVENING  
     
   
   
   
  
 CENTRUM SILVER PO Your last dose was: Your next dose is: Take 1 Tab by mouth daily. 1 Tab  
    
   
   
   
  
 glucosam-chondroit-C-manganese 181-754-76-3 mg Cap Your last dose was: Your next dose is: Take 1 Tab by mouth daily. 1 Tab HYDROcodone-acetaminophen 5-325 mg per tablet Commonly known as:  Malka Mura Your last dose was: Your next dose is: Take 1-2 Tabs by mouth every six (6) hours as needed for Pain.  Max Daily Amount: 8 Tabs. 1-2 Tab  
    
   
   
   
  
 metoprolol succinate 25 mg XL tablet Commonly known as:  TOPROL-XL Your last dose was: Your next dose is: TAKE 1 TABLET BY MOUTH DAILY NexIUM 20 mg capsule Generic drug:  esomeprazole Your last dose was: Your next dose is: Take 20 mg by mouth daily. 20 mg  
    
   
   
   
  
 nitroglycerin 0.4 mg SL tablet Commonly known as:  NITROSTAT Your last dose was: Your next dose is: 0.4 mg by SubLINGual route every five (5) minutes as needed for Chest Pain. 0.4 mg  
    
   
   
   
  
 VITAMIN B-12 2,500 mcg sublingual tablet Generic drug:  cyanocobalamin Your last dose was: Your next dose is: Take 2,500 mcg by mouth daily. 2500 mcg VITAMIN C 1,000 mg tablet Generic drug:  ascorbic acid (vitamin C) Your last dose was: Your next dose is: Take 1,000 mg by mouth daily. 1000 mg Discharge Instructions Upper GI Endoscopy: What to Expect at [facility] Your Recovery After you have an endoscopy, you will stay at the hospital or clinic for 1 to 2 hours. This will allow the medicine to wear off. You will be able to go home after your doctor or nurse checks to make sure you are not having any problems. You may have to stay overnight if you had treatment during the test. You may have a sore throat for a day or two after the test. 
This care sheet gives you a general idea about what to expect after the test. 
How can you care for yourself at home? Activity · Rest as much as you need to after you go home. · You should be able to go back to your usual activities the day after the test. 
Diet · Follow your doctor's directions for eating after the test. 
· Drink plenty of fluids (unless your doctor has told you not to). Medications · If you have a sore throat the day after the test, use an over-the-counter spray to numb your throat. Follow-up care is a key part of your treatment and safety. Be sure to make and go to all appointments, and call your doctor if you are having problems. It's also a good idea to know your test results and keep a list of the medicines you take. When should you call for help? Call 911 anytime you think you may need emergency care. For example, call if: 
· You passed out (lost consciousness). · You cough up blood. · You vomit blood or what looks like coffee grounds. · You pass maroon or very bloody stools. Call your doctor now or seek immediate medical care if: 
· You have trouble swallowing. · You have belly pain. · Your stools are black and tarlike or have streaks of blood. · You are sick to your stomach or cannot keep fluids down. Watch closely for changes in your health, and be sure to contact your doctor if: 
· Your throat still hurts after a day or two. · You do not get better as expected. Where can you learn more? Go to "[employer]be Enter [phone] in the search box to learn more about \"Upper GI Endoscopy: What to Expect at Home. \"  
© 5768-1160 Healthwise, Incorporated. Care instructions adapted under license by [employer] (which disclaims liability or warranty for this information). This care instruction is for use with your licensed healthcare professional. If you have questions about a medical condition or this instruction, always ask your healthcare professional. [place] [address] any warranty or liability for your use of this information. Content Version: 84.3.522298; Current as of: November 14, 2014 Esophageal Dilation: What to Expect at [facility] Your Recovery After you have esophageal dilation, you will stay at the hospital or surgery center for 1 to 2 hours. This will allow the medicine to wear off. You will be able to go home after your doctor or nurse checks to make sure you are not having any problems. This care sheet gives you a general idea about how long it will take for you to recover. But each person recovers at a different pace. Follow the steps below to get better as quickly as possible. How can you care for yourself at home? Activity ? · Rest as much as you need to after you go home. ? · You should be able to go back to your usual activities the day after the procedure. Diet ? · Follow your doctor's directions for eating after the procedure. ? · Drink plenty of fluids (unless your doctor has told you not to). Medicines ? · Your doctor will tell you if and when you can restart your medicines. He or she will also give you instructions about taking any new medicines. ? · If you take blood thinners, such as warfarin (Coumadin), clopidogrel (Plavix), or aspirin, be sure to talk to your doctor. He or she will tell you if and when to start taking those medicines again. Make sure that you understand exactly what your doctor wants you to do. ? · If you have a sore throat the day after the procedure, use an over-the-counter spray to numb your throat. Sucking on throat lozenges and gargling with warm salt water may also help relieve your symptoms. Follow-up care is a key part of your treatment and safety. Be sure to make and go to all appointments, and call your doctor if you are having problems. It's also a good idea to know your test results and keep a list of the medicines you take. When should you call for help? Call 911 anytime you think you may need emergency care. For example, call if: 
? · You passed out (lost consciousness). ? · You have trouble breathing. ? · Your stools are maroon or very bloody ? Call your doctor now or seek immediate medical care if: 
? · You have new or worse belly pain. ? · You have a fever. ? · You have new or more blood in your stools. ? · You are sick to your stomach and cannot drink fluids. ? · You cannot pass stools or gas. ? · You have pain that does not get better after you take pain medicine. ? Watch closely for changes in your health, and be sure to contact your doctor if: 
? · Your throat still hurts after a day or two. ? · You do not get better as expected. Where can you learn more? Go to http://martine-feng.info/. Enter M008 in the search box to learn more about \"Esophageal Dilation: What to Expect at Home. \" Current as of: May 12, 2017 Content Version: 11.4 © 6054-3289 [employer]. Care instructions adapted under license by "[employer] (which disclaims liability or warranty for this information). If you have questions about a medical condition or this instruction, always ask your healthcare professional. [address] any warranty or liability for your use of this information. DISCHARGE SUMMARY from Nurse POST-PROCEDURE INSTRUCTIONS: 
 
Call your Physician if you: 
? Observe any excess bleeding. ? Develop a temperature over 100.5o F. 
? Experience abdominal, shoulder or chest pain. ? Notice any signs of decreased circulation or nerve impairment to an extremity such as a change in color, persistent numbness, tingling, coldness or increase in pain. ? Vomit blood or you have nausea and vomiting lasting longer than 4 hours. ? Are unable to take medications. ? Are unable to urinate within 8 hours after discharge following general anesthesia or intravenous sedation. For the next 24 hours after receiving general anesthesia or intravenous sedation, or while taking prescription Narcotics, limit your activities: 
? Do NOT drive a motor vehicle, operate hazard machinery or power tools, or perform tasks that require coordination. The medication you received during your procedure may have some effect on your mental awareness. ? Do NOT make important personal or business decisions. The medication you received during your procedure may have some effect on your mental awareness. ? Do NOT drink alcoholic beverages. These drinks do not mix well with the medications that have been given to you. ? Upon discharge from the hospital, you must be accompanied by a responsible adult. ? Resume your diet as directed by your physician. ? Resume medications as your physician has prescribed. ? Please give a list of your current medications to your Primary Care Provider. ? Please update this list whenever your medications are discontinued, doses are changed, or new medications (including over-the-counter products) are added. ? Please carry medication information at all times in case of emergency situations. These are general instructions for a healthy lifestyle: No smoking/ No tobacco products/ Avoid exposure to second hand smoke. ? Surgeon General's Warning:  Quitting smoking now greatly reduces serious risk to your health. Obesity, smoking, and a sedentary lifestyle greatly increase your risk for illness. ? A healthy diet, regular physical exercise & weight monitoring are important for maintaining a healthy lifestyle ? You may be retaining fluid if you have a history of heart failure or if you experience any of the following symptoms:  Weight gain of 3 pounds or more overnight or 5 pounds in a week, increased swelling in our hands or feet or shortness of breath while lying flat in bed. Please call your doctor as soon as you notice any of these symptoms; do not wait until your next office visit. Recognize signs and symptoms of STROKE: 
F  -  Face looks uneven A  -  Arms unable to move or move unevenly S  -  Speech slurred or non-existent T  -  Time to call 911 - as soon as signs and symptoms begin - DO NOT go back to bed or wait to see If you get better - TIME IS BRAIN. Colorectal Screening ? Colorectal cancer almost always develops from precancerous polyps (abnormal growths) in the colon or rectum. Screening tests can find precancerous polyps, so that they can be removed before they turn into cancer. Screening tests can also find colorectal cancer early, when treatment works best. 
? Speak with your physician about when you should begin screening and how often you should be tested ? Additional Information If you have questions, please call [phone]. Remember, MyChart is NOT to be used for urgent needs. For medical emergencies, dial 911. Educational references and/or instructions provided during this visit included: 
 
Esophageal Dilation APPOINTMENTS: 
 
Please make a follow-up appointment with your physician. Discharge information has been reviewed with the patient and spouse. The patient and spouse verbalized understanding. ACO Transitions of Care Introducing [employer] [address] Molly Lozano offers a voluntary care coordination program to provide high quality service and care to [facility] fee-for-service beneficiaries. Soraya Mcdaniels was designed to help you enhance your health and well-being through the following services: ? Transitions of Care  support for individuals who are transitioning from one care setting to another (example: Hospital to home). ? Chronic and Complex Care Coordination  support for individuals and caregivers of those with serious or chronic illnesses or with more than one chronic (ongoing) condition and those who take a number of different medications. If you meet specific medical criteria, a [facility] [address] may call you directly to coordinate your care with your primary care physician and your other care providers. For questions about the [facility] programs, please, contact your physicians office. For general questions or additional information about Accountable Care Organizations: 
Please visit www.medicare.gov/acos. html or call 1-800-MEDICARE (0-784.568.4096) TTY users should call 5-570.633.4324. Introducing [facility] & HEALTH SERVICES! Dear Andrea Feliz: Thank you for requesting a [employer] account. Our records indicate that you have previously registered for a [employer] account but its currently inactive. Please call our [employer] support line at [phone]. Additional Information If you have questions, please visit the Frequently Asked Questions section of the [employer] website at [email or web address] Remember, [employer] is NOT to be used for urgent needs. For medical emergencies, dial 911. Now available from your iPhone and Android! Providers Seen During Your Hospitalization Provider Specialty Primary office phone Jackelin Grayson MD Gastroenterology [phone] Your Primary Care Physician (PCP) Primary Care Physician Office Phone Office Fax Roman Torres [phone] You are allergic to the following Allergen Reactions Albuterol Rash  
 seizure Influenza Virus Vaccine, Specific Other (comments) Percocet (Oxycodone-Acetaminophen) Other (comments) Recent Documentation Height Weight BMI Smoking Status 1.74 m 81.6 kg 26.97 kg/m2 Former Smoker Emergency Contacts Name Discharge Info Relation Home Work Mobile Shannan Patelyce DISCHARGE CAREGIVER [3] Spouse [3] [phone] Patient Belongings The following personal items are in your possession at time of discharge: 
  Dental Appliances: None  Visual Aid: Glasses, With patient   Hearing Aids/Status: Does not own Please provide this summary of care documentation to your next provider.  
  
  
 
  
Signatures-by signing, you are acknowledging that this After Visit Summary has been reviewed with you and you have received a copy. Patient Signature:  ____________________________________________________________ Date:  ____________________________________________________________  
  
Janyth Mins Provider Signature:  ____________________________________________________________ Date:  ____________________________________________________________ Former smoker

## 2021-08-02 RX ORDER — CLOPIDOGREL BISULFATE 75 MG/1
75 TABLET ORAL DAILY
Qty: 90 TABLET | Refills: 3 | Status: SHIPPED | OUTPATIENT
Start: 2021-08-02 | End: 2022-08-08

## 2021-11-22 ENCOUNTER — APPOINTMENT (OUTPATIENT)
Dept: INTERNAL MEDICINE CLINIC | Age: 81
End: 2021-11-22

## 2021-11-22 ENCOUNTER — HOSPITAL ENCOUNTER (OUTPATIENT)
Dept: LAB | Age: 81
Discharge: HOME | End: 2021-11-22
Payer: MEDICARE

## 2021-11-22 DIAGNOSIS — E78.5 HYPERLIPIDEMIA, UNSPECIFIED HYPERLIPIDEMIA TYPE: ICD-10-CM

## 2021-11-22 DIAGNOSIS — R73.01 IFG (IMPAIRED FASTING GLUCOSE): ICD-10-CM

## 2021-11-22 DIAGNOSIS — Z95.1 S/P CABG X 3: ICD-10-CM

## 2021-11-22 LAB
ALBUMIN SERPL-MCNC: 4.1 G/DL (ref 3.4–5)
ALBUMIN/GLOB SERPL: 1.4 {RATIO} (ref 0.8–1.7)
ALP SERPL-CCNC: 51 U/L (ref 45–117)
ALT SERPL-CCNC: 29 U/L (ref 16–61)
ANION GAP SERPL CALC-SCNC: 5 MMOL/L (ref 3–18)
AST SERPL-CCNC: 26 U/L (ref 10–38)
BILIRUB SERPL-MCNC: 0.4 MG/DL (ref 0.2–1)
BUN SERPL-MCNC: 15 MG/DL (ref 7–18)
BUN/CREAT SERPL: 14 (ref 12–20)
CALCIUM SERPL-MCNC: 9.2 MG/DL (ref 8.5–10.1)
CHLORIDE SERPL-SCNC: 106 MMOL/L (ref 100–111)
CHOLEST SERPL-MCNC: 116 MG/DL
CO2 SERPL-SCNC: 28 MMOL/L (ref 21–32)
CREAT SERPL-MCNC: 1.07 MG/DL (ref 0.6–1.3)
ERYTHROCYTE [DISTWIDTH] IN BLOOD BY AUTOMATED COUNT: 13.3 % (ref 11.6–14.5)
EST. AVERAGE GLUCOSE BLD GHB EST-MCNC: 111 MG/DL
GLOBULIN SER CALC-MCNC: 3 G/DL (ref 2–4)
GLUCOSE SERPL-MCNC: 115 MG/DL (ref 74–99)
HBA1C MFR BLD: 5.5 % (ref 4.2–5.6)
HCT VFR BLD AUTO: 39.4 % (ref 36–48)
HDLC SERPL-MCNC: 48 MG/DL (ref 40–60)
HDLC SERPL: 2.4 {RATIO} (ref 0–5)
HGB BLD-MCNC: 12.9 G/DL (ref 13–16)
LDLC SERPL CALC-MCNC: 50 MG/DL (ref 0–100)
LIPID PROFILE,FLP: NORMAL
MCH RBC QN AUTO: 30.1 PG (ref 24–34)
MCHC RBC AUTO-ENTMCNC: 32.7 G/DL (ref 31–37)
MCV RBC AUTO: 91.8 FL (ref 78–100)
NRBC # BLD: 0 K/UL (ref 0–0.01)
NRBC BLD-RTO: 0 PER 100 WBC
PLATELET # BLD AUTO: 229 K/UL (ref 135–420)
PMV BLD AUTO: 10.2 FL (ref 9.2–11.8)
POTASSIUM SERPL-SCNC: 4.1 MMOL/L (ref 3.5–5.5)
PROT SERPL-MCNC: 7.1 G/DL (ref 6.4–8.2)
RBC # BLD AUTO: 4.29 M/UL (ref 4.35–5.65)
SODIUM SERPL-SCNC: 139 MMOL/L (ref 136–145)
TRIGL SERPL-MCNC: 90 MG/DL (ref ?–150)
VLDLC SERPL CALC-MCNC: 18 MG/DL
WBC # BLD AUTO: 8.9 K/UL (ref 4.6–13.2)

## 2021-11-22 PROCEDURE — 80061 LIPID PANEL: CPT

## 2021-11-22 PROCEDURE — 83036 HEMOGLOBIN GLYCOSYLATED A1C: CPT

## 2021-11-22 PROCEDURE — 80053 COMPREHEN METABOLIC PANEL: CPT

## 2021-11-22 PROCEDURE — 36415 COLL VENOUS BLD VENIPUNCTURE: CPT

## 2021-11-22 PROCEDURE — 85027 COMPLETE CBC AUTOMATED: CPT

## 2021-11-25 NOTE — PROGRESS NOTES
78 y.o. male who presents for evaluation. Dr Rhiannon Alberto continues to follow the prostatic issues     Continues to work 4d/week at [employer]. saw Dr Elizabeth Anderson a few weeks ago and no new recs    Dr Sandro Stapleton and no alarm complaints     Denies polyuria, polydipsia, nocturia, vision change. Not checking sugars at this time.       No sx referable to the thyroid. He is requesting some voltaren gel be sent as it really helped with his wife's arthritis    LAST MEDICARE WELLNESS EXAM: 8/12/15, 2/22/17, 4/12/18, 5/8/19, 11/17/20, 11/29/21    Past Medical History:   Diagnosis Date    Allergic rhinitis     Pederson's esophagus     Dr. Sandro Stapleton; last 8/17, for HALO    CABG x 3 04/24/2014    LIMA to LAD, and saphenous vein grafts to the second diagonal of the LAD, and to the posterior descending branch of the RCA, Dr. Ariadna Rebolledo, 4/23/14.      CAD (coronary artery disease)     Colon adenoma 08/2017    Dr Shellie Lucia Colon polyps 2007    Dr. Shellie Lucia COPD (chronic obstructive pulmonary disease) ([facility]     mild obst disease on PFTs Dr. Kishore Davila (2007)    Erectile dysfunction     GERD (gastroesophageal reflux disease)     by EGD 2007 Dr. Sandro Stapleton; EGD 5/14/21 hh and s/p dilation stricture    Hearing loss     Hypertension     BLAIR (obstructive sleep apnea) 2011    CPAP not used    Osteoarthritis of both hands     Prostate cancer ([facility] 06/2010    Dr. Littlejohn Graft [id number] Arion 3+3, 1/9 cores up to 10% 1 core; cryo Oct 2012 psa went from 7/1 to torsten 1.78    Prostate cancer ([facility] [address] 04/2018    Dr Myranda Dawson stage 2B [id number], Jj 3+4, P,10, G2; brachytherapy Dr Zainab Acevedo Pulmonary nodule 2014    no change 2015, 3/17     Past Surgical History:   Procedure Laterality Date    COLONOSCOPY N/A 7/27/2017    Dr. Sandro Stapleton 2007 negative; 8/17 adenoma     Regenia Chamber      Dr. Levora Kehr    HX CATARACT REMOVAL Bilateral 11/2014    Dr Rigo Wilson  2004   [facility]  2020 Stent    HX TONSILLECTOMY      HX TURP      x2 Dr. Pagan Reveal HX UROLOGICAL  10/2012    [facility], Dr. Benny Hayden, Cystoscopy and dilation of stricture, cryoablation of prostate    ID CABG, ARTERY-VEIN, THREE  2014    3V CABG; Dr Irina LEAVITT to LAD, SVG to DM2 and sequential to RPDA     Social History     Socioeconomic History    Marital status:      Spouse name: Not on file    Number of children: 3    Years of education: Not on file    Highest education level: Not on file   Occupational History    Occupation: ret engr [employer]     Employer: RETIRED   Tobacco Use    Smoking status: Former Smoker     Packs/day: 0.50     Years: 4.00     Pack years: 2.00     Quit date: 1957     Years since quittin.9    Smokeless tobacco: Never Used    Tobacco comment: Quit in    Vaping Use    Vaping Use: Never used   Substance and Sexual Activity    Alcohol use: No    Drug use: Never    Sexual activity: Not on file   Other Topics Concern    Not on file   Social History Narrative    Not on file     Social Determinants of Health     Financial Resource Strain:     Difficulty of Paying Living Expenses: Not on file   Food Insecurity:     Worried About [address] in the Last Year: Not on file    [address] in the Last Year: Not on file   Transportation Needs:     Lack of Transportation (Medical): Not on file    Lack of Transportation (Non-Medical):  Not on file   Physical Activity:     Days of Exercise per Week: Not on file    Minutes of Exercise per Session: Not on file   Stress:     Feeling of Stress : Not on file   Social Connections:     Frequency of Communication with Friends and Family: Not on file    Frequency of Social Gatherings with Friends and Family: Not on file    Attends Yazidi Services: Not on file    Active Member of Clubs or Organizations: Not on file    Attends Club or Organization Meetings: Not on file    Marital Status: Not on file   Intimate Partner Violence:     Fear of Current or Ex-Partner: Not on file    Emotionally Abused: Not on file    Physically Abused: Not on file    Sexually Abused: Not on file   Housing Stability:     Unable to Pay for Housing in the Last Year: Not on file    Number of Places Lived in the Last Year: Not on file    Unstable Housing in the Last Year: Not on file     Allergies   Allergen Reactions    Albuterol Palpitations    Influenza Virus Vaccine, Specific Other (comments)     Current Outpatient Medications   Medication Sig    diclofenac (VOLTAREN) 1 % gel Apply 2 g to affected area four (4) times daily.  atorvastatin (LIPITOR) 20 mg tablet TAKE ONE TABLET BY MOUTH EVERY NIGHT AT BEDTIME    clopidogreL (Plavix) 75 mg tab Take 1 Tablet by mouth daily. 1 tablet daily    amLODIPine (NORVASC) 10 mg tablet TAKE ONE TABLET BY MOUTH DAILY    metoprolol succinate (TOPROL-XL) 25 mg XL tablet TAKE ONE TABLET BY MOUTH DAILY    fluticasone propionate (FLONASE) 50 mcg/actuation nasal spray 2 sprays each nostril daily    benazepriL (LOTENSIN) 10 mg tablet TAKE ONE TABLET BY MOUTH DAILY    aspirin 81 mg chewable tablet Take 81 mg by mouth daily.  nitroglycerin (NITROSTAT) 0.4 mg SL tablet 1 Tab by SubLINGual route every five (5) minutes as needed for Chest Pain.  levalbuterol tartrate (XOPENEX) 45 mcg/actuation inhaler Take 2 Puffs by inhalation every six (6) hours as needed for Wheezing.  omeprazole (PRILOSEC) 20 mg capsule Take 20 mg by mouth two (2) times a day.  ascorbic acid (VITAMIN C) 1,000 mg tablet Take 1,000 mg by mouth daily.  MULTIVITAMIN W-MINERALS/LUTEIN (CENTRUM SILVER PO) Take 1 Tab by mouth daily.  triamcinolone acetonide (KENALOG-40) 40 mg/mL injection  (Patient not taking: Reported on 6/1/2021)     No current facility-administered medications for this visit.      REVIEW OF SYSTEMS:  colo 2017 Dr Akiko Ferrer  Ophtho  no vision change or eye pain  Oral  no mouth pain, tongue or tooth problems  Ears  no hearing loss, ear pain, fullness, no swallowing problems  Cardiac  no CP, PND, orthopnea, edema, palpitations or syncope  Chest  no breast masses  Resp  no wheezing, chronic coughing, dyspnea  GI  no heartburn, nausea, vomiting, change in bowel habits, bleeding, hemorrhoids  Urinary  no dysuria, hematuria, flank pain, urgency, frequency    Visit Vitals  /64   Pulse 62   Temp 98.1 °F (36.7 °C) (Temporal)   Resp 16   Ht 5' 7\" (1.702 m)   Wt 186 lb (84.4 kg)   SpO2 96%   BMI 29.13 kg/m²     A&O x3  Affect is appropriate. Mood stable  No apparent distress  Anicteric, no JVD, adenopathy or thyromegaly. No carotid bruits or radiated murmur  Lungs clear to auscultation, no wheezes or rales  Heart showed regular rate and rhythm. No murmur, rubs, gallops  Abdomen soft nontender, no hepatosplenomegaly or masses. Extremities with trace pedal edema. Venous varicosities noted.  Pulses 1+ symmetrically    LABS   From 9/10 showed gluc 102, cr 1.00,    alt 41,        chol 190, tg 102, hdl 47, ldl-c 123, wbc 8.3, hb 14.6, plt 222, psa 7.60  From 6/12 showed                        psa 5.78  From 8/12 showed                                            b12 400, fol>20  From 9/12 showed gluc 109, cr 1.10, gfr>60,                 wbc 8.1, hb 14.1, plt 232  From 9/13 showed                        psa 0.96  From 3/14 showed                        psa 1.78  From 4/14 showed gluc 107, cr 0.90, gfr>60, alt 25, hba1c 5.5, chol 190, tg 135, hdl 48, ldl-c 115, wbc 8.1, hb 14.2, plt 241, ua neg  From 8/14 showed gluc 97,   cr 0.90, gfr>60, alt 17, hba1c 6.0, chol 109, tg 111, hdl 44, ldl-c 43  From 2/15 showed      hba1c 6.0  From 3/15 showed                      psa 2.13  From 8/15 showed gluc 106, cr 0.98, gfr 75,  alt 20, hba1c 5.9, chol 129, tg 79,   hdl 51, ldl-c 62,   wbc 7.3, hb 14.2, plt 232  From 3/16 showed                      psa 2.90  From 4/16 showed      alt 37, hba1c 5.6, chol 108, tg 103, hdl 46, ldl-c 41  From 2/17 showed gluc 103, cr 1.00, gfr>60, alt 36, hba1c 5.6, chol 117, tg 93,   hdl 49, ldl-c 59,                tsh 4.14  From 1/18 showed                      psa 7.40  From 2/18 showed gluc 107, cr 1.01, gfr>60, alt 27,        chol 106, tg 113, hdl 42, ldl-c 41,   wbc 7.9, hb 13.8, plt 240,             tsh 3.15, ft4 0.90  From 8/18 showed gluc 97,   cr 1.01, gfr>60, alt 28,        wbc 7.6, hb 12.2, plt 206,             fe 94, %sat 32, ferritin 83, b12 332, fol>20, spep neg, retic 1.0  From 5/19 showed gluc 100, cr 1.00, gfr>60, alt 29,        wbc 9.4, hb 13.4, plt 214  From 1/20 showed                      psa 0.23  From 2/20 showed             chol 116, tg 79,   hdl 43, ldl-c 57  From 7/20 showed     alt 34,         chol 123, tg 112, hdl 44, ldl-c 57  From 11/20 showed glu 108, cr 1.03, gfr>60, alt 28,        wbc 8.2, hb 13.6, plt 253  From 12/20 showed gluc 94, cr 1.01, gfr>60,  hba1c 5.6, chol 128, tg 108, hdl 49, ldl-c 57  From 1/21 showed                      psa 0.10, test 47, covid neg, flu neg    Results for orders placed or performed during the hospital encounter of 11/22/21   CBC W/O DIFF   Result Value Ref Range    WBC 8.9 4.6 - 13.2 K/uL    RBC 4.29 (L) 4.35 - 5.65 M/uL    HGB 12.9 (L) 13.0 - 16.0 g/dL    HCT 39.4 36.0 - 48.0 %    MCV 91.8 78.0 - 100.0 FL    MCH 30.1 24.0 - 34.0 PG    MCHC 32.7 31.0 - 37.0 g/dL    RDW 13.3 11.6 - 14.5 %    PLATELET 695 718 - 911 K/uL    MPV 10.2 9.2 - 11.8 FL    NRBC 0.0 0  WBC    ABSOLUTE NRBC 0.00 0.00 - 0.75 K/uL   METABOLIC PANEL, COMPREHENSIVE   Result Value Ref Range    Sodium 139 136 - 145 mmol/L    Potassium 4.1 3.5 - 5.5 mmol/L    Chloride 106 100 - 111 mmol/L    CO2 28 21 - 32 mmol/L    Anion gap 5 3.0 - 18 mmol/L    Glucose 115 (H) 74 - 99 mg/dL    BUN 15 7.0 - 18 MG/DL    Creatinine 1.07 0.6 - 1.3 MG/DL    BUN/Creatinine ratio 14 12 - 20      GFR est AA >60 >60 ml/min/1.73m2    GFR est non-AA >60 >60 ml/min/1.73m2    Calcium 9.2 8.5 - 10.1 MG/DL    Bilirubin, total 0.4 0.2 - 1.0 MG/DL    ALT (SGPT) 29 16 - 61 U/L    AST (SGOT) 26 10 - 38 U/L    Alk. phosphatase 51 45 - 117 U/L    Protein, total 7.1 6.4 - 8.2 g/dL    Albumin 4.1 3.4 - 5.0 g/dL    Globulin 3.0 2.0 - 4.0 g/dL    A-G Ratio 1.4 0.8 - 1.7     LIPID PANEL   Result Value Ref Range    LIPID PROFILE          Cholesterol, total 116 <200 MG/DL    Triglyceride 90 <150 MG/DL    HDL Cholesterol 48 40 - 60 MG/DL    LDL, calculated 50 0 - 100 MG/DL    VLDL, calculated 18 MG/DL    CHOL/HDL Ratio 2.4 0 - 5.0     HEMOGLOBIN A1C WITH EAG   Result Value Ref Range    Hemoglobin A1c 5.5 4.2 - 5.6 %    Est. average glucose 111 mg/dL     We reviewed the patient's labs from the last several visits to point out trends in the numbers        Patient Active Problem List   Diagnosis Code    Pederson's esophagus K22.70    BPH with obstruction/lower urinary tract symptoms N40.1, N13.8    Cancer of prostate ([facility] [address] C61    Colon polyp 2007  [facility] K63.5    BLAIR (obstructive sleep apnea) G47.33    S/P CABG x 3 Z95.1    IFG (impaired fasting glucose) R73.01    Hyperlipidemia E78.5    Bilateral carotid artery stenosis 12/14 I65.23    CAD (coronary artery disease) I25.10    Multinodular goiter 2014 E04.2    Pulmonary nodules R91.8    Essential hypertension I10    Impotence of organic origin N52.9    Overweight (BMI 25.0-29. 9) E66.3     Assessment and plan:  1. Pederson's. Lifelong PPI and f/u  [facility]  2. ED. Prn meds  3. BPH and prostate ca. F/U Dr. Ravindra Bagley  4. Colon polyp. Fiber  5. Asthma. Prn inhalers  6. CHD. F/U Dr. Christa Quintanilla as scheduled  7. IFG. Wt loss would be ideal, continue dietary and lifestyle measures  8. Bilat carotid stenosis. Cont to follow per cardiology  9. HTN. Continue current regimen. 10.  Overweight. Lifestyle and dietary measures. Portion control reiterated. 11. Arthralgias. Prn voltaren        RTC 5/22    Above conditions discussed at length and patient vocalized understanding. All questions answered to patient satisfaction        ICD-10-CM ICD-9-CM    1. Medicare annual wellness visit, subsequent  Z00.00 V70.0    2. Essential hypertension  I10 401.9    3. Coronary artery disease involving native coronary artery of native heart without angina pectoris  I25.10 414.01    4. Bilateral carotid artery stenosis 12/14  I65.23 433.10      433.30    5. Hyperplastic colonic polyp, unspecified part of colon  K63.5 211.3    6. Pederson's esophagus with dysplasia  K22.719 530.85    7. IFG (impaired fasting glucose)  R73.01 790.21 CBC W/O DIFF      HEMOGLOBIN A1C WITH EAG   8. Cancer of prostate ([facility] [address]  C61 185    9. S/P CABG x 3  Z95.1 V45.81    10. Hyperlipidemia, unspecified hyperlipidemia type  E78.5 272.4    11. Advanced directives, counseling/discussion  Z71.89 V65.49 ADVANCE CARE PLANNING FIRST 30 MINS   12.  Arthralgia, unspecified joint  M25.50 719.40 diclofenac (VOLTAREN) 1 % gel

## 2021-11-25 NOTE — PROGRESS NOTES
This is an subsequent Medicare Annual Wellness Exam (AWV)     I have reviewed the patient's medical history in detail and updated the computerized patient record. History     Past Medical History:   Diagnosis Date    Allergic rhinitis     Pederson's esophagus     Dr. Anne Waller; last 8/17, for HALO    CABG x 3 04/24/2014    LIMA to LAD, and saphenous vein grafts to the second diagonal of the LAD, and to the posterior descending branch of the RCA, Dr. Oarl Garrett, 4/23/14.      CAD (coronary artery disease)     Colon adenoma 08/2017    Dr Mark Aguilar Colon polyps 2007    Dr. Mark Aguilar COPD (chronic obstructive pulmonary disease) ([facility] [address]     mild obst disease on PFTs Dr. Suly Quintanilla (2007)    Erectile dysfunction     GERD (gastroesophageal reflux disease)     by EGD 2007 Dr. Anne Waller; EGD 5/14/21 hh and s/p dilation stricture    Hearing loss     Hypertension     BLAIR (obstructive sleep apnea) 2011    CPAP not used    Osteoarthritis of both hands     Prostate cancer ([facility] [address] 06/2010    Dr. Lulu Capps [id number] Jj 3+3, 1/9 cores up to 10% 1 core; cryo Oct 2012 psa went from 7/1 to torsten 1.78    Prostate cancer ([facility] [address] 04/2018    Dr Brittney Barrios stage 2B [id number], Jj 3+4, P,10, G2; brachytherapy Dr Sanjuanita Magallanes Pulmonary nodule 2014    no change 2015, 3/17      Past Surgical History:   Procedure Laterality Date    COLONOSCOPY N/A 7/27/2017    Dr. Anne Waller 2007 negative; 8/17 adenoma     HX Vidal Sieper      Dr. Ilan Sanchez    HX CATARACT REMOVAL Bilateral 11/2014    Dr Palmer Snare CHOLECYSTECTOMY  2004    HX HEART CATHETERIZATION  2020    Stent    HX TONSILLECTOMY      HX TURP      x2 Dr. Zapata Ba HX UROLOGICAL  10/2012    [facility] Dr. Isaiah Bedoya, Cystoscopy and dilation of stricture, cryoablation of prostate    [place] CABG, ARTERY-VEIN, THREE  04/2014    3V CABG; Dr Oral LEAVITT to LAD, SVG to DM2 and sequential to RPDA     Current Outpatient Medications   Medication Sig Dispense Refill    atorvastatin (LIPITOR) 20 mg tablet TAKE ONE TABLET BY MOUTH EVERY NIGHT AT BEDTIME 90 Tablet 3    clopidogreL (Plavix) 75 mg tab Take 1 Tablet by mouth daily. 1 tablet daily 90 Tablet 3    amLODIPine (NORVASC) 10 mg tablet TAKE ONE TABLET BY MOUTH DAILY 90 Tablet 3    metoprolol succinate (TOPROL-XL) 25 mg XL tablet TAKE ONE TABLET BY MOUTH DAILY 90 Tablet 2    fluticasone propionate (FLONASE) 50 mcg/actuation nasal spray 2 sprays each nostril daily 1 Bottle 12    benazepriL (LOTENSIN) 10 mg tablet TAKE ONE TABLET BY MOUTH DAILY 90 Tablet 4    aspirin 81 mg chewable tablet Take 81 mg by mouth daily.  nitroglycerin (NITROSTAT) 0.4 mg SL tablet 1 Tab by SubLINGual route every five (5) minutes as needed for Chest Pain. 1 Bottle 3    levalbuterol tartrate (XOPENEX) 45 mcg/actuation inhaler Take 2 Puffs by inhalation every six (6) hours as needed for Wheezing. 1 Inhaler 5    omeprazole (PRILOSEC) 20 mg capsule Take 20 mg by mouth two (2) times a day.  ascorbic acid (VITAMIN C) 1,000 mg tablet Take 1,000 mg by mouth daily.  MULTIVITAMIN W-MINERALS/LUTEIN (CENTRUM SILVER PO) Take 1 Tab by mouth daily.       triamcinolone acetonide (KENALOG-40) 40 mg/mL injection  (Patient not taking: Reported on 2021)       Allergies   Allergen Reactions    Albuterol Palpitations    Influenza Virus Vaccine, Specific Other (comments)     Family History   Problem Relation Age of Onset    Heart Disease Father     Stroke Mother     Hypertension Mother     Diabetes Sister      Social History     Tobacco Use    Smoking status: Former Smoker     Packs/day: 0.50     Years: 4.00     Pack years: 2.00     Quit date: 1957     Years since quittin.9    Smokeless tobacco: Never Used    Tobacco comment: Quit in    Substance Use Topics    Alcohol use: No     Depression Risk Factor Screening:     3 most recent PHQ Screens 2021   Little interest or pleasure in doing things Not at all   Feeling down, depressed, irritable, or hopeless Not at all   Total Score PHQ 2 0     SCREENINGS  Colonoscopy last done 2007 Dr James Tucker ca being followed by Dr Myranda Dawson    Immunization History   Administered Date(s) Administered    (RETIRED) Pneumococcal Vaccine (Unspecified Type) 09/11/2008    COVID-19, Jorgito Figueroa, Primary or Immunocompromised Series, MRNA, PF, 100mcg/0.5mL 01/23/2021, 02/20/2021    Pneumococcal Conjugate (PCV-13) 08/12/2015    TD Vaccine 09/10/2008    Tdap 08/12/2015, 10/18/2018    Zoster 04/24/2008    Zoster Recombinant 10/01/2020, 12/01/2020     Alcohol Risk Factor Screening: On any occasion during the past 3 months, have you had more than 4 drinks containing alcohol? No    Do you average more than 14 drinks per week? No    Functional Ability and Level of Safety:     Hearing Loss   normal-to-mild    Vision - no acute complaints and is followed by Dr Kurt Solis [facility]   Self-care. Requires assistance with: no ADLs  He walks with a cane    Fall Risk     Fall Risk Assessment, last 12 mths 11/29/2021   Able to walk? Yes   Fall in past 12 months? 0   Do you feel unsteady? 0   Are you worried about falling 0     Abuse Screen   Patient is not abused    Review of Systems   A comprehensive review of systems was negative except for that written in the HPI.     Physical Examination     No exam data present    Evaluation of Cognitive Function:  Mood/affect:  happy  Appearance: age appropriate, overweight, well dressed and within normal Limits  Family member/caregiver input: na    Patient Care Team:  Ivan Carney MD as PCP - General (Internal Medicine)  Ivan Carney MD as PCP - [facility] Empaneled Provider    Advice/Referrals/Counseling   Education and counseling provided:  Are appropriate based on today's review and evaluation  End-of-Life planning (with patient's consent)  Pneumococcal Vaccine  Influenza Vaccine  Prostate cancer screening tests (PSA, covered annually)  Colorectal cancer screening tests  Cardiovascular screening blood test  Diabetes screening test    Assessment/Plan       ICD-10-CM ICD-9-CM    1. Essential hypertension  I10 401.9    2. Coronary artery disease involving native coronary artery of native heart without angina pectoris  I25.10 414.01    3. Bilateral carotid artery stenosis 12/14  I65.23 433.10      433.30    4. Hyperplastic colonic polyp, unspecified part of colon  K63.5 211.3    5. Pederson's esophagus with dysplasia  K22.719 530.85    6. IFG (impaired fasting glucose)  R73.01 790.21    7. [facility]  C61 185    8. S/P CABG x 3  Z95.1 V45.81    9. Hyperlipidemia, unspecified hyperlipidemia type  E78.5 272.4      current treatment plan is effective  lab results and schedule of future lab studies reviewed with patient  cardiovascular risk and specific lipid/LDL goals reviewed. End of life discussion undertaken.   He will work on getting medical directive in place  Flu high dose declined repeatedly  Colonoscopy beyond age

## 2021-11-26 NOTE — PROGRESS NOTES
Selina Khan. presents with   Chief Complaint   Patient presents with    Annual Wellness Visit    Hypertension    Cholesterol Problem    Labs     11-22-21          1. \"Have you been to the ER, urgent care clinic since your last visit? Hospitalized since your last visit? \" NO    2. \"Have you seen or consulted any other health care providers outside of the [address] [place] [address] since your last visit? \" NO    3. For patients aged 39-70: Has the patient had a colonoscopy? NA based on age or sex     If the patient is female:    3. For patients aged 41-77: Has the patient had a mammogram within the past 2 years? NA based on age or sex    11. For patients aged 21-65: Has the patient had a pap smear?  NA based on age or sex

## 2021-11-29 ENCOUNTER — OFFICE VISIT (OUTPATIENT)
Dept: INTERNAL MEDICINE CLINIC | Age: 81
End: 2021-11-29
Payer: MEDICARE

## 2021-11-29 VITALS
TEMPERATURE: 98.1 F | RESPIRATION RATE: 16 BRPM | HEIGHT: 67 IN | BODY MASS INDEX: 29.19 KG/M2 | WEIGHT: 186 LBS | SYSTOLIC BLOOD PRESSURE: 126 MMHG | HEART RATE: 62 BPM | DIASTOLIC BLOOD PRESSURE: 64 MMHG | OXYGEN SATURATION: 96 %

## 2021-11-29 DIAGNOSIS — C61 CANCER OF PROSTATE (HCC): ICD-10-CM

## 2021-11-29 DIAGNOSIS — I25.10 CORONARY ARTERY DISEASE INVOLVING NATIVE CORONARY ARTERY OF NATIVE HEART WITHOUT ANGINA PECTORIS: ICD-10-CM

## 2021-11-29 DIAGNOSIS — E78.5 HYPERLIPIDEMIA, UNSPECIFIED HYPERLIPIDEMIA TYPE: ICD-10-CM

## 2021-11-29 DIAGNOSIS — R73.01 IFG (IMPAIRED FASTING GLUCOSE): ICD-10-CM

## 2021-11-29 DIAGNOSIS — Z95.1 S/P CABG X 3: ICD-10-CM

## 2021-11-29 DIAGNOSIS — K22.719 BARRETT'S ESOPHAGUS WITH DYSPLASIA: ICD-10-CM

## 2021-11-29 DIAGNOSIS — I65.23 BILATERAL CAROTID ARTERY STENOSIS: ICD-10-CM

## 2021-11-29 DIAGNOSIS — K63.5 HYPERPLASTIC COLONIC POLYP, UNSPECIFIED PART OF COLON: ICD-10-CM

## 2021-11-29 DIAGNOSIS — Z71.89 ADVANCED DIRECTIVES, COUNSELING/DISCUSSION: ICD-10-CM

## 2021-11-29 DIAGNOSIS — Z00.00 MEDICARE ANNUAL WELLNESS VISIT, SUBSEQUENT: Primary | ICD-10-CM

## 2021-11-29 DIAGNOSIS — I10 ESSENTIAL HYPERTENSION: ICD-10-CM

## 2021-11-29 DIAGNOSIS — M25.50 ARTHRALGIA, UNSPECIFIED JOINT: ICD-10-CM

## 2021-11-29 PROCEDURE — G8754 DIAS BP LESS 90: HCPCS | Performed by: INTERNAL MEDICINE

## 2021-11-29 PROCEDURE — G8427 DOCREV CUR MEDS BY ELIG CLIN: HCPCS | Performed by: INTERNAL MEDICINE

## 2021-11-29 PROCEDURE — G0463 HOSPITAL OUTPT CLINIC VISIT: HCPCS | Performed by: INTERNAL MEDICINE

## 2021-11-29 PROCEDURE — G8536 NO DOC ELDER MAL SCRN: HCPCS | Performed by: INTERNAL MEDICINE

## 2021-11-29 PROCEDURE — G0439 PPPS, SUBSEQ VISIT: HCPCS | Performed by: INTERNAL MEDICINE

## 2021-11-29 PROCEDURE — 99214 OFFICE O/P EST MOD 30 MIN: CPT | Performed by: INTERNAL MEDICINE

## 2021-11-29 PROCEDURE — G8510 SCR DEP NEG, NO PLAN REQD: HCPCS | Performed by: INTERNAL MEDICINE

## 2021-11-29 PROCEDURE — 99497 ADVNCD CARE PLAN 30 MIN: CPT | Performed by: INTERNAL MEDICINE

## 2021-11-29 PROCEDURE — 1101F PT FALLS ASSESS-DOCD LE1/YR: CPT | Performed by: INTERNAL MEDICINE

## 2021-11-29 PROCEDURE — G8752 SYS BP LESS 140: HCPCS | Performed by: INTERNAL MEDICINE

## 2021-11-29 PROCEDURE — G8419 CALC BMI OUT NRM PARAM NOF/U: HCPCS | Performed by: INTERNAL MEDICINE

## 2021-11-29 RX ORDER — DICLOFENAC SODIUM 10 MG/G
2 GEL TOPICAL 4 TIMES DAILY
Qty: 500 G | Refills: 5 | Status: SHIPPED | OUTPATIENT
Start: 2021-11-29

## 2021-11-29 NOTE — ACP (ADVANCE CARE PLANNING)
Advance Care Planning     Advance Care Planning (ACP) Physician/NP/PA Conversation      Date of Conversation: 11/29/2021  Conducted with: Patient with Decision Making Capacity    Healthcare Decision Maker:     Primary Decision Maker: Eve Patel - Spouse - [phone]  Click here to complete [address] including selection of the Healthcare Decision Maker Relationship (ie \"Primary\")        Today we documented Decision Maker(s) consistent with Legal Next of Kin hierarchy. Care Preferences:    Hospitalization: \"If your health worsens and it becomes clear that your chance of recovery is unlikely, what would be your preference regarding hospitalization? \"  The patient would prefer hospitalization. Ventilation: \"If you were unable to breathe on your own and your chance of recovery was unlikely, what would be your preference about the use of a ventilator (breathing machine) if it was available to you? \"   The patient would desire the use of a ventilator. Resuscitation: \"In the event your heart stopped as a result of an underlying serious health condition, would you want attempts to be made to restart your heart, or would you prefer a natural death? \"   Yes, attempt to resuscitate.     Additional topics discussed: ventilation preferences, hospitalization preferences and resuscitation preferences    Conversation Outcomes / Follow-Up Plan:   ACP in process - information provided, considering goals and options  Reviewed DNR/DNI and patient elects Full Code (Attempt Resuscitation)     Length of Voluntary ACP Conversation in minutes:  16 minutes    Daniel Gerard MD

## 2021-11-29 NOTE — PATIENT INSTRUCTIONS
Medicare Wellness Visit, Male    The best way to live healthy is to have a lifestyle where you eat a well-balanced diet, exercise regularly, limit alcohol use, and quit all forms of tobacco/nicotine, if applicable. Regular preventive services are another way to keep healthy. Preventive services (vaccines, screening tests, monitoring & exams) can help personalize your care plan, which helps you manage your own care. Screening tests can find health problems at the earliest stages, when they are easiest to treat. [facility] follows the current, evidence-based guidelines published by the [facility] Kenan Nora ([facility]) when recommending preventive services for our patients. Because we follow these guidelines, sometimes recommendations change over time as research supports it. (For example, a prostate screening blood test is no longer routinely recommended for men with no symptoms). Of course, you and your doctor may decide to screen more often for some diseases, based on your risk and co-morbidities (chronic disease you are already diagnosed with). Preventive services for you include:  - Medicare offers their members a free annual wellness visit, which is time for you and your primary care provider to discuss and plan for your preventive service needs. Take advantage of this benefit every year!  -All adults over age 72 should receive the recommended pneumonia vaccines. Current USPSTF guidelines recommend a series of two vaccines for the best pneumonia protection.   -All adults should have a flu vaccine yearly and tetanus vaccine every 10 years.  -All adults age 48 and older should receive the shingles vaccines (series of two vaccines).        -All adults age 38-68 who are overweight should have a diabetes screening test once every three years.   -Other screening tests & preventive services for persons with diabetes include: an eye exam to screen for diabetic retinopathy, a kidney function test, a foot exam, and stricter control over your cholesterol.   -Cardiovascular screening for adults with routine risk involves an electrocardiogram (ECG) at intervals determined by the provider.   -Colorectal cancer screening should be done for adults age 54-65 with no increased risk factors for colorectal cancer. There are a number of acceptable methods of screening for this type of cancer. Each test has its own benefits and drawbacks. Discuss with your provider what is most appropriate for you during your annual wellness visit. The different tests include: colonoscopy (considered the best screening method), a fecal occult blood test, a fecal DNA test, and sigmoidoscopy.  -All adults born between [facility] should be screened once for Hepatitis C.  -An Abdominal Aortic Aneurysm (AAA) Screening is recommended for men age 73-68 who has ever smoked in their lifetime.      Here is a list of your current Health Maintenance items (your personalized list of preventive services) with a due date:  Health Maintenance Due   Topic Date Due    COVID-19 Vaccine (3 - Booster for Destiny Pedlar series) 08/20/2021

## 2022-01-05 ENCOUNTER — OFFICE VISIT (OUTPATIENT)
Dept: CARDIOLOGY CLINIC | Age: 82
End: 2022-01-05
Payer: MEDICARE

## 2022-01-05 VITALS
HEIGHT: 67 IN | HEART RATE: 55 BPM | BODY MASS INDEX: 28.88 KG/M2 | DIASTOLIC BLOOD PRESSURE: 60 MMHG | WEIGHT: 184 LBS | OXYGEN SATURATION: 99 % | SYSTOLIC BLOOD PRESSURE: 122 MMHG

## 2022-01-05 DIAGNOSIS — I25.10 CORONARY ARTERY DISEASE INVOLVING NATIVE CORONARY ARTERY OF NATIVE HEART WITHOUT ANGINA PECTORIS: Primary | ICD-10-CM

## 2022-01-05 DIAGNOSIS — I65.23 BILATERAL CAROTID ARTERY STENOSIS: ICD-10-CM

## 2022-01-05 PROCEDURE — G8754 DIAS BP LESS 90: HCPCS | Performed by: INTERNAL MEDICINE

## 2022-01-05 PROCEDURE — 93000 ELECTROCARDIOGRAM COMPLETE: CPT | Performed by: INTERNAL MEDICINE

## 2022-01-05 PROCEDURE — G8510 SCR DEP NEG, NO PLAN REQD: HCPCS | Performed by: INTERNAL MEDICINE

## 2022-01-05 PROCEDURE — G8419 CALC BMI OUT NRM PARAM NOF/U: HCPCS | Performed by: INTERNAL MEDICINE

## 2022-01-05 PROCEDURE — G8752 SYS BP LESS 140: HCPCS | Performed by: INTERNAL MEDICINE

## 2022-01-05 PROCEDURE — G8427 DOCREV CUR MEDS BY ELIG CLIN: HCPCS | Performed by: INTERNAL MEDICINE

## 2022-01-05 PROCEDURE — 99214 OFFICE O/P EST MOD 30 MIN: CPT | Performed by: INTERNAL MEDICINE

## 2022-01-05 PROCEDURE — 1101F PT FALLS ASSESS-DOCD LE1/YR: CPT | Performed by: INTERNAL MEDICINE

## 2022-01-05 PROCEDURE — G8536 NO DOC ELDER MAL SCRN: HCPCS | Performed by: INTERNAL MEDICINE

## 2022-01-05 NOTE — PROGRESS NOTES
HISTORY OF PRESENT ILLNESS  Val Salinas is a 78 y.o. male. ASSESSMENT and PLAN    Mr. Anthony Fitzgerald, previous Dr. Claude Fleck patient, has known history of CAD. He developed exertional angina in 2014. He ultimately underwent bypass surgery after his coronary angiography revealed CAD. He had LIMA to LAD as well as SVG to diagonal and sequential to PDA. Because of bradycardia, he is only on Toprol-XL 25 mg daily. In October 2020, he had worsening dyspnea on exertion. He underwent repeat coronary angiography which revealed normal LV function with EF 60%. His LIMA to LAD is patent; SVG to second diagonal has 30 to 40% lesion with sequential segment occluded. Left main is patent with LAD proximal 95%. Circumflex has only mild diffuse disease. Super dominant RCA has ostial/proximal 95% stenosis followed by mid segment 90-95% stenosis followed by distal segment along 85-95% stenosis. RPDA has proximal 95% stenosis. The 4 separate RCA lesions were stented using LENNOX ranging in size from 3.25 mm at the ostium down to 2.25 mm in the RPDA. Since that time, his breathing has returned to baseline. He also has history of BLAIR but does not use CPAP. He has history of prostate carcinoma. He has history of Pederson's esophagus as well as colon adenoma. He still uses inhalers for his COPD. · CAD:    Clinically stable. · BP:    Well controlled. · Rhythm:    Asymptomatic sinus bradycardia. · CHF:    There is no evidence of decompensated CHF noted. · Weight:     His weight today is 184 pounds. This is at baseline. · Cholesterol:   Target LDL <70. Lipitor 20. · Anti-platelet:   Remains on ASA, and Plavix. I will see him back in 6 months. Thank you. Encounter Diagnoses   Name Primary?     Coronary artery disease involving native coronary artery of native heart without angina pectoris Yes    Bilateral carotid artery stenosis 12/14      current treatment plan is effective, no change in therapy  lab results and schedule of future lab studies reviewed with patient  reviewed diet, exercise and weight control  cardiovascular risk and specific lipid/LDL goals reviewed  use of aspirin to prevent MI and TIA's discussed      HPI   Today, Mr. Tone Lane has no complaints of chest pains, shortness of breath or decreased exercise capacity. Because of his history of COPD, he does have baseline dyspnea on exertion. Despite his age, he remains active physically. He denies any orthopnea or PND. He denies any palpitation sensation or dizziness. Review of Systems   Respiratory: Positive for shortness of breath. Cardiovascular: Negative for chest pain, palpitations, orthopnea, claudication, leg swelling and PND. All other systems reviewed and are negative. Physical Exam  Vitals and nursing note reviewed. Constitutional:       Appearance: Normal appearance. HENT:      Head: Normocephalic. Eyes:      Conjunctiva/sclera: Conjunctivae normal.   Neck:      Vascular: No carotid bruit. Cardiovascular:      Rate and Rhythm: Regular rhythm. Bradycardia present. Pulmonary:      Breath sounds: Normal breath sounds. Abdominal:      Palpations: Abdomen is soft. Musculoskeletal:         General: No swelling. Cervical back: No rigidity. Skin:     General: Skin is warm and dry. Neurological:      General: No focal deficit present. Mental Status: He is alert and oriented to person, place, and time. Psychiatric:         Mood and Affect: Mood normal.         Behavior: Behavior normal.         PCP: Gauri Strauss MD    Past Medical History:   Diagnosis Date    Allergic rhinitis     Pederson's esophagus     Dr. Jo Valenzuela; last 8/17, for HALO    CABG x 3 04/24/2014    LIMA to LAD, and saphenous vein grafts to the second diagonal of the LAD, and to the posterior descending branch of the RCA, Dr. Lopez Degree, 4/23/14.      CAD (coronary artery disease)     Colon adenoma 08/2017    Dr Jane Lyn Colon polyps 2007 Dr. Modesta Vasquez COPD (chronic obstructive pulmonary disease) ([facility]     mild obst disease on PFTs Dr. Medina Lamar (2007)    Erectile dysfunction     GERD (gastroesophageal reflux disease)     by EGD 2007 Dr. Samson Mao; EGD 5/14/21 hh and s/p dilation stricture    Hearing loss     Hypertension     BLAIR (obstructive sleep apnea) 2011    CPAP not used    Osteoarthritis of both hands     Prostate cancer ([facility] 06/2010    Dr. Gonzalo Rene G2cAnPt [place] 3+3, 1/9 cores up to 10% 1 core; cryo Oct 2012 psa went from 7/1 to torsten 1.78    Prostate cancer ([facility] 04/2018    Dr Maria Luisa Maciel stage 2B [id number], [place] 3+4, P,10, G2; brachytherapy Dr Aliyah Fischer Pulmonary nodule 2014    no change 2015, 3/17       Past Surgical History:   Procedure Laterality Date    COLONOSCOPY N/A 7/27/2017    Dr. Samson Mao 2007 negative; 8/17 adenoma     HX [place] Snehal Arce    HX CATARACT REMOVAL Bilateral 11/2014    Dr Justin Pool     HX CHOLECYSTECTOMY  2004    HX HEART CATHETERIZATION  2020    Stent    HX TONSILLECTOMY      HX TURP      x2 Dr. Mariposa Starkey HX UROLOGICAL  10/2012    [facility], Dr. Matilde Medeiros, Cystoscopy and dilation of stricture, cryoablation of prostate    [place] CABG, ARTERY-VEIN, THREE  04/2014    3V CABG; Dr Raquel LEAVITT to LAD, SVG to DM2 and sequential to RPDA       Current Outpatient Medications   Medication Sig Dispense Refill    diclofenac (VOLTAREN) 1 % gel Apply 2 g to affected area four (4) times daily. 500 g 5    atorvastatin (LIPITOR) 20 mg tablet TAKE ONE TABLET BY MOUTH EVERY NIGHT AT BEDTIME 90 Tablet 3    clopidogreL (Plavix) 75 mg tab Take 1 Tablet by mouth daily.  1 tablet daily 90 Tablet 3    amLODIPine (NORVASC) 10 mg tablet TAKE ONE TABLET BY MOUTH DAILY 90 Tablet 3    metoprolol succinate (TOPROL-XL) 25 mg XL tablet TAKE ONE TABLET BY MOUTH DAILY 90 Tablet 2    fluticasone propionate (FLONASE) 50 mcg/actuation nasal spray 2 sprays each nostril daily 1 Bottle 12    benazepriL (LOTENSIN) 10 mg tablet TAKE ONE TABLET BY MOUTH DAILY 90 Tablet 4    triamcinolone acetonide (KENALOG-40) 40 mg/mL injection       aspirin 81 mg chewable tablet Take 81 mg by mouth daily.  nitroglycerin (NITROSTAT) 0.4 mg SL tablet 1 Tab by SubLINGual route every five (5) minutes as needed for Chest Pain. 1 Bottle 3    levalbuterol tartrate (XOPENEX) 45 mcg/actuation inhaler Take 2 Puffs by inhalation every six (6) hours as needed for Wheezing. 1 Inhaler 5    omeprazole (PRILOSEC) 20 mg capsule Take 20 mg by mouth two (2) times a day.  ascorbic acid (VITAMIN C) 1,000 mg tablet Take 1,000 mg by mouth daily.  MULTIVITAMIN W-MINERALS/LUTEIN (CENTRUM SILVER PO) Take 1 Tab by mouth daily. The patient has a family history of    Social History     Tobacco Use    Smoking status: Former Smoker     Packs/day: 0.50     Years: 4.00     Pack years: 2.00     Quit date: 1957     Years since quittin.0    Smokeless tobacco: Never Used    Tobacco comment: Quit in [address] [place] [address] Avenue Use: Never used   Substance Use Topics    Alcohol use: No    Drug use: Never       Lab Results   Component Value Date/Time    Cholesterol, total 116 2021 08:25 AM    HDL Cholesterol 48 2021 08:25 AM    LDL, calculated 50 2021 08:25 AM    Triglyceride 90 2021 08:25 AM    CHOL/HDL Ratio 2.4 2021 08:25 AM        BP Readings from Last 3 Encounters:   22 122/60   21 126/64   21 138/64        Pulse Readings from Last 3 Encounters:   22 (!) 55   21 62   21 (!) 51       Wt Readings from Last 3 Encounters:   22 83.5 kg (184 lb)   21 84.4 kg (186 lb)   21 84.4 kg (186 lb)         EKG: unchanged from previous tracings, sinus bradycardia, 1st degree AV block.

## 2022-02-08 RX ORDER — METOPROLOL SUCCINATE 25 MG/1
TABLET, EXTENDED RELEASE ORAL
Qty: 90 TABLET | Refills: 2 | Status: SHIPPED | OUTPATIENT
Start: 2022-02-08

## 2022-03-18 PROBLEM — I10 ESSENTIAL HYPERTENSION: Status: ACTIVE | Noted: 2018-04-12

## 2022-03-19 PROBLEM — E04.2 MULTINODULAR GOITER: Status: ACTIVE | Noted: 2017-02-16

## 2022-03-19 PROBLEM — E66.3 OVERWEIGHT (BMI 25.0-29.9): Status: ACTIVE | Noted: 2021-05-24

## 2022-03-20 PROBLEM — R91.8 PULMONARY NODULES: Status: ACTIVE | Noted: 2017-02-16

## 2022-05-09 RX ORDER — AMLODIPINE BESYLATE 10 MG/1
TABLET ORAL
Qty: 90 TABLET | Refills: 3 | Status: SHIPPED | OUTPATIENT
Start: 2022-05-09

## 2022-06-06 ENCOUNTER — HOSPITAL ENCOUNTER (OUTPATIENT)
Dept: LAB | Age: 82
Discharge: HOME | End: 2022-06-06
Payer: MEDICARE

## 2022-06-06 ENCOUNTER — APPOINTMENT (OUTPATIENT)
Dept: INTERNAL MEDICINE CLINIC | Age: 82
End: 2022-06-06

## 2022-06-06 DIAGNOSIS — R73.01 IFG (IMPAIRED FASTING GLUCOSE): ICD-10-CM

## 2022-06-06 LAB
ERYTHROCYTE [DISTWIDTH] IN BLOOD BY AUTOMATED COUNT: 14.3 % (ref 11.6–14.5)
EST. AVERAGE GLUCOSE BLD GHB EST-MCNC: 114 MG/DL
HBA1C MFR BLD: 5.6 % (ref 4.2–5.6)
HCT VFR BLD AUTO: 37.5 % (ref 36–48)
HGB BLD-MCNC: 12.3 G/DL (ref 13–16)
MCH RBC QN AUTO: 30.2 PG (ref 24–34)
MCHC RBC AUTO-ENTMCNC: 32.8 G/DL (ref 31–37)
MCV RBC AUTO: 92.1 FL (ref 78–100)
NRBC # BLD: 0 K/UL (ref 0–0.01)
NRBC BLD-RTO: 0 PER 100 WBC
PLATELET # BLD AUTO: 241 K/UL (ref 135–420)
PMV BLD AUTO: 10.2 FL (ref 9.2–11.8)
RBC # BLD AUTO: 4.07 M/UL (ref 4.35–5.65)
WBC # BLD AUTO: 8.9 K/UL (ref 4.6–13.2)

## 2022-06-06 PROCEDURE — 85027 COMPLETE CBC AUTOMATED: CPT

## 2022-06-06 PROCEDURE — 83036 HEMOGLOBIN GLYCOSYLATED A1C: CPT

## 2022-06-06 PROCEDURE — 36415 COLL VENOUS BLD VENIPUNCTURE: CPT

## 2022-06-07 NOTE — PROGRESS NOTES
78 y.o. male who presents for evaluation. Dr Alanis Pederson continues to follow the prostatic issues     Continues to work at [employer]. Seeing Dr Leonarda Hollis periodically, he has not been checking his bp at home    He sees Dr Willy Olea and will be undergoing surveillance egd and follow up colo in the next week apparently for the cunningham's and polyps. He has not noted any bleeding from anywhere     Denies polyuria, polydipsia, nocturia, vision change. Not checking sugars at this time.       No sx referable to the thyroid. LAST MEDICARE WELLNESS EXAM: 8/12/15, 2/22/17, 4/12/18, 5/8/19, 11/17/20, 11/29/21    Past Medical History:   Diagnosis Date    Allergic rhinitis     Cunningham's esophagus     Dr. Willy Olea; last 8/17, for HALO    CABG x 3 04/24/2014    LIMA to LAD, and saphenous vein grafts to the second diagonal of the LAD, and to the posterior descending branch of the RCA, Dr. Luis Austin, 4/23/14.      CAD (coronary artery disease)     Colon adenoma 08/2017    Dr Joe Frias Colon polyps 2007    Dr. Joe Frias COPD (chronic obstructive pulmonary disease) ([facility]     mild obst disease on PFTs Dr. Fabian Benitez (2007)    Erectile dysfunction     GERD (gastroesophageal reflux disease)     by EGD 2007 Dr. Willy Olea; EGD 5/14/21 hh and s/p dilation stricture    Hearing loss     Hypertension     BLAIR (obstructive sleep apnea) 2011    CPAP not used    Osteoarthritis of both hands     Prostate cancer ([facility] 06/2010    Dr. Irineo Martinez [id number] [place] 3+3, 1/9 cores up to 10% 1 core; cryo Oct 2012 psa went from 7/1 to torsten 1.78    Prostate cancer ([facility] 04/2018    Dr Noah López stage 2B [id number], [place] 3+4, P,10, G2; brachytherapy Dr Misha Fisher Pulmonary nodule 2014    no change 2015, 3/17     Past Surgical History:   Procedure Laterality Date    COLONOSCOPY N/A 7/27/2017    Dr. Willy Olea 2007 negative; 8/17 adenoma     HX APPENDECTOMY      HX Marti Jimenez    HX CATARACT REMOVAL Bilateral 11/2014    Dr Joe Ellington  HX CHOLECYSTECTOMY  2004    HX HEART CATHETERIZATION      Stent    HX TONSILLECTOMY      HX TURP      x2 Dr. Jean Hawk HX UROLOGICAL  10/2012    [facility], Dr. Danilo Conn, Cystoscopy and dilation of stricture, cryoablation of prostate    [place] CABG, ARTERY-VEIN, THREE  2014    3V CABG; Dr Ramila Garnica LIMA to LAD, SVG to DM2 and sequential to RPDA     Social History     Socioeconomic History    Marital status:      Spouse name: Not on file    Number of children: 3    Years of education: Not on file    Highest education level: Not on file   Occupational History    Occupation: ret engr [employer]     Employer: RETIRED   Tobacco Use    Smoking status: Former Smoker     Packs/day: 0.50     Years: 4.00     Pack years: 2.00     Quit date: 1957     Years since quittin.4    Smokeless tobacco: Never Used    Tobacco comment: Quit in    Vaping Use    Vaping Use: Never used   Substance and Sexual Activity    Alcohol use: No    Drug use: Never    Sexual activity: Not on file   Other Topics Concern    Not on file   Social History Narrative    Not on file     Social Determinants of Health     Financial Resource Strain:     Difficulty of Paying Living Expenses: Not on file   Food Insecurity:     Worried About [address] in the Last Year: Not on file    [address] in the Last Year: Not on file   Transportation Needs:     Lack of Transportation (Medical): Not on file    Lack of Transportation (Non-Medical):  Not on file   Physical Activity:     Days of Exercise per Week: Not on file    Minutes of Exercise per Session: Not on file   Stress:     Feeling of Stress : Not on file   Social Connections:     Frequency of Communication with Friends and Family: Not on file    Frequency of Social Gatherings with Friends and Family: Not on file    Attends Yarsani Services: Not on file    Active Member of Clubs or Organizations: Not on file    Attends Club or Organization Meetings: Not on file    Marital Status: Not on file   Intimate Partner Violence:     Fear of Current or Ex-Partner: Not on file    Emotionally Abused: Not on file    Physically Abused: Not on file    Sexually Abused: Not on file   Housing Stability:     Unable to Pay for Housing in the Last Year: Not on file    Number of Places Lived in the Last Year: Not on file    Unstable Housing in the Last Year: Not on file     Allergies   Allergen Reactions    Albuterol Palpitations    Influenza Virus Vaccine, Specific Other (comments)     Current Outpatient Medications   Medication Sig    amLODIPine (NORVASC) 10 mg tablet TAKE ONE TABLET BY MOUTH DAILY    metoprolol succinate (TOPROL-XL) 25 mg XL tablet TAKE ONE TABLET BY MOUTH DAILY    diclofenac (VOLTAREN) 1 % gel Apply 2 g to affected area four (4) times daily.  atorvastatin (LIPITOR) 20 mg tablet TAKE ONE TABLET BY MOUTH EVERY NIGHT AT BEDTIME    clopidogreL (Plavix) 75 mg tab Take 1 Tablet by mouth daily. 1 tablet daily    fluticasone propionate (FLONASE) 50 mcg/actuation nasal spray 2 sprays each nostril daily    benazepriL (LOTENSIN) 10 mg tablet TAKE ONE TABLET BY MOUTH DAILY    triamcinolone acetonide (KENALOG-40) 40 mg/mL injection     aspirin 81 mg chewable tablet Take 81 mg by mouth daily.  nitroglycerin (NITROSTAT) 0.4 mg SL tablet 1 Tab by SubLINGual route every five (5) minutes as needed for Chest Pain.  levalbuterol tartrate (XOPENEX) 45 mcg/actuation inhaler Take 2 Puffs by inhalation every six (6) hours as needed for Wheezing.  omeprazole (PRILOSEC) 20 mg capsule Take 20 mg by mouth two (2) times a day.  ascorbic acid (VITAMIN C) 1,000 mg tablet Take 1,000 mg by mouth daily.  MULTIVITAMIN W-MINERALS/LUTEIN (CENTRUM SILVER PO) Take 1 Tab by mouth daily. No current facility-administered medications for this visit.      REVIEW OF SYSTEMS:  colo 2017 Dr Carleen Villeda  Ophtho - no vision change or eye pain  Oral - no mouth pain, tongue or tooth problems  Ears - no hearing loss, ear pain, fullness, no swallowing problems  Cardiac - no CP, PND, orthopnea, edema, palpitations or syncope  Chest - no breast masses  Resp - no wheezing, chronic coughing, dyspnea  GI - no heartburn, nausea, vomiting, change in bowel habits, bleeding, hemorrhoids  Urinary - no dysuria, hematuria, flank pain, urgency, frequency    Visit Vitals  /60   Pulse 60   Temp 97.7 °F (36.5 °C) (Temporal)   Resp 16   Ht 5' 7\" (1.702 m)   Wt 177 lb (80.3 kg)   SpO2 99%   BMI 27.72 kg/m²     A&O x3  Affect is appropriate. Mood stable  No apparent distress  Anicteric, no JVD, adenopathy or thyromegaly. No carotid bruits or radiated murmur  Lungs clear to auscultation, no wheezes or rales  Heart showed regular rate and rhythm. No murmur, rubs, gallops  Abdomen soft nontender, no hepatosplenomegaly or masses. Extremities with trace pedal edema. Venous varicosities noted.  Pulses 1+ symmetrically    LABS   From 9/10 showed gluc 102, cr 1.00,    alt 41,        chol 190, tg 102, hdl 47, ldl-c 123, wbc 8.3, hb 14.6, plt 222, psa 7.60  From 6/12 showed                        psa 5.78  From 8/12 showed                                            b12 400, fol>20  From 9/12 showed gluc 109, cr 1.10, gfr>60,                 wbc 8.1, hb 14.1, plt 232  From 9/13 showed                        psa 0.96  From 3/14 showed                        psa 1.78  From 4/14 showed gluc 107, cr 0.90, gfr>60, alt 25, hba1c 5.5, chol 190, tg 135, hdl 48, ldl-c 115, wbc 8.1, hb 14.2, plt 241, ua neg  From 8/14 showed gluc 97,   cr 0.90, gfr>60, alt 17, hba1c 6.0, chol 109, tg 111, hdl 44, ldl-c 43  From 2/15 showed      hba1c 6.0  From 3/15 showed                      psa 2.13  From 8/15 showed gluc 106, cr 0.98, gfr 75,  alt 20, hba1c 5.9, chol 129, tg 79,   hdl 51, ldl-c 62,   wbc 7.3, hb 14.2, plt 232  From 3/16 showed                      psa 2.90  From 4/16 showed      alt 37, hba1c 5.6, chol 108, tg 103, hdl 46, ldl-c 41  From 2/17 showed gluc 103, cr 1.00, gfr>60, alt 36, hba1c 5.6, chol 117, tg 93,   hdl 49, ldl-c 59,                tsh 4.14  From 1/18 showed                      psa 7.40  From 2/18 showed gluc 107, cr 1.01, gfr>60, alt 27,        chol 106, tg 113, hdl 42, ldl-c 41,   wbc 7.9, hb 13.8, plt 240,             tsh 3.15, ft4 0.90  From 8/18 showed gluc 97,   cr 1.01, gfr>60, alt 28,        wbc 7.6, hb 12.2, plt 206,             fe 94, %sat 32, ferritin 83, b12 332, fol>20, spep neg, retic 1.0  From 5/19 showed gluc 100, cr 1.00, gfr>60, alt 29,        wbc 9.4, hb 13.4, plt 214  From 1/20 showed                      psa 0.23  From 2/20 showed             chol 116, tg 79,   hdl 43, ldl-c 57  From 7/20 showed     alt 34,         chol 123, tg 112, hdl 44, ldl-c 57  From 11/20 showed glu 108, cr 1.03, gfr>60, alt 28,        wbc 8.2, hb 13.6, plt 253  From 12/20 showed gluc 94, cr 1.01, gfr>60,  hba1c 5.6, chol 128, tg 108, hdl 49, ldl-c 57  From 1/21 showed                      psa 0.10, test 47, covid neg, flu neg  From 12/21 showed glu 115, cr 1.07, gfr>60, alt 29, hba1c 5.5, chol 116, tg 90,   hdl 48, ldl-c 50,   wbc 8.9, hb 12.9, plt 229    Results for orders placed or performed during the hospital encounter of 06/06/22   CBC W/O DIFF   Result Value Ref Range    WBC 8.9 4.6 - 13.2 K/uL    RBC 4.07 (L) 4.35 - 5.65 M/uL    HGB 12.3 (L) 13.0 - 16.0 g/dL    HCT 37.5 36.0 - 48.0 %    MCV 92.1 78.0 - 100.0 FL    MCH 30.2 24.0 - 34.0 PG    MCHC 32.8 31.0 - 37.0 g/dL    RDW 14.3 11.6 - 14.5 %    PLATELET 747 138 - 293 K/uL    MPV 10.2 9.2 - 11.8 FL    NRBC 0.0 0  WBC    ABSOLUTE NRBC 0.00 0.00 - 0.01 K/uL   HEMOGLOBIN A1C WITH EAG   Result Value Ref Range    Hemoglobin A1c 5.6 4.2 - 5.6 %    Est. average glucose 114 mg/dL     We reviewed the patient's labs from the last several visits to point out trends in the numbers        Patient Active Problem List   Diagnosis Code    Pederson's esophagus K22.70    BPH with obstruction/lower urinary tract symptoms N40.1, N13.8    [facility] C61    Colon polyp 2007 Dr. Dre Vargas K63.5    BLAIR (obstructive sleep apnea) G47.33    S/P CABG x 3 Z95.1    IFG (impaired fasting glucose) R73.01    Hyperlipidemia E78.5    Bilateral carotid artery stenosis 12/14 I65.23    CAD (coronary artery disease) I25.10    Multinodular goiter 2014 E04.2    Pulmonary nodules R91.8    Essential hypertension I10    Impotence of organic origin N52.9    Overweight (BMI 25.0-29. 9) E66.3     Assessment and plan:  1. Pederson's. Lifelong PPI and f/u Dr Dre Vargas, egd as scheduled  2. ED. Prn meds  3. BPH and prostate ca. F/U Dr. Bess Krueger  4. Colon polyp. Fiber, colo as scheduled  5. Asthma. Prn inhalers  6. CHD. F/U Dr. Shahla Solomon as scheduled  7. IFG. Wt loss would be ideal, continue dietary and lifestyle measures  8. Bilat carotid stenosis. Cont to follow per cardiology  9. HTN. Continue current regimen, he will call in readings from home  10. Overweight. Lifestyle and dietary measures. Portion control reiterated. 11. Arthralgias. Prn voltaren  12. Anemia. He's been up and down the last few years, neg evaluation 2018 as above, will recheck w next draw        RTC 5/22    Above conditions discussed at length and patient vocalized understanding. All questions answered to patient satisfaction        ICD-10-CM ICD-9-CM    1. Essential hypertension  I10 401.9    2. Coronary artery disease involving native coronary artery of native heart without angina pectoris  I25.10 414.01    3. Hyperplastic colonic polyp, unspecified part of colon  K63.5 211.3    4. Pederson's esophagus with dysplasia  K22.719 530.85    5. IFG (impaired fasting glucose)  U05.34 835.00 METABOLIC PANEL, COMPREHENSIVE      HEMOGLOBIN A1C WITH EAG   6. Cancer of prostate ([facility]  C61 185    7. S/P CABG x 3  Z95.1 V45.81    8. Hyperlipidemia, unspecified hyperlipidemia type  E78.5 272.4 LIPID PANEL   9.  Anemia, unspecified type  D64.9 285.9 CBC W/O DIFF      IRON PROFILE      FERRITIN      PROTEIN ELECTROPHORESIS   10.  B12 deficiency  E53.8 266.2 VITAMIN B12 & FOLATE

## 2022-06-13 ENCOUNTER — OFFICE VISIT (OUTPATIENT)
Dept: INTERNAL MEDICINE CLINIC | Age: 82
End: 2022-06-13
Payer: MEDICARE

## 2022-06-13 VITALS
SYSTOLIC BLOOD PRESSURE: 138 MMHG | HEIGHT: 67 IN | HEART RATE: 60 BPM | DIASTOLIC BLOOD PRESSURE: 60 MMHG | TEMPERATURE: 97.7 F | WEIGHT: 177 LBS | BODY MASS INDEX: 27.78 KG/M2 | OXYGEN SATURATION: 99 % | RESPIRATION RATE: 16 BRPM

## 2022-06-13 DIAGNOSIS — Z95.1 S/P CABG X 3: ICD-10-CM

## 2022-06-13 DIAGNOSIS — K63.5 HYPERPLASTIC COLONIC POLYP, UNSPECIFIED PART OF COLON: ICD-10-CM

## 2022-06-13 DIAGNOSIS — C61 CANCER OF PROSTATE (HCC): ICD-10-CM

## 2022-06-13 DIAGNOSIS — I25.10 CORONARY ARTERY DISEASE INVOLVING NATIVE CORONARY ARTERY OF NATIVE HEART WITHOUT ANGINA PECTORIS: ICD-10-CM

## 2022-06-13 DIAGNOSIS — E78.5 HYPERLIPIDEMIA, UNSPECIFIED HYPERLIPIDEMIA TYPE: ICD-10-CM

## 2022-06-13 DIAGNOSIS — K22.719 BARRETT'S ESOPHAGUS WITH DYSPLASIA: ICD-10-CM

## 2022-06-13 DIAGNOSIS — I10 ESSENTIAL HYPERTENSION: Primary | ICD-10-CM

## 2022-06-13 DIAGNOSIS — D64.9 ANEMIA, UNSPECIFIED TYPE: ICD-10-CM

## 2022-06-13 DIAGNOSIS — E53.8 B12 DEFICIENCY: ICD-10-CM

## 2022-06-13 DIAGNOSIS — R73.01 IFG (IMPAIRED FASTING GLUCOSE): ICD-10-CM

## 2022-06-13 PROCEDURE — G8752 SYS BP LESS 140: HCPCS | Performed by: INTERNAL MEDICINE

## 2022-06-13 PROCEDURE — 99214 OFFICE O/P EST MOD 30 MIN: CPT | Performed by: INTERNAL MEDICINE

## 2022-06-13 PROCEDURE — G8536 NO DOC ELDER MAL SCRN: HCPCS | Performed by: INTERNAL MEDICINE

## 2022-06-13 PROCEDURE — G8754 DIAS BP LESS 90: HCPCS | Performed by: INTERNAL MEDICINE

## 2022-06-13 PROCEDURE — G0463 HOSPITAL OUTPT CLINIC VISIT: HCPCS | Performed by: INTERNAL MEDICINE

## 2022-06-13 PROCEDURE — G8510 SCR DEP NEG, NO PLAN REQD: HCPCS | Performed by: INTERNAL MEDICINE

## 2022-06-13 PROCEDURE — G8417 CALC BMI ABV UP PARAM F/U: HCPCS | Performed by: INTERNAL MEDICINE

## 2022-06-13 PROCEDURE — 1123F ACP DISCUSS/DSCN MKR DOCD: CPT | Performed by: INTERNAL MEDICINE

## 2022-06-13 PROCEDURE — 1101F PT FALLS ASSESS-DOCD LE1/YR: CPT | Performed by: INTERNAL MEDICINE

## 2022-06-13 PROCEDURE — G8427 DOCREV CUR MEDS BY ELIG CLIN: HCPCS | Performed by: INTERNAL MEDICINE

## 2022-06-13 NOTE — PROGRESS NOTES
Tiffanie Jennings. presents with   Chief Complaint   Patient presents with    Follow-up     6 month    Hypertension    Cholesterol Problem    Labs     6-6-22          1. \"Have you been to the ER, urgent care clinic since your last visit? Hospitalized since your last visit? \" No    2. \"Have you seen or consulted any other health care providers outside of the [address] [place] [address] since your last visit? \" No     3. For patients aged 39-70: Has the patient had a colonoscopy / FIT/ Cologuard?  NA - based on age

## 2022-06-21 ENCOUNTER — TELEPHONE (OUTPATIENT)
Dept: INTERNAL MEDICINE CLINIC | Age: 82
End: 2022-06-21

## 2022-06-21 NOTE — TELEPHONE ENCOUNTER
Patient's wife came by and dropped off a list of recent blood pressure readings for Dr. Denisa Lui to review. Placed in box.  Thank you

## 2022-07-06 ENCOUNTER — OFFICE VISIT (OUTPATIENT)
Dept: CARDIOLOGY CLINIC | Age: 82
End: 2022-07-06
Payer: MEDICARE

## 2022-07-06 VITALS
DIASTOLIC BLOOD PRESSURE: 62 MMHG | WEIGHT: 178 LBS | HEART RATE: 53 BPM | SYSTOLIC BLOOD PRESSURE: 136 MMHG | HEIGHT: 67 IN | OXYGEN SATURATION: 99 % | BODY MASS INDEX: 27.94 KG/M2

## 2022-07-06 DIAGNOSIS — I65.23 BILATERAL CAROTID ARTERY STENOSIS: ICD-10-CM

## 2022-07-06 DIAGNOSIS — R09.89 BILATERAL CAROTID BRUITS: ICD-10-CM

## 2022-07-06 DIAGNOSIS — R06.02 SOB (SHORTNESS OF BREATH): ICD-10-CM

## 2022-07-06 DIAGNOSIS — Z95.1 S/P CABG X 3: ICD-10-CM

## 2022-07-06 DIAGNOSIS — I25.10 CORONARY ARTERY DISEASE INVOLVING NATIVE CORONARY ARTERY OF NATIVE HEART WITHOUT ANGINA PECTORIS: Primary | ICD-10-CM

## 2022-07-06 PROCEDURE — G8427 DOCREV CUR MEDS BY ELIG CLIN: HCPCS | Performed by: INTERNAL MEDICINE

## 2022-07-06 PROCEDURE — G8510 SCR DEP NEG, NO PLAN REQD: HCPCS | Performed by: INTERNAL MEDICINE

## 2022-07-06 PROCEDURE — G8536 NO DOC ELDER MAL SCRN: HCPCS | Performed by: INTERNAL MEDICINE

## 2022-07-06 PROCEDURE — 93000 ELECTROCARDIOGRAM COMPLETE: CPT | Performed by: INTERNAL MEDICINE

## 2022-07-06 PROCEDURE — G8754 DIAS BP LESS 90: HCPCS | Performed by: INTERNAL MEDICINE

## 2022-07-06 PROCEDURE — 1123F ACP DISCUSS/DSCN MKR DOCD: CPT | Performed by: INTERNAL MEDICINE

## 2022-07-06 PROCEDURE — 1101F PT FALLS ASSESS-DOCD LE1/YR: CPT | Performed by: INTERNAL MEDICINE

## 2022-07-06 PROCEDURE — G8417 CALC BMI ABV UP PARAM F/U: HCPCS | Performed by: INTERNAL MEDICINE

## 2022-07-06 PROCEDURE — G8752 SYS BP LESS 140: HCPCS | Performed by: INTERNAL MEDICINE

## 2022-07-06 PROCEDURE — 99214 OFFICE O/P EST MOD 30 MIN: CPT | Performed by: INTERNAL MEDICINE

## 2022-07-06 NOTE — PROGRESS NOTES
Daivd Sine. presents today for   Chief Complaint   Patient presents with    Follow-up     6 month follow up- no complaints        Daivd Sine. preferred language for health care discussion is english/other. Is someone accompanying this pt? no    Is the patient using any DME equipment during [address]? no    Depression Screening:  3 most recent PHQ Screens 7/6/2022   Little interest or pleasure in doing things Not at all   Feeling down, depressed, irritable, or hopeless Not at all   Total Score PHQ 2 0       Learning Assessment:  Learning Assessment 5/8/2019   PRIMARY LEARNER Patient   HIGHEST LEVEL OF EDUCATION - PRIMARY LEARNER  [facility] [address] PRIMARY LEARNER HEARING   CO-LEARNER CAREGIVER No   PRIMARY LANGUAGE ENGLISH   LEARNER PREFERENCE PRIMARY DEMONSTRATION   ANSWERED BY patient   RELATIONSHIP SELF       Abuse Screening:  Abuse Screening Questionnaire 7/6/2022   Do you ever feel afraid of your partner? N   Are you in a relationship with someone who physically or mentally threatens you? N   Is it safe for you to go home? Y       Fall Risk  Fall Risk Assessment, last 12 mths 7/6/2022   Able to walk? Yes   Fall in past 12 months? 0   Do you feel unsteady? 0   Are you worried about falling 0       Pt currently taking Anticoagulant therapy? no    Coordination of Care:  1. Have you been to the ER, urgent care clinic since your last visit? Hospitalized since your last visit? no    2. Have you seen or consulted any other health care providers outside of the [address] [place] [address] since your last visit? Include any pap smears or colon screening.  no

## 2022-07-06 NOTE — PROGRESS NOTES
HISTORY OF PRESENT ILLNESS  Jennifer Mcdaniel is a 78 y.o. male. ASSESSMENT and PLAN    Mr. Delgado Rose, previous Dr. Sean Jimenez patient, has known history of CAD. Ardeth Grade developed exertional angina in 2014. Ardeth Grade ultimately underwent bypass surgery after his coronary angiography revealed CAD. Ardeth Grade had LEAVITT to LAD as well as SVG to diagonal and sequential to PDA.  Because of bradycardia, he is only on Toprol-XL 25 mg daily. In October 2020, he had worsening dyspnea on exertion.  He underwent repeat coronary angiography which revealed normal LV function with EF 60%.  His LIMA to LAD is patent; SVG to second diagonal has 30 to 40% lesion with sequential segment occluded.  Left main is patent with LAD proximal 95%.  Circumflex has only mild diffuse disease.  Super dominant RCA has ostial/proximal 95% stenosis followed by mid segment 90-95% stenosis followed by distal segment along 85-95% stenosis.  RPDA has proximal 95% stenosis.  The 4 separate RCA lesions were stented using LENNOX ranging in size from 3.25 mm at the ostium down to 2.25 mm in the RPDA.  Since that time, his breathing has returned to baseline. He also has history of BLAIR but does not use CPAP.  He has history of prostate carcinoma.  He has history of Pederson's esophagus as well as colon adenoma.  He still uses inhalers for his COPD. · CAD:    Clinically stable. · BP:    Well controlled at 136/62. · Rhythm:    Asymptomatic sinus bradycardia 53 bpm.  · CHF:    There is no evidence of decompensated CHF noted. · Weight:     His weight today is 178 pounds. His baseline weight is 180 pounds. His current weight is acceptable. · Cholesterol:   Target LDL <70. Lipitor 20. · Anti-platelet:   Remains on ASA, and Plavix. I will see him back in 6 months.  Thank you. Encounter Diagnoses   Name Primary?     Coronary artery disease involving native coronary artery of native heart without angina pectoris Yes    SOB (shortness of breath)     Bilateral carotid artery stenosis     S/P CABG x 3     Bilateral carotid bruits      current treatment plan is effective, no change in therapy  lab results and schedule of future lab studies reviewed with patient  reviewed diet, exercise and weight control  cardiovascular risk and specific lipid/LDL goals reviewed  use of aspirin to prevent MI and TIA's discussed      HPI   Today, Mr. Gigi Mayfield has no complaints of chest pains, increased shortness of breath or decreased exercise capacity. He denies any changes in his activity levels. He still works at [email or web address]. He remains active. He denies any orthopnea or PND. He denies any palpitations or dizziness. Review of Systems   Respiratory: Negative for shortness of breath. Cardiovascular: Negative for chest pain, palpitations, orthopnea, claudication, leg swelling and PND. All other systems reviewed and are negative. Physical Exam  Vitals and nursing note reviewed. Constitutional:       Appearance: Normal appearance. HENT:      Head: Normocephalic. Eyes:      Conjunctiva/sclera: Conjunctivae normal.   Neck:      Vascular: No carotid bruit. Cardiovascular:      Rate and Rhythm: Regular rhythm. Bradycardia present. Pulmonary:      Breath sounds: Normal breath sounds. Abdominal:      Palpations: Abdomen is soft. Musculoskeletal:         General: No swelling. Cervical back: No rigidity. Skin:     General: Skin is warm and dry. Neurological:      General: No focal deficit present. Mental Status: He is alert and oriented to person, place, and time.    Psychiatric:         Mood and Affect: Mood normal.         Behavior: Behavior normal.         PCP: Toro Diaz MD    Past Medical History:   Diagnosis Date    Allergic rhinitis     Pederson's esophagus     Dr. Claudette Agreste; last 8/17, for HAL[facility]    CABG x 3 04/24/2014    LIMA to LAD, and saphenous vein grafts to the second diagonal of the LAD, and to the posterior descending branch of the RCA, Dr. Charles Barrera, 4/23/14.  CAD (coronary artery disease)     Colon adenoma 08/2017    Dr Napoleon Nieves Colon polyps 2007    Dr. Napoleon Nieves COPD (chronic obstructive pulmonary disease) ([facility]     mild obst disease on PFTs Dr. Tashia Garcia (2007)    Erectile dysfunction     GERD (gastroesophageal reflux disease)     by EGD 2007 Dr. Fatou Heredia; EGD 5/14/21 hh and s/p dilation stricture    Hearing loss     Hypertension     BLAIR (obstructive sleep apnea) 2011    CPAP not used    Osteoarthritis of both hands     Prostate cancer ([facility] 06/2010    Dr. Nichole Mercado [id number] Jj 3+3, 1/9 cores up to 10% 1 core; cryo Oct 2012 psa went from 7/1 to torsten 1.78    Prostate cancer ([facility] 04/2018    Dr Allen Posen stage 2B [id number], [place] 3+4, P,10, G2; brachytherapy Dr Zoila Gaviria Pulmonary nodule 2014    no change 2015, 3/17       Past Surgical History:   Procedure Laterality Date    COLONOSCOPY N/A 7/27/2017    Dr. Fatou Heredia 2007 negative; 8/17 adenoma     Trini Dec      Dr. Marcel Guerrero    HX CATARACT REMOVAL Bilateral 11/2014    Dr Kip Odom     HX CHOLECYSTECTOMY  2004    HX HEART CATHETERIZATION  2020    Stent    HX TONSILLECTOMY      HX TURP      x2 Dr. Yelena Tan HX UROLOGICAL  10/2012     [facility], Dr. Melissa Omer, Cystoscopy and dilation of stricture, cryoablation of prostate    HI CABG, ARTERY-VEIN, THREE  04/2014    3V CABG; Dr Charles Barrera LEAVITT to LAD, SVG to DM2 and sequential to RPDA       Current Outpatient Medications   Medication Sig Dispense Refill    amLODIPine (NORVASC) 10 mg tablet TAKE ONE TABLET BY MOUTH DAILY 90 Tablet 3    metoprolol succinate (TOPROL-XL) 25 mg XL tablet TAKE ONE TABLET BY MOUTH DAILY 90 Tablet 2    diclofenac (VOLTAREN) 1 % gel Apply 2 g to affected area four (4) times daily. 500 g 5    atorvastatin (LIPITOR) 20 mg tablet TAKE ONE TABLET BY MOUTH EVERY NIGHT AT BEDTIME 90 Tablet 3    clopidogreL (Plavix) 75 mg tab Take 1 Tablet by mouth daily.  1 tablet daily 90 Tablet 3    fluticasone propionate (FLONASE) 50 mcg/actuation nasal spray 2 sprays each nostril daily 1 Bottle 12    benazepriL (LOTENSIN) 10 mg tablet TAKE ONE TABLET BY MOUTH DAILY 90 Tablet 4    triamcinolone acetonide (KENALOG-40) 40 mg/mL injection       aspirin 81 mg chewable tablet Take 81 mg by mouth daily.  nitroglycerin (NITROSTAT) 0.4 mg SL tablet 1 Tab by SubLINGual route every five (5) minutes as needed for Chest Pain. 1 Bottle 3    levalbuterol tartrate (XOPENEX) 45 mcg/actuation inhaler Take 2 Puffs by inhalation every six (6) hours as needed for Wheezing. 1 Inhaler 5    omeprazole (PRILOSEC) 20 mg capsule Take 20 mg by mouth two (2) times a day.  ascorbic acid (VITAMIN C) 1,000 mg tablet Take 1,000 mg by mouth daily.  MULTIVITAMIN W-MINERALS/LUTEIN (CENTRUM SILVER PO) Take 1 Tab by mouth daily. The patient has a family history of    Social History     Tobacco Use    Smoking status: Former Smoker     Packs/day: 0.50     Years: 4.00     Pack years: 2.00     Quit date: 1957     Years since quittin.5    Smokeless tobacco: Never Used    Tobacco comment: Quit in [address] Use: Never used   Substance Use Topics    Alcohol use: No    Drug use: Never       Lab Results   Component Value Date/Time    Cholesterol, total 116 2021 08:25 AM    HDL Cholesterol 48 2021 08:25 AM    LDL, calculated 50 2021 08:25 AM    Triglyceride 90 2021 08:25 AM    CHOL/HDL Ratio 2.4 2021 08:25 AM        BP Readings from Last 3 Encounters:   22 136/62   22 138/60   22 122/60        Pulse Readings from Last 3 Encounters:   22 (!) 53   22 60   22 (!) 55       Wt Readings from Last 3 Encounters:   22 80.7 kg (178 lb)   22 80.3 kg (177 lb)   22 83.5 kg (184 lb)         EKG: unchanged from previous tracings, sinus bradycardia, first-degree AVB.

## 2022-07-08 ENCOUNTER — TELEPHONE (OUTPATIENT)
Dept: CARDIOLOGY CLINIC | Age: 82
End: 2022-07-08

## 2022-07-08 DIAGNOSIS — I25.10 CORONARY ARTERY DISEASE INVOLVING NATIVE CORONARY ARTERY OF NATIVE HEART WITHOUT ANGINA PECTORIS: Primary | ICD-10-CM

## 2022-07-08 DIAGNOSIS — Z01.818 PRE-OP EXAMINATION: ICD-10-CM

## 2022-07-08 DIAGNOSIS — I10 ESSENTIAL HYPERTENSION: ICD-10-CM

## 2022-07-08 DIAGNOSIS — Z95.1 S/P CABG X 3: ICD-10-CM

## 2022-07-20 ENCOUNTER — TELEPHONE (OUTPATIENT)
Dept: CARDIOLOGY CLINIC | Age: 82
End: 2022-07-20

## 2022-07-20 NOTE — TELEPHONE ENCOUNTER
After receiving 2nd request fro clearance from [facility], sent them a reply back that pt has echo 7/22 which he needs to get done before Dr Saida Hoskins will ok him for colonoscopy

## 2022-07-26 NOTE — PERIOP NOTES
PRE-SURGICAL INSTRUCTIONS        Patient's Name:  Aubrey Woods. Today's Date:  7/26/2022            Covid Testing Date and Time:    Surgery Date:  7/28/2022                Do NOT eat or drink anything, including candy, gum, or ice chips after midnight on 7/28, unless you have specific instructions from your surgeon or anesthesia provider to do so. You may brush your teeth before coming to the hospital.  No smoking 24 hours prior to the day of surgery. No alcohol 24 hours prior to the day of surgery. No recreational drugs for one week prior to the day of surgery. Leave all valuables, including money/purse, at home. Remove all jewelry, nail polish, acrylic nails, and makeup (including mascara); no lotions powders, deodorant, or perfume/cologne/after shave on the skin. Follow instruction for Hibiclens washes and CHG wipes from surgeon's office. Glasses/contact lenses and dentures may be worn to the hospital.  They will be removed prior to surgery. Call your doctor if symptoms of a cold or illness develop within 24-48 hours prior to your surgery. 11.  If you are having an outpatient procedure, please make arrangements for a responsible ADULT TO [address] [place] [address] and stay with you for 24 hours after your surgery. 12. ONE VISITOR in the hospital at this time for outpatient procedures. Exceptions may be made for surgical admissions, per nursing unit guidelines      Special Instructions:      Bring list of CURRENT medications. Bring inhaler. Bring any pertinent legal medical records. Take these medications the morning of surgery with a sip of water:  per office    Follow physician instructions about stopping anticoagulants. Complete bowel prep per MD instructions. On the day of surgery, come in the main entrance of [facility]. Let the  at the desk know you are there for surgery.   A staff member will come escort you to the surgical area on the second floor.    If you have any questions or concerns, please do not hesitate to call:     (Prior to the day of surgery) PAT department:  [phone]   (Day of surgery) Pre-Op department:  [phone]    These surgical instructions were reviewed with the patient during the PAT phone call.

## 2022-07-27 ENCOUNTER — ANESTHESIA EVENT (OUTPATIENT)
Dept: ENDOSCOPY | Age: 82
End: 2022-07-27
Payer: MEDICARE

## 2022-07-27 ENCOUNTER — TELEPHONE (OUTPATIENT)
Dept: CARDIOLOGY CLINIC | Age: 82
End: 2022-07-27

## 2022-07-28 ENCOUNTER — ANESTHESIA (OUTPATIENT)
Dept: ENDOSCOPY | Age: 82
End: 2022-07-28
Payer: MEDICARE

## 2022-07-28 ENCOUNTER — HOSPITAL ENCOUNTER (OUTPATIENT)
Age: 82
Setting detail: OUTPATIENT SURGERY
Discharge: HOME | End: 2022-07-28
Attending: INTERNAL MEDICINE | Admitting: INTERNAL MEDICINE
Payer: MEDICARE

## 2022-07-28 VITALS
SYSTOLIC BLOOD PRESSURE: 134 MMHG | OXYGEN SATURATION: 100 % | TEMPERATURE: 97.3 F | DIASTOLIC BLOOD PRESSURE: 69 MMHG | RESPIRATION RATE: 20 BRPM | BODY MASS INDEX: 26.67 KG/M2 | WEIGHT: 169.9 LBS | HEART RATE: 46 BPM | HEIGHT: 67 IN

## 2022-07-28 PROCEDURE — C1886 CATHETER, ABLATION: HCPCS | Performed by: INTERNAL MEDICINE

## 2022-07-28 PROCEDURE — 00813 ANES UPR LWR GI NDSC PX: CPT | Performed by: ANESTHESIOLOGY

## 2022-07-28 PROCEDURE — 74011250636 HC RX REV CODE- 250/636: Performed by: ANESTHESIOLOGY

## 2022-07-28 PROCEDURE — 77030008565 HC TBNG SUC IRR ERBE -B: Performed by: INTERNAL MEDICINE

## 2022-07-28 PROCEDURE — 74011000250 HC RX REV CODE- 250: Performed by: ANESTHESIOLOGY

## 2022-07-28 PROCEDURE — 77030021593 HC FCPS BIOP ENDOSC BSC -A: Performed by: INTERNAL MEDICINE

## 2022-07-28 PROCEDURE — 76060000032 HC ANESTHESIA 0.5 TO 1 HR: Performed by: INTERNAL MEDICINE

## 2022-07-28 PROCEDURE — 76040000007: Performed by: INTERNAL MEDICINE

## 2022-07-28 PROCEDURE — 88305 TISSUE EXAM BY PATHOLOGIST: CPT

## 2022-07-28 PROCEDURE — 99100 ANES PT EXTEME AGE<1 YR&>70: CPT | Performed by: ANESTHESIOLOGY

## 2022-07-28 PROCEDURE — 2709999900 HC NON-CHARGEABLE SUPPLY: Performed by: INTERNAL MEDICINE

## 2022-07-28 RX ORDER — SODIUM CHLORIDE, SODIUM LACTATE, POTASSIUM CHLORIDE, CALCIUM CHLORIDE 600; 310; 30; 20 MG/100ML; MG/100ML; MG/100ML; MG/100ML
25 INJECTION, SOLUTION INTRAVENOUS CONTINUOUS
Status: DISCONTINUED | OUTPATIENT
Start: 2022-07-28 | End: 2022-07-28 | Stop reason: HOSPADM

## 2022-07-28 RX ORDER — LIDOCAINE HYDROCHLORIDE 20 MG/ML
INJECTION, SOLUTION EPIDURAL; INFILTRATION; INTRACAUDAL; PERINEURAL AS NEEDED
Status: DISCONTINUED | OUTPATIENT
Start: 2022-07-28 | End: 2022-07-28 | Stop reason: HOSPADM

## 2022-07-28 RX ORDER — PROPOFOL 10 MG/ML
INJECTION, EMULSION INTRAVENOUS AS NEEDED
Status: DISCONTINUED | OUTPATIENT
Start: 2022-07-28 | End: 2022-07-28 | Stop reason: HOSPADM

## 2022-07-28 RX ADMIN — PROPOFOL 50 MG: 10 INJECTION, EMULSION INTRAVENOUS at 11:04

## 2022-07-28 RX ADMIN — PROPOFOL 50 MG: 10 INJECTION, EMULSION INTRAVENOUS at 11:00

## 2022-07-28 RX ADMIN — PROPOFOL 80 MG: 10 INJECTION, EMULSION INTRAVENOUS at 10:52

## 2022-07-28 RX ADMIN — SODIUM CHLORIDE, POTASSIUM CHLORIDE, SODIUM LACTATE AND CALCIUM CHLORIDE 25 ML/HR: 600; 310; 30; 20 INJECTION, SOLUTION INTRAVENOUS at 08:44

## 2022-07-28 RX ADMIN — PROPOFOL 50 MG: 10 INJECTION, EMULSION INTRAVENOUS at 11:09

## 2022-07-28 RX ADMIN — LIDOCAINE HYDROCHLORIDE 50 MG: 20 INJECTION, SOLUTION EPIDURAL; INFILTRATION; INTRACAUDAL; PERINEURAL at 10:52

## 2022-07-28 RX ADMIN — PROPOFOL 50 MG: 10 INJECTION, EMULSION INTRAVENOUS at 11:15

## 2022-07-28 RX ADMIN — PROPOFOL 50 MG: 10 INJECTION, EMULSION INTRAVENOUS at 10:56

## 2022-07-28 NOTE — ANESTHESIA PREPROCEDURE EVALUATION
Anesthetic History   No history of anesthetic complications            Review of Systems / Medical History  Patient summary reviewed, nursing notes reviewed and pertinent labs reviewed    Pulmonary    COPD    Sleep apnea: No treatment           Neuro/Psych             Comments: 2021 Moderate mixed density plaque bilateral internal carotid arteries with 50 to 69% stenosis. Cardiovascular    Hypertension  Valvular problems/murmurs (mild): mitral insufficiency        CAD, cardiac stents, CABG and hyperlipidemia      Comments: 07/2022 ECHO  estimated EF of 55 - 60%.    GI/Hepatic/Renal     GERD           Endo/Other        Arthritis     Other Findings            Physical Exam    Airway  Mallampati: III  TM Distance: 4 - 6 cm  Neck ROM: normal range of motion   Mouth opening: Normal     Cardiovascular    Rhythm: regular           Dental    Dentition: Poor dentition     Pulmonary  Breath sounds clear to auscultation               Abdominal  GI exam deferred       Other Findings            Anesthetic Plan    ASA: 3  Anesthesia type: MAC            Anesthetic plan and risks discussed with: Patient

## 2022-07-28 NOTE — DISCHARGE INSTRUCTIONS
Upper GI Endoscopy: What to Expect at [address] had an upper GI endoscopy. Your doctor used a thin, lighted tube that bends to look at the inside of your esophagus, your stomach, and the first part of the small intestine, called the duodenum. After you have an endoscopy, you will stay at the hospital or clinic for 1 to 2 hours. This will allow the medicine to wear off. You will be able to go home after your doctor or nurse checks to make sure that you're not having any problems. You may have to stay overnight if you had treatment during the test. You may have a sore throat for a day or two after the test.  This care sheet gives you a general idea about what to expect after the test.  How can you care for yourself at home? Activity   Rest as much as you need to after you go home. You should be able to go back to your usual activities the day after the test.  Diet   Follow your doctor's directions for eating after the test.  Drink plenty of fluids (unless your doctor has told you not to). Medications   If you have a sore throat the day after the test, use an over-the-counter spray to numb your throat. Follow-up care is a key part of your treatment and safety. Be sure to make and go to all appointments, and call your doctor if you are having problems. It's also a good idea to know your test results and keep a list of the medicines you take. When should you call for help? Call 911 anytime you think you may need emergency care. For example, call if:    You passed out (lost consciousness). You have trouble breathing. You pass maroon or bloody stools. Call your doctor now or seek immediate medical care if:    You have pain that does not get better after your take pain medicine. You have new or worse belly pain. You have blood in your stools. You are sick to your stomach and cannot keep fluids down. You have a fever. You cannot pass stools or gas.    Watch closely for changes in your health, and be sure to contact your doctor if:    Your throat still hurts after a day or two. You do not get better as expected. Where can you learn more? Go to http://www.chavez.com/  Enter J454 in the search box to learn more about \"Upper GI Endoscopy: What to Expect at Home. \"  Current as of: September 8, 2021               Content Version: 13.2  © 2006-2022 [employer]. Care instructions adapted under license by [employer] (which disclaims liability or warranty for this information). If you have questions about a medical condition or this instruction, always ask your healthcare professional. [address] any warranty or liability for your use of this information. Esophageal Dilation: What to Expect at [address] had an esophageal dilation. This procedure can open up narrow areas of the esophagus. After the procedure, you will stay at the hospital or surgery center for 1 to 2 hours. This will allow the medicine to wear off. You will be able to go home after your doctor or nurse checks to make sure that you're not having any problems. This care sheet gives you a general idea about how long it will take for you to recover. But each person recovers at a different pace. Follow the steps below to get better as quickly as possible. How can you care for yourself at home? Activity    Rest as much as you need to after you go home. You should be able to go back to your usual activities the day after the procedure. Diet    Follow your doctor's directions for eating after the procedure. Drink plenty of fluids (unless your doctor has told you not to). Medicines    Your doctor will tell you if and when you can restart your medicines. He or she will also give you instructions about taking any new medicines.      If you take aspirin or some other blood thinner, ask your doctor if and when to start taking it again. Make sure that you understand exactly what your doctor wants you to do. If you have a sore throat the day after the procedure, use an over-the-counter spray to numb your throat. Sucking on throat lozenges and gargling with warm salt water may also help relieve your symptoms. Follow-up care is a key part of your treatment and safety. Be sure to make and go to all appointments, and call your doctor if you are having problems. It's also a good idea to know your test results and keep a list of the medicines you take. When should you call for help? Call 911 anytime you think you may need emergency care. For example, call if:    You passed out (lost consciousness). You have trouble breathing. Your stools are maroon or very bloody   Call your doctor now or seek immediate medical care if:    You have new or worse belly pain. You have a fever. You have new or more blood in your stools. You are sick to your stomach and cannot drink fluids. You cannot pass stools or gas. You have pain that does not get better after you take pain medicine. Watch closely for changes in your health, and be sure to contact your doctor if:    Your throat still hurts after a day or two. You do not get better as expected. Where can you learn more? Go to http://www.gray.com/  Enter J014 in the search box to learn more about \"Esophageal Dilation: What to Expect at Home. \"  Current as of: September 8, 2021               Content Version: 13.2  © 2006-2022 [employer]. Care instructions adapted under license by [employer] (which disclaims liability or warranty for this information). If you have questions about a medical condition or this instruction, always ask your healthcare professional. [place] [address] any warranty or liability for your use of this information.          Hiatal Hernia: Care Instructions  Your Care Instructions  A hiatal hernia occurs when part of the stomach bulges into the chest cavity. A hiatal hernia may allow stomach acid and juices to back up into the esophagus (acid reflux). This can cause a feeling of burning, warmth, heat, or pain behind the breastbone. This feeling may often occur after you eat, soon after you lie down, or when you bend forward, and it may come and go. You also may have a sour taste in your mouth. These symptoms are commonly known as heartburn or reflux. But not all hiatal hernias cause symptoms. Follow-up care is a key part of your treatment and safety. Be sure to make and go to all appointments, and call your doctor if you are having problems. It's also a good idea to know your test results and keep a list of the medicines you take. How can you care for yourself at home? Take your medicines exactly as prescribed. Call your doctor if you think you are having a problem with your medicine. Do not take aspirin or other nonsteroidal anti-inflammatory drugs (NSAIDs), such as ibuprofen (Advil, Motrin) or naproxen (Aleve), unless your doctor says it is okay. Ask your doctor what you can take for pain. Your doctor may recommend over-the-counter medicine. For mild or occasional indigestion, antacids such as Tums, Gaviscon, Maalox, or Mylanta may help. Your doctor also may recommend over-the-counter acid reducers, such as famotidine (Pepcid AC), cimetidine (Tagamet HB), or omeprazole (Prilosec). Read and follow all instructions on the label. If you use these medicines often, talk with your doctor. Change your eating habits. It's best to eat several small meals instead of two or three large meals. After you eat, wait 2 to 3 hours before you lie down. Late-night snacks aren't a good idea. Avoid foods that make your symptoms worse.  These may include chocolate, mint, alcohol, pepper, spicy foods, high-fat foods, or drinks with caffeine in them, such as tea, coffee, andrea, or energy drinks. If your symptoms are worse after you eat a certain food, you may want to stop eating it to see if your symptoms get better. Do not smoke or chew tobacco.  If you get heartburn at night, raise the head of your bed 6 to 8 inches by putting the frame on blocks or placing a foam wedge under the head of your mattress. (Adding extra pillows does not work.)  Do not wear tight clothing around your middle. Lose weight if you need to. Losing just 5 to 10 pounds can help. When should you call for help? Call your doctor now or seek immediate medical care if:    You have new or worse belly pain. You are vomiting. Watch closely for changes in your health, and be sure to contact your doctor if:    You have new or worse symptoms of indigestion. You have trouble or pain swallowing. You are losing weight. You do not get better as expected. Where can you learn more? Go to http://martine-feng.info/  Enter T074 in the search box to learn more about \"Hiatal Hernia: Care Instructions. \"  Current as of: September 8, 2021               Content Version: 13.2  © 8438-9040 [employer]. Care instructions adapted under license by [employer] (which disclaims liability or warranty for this information). If you have questions about a medical condition or this instruction, always ask your healthcare professional. [address] any warranty or liability for your use of this information. Colonoscopy: What to Expect at [address] [place] [address]  After a colonoscopy, you'll stay at the clinic until you wake up. Then you can go home. But you'll need to arrange for a ride. Your doctor will tell you when you can eat and do your other usual activities. Your doctor will talk to you about when you'll need your next colonoscopy. Your doctor can help you decide how often you need to be checked.  This will depend on the results of your test and your risk for colorectal cancer. After the test, you may be bloated or have gas pains. You may need to pass gas. If a biopsy was done or a polyp was removed, you may have streaks of blood in your stool (feces) for a few days. Problems such as heavy rectal bleeding may not occur until several weeks after the test. This isn't common. But it can happen after polyps are removed. This care sheet gives you a general idea about how long it will take for you to recover. But each person recovers at a different pace. Follow the steps below to get better as quickly as possible. How can you care for yourself at home? Activity    Rest when you feel tired. You can do your normal activities when it feels okay to do so. Diet    Follow your doctor's directions for eating. Unless your doctor has told you not to, drink plenty of fluids. This helps to replace the fluids that were lost during the colon prep. Do not drink alcohol. Medicines    Your doctor will tell you if and when you can restart your medicines. You will also be given instructions about taking any new medicines. If you take aspirin or some other blood thinner, ask your doctor if and when to start taking it again. Make sure that you understand exactly what your doctor wants you to do. If polyps were removed or a biopsy was done during the test, your doctor may tell you not to take aspirin or other anti-inflammatory medicines for a few days. These include ibuprofen (Advil, Motrin) and naproxen (Aleve). Other instructions    For your safety, do not drive or operate machinery until the medicine wears off and you can think clearly. Your doctor may tell you not to drive or operate machinery until the day after your test.     Do not sign legal documents or make major decisions until the medicine wears off and you can think clearly. The anesthesia can make it hard for you to fully understand what you are agreeing to.    Follow-up care is a key part of your treatment and safety. Be sure to make and go to all appointments, and call your doctor if you are having problems. It's also a good idea to know your test results and keep a list of the medicines you take. When should you call for help? Call 911 anytime you think you may need emergency care. For example, call if:    You passed out (lost consciousness). You pass maroon or bloody stools. You have trouble breathing. Call your doctor now or seek immediate medical care if:    You have pain that does not get better after you take pain medicine. You are sick to your stomach or cannot drink fluids. You have new or worse belly pain. You have blood in your stools. You have a fever. You cannot pass stools or gas. Watch closely for changes in your health, and be sure to contact your doctor if you have any problems. Where can you learn more? Go to http://www.gray.com/  Enter E264 in the search box to learn more about \"Colonoscopy: What to Expect at Home. \"  Current as of: September 8, 2021               Content Version: 13.2  © 9032-2715 [employer]. Care instructions adapted under license by [employer] (which disclaims liability or warranty for this information). If you have questions about a medical condition or this instruction, always ask your healthcare professional. [address] any warranty or liability for your use of this information. Colon Polyps: Care Instructions  Your Care Instructions     Colon polyps are growths in the colon or the rectum. The cause of most colon polyps is not known, and most people who get them do not have any problems. But a certain kind can turn into cancer. For this reason, regular testing for colon polyps is important for people as they get older. It is also important for anyone who has an increased risk for colon cancer.   Polyps are usually found through routine colon cancer screening tests. Although most colon polyps are not cancerous, they are usually removed and then tested for cancer. Screening for colon cancer saves lives because the cancer can usually be cured if it is caught early. If you have a polyp that is the type that can turn into cancer, you may need more tests to examine your entire colon. The doctor will remove any other polyps that he or she finds, and you will be tested more often. Follow-up care is a key part of your treatment and safety. Be sure to make and go to all appointments, and call your doctor if you are having problems. It's also a good idea to know your test results and keep a list of the medicines you take. How can you care for yourself at home? Regular exams to look for colon polyps are the best way to prevent polyps from turning into colon cancer. These can include stool tests, sigmoidoscopy, colonoscopy, and CT colonography. Talk with your doctor about a testing schedule that is right for you. To prevent polyps  There is no home treatment that can prevent colon polyps. But these steps may help lower your risk for cancer. Stay active. Being active can help you get to and stay at a healthy weight. Try to exercise on most days of the week. Walking is a good choice. Eat well. Choose a variety of vegetables, fruits, legumes (such as peas and beans), fish, poultry, and whole grains. Do not smoke. If you need help quitting, talk to your doctor about stop-smoking programs and medicines. These can increase your chances of quitting for good. If you drink alcohol, limit how much you drink. Limit alcohol to 2 drinks a day for men and 1 drink a day for women. When should you call for help? Call your doctor now or seek immediate medical care if:    You have severe belly pain. Your stools are maroon or very bloody. Watch closely for changes in your health, and be sure to contact your doctor if:    You have a fever. You have nausea or vomiting. You have a change in bowel habits (new constipation or diarrhea). Your symptoms get worse or are not improving as expected. Where can you learn more? Go to http://www.Travel Likes.net.com/  Enter C571 in the search box to learn more about \"Colon Polyps: Care Instructions. \"  Current as of: September 8, 2021               Content Version: 13.2  © 2006-2022 [employer]. Care instructions adapted under license by [employer] (which disclaims liability or warranty for this information). If you have questions about a medical condition or this instruction, always ask your healthcare professional. [place] [address] any warranty or liability for your use of this information. DISCHARGE SUMMARY from Nurse    PATIENT INSTRUCTIONS:    After general anesthesia or intravenous sedation, for 24 hours or while taking prescription Narcotics:  Limit your activities  Do not drive and operate hazardous machinery  Do not make important personal or business decisions  Do  not drink alcoholic beverages  If you have not urinated within 8 hours after discharge, please contact your surgeon on call. Report the following to your surgeon:  Excessive pain, swelling, redness or odor of or around the surgical area  Temperature over 100.5  Nausea and vomiting lasting longer than 4 hours or if unable to take medications  Any signs of decreased circulation or nerve impairment to extremity: change in color, persistent  numbness, tingling, coldness or increase pain  Any questions        These are general instructions for a healthy lifestyle:    No smoking/ No tobacco products/ Avoid exposure to second hand smoke  Surgeon General's Warning:  Quitting smoking now greatly reduces serious risk to your health.     Obesity, smoking, and sedentary lifestyle greatly increases your risk for illness    A healthy diet, regular physical exercise & weight monitoring are important for maintaining a healthy lifestyle    You may be retaining fluid if you have a history of heart failure or if you experience any of the following symptoms:  Weight gain of 3 pounds or more overnight or 5 pounds in a week, increased swelling in our hands or feet or shortness of breath while lying flat in bed. Please call your doctor as soon as you notice any of these symptoms; do not wait until your next office visit. The discharge information has been reviewed with the patient. The patient verbalized understanding. Discharge medications reviewed with the patient and appropriate educational materials and side effects teaching were provided.   ___________________________________________________________________________________________________________________________________

## 2022-07-28 NOTE — H&P
[email or web address] [address]  [place], [address]        Impression:   1.dysplastic Pederson's  2. Polyp surveillance       Plan:     1. EGD HALO [place] mac all risks benefits and alt discussed       Chief Complaint: dysplastic Pederson's hx polyps      HPI:  Aubrey Shaikh is a 78 y.o. male who is being seen on consult for dysplastic Pederson's and hx of polyps. PMH:   Past Medical History:   Diagnosis Date    Allergic rhinitis     Pederson's esophagus     Dr. Raji Salas; last 8/17, for HALO    CABG x 3 04/24/2014    LIMA to LAD, and saphenous vein grafts to the second diagonal of the LAD, and to the posterior descending branch of the RCA, Dr. John Kellogg, 4/23/14.      CAD (coronary artery disease)     Colon adenoma 08/2017    Dr Raji Salas    Colon polyps 2007    Dr. Raji Salas    COPD (chronic obstructive pulmonary disease) ([facility] [address]     mild obst disease on PFTs Dr. Bere Pickard (2007)    Erectile dysfunction     GERD (gastroesophageal reflux disease)     by EGD 2007 Dr. Raji Salas; EGD 5/14/21 hh and s/p dilation stricture    Hearing loss     Hypertension     BLAIR (obstructive sleep apnea) 2011    CPAP not used    Osteoarthritis of both hands     Prostate cancer ([facility] [address] 06/2010    Dr. Chriss Valdes [id number] All[place] 3+3, 1/9 cores up to 10% 1 core; cryo Oct 2012 psa went from 7/1 to torsten 1.78    Prostate cancer ([facility] [address] 04/2018    Dr Janice Easley stage 2B D1pP2Q3, Jj 3+4, P,10, G2; brachytherapy Dr Orestes Ocampo     Pulmonary nodule 2014    no change 2015, 3/17       PSH:   Past Surgical History:   Procedure Laterality Date    COLONOSCOPY N/A 7/27/2017    Dr. Raji Salas 2007 negative; 8/17 adenoma     HX APPENDECTOMY      HX Myrandan Flo Villarreal CATARACT REMOVAL Bilateral 11/2014    Dr Kristy Gaviria CHOLECYSTECTOMY  2004    [address]  2020    Stent    HX TONSILLECTOMY      HX TURP      x2 Dr. Valeriy Morales UROLOGICAL  10/2012    [facility], Dr. Madi Philippe, Cystoscopy and dilation of stricture, cryoablation of prostate    ID CABG, ARTERY-VEIN, THREE  2014    3V CABG; Dr Irina LEAVITT to LAD, SVG to DM2 and sequential to RPDA       Social HX:   Social History     Socioeconomic History    Marital status:      Spouse name: Not on file    Number of children: 3    Years of education: Not on file    Highest education level: Not on file   Occupational History    Occupation: ret engr [employer]     Employer: RETIRED   Tobacco Use    Smoking status: Former     Packs/day: 0.50     Years: 4.00     Pack years: 2.00     Types: Cigarettes     Quit date: 1957     Years since quittin.6    Smokeless tobacco: Never    Tobacco comments:     Quit in    Vaping Use    Vaping Use: Never used   Substance and Sexual Activity    Alcohol use: No    Drug use: Never    Sexual activity: Not on file   Other Topics Concern    Not on file   Social History Narrative    Not on file     Social Determinants of Health     Financial Resource Strain: Not on file   Food Insecurity: Not on file   Transportation Needs: Not on file   Physical Activity: Not on file   Stress: Not on file   Social Connections: Not on file   Intimate Partner Violence: Not on file   Housing Stability: Not on file       FHX:   Family History   Problem Relation Age of Onset    Heart Disease Father     Stroke Mother     Hypertension Mother     Diabetes Sister        Allergy:   Allergies   Allergen Reactions    Albuterol Palpitations    Influenza Virus Vaccine, Specific Other (comments)     \"Gets the flu\"       Home Medications:     Medications Prior to Admission   Medication Sig    amLODIPine (NORVASC) 10 mg tablet TAKE ONE TABLET BY MOUTH DAILY    metoprolol succinate (TOPROL-XL) 25 mg XL tablet TAKE ONE TABLET BY MOUTH DAILY    atorvastatin (LIPITOR) 20 mg tablet TAKE ONE TABLET BY MOUTH EVERY NIGHT AT BEDTIME    aspirin 81 mg chewable tablet Take 81 mg by mouth daily.     omeprazole (PRILOSEC) 20 mg capsule Take 20 mg by mouth two (2) times a day. ascorbic acid, vitamin C, (VITAMIN C) 1,000 mg tablet Take 1,000 mg by mouth daily. MULTIVITAMIN W-MINERALS/LUTEIN (CENTRUM SILVER PO) Take 1 Tab by mouth daily. diclofenac (VOLTAREN) 1 % gel Apply 2 g to affected area four (4) times daily. (Patient not taking: Reported on 7/26/2022)    clopidogreL (Plavix) 75 mg tab Take 1 Tablet by mouth daily. 1 tablet daily    fluticasone propionate (FLONASE) 50 mcg/actuation nasal spray 2 sprays each nostril daily    benazepriL (LOTENSIN) 10 mg tablet TAKE ONE TABLET BY MOUTH DAILY (Patient not taking: Reported on 7/28/2022)    triamcinolone acetonide (KENALOG-40) 40 mg/mL injection  (Patient not taking: Reported on 7/26/2022)    nitroglycerin (NITROSTAT) 0.4 mg SL tablet 1 Tab by SubLINGual route every five (5) minutes as needed for Chest Pain. (Patient not taking: Reported on 7/26/2022)    levalbuterol tartrate (XOPENEX) 45 mcg/actuation inhaler Take 2 Puffs by inhalation every six (6) hours as needed for Wheezing. (Patient not taking: Reported on 7/26/2022)       Review of Systems:     Constitutional: No fevers, chills, weight loss, fatigue. Skin: No rashes, pruritis, jaundice, ulcerations, erythema. HENT: No headaches, nosebleeds, sinus pressure, rhinorrhea, sore throat. Eyes: No visual changes, blurred vision, eye pain, photophobia, jaundice. Cardiovascular: No chest pain, heart palpitations. Respiratory: No cough, SOB, wheezing, chest discomfort, orthopnea. Gastrointestinal: heartburn   Genitourinary: No dysuria, bleeding, discharge, pyuria. Musculoskeletal: No weakness, arthralgias, wasting. Endo: No sweats. Heme: No bruising, easy bleeding. Allergies: As noted. Neurological: Cranial nerves intact. Alert and oriented. Gait not assessed. Psychiatric:  No anxiety, depression, hallucinations.                  Visit Vitals  BP (!) 149/56   Pulse (!) 53   Temp 97.9 °F (36.6 °C)   Resp 18   Ht 5' 7\" (1.702 m)   Wt 77.1 kg (169 lb 14.4 oz)   SpO2 99%   BMI 26.61 kg/m²       Physical Assessment:     constitutional: appearance: well developed, well nourished, normal habitus, no deformities, in no acute distress. skin: inspection: no rashes, ulcers, icterus or other lesions; no clubbing or telangiectasias. palpation: no induration or subcutaneos nodules. eyes: inspection: normal conjunctivae and lids; no jaundice pupils: symmetrical, normoreactive to light, normal accommodation and size. ENMT: mouth: normal oral mucosa,lips and gums; good dentition. oropharynx: normal tongue, hard and soft palate; posterior pharynx without erythema, exudate or lesions. neck: no masses organomegaly or tenderness. respiratory: effort: normal chest excursion; no intercostal retraction or accessory muscle use. cardiovascular: abdominal aorta: normal size and position; no bruits. palpation: PMI of normal size and position; normal rhythm; no thrill or murmurs. abdominal: abdomen: normal consistency; no tenderness or masses. hernias: no hernias appreciated. liver: normal size and consistency. spleen: not palpable. rectal: hemoccult/guaiac: not performed. musculoskeletal: no deformities or muscle wasting   lymphatic: axilae: not palpable. groin: not palpable. neck: within normal limits. other: not palpable. neurologic: cranial nerves: II-XII normal.   psychiatric: judgement/insight: within normal limits. memory: within normal limits for recent and remote events. mood and affect: no evidence of depression, anxiety or agitation. orientation: oriented to time, space and person. Basic Metabolic Profile   No results for input(s): NA, K, CL, CO2, BUN, GLU, CA, MG, PHOS in the last 72 hours. No lab exists for component: CREAT      CBC w/Diff    No results for input(s): WBC, RBC, HGB, HCT, MCV, MCH, MCHC, RDW, PLT, HGBEXT, HCTEXT, PLTEXT in the last 72 hours.     No lab exists for component: MPV No results for input(s): GRANS, LYMPH, EOS, PRO, MYELO, METAS, BLAST in the last 72 hours. No lab exists for component: MONO, BASO     Hepatic Function   No results for input(s): ALB, TP, TBILI, AP, AML, LPSE in the last 72 hours. No lab exists for component: DBILI, GPT, SGOT       Danya Cadet MD, M.D. [facility], [address] [facility]  [email or web address] [facility]

## 2022-07-28 NOTE — ANESTHESIA POSTPROCEDURE EVALUATION
Procedure(s):  UPPER ENDOSCOPY / Halo 90, Dilation 52Fr, & Bx's  COLONOSCOPY with Polypectomy. MAC    Anesthesia Post Evaluation      Multimodal analgesia: multimodal analgesia used between 6 hours prior to anesthesia start to PACU discharge  Patient location during evaluation: PACU  Patient participation: complete - patient participated  Level of consciousness: awake and alert  Pain management: adequate  Airway patency: patent  Anesthetic complications: no  Cardiovascular status: acceptable and hemodynamically stable  Respiratory status: acceptable  Hydration status: acceptable  Post anesthesia nausea and vomiting:  controlled      INITIAL Post-op Vital signs:   Vitals Value Taken Time   /65 07/28/22 1158   Temp     Pulse 50 07/28/22 1207   Resp 13 07/28/22 1207   SpO2 97 % 07/28/22 1207   Vitals shown include unvalidated device data.

## 2022-08-05 ENCOUNTER — HOSPITAL ENCOUNTER (EMERGENCY)
Age: 82
Discharge: HOME | End: 2022-08-05
Attending: STUDENT IN AN ORGANIZED HEALTH CARE EDUCATION/TRAINING PROGRAM
Payer: MEDICARE

## 2022-08-05 VITALS
BODY MASS INDEX: 28.25 KG/M2 | SYSTOLIC BLOOD PRESSURE: 132 MMHG | RESPIRATION RATE: 14 BRPM | TEMPERATURE: 98.2 F | HEART RATE: 64 BPM | OXYGEN SATURATION: 99 % | DIASTOLIC BLOOD PRESSURE: 62 MMHG | WEIGHT: 180 LBS | HEIGHT: 67 IN

## 2022-08-05 DIAGNOSIS — B34.9 VIRAL ILLNESS: Primary | ICD-10-CM

## 2022-08-05 LAB
SARS-COV-2, COV2: NORMAL
SARS-COV-2, NAA: NOT DETECTED

## 2022-08-05 PROCEDURE — U0003 INFECTIOUS AGENT DETECTION BY NUCLEIC ACID (DNA OR RNA); SEVERE ACUTE RESPIRATORY SYNDROME CORONAVIRUS 2 (SARS-COV-2) (CORONAVIRUS DISEASE [COVID-19]), AMPLIFIED PROBE TECHNIQUE, MAKING USE OF HIGH THROUGHPUT TECHNOLOGIES AS DESCRIBED BY CMS-2020-01-R: HCPCS

## 2022-08-05 PROCEDURE — 99282 EMERGENCY DEPT VISIT SF MDM: CPT

## 2022-08-05 NOTE — ED NOTES
Discharge instructions reviewed with patient. Patient verbalized understanding. Patient advised to follow up as directed on discharge instructions. Patient denies questions, needs or concerns at this time. Patient verbalized understanding. No s/sx of distress noted. No

## 2022-08-05 NOTE — Clinical Note
[address] [facility] [facility] EMERGENCY DEPT  [address] [address] [phone]    Work/School Note    Date: 8/5/2022    To Whom It May concern:      Julia Riggins was seen and treated today in the emergency room by the following provider(s):  Attending Provider: Brittney Hunt DO. Julia Beebe. is excused from work/school on 08/05/22. He is clear to return to work/school on 08/06/22.         Sincerely,          Bonifacio Manley DO

## 2022-08-05 NOTE — ED PROVIDER NOTES
EMERGENCY DEPARTMENT HISTORY AND PHYSICAL EXAM    I have evaluated the patient at 9:23 AM      Date: 8/5/2022  Patient Name: Marissa Cordero. History of Presenting Illness     Chief Complaint   Patient presents with    Cough    Sore Throat         History Provided By: Patient  Location/Duration/Severity/Modifying factors   1year-old male presenting for evaluation of upper respiratory symptoms since yesterday. Patient reports having nonproductive cough with rhinorrhea, body wide myalgias. Wants to be tested for COVID. Denies any sick contacts. Denies chest pain or shortness of breath      PCP: Shanti Rivera MD    Current Outpatient Medications   Medication Sig Dispense Refill    amLODIPine (NORVASC) 10 mg tablet TAKE ONE TABLET BY MOUTH DAILY 90 Tablet 3    metoprolol succinate (TOPROL-XL) 25 mg XL tablet TAKE ONE TABLET BY MOUTH DAILY 90 Tablet 2    diclofenac (VOLTAREN) 1 % gel Apply 2 g to affected area four (4) times daily. (Patient not taking: Reported on 7/26/2022) 500 g 5    atorvastatin (LIPITOR) 20 mg tablet TAKE ONE TABLET BY MOUTH EVERY NIGHT AT BEDTIME 90 Tablet 3    clopidogreL (Plavix) 75 mg tab Take 1 Tablet by mouth daily. 1 tablet daily 90 Tablet 3    fluticasone propionate (FLONASE) 50 mcg/actuation nasal spray 2 sprays each nostril daily 1 Bottle 12    benazepriL (LOTENSIN) 10 mg tablet TAKE ONE TABLET BY MOUTH DAILY (Patient not taking: Reported on 7/28/2022) 90 Tablet 4    triamcinolone acetonide (KENALOG-40) 40 mg/mL injection  (Patient not taking: Reported on 7/26/2022)      aspirin 81 mg chewable tablet Take 81 mg by mouth daily. nitroglycerin (NITROSTAT) 0.4 mg SL tablet 1 Tab by SubLINGual route every five (5) minutes as needed for Chest Pain. (Patient not taking: Reported on 7/26/2022) 1 Bottle 3    levalbuterol tartrate (XOPENEX) 45 mcg/actuation inhaler Take 2 Puffs by inhalation every six (6) hours as needed for Wheezing.  (Patient not taking: Reported on 7/26/2022) 1 Inhaler 5    omeprazole (PRILOSEC) 20 mg capsule Take 20 mg by mouth two (2) times a day. ascorbic acid, vitamin C, (VITAMIN C) 1,000 mg tablet Take 1,000 mg by mouth daily. MULTIVITAMIN W-MINERALS/LUTEIN (CENTRUM SILVER PO) Take 1 Tab by mouth daily. Past History     Past Medical History:  Past Medical History:   Diagnosis Date    Allergic rhinitis     Pederson's esophagus     Dr. Medardo Belcher 8/17 s/p HALO; 7/22    CABG x 3 04/24/2014    LIMA to LAD, and saphenous vein grafts to the second diagonal of the LAD, and to the posterior descending branch of the RCA, Dr. Ml Lea, 4/23/14.      CAD (coronary artery disease)     Colon adenoma 08/2017    Dr Medardo Belcher    Colon polyps 2007    Dr. Medardo Belcher    COPD (chronic obstructive pulmonary disease) ([facility] [address]     mild obst disease on PFTs Dr. Kamar Fraser (2007)    Erectile dysfunction     GERD (gastroesophageal reflux disease)     by EGD 2007 Dr. Medardo Belcher; EGD 5/14/21 hh and s/p dilation stricture    Hearing loss     Hypertension     BLAIR (obstructive sleep apnea) 2011    CPAP not used    Osteoarthritis of both hands     Prostate cancer ([facility] [address] 06/2010    Dr. John Wilkes [id number] Jj 3+3, 1/9 cores up to 10% 1 core; cryo Oct 2012 psa went from 7/1 to torsten 1.78    Prostate cancer ([facility] [address] 04/2018    Dr Fredi Hobson stage 2B [id number], [place] 3+4, P,10, G2; brachytherapy Dr Marylou Montemayor     Pulmonary nodule 2014    no change 2015, 3/17       Past Surgical History:  Past Surgical History:   Procedure Laterality Date    COLONOSCOPY N/A 07/27/2017    Dr. Medardo Belcher 2007 negative; 8/17 adenoma, 7/22 polyp    COLONOSCOPY N/A 07/28/2022    COLONOSCOPY with Polypectomy performed by Cathi Donnelly MD at [address] [place] [address] Place    [place] Arango CATARACT REMOVAL Bilateral 11/2014    Dr Isela Velasquez CHOLECYSTECTOMY  2004    HX HEART CATHETERIZATION  2020    Stent    HX TONSILLECTOMY      HX TURP      x2 Dr. Levi Olmos UROLOGICAL  10/2012    [facility], Dr. Tawny Noble Jose, Cystoscopy and dilation of stricture, cryoablation of prostate    DC CABG, ARTERY-VEIN, THREE  2014    3V CABG; Dr Montrell LEAVITT to LAD, SVG to DM2 and sequential to RPDA       Family History:  Family History   Problem Relation Age of Onset    Heart Disease Father     Stroke Mother     Hypertension Mother     Diabetes Sister        Social History:  Social History     Tobacco Use    Smoking status: Former     Packs/day: 0.50     Years: 4.00     Pack years: 2.00     Types: Cigarettes     Quit date: 1957     Years since quittin.6    Smokeless tobacco: Never    Tobacco comments:     Quit in    Vaping Use    Vaping Use: Never used   Substance Use Topics    Alcohol use: No    Drug use: Never       Allergies: Allergies   Allergen Reactions    Albuterol Palpitations    Influenza Virus Vaccine, Specific Other (comments)     \"Gets the flu\"         Review of Systems       Review of Systems   Constitutional:  Positive for fatigue. Negative for activity change, chills, diaphoresis and fever. HENT:  Positive for congestion, rhinorrhea and sore throat. Negative for trouble swallowing. Respiratory:  Positive for cough. Negative for chest tightness, shortness of breath, wheezing and stridor. Cardiovascular:  Negative for chest pain and palpitations. Gastrointestinal:  Negative for abdominal distention, abdominal pain, constipation, diarrhea, nausea and vomiting. Genitourinary:  Negative for difficulty urinating, dysuria and hematuria. Musculoskeletal:  Positive for myalgias. Negative for back pain and joint swelling. Skin:  Negative for rash. Neurological:  Negative for dizziness, weakness, light-headedness and headaches. Psychiatric/Behavioral:  Negative for agitation. The patient is not nervous/anxious.         Physical Exam   Visit Vitals  /62 (BP 1 Location: Left upper arm, BP Patient Position: Sitting)   Pulse 64   Temp 98.2 °F (36.8 °C)   Resp 14   Ht 5' 7\" (1.702 m)   Wt 81.6 kg (180 lb)   SpO2 99%   BMI 28.19 kg/m²         Physical Exam  Constitutional:       General: He is not in acute distress. Appearance: He is not toxic-appearing. HENT:      Head: Normocephalic and atraumatic. Mouth/Throat:      Mouth: Mucous membranes are moist. No oral lesions. Pharynx: No pharyngeal swelling, oropharyngeal exudate or posterior oropharyngeal erythema. Eyes:      Extraocular Movements: Extraocular movements intact. Pupils: Pupils are equal, round, and reactive to light. Cardiovascular:      Rate and Rhythm: Normal rate and regular rhythm. Heart sounds: Normal heart sounds. No murmur heard. No friction rub. No gallop. Pulmonary:      Effort: Pulmonary effort is normal.      Breath sounds: Normal breath sounds. Abdominal:      General: There is no distension. Palpations: Abdomen is soft. There is no mass. Tenderness: There is no abdominal tenderness. There is no guarding. Hernia: No hernia is present. Musculoskeletal:         General: No swelling, tenderness or deformity. Cervical back: Normal range of motion and neck supple. Skin:     General: Skin is warm and dry. Findings: No rash. Neurological:      General: No focal deficit present. Mental Status: He is alert and oriented to person, place, and time. Psychiatric:         Mood and Affect: Mood normal.         Diagnostic Study Results     Labs -  Recent Results (from the past 12 hour(s))   SARS-COV-2    Collection Time: 08/05/22  8:30 AM   Result Value Ref Range    SARS-CoV-2 by PCR Please find results under separate order         Radiologic Studies -   No orders to display         Medical Decision Making   I am the first provider for this patient. I reviewed the vital signs, available nursing notes, past medical history, past surgical history, family history and social history. Vital Signs-Reviewed the patient's vital signs.       EKG:     Records Reviewed: Nursing Notes (Time of Review: 9:23 AM)    ED Course: Progress Notes, Reevaluation, and Consults:         Provider Notes (Medical Decision Making):   MDM  Number of Diagnoses or Management Options  Viral illness  Diagnosis management comments: Patient is well-appearing and in no acute distress. 99% on room air. Afebrile. PCR COVID swab obtained. Patient has been reassured and instructed on conservative measures for care at home. Will update patient on findings. Patient discharged at this time. Procedures    Critical Care Time:       Diagnosis     Clinical Impression:   1. Viral illness        Disposition: discharged    Follow-up Information       [facility]  As needed, If symptoms worsen [address]    Sahnti Rivera MD Internal Medicine Physician Call   [address] [facility]/Matt Simmons [address] [phone]               Patient's Medications   Start Taking    No medications on file   Continue Taking    AMLODIPINE (NORVASC) 10 MG TABLET    TAKE ONE TABLET BY MOUTH DAILY    ASCORBIC ACID, VITAMIN C, (VITAMIN C) 1,000 MG TABLET    Take 1,000 mg by mouth daily. ASPIRIN 81 MG CHEWABLE TABLET    Take 81 mg by mouth daily. ATORVASTATIN (LIPITOR) 20 MG TABLET    TAKE ONE TABLET BY MOUTH EVERY NIGHT AT BEDTIME    BENAZEPRIL (LOTENSIN) 10 MG TABLET    TAKE ONE TABLET BY MOUTH DAILY    CLOPIDOGREL (PLAVIX) 75 MG TAB    Take 1 Tablet by mouth daily. 1 tablet daily    DICLOFENAC (VOLTAREN) 1 % GEL    Apply 2 g to affected area four (4) times daily. FLUTICASONE PROPIONATE (FLONASE) 50 MCG/ACTUATION NASAL SPRAY    2 sprays each nostril daily    LEVALBUTEROL TARTRATE (XOPENEX) 45 MCG/ACTUATION INHALER    Take 2 Puffs by inhalation every six (6) hours as needed for Wheezing.     METOPROLOL SUCCINATE (TOPROL-XL) 25 MG XL TABLET    TAKE ONE TABLET BY MOUTH DAILY MULTIVITAMIN W-MINERALS/LUTEIN (CENTRUM SILVER PO)    Take 1 Tab by mouth daily. NITROGLYCERIN (NITROSTAT) 0.4 MG SL TABLET    1 Tab by SubLINGual route every five (5) minutes as needed for Chest Pain. OMEPRAZOLE (PRILOSEC) 20 MG CAPSULE    Take 20 mg by mouth two (2) times a day. TRIAMCINOLONE ACETONIDE (KENALOG-40) 40 MG/ML INJECTION       These Medications have changed    No medications on file   Stop Taking    No medications on file     Disclaimer: Sections of this note are dictated using utilizing voice recognition software. Minor typographical errors may be present. If questions arise, please do not hesitate to contact me or call our department.

## 2022-08-08 RX ORDER — CLOPIDOGREL BISULFATE 75 MG/1
TABLET ORAL
Qty: 90 TABLET | Refills: 3 | Status: SHIPPED | OUTPATIENT
Start: 2022-08-08

## 2022-08-09 ENCOUNTER — TELEPHONE (OUTPATIENT)
Dept: INTERNAL MEDICINE CLINIC | Age: 82
End: 2022-08-09

## 2022-08-09 DIAGNOSIS — J01.10 ACUTE NON-RECURRENT FRONTAL SINUSITIS: Primary | ICD-10-CM

## 2022-08-09 RX ORDER — AMOXICILLIN AND CLAVULANATE POTASSIUM 875; 125 MG/1; MG/1
1 TABLET, FILM COATED ORAL EVERY 12 HOURS
Qty: 20 TABLET | Refills: 0 | Status: SHIPPED | OUTPATIENT
Start: 2022-08-09 | End: 2022-08-19

## 2022-08-09 NOTE — TELEPHONE ENCOUNTER
Patient's wife called, states that Patient was in the ED on 8/5/2 with covid like symptoms but was negative when tested. He went back to work today but is now coughing up mucus and thinks he might have an infection. She states the symptoms started about 8/2/22, and he had headache, sneezing, congestion, cough, runny nose, and a fever when he went to the ED. The mucus he is coughing up now is dark yellow and green. She can be reached at [phone].   Please advise ,thank you

## 2022-08-09 NOTE — TELEPHONE ENCOUNTER
Patient c/o runny nose, cough with thick  yellow green mucous, feeling weak, and  fatigued today. Patient states the cough kept him awake some last night. Patient was wheezing , sneezing, had a headache, and what he thought was a fever, but that has subsided. Patient was seen in the ER and tested negative with both rapid and PCR Covid testing. Patient is asking for something to clear up his chest congestion and help soothe his cough. Please send to  [facility] [address] [place] if approved.

## 2022-08-10 RX ORDER — ATORVASTATIN CALCIUM 20 MG/1
TABLET, FILM COATED ORAL
Qty: 90 TABLET | Refills: 3 | Status: SHIPPED | OUTPATIENT
Start: 2022-08-10

## 2022-08-10 RX ORDER — BENAZEPRIL HYDROCHLORIDE 10 MG/1
TABLET ORAL
Qty: 90 TABLET | Refills: 4 | Status: SHIPPED | OUTPATIENT
Start: 2022-08-10

## 2022-08-15 ENCOUNTER — TELEPHONE (OUTPATIENT)
Dept: INTERNAL MEDICINE CLINIC | Age: 82
End: 2022-08-15

## 2022-08-15 NOTE — TELEPHONE ENCOUNTER
----- Message from Dana Corrales sent at 8/15/2022  2:35 PM EDT -----  Subject: Message to Provider    QUESTIONS  Information for Provider? pt has been having diarrhea since he started   taking amoxicillin-clavulanate he would like to know if he can tale   imodium with it   ---------------------------------------------------------------------------  --------------  [address] [employer]  [phone]; OK to leave message on voicemail  ---------------------------------------------------------------------------  --------------  SCRIPT ANSWERS  Relationship to Patient? Other  Representative Name? Mehreen gómez  Is the Representative on the appropriate HIPAA document in Epic?  Yes

## 2022-11-01 NOTE — PERIOP NOTES
PRE-SURGICAL INSTRUCTIONS        Patient's Name:  Sandy Hoyt. Today's Date:  11/1/2022            Covid Testing Date and Time:    Surgery Date:  11/3/2022                Do NOT eat or drink anything, including candy, gum, or ice chips after midnight on 11/3/2022, unless you have specific instructions from your surgeon or anesthesia provider to do so. You may brush your teeth before coming to the hospital.  No smoking 24 hours prior to the day of surgery. No alcohol 24 hours prior to the day of surgery. No recreational drugs for one week prior to the day of surgery. Leave all valuables, including money/purse, at home. Remove all jewelry, nail polish, acrylic nails, and makeup (including mascara); no lotions powders, deodorant, or perfume/cologne/after shave on the skin. Follow instruction for Hibiclens washes and CHG wipes from surgeon's office. Glasses/contact lenses and dentures may be worn to the hospital.  They will be removed prior to surgery. Call your doctor if symptoms of a cold or illness develop within 24-48 hours prior to your surgery. 11.  If you are having an outpatient procedure, please make arrangements for a responsible ADULT TO [address] [place] [address] and stay with you for 24 hours after your surgery. 12. ONE VISITOR in the hospital at this time for outpatient procedures. Exceptions may be made for surgical admissions, per nursing unit guidelines      Special Instructions:      Bring list of CURRENT medications. Bring inhaler. Bring CPAP machine. Bring any pertinent legal medical records. Take these medications the morning of surgery with a sip of water:  BP meds  Follow physician instructions about stopping anticoagulants. Plavix/asa stopped on 10/29 per pt  Complete bowel prep per MD instructions. On the day of surgery, come in the main entrance of [facility]. Let the  at the desk know you are there for surgery.   A staff member will come escort you to the surgical area on the second floor. If you have any questions or concerns, please do not hesitate to call:     (Prior to the day of surgery) [facility] department:  [phone]   (Day of surgery) Pre-Op department:  [phone]    These surgical instructions were reviewed with Mauricio Awan and Cedric Rangel during the [facility] phone call.

## 2022-11-02 ENCOUNTER — ANESTHESIA EVENT (OUTPATIENT)
Dept: ENDOSCOPY | Age: 82
End: 2022-11-02
Payer: MEDICARE

## 2022-11-03 ENCOUNTER — HOSPITAL ENCOUNTER (OUTPATIENT)
Age: 82
Setting detail: OUTPATIENT SURGERY
Discharge: HOME | End: 2022-11-03
Attending: INTERNAL MEDICINE | Admitting: INTERNAL MEDICINE
Payer: MEDICARE

## 2022-11-03 ENCOUNTER — ANESTHESIA (OUTPATIENT)
Dept: ENDOSCOPY | Age: 82
End: 2022-11-03
Payer: MEDICARE

## 2022-11-03 VITALS
DIASTOLIC BLOOD PRESSURE: 53 MMHG | RESPIRATION RATE: 17 BRPM | TEMPERATURE: 97.2 F | BODY MASS INDEX: 26.68 KG/M2 | HEIGHT: 67 IN | OXYGEN SATURATION: 97 % | WEIGHT: 170 LBS | HEART RATE: 50 BPM | SYSTOLIC BLOOD PRESSURE: 140 MMHG

## 2022-11-03 PROCEDURE — 74011250636 HC RX REV CODE- 250/636: Performed by: NURSE ANESTHETIST, CERTIFIED REGISTERED

## 2022-11-03 PROCEDURE — 76060000031 HC ANESTHESIA FIRST 0.5 HR: Performed by: INTERNAL MEDICINE

## 2022-11-03 PROCEDURE — 76040000019: Performed by: INTERNAL MEDICINE

## 2022-11-03 PROCEDURE — C1886 CATHETER, ABLATION: HCPCS | Performed by: INTERNAL MEDICINE

## 2022-11-03 PROCEDURE — 00731 ANES UPR GI NDSC PX NOS: CPT | Performed by: NURSE ANESTHETIST, CERTIFIED REGISTERED

## 2022-11-03 PROCEDURE — 77030008565 HC TBNG SUC IRR ERBE -B: Performed by: INTERNAL MEDICINE

## 2022-11-03 PROCEDURE — 2709999900 HC NON-CHARGEABLE SUPPLY: Performed by: INTERNAL MEDICINE

## 2022-11-03 PROCEDURE — 00731 ANES UPR GI NDSC PX NOS: CPT | Performed by: ANESTHESIOLOGY

## 2022-11-03 PROCEDURE — 77030018831 HC SOL IRR H20 BAXT -A: Performed by: INTERNAL MEDICINE

## 2022-11-03 PROCEDURE — 99100 ANES PT EXTEME AGE<1 YR&>70: CPT | Performed by: ANESTHESIOLOGY

## 2022-11-03 PROCEDURE — 99100 ANES PT EXTEME AGE<1 YR&>70: CPT | Performed by: NURSE ANESTHETIST, CERTIFIED REGISTERED

## 2022-11-03 PROCEDURE — 74011000250 HC RX REV CODE- 250: Performed by: NURSE ANESTHETIST, CERTIFIED REGISTERED

## 2022-11-03 RX ORDER — SODIUM CHLORIDE, SODIUM LACTATE, POTASSIUM CHLORIDE, CALCIUM CHLORIDE 600; 310; 30; 20 MG/100ML; MG/100ML; MG/100ML; MG/100ML
75 INJECTION, SOLUTION INTRAVENOUS CONTINUOUS
Status: DISCONTINUED | OUTPATIENT
Start: 2022-11-03 | End: 2022-11-03 | Stop reason: HOSPADM

## 2022-11-03 RX ORDER — SODIUM CHLORIDE 0.9 % (FLUSH) 0.9 %
5-40 SYRINGE (ML) INJECTION AS NEEDED
Status: DISCONTINUED | OUTPATIENT
Start: 2022-11-03 | End: 2022-11-03 | Stop reason: HOSPADM

## 2022-11-03 RX ORDER — LIDOCAINE HYDROCHLORIDE 20 MG/ML
INJECTION, SOLUTION EPIDURAL; INFILTRATION; INTRACAUDAL; PERINEURAL AS NEEDED
Status: DISCONTINUED | OUTPATIENT
Start: 2022-11-03 | End: 2022-11-03 | Stop reason: HOSPADM

## 2022-11-03 RX ORDER — SODIUM CHLORIDE 0.9 % (FLUSH) 0.9 %
5-40 SYRINGE (ML) INJECTION EVERY 8 HOURS
Status: DISCONTINUED | OUTPATIENT
Start: 2022-11-03 | End: 2022-11-03 | Stop reason: HOSPADM

## 2022-11-03 RX ORDER — PROPOFOL 10 MG/ML
INJECTION, EMULSION INTRAVENOUS AS NEEDED
Status: DISCONTINUED | OUTPATIENT
Start: 2022-11-03 | End: 2022-11-03 | Stop reason: HOSPADM

## 2022-11-03 RX ORDER — INSULIN LISPRO 100 [IU]/ML
INJECTION, SOLUTION INTRAVENOUS; SUBCUTANEOUS ONCE
Status: DISCONTINUED | OUTPATIENT
Start: 2022-11-03 | End: 2022-11-03 | Stop reason: HOSPADM

## 2022-11-03 RX ORDER — SODIUM CHLORIDE, SODIUM LACTATE, POTASSIUM CHLORIDE, CALCIUM CHLORIDE 600; 310; 30; 20 MG/100ML; MG/100ML; MG/100ML; MG/100ML
25 INJECTION, SOLUTION INTRAVENOUS CONTINUOUS
Status: DISCONTINUED | OUTPATIENT
Start: 2022-11-03 | End: 2022-11-03 | Stop reason: HOSPADM

## 2022-11-03 RX ORDER — ONDANSETRON 2 MG/ML
4 INJECTION INTRAMUSCULAR; INTRAVENOUS AS NEEDED
Status: DISCONTINUED | OUTPATIENT
Start: 2022-11-03 | End: 2022-11-03 | Stop reason: HOSPADM

## 2022-11-03 RX ORDER — LIDOCAINE HYDROCHLORIDE 10 MG/ML
0.1 INJECTION, SOLUTION EPIDURAL; INFILTRATION; INTRACAUDAL; PERINEURAL AS NEEDED
Status: DISCONTINUED | OUTPATIENT
Start: 2022-11-03 | End: 2022-11-03 | Stop reason: HOSPADM

## 2022-11-03 RX ORDER — MAGNESIUM SULFATE 100 %
4 CRYSTALS MISCELLANEOUS AS NEEDED
Status: DISCONTINUED | OUTPATIENT
Start: 2022-11-03 | End: 2022-11-03 | Stop reason: HOSPADM

## 2022-11-03 RX ADMIN — PROPOFOL 50 MG: 10 INJECTION, EMULSION INTRAVENOUS at 09:27

## 2022-11-03 RX ADMIN — PROPOFOL 20 MG: 10 INJECTION, EMULSION INTRAVENOUS at 09:36

## 2022-11-03 RX ADMIN — PROPOFOL 30 MG: 10 INJECTION, EMULSION INTRAVENOUS at 09:33

## 2022-11-03 RX ADMIN — PROPOFOL 30 MG: 10 INJECTION, EMULSION INTRAVENOUS at 09:31

## 2022-11-03 RX ADMIN — FAMOTIDINE 20 MG: 10 INJECTION, SOLUTION INTRAVENOUS at 08:50

## 2022-11-03 RX ADMIN — LIDOCAINE HYDROCHLORIDE 80 MG: 20 INJECTION, SOLUTION EPIDURAL; INFILTRATION; INTRACAUDAL; PERINEURAL at 09:26

## 2022-11-03 RX ADMIN — SODIUM CHLORIDE, POTASSIUM CHLORIDE, SODIUM LACTATE AND CALCIUM CHLORIDE 25 ML/HR: 600; 310; 30; 20 INJECTION, SOLUTION INTRAVENOUS at 08:50

## 2022-11-03 RX ADMIN — PROPOFOL 30 MG: 10 INJECTION, EMULSION INTRAVENOUS at 09:29

## 2022-11-03 NOTE — H&P
[email or web address]  [place], [address]        Impression:   1.gerd  2. Dysplastic cunningham's      Plan:     1. EGD HALO Mac all risks benefits and alt discussed       Chief Complaint: Cunningham's      HPI:  Noam Laguerre is a 78 y.o. male who is being seen on consult for dysplastic Cunningham's. Carmen Lucio PMH:   Past Medical History:   Diagnosis Date    Allergic rhinitis     Cunningham's esophagus     Dr. Kourtney Zelaya 8/17 s/p HALO; 7/22    CABG x 3 04/24/2014    LIMA to LAD, and saphenous vein grafts to the second diagonal of the LAD, and to the posterior descending branch of the RCA, Dr. Muriel Orosco, 4/23/14.      CAD (coronary artery disease)     Colon adenoma 08/2017    Dr Kourtney Zelaya    Colon polyps 2007    Dr. Kourtney Zelaya    COPD (chronic obstructive pulmonary disease) ([facility] [address]     mild obst disease on PFTs Dr. Dima Altman (2007)    Erectile dysfunction     GERD (gastroesophageal reflux disease)     by EGD 2007 Dr. Kourtney Zelaya; EGD 5/14/21 hh and s/p dilation stricture    Hearing loss     Hypertension     BLAIR (obstructive sleep apnea) 2011    CPAP not used    Osteoarthritis of both hands     Prostate cancer ([address] 06/2010    Dr. Carolina Fontaine Y1hPgFa Jj 3+3, 1/9 cores up to 10% 1 core; cryo Oct 2012 psa went from 7/1 to torsten 1.78    Prostate cancer ([address] 04/2018    Dr Albert Romero stage 2B N4hG6H9, [place] 3+4, P,10, G2; brachytherapy Dr Kj Garner     Pulmonary nodule 2014    no change 2015, 3/17       PSH:   Past Surgical History:   Procedure Laterality Date    COLONOSCOPY N/A 07/27/2017    Dr. Kourtney Reich negative; 8/17 adenoma, 7/22 polyp    COLONOSCOPY N/A 07/28/2022    Dr Kourtney Zelaya hyperplastic    HX APPENDECTOMY      HX [facility]      Dr. Jigna Zeng CATARACT REMOVAL Bilateral 11/2014    Dr Josiah Irizarry CHOLECYSTECTOMY  2004    [address]  2020    Stent    HX TONSILLECTOMY      HX TURP      x2 Dr. Joyce Melendez UROLOGICAL  10/2012    [facility], Dr. Elizabeth Garcia Jose, Cystoscopy and dilation of stricture, cryoablation of prostate    [place] CABG, ARTERY-VEIN, THREE  2014    3V CABG; Dr Kemal LEAVITT to LAD, SVG to DM2 and sequential to RPDA       Social HX:   Social History     Socioeconomic History    Marital status:      Spouse name: Not on file    Number of children: 3    Years of education: Not on file    Highest education level: Not on file   Occupational History    Occupation: ret engr [employer]     Employer: RETIRED   Tobacco Use    Smoking status: Former     Packs/day: 0.50     Years: 4.00     Pack years: 2.00     Types: Cigarettes     Quit date: 1957     Years since quittin.8    Smokeless tobacco: Never    Tobacco comments:     Quit in    Vaping Use    Vaping Use: Never used   Substance and Sexual Activity    Alcohol use: No    Drug use: Never    Sexual activity: Not on file   Other Topics Concern    Not on file   Social History Narrative    Not on file     Social Determinants of Health     Financial Resource Strain: Not on file   Food Insecurity: Not on file   Transportation Needs: Not on file   Physical Activity: Not on file   Stress: Not on file   Social Connections: Not on file   Intimate Partner Violence: Not on file   Housing Stability: Not on file       FHX:   Family History   Problem Relation Age of Onset    Heart Disease Father     Stroke Mother     Hypertension Mother     Diabetes Sister        Allergy:   Allergies   Allergen Reactions    Albuterol Palpitations    Influenza Virus Vaccine, Specific Other (comments)     \"Gets the flu\"       Home Medications:     Medications Prior to Admission   Medication Sig    benazepriL (LOTENSIN) 10 mg tablet TAKE ONE TABLET BY MOUTH DAILY    atorvastatin (LIPITOR) 20 mg tablet TAKE ONE TABLET BY MOUTH EVERY NIGHT AT BEDTIME    clopidogreL (PLAVIX) 75 mg tab TAKE ONE TABLET BY MOUTH DAILY    amLODIPine (NORVASC) 10 mg tablet TAKE ONE TABLET BY MOUTH DAILY    metoprolol succinate (TOPROL-XL) 25 mg XL tablet TAKE ONE TABLET BY MOUTH DAILY    fluticasone propionate (FLONASE) 50 mcg/actuation nasal spray 2 sprays each nostril daily    aspirin 81 mg chewable tablet Take 81 mg by mouth daily. levalbuterol tartrate (XOPENEX) 45 mcg/actuation inhaler Take 2 Puffs by inhalation every six (6) hours as needed for Wheezing. omeprazole (PRILOSEC) 20 mg capsule Take 20 mg by mouth two (2) times a day. ascorbic acid, vitamin C, (VITAMIN C) 1,000 mg tablet Take 1,000 mg by mouth daily. MULTIVITAMIN W-MINERALS/LUTEIN (CENTRUM SILVER PO) Take 1 Tab by mouth daily. diclofenac (VOLTAREN) 1 % gel Apply 2 g to affected area four (4) times daily. (Patient not taking: No sig reported)    triamcinolone acetonide (KENALOG-40) 40 mg/mL injection  (Patient not taking: No sig reported)    nitroglycerin (NITROSTAT) 0.4 mg SL tablet 1 Tab by SubLINGual route every five (5) minutes as needed for Chest Pain. (Patient not taking: Reported on 7/26/2022)       Review of Systems:     Constitutional: No fevers, chills, weight loss, fatigue. Skin: No rashes, pruritis, jaundice, ulcerations, erythema. HENT: No headaches, nosebleeds, sinus pressure, rhinorrhea, sore throat. Eyes: No visual changes, blurred vision, eye pain, photophobia, jaundice. Cardiovascular: No chest pain, heart palpitations. Respiratory: No cough, SOB, wheezing, chest discomfort, orthopnea. Gastrointestinal: gerd   Genitourinary: No dysuria, bleeding, discharge, pyuria. Musculoskeletal: No weakness, arthralgias, wasting. Endo: No sweats. Heme: No bruising, easy bleeding. Allergies: As noted. Neurological: Cranial nerves intact. Alert and oriented. Gait not assessed. Psychiatric:  No anxiety, depression, hallucinations.                  Visit Vitals  Ht 5' 7\" (1.702 m)   Wt 77.1 kg (170 lb)   BMI 26.63 kg/m²       Physical Assessment:     constitutional: appearance: well developed, well nourished, normal habitus, no deformities, in no acute distress. skin: inspection: no rashes, ulcers, icterus or other lesions; no clubbing or telangiectasias. palpation: no induration or subcutaneos nodules. eyes: inspection: normal conjunctivae and lids; no jaundice pupils: symmetrical, normoreactive to light, normal accommodation and size. ENMT: mouth: normal oral mucosa,lips and gums; good dentition. oropharynx: normal tongue, hard and soft palate; posterior pharynx without erythema, exudate or lesions. neck: no masses organomegaly or tenderness. respiratory: effort: normal chest excursion; no intercostal retraction or accessory muscle use. cardiovascular: abdominal aorta: normal size and position; no bruits. palpation: PMI of normal size and position; normal rhythm; no thrill or murmurs. abdominal: abdomen: normal consistency; no tenderness or masses. hernias: no hernias appreciated. liver: normal size and consistency. spleen: not palpable. rectal: hemoccult/guaiac: not performed. musculoskeletal: no deformities or muscle wasting   lymphatic: axilae: not palpable. groin: not palpable. neck: within normal limits. other: not palpable. neurologic: cranial nerves: II-XII normal.   psychiatric: judgement/insight: within normal limits. memory: within normal limits for recent and remote events. mood and affect: no evidence of depression, anxiety or agitation. orientation: oriented to time, space and person. Basic Metabolic Profile   No results for input(s): NA, K, CL, CO2, BUN, GLU, CA, MG, PHOS in the last 72 hours. No lab exists for component: CREAT      CBC w/Diff    No results for input(s): WBC, RBC, HGB, HCT, MCV, MCH, MCHC, RDW, PLT, HGBEXT, HCTEXT, PLTEXT in the last 72 hours. No lab exists for component: MPV No results for input(s): GRANS, LYMPH, EOS, PRO, MYELO, METAS, BLAST in the last 72 hours.     No lab exists for component: MONO, BASO     Hepatic Function   No results for input(s): ALB, TP, TBILI, AP, AML, LPSE in the last 72 hours.    No lab exists for component: ISAIAH PALMER, SGOT       Shawn Middleton MD, M.D. [facility], [facility]  [email or web address]

## 2022-11-03 NOTE — ANESTHESIA POSTPROCEDURE EVALUATION
Procedure(s):  UPPER ENDOSCOPY W/ HALO ABLATION & Dilation 50Fr. MAC    Anesthesia Post Evaluation      Multimodal analgesia: multimodal analgesia used between 6 hours prior to anesthesia start to PACU discharge  Patient location during evaluation: bedside  Patient participation: complete - patient participated  Level of consciousness: awake  Pain management: adequate  Airway patency: patent  Anesthetic complications: no  Cardiovascular status: stable  Respiratory status: acceptable  Hydration status: acceptable  Post anesthesia nausea and vomiting:  controlled      INITIAL Post-op Vital signs:   Vitals Value Taken Time   /63 11/03/22 1024   Temp 36.1 °C (97 °F) 11/03/22 0949   Pulse 53 11/03/22 1028   Resp 16 11/03/22 1028   SpO2 99 % 11/03/22 1028   Vitals shown include unvalidated device data.

## 2022-11-03 NOTE — DISCHARGE INSTRUCTIONS
Upper GI Endoscopy: What to Expect at [address] had an upper GI endoscopy. Your doctor used a thin, lighted tube that bends to look at the inside of your esophagus, your stomach, and the first part of the small intestine, called the duodenum. After you have an endoscopy, you will stay at the hospital or clinic for 1 to 2 hours. This will allow the medicine to wear off. You will be able to go home after your doctor or nurse checks to make sure that you're not having any problems. You may have to stay overnight if you had treatment during the test. You may have a sore throat for a day or two after the test.  This care sheet gives you a general idea about what to expect after the test.  How can you care for yourself at home? Activity   Rest as much as you need to after you go home. You should be able to go back to your usual activities the day after the test.  Diet   Follow your doctor's directions for eating after the test.  Drink plenty of fluids (unless your doctor has told you not to). Medications   If you have a sore throat the day after the test, use an over-the-counter spray to numb your throat. Follow-up care is a key part of your treatment and safety. Be sure to make and go to all appointments, and call your doctor if you are having problems. It's also a good idea to know your test results and keep a list of the medicines you take. When should you call for help? Call 911 anytime you think you may need emergency care. For example, call if:    You passed out (lost consciousness). You have trouble breathing. You pass maroon or bloody stools. Call your doctor now or seek immediate medical care if:    You have pain that does not get better after your take pain medicine. You have new or worse belly pain. You have blood in your stools. You are sick to your stomach and cannot keep fluids down. You have a fever. You cannot pass stools or gas.    Watch closely for changes in your health, and be sure to contact your doctor if:    Your throat still hurts after a day or two. You do not get better as expected. Where can you learn more? Go to http://www.chavez.com/  Enter J454 in the search box to learn more about \"Upper GI Endoscopy: What to Expect at Home. \"  Current as of: June 6, 2022               Content Version: 13.4  © 2006-2022 [employer]. Care instructions adapted under license by [employer] (which disclaims liability or warranty for this information). If you have questions about a medical condition or this instruction, always ask your healthcare professional. [place] [address] any warranty or liability for your use of this information. Hiatal Hernia: Care Instructions  Your Care Instructions  A hiatal hernia occurs when part of the stomach bulges into the chest cavity. A hiatal hernia may allow stomach acid and juices to back up into the esophagus (acid reflux). This can cause a feeling of burning, warmth, heat, or pain behind the breastbone. This feeling may often occur after you eat, soon after you lie down, or when you bend forward, and it may come and go. You also may have a sour taste in your mouth. These symptoms are commonly known as heartburn or reflux. But not all hiatal hernias cause symptoms. Follow-up care is a key part of your treatment and safety. Be sure to make and go to all appointments, and call your doctor if you are having problems. It's also a good idea to know your test results and keep a list of the medicines you take. How can you care for yourself at home? Take your medicines exactly as prescribed. Call your doctor if you think you are having a problem with your medicine. Do not take aspirin or other nonsteroidal anti-inflammatory drugs (NSAIDs), such as ibuprofen (Advil, Motrin) or naproxen (Aleve), unless your doctor says it is okay.  Ask your doctor what you can take for pain. Your doctor may recommend over-the-counter medicine. For mild or occasional indigestion, antacids such as Tums, Gaviscon, Maalox, or Mylanta may help. Your doctor also may recommend over-the-counter acid reducers, such as famotidine (Pepcid AC), cimetidine (Tagamet HB), or omeprazole (Prilosec). Read and follow all instructions on the label. If you use these medicines often, talk with your doctor. Change your eating habits. It's best to eat several small meals instead of two or three large meals. After you eat, wait 2 to 3 hours before you lie down. Late-night snacks aren't a good idea. Avoid foods that make your symptoms worse. These may include chocolate, mint, alcohol, pepper, spicy foods, high-fat foods, or drinks with caffeine in them, such as tea, coffee, andrea, or energy drinks. If your symptoms are worse after you eat a certain food, you may want to stop eating it to see if your symptoms get better. Do not smoke or chew tobacco.  If you get heartburn at night, raise the head of your bed 6 to 8 inches by putting the frame on blocks or placing a foam wedge under the head of your mattress. (Adding extra pillows does not work.)  Do not wear tight clothing around your middle. Lose weight if you need to. Losing just 5 to 10 pounds can help. When should you call for help? Call your doctor now or seek immediate medical care if:    You have new or worse belly pain. You are vomiting. Watch closely for changes in your health, and be sure to contact your doctor if:    You have new or worse symptoms of indigestion. You have trouble or pain swallowing. You are losing weight. You do not get better as expected. Where can you learn more? Go to http://www.Entasso.com/  Enter T074 in the search box to learn more about \"Hiatal Hernia: Care Instructions. \"  Current as of: June 6, 2022               Content Version: 13.4  © 6643-6232 Healthwise[employer]. Care instructions adapted under license by [employer] (which disclaims liability or warranty for this information). If you have questions about a medical condition or this instruction, always ask your healthcare professional. [address] any warranty or liability for your use of this information. DISCHARGE SUMMARY from Nurse    PATIENT INSTRUCTIONS:    After general anesthesia or intravenous sedation, for 24 hours or while taking prescription Narcotics:  Limit your activities  Do not drive and operate hazardous machinery  Do not make important personal or business decisions  Do  not drink alcoholic beverages  If you have not urinated within 8 hours after discharge, please contact your surgeon on call. Report the following to your surgeon:  Excessive pain, swelling, redness or odor of or around the surgical area  Temperature over 100.5  Nausea and vomiting lasting longer than 4 hours or if unable to take medications  Any signs of decreased circulation or nerve impairment to extremity: change in color, persistent  numbness, tingling, coldness or increase pain  Any questions        These are general instructions for a healthy lifestyle:    No smoking/ No tobacco products/ Avoid exposure to second hand smoke  Surgeon General's Warning:  Quitting smoking now greatly reduces serious risk to your health. Obesity, smoking, and sedentary lifestyle greatly increases your risk for illness    A healthy diet, regular physical exercise & weight monitoring are important for maintaining a healthy lifestyle    You may be retaining fluid if you have a history of heart failure or if you experience any of the following symptoms:  Weight gain of 3 pounds or more overnight or 5 pounds in a week, increased swelling in our hands or feet or shortness of breath while lying flat in bed.   Please call your doctor as soon as you notice any of these symptoms; do not wait until your next office visit. The discharge information has been reviewed with the patient. The patient verbalized understanding. Discharge medications reviewed with the patient and appropriate educational materials and side effects teaching were provided. ___________________________________________________________________________________________________________________________________         Esophageal Dilation: What to Expect at [address] had an esophageal dilation. This procedure can open up narrow areas of the esophagus. After the procedure, you will stay at the hospital or surgery center for 1 to 2 hours. This will allow the medicine to wear off. You will be able to go home after your doctor or nurse checks to make sure that you're not having any problems. This care sheet gives you a general idea about how long it will take for you to recover. But each person recovers at a different pace. Follow the steps below to get better as quickly as possible. How can you care for yourself at home? Activity    Rest as much as you need to after you go home. You should be able to go back to your usual activities the day after the procedure. Diet    Follow your doctor's directions for eating after the procedure. Drink plenty of fluids (unless your doctor has told you not to). Medicines    Your doctor will tell you if and when you can restart your medicines. He or she will also give you instructions about taking any new medicines. If you stopped taking aspirin or some other blood thinner, your doctor will tell you when to start taking it again. If you have a sore throat the day after the procedure, use an over-the-counter spray to numb your throat. Sucking on throat lozenges and gargling with warm salt water may also help relieve your symptoms. Follow-up care is a key part of your treatment and safety.  Be sure to make and go to all appointments, and call your doctor if you are having problems. It's also a good idea to know your test results and keep a list of the medicines you take. When should you call for help? Call 911 anytime you think you may need emergency care. For example, call if:    You passed out (lost consciousness). You have trouble breathing. Your stools are maroon or very bloody   Call your doctor now or seek immediate medical care if:    You have new or worse belly pain. You have a fever. You have new or more blood in your stools. You are sick to your stomach and cannot drink fluids. You cannot pass stools or gas. You have pain that does not get better after you take pain medicine. Watch closely for changes in your health, and be sure to contact your doctor if:    Your throat still hurts after a day or two. You do not get better as expected. Where can you learn more? Go to http://martine-feng.info/  Enter J014 in the search box to learn more about \"Esophageal Dilation: What to Expect at Home. \"  Current as of: June 6, 2022               Content Version: 13.4  © 7845-6297 Healthwise, Incorporated. Care instructions adapted under license by [employer] (which disclaims liability or warranty for this information). If you have questions about a medical condition or this instruction, always ask your healthcare professional. [address] any warranty or liability for your use of this information.

## 2022-11-03 NOTE — ANESTHESIA PREPROCEDURE EVALUATION
Relevant Problems   RESPIRATORY SYSTEM   (+) BLAIR (obstructive sleep apnea)      CARDIOVASCULAR   (+) CAD (coronary artery disease)   (+) Essential hypertension   (+) S/P CABG x 3      PERSONAL HX & FAMILY HX OF CANCER   (+) Cancer of prostate (HCC)       Anesthetic History   No history of anesthetic complications            Review of Systems / Medical History  Patient summary reviewed and pertinent labs reviewed    Pulmonary    COPD: mild    Sleep apnea           Neuro/Psych   Within defined limits           Cardiovascular    Hypertension: well controlled              Exercise tolerance: >4 METS     GI/Hepatic/Renal     GERD: poorly controlled           Endo/Other      Hypothyroidism: well controlled  Morbid obesity and arthritis     Other Findings              Physical Exam    Airway  Mallampati: II  TM Distance: 4 - 6 cm  Neck ROM: normal range of motion   Mouth opening: Normal     Cardiovascular  Regular rate and rhythm,  S1 and S2 normal,  no murmur, click, rub, or gallop             Dental  No notable dental hx       Pulmonary  Breath sounds clear to auscultation               Abdominal  GI exam deferred       Other Findings            Anesthetic Plan    ASA: 3  Anesthesia type: MAC          Induction: Intravenous  Anesthetic plan and risks discussed with: Patient

## 2022-12-12 ENCOUNTER — HOSPITAL ENCOUNTER (OUTPATIENT)
Dept: LAB | Age: 82
Discharge: HOME | End: 2022-12-12
Payer: MEDICARE

## 2022-12-12 ENCOUNTER — APPOINTMENT (OUTPATIENT)
Dept: INTERNAL MEDICINE CLINIC | Age: 82
End: 2022-12-12

## 2022-12-12 DIAGNOSIS — D64.9 ANEMIA, UNSPECIFIED TYPE: ICD-10-CM

## 2022-12-12 DIAGNOSIS — E53.8 B12 DEFICIENCY: ICD-10-CM

## 2022-12-12 DIAGNOSIS — R73.01 IFG (IMPAIRED FASTING GLUCOSE): ICD-10-CM

## 2022-12-12 DIAGNOSIS — E78.5 HYPERLIPIDEMIA, UNSPECIFIED HYPERLIPIDEMIA TYPE: ICD-10-CM

## 2022-12-12 LAB
ALBUMIN SERPL-MCNC: 4.1 G/DL (ref 3.4–5)
ALBUMIN/GLOB SERPL: 1.6 {RATIO} (ref 0.8–1.7)
ALP SERPL-CCNC: 48 U/L (ref 45–117)
ALT SERPL-CCNC: 23 U/L (ref 16–61)
ANION GAP SERPL CALC-SCNC: 5 MMOL/L (ref 3–18)
AST SERPL-CCNC: 14 U/L (ref 10–38)
BILIRUB SERPL-MCNC: 0.4 MG/DL (ref 0.2–1)
BUN SERPL-MCNC: 15 MG/DL (ref 7–18)
BUN/CREAT SERPL: 15 (ref 12–20)
CALCIUM SERPL-MCNC: 9.5 MG/DL (ref 8.5–10.1)
CHLORIDE SERPL-SCNC: 108 MMOL/L (ref 100–111)
CHOLEST SERPL-MCNC: 127 MG/DL
CO2 SERPL-SCNC: 28 MMOL/L (ref 21–32)
CREAT SERPL-MCNC: 1.01 MG/DL (ref 0.6–1.3)
ERYTHROCYTE [DISTWIDTH] IN BLOOD BY AUTOMATED COUNT: 13.7 % (ref 11.6–14.5)
EST. AVERAGE GLUCOSE BLD GHB EST-MCNC: 108 MG/DL
FERRITIN SERPL-MCNC: 80 NG/ML (ref 8–388)
FOLATE SERPL-MCNC: >20 NG/ML (ref 3.1–17.5)
GLOBULIN SER CALC-MCNC: 2.6 G/DL (ref 2–4)
GLUCOSE SERPL-MCNC: 101 MG/DL (ref 74–99)
HBA1C MFR BLD: 5.4 % (ref 4.2–5.6)
HCT VFR BLD AUTO: 36.2 % (ref 36–48)
HDLC SERPL-MCNC: 58 MG/DL (ref 40–60)
HDLC SERPL: 2.2 {RATIO} (ref 0–5)
HGB BLD-MCNC: 12.2 G/DL (ref 13–16)
IRON SATN MFR SERPL: 24 % (ref 20–50)
IRON SERPL-MCNC: 64 UG/DL (ref 50–175)
LDLC SERPL CALC-MCNC: 57.2 MG/DL (ref 0–100)
LIPID PROFILE,FLP: NORMAL
MCH RBC QN AUTO: 30 PG (ref 24–34)
MCHC RBC AUTO-ENTMCNC: 33.7 G/DL (ref 31–37)
MCV RBC AUTO: 88.9 FL (ref 78–100)
NRBC # BLD: 0 K/UL (ref 0–0.01)
NRBC BLD-RTO: 0 PER 100 WBC
PLATELET # BLD AUTO: 211 K/UL (ref 135–420)
PMV BLD AUTO: 10.3 FL (ref 9.2–11.8)
POTASSIUM SERPL-SCNC: 4.1 MMOL/L (ref 3.5–5.5)
PROT SERPL-MCNC: 6.7 G/DL (ref 6.4–8.2)
RBC # BLD AUTO: 4.07 M/UL (ref 4.35–5.65)
SODIUM SERPL-SCNC: 141 MMOL/L (ref 136–145)
TIBC SERPL-MCNC: 263 UG/DL (ref 250–450)
TRIGL SERPL-MCNC: 59 MG/DL (ref ?–150)
VIT B12 SERPL-MCNC: 384 PG/ML (ref 211–911)
VLDLC SERPL CALC-MCNC: 11.8 MG/DL
WBC # BLD AUTO: 8.3 K/UL (ref 4.6–13.2)

## 2022-12-12 PROCEDURE — 83036 HEMOGLOBIN GLYCOSYLATED A1C: CPT

## 2022-12-12 PROCEDURE — 82607 VITAMIN B-12: CPT

## 2022-12-12 PROCEDURE — 80061 LIPID PANEL: CPT

## 2022-12-12 PROCEDURE — 36415 COLL VENOUS BLD VENIPUNCTURE: CPT

## 2022-12-12 PROCEDURE — 83540 ASSAY OF IRON: CPT

## 2022-12-12 PROCEDURE — 84165 PROTEIN E-PHORESIS SERUM: CPT

## 2022-12-12 PROCEDURE — 82728 ASSAY OF FERRITIN: CPT

## 2022-12-12 PROCEDURE — 80053 COMPREHEN METABOLIC PANEL: CPT

## 2022-12-12 PROCEDURE — 85027 COMPLETE CBC AUTOMATED: CPT

## 2022-12-14 LAB
ALBUMIN SERPL ELPH-MCNC: 3.9 G/DL (ref 2.9–4.4)
ALBUMIN/GLOB SERPL: 1.5 {RATIO} (ref 0.7–1.7)
ALPHA1 GLOB SERPL ELPH-MCNC: 0.2 G/DL (ref 0–0.4)
ALPHA2 GLOB SERPL ELPH-MCNC: 0.6 G/DL (ref 0.4–1)
B-GLOBULIN SERPL ELPH-MCNC: 0.9 G/DL (ref 0.7–1.3)
GAMMA GLOB SERPL ELPH-MCNC: 0.9 G/DL (ref 0.4–1.8)
GLOBULIN SER CALC-MCNC: 2.6 G/DL (ref 2.2–3.9)
M PROTEIN SERPL ELPH-MCNC: NORMAL G/DL
PROT SERPL-MCNC: 6.5 G/DL (ref 6–8.5)

## 2022-12-14 NOTE — PROGRESS NOTES
This is an subsequent Medicare Annual Wellness Exam (AWV)     I have reviewed the patient's medical history in detail and updated the computerized patient record. Assessment/Plan   Education and counseling provided:  Are appropriate based on today's review and evaluation  End-of-Life planning (with patient's consent)  Influenza Vaccine  Cardiovascular screening blood test    1. Advanced directives, counseling/discussion  -     ADVANCE CARE PLANNING FIRST 30 MINS  2. Medicare annual wellness visit, subsequent  3. Pederson's esophagus with dysplasia  4. Coronary artery disease involving native coronary artery of native heart without angina pectoris  5. Cancer of prostate ([facility] [address]  6. Hyperplastic colonic polyp, unspecified part of colon  7. Essential hypertension  8. IFG (impaired fasting glucose)  9. Anemia, unspecified type  -     REFERRAL TO HEMATOLOGY     Depression Risk Factor Screening     3 most recent PHQ Screens 12/19/2022   Little interest or pleasure in doing things Not at all   Feeling down, depressed, irritable, or hopeless Not at all   Total Score PHQ 2 0       Alcohol & Drug Abuse Risk Screen    Do you average more than 1 drink per night or more than 7 drinks a week: No    In the past three months have you have had more than 4 drinks containing alcohol on one occasion: No          Functional Ability and Level of Safety    Hearing: Hearing is good. Needs hearing aids      Activities of Daily Living: The home contains: no safety equipment. Patient does total self care      Ambulation: with no difficulty     Fall Risk:  Fall Risk Assessment, last 12 mths 12/19/2022   Able to walk? Yes   Fall in past 12 months? 0   Do you feel unsteady?  0   Are you worried about falling 0      Abuse Screen:  Patient is not abused       Cognitive Screening    Has your family/caregiver stated any concerns about your memory: no     Cognitive Screening: Normal - Mini Cog Test    Health Maintenance Due     Health Maintenance Due   Topic Date Due    COVID-19 Vaccine (3 - Booster for Moderna series) 04/17/2021       Patient Care Team   Patient Care Team:  Odelia Lesches, MD as PCP - General (Internal Medicine Physician)  Odelia Lesches, MD as PCP - [facility]    History     Patient Active Problem List   Diagnosis Code    Pederson's esophagus K22.70    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 Mima Avery K63.5    BLAIR (obstructive sleep apnea) G47.33    S/P CABG x 3 Z95.1    IFG (impaired fasting glucose) R73.01    Hyperlipidemia E78.5    Bilateral carotid artery stenosis 12/14 I65.23    CAD (coronary artery disease) I25.10    Multinodular goiter 2014 E04.2    Pulmonary nodules R91.8    Essential hypertension I10    Impotence of organic origin N52.9    Overweight (BMI 25.0-29. 9) E66.3     Past Medical History:   Diagnosis Date    Allergic rhinitis     Pederson's esophagus     Dr. Mima Avery 8/17 s/p HALO; 7/22    CABG x 3 04/24/2014    LIMA to LAD, and saphenous vein grafts to the second diagonal of the LAD, and to the posterior descending branch of the RCA, Dr. Kimberli Holt, 4/23/14.      CAD (coronary artery disease)     Colon adenoma 08/2017    Dr Christopher Escoto polyps 2007    Dr. Mima Avery    COPD (chronic obstructive pulmonary disease) ([facility] [address]     mild obst disease on PFTs Dr. Lauren Gordon (2007)    COVID-19 vaccine dose declined 12/2022    boosters    ED (erectile dysfunction)     GERD (gastroesophageal reflux disease)     by EGD 2007 Dr. Mima Avery; EGD 5/14/21 hh and s/p dilation stricture    Hearing loss     Hx of cardiac catheterization 11/2020    Dr Mireya Shields; ef 60%, LIMA to LAD patent, SVG to DM1 patent, segment to RPDA occ, prox LAD 95%, Cx 30%, 95% prox RCA, mid RCA 95%, dist RCA 95%, RPDA 95%, s/p stent of all 4 vessels    Hypertension     BLAIR (obstructive sleep apnea) 2011    CPAP not used    Osteoarthritis of both hands     Pro[facility] ([facility] 06/2010    Dr. Sandrita Oliveira [id number] [place] 3+3, 1/9 cores up to 10% 1 core; cryo Oct 2012 psa went from 7/1 to torsten 1.78    Prostate cancer ([facility] 04/2018    Dr Chet Mcadams stage 2B [id number], [place] 3+4, P,10, G2; brachytherapy Dr Melissa Grace     Pulmonary nodule 2014    no change 2015, 3/17    S/P angioplasty with stent 11/2020    Dr Laurie Loaiza; 4 sequential stents RCA      Past Surgical History:   Procedure Laterality Date    COLONOSCOPY N/A 07/27/2017    Dr. Marbin Mina 2007 negative; 8/17 adenoma, 7/22 polyp    COLONOSCOPY N/A 07/28/2022    Dr Marbin Mina hyperplastic    HX APPENDECTOMY      HX Kev Viniciomarilynn Mahoney CATARACT REMOVAL Bilateral 11/2014    Dr David Mckeon CHOLECYSTECTOMY  2004    HX TONSILLECTOMY      HX TURP      x2 Dr. Jackie Barr UROLOGICAL  10/2012    [facility], Dr. Jennifer Armstrong, Cystoscopy and dilation of stricture, cryoablation of prostate    [place] CABG, ARTERY-VEIN, THREE  04/2014    3V CABG; Dr Magdi LEAVITT to LAD, SVG to DM2 and sequential to RPDA     Current Outpatient Medications   Medication Sig Dispense Refill    metoprolol succinate (TOPROL-XL) 25 mg XL tablet TAKE ONE TABLET BY MOUTH DAILY 90 Tablet 3    benazepriL (LOTENSIN) 10 mg tablet TAKE ONE TABLET BY MOUTH DAILY 90 Tablet 4    atorvastatin (LIPITOR) 20 mg tablet TAKE ONE TABLET BY MOUTH EVERY NIGHT AT BEDTIME 90 Tablet 3    clopidogreL (PLAVIX) 75 mg tab TAKE ONE TABLET BY MOUTH DAILY 90 Tablet 3    amLODIPine (NORVASC) 10 mg tablet TAKE ONE TABLET BY MOUTH DAILY 90 Tablet 3    fluticasone propionate (FLONASE) 50 mcg/actuation nasal spray 2 sprays each nostril daily 1 Bottle 12    aspirin 81 mg chewable tablet Take 81 mg by mouth daily. levalbuterol tartrate (XOPENEX) 45 mcg/actuation inhaler Take 2 Puffs by inhalation every six (6) hours as needed for Wheezing. 1 Inhaler 5    omeprazole (PRILOSEC) 20 mg capsule Take 20 mg by mouth two (2) times a day.       ascorbic acid, vitamin C, (VITAMIN C) 1,000 mg tablet Take 1,000 mg by mouth daily. MULTIVITAMIN W-MINERALS/LUTEIN (CENTRUM SILVER PO) Take 1 Tab by mouth daily. diclofenac (VOLTAREN) 1 % gel Apply 2 g to affected area four (4) times daily. (Patient not taking: No sig reported) 500 g 5    triamcinolone acetonide (KENALOG-40) 40 mg/mL injection  (Patient not taking: No sig reported)      nitroglycerin (NITROSTAT) 0.4 mg SL tablet 1 Tab by SubLINGual route every five (5) minutes as needed for Chest Pain.  (Patient not taking: No sig reported) 1 Bottle 3     Allergies   Allergen Reactions    Albuterol Palpitations    Influenza Virus Vaccine, Specific Other (comments)     \"Gets the flu\"       Family History   Problem Relation Age of Onset    Heart Disease Father     Stroke Mother     Hypertension Mother     Diabetes Sister      Social History     Tobacco Use    Smoking status: Former     Packs/day: 0.50     Years: 4.00     Pack years: 2.00     Types: Cigarettes     Quit date: 1957     Years since quittin.0    Smokeless tobacco: Never    Tobacco comments:     Quit in    Substance Use Topics    Alcohol use: No         Lenord Canavan, MD

## 2022-12-14 NOTE — PROGRESS NOTES
Nayan Fat. presents today for   Chief Complaint   Patient presents with    Annual Wellness Visit    Labs     12-12-22    Hypertension     Essential     Coronary Artery Disease    Blood sugar problem     IFG     Prostate Cancer    Hyperlipidemia    Anemia    Vitamin B12 Deficiency       1. \"Have you been to the ER, urgent care clinic since your last visit? Hospitalized since your last visit? \"   11-03-22 @ [facility], cunningham's esophagus w high grade dysplasia   07-28-22 @ [facility], Upper endoscopy     2. \"Have you seen or consulted any other health care providers outside of the [address] [place] [address] since your last visit? \" no     3. For patients aged 39-70: Has the patient had a colonoscopy / FIT/ Cologuard? NA - based on age      If the patient is female:    4. For patients aged 41-77: Has the patient had a mammogram within the past 2 years? NA - based on age or sex  See top three    5. For patients aged 21-65: Has the patient had a pap smear?  NA - based on age or sex

## 2022-12-14 NOTE — PROGRESS NOTES
78 y.o. male who presents for evaluation. Dr Queenie Tucker continues to follow the prostatic issues and no new developments     Continues to work at [employer] in the [employer] side and remains active. Seeing Dr Silvia Montemayor periodically    Had egd and colo as below, no new complaints     Denies polyuria, polydipsia, nocturia, vision change. Not checking sugars at this time. No sx referable to the thyroid. LAST MEDICARE WELLNESS EXAM: 8/12/15, 2/22/17, 4/12/18, 5/8/19, 11/17/20, 11/29/21, 12/19/22    Past Medical History:   Diagnosis Date    Allergic rhinitis     Pederson's esophagus     Dr. Dontae Collins 8/17 s/p HALO; 7/22    CABG x 3 04/24/2014    LIMA to LAD, and saphenous vein grafts to the second diagonal of the LAD, and to the posterior descending branch of the RCA, Dr. Meeta Johnson, 4/23/14.      CAD (coronary artery disease)     Colon adenoma 08/2017    Dr Dontae Collins    Colon polyps 2007    Dr. Dontae Collins    COPD (chronic obstructive pulmonary disease) ([facility] [address]     mild obst disease on PFTs Dr. Josesito Quispe (2007)    COVID-19 vaccine dose declined 12/2022    boosters    ED (erectile dysfunction)     GERD (gastroesophageal reflux disease)     by EGD 2007 Dr. Dontae Collins; EGD 5/14/21 hh and s/p dilation stricture    Hearing loss     Hx of cardiac catheterization 11/2020    Dr Silvia Montemayor; ef 60%, LEAVITT to LAD patent, SVG to DM1 patent, segment to RPDA occ, prox LAD 95%, Cx 30%, 95% prox RCA, mid RCA 95%, dist RCA 95%, RPDA 95%, s/p stent of all 4 vessels    Hypertension     BLAIR (obstructive sleep apnea) 2011    CPAP not used    Osteoarthritis of both hands     Prostate cancer ([facility] [address] 06/2010    Dr. Charlotte Bateman [id number] [place] 3+3, 1/9 cores up to 10% 1 core; cryo Oct 2012 psa went from 7/1 to torsten 1.78    Prostate cancer ([facility] [address] 04/2018    Dr Chrissy Barrera stage 2B [id number], [place] 3+4, P,10, G2; brachytherapy Dr Jeevan Ramos     Pulmonary nodule 2014    no change 2015, 3/17    S/P angioplasty with stent 11/2020    Dr Silvia Montemayor; 4 sequential stents RCA     Past Surgical History:   Procedure Laterality Date    COLONOSCOPY N/A 2017    Dr. Gerald Louis  negative;  adenoma,  polyp    COLONOSCOPY N/A 2022    Dr Gerald Louis hyperplastic    HX APPENDECTOMY      HX [address]      Dr. Myranda Antunez CATARACT REMOVAL Bilateral 2014    Dr Villa Ruff CHOLECYSTECTOMY  2004    HX TONSILLECTOMY      HX TURP      x2 Dr. William Field UROLOGICAL  10/2012    [facility], Dr. Kendell Tinoco, Cystoscopy and dilation of stricture, cryoablation of prostate    [place] CABG, ARTERY-VEIN, THREE  2014    3V CABG; Dr Sadiq LEAVITT to LAD, SVG to DM2 and sequential to RPDA     Social History     Socioeconomic History    Marital status:      Spouse name: Not on file    Number of children: 3    Years of education: Not on file    Highest education level: Not on file   Occupational History    Occupation: ret engr [employer]     Employer: RETIRED   Tobacco Use    Smoking status: Former     Packs/day: 0.50     Years: 4.00     Pack years: 2.00     Types: Cigarettes     Quit date: 1957     Years since quittin.0    Smokeless tobacco: Never    Tobacco comments:     Quit in    Vaping Use    Vaping Use: Never used   Substance and Sexual Activity    Alcohol use: No    Drug use: Never    Sexual activity: Not on file   Other Topics Concern    Not on file   Social History Narrative    Not on file     Social Determinants of Health     Financial Resource Strain: Not on file   Food Insecurity: Not on file   Transportation Needs: Not on file   Physical Activity: Not on file   Stress: Not on file   Social Connections: Not on file   Intimate Partner Violence: Not on file   Housing Stability: Not on file     Allergies   Allergen Reactions    Albuterol Palpitations    Influenza Virus Vaccine, Specific Other (comments)     \"Gets the flu\"     Current Outpatient Medications   Medication Sig    metoprolol succinate (TOPROL-XL) 25 mg XL tablet TAKE ONE TABLET BY MOUTH DAILY    benazepriL (LOTENSIN) 10 mg tablet TAKE ONE TABLET BY MOUTH DAILY    atorvastatin (LIPITOR) 20 mg tablet TAKE ONE TABLET BY MOUTH EVERY NIGHT AT BEDTIME    clopidogreL (PLAVIX) 75 mg tab TAKE ONE TABLET BY MOUTH DAILY    amLODIPine (NORVASC) 10 mg tablet TAKE ONE TABLET BY MOUTH DAILY    fluticasone propionate (FLONASE) 50 mcg/actuation nasal spray 2 sprays each nostril daily    aspirin 81 mg chewable tablet Take 81 mg by mouth daily. levalbuterol tartrate (XOPENEX) 45 mcg/actuation inhaler Take 2 Puffs by inhalation every six (6) hours as needed for Wheezing. omeprazole (PRILOSEC) 20 mg capsule Take 20 mg by mouth two (2) times a day. ascorbic acid, vitamin C, (VITAMIN C) 1,000 mg tablet Take 1,000 mg by mouth daily. MULTIVITAMIN W-MINERALS/LUTEIN (CENTRUM SILVER PO) Take 1 Tab by mouth daily. diclofenac (VOLTAREN) 1 % gel Apply 2 g to affected area four (4) times daily. (Patient not taking: No sig reported)    triamcinolone acetonide (KENALOG-40) 40 mg/mL injection  (Patient not taking: No sig reported)    nitroglycerin (NITROSTAT) 0.4 mg SL tablet 1 Tab by SubLINGual route every five (5) minutes as needed for Chest Pain. (Patient not taking: No sig reported)     No current facility-administered medications for this visit. REVIEW OF SYSTEMS:  colo 2017 Dr Marisol Lopez  Ophtho - no vision change or eye pain  Oral - no mouth pain, tongue or tooth problems  Ears - no hearing loss, ear pain, fullness, no swallowing problems  Cardiac - no CP, PND, orthopnea, edema, palpitations or syncope  Chest - no breast masses  Resp - no wheezing, chronic coughing, dyspnea  GI - no heartburn, nausea, vomiting, change in bowel habits, bleeding, hemorrhoids  Urinary - no dysuria, hematuria, flank pain, urgency, frequency    Visit Vitals  /62   Pulse 60   Temp 98.9 °F (37.2 °C) (Temporal)   Resp 18   Ht 5' 7\" (1.702 m)   Wt 179 lb (81.2 kg)   SpO2 96%   BMI 28.04 kg/m²   A&O x3  Affect is appropriate.   Mood stable  No apparent distress  Anicteric, no JVD, adenopathy or thyromegaly. No carotid bruits or radiated murmur  Lungs clear to auscultation, no wheezes or rales  Heart showed regular rate and rhythm. No murmur, rubs, gallops  Abdomen soft nontender, no hepatosplenomegaly or masses. Extremities with trace pedal edema. Venous varicosities noted.  Pulses 1+ symmetrically    LABS   From 9/10 showed gluc 102, cr 1.00,    alt 41,        chol 190, tg 102, hdl 47, ldl-c 123, wbc 8.3, hb 14.6, plt 222, psa 7.60  From 6/12 showed                         psa 5.78  From 8/12 showed                                               b12 400, fol>20  From 9/12 showed gluc 109, cr 1.10, gfr>60,                  wbc 8.1, hb 14.1, plt 232  From 9/13 showed                         psa 0.96  From 3/14 showed                         psa 1.78  From 4/14 showed gluc 107, cr 0.90, gfr>60, alt 25, hba1c 5.5, chol 190, tg 135, hdl 48, ldl-c 115, wbc 8.1, hb 14.2, plt 241, ua neg  From 8/14 showed gluc 97,   cr 0.90, gfr>60, alt 17, hba1c 6.0, chol 109, tg 111, hdl 44, ldl-c 43  From 2/15 showed       hba1c 6.0  From 3/15 showed                       psa 2.13  From 8/15 showed gluc 106, cr 0.98, gfr 75,  alt 20, hba1c 5.9, chol 129, tg 79,   hdl 51, ldl-c 62,   wbc 7.3, hb 14.2, plt 232  From 3/16 showed                       psa 2.90  From 4/16 showed       alt 37, hba1c 5.6, chol 108, tg 103, hdl 46, ldl-c 41  From 2/17 showed gluc 103, cr 1.00, gfr>60, alt 36, hba1c 5.6, chol 117, tg 93,   hdl 49, ldl-c 59,                 tsh 4.14  From 1/18 showed                      psa 7.40  From 2/18 showed gluc 107, cr 1.01, gfr>60, alt 27,         chol 106, tg 113, hdl 42, ldl-c 41,  wbc 7.9, hb 13.8, plt 240,              tsh 3.15, ft4 0.90  From 8/18 showed gluc 97,   cr 1.01, gfr>60, alt 28,         wbc 7.6, hb 12.2, plt 206,              fe 94, %sat 32, ferritin 83, b12 332, fol>20, spep neg, retic 1.0  From 5/19 showed gluc 100, cr 1.00, gfr>60, alt 29, wbc 9.4, hb 13.4, plt 214  From 1/20 showed                       psa 0.23  From 2/20 showed              chol 116, tg 79,   hdl 43, ldl-c 57  From 7/20 showed     alt 34,          chol 123, tg 112, hdl 44, ldl-c 57  From 11/20 showed glu 108, cr 1.03, gfr>60, alt 28,         wbc 8.2, hb 13.6, plt 253  From 12/20 showed gluc 94, cr 1.01, gfr>60,   hba1c 5.6, chol 128, tg 108, hdl 49, ldl-c 57  From 1/21 showed                       psa 0.10, test 47  From 12/21 showed glu 115, cr 1.07, gfr>60, alt 29, hba1c 5.5, chol 116, tg 90,   hdl 48, ldl-c 50,  wbc 8.9, hb 12.9, plt 229  From 6/22 showed       hba1c 5.6,        wbc 8.9, hb 12.3, plt 241    Results for orders placed or performed during the hospital encounter of 12/12/22   CBC W/O DIFF   Result Value Ref Range    WBC 8.3 4.6 - 13.2 K/uL    RBC 4.07 (L) 4.35 - 5.65 M/uL    HGB 12.2 (L) 13.0 - 16.0 g/dL    HCT 36.2 36.0 - 48.0 %    MCV 88.9 78.0 - 100.0 FL    MCH 30.0 24.0 - 34.0 PG    MCHC 33.7 31.0 - 37.0 g/dL    RDW 13.7 11.6 - 14.5 %    PLATELET 471 074 - 327 K/uL    MPV 10.3 9.2 - 11.8 FL    NRBC 0.0 0  WBC    ABSOLUTE NRBC 0.00 0.00 - 0.68 K/uL   METABOLIC PANEL, COMPREHENSIVE   Result Value Ref Range    Sodium 141 136 - 145 mmol/L    Potassium 4.1 3.5 - 5.5 mmol/L    Chloride 108 100 - 111 mmol/L    CO2 28 21 - 32 mmol/L    Anion gap 5 3.0 - 18 mmol/L    Glucose 101 (H) 74 - 99 mg/dL    BUN 15 7.0 - 18 MG/DL    Creatinine 1.01 0.6 - 1.3 MG/DL    BUN/Creatinine ratio 15 12 - 20      eGFR >60 >60 ml/min/1.73m2    Calcium 9.5 8.5 - 10.1 MG/DL    Bilirubin, total 0.4 0.2 - 1.0 MG/DL    ALT (SGPT) 23 16 - 61 U/L    AST (SGOT) 14 10 - 38 U/L    Alk.  phosphatase 48 45 - 117 U/L    Protein, total 6.7 6.4 - 8.2 g/dL    Albumin 4.1 3.4 - 5.0 g/dL    Globulin 2.6 2.0 - 4.0 g/dL    A-G Ratio 1.6 0.8 - 1.7     LIPID PANEL   Result Value Ref Range    LIPID PROFILE          Cholesterol, total 127 <200 MG/DL    Triglyceride 59 <150 MG/DL    HDL Cholesterol 58 40 - 60 MG/DL    LDL, calculated 57.2 0 - 100 MG/DL    VLDL, calculated 11.8 MG/DL    CHOL/HDL Ratio 2.2 0 - 5.0     HEMOGLOBIN A1C WITH EAG   Result Value Ref Range    Hemoglobin A1c 5.4 4.2 - 5.6 %    Est. average glucose 108 mg/dL   PROTEIN ELECTROPHORESIS   Result Value Ref Range    Protein, total 6.5 6.0 - 8.5 g/dL    Albumin 3.9 2.9 - 4.4 g/dL    Alpha-1-globulin 0.2 0.0 - 0.4 g/dL    ALPHA-2 GLOBULIN 0.6 0.4 - 1.0 g/dL    Beta globulin 0.9 0.7 - 1.3 g/dL    Gamma globulin 0.9 0.4 - 1.8 g/dL    M-Branden Not Observed Not Observed g/dL    Globulin, total 2.6 2.2 - 3.9 g/dL    A/G ratio 1.5 0.7 - 1.7     VITAMIN B12 & FOLATE   Result Value Ref Range    Vitamin B12 384 211 - 911 pg/mL    Folate >20.0 (H) 3.10 - 17.50 ng/mL   FERRITIN   Result Value Ref Range    Ferritin 80 8 - 388 NG/ML   IRON PROFILE   Result Value Ref Range    Iron 64 50 - 175 ug/dL    TIBC 263 250 - 450 ug/dL    Iron % saturation 24 20 - 50 %     We reviewed the patient's labs from the last several visits to point out trends in the numbers        Patient Active Problem List   Diagnosis Code    Pederson's esophagus K22.70    BPH with obstruction/lower urinary tract symptoms N40.1, N13.8    Cancer of prostate ([facility] [address] C61    Colon polyp 2007 Dr. Jose Rabago K63.5    BLAIR (obstructive sleep apnea) G47.33    S/P CABG x 3 Z95.1    IFG (impaired fasting glucose) R73.01    Hyperlipidemia E78.5    Bilateral carotid artery stenosis 12/14 I65.23    CAD (coronary artery disease) I25.10    Multinodular goiter 2014 E04.2    Pulmonary nodules R91.8    Essential hypertension I10    Impotence of organic origin N52.9    Overweight (BMI 25.0-29. 9) E66.3     Assessment and plan:  1. Pederson's. Lifelong PPI and f/u Dr Jose Rabago, egd as scheduled  2. ED. Prn meds  3. BPH and prostate ca. F/U Dr. Angelica Sorensen  4. Colon polyp. Fiber, colo as scheduled  5. Asthma. Prn inhalers  6. CHD. F/U Dr. Jose Adams as scheduled  7. IFG. Wt loss would be ideal, continue dietary and lifestyle measures  8. Bilat carotid stenosis. Cont to follow per cardiology  9. HTN. Continue current regimen  10. Overweight. Lifestyle and dietary measures. Portion control reiterated. 11. Arthralgias. Prn voltaren  12. Anemia. Progressive and no obvious reason, will send to hematology for opinion         RTC 6/23    Above conditions discussed at length and patient vocalized understanding. All questions answered to patient satisfaction        ICD-10-CM ICD-9-CM    1. Advanced directives, counseling/discussion  Z71.89 V65.49 ADVANCE CARE PLANNING FIRST 30 MINS      2. Medicare annual wellness visit, subsequent  Z00.00 V70.0       3. Pederson's esophagus with dysplasia  K22.719 530.85       4. Coronary artery disease involving native coronary artery of native heart without angina pectoris  I25.10 414.01       5. Cancer of prostate ([facility] [address]  C61 185       6. Hyperplastic colonic polyp, unspecified part of colon  K63.5 211.3       7. Essential hypertension  I10 401.9       8. IFG (impaired fasting glucose)  I33.22 802.74 METABOLIC PANEL, BASIC      HEMOGLOBIN A1C WITH EAG      9.  Anemia, unspecified type  D64.9 285.9 REFERRAL TO HEMATOLOGY

## 2022-12-19 ENCOUNTER — OFFICE VISIT (OUTPATIENT)
Dept: INTERNAL MEDICINE CLINIC | Age: 82
End: 2022-12-19
Payer: MEDICARE

## 2022-12-19 VITALS
HEIGHT: 67 IN | BODY MASS INDEX: 28.09 KG/M2 | WEIGHT: 179 LBS | DIASTOLIC BLOOD PRESSURE: 62 MMHG | OXYGEN SATURATION: 96 % | TEMPERATURE: 98.9 F | RESPIRATION RATE: 18 BRPM | HEART RATE: 60 BPM | SYSTOLIC BLOOD PRESSURE: 135 MMHG

## 2022-12-19 DIAGNOSIS — R73.01 IFG (IMPAIRED FASTING GLUCOSE): ICD-10-CM

## 2022-12-19 DIAGNOSIS — K63.5 HYPERPLASTIC COLONIC POLYP, UNSPECIFIED PART OF COLON: ICD-10-CM

## 2022-12-19 DIAGNOSIS — Z71.89 ADVANCED DIRECTIVES, COUNSELING/DISCUSSION: ICD-10-CM

## 2022-12-19 DIAGNOSIS — C61 CANCER OF PROSTATE (HCC): ICD-10-CM

## 2022-12-19 DIAGNOSIS — I10 ESSENTIAL HYPERTENSION: ICD-10-CM

## 2022-12-19 DIAGNOSIS — I25.10 CORONARY ARTERY DISEASE INVOLVING NATIVE CORONARY ARTERY OF NATIVE HEART WITHOUT ANGINA PECTORIS: ICD-10-CM

## 2022-12-19 DIAGNOSIS — D64.9 ANEMIA, UNSPECIFIED TYPE: ICD-10-CM

## 2022-12-19 DIAGNOSIS — K22.719 BARRETT'S ESOPHAGUS WITH DYSPLASIA: ICD-10-CM

## 2022-12-19 DIAGNOSIS — Z00.00 MEDICARE ANNUAL WELLNESS VISIT, SUBSEQUENT: Primary | ICD-10-CM

## 2022-12-19 PROCEDURE — G8536 NO DOC ELDER MAL SCRN: HCPCS | Performed by: INTERNAL MEDICINE

## 2022-12-19 PROCEDURE — 1123F ACP DISCUSS/DSCN MKR DOCD: CPT | Performed by: INTERNAL MEDICINE

## 2022-12-19 PROCEDURE — 3074F SYST BP LT 130 MM HG: CPT | Performed by: INTERNAL MEDICINE

## 2022-12-19 PROCEDURE — G8752 SYS BP LESS 140: HCPCS | Performed by: INTERNAL MEDICINE

## 2022-12-19 PROCEDURE — G8427 DOCREV CUR MEDS BY ELIG CLIN: HCPCS | Performed by: INTERNAL MEDICINE

## 2022-12-19 PROCEDURE — 99214 OFFICE O/P EST MOD 30 MIN: CPT | Performed by: INTERNAL MEDICINE

## 2022-12-19 PROCEDURE — 1101F PT FALLS ASSESS-DOCD LE1/YR: CPT | Performed by: INTERNAL MEDICINE

## 2022-12-19 PROCEDURE — G0439 PPPS, SUBSEQ VISIT: HCPCS | Performed by: INTERNAL MEDICINE

## 2022-12-19 PROCEDURE — G0463 HOSPITAL OUTPT CLINIC VISIT: HCPCS | Performed by: INTERNAL MEDICINE

## 2022-12-19 PROCEDURE — 99497 ADVNCD CARE PLAN 30 MIN: CPT | Performed by: INTERNAL MEDICINE

## 2022-12-19 PROCEDURE — G8510 SCR DEP NEG, NO PLAN REQD: HCPCS | Performed by: INTERNAL MEDICINE

## 2022-12-19 PROCEDURE — 3078F DIAST BP <80 MM HG: CPT | Performed by: INTERNAL MEDICINE

## 2022-12-19 PROCEDURE — G8417 CALC BMI ABV UP PARAM F/U: HCPCS | Performed by: INTERNAL MEDICINE

## 2022-12-19 PROCEDURE — G8754 DIAS BP LESS 90: HCPCS | Performed by: INTERNAL MEDICINE

## 2022-12-19 NOTE — PATIENT INSTRUCTIONS
Medicare Wellness Visit, Male    The best way to live healthy is to have a lifestyle where you eat a well-balanced diet, exercise regularly, limit alcohol use, and quit all forms of tobacco/nicotine, if applicable. Regular preventive services are another way to keep healthy. Preventive services (vaccines, screening tests, monitoring & exams) can help personalize your care plan, which helps you manage your own care. Screening tests can find health problems at the earliest stages, when they are easiest to treat. [facility] follows the current, evidence-based guidelines published by the [facility] N[facility] ([facility]) when recommending preventive services for our patients. Because we follow these guidelines, sometimes recommendations change over time as research supports it. (For example, a prostate screening blood test is no longer routinely recommended for men with no symptoms). Of course, you and your doctor may decide to screen more often for some diseases, based on your risk and co-morbidities (chronic disease you are already diagnosed with). Preventive services for you include:  - Medicare offers their members a free annual wellness visit, which is time for you and your primary care provider to discuss and plan for your preventive service needs.  Take advantage of this benefit every year!    -Over the age of 72 should receive the recommended pneumonia vaccines.   -All adults should have a flu vaccine yearly.  -All adults should receive a tetanus vaccine every 10 years.  -Over the age of 48 should receive the shingles vaccines.    -All adults should be screened once for Hepatitis C.  -All adults age 38-68 who are overweight should have a diabetes screening test once every three years.   -Other screening tests & preventive services for persons with diabetes include: an eye exam to screen for diabetic retinopathy, a kidney function test, a foot exam, and stricter control over your cholesterol.   -Cardiovascular screening for adults with routine risk involves an electrocardiogram (ECG) at intervals determined by the provider.     -Colorectal cancer screening should be done for adults age 43-69 with no increased risk factors for colorectal cancer. There are a number of acceptable methods of screening for this type of cancer. Each test has its own benefits and drawbacks. Discuss with your provider what is most appropriate for you during your annual wellness visit. The different tests include: colonoscopy (considered the best screening method), a fecal occult blood test, a fecal DNA test, and sigmoidoscopy.    -Lung cancer screening is recommended annually with a low dose CT scan for adults between age 54 and 68, who have smoked at least 30 pack years (equivalent of 1 pack per day for 30 days), and who is a current smoker or quit less than 15 years ago. -An Abdominal Aortic Aneurysm (AAA) Screening is recommended for men age 73-68 who has ever smoked in their lifetime.      Here is a list of your current Health Maintenance items (your personalized list of preventive services) with a due date:  Health Maintenance Due   Topic Date Due    COVID-19 Vaccine (3 - Booster for Debbrah Neighbor series) 04/17/2021

## 2022-12-19 NOTE — ACP (ADVANCE CARE PLANNING)
Advance Care Planning     Advance Care Planning (ACP) Physician/NP/PA Conversation      Date of Conversation: 12/19/2022  Conducted with: Patient with Decision Making Capacity    Healthcare Decision Maker:     Primary Decision Maker: Eve Patel (PICKUP) - Spouse - [phone]  Click here to complete [address] including selection of the Healthcare Decision Maker Relationship (ie \"Primary\")      Today we documented Decision Maker(s) consistent with Legal Next of Kin hierarchy. Care Preferences:    Hospitalization: \"If your health worsens and it becomes clear that your chance of recovery is unlikely, what would be your preference regarding hospitalization? \"  The patient would prefer hospitalization. Ventilation: \"If you were unable to breathe on your own and your chance of recovery was unlikely, what would be your preference about the use of a ventilator (breathing machine) if it was available to you? \"   The patient would desire the use of a ventilator. Resuscitation: \"In the event your heart stopped as a result of an underlying serious health condition, would you want attempts to be made to restart your heart, or would you prefer a natural death? \"   Yes, attempt to resuscitate.     Additional topics discussed: ventilation preferences, hospitalization preferences, and resuscitation preferences    Conversation Outcomes / Follow-Up Plan:   ACP in process - information provided, considering goals and options  Reviewed DNR/DNI and patient elects Full Code (Attempt Resuscitation)     Length of Voluntary ACP Conversation in minutes:  16 minutes    Juan Ramon Mcdonald MD

## 2023-01-03 ENCOUNTER — OFFICE VISIT (OUTPATIENT)
Dept: CARDIOLOGY CLINIC | Age: 83
End: 2023-01-03
Payer: MEDICARE

## 2023-01-03 VITALS
OXYGEN SATURATION: 98 % | HEART RATE: 53 BPM | HEIGHT: 67 IN | WEIGHT: 180 LBS | DIASTOLIC BLOOD PRESSURE: 76 MMHG | SYSTOLIC BLOOD PRESSURE: 136 MMHG | BODY MASS INDEX: 28.25 KG/M2

## 2023-01-03 DIAGNOSIS — I65.23 BILATERAL CAROTID ARTERY STENOSIS: ICD-10-CM

## 2023-01-03 DIAGNOSIS — R06.02 SOB (SHORTNESS OF BREATH): ICD-10-CM

## 2023-01-03 DIAGNOSIS — I10 ESSENTIAL HYPERTENSION: ICD-10-CM

## 2023-01-03 DIAGNOSIS — Z95.1 S/P CABG X 3: ICD-10-CM

## 2023-01-03 DIAGNOSIS — I25.10 CORONARY ARTERY DISEASE INVOLVING NATIVE CORONARY ARTERY OF NATIVE HEART WITHOUT ANGINA PECTORIS: Primary | ICD-10-CM

## 2023-01-03 PROCEDURE — 3075F SYST BP GE 130 - 139MM HG: CPT | Performed by: INTERNAL MEDICINE

## 2023-01-03 PROCEDURE — G8417 CALC BMI ABV UP PARAM F/U: HCPCS | Performed by: INTERNAL MEDICINE

## 2023-01-03 PROCEDURE — G8427 DOCREV CUR MEDS BY ELIG CLIN: HCPCS | Performed by: INTERNAL MEDICINE

## 2023-01-03 PROCEDURE — G8510 SCR DEP NEG, NO PLAN REQD: HCPCS | Performed by: INTERNAL MEDICINE

## 2023-01-03 PROCEDURE — 1123F ACP DISCUSS/DSCN MKR DOCD: CPT | Performed by: INTERNAL MEDICINE

## 2023-01-03 PROCEDURE — 3078F DIAST BP <80 MM HG: CPT | Performed by: INTERNAL MEDICINE

## 2023-01-03 PROCEDURE — G8536 NO DOC ELDER MAL SCRN: HCPCS | Performed by: INTERNAL MEDICINE

## 2023-01-03 PROCEDURE — 99214 OFFICE O/P EST MOD 30 MIN: CPT | Performed by: INTERNAL MEDICINE

## 2023-01-03 PROCEDURE — 1101F PT FALLS ASSESS-DOCD LE1/YR: CPT | Performed by: INTERNAL MEDICINE

## 2023-01-03 PROCEDURE — 93000 ELECTROCARDIOGRAM COMPLETE: CPT | Performed by: INTERNAL MEDICINE

## 2023-01-03 NOTE — PROGRESS NOTES
HISTORY OF PRESENT ILLNESS  Gael Anguiano is a 78 y.o. male. ASSESSMENT and PLAN    Mr. Nile Dover, previous Dr. Fran Lei patient, has known history of CAD. He developed exertional angina in 2014. He ultimately underwent bypass surgery after his coronary angiography revealed CAD. He had LIMA to LAD as well as SVG to diagonal and sequential to PDA. Because of bradycardia, he is only on Toprol-XL 25 mg daily. In October 2020, he had worsening dyspnea on exertion. He underwent repeat coronary angiography which revealed normal LV function with EF 60%. His LIMA to LAD is patent; SVG to second diagonal has 30 to 40% lesion with sequential segment occluded. Left main is patent with LAD proximal 95%. Circumflex has only mild diffuse disease. Super dominant RCA has ostial/proximal 95% stenosis followed by mid segment 90-95% stenosis followed by distal segment along 85-95% stenosis. RPDA has proximal 95% stenosis. The 4 separate RCA lesions were stented using LENNOX ranging in size from 3.25 mm at the ostium down to 2.25 mm in the RPDA. Since that time, his breathing has returned to baseline. When he weighed 220 pounds, he had history of BLAIR. He has history of prostate carcinoma. He has history of Pederson's esophagus as well as colon adenoma. He still uses inhalers for his COPD. His echocardiogram in July 2022 revealed EF 55-60%. There was mild MR.    CAD:    Clinically stable. BP:    Mildly elevated today. However, he did not take his morning blood pressure medications yet. I did not make any changes to his medication regimen. Rhythm:    Asymptomatic sinus bradycardia at 53 bpm.  CHF:    There is no evidence of decompensated CHF noted. Weight:     His weight today is 180 pounds. His previous weight is 178 pounds. His current weight is at baseline. Cholesterol:   Target LDL <70. Lipitor 20. Anti-platelet:   Remains on ASA, and Plavix. I will see him back in 6 months.   Thank you.    Encounter Diagnoses   Name Primary? Coronary artery disease involving native coronary artery of native heart without angina pectoris Yes    Essential hypertension     S/P CABG x 3     SOB (shortness of breath)     Bilateral carotid artery stenosis      current treatment plan is effective, no change in therapy  lab results and schedule of future lab studies reviewed with patient  reviewed diet, exercise and weight control  cardiovascular risk and specific lipid/LDL goals reviewed  use of aspirin to prevent MI and TIA's discussed    Follow-up  Pertinent negatives include no chest pain and no shortness of breath. Today, Mr. Burt Pitts has no complaints of chest pains, increased shortness of breath or decreased exercise capacity. 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 He enjoys his work and has no significant limitations. He denies any orthopnea or PND. He denies any palpitations or dizziness. He does not feel that he has BLAIR at this point after he has lost 40 pounds from several years ago. Review of Systems   Respiratory:  Negative for shortness of breath. Cardiovascular:  Negative for chest pain, palpitations, orthopnea, claudication, leg swelling and PND. All other systems reviewed and are negative. Physical Exam  Vitals and nursing note reviewed. Constitutional:       Appearance: Normal appearance. HENT:      Head: Normocephalic. Eyes:      Conjunctiva/sclera: Conjunctivae normal.   Neck:      Vascular: No carotid bruit. Cardiovascular:      Rate and Rhythm: Regular rhythm. Bradycardia present. Pulmonary:      Breath sounds: Normal breath sounds. Abdominal:      Palpations: Abdomen is soft. Musculoskeletal:         General: No swelling. Cervical back: No rigidity. Skin:     General: Skin is warm and dry. Neurological:      General: No focal deficit present. Mental Status: He is alert and oriented to person, place, and time. Psychiatric:         Mood and Affect: Mood normal.         Behavior: Behavior normal.       PCP: Elizabeth King MD    Past Medical History:   Diagnosis Date    Allergic rhinitis     Pederson's esophagus     Dr. Eileen Valdes 8/17 s/p HALO; 7/22    CABG x 3 04/24/2014    LIMA to LAD, and saphenous vein grafts to the second diagonal of the LAD, and to the posterior descending branch of the RCA, Dr. Florida Frost, 4/23/14.      CAD (coronary artery disease)     Colon adenoma 08/2017    Dr Eileen Valdes    Colon polyps 2007    Dr. Eileen Valdes    COPD [facility]ch[facility]) [facility]     mild obst disease on PFTs Dr. Sherran Cranker (2007)    COVID-19 vaccine dose declined 12/2022    boosters    ED (erectile dysfunction)     GERD (gastroesophageal reflux disease)     by EGD 2007 Dr. Eileen Valdes; EGD 5/14/21 hh and s/p dilation stricture    Hearing loss     Hx of cardiac catheterization 11/2020    Dr Steph Nieto; ef 60%, LEAVITT to LAD patent, SVG to DM1 patent, segment to RPDA occ, prox LAD 95%, Cx 30%, 95% prox RCA, mid RCA 95%, dist RCA 95%, RPDA 95%, s/p stent of all 4 vessels    Hypertension     BLAIR (obstructive sleep apnea) 2011    CPAP not used    Osteoarthritis of both hands     Prostate [facility] ([facility] 06/2010    Dr. Ronnie Cooper [id number] Jj 3+3, 1/9 cores up to 10% 1 core; cryo Oct 2012 psa went from 7/1 to torsten 1.78    Prostate cancer ([facility] 04/2018    Dr Hernan Gómez stage 2B [id number], Neal 3+4, P,10, G2; brachytherapy Dr Harriet Almeida     Pulmonary nodule 2014    no change 2015, 3/17    S/P angioplasty with stent 11/2020    Dr Steph Nieto; 4 sequential stents RCA       Past Surgical History:   Procedure Laterality Date    COLONOSCOPY N/A 07/27/2017    Dr. Eileen Valdes 2007 negative; 8/17 adenoma, 7/22 polyp    COLONOSCOPY N/A 07/28/2022    Dr Eileen Valdes hyperplastic    [place]      Dr. Zander Duran CATARACT REMOVAL Bilateral 11/2014    Dr Gardner Limbleona CHOLECYSTECTOMY  2004    HX TONSILLECTOMY      HX TURP      x2  Cedric Cochran UROLOGICAL  10/2012    [facility], Dr. Dorys Ford, Cystoscopy and dilation of stricture, cryoablation of prostate    MD CORONARY ARTERY BYP W/VEIN & ARTERY GRAFT 3 VEIN  2014    3V CABG; Dr Ariadna LEAVITT to LAD, SVG to DM2 and sequential to RPDA       Current Outpatient Medications   Medication Sig Dispense Refill    metoprolol succinate (TOPROL-XL) 25 mg XL tablet TAKE ONE TABLET BY MOUTH DAILY 90 Tablet 3    benazepriL (LOTENSIN) 10 mg tablet TAKE ONE TABLET BY MOUTH DAILY 90 Tablet 4    atorvastatin (LIPITOR) 20 mg tablet TAKE ONE TABLET BY MOUTH EVERY NIGHT AT BEDTIME 90 Tablet 3    clopidogreL (PLAVIX) 75 mg tab TAKE ONE TABLET BY MOUTH DAILY 90 Tablet 3    amLODIPine (NORVASC) 10 mg tablet TAKE ONE TABLET BY MOUTH DAILY 90 Tablet 3    diclofenac (VOLTAREN) 1 % gel Apply 2 g to affected area four (4) times daily. 500 g 5    fluticasone propionate (FLONASE) 50 mcg/actuation nasal spray 2 sprays each nostril daily 1 Bottle 12    triamcinolone acetonide (KENALOG-40) 40 mg/mL injection       aspirin 81 mg chewable tablet Take 81 mg by mouth daily. nitroglycerin (NITROSTAT) 0.4 mg SL tablet 1 Tab by SubLINGual route every five (5) minutes as needed for Chest Pain. 1 Bottle 3    levalbuterol tartrate (XOPENEX) 45 mcg/actuation inhaler Take 2 Puffs by inhalation every six (6) hours as needed for Wheezing. 1 Inhaler 5    omeprazole (PRILOSEC) 20 mg capsule Take 20 mg by mouth two (2) times a day. ascorbic acid, vitamin C, (VITAMIN C) 1,000 mg tablet Take 1,000 mg by mouth daily. MULTIVITAMIN W-MINERALS/LUTEIN (CENTRUM SILVER PO) Take 1 Tab by mouth daily.          The patient has a family history of    Social History     Tobacco Use    Smoking status: Former     Packs/day: 0.50     Years: 4.00     Pack years: 2.00     Types: Cigarettes     Quit date: 1957     Years since quittin.0    Smokeless tobacco: Never    Tobacco comments:     Quit in    Vaping Use    Vaping Use: Never used Substance Use Topics    Alcohol use: No    Drug use: Never       Lab Results   Component Value Date/Time    Cholesterol, total 127 12/12/2022 08:20 AM    HDL Cholesterol 58 12/12/2022 08:20 AM    LDL, calculated 57.2 12/12/2022 08:20 AM    Triglyceride 59 12/12/2022 08:20 AM    CHOL/HDL Ratio 2.2 12/12/2022 08:20 AM        BP Readings from Last 3 Encounters:   01/03/23 136/76   12/19/22 135/62   11/03/22 (!) 140/53        Pulse Readings from Last 3 Encounters:   01/03/23 (!) 53   12/19/22 60   11/03/22 (!) 50       Wt Readings from Last 3 Encounters:   01/03/23 81.6 kg (180 lb)   12/19/22 81.2 kg (179 lb)   11/01/22 77.1 kg (170 lb)         EKG: unchanged from previous tracings, sinus bradycardia, 1st degree AV block.

## 2023-01-03 NOTE — PROGRESS NOTES
Jennifer Common. presents today for   Chief Complaint   Patient presents with    Follow-up     6 month follow up- no complaints            Jennifer Common. preferred language for health care discussion is english/other. Is someone accompanying this pt? no    Is the patient using any DME equipment during [address]? no    Depression Screening:  3 most recent PHQ Screens 1/3/2023   Little interest or pleasure in doing things Not at all   Feeling down, depressed, irritable, or hopeless Not at all   Total Score PHQ 2 0       Learning Assessment:  Learning Assessment 5/8/2019   PRIMARY LEARNER Patient   HIGHEST LEVEL OF EDUCATION - PRIMARY LEARNER  SOME [address] PRIMARY LEARNER HEARING   CO-LEARNER CAREGIVER No   PRIMARY LANGUAGE ENGLISH   LEARNER PREFERENCE PRIMARY DEMONSTRATION   ANSWERED BY patient   RELATIONSHIP SELF       Abuse Screening:  Abuse Screening Questionnaire 1/3/2023   Do you ever feel afraid of your partner? N   Are you in a relationship with someone who physically or mentally threatens you? N   Is it safe for you to go home? Y       Fall Risk  Fall Risk Assessment, last 12 mths 1/3/2023   Able to walk? Yes   Fall in past 12 months? 0   Do you feel unsteady? 0   Are you worried about falling 0       Pt currently taking Anticoagulant therapy? no    Coordination of Care:  1. Have you been to the ER, urgent care clinic since your last visit? Hospitalized since your last visit? no    2. Have you seen or consulted any other health care providers outside of the [address] [place] [address] since your last visit? Include any pap smears or colon screening.  no

## 2023-02-04 DIAGNOSIS — R73.01 IFG (IMPAIRED FASTING GLUCOSE): Primary | ICD-10-CM

## 2023-05-08 RX ORDER — AMLODIPINE BESYLATE 10 MG/1
TABLET ORAL
Qty: 90 TABLET | Refills: 2 | Status: SHIPPED | OUTPATIENT
Start: 2023-05-08

## 2023-06-05 ENCOUNTER — OFFICE VISIT (OUTPATIENT)
Age: 83
End: 2023-06-05

## 2023-06-05 ENCOUNTER — HOSPITAL ENCOUNTER (OUTPATIENT)
Facility: HOSPITAL | Age: 83
Setting detail: SPECIMEN
Discharge: HOME | End: 2023-06-08
Payer: MEDICARE

## 2023-06-05 VITALS
TEMPERATURE: 97.9 F | BODY MASS INDEX: 26.68 KG/M2 | OXYGEN SATURATION: 98 % | WEIGHT: 170 LBS | RESPIRATION RATE: 19 BRPM | DIASTOLIC BLOOD PRESSURE: 61 MMHG | SYSTOLIC BLOOD PRESSURE: 132 MMHG | HEIGHT: 67 IN | HEART RATE: 62 BPM

## 2023-06-05 DIAGNOSIS — D64.9 ANEMIA, UNSPECIFIED TYPE: ICD-10-CM

## 2023-06-05 DIAGNOSIS — R63.4 WEIGHT LOSS: ICD-10-CM

## 2023-06-05 DIAGNOSIS — S06.5XAA SDH (SUBDURAL HEMATOMA) (HCC): ICD-10-CM

## 2023-06-05 DIAGNOSIS — I10 ESSENTIAL HYPERTENSION: ICD-10-CM

## 2023-06-05 DIAGNOSIS — I65.23 BILATERAL CAROTID ARTERY STENOSIS: ICD-10-CM

## 2023-06-05 DIAGNOSIS — E53.8 B12 DEFICIENCY: ICD-10-CM

## 2023-06-05 DIAGNOSIS — D64.9 ANEMIA, UNSPECIFIED TYPE: Primary | ICD-10-CM

## 2023-06-05 LAB
FOLATE SERPL-MCNC: >20 NG/ML (ref 3.1–17.5)
IRON SATN MFR SERPL: 18 % (ref 20–50)
IRON SERPL-MCNC: 48 UG/DL (ref 50–175)
T4 FREE SERPL-MCNC: 1 NG/DL (ref 0.7–1.5)
TIBC SERPL-MCNC: 263 UG/DL (ref 250–450)
TSH SERPL DL<=0.05 MIU/L-ACNC: 2.21 UIU/ML (ref 0.36–3.74)
VIT B12 SERPL-MCNC: 1015 PG/ML (ref 211–911)

## 2023-06-05 PROCEDURE — 84439 ASSAY OF FREE THYROXINE: CPT

## 2023-06-05 PROCEDURE — 83550 IRON BINDING TEST: CPT

## 2023-06-05 PROCEDURE — 84443 ASSAY THYROID STIM HORMONE: CPT

## 2023-06-05 PROCEDURE — 36415 COLL VENOUS BLD VENIPUNCTURE: CPT

## 2023-06-05 PROCEDURE — 82746 ASSAY OF FOLIC ACID SERUM: CPT

## 2023-06-05 PROCEDURE — 82607 VITAMIN B-12: CPT

## 2023-06-05 PROCEDURE — 83540 ASSAY OF IRON: CPT

## 2023-06-05 PROCEDURE — 84165 PROTEIN E-PHORESIS SERUM: CPT

## 2023-06-05 PROCEDURE — 84155 ASSAY OF PROTEIN SERUM: CPT

## 2023-06-05 SDOH — ECONOMIC STABILITY: INCOME INSECURITY: HOW HARD IS IT FOR YOU TO PAY FOR THE VERY BASICS LIKE FOOD, HOUSING, MEDICAL CARE, AND HEATING?: NOT HARD AT ALL

## 2023-06-05 SDOH — ECONOMIC STABILITY: FOOD INSECURITY: WITHIN THE PAST 12 MONTHS, THE FOOD YOU BOUGHT JUST DIDN'T LAST AND YOU DIDN'T HAVE MONEY TO GET MORE.: NEVER TRUE

## 2023-06-05 SDOH — ECONOMIC STABILITY: FOOD INSECURITY: WITHIN THE PAST 12 MONTHS, YOU WORRIED THAT YOUR FOOD WOULD RUN OUT BEFORE YOU GOT MONEY TO BUY MORE.: NEVER TRUE

## 2023-06-05 SDOH — ECONOMIC STABILITY: HOUSING INSECURITY
IN THE LAST 12 MONTHS, WAS THERE A TIME WHEN YOU DID NOT HAVE A STEADY PLACE TO SLEEP OR SLEPT IN A SHELTER (INCLUDING NOW)?: NO

## 2023-06-05 ASSESSMENT — PATIENT HEALTH QUESTIONNAIRE - PHQ9
SUM OF ALL RESPONSES TO PHQ QUESTIONS 1-9: 0
2. FEELING DOWN, DEPRESSED OR HOPELESS: 0
SUM OF ALL RESPONSES TO PHQ QUESTIONS 1-9: 0
SUM OF ALL RESPONSES TO PHQ QUESTIONS 1-9: 0
SUM OF ALL RESPONSES TO PHQ9 QUESTIONS 1 & 2: 0
SUM OF ALL RESPONSES TO PHQ QUESTIONS 1-9: 0
1. LITTLE INTEREST OR PLEASURE IN DOING THINGS: 0
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
## 2023-06-07 PROBLEM — S06.5XAA SDH (SUBDURAL HEMATOMA) (HCC): Status: ACTIVE | Noted: 2023-05-01

## 2023-06-07 PROBLEM — I65.29 CAROTID STENOSIS: Status: ACTIVE | Noted: 2023-06-07
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
## 2023-06-08 DIAGNOSIS — R73.01 IFG (IMPAIRED FASTING GLUCOSE): Primary | ICD-10-CM

## 2023-06-08 LAB
ALBUMIN SERPL ELPH-MCNC: 4 G/DL (ref 2.9–4.4)
ALBUMIN/GLOB SERPL: 1.3 (ref 0.7–1.7)
ALPHA1 GLOB SERPL ELPH-MCNC: 0.3 G/DL (ref 0–0.4)
ALPHA2 GLOB SERPL ELPH-MCNC: 0.8 G/DL (ref 0.4–1)
B-GLOBULIN SERPL ELPH-MCNC: 1 G/DL (ref 0.7–1.3)
GAMMA GLOB SERPL ELPH-MCNC: 1 G/DL (ref 0.4–1.8)
GLOBULIN SER CALC-MCNC: 3.1 G/DL (ref 2.2–3.9)
M PROTEIN SERPL ELPH-MCNC: NORMAL G/DL
PROT SERPL-MCNC: 7.1 G/DL (ref 6–8.5)

## 2023-06-16 ENCOUNTER — HOSPITAL ENCOUNTER (OUTPATIENT)
Facility: HOSPITAL | Age: 83
Discharge: HOME | End: 2023-06-19
Payer: MEDICARE

## 2023-06-16 DIAGNOSIS — R11.0 NAUSEA: ICD-10-CM

## 2023-06-16 DIAGNOSIS — K59.00 CONSTIPATION, UNSPECIFIED CONSTIPATION TYPE: ICD-10-CM

## 2023-06-16 PROCEDURE — 74019 RADEX ABDOMEN 2 VIEWS: CPT

## 2023-06-19 ENCOUNTER — HOSPITAL ENCOUNTER (OUTPATIENT)
Facility: HOSPITAL | Age: 83
Setting detail: SPECIMEN
Discharge: HOME | End: 2023-06-22
Payer: MEDICARE

## 2023-06-19 DIAGNOSIS — R73.01 IFG (IMPAIRED FASTING GLUCOSE): ICD-10-CM

## 2023-06-19 LAB
ANION GAP SERPL CALC-SCNC: 4 MMOL/L (ref 3–18)
BUN SERPL-MCNC: 15 MG/DL (ref 7–18)
BUN/CREAT SERPL: 14 (ref 12–20)
CALCIUM SERPL-MCNC: 10.1 MG/DL (ref 8.5–10.1)
CHLORIDE SERPL-SCNC: 106 MMOL/L (ref 100–111)
CO2 SERPL-SCNC: 27 MMOL/L (ref 21–32)
CREAT SERPL-MCNC: 1.06 MG/DL (ref 0.6–1.3)
GLUCOSE SERPL-MCNC: 105 MG/DL (ref 74–99)
HBA1C MFR BLD: 5.5 % (ref 4.2–5.6)
POTASSIUM SERPL-SCNC: 4.8 MMOL/L (ref 3.5–5.5)
SODIUM SERPL-SCNC: 137 MMOL/L (ref 136–145)

## 2023-06-19 PROCEDURE — 83036 HEMOGLOBIN GLYCOSYLATED A1C: CPT

## 2023-06-19 PROCEDURE — 36415 COLL VENOUS BLD VENIPUNCTURE: CPT

## 2023-06-19 PROCEDURE — 80048 BASIC METABOLIC PNL TOTAL CA: CPT

## 2023-06-24 ENCOUNTER — APPOINTMENT (OUTPATIENT)
Facility: HOSPITAL | Age: 83
End: 2023-06-24
Payer: MEDICARE

## 2023-06-24 ENCOUNTER — HOSPITAL ENCOUNTER (EMERGENCY)
Facility: HOSPITAL | Age: 83
Discharge: HOME | End: 2023-06-24
Attending: STUDENT IN AN ORGANIZED HEALTH CARE EDUCATION/TRAINING PROGRAM
Payer: MEDICARE

## 2023-06-24 VITALS
RESPIRATION RATE: 20 BRPM | HEART RATE: 55 BPM | OXYGEN SATURATION: 99 % | HEIGHT: 67 IN | DIASTOLIC BLOOD PRESSURE: 52 MMHG | TEMPERATURE: 97.5 F | WEIGHT: 175 LBS | SYSTOLIC BLOOD PRESSURE: 149 MMHG | BODY MASS INDEX: 27.47 KG/M2

## 2023-06-24 DIAGNOSIS — R03.0 ELEVATED BLOOD PRESSURE READING: ICD-10-CM

## 2023-06-24 DIAGNOSIS — R06.02 SHORTNESS OF BREATH: Primary | ICD-10-CM

## 2023-06-24 DIAGNOSIS — Z76.0 ENCOUNTER FOR MEDICATION REFILL: ICD-10-CM

## 2023-06-24 DIAGNOSIS — D64.9 NORMOCYTIC ANEMIA: ICD-10-CM

## 2023-06-24 LAB
ALBUMIN SERPL-MCNC: 4.1 G/DL (ref 3.4–5)
ALBUMIN/GLOB SERPL: 1.1 (ref 0.8–1.7)
ALP SERPL-CCNC: 103 U/L (ref 45–117)
ALT SERPL-CCNC: 24 U/L (ref 16–61)
ANION GAP SERPL CALC-SCNC: 7 MMOL/L (ref 3–18)
AST SERPL-CCNC: 15 U/L (ref 10–38)
BASOPHILS # BLD: 0.1 K/UL (ref 0–0.1)
BASOPHILS NFR BLD: 1 % (ref 0–2)
BILIRUB SERPL-MCNC: 0.4 MG/DL (ref 0.2–1)
BUN SERPL-MCNC: 19 MG/DL (ref 7–18)
BUN/CREAT SERPL: 15 (ref 12–20)
CALCIUM SERPL-MCNC: 9.6 MG/DL (ref 8.5–10.1)
CHLORIDE SERPL-SCNC: 105 MMOL/L (ref 100–111)
CO2 SERPL-SCNC: 24 MMOL/L (ref 21–32)
CREAT SERPL-MCNC: 1.27 MG/DL (ref 0.6–1.3)
DIFFERENTIAL METHOD BLD: ABNORMAL
EKG ATRIAL RATE: 61 BPM
EKG DIAGNOSIS: NORMAL
EKG P AXIS: 60 DEGREES
EKG P-R INTERVAL: 234 MS
EKG Q-T INTERVAL: 444 MS
EKG QRS DURATION: 86 MS
EKG QTC CALCULATION (BAZETT): 446 MS
EKG R AXIS: -11 DEGREES
EKG T AXIS: 37 DEGREES
EKG VENTRICULAR RATE: 61 BPM
EOSINOPHIL # BLD: 0.1 K/UL (ref 0–0.4)
EOSINOPHIL NFR BLD: 2 % (ref 0–5)
ERYTHROCYTE [DISTWIDTH] IN BLOOD BY AUTOMATED COUNT: 13.4 % (ref 11.6–14.5)
GLOBULIN SER CALC-MCNC: 3.6 G/DL (ref 2–4)
GLUCOSE SERPL-MCNC: 93 MG/DL (ref 74–99)
HCT VFR BLD AUTO: 32.5 % (ref 36–48)
HGB BLD-MCNC: 11.3 G/DL (ref 13–16)
IMM GRANULOCYTES # BLD AUTO: 0 K/UL (ref 0–0.04)
IMM GRANULOCYTES NFR BLD AUTO: 0 % (ref 0–0.5)
LYMPHOCYTES # BLD: 4 K/UL (ref 0.9–3.6)
LYMPHOCYTES NFR BLD: 42 % (ref 21–52)
MAGNESIUM SERPL-MCNC: 2.2 MG/DL (ref 1.6–2.6)
MCH RBC QN AUTO: 30.7 PG (ref 24–34)
MCHC RBC AUTO-ENTMCNC: 34.8 G/DL (ref 31–37)
MCV RBC AUTO: 88.3 FL (ref 78–100)
MONOCYTES # BLD: 1 K/UL (ref 0.05–1.2)
MONOCYTES NFR BLD: 10 % (ref 3–10)
NEUTS SEG # BLD: 4.4 K/UL (ref 1.8–8)
NEUTS SEG NFR BLD: 46 % (ref 40–73)
NRBC # BLD: 0 K/UL (ref 0–0.01)
NRBC BLD-RTO: 0 PER 100 WBC
NT PRO BNP: 303 PG/ML (ref 0–1800)
PLATELET # BLD AUTO: 285 K/UL (ref 135–420)
PMV BLD AUTO: 10.2 FL (ref 9.2–11.8)
POTASSIUM SERPL-SCNC: 4.4 MMOL/L (ref 3.5–5.5)
PROCALCITONIN SERPL-MCNC: <0.05 NG/ML
PROT SERPL-MCNC: 7.7 G/DL (ref 6.4–8.2)
RBC # BLD AUTO: 3.68 M/UL (ref 4.35–5.65)
SODIUM SERPL-SCNC: 136 MMOL/L (ref 136–145)
TROPONIN I SERPL HS-MCNC: 14 NG/L (ref 0–78)
TROPONIN I SERPL HS-MCNC: 14 NG/L (ref 0–78)
WBC # BLD AUTO: 9.7 K/UL (ref 4.6–13.2)

## 2023-06-24 PROCEDURE — 99285 EMERGENCY DEPT VISIT HI MDM: CPT

## 2023-06-24 PROCEDURE — 84145 PROCALCITONIN (PCT): CPT

## 2023-06-24 PROCEDURE — 85025 COMPLETE CBC W/AUTO DIFF WBC: CPT

## 2023-06-24 PROCEDURE — 80053 COMPREHEN METABOLIC PANEL: CPT

## 2023-06-24 PROCEDURE — 93010 ELECTROCARDIOGRAM REPORT: CPT | Performed by: INTERNAL MEDICINE

## 2023-06-24 PROCEDURE — 83735 ASSAY OF MAGNESIUM: CPT

## 2023-06-24 PROCEDURE — 6360000002 HC RX W HCPCS: Performed by: STUDENT IN AN ORGANIZED HEALTH CARE EDUCATION/TRAINING PROGRAM

## 2023-06-24 PROCEDURE — 93005 ELECTROCARDIOGRAM TRACING: CPT | Performed by: STUDENT IN AN ORGANIZED HEALTH CARE EDUCATION/TRAINING PROGRAM

## 2023-06-24 PROCEDURE — 84484 ASSAY OF TROPONIN QUANT: CPT

## 2023-06-24 PROCEDURE — 71045 X-RAY EXAM CHEST 1 VIEW: CPT

## 2023-06-24 PROCEDURE — 83880 ASSAY OF NATRIURETIC PEPTIDE: CPT

## 2023-06-24 RX ORDER — LEVALBUTEROL TARTRATE 45 UG/1
2 AEROSOL, METERED ORAL EVERY 6 HOURS PRN
Qty: 1 EACH | Refills: 0 | Status: SHIPPED | OUTPATIENT
Start: 2023-06-24 | End: 2023-06-24 | Stop reason: SDUPTHER

## 2023-06-24 RX ORDER — LEVALBUTEROL TARTRATE 45 UG/1
2 AEROSOL, METERED ORAL EVERY 6 HOURS PRN
Qty: 1 EACH | Refills: 0 | Status: SHIPPED | OUTPATIENT
Start: 2023-06-24

## 2023-06-24 RX ADMIN — IPRATROPIUM BROMIDE 0.5 MG: 0.5 SOLUTION RESPIRATORY (INHALATION) at 16:41

## 2023-06-24 ASSESSMENT — PAIN - FUNCTIONAL ASSESSMENT: PAIN_FUNCTIONAL_ASSESSMENT: NONE - DENIES PAIN

## 2023-06-24 NOTE — ED TRIAGE NOTES
Pt here for evaluation of \"trouble breathing\" the past few days. H/o COPD, using his Xopenex inhaler as prescribed. Pt is alert and speaking in complete sentences. Appears in NAD. Denies any chest pain.  Denies any cold symptoms

## 2023-06-24 NOTE — DISCHARGE INSTRUCTIONS
You were evaluated in the Emergency Department today for shortness of breath. Your symptoms improved with a breathing treatment, and your evaluation did not show evidence of medical conditions requiring emergent intervention at this time. I have placed a refill prescription for your inhaler    Please follow up with your primary care physician within two days. Return to the Emergency Department if you experience worsening shortness of breath, chest pain, headache, light headedness, or any other concerning symptoms.

## 2023-06-26 ENCOUNTER — HOSPITAL ENCOUNTER (OUTPATIENT)
Facility: HOSPITAL | Age: 83
Discharge: HOME | End: 2023-06-29
Attending: INTERNAL MEDICINE
Payer: MEDICARE

## 2023-06-26 DIAGNOSIS — K59.00 CONSTIPATION, UNSPECIFIED CONSTIPATION TYPE: ICD-10-CM

## 2023-06-26 PROCEDURE — A9541 TC99M SULFUR COLLOID: HCPCS | Performed by: INTERNAL MEDICINE

## 2023-06-26 PROCEDURE — 3430000000 HC RX DIAGNOSTIC RADIOPHARMACEUTICAL: Performed by: INTERNAL MEDICINE

## 2023-06-26 RX ADMIN — Medication 1 MILLICURIE: at 12:00

## 2023-06-28 ENCOUNTER — OFFICE VISIT (OUTPATIENT)
Age: 83
End: 2023-06-28
Payer: MEDICARE

## 2023-06-28 VITALS
SYSTOLIC BLOOD PRESSURE: 130 MMHG | RESPIRATION RATE: 16 BRPM | DIASTOLIC BLOOD PRESSURE: 80 MMHG | WEIGHT: 162 LBS | OXYGEN SATURATION: 96 % | BODY MASS INDEX: 25.43 KG/M2 | HEIGHT: 67 IN | HEART RATE: 70 BPM | TEMPERATURE: 98.4 F

## 2023-06-28 DIAGNOSIS — J44.9 CHRONIC OBSTRUCTIVE PULMONARY DISEASE, UNSPECIFIED COPD TYPE (HCC): ICD-10-CM

## 2023-06-28 DIAGNOSIS — S06.5XAA SDH (SUBDURAL HEMATOMA) (HCC): Primary | ICD-10-CM

## 2023-06-28 DIAGNOSIS — K21.9 GASTROESOPHAGEAL REFLUX DISEASE, UNSPECIFIED WHETHER ESOPHAGITIS PRESENT: ICD-10-CM

## 2023-06-28 PROCEDURE — G8417 CALC BMI ABV UP PARAM F/U: HCPCS | Performed by: INTERNAL MEDICINE

## 2023-06-28 PROCEDURE — 3078F DIAST BP <80 MM HG: CPT | Performed by: INTERNAL MEDICINE

## 2023-06-28 PROCEDURE — 1123F ACP DISCUSS/DSCN MKR DOCD: CPT | Performed by: INTERNAL MEDICINE

## 2023-06-28 PROCEDURE — 99211 OFF/OP EST MAY X REQ PHY/QHP: CPT

## 2023-06-28 PROCEDURE — 3075F SYST BP GE 130 - 139MM HG: CPT | Performed by: INTERNAL MEDICINE

## 2023-06-28 PROCEDURE — G8427 DOCREV CUR MEDS BY ELIG CLIN: HCPCS | Performed by: INTERNAL MEDICINE

## 2023-06-28 PROCEDURE — 99214 OFFICE O/P EST MOD 30 MIN: CPT | Performed by: INTERNAL MEDICINE

## 2023-06-28 PROCEDURE — 1036F TOBACCO NON-USER: CPT | Performed by: INTERNAL MEDICINE

## 2023-06-28 PROCEDURE — 3023F SPIROM DOC REV: CPT | Performed by: INTERNAL MEDICINE

## 2023-06-28 RX ORDER — FAMOTIDINE 20 MG/1
20 TABLET, FILM COATED ORAL 2 TIMES DAILY
Qty: 60 TABLET | Refills: 0 | Status: SHIPPED | OUTPATIENT
Start: 2023-06-28 | End: 2023-07-02

## 2023-06-28 SDOH — ECONOMIC STABILITY: FOOD INSECURITY: WITHIN THE PAST 12 MONTHS, THE FOOD YOU BOUGHT JUST DIDN'T LAST AND YOU DIDN'T HAVE MONEY TO GET MORE.: NEVER TRUE

## 2023-06-28 SDOH — ECONOMIC STABILITY: INCOME INSECURITY: HOW HARD IS IT FOR YOU TO PAY FOR THE VERY BASICS LIKE FOOD, HOUSING, MEDICAL CARE, AND HEATING?: NOT HARD AT ALL

## 2023-06-28 SDOH — ECONOMIC STABILITY: FOOD INSECURITY: WITHIN THE PAST 12 MONTHS, YOU WORRIED THAT YOUR FOOD WOULD RUN OUT BEFORE YOU GOT MONEY TO BUY MORE.: NEVER TRUE

## 2023-06-28 ASSESSMENT — PATIENT HEALTH QUESTIONNAIRE - PHQ9
SUM OF ALL RESPONSES TO PHQ9 QUESTIONS 1 & 2: 2
SUM OF ALL RESPONSES TO PHQ QUESTIONS 1-9: 2
2. FEELING DOWN, DEPRESSED OR HOPELESS: 1
SUM OF ALL RESPONSES TO PHQ QUESTIONS 1-9: 2
1. LITTLE INTEREST OR PLEASURE IN DOING THINGS: 1

## 2023-07-02 ENCOUNTER — APPOINTMENT (OUTPATIENT)
Facility: HOSPITAL | Age: 83
End: 2023-07-02
Payer: MEDICARE

## 2023-07-02 ENCOUNTER — HOSPITAL ENCOUNTER (EMERGENCY)
Facility: HOSPITAL | Age: 83
Discharge: ANOTHER HOSPITAL | End: 2023-07-02
Attending: EMERGENCY MEDICINE
Payer: MEDICARE

## 2023-07-02 VITALS
TEMPERATURE: 98.5 F | DIASTOLIC BLOOD PRESSURE: 56 MMHG | BODY MASS INDEX: 26.03 KG/M2 | RESPIRATION RATE: 21 BRPM | OXYGEN SATURATION: 97 % | SYSTOLIC BLOOD PRESSURE: 121 MMHG | HEART RATE: 64 BPM | HEIGHT: 66 IN | WEIGHT: 162 LBS

## 2023-07-02 DIAGNOSIS — S06.5XAA SUBDURAL HEMATOMA (HCC): Primary | ICD-10-CM

## 2023-07-02 LAB
ALBUMIN SERPL-MCNC: 4.1 G/DL (ref 3.4–5)
ALBUMIN/GLOB SERPL: 1.3 (ref 0.8–1.7)
ALP SERPL-CCNC: 79 U/L (ref 45–117)
ALT SERPL-CCNC: 28 U/L (ref 16–61)
ANION GAP SERPL CALC-SCNC: 8 MMOL/L (ref 3–18)
AST SERPL-CCNC: 18 U/L (ref 10–38)
BASOPHILS # BLD: 0.1 K/UL (ref 0–0.1)
BASOPHILS NFR BLD: 1 % (ref 0–2)
BILIRUB SERPL-MCNC: 0.5 MG/DL (ref 0.2–1)
BUN SERPL-MCNC: 20 MG/DL (ref 7–18)
BUN/CREAT SERPL: 16 (ref 12–20)
CALCIUM SERPL-MCNC: 9.6 MG/DL (ref 8.5–10.1)
CHLORIDE SERPL-SCNC: 104 MMOL/L (ref 100–111)
CO2 SERPL-SCNC: 25 MMOL/L (ref 21–32)
CREAT SERPL-MCNC: 1.24 MG/DL (ref 0.6–1.3)
DIFFERENTIAL METHOD BLD: ABNORMAL
EKG ATRIAL RATE: 71 BPM
EKG DIAGNOSIS: NORMAL
EKG P AXIS: 87 DEGREES
EKG P-R INTERVAL: 228 MS
EKG Q-T INTERVAL: 430 MS
EKG QRS DURATION: 86 MS
EKG QTC CALCULATION (BAZETT): 467 MS
EKG R AXIS: 0 DEGREES
EKG T AXIS: 55 DEGREES
EKG VENTRICULAR RATE: 71 BPM
EOSINOPHIL # BLD: 0.2 K/UL (ref 0–0.4)
EOSINOPHIL NFR BLD: 2 % (ref 0–5)
ERYTHROCYTE [DISTWIDTH] IN BLOOD BY AUTOMATED COUNT: 13.4 % (ref 11.6–14.5)
GLOBULIN SER CALC-MCNC: 3.2 G/DL (ref 2–4)
GLUCOSE SERPL-MCNC: 109 MG/DL (ref 74–99)
HCT VFR BLD AUTO: 32.3 % (ref 36–48)
HGB BLD-MCNC: 11 G/DL (ref 13–16)
IMM GRANULOCYTES # BLD AUTO: 0 K/UL (ref 0–0.04)
IMM GRANULOCYTES NFR BLD AUTO: 0 % (ref 0–0.5)
LYMPHOCYTES # BLD: 2.9 K/UL (ref 0.9–3.6)
LYMPHOCYTES NFR BLD: 32 % (ref 21–52)
MCH RBC QN AUTO: 30.3 PG (ref 24–34)
MCHC RBC AUTO-ENTMCNC: 34.1 G/DL (ref 31–37)
MCV RBC AUTO: 89 FL (ref 78–100)
MONOCYTES # BLD: 0.9 K/UL (ref 0.05–1.2)
MONOCYTES NFR BLD: 9 % (ref 3–10)
NEUTS SEG # BLD: 5.1 K/UL (ref 1.8–8)
NEUTS SEG NFR BLD: 55 % (ref 40–73)
NRBC # BLD: 0 K/UL (ref 0–0.01)
NRBC BLD-RTO: 0 PER 100 WBC
NT PRO BNP: 160 PG/ML (ref 0–1800)
PLATELET # BLD AUTO: 234 K/UL (ref 135–420)
PMV BLD AUTO: 9.5 FL (ref 9.2–11.8)
POTASSIUM SERPL-SCNC: 4.1 MMOL/L (ref 3.5–5.5)
PROT SERPL-MCNC: 7.3 G/DL (ref 6.4–8.2)
RBC # BLD AUTO: 3.63 M/UL (ref 4.35–5.65)
SODIUM SERPL-SCNC: 137 MMOL/L (ref 136–145)
TROPONIN I SERPL HS-MCNC: 13 NG/L (ref 0–78)
WBC # BLD AUTO: 9.2 K/UL (ref 4.6–13.2)

## 2023-07-02 PROCEDURE — 80053 COMPREHEN METABOLIC PANEL: CPT

## 2023-07-02 PROCEDURE — 71045 X-RAY EXAM CHEST 1 VIEW: CPT

## 2023-07-02 PROCEDURE — 83880 ASSAY OF NATRIURETIC PEPTIDE: CPT

## 2023-07-02 PROCEDURE — 2580000003 HC RX 258: Performed by: EMERGENCY MEDICINE

## 2023-07-02 PROCEDURE — 84484 ASSAY OF TROPONIN QUANT: CPT

## 2023-07-02 PROCEDURE — 93005 ELECTROCARDIOGRAM TRACING: CPT | Performed by: EMERGENCY MEDICINE

## 2023-07-02 PROCEDURE — 6360000002 HC RX W HCPCS: Performed by: EMERGENCY MEDICINE

## 2023-07-02 PROCEDURE — 6360000004 HC RX CONTRAST MEDICATION: Performed by: EMERGENCY MEDICINE

## 2023-07-02 PROCEDURE — 99285 EMERGENCY DEPT VISIT HI MDM: CPT

## 2023-07-02 PROCEDURE — 96374 THER/PROPH/DIAG INJ IV PUSH: CPT

## 2023-07-02 PROCEDURE — 93010 ELECTROCARDIOGRAM REPORT: CPT | Performed by: INTERNAL MEDICINE

## 2023-07-02 PROCEDURE — 70450 CT HEAD/BRAIN W/O DYE: CPT

## 2023-07-02 PROCEDURE — 85025 COMPLETE CBC W/AUTO DIFF WBC: CPT

## 2023-07-02 PROCEDURE — 70498 CT ANGIOGRAPHY NECK: CPT

## 2023-07-02 RX ORDER — 0.9 % SODIUM CHLORIDE 0.9 %
500 INTRAVENOUS SOLUTION INTRAVENOUS ONCE
Status: COMPLETED | OUTPATIENT
Start: 2023-07-02 | End: 2023-07-02

## 2023-07-02 RX ORDER — LEVETIRACETAM 500 MG/5ML
2000 INJECTION, SOLUTION, CONCENTRATE INTRAVENOUS ONCE
Status: COMPLETED | OUTPATIENT
Start: 2023-07-02 | End: 2023-07-02

## 2023-07-02 RX ADMIN — SODIUM CHLORIDE 500 ML: 9 INJECTION, SOLUTION INTRAVENOUS at 06:38

## 2023-07-02 RX ADMIN — LEVETIRACETAM 2000 MG: 100 INJECTION, SOLUTION, CONCENTRATE INTRAVENOUS at 05:36

## 2023-07-02 RX ADMIN — IOPAMIDOL 100 ML: 755 INJECTION, SOLUTION INTRAVENOUS at 04:52

## 2023-07-02 ASSESSMENT — PAIN - FUNCTIONAL ASSESSMENT: PAIN_FUNCTIONAL_ASSESSMENT: 0-10

## 2023-07-02 ASSESSMENT — PAIN SCALES - GENERAL: PAINLEVEL_OUTOF10: 10

## 2023-07-03 PROBLEM — I10 ESSENTIAL HYPERTENSION: Status: ACTIVE | Noted: 2018-04-12

## 2023-07-05 PROBLEM — G93.6 CEREBRAL EDEMA (HCC): Status: ACTIVE | Noted: 2023-07-03

## 2023-07-05 PROBLEM — G93.5 BRAIN COMPRESSION (HCC): Status: ACTIVE | Noted: 2023-07-03

## 2023-07-06 ENCOUNTER — TELEPHONE (OUTPATIENT)
Age: 83
End: 2023-07-06

## 2023-07-12 ENCOUNTER — TELEPHONE (OUTPATIENT)
Age: 83
End: 2023-07-12

## 2023-07-12 NOTE — TELEPHONE ENCOUNTER
----- Message from Indira Sosa sent at 7/12/2023 11:01 AM EDT -----  Subject: Message to Provider    QUESTIONS  Information for Provider? Patients wife called in to let the PCP know that   the Patient missed his appt because he is in the hospital because he had   brain surgery he is alert and doing things on commend now. She wanted the   PCP to be aware on what was going on with the patient. He has been there   for close to 2 weeks now  ---------------------------------------------------------------------------  --------------  Maria Dolores To INFO  [phone]; OK to leave message on voicemail  ---------------------------------------------------------------------------  --------------  SCRIPT ANSWERS  Relationship to Patient? Spouse/Partner  Representative Name? Pepper Guillory  Is the representative on the Communication Release of Information (MEENU)   form in Epic?  Yes

## 2023-07-24 DIAGNOSIS — K21.9 GASTROESOPHAGEAL REFLUX DISEASE, UNSPECIFIED WHETHER ESOPHAGITIS PRESENT: ICD-10-CM

## 2023-07-25 RX ORDER — FAMOTIDINE 20 MG/1
TABLET, FILM COATED ORAL
Qty: 60 TABLET | Refills: 0 | Status: SHIPPED
Start: 2023-07-25 | End: 2023-07-28 | Stop reason: HOSPADM

## 2023-07-31 ENCOUNTER — CARE COORDINATION (OUTPATIENT)
Facility: CLINIC | Age: 83
End: 2023-07-31

## 2023-07-31 NOTE — CARE COORDINATION
Care Transitions Outreach Attempt    Call within 2 business days of discharge: Yes   Attempted to reach patient for transitions of care follow up. Unable to reach patient. Care Transitions Nurse ( CTN)  attempted to reach patient and spouse at the listed phone numbers ( see outgoing calls). CTN unable to reach patient or spouse. CTN left voicemail messages advising this was not an emergency phone call. CTN contact information provided. CTN will attempt to follow up again at another time or on another day. No patient medical information  was left on the voicemail message. Patient: Shantal Ac. Patient : 1940 MRN: [id number]    Last Discharge [address]       Date Complaint Diagnosis Description Type Department Provider    23 Facial Droop Subdural hematoma ([address]) . .. ED to Hosp-Admission (Discharged) (ADMITTED) Jazmín Tracey MD; Jacjaya May. ..    23 Numbness Subdural hematoma (HCC) . .. ED (TRANSFER) Winston Winkler MD              Was this an external facility discharge?  Yes, 23     [facility]charge [facility]     Noted following upcoming appointments from discharge chart review:   [facility] follow up appointment(s):   Future Appointments   Date Time Provider [address]   3/18/2024 11:10 AM Giselle Gonzalez   3/25/2024  9:40 AM JACE Garza [facility] [address] [facility]ty follow up appointment(s):   Patient recommended to follow up with:   - Neurology   - Neurosurgery

## 2023-08-01 ENCOUNTER — HOSPITAL ENCOUNTER (EMERGENCY)
Facility: HOSPITAL | Age: 83
Discharge: ANOTHER HOSPITAL | End: 2023-08-01
Attending: STUDENT IN AN ORGANIZED HEALTH CARE EDUCATION/TRAINING PROGRAM
Payer: MEDICARE

## 2023-08-01 ENCOUNTER — CARE COORDINATION (OUTPATIENT)
Facility: CLINIC | Age: 83
End: 2023-08-01

## 2023-08-01 ENCOUNTER — APPOINTMENT (OUTPATIENT)
Facility: HOSPITAL | Age: 83
End: 2023-08-01
Payer: MEDICARE

## 2023-08-01 ENCOUNTER — TELEPHONE (OUTPATIENT)
Age: 83
End: 2023-08-01

## 2023-08-01 VITALS
HEIGHT: 67 IN | OXYGEN SATURATION: 97 % | DIASTOLIC BLOOD PRESSURE: 99 MMHG | BODY MASS INDEX: 22.13 KG/M2 | RESPIRATION RATE: 17 BRPM | SYSTOLIC BLOOD PRESSURE: 122 MMHG | TEMPERATURE: 98.5 F | WEIGHT: 141 LBS | HEART RATE: 93 BPM

## 2023-08-01 DIAGNOSIS — R53.1 PROGRESSIVE FOCAL MOTOR WEAKNESS: ICD-10-CM

## 2023-08-01 DIAGNOSIS — S06.5XAA SUBDURAL HEMATOMA (HCC): Primary | ICD-10-CM

## 2023-08-01 PROBLEM — G93.40 ENCEPHALOPATHY: Status: ACTIVE | Noted: 2023-08-01

## 2023-08-01 PROBLEM — R41.82 ALTERED MENTAL STATUS: Status: ACTIVE | Noted: 2023-08-01

## 2023-08-01 LAB
ALBUMIN SERPL-MCNC: 2.8 G/DL (ref 3.4–5)
ALBUMIN/GLOB SERPL: 0.6 (ref 0.8–1.7)
ALP SERPL-CCNC: 90 U/L (ref 45–117)
ALT SERPL-CCNC: 53 U/L (ref 16–61)
ANION GAP SERPL CALC-SCNC: 6 MMOL/L (ref 3–18)
AST SERPL-CCNC: 20 U/L (ref 10–38)
BASOPHILS # BLD: 0.1 K/UL (ref 0–0.1)
BASOPHILS NFR BLD: 1 % (ref 0–2)
BILIRUB SERPL-MCNC: 0.4 MG/DL (ref 0.2–1)
BUN SERPL-MCNC: 15 MG/DL (ref 7–18)
BUN/CREAT SERPL: 13 (ref 12–20)
CALCIUM SERPL-MCNC: 9.3 MG/DL (ref 8.5–10.1)
CHLORIDE SERPL-SCNC: 101 MMOL/L (ref 100–111)
CO2 SERPL-SCNC: 26 MMOL/L (ref 21–32)
CREAT SERPL-MCNC: 1.12 MG/DL (ref 0.6–1.3)
DIFFERENTIAL METHOD BLD: ABNORMAL
EOSINOPHIL # BLD: 0.2 K/UL (ref 0–0.4)
EOSINOPHIL NFR BLD: 2 % (ref 0–5)
ERYTHROCYTE [DISTWIDTH] IN BLOOD BY AUTOMATED COUNT: 14 % (ref 11.6–14.5)
GLOBULIN SER CALC-MCNC: 4.4 G/DL (ref 2–4)
GLUCOSE SERPL-MCNC: 116 MG/DL (ref 74–99)
HCT VFR BLD AUTO: 31.2 % (ref 36–48)
HGB BLD-MCNC: 10.2 G/DL (ref 13–16)
IMM GRANULOCYTES # BLD AUTO: 0.1 K/UL (ref 0–0.04)
IMM GRANULOCYTES NFR BLD AUTO: 1 % (ref 0–0.5)
INR PPP: 1.1 (ref 0.9–1.1)
LYMPHOCYTES # BLD: 1.9 K/UL (ref 0.9–3.6)
LYMPHOCYTES NFR BLD: 19 % (ref 21–52)
MAGNESIUM SERPL-MCNC: 2 MG/DL (ref 1.6–2.6)
MCH RBC QN AUTO: 29.7 PG (ref 24–34)
MCHC RBC AUTO-ENTMCNC: 32.7 G/DL (ref 31–37)
MCV RBC AUTO: 90.7 FL (ref 78–100)
MONOCYTES # BLD: 0.9 K/UL (ref 0.05–1.2)
MONOCYTES NFR BLD: 9 % (ref 3–10)
NEUTS SEG # BLD: 6.9 K/UL (ref 1.8–8)
NEUTS SEG NFR BLD: 69 % (ref 40–73)
NRBC # BLD: 0 K/UL (ref 0–0.01)
NRBC BLD-RTO: 0 PER 100 WBC
PLATELET # BLD AUTO: 282 K/UL (ref 135–420)
PMV BLD AUTO: 9.9 FL (ref 9.2–11.8)
POTASSIUM SERPL-SCNC: 4.7 MMOL/L (ref 3.5–5.5)
PROT SERPL-MCNC: 7.2 G/DL (ref 6.4–8.2)
PROTHROMBIN TIME: 13.8 SEC (ref 11.9–14.7)
RBC # BLD AUTO: 3.44 M/UL (ref 4.35–5.65)
SODIUM SERPL-SCNC: 133 MMOL/L (ref 136–145)
TROPONIN I SERPL HS-MCNC: 12 NG/L (ref 0–78)
TSH SERPL DL<=0.05 MIU/L-ACNC: 2.8 UIU/ML (ref 0.36–3.74)
WBC # BLD AUTO: 10 K/UL (ref 4.6–13.2)

## 2023-08-01 PROCEDURE — 80053 COMPREHEN METABOLIC PANEL: CPT

## 2023-08-01 PROCEDURE — 83735 ASSAY OF MAGNESIUM: CPT

## 2023-08-01 PROCEDURE — 70450 CT HEAD/BRAIN W/O DYE: CPT

## 2023-08-01 PROCEDURE — 93005 ELECTROCARDIOGRAM TRACING: CPT | Performed by: EMERGENCY MEDICINE

## 2023-08-01 PROCEDURE — 84443 ASSAY THYROID STIM HORMONE: CPT

## 2023-08-01 PROCEDURE — 71045 X-RAY EXAM CHEST 1 VIEW: CPT

## 2023-08-01 PROCEDURE — 85610 PROTHROMBIN TIME: CPT

## 2023-08-01 PROCEDURE — 85025 COMPLETE CBC W/AUTO DIFF WBC: CPT

## 2023-08-01 PROCEDURE — 99285 EMERGENCY DEPT VISIT HI MDM: CPT

## 2023-08-01 PROCEDURE — 84484 ASSAY OF TROPONIN QUANT: CPT

## 2023-08-01 NOTE — ED NOTES
Pt transferred to [facility]. Belongings and paperwork given to transport team      Va Elam RN  08/01/23 6980
procedures. Hemodynamically stable. Disposition Considerations (tests considered but not done, Admit vs D/C, Shared Decision Making, Pt Expectation of Test or Tx.): Transfer to [facility] due to non-availability of neurosurgery here. Diagnosis and Disposition     DIAGNOSIS:   1. Subdural hematoma ([address])    2. Progressive focal motor weakness        DISPOSITION Decision To Transfer 08/01/2023 03:43:48 PM      PATIENT REFERRED TO:  No follow-up provider specified. DISCHARGE MEDICATIONS:  Discharge Medication List as of 8/1/2023  4:10 PM          DISCONTINUED MEDICATIONS:  Discharge Medication List as of 8/1/2023  4:10 PM                 (Please note that portions of this note were completed with a voice recognition program.  Efforts were made to edit the dictations but occasionally words are mis-transcribed.)    Germaine Ambriz MD (electronically signed)        Dragon Disclaimer     Please note that this dictation was completed with Rule., the computer voice recognition software. Quite often unanticipated grammatical, syntax, homophones, and other interpretive errors are inadvertently transcribed by the computer software. Please disregard these errors. Please excuse any errors that have escaped final proofreading.       Quoc Mathur MD  08/01/23 MD Norberto  08/01/23 0264       Juan Hale MD  08/01/23 5626

## 2023-08-01 NOTE — TELEPHONE ENCOUNTER
Luca Whitten is calling from [address] [facility] asking to speak with a nurse. I could not get any details. It was extremely hard to hear her.

## 2023-08-01 NOTE — CARE COORDINATION
Care Transitions Outreach Attempt    Call within 2 business days of discharge: Yes   Attempted to reach patient for transitions of care follow up. Unable to reach patient. Care Transitions Nurse ( CTN)  attempted to reach patient by phone call. CTN called patient's listed home and mobile phone numbers. An individual answered the outreach call to patient's listed mobile phone number. This individual related that patient's son is speaking with home health staff at this time. CTN identified self, an employee with [address] [place] [address] [place], and related that this was not an emergency phone call. No patient medical information  was discussed during phone contact. Patient: Dakota Jeff Patient : 1940 MRN: [id number]    Last Discharge [address]       Date Complaint Diagnosis Description Type Department Provider    23 Facial Droop Subdural hematoma ([address]) . .. ED to Hosp-Admission (Discharged) (ADMITTED) Lazarus Half, MD; Mattie Horne. ..    23 Numbness Subdural hematoma (HCC) . .. ED (TRANSFER) Penelope Yates MD              Was this an external facility discharge? Yes, 23     Discharge Facility:  [facility]     Per chart review, patient was discharged to a skilled nursing   Facility ( SNF)  upon discharge from the acute care hospital setting. Per chart review, after SNF admission, patient's family elected to have patient return home with home health.      Noted following upcoming appointments from discharge chart review:   [facility] follow up appointment(s):   Future Appointments   Date Time Provider [address]   3/18/2024 11:10 AM [address]   3/25/2024  9:40 AM JACE Cottrell [facility] follow up appointment(s):   Per chart review patient to follow up with:   - Neurology   - Neurosurgery      An outreach letter was sent to patient via the [employer] portal.     Per subsequent chart review on this

## 2023-08-02 LAB
EKG ATRIAL RATE: 101 BPM
EKG DIAGNOSIS: NORMAL
EKG P AXIS: 89 DEGREES
EKG P-R INTERVAL: 226 MS
EKG Q-T INTERVAL: 348 MS
EKG QRS DURATION: 70 MS
EKG QTC CALCULATION (BAZETT): 451 MS
EKG R AXIS: -12 DEGREES
EKG T AXIS: 62 DEGREES
EKG VENTRICULAR RATE: 101 BPM

## 2023-08-02 PROCEDURE — 93010 ELECTROCARDIOGRAM REPORT: CPT | Performed by: INTERNAL MEDICINE

## 2023-08-02 RX ORDER — ATORVASTATIN CALCIUM 20 MG/1
TABLET, FILM COATED ORAL
Qty: 90 TABLET | Refills: 1 | Status: ON HOLD | OUTPATIENT
Start: 2023-08-02

## 2023-08-02 RX ORDER — CLOPIDOGREL BISULFATE 75 MG/1
TABLET ORAL
Qty: 90 TABLET | Refills: 3 | Status: ON HOLD | OUTPATIENT
Start: 2023-08-02

## 2023-08-03 ENCOUNTER — CARE COORDINATION (OUTPATIENT)
Facility: CLINIC | Age: 83
End: 2023-08-03

## 2023-08-03 NOTE — CARE COORDINATION
Care Transitions     Patient appears to be admitted into a hospital setting at his time. Care Transitions Nurse ( CTN)  will attempt to monitor for discharge.

## 2023-08-06 PROBLEM — E44.0 MODERATE PROTEIN-CALORIE MALNUTRITION (HCC): Status: ACTIVE | Noted: 2023-08-06

## 2023-08-08 ENCOUNTER — HOSPITAL ENCOUNTER (INPATIENT)
Facility: HOSPITAL | Age: 83
LOS: 11 days | Discharge: HOME HEALTH | End: 2023-08-19
Attending: EMERGENCY MEDICINE | Admitting: EMERGENCY MEDICINE
Payer: MEDICARE

## 2023-08-08 ENCOUNTER — CARE COORDINATION (OUTPATIENT)
Facility: CLINIC | Age: 83
End: 2023-08-08

## 2023-08-08 DIAGNOSIS — S06.5XAA SDH (SUBDURAL HEMATOMA) (HCC): Primary | ICD-10-CM

## 2023-08-08 PROBLEM — E43 SEVERE PROTEIN-CALORIE MALNUTRITION (HCC): Status: ACTIVE | Noted: 2023-08-06

## 2023-08-08 PROCEDURE — 1180000000 HC REHAB R&B

## 2023-08-08 PROCEDURE — 6370000000 HC RX 637 (ALT 250 FOR IP): Performed by: EMERGENCY MEDICINE

## 2023-08-08 PROCEDURE — 99223 1ST HOSP IP/OBS HIGH 75: CPT | Performed by: EMERGENCY MEDICINE

## 2023-08-08 RX ORDER — ALBUTEROL SULFATE 2.5 MG/3ML
1.25 SOLUTION RESPIRATORY (INHALATION) EVERY 4 HOURS PRN
Status: DISCONTINUED | OUTPATIENT
Start: 2023-08-08 | End: 2023-08-19 | Stop reason: HOSPADM

## 2023-08-08 RX ORDER — BISACODYL 10 MG
10 SUPPOSITORY, RECTAL RECTAL DAILY PRN
Status: DISCONTINUED | OUTPATIENT
Start: 2023-08-08 | End: 2023-08-19 | Stop reason: HOSPADM

## 2023-08-08 RX ORDER — FERROUS SULFATE 325(65) MG
325 TABLET ORAL
Status: DISCONTINUED | OUTPATIENT
Start: 2023-08-09 | End: 2023-08-19 | Stop reason: HOSPADM

## 2023-08-08 RX ORDER — LEVETIRACETAM 500 MG/1
500 TABLET ORAL 2 TIMES DAILY
Status: DISCONTINUED | OUTPATIENT
Start: 2023-08-08 | End: 2023-08-19 | Stop reason: HOSPADM

## 2023-08-08 RX ORDER — PANTOPRAZOLE SODIUM 40 MG/1
40 TABLET, DELAYED RELEASE ORAL
Status: DISCONTINUED | OUTPATIENT
Start: 2023-08-09 | End: 2023-08-19 | Stop reason: HOSPADM

## 2023-08-08 RX ORDER — SENNA AND DOCUSATE SODIUM 50; 8.6 MG/1; MG/1
2 TABLET, FILM COATED ORAL DAILY PRN
Status: DISCONTINUED | OUTPATIENT
Start: 2023-08-08 | End: 2023-08-19 | Stop reason: HOSPADM

## 2023-08-08 RX ORDER — M-VIT,TX,IRON,MINS/CALC/FOLIC 27MG-0.4MG
1 TABLET ORAL DAILY
Status: DISCONTINUED | OUTPATIENT
Start: 2023-08-08 | End: 2023-08-19 | Stop reason: HOSPADM

## 2023-08-08 RX ORDER — ACETAMINOPHEN 325 MG/1
650 TABLET ORAL EVERY 4 HOURS PRN
Status: DISCONTINUED | OUTPATIENT
Start: 2023-08-08 | End: 2023-08-19 | Stop reason: HOSPADM

## 2023-08-08 RX ORDER — NITROGLYCERIN 0.4 MG/1
0.4 TABLET SUBLINGUAL EVERY 5 MIN PRN
Status: DISCONTINUED | OUTPATIENT
Start: 2023-08-08 | End: 2023-08-19 | Stop reason: HOSPADM

## 2023-08-08 RX ORDER — ATORVASTATIN CALCIUM 20 MG/1
20 TABLET, FILM COATED ORAL NIGHTLY
Status: DISCONTINUED | OUTPATIENT
Start: 2023-08-08 | End: 2023-08-19 | Stop reason: HOSPADM

## 2023-08-08 RX ORDER — AMLODIPINE BESYLATE 5 MG/1
5 TABLET ORAL DAILY
Status: DISCONTINUED | OUTPATIENT
Start: 2023-08-09 | End: 2023-08-19 | Stop reason: HOSPADM

## 2023-08-08 RX ORDER — LANOLIN ALCOHOL/MO/W.PET/CERES
3 CREAM (GRAM) TOPICAL NIGHTLY PRN
Status: DISCONTINUED | OUTPATIENT
Start: 2023-08-08 | End: 2023-08-09

## 2023-08-08 RX ADMIN — MULTIPLE VITAMINS W/ MINERALS TAB 1 TABLET: TAB at 16:57

## 2023-08-08 RX ADMIN — ATORVASTATIN CALCIUM 20 MG: 20 TABLET, FILM COATED ORAL at 20:46

## 2023-08-08 RX ADMIN — LEVETIRACETAM 500 MG: 500 TABLET, FILM COATED ORAL at 20:45

## 2023-08-08 RX ADMIN — Medication 3 MG: at 20:46

## 2023-08-08 RX ADMIN — METOPROLOL TARTRATE 25 MG: 25 TABLET, FILM COATED ORAL at 20:45

## 2023-08-08 RX ADMIN — ACETAMINOPHEN 325MG 650 MG: 325 TABLET ORAL at 16:57

## 2023-08-08 ASSESSMENT — PAIN DESCRIPTION - DESCRIPTORS: DESCRIPTORS: ACHING

## 2023-08-08 ASSESSMENT — PAIN DESCRIPTION - ORIENTATION: ORIENTATION: LEFT

## 2023-08-08 ASSESSMENT — PAIN SCALES - GENERAL
PAINLEVEL_OUTOF10: 0
PAINLEVEL_OUTOF10: 4
PAINLEVEL_OUTOF10: 0

## 2023-08-08 ASSESSMENT — PAIN DESCRIPTION - RADICULAR PAIN: RADICULAR_PAIN: ABSENT

## 2023-08-08 ASSESSMENT — PAIN DESCRIPTION - LOCATION: LOCATION: HEAD;ARM

## 2023-08-08 NOTE — PROGRESS NOTES
Comprehensive Nutrition Assessment    Type and Reason for Visit:  Initial    Nutrition Recommendations/Plan:   Add supplement: Ensure Plus BID (350 kcal, 20 gm protein each)  Continue daily MVI-mineral supplement r/t pt with malnutrition  Continue all other nutrition interventions. Encourage/ monitor po intake of meals and supplements. Malnutrition Assessment:  Malnutrition Status:  Severe malnutrition (08/08/23 1756)    Context:  Chronic Illness     Findings of the 6 clinical characteristics of malnutrition:  Energy Intake:  75% or less estimated energy requirements for 1 month or longer  Weight Loss:  Greater than 7.5% over 3 months     Body Fat Loss:  Unable to assess     Muscle Mass Loss:  Unable to assess    Fluid Accumulation:  No significant fluid accumulation     Strength:  Not Performed    Nutrition History and Allergies:   Past medical hx:  CAD, colon adenoma, colon polyps, COPD, GERD, HTN, prostate cancer, s/p CABG x3, appendectomy, cholecystectomy. Pt had poor po intake x month while in acute care hospital PTA to ARU per wife report. Unplanned wt loss since June 2023 per wife and pt report. Per chart hx, pt weighing 170 lb on 6/5/23;  currently weighing 153 lb. Wt loss of 17 lb, 10% x 2 month PTA; significant wt loss. No known food allergies     Nutrition Assessment:    Pt seen following RN report of patient's wife reporting that pt had poor po intake and unplanned wt loss PTA; pt and wife inquiring about ensure supplement. Pt appetite improving since admission to ARU; fair/good per wife. Fair meal intake today per wife. Tolerating diet. Discussed adding ensure supplement. Nutrition Related Findings:    BM 8/8. No edema. Pertinent labs reviewed. Pertinent meds: lipitor, ferrous sulfate, protonix, MVI-mineral supplement. Wound Type: None       Current Nutrition Intake & Therapies:    Average Meal Intake: 51-75%  Average Supplements Intake: None Ordered  ADULT DIET;  Dysphagia - Soft and

## 2023-08-08 NOTE — PROGRESS NOTES
Colleen Gramajo. is a 78 y.o.  male admitted on 8/8/2023 from [facility]. Report received from [address]. Transportation was provided by ambulance where the patient received  prior to arrival., and the patient was transferred to his room via [employer]. Patient was assisted to bed with assist of 2. The patient was oriented to his room. Fall risk precautions were discussed with the patient; he was instructed to call for help prior to getting up. The following fall risk precautions were initiated: bed/ chair alarms, door signage, yellow bracelet and socks as well as completion of the [place] tool in the patient's room. Four eyes nurse skin assessment was performed by Milli Coleman LPN and Ashvin Swanson CNA.  Skin problems noted:

## 2023-08-08 NOTE — CARE COORDINATION
Care Transitions     Per chart review, patient was admitted into [facility] on 8-1-2023. Per chart review, discharge planning is in progress. Care Transitions Nurse ( CTN)  to follow for discharge.

## 2023-08-09 ENCOUNTER — CARE COORDINATION (OUTPATIENT)
Facility: CLINIC | Age: 83
End: 2023-08-09

## 2023-08-09 LAB
ANION GAP SERPL CALC-SCNC: 6 MMOL/L (ref 3–18)
BASOPHILS # BLD: 0.1 K/UL (ref 0–0.1)
BASOPHILS NFR BLD: 1 % (ref 0–2)
BUN SERPL-MCNC: 19 MG/DL (ref 7–18)
BUN/CREAT SERPL: 25 (ref 12–20)
CALCIUM SERPL-MCNC: 9.8 MG/DL (ref 8.5–10.1)
CHLORIDE SERPL-SCNC: 104 MMOL/L (ref 100–111)
CO2 SERPL-SCNC: 26 MMOL/L (ref 21–32)
CREAT SERPL-MCNC: 0.75 MG/DL (ref 0.6–1.3)
DIFFERENTIAL METHOD BLD: ABNORMAL
EOSINOPHIL # BLD: 0.3 K/UL (ref 0–0.4)
EOSINOPHIL NFR BLD: 3 % (ref 0–5)
ERYTHROCYTE [DISTWIDTH] IN BLOOD BY AUTOMATED COUNT: 13.9 % (ref 11.6–14.5)
GLUCOSE SERPL-MCNC: 109 MG/DL (ref 74–99)
HCT VFR BLD AUTO: 31.3 % (ref 36–48)
HGB BLD-MCNC: 10.2 G/DL (ref 13–16)
IMM GRANULOCYTES # BLD AUTO: 0 K/UL (ref 0–0.04)
IMM GRANULOCYTES NFR BLD AUTO: 0 % (ref 0–0.5)
LYMPHOCYTES # BLD: 1.8 K/UL (ref 0.9–3.6)
LYMPHOCYTES NFR BLD: 22 % (ref 21–52)
MAGNESIUM SERPL-MCNC: 2.2 MG/DL (ref 1.6–2.6)
MCH RBC QN AUTO: 28.9 PG (ref 24–34)
MCHC RBC AUTO-ENTMCNC: 32.6 G/DL (ref 31–37)
MCV RBC AUTO: 88.7 FL (ref 78–100)
MONOCYTES # BLD: 0.9 K/UL (ref 0.05–1.2)
MONOCYTES NFR BLD: 11 % (ref 3–10)
NEUTS SEG # BLD: 5 K/UL (ref 1.8–8)
NEUTS SEG NFR BLD: 62 % (ref 40–73)
NRBC # BLD: 0 K/UL (ref 0–0.01)
NRBC BLD-RTO: 0 PER 100 WBC
PLATELET # BLD AUTO: 411 K/UL (ref 135–420)
PMV BLD AUTO: 9.2 FL (ref 9.2–11.8)
POTASSIUM SERPL-SCNC: 3.9 MMOL/L (ref 3.5–5.5)
RBC # BLD AUTO: 3.53 M/UL (ref 4.35–5.65)
SODIUM SERPL-SCNC: 136 MMOL/L (ref 136–145)
WBC # BLD AUTO: 8.1 K/UL (ref 4.6–13.2)

## 2023-08-09 PROCEDURE — 92523 SPEECH SOUND LANG COMPREHEN: CPT

## 2023-08-09 PROCEDURE — 92522 EVALUATE SPEECH PRODUCTION: CPT

## 2023-08-09 PROCEDURE — 36415 COLL VENOUS BLD VENIPUNCTURE: CPT

## 2023-08-09 PROCEDURE — 96125 COGNITIVE TEST BY HC PRO: CPT

## 2023-08-09 PROCEDURE — 97116 GAIT TRAINING THERAPY: CPT

## 2023-08-09 PROCEDURE — 97530 THERAPEUTIC ACTIVITIES: CPT

## 2023-08-09 PROCEDURE — 97166 OT EVAL MOD COMPLEX 45 MIN: CPT

## 2023-08-09 PROCEDURE — 83735 ASSAY OF MAGNESIUM: CPT

## 2023-08-09 PROCEDURE — 6370000000 HC RX 637 (ALT 250 FOR IP): Performed by: EMERGENCY MEDICINE

## 2023-08-09 PROCEDURE — 80048 BASIC METABOLIC PNL TOTAL CA: CPT

## 2023-08-09 PROCEDURE — 97542 WHEELCHAIR MNGMENT TRAINING: CPT

## 2023-08-09 PROCEDURE — 97162 PT EVAL MOD COMPLEX 30 MIN: CPT

## 2023-08-09 PROCEDURE — 85025 COMPLETE CBC W/AUTO DIFF WBC: CPT

## 2023-08-09 PROCEDURE — 1180000000 HC REHAB R&B

## 2023-08-09 PROCEDURE — 97535 SELF CARE MNGMENT TRAINING: CPT

## 2023-08-09 PROCEDURE — 97112 NEUROMUSCULAR REEDUCATION: CPT

## 2023-08-09 RX ORDER — LANOLIN ALCOHOL/MO/W.PET/CERES
3 CREAM (GRAM) TOPICAL
Status: DISCONTINUED | OUTPATIENT
Start: 2023-08-09 | End: 2023-08-10

## 2023-08-09 RX ADMIN — AMLODIPINE BESYLATE 5 MG: 5 TABLET ORAL at 09:02

## 2023-08-09 RX ADMIN — PANTOPRAZOLE SODIUM 40 MG: 40 TABLET, DELAYED RELEASE ORAL at 06:10

## 2023-08-09 RX ADMIN — METOPROLOL TARTRATE 25 MG: 25 TABLET, FILM COATED ORAL at 09:02

## 2023-08-09 RX ADMIN — LEVETIRACETAM 500 MG: 500 TABLET, FILM COATED ORAL at 21:35

## 2023-08-09 RX ADMIN — ATORVASTATIN CALCIUM 20 MG: 20 TABLET, FILM COATED ORAL at 21:35

## 2023-08-09 RX ADMIN — Medication 3 MG: at 21:35

## 2023-08-09 RX ADMIN — MULTIPLE VITAMINS W/ MINERALS TAB 1 TABLET: TAB at 09:02

## 2023-08-09 RX ADMIN — LEVETIRACETAM 500 MG: 500 TABLET, FILM COATED ORAL at 09:02

## 2023-08-09 RX ADMIN — FERROUS SULFATE TAB 325 MG (65 MG ELEMENTAL FE) 325 MG: 325 (65 FE) TAB at 09:02

## 2023-08-09 RX ADMIN — METOPROLOL TARTRATE 25 MG: 25 TABLET, FILM COATED ORAL at 21:35

## 2023-08-09 ASSESSMENT — PAIN SCALES - GENERAL
PAINLEVEL_OUTOF10: 0

## 2023-08-09 ASSESSMENT — PAIN SCALES - WONG BAKER
WONGBAKER_NUMERICALRESPONSE: 0

## 2023-08-09 NOTE — PLAN OF CARE
Problem: Occupational Therapy - Adult  Goal: By Discharge: Performs self-care activities at highest level of function for planned discharge setting. See evaluation for individualized goals. Description: Occupational Therapy Goals   Long Term Goals  Initiated 2023 and to be accomplished within 2 week(s) 2023  1. Pt will perform self-feeding with Dale. 2. Pt will perform grooming with Dale. 3. Pt will perform UB bathing with Dale  4. Pt will perform LB bathing with S/SBA  5. Pt will perform tub/shower transfer with S.  6. Pt will perform UB dressing with S/Setup. 7. Pt will perform LB dressing with S/SBA  8. Pt will perform toileting task with S/SBA  9. Pt will perform toilet transfer with SBA        Short Term Goals   Initiated 2020 and to be accomplished within 7 day(s) 3/3/2020  1. Pt will perform self-feeding with S   2. Pt will perform grooming with CGA>  3. Pt will perform UB bathing with CGA. 4. Pt will perform LB bathing with CGA  5. Pt will perform tub/shower transfer with CGA. 6. Pt will perform UB dressing with CGA. 7. Pt will perform LB dressing with Yuan. 8. Pt will perform toileting task with CGA  9. Pt will perform toilet transfer with CGA         Outcome: Progressing   OCCUPATIONAL THERAPY EVALUATION    Patient: Deandra Swift. 78 y.o. Date: 2023  Primary Diagnosis: Subdural hematoma ([address]) [S06. 5XAA]    Patient identified with name and : Yes     Past Medical History:   Past Medical History:   Diagnosis Date    Allergic rhinitis     Hooker's esophagus     Dr. Mina Rebolledo  s/p HALO;     CAD (coronary artery disease)     Carotid stenosis 2023    [facility] 84-24%, LICA<50% ()    Colon adenoma 2017    Dr Jody Alarcon polyps     Dr. Mina Rebolledo    COPD (chronic obstructive pulmonary disease) ([address])     mild obst disease on PFTs Dr. Caesar Damian ()    COVID-19 vaccine dose declined 2022    boosters    ED (erectile dysfunction)     GERD (gastroesophageal safety when standing for bathing 2/2 to impaired hearing. Vcs to wash thighs and legs seated EOB without carryover. Pt stood to wash abdias/anal multipl times 2/2 fatigue     SHOWER TRANSFER  Initial Assessment   Transfer Technique     Shower Transfer Functional Level Not tested   Equipment     Shower Transfer Comments 2/2 to fatigue and safety concerns     UPPER BODY DRESSING/UNDRESSING Initial Assessment   Functional Level Minimal assistance   Clinical Factors Pt thread BUE through sleeves of shirt with difficulty at right UE   Increased fatigue noted     LOWER BODY DRESSING/UNDRESSING Initial Assessment   Functional Level Moderate assistance   Clinical Factors Decreased grasp at right to odilia sock on left foot. Increased fatigue noted. Education on threading weaker right leg first, crossing knees. Asst to odilia socks, decreased balance for CM/asst for CM. Pt not aware of foot placement/decr balance     TOILETING Initial Assessment   Functional Level Minimal assistance   Clinical factors clinical judgement     TOILET TRANSFER Initial Assessment   Transfer Technique     Functional Level Minimal assistance   Equipment     Toilet Transfer Comments clinical judgement based on EOB to w/c transfer with 2 hand hold of this OT hands     WHEELCHAIR/BED TRANSFER INDEPENDENCE Initial Assessment   Transfer Technique   Stand step   Level of Assistance Minimal assistance   Equipment     Comments using 2 hand hold of this therapist hand     IADL Initial Assessment (PLOF)   Homemaking Responsibilities @Yes  Independent      Activity Tolerance:   Patient limited by fatigue;Treatment limited secondary to decreased cognition         EDUCATION:   Education Given To: Patient  Education Provided: Role of Therapy;Plan of Care;Precautions; Safety; Mobility Training;Transfer Training; Fall Prevention Strategies  Education Method: Verbal  Barriers to Learning: Cognition; Hearing  Education Outcome: Verbalized understanding;Continued education

## 2023-08-09 NOTE — ACP (ADVANCE CARE PLANNING)
Advance Care Planning     General Advance Care Planning (ACP)     Date of Visit(s): 8/8/2023      Content/Action Overview: The Palliative Care team attempted to visit with patient on two different occassions today. One time patient was out of the room. The other time, patient was in the room having therapy. The Palliative Care team will attempt to visit with patient tomorrow to complete initial assessment and address and clarify goals of care. At this time, patient will remain a Limited Code Status. Patient does have an existing DDNR, on file; however, this document needs to be re-addressed and verified. Recommendations: The Palliative Care team will attempt to assess patient tomorrow to address and clarify goals of care moving forward. Length of Voluntary ACP Conversation in minutes:  <16 minutes (Non-Billable)        Huong Gentile, MSW  Palliative Care   [place]#[phone]

## 2023-08-09 NOTE — PLAN OF CARE
Problem: Physical Therapy - Adult  Goal: By Discharge: Performs mobility at highest level of function for planned discharge setting. See evaluation for individualized goals. Description: Physical Therapy Short Term Goals  Initiated 8/9/2023 and to be accomplished within 7 day(s) [8/16/23]  1. Patient will supine to sit, sit to supine, roll right, and roll left  in bed with modified independence. 2.  Patient will transfer from bed to chair and chair to bed with SBA using a RW. 3.  Patient will perform sit to stand with SBA/CGA from bed, w/c, and toilet heights  4. Patient will ambulate with contact guard assist/SBA for 100 feet with RW.   5.  Patient will propel w/c 150 ft with supervision for mobility on unit. Physical Therapy Long Term Goals  Initiated 8/9/2023 and to be accomplished within 14 day(s) [8/23/23]  1. Patient will supine to sit, sit to supine, roll right, and roll left in bed with independence, without bed rail. 2.  Patient will transfer from bed to chair and chair to bed with supervision using the least restrictive device. 3.  Patient will perform sit to stand with supervision from various surface heights. 4.  Patient will ambulate with SBA for 150 feet with the least restrictive device. 5.  Patient will ascend/descend 1 curb with least restrictive device with SBA/CGA to negotiate curb to sidewalk for entry into home. Outcome: Progressing     PHYSICAL THERAPY EVALUATION    Patient: Micha Murphy. (78 y.o. male)  Date: 8/9/2023  Diagnosis: Subdural hematoma ([address]) [S06. 5XAA]   Precautions: Fall Risk;Seizure (hard of hearing)                Chart, physical therapy assessment, plan of care and goals were reviewed.     1105  Time In: 1105  Time Out: 1235  Minutes: 90  Entered Differentiated Treatment minutes: Yes    Patient seen for: eval, gait training, bed mobility training, transfers training, balance training, w/c mobility, and pt education    Pain:  Pt pain was reported as 0/10 or dizziness  Please refer to the flowsheet for vital signs taken during this treatment. After treatment:   [] Patient left in no apparent distress in bed  [x] Patient left in no apparent distress sitting up in chair  [] Patient left in no apparent distress in w/c mobilizing under own power  [] Patient left in no apparent distress dining area  [] Patient left in no apparent distress mobilizing under own power  [x] Call bell left within reach  [] Nursing notified  [x] spouse present in room  [] Bed alarm activated   [x] Chair alarm activated and video monitoring in place. COMMUNICATION/EDUCATION:   [x]         Fall prevention education was provided and the patient/caregiver indicated understanding. [x]         Patient/family have participated as able in goal setting and plan of care. [x]         Patient/family agree to work toward stated goals and plan of care. []         Patient understands intent and goals of therapy, but is neutral about his/her participation. []         Patient is unable to participate in goal setting and plan of care.       Thank you for this referral.  Dolores Handy, PT  8/9/2023

## 2023-08-09 NOTE — CARE COORDINATION
Care Transitions     Transition of care outreach postponed for 7 days due to patient's discharge to [facility] -   inpatient rehab facility (IPR). Please see the [facility] record or Care Everywhere for additional verification or information as needed.

## 2023-08-09 NOTE — H&P
[id number] [facility]  [address] [facility], [address] [place] [address]     INPATIENT REHABILITATION  HISTORY AND PHYSICAL      Name: Gen Chadwick. CSN: [id number]   Age: 78 y.o. MRN: [id number]   Sex: male Admit Date: 8/8/2023     PCP: Yovana Ellis MD         Subjective:     Patient seen and examined. History of the Present Illness: This is a 40-year-old male with a past medical history of hypertension and dyslipidemia who was recently admitted to [facility] with a recurrent chronic subdural hematoma. During the hospitalization patient was seen by neurosurgery who recommended conservative management. Patient remained hemodynamically stable. Initially patient was encephalopathic but this has improved towards baseline. PT OT saw the patient and it was felt that patient may benefit from inpatient rehab. For full details regarding hospital course please refer to chart. The patient  was noted to have impaired mobility and ADLs. Patient was felt to be a good candidate for acute inpatient rehabilitation. Upon evaluation by Physical Therapy and Occupational Therapy, the patient was recommended for acute inpatient rehabilitation. The patient was discharged and was subsequently admitted to the [facility] for intensive rehabilitation to help recover strength, function and mobility in the setting of recurrent nontraumatic acute on chronic subdural hematoma with some right hemiparesis    I saw the patient in the inpatient rehab at [facility]. Patient is sitting in bed in no apparent distress. Patient is awake and follows simple commands. Patient denies any chest pain or shortness of breath. No history of any fever or chills. No history of any nausea vomiting or abdominal pain. No history of any headaches or visual changes. No history of any leg swelling or rash.   No history of any urinary complaints or rectal bleeding or melanotic independence in ADL  and independence in home management skills. - Speech and Language Pathology for cognitive training, dysphagia therapy, speech and language communication therapy. Goals for speech therapy to address cognition, expression of language, comprehension, safety awareness and safe swallow. - Specialized 24 hour rehabilitation nursing care for bowel and bladder retraining, disease management, pain management, pressure ulcer prevention and management per policy, education on pressure relief techniques, embolism prevention, nutrition management, hydration management, transfer training and   medication distribution.    - Nutrition and Dietary services will be obtained for assessment of adequate calorie needs, hydration and calorie counts as appropriate.  - Social work services for patient and family counseling and safe discharge planning. MEDICAL PLAN:     -impaired mobility and ADLs in the setting of recurrent nontraumatic acute on chronic subdural hematoma with right hemiparesis  Continue physical therapy and Occupational Therapy and speech therapy  Fall precautions    -Acute encephalopathy in the setting of subdural hematoma which is improved to baseline  Continue to monitor    -Seizure disorder  Continue Keppra 500 mg twice daily  Seizure precautions    -Hypertension  Continue amlodipine 5 mg daily  Continue Lopressor 25 mg twice daily  Monitor    -Dyslipidemia  Continue atorvastatin 20 mg at bedtime    -Anemia  Monitor blood counts intermittently    -Coronary artery disease and history of CABG  Continue atorvastatin and Lopressor. Not on aspirin in the setting of subdural hematoma    -Obstructive sleep apnea-intolerant of CPAP    -History of prostate cancer  Patient denies any urinary complaints at this time. Patient follows with urology as an outpatient    GERD  Continue Protonix    -COPD without acute exacerbation  Continue to monitor.   As needed albuterol    I discussed my recommendations

## 2023-08-10 PROBLEM — Z71.89 GOALS OF CARE, COUNSELING/DISCUSSION: Status: ACTIVE | Noted: 2023-08-10

## 2023-08-10 PROBLEM — R56.9 SEIZURES (HCC): Status: ACTIVE | Noted: 2023-08-10

## 2023-08-10 PROBLEM — R53.81 DEBILITY: Status: ACTIVE | Noted: 2023-08-10

## 2023-08-10 PROCEDURE — 97110 THERAPEUTIC EXERCISES: CPT

## 2023-08-10 PROCEDURE — 97535 SELF CARE MNGMENT TRAINING: CPT

## 2023-08-10 PROCEDURE — 99232 SBSQ HOSP IP/OBS MODERATE 35: CPT | Performed by: EMERGENCY MEDICINE

## 2023-08-10 PROCEDURE — 97530 THERAPEUTIC ACTIVITIES: CPT

## 2023-08-10 PROCEDURE — 1180000000 HC REHAB R&B

## 2023-08-10 PROCEDURE — 6370000000 HC RX 637 (ALT 250 FOR IP): Performed by: EMERGENCY MEDICINE

## 2023-08-10 PROCEDURE — 94761 N-INVAS EAR/PLS OXIMETRY MLT: CPT

## 2023-08-10 PROCEDURE — 97116 GAIT TRAINING THERAPY: CPT

## 2023-08-10 PROCEDURE — 99222 1ST HOSP IP/OBS MODERATE 55: CPT | Performed by: NURSE PRACTITIONER

## 2023-08-10 PROCEDURE — 92507 TX SP LANG VOICE COMM INDIV: CPT

## 2023-08-10 PROCEDURE — 97150 GROUP THERAPEUTIC PROCEDURES: CPT

## 2023-08-10 RX ORDER — LANOLIN ALCOHOL/MO/W.PET/CERES
6 CREAM (GRAM) TOPICAL
Status: DISCONTINUED | OUTPATIENT
Start: 2023-08-10 | End: 2023-08-12

## 2023-08-10 RX ADMIN — LEVETIRACETAM 500 MG: 500 TABLET, FILM COATED ORAL at 08:07

## 2023-08-10 RX ADMIN — ATORVASTATIN CALCIUM 20 MG: 20 TABLET, FILM COATED ORAL at 20:11

## 2023-08-10 RX ADMIN — LEVETIRACETAM 500 MG: 500 TABLET, FILM COATED ORAL at 20:11

## 2023-08-10 RX ADMIN — FERROUS SULFATE TAB 325 MG (65 MG ELEMENTAL FE) 325 MG: 325 (65 FE) TAB at 08:07

## 2023-08-10 RX ADMIN — ACETAMINOPHEN 325MG 650 MG: 325 TABLET ORAL at 00:01

## 2023-08-10 RX ADMIN — METOPROLOL TARTRATE 25 MG: 25 TABLET, FILM COATED ORAL at 20:11

## 2023-08-10 RX ADMIN — MULTIPLE VITAMINS W/ MINERALS TAB 1 TABLET: TAB at 08:07

## 2023-08-10 RX ADMIN — PANTOPRAZOLE SODIUM 40 MG: 40 TABLET, DELAYED RELEASE ORAL at 05:40

## 2023-08-10 RX ADMIN — Medication 6 MG: at 20:11

## 2023-08-10 ASSESSMENT — PAIN SCALES - WONG BAKER
WONGBAKER_NUMERICALRESPONSE: 0

## 2023-08-10 ASSESSMENT — PAIN SCALES - GENERAL
PAINLEVEL_OUTOF10: 0

## 2023-08-10 NOTE — PLAN OF CARE
[address] [facility] PHYSICAL REHABILITATION  [address] [facility], [place], [address]  OVERALL PLAN OF CARE    Name: Jennifer Valenzuela CSN: [id number]   Age: 78 y.o. MRN: [id number]   Sex: male Admit Date: 8/8/2023     Primary Rehabilitation Diagnosis impaired mobility and ADLs in the setting of recurrent nontraumatic acute on chronic subdural hematoma with some right hemiparesis. Other Co-Morbid Conditions managed in Rehab   Seizure disorder  Hypertension  Dyslipidemia  Anemia  Coronary artery disease and history of CABG  Obstructive sleep apnea-intolerant of CPAP  History of prostate cancer  GERD  COPD without acute exacerbation     PLAN:     -impaired mobility and ADLs in the setting of recurrent nontraumatic acute on chronic subdural hematoma with right hemiparesis  Continue physical therapy and Occupational Therapy and speech therapy  Fall precautions     -Acute encephalopathy in the setting of subdural hematoma which is improved to baseline  Continue to monitor     -Seizure disorder  Continue Keppra 500 mg twice daily  Seizure precautions     -Hypertension  Continue amlodipine 5 mg daily  Continue Lopressor 25 mg twice daily  Monitor     -Dyslipidemia  Continue atorvastatin 20 mg at bedtime     -Anemia  Monitor blood counts intermittently     -Coronary artery disease and history of CABG  Continue atorvastatin and Lopressor. Not on aspirin in the setting of subdural hematoma     -Obstructive sleep apnea-intolerant of CPAP     -History of prostate cancer  Patient denies any urinary complaints at this time. Patient follows with urology as an outpatient     GERD  Continue Protonix     -COPD without acute exacerbation  Continue to monitor. As needed albuterol     Increase melatonin at bedtime to 6 mg.      Fall precautions     I discussed with patient and wife      ANTICIPATED INTERVENTIONS THAT SUPPORT THE MEDICAL NECESSITY OF THIS ADMISSION:    I. Physical Therapy              AErik

## 2023-08-10 NOTE — PLAN OF CARE
Problem: Occupational Therapy - Adult  Goal: By Discharge: Performs self-care activities at highest level of function for planned discharge setting. See evaluation for individualized goals. Description: Occupational Therapy Goals   Long Term Goals  Initiated 2023 and to be accomplished within 2 week(s) 2023  1. Pt will perform self-feeding with Dale. 2. Pt will perform grooming with Dale. 3. Pt will perform UB bathing with Dale  4. Pt will perform LB bathing with S/SBA  5. Pt will perform tub/shower transfer with S.  6. Pt will perform UB dressing with S/Setup. 7. Pt will perform LB dressing with S/SBA  8. Pt will perform toileting task with S/SBA  9. Pt will perform toilet transfer with SBA        Short Term Goals   Initiated 2020 and to be accomplished within 7 day(s) 3/3/2020  1. Pt will perform self-feeding with S   2. Pt will perform grooming with CGA>  3. Pt will perform UB bathing with CGA. 4. Pt will perform LB bathing with CGA  5. Pt will perform tub/shower transfer with CGA. 6. Pt will perform UB dressing with CGA. 7. Pt will perform LB dressing with Yuan. 8. Pt will perform toileting task with CGA  9. Pt will perform toilet transfer with CGA         Outcome: Progressing   Occupational Therapy TREATMENT    Patient: Nile Valdez.   78 y.o. Patient identified with name and : Yes    Date: 8/10/2023    First Tx Session  Time In: 1200  Time Out: 200    Second Tx Session  Time In: 1330  Time Out: 1400    Third Tx Session  (group session)  Time In: 1500  Time Out: 1530    Diagnosis: Subdural hematoma ([address]) [S06. 5XAA]   Precautions: Restrictions/Precautions: Fall Risk, Seizure (hard of hearing)         Chart, occupational therapy assessment, plan of care, and goals were reviewed. Pain:  Pt reports 0/10 pain or discomfort prior to treatment. Pt reports 0/10 pain or discomfort post treatment.    Intervention Provided: NA      SUBJECTIVE:   Patient stated that he had been goals  []          Not making progress toward goals and plan of care will be adjusted     PLAN:  Patient continues to benefit from skilled intervention to address the above impairments. Continue treatment per established plan of care. Discharge Recommendations:  home health w/ 24 hr asst  Further Equipment Recommendations for Discharge:  transfer bench     Activity Tolerance:    Fair    EDUCATION:   Education Given To: Patient; Family  Education Provided: Safety; Mobility Training;Transfer Training;Family Education  Education Method: Verbal  Barriers to Learning: Cognition; Hearing  Education Outcome: Verbalized understanding;Continued education needed    COMMUNICATION:   [] Home safety education was provided and the patient/caregiver indicated understanding. [] Patient/family have participated as able in goal setting and plan of care. [x] Patient/family agree to work toward stated goals and plan of care. [] Patient understands intent and goals of therapy, but is neutral about his/her participation. [] Patient is unable to participate in goal setting and plan of care. Please refer to the flowsheet for vital signs taken during this treatment.   After treatment:   [x]  Patient left in no apparent distress sitting up in chair   []  Patient left in no apparent distress in bed  [x]  Call bell left within reach  []  Nursing notified  [x]  Caregiver present (supportive wife)  []  Bed alarm activated      Estimated LOS:TBD  Entered Differentiated Treatment minutes: Yes    IDANIA Gilliland/RAND
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
## 2023-08-10 NOTE — PLAN OF CARE
Problem: SLP Adult - Impaired Communication  Goal: By Discharge: Demonstrates communication skills at highest level of function for planned discharge setting. See evaluation for individualized goals. Description: Long term goals (initiated 8/9/23; to be met by 8/23/23)  Patient will:  1. Be oriented x 3 using the white board as a reference, min cues. 2. Recall events of the day using environmental aids, min cues. 3. Follow simple instructions, 90% accuracy; 2 part instructions 60-70% accuracy. 4. Name 7-9 items in divergent naming tasks, supervision. 5. Respond to questions about himself, his environment, and general information in phrases and sentences, 80% accuracy  6. Perform basic problem solving/reasoning tasks, 70-80% accuracy. Short term goals (by 8/16/23)  Patient will:  1. Be oriented x 3 using the white board as a reference, mod cues. 2. Recall events of the day using environmental aids, mod cues. 3. Follow simple instructions, 80% accuracy; 2 part instructions 60% accuracy. 4. Name 5-7 items in divergent naming tasks, min assist.  5. Respond to questions about himself, his environment, and general information in phrases and sentences, 60% accuracy  6. Perform basic problem solving/reasoning tasks, 60% accuracy. Note:   SPEECH-LANGUAGE TREATMENT    Patient: Colleen Gramajo. (78 y.o. male)  Date: 8/10/2023  Diagnosis: Subdural hematoma ([address]) [S06. 5XAA] Subdural hematoma (HCC)      Precautions: Standard  PLOF: As per H&P     ASSESSMENT:  Poor hearing continues to be a major barrier shahriar communication. Patient was seated in a chair today which improved his ability to read lips as the SLP was speaking.   Progression toward goals:  []       Improving appropriately and progressing toward goals  [x]       Improving slowly and progressing toward goals  []       Not making progress toward goals and plan of care will be adjusted     PLAN:  Patient continues to benefit from skilled intervention to

## 2023-08-10 NOTE — PROGRESS NOTES
[facility]  Team Conference  Date: 8/10/2023  ACTIVE MONITORING OF CO-MORBID CONDITIONS/MANAGEMENT OF NEW MEDICAL ISSUES: Subdural hematoma ([address]) [S06. 5XAA]    **Please refer to patient's electronic medical record to view the care plan and goals**  NURSING Making gains No   Skin Care: Skin Integumentary   Skin Color: Ecchymosis (comment)  Skin Condition/Temp: Warm  Skin Integrity: Ecchymosis  Location: head  Nails: Within Defined Limits    Wound Care:           Pain: Pain Assessment  Pain Assessment: None - Denies Pain (08/10/23 0730)  Pain Level: 0 (08/10/23 0730)  Bashir-Mathis Pain Rating: No hurt (08/10/23 0730)  Patient's Stated Pain Goal: 0 - No pain (08/10/23 0730)  Pain Location: Head;Arm (08/08/23 1657)  Pain Orientation: Left (08/08/23 1657)  Pain Descriptors: Aching (08/08/23 1657)    Bladder/Bowel Urine Assessment  Urinary Status: Voiding  Urinary Incontinence: Absent  Urine Color: Yellow/straw  Urine Appearance: Clear  Urine Odor: No odor Stool Assessment  Incontinence: Yes  Stool Appearance: Soft  Stool Color: Brown  Stool Amount: Large  Stool Source: Rectum  Last BM (including prior to admit): 08/10/23   [x]  Continent bladder        [] Incontinent bladder           []Bladder training        []Gaston     [x]  Continent bowel        [] Incontinent bowel              []Bowel training    Education:   [] Stroke Prevention   [] Diabetic Teaching   [x] Fall Risk    []Blood Pressure Management   [] Wound Care/Pressure Injury    [x]Medication Management     []Pain Management           [] Other:    [] Caregiver Education completed:  No   [] Lab value concerns   No       Occupational Therapy  Making gains  Yes   Treatment Barriers:  [] Fatigue                   [] Pain                  [] Participation      [x] Cognition             [] Incontinence           [] Precautions      [] Communication/Language   [x] Safety awareness     [] Visual/Perceptual deficits     [x] Other: [facility] 10:29 AM  Physician signature:      Date/time:    Team Conference Recorder Jayne Blanton  Date 8/10/2023    Team members participating in today's conference.    [x]  Trini Rogers, PT     []                     [] Judy Ontiveros, OT         [x] Thomasina Bamberger, OT       [] Audelia Todd, OT          [x]  Guy Chaney, GIOVANI    [x] Jayne Blanton, SW        [] Beth Qureshi RN    [x] Abhi Mercado, NP  [x] Other           Darell Padgett RN
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
## 2023-08-11 PROBLEM — Z78.9 IMPAIRED MOBILITY AND ADLS: Status: ACTIVE | Noted: 2023-08-11

## 2023-08-11 PROBLEM — Z74.09 IMPAIRED MOBILITY AND ADLS: Status: ACTIVE | Noted: 2023-08-11

## 2023-08-11 PROBLEM — D64.9 ANEMIA: Status: ACTIVE | Noted: 2023-08-11

## 2023-08-11 PROCEDURE — 1180000000 HC REHAB R&B

## 2023-08-11 PROCEDURE — 94761 N-INVAS EAR/PLS OXIMETRY MLT: CPT

## 2023-08-11 PROCEDURE — 97530 THERAPEUTIC ACTIVITIES: CPT

## 2023-08-11 PROCEDURE — 99232 SBSQ HOSP IP/OBS MODERATE 35: CPT | Performed by: EMERGENCY MEDICINE

## 2023-08-11 PROCEDURE — 97110 THERAPEUTIC EXERCISES: CPT

## 2023-08-11 PROCEDURE — 97116 GAIT TRAINING THERAPY: CPT

## 2023-08-11 PROCEDURE — 97129 THER IVNTJ 1ST 15 MIN: CPT

## 2023-08-11 PROCEDURE — 92507 TX SP LANG VOICE COMM INDIV: CPT

## 2023-08-11 PROCEDURE — 6370000000 HC RX 637 (ALT 250 FOR IP): Performed by: EMERGENCY MEDICINE

## 2023-08-11 PROCEDURE — 97535 SELF CARE MNGMENT TRAINING: CPT

## 2023-08-11 RX ADMIN — METOPROLOL TARTRATE 25 MG: 25 TABLET, FILM COATED ORAL at 08:51

## 2023-08-11 RX ADMIN — FERROUS SULFATE TAB 325 MG (65 MG ELEMENTAL FE) 325 MG: 325 (65 FE) TAB at 08:51

## 2023-08-11 RX ADMIN — AMLODIPINE BESYLATE 5 MG: 5 TABLET ORAL at 08:51

## 2023-08-11 RX ADMIN — ATORVASTATIN CALCIUM 20 MG: 20 TABLET, FILM COATED ORAL at 20:47

## 2023-08-11 RX ADMIN — LEVETIRACETAM 500 MG: 500 TABLET, FILM COATED ORAL at 08:51

## 2023-08-11 RX ADMIN — MULTIPLE VITAMINS W/ MINERALS TAB 1 TABLET: TAB at 08:51

## 2023-08-11 RX ADMIN — Medication 6 MG: at 20:47

## 2023-08-11 RX ADMIN — LEVETIRACETAM 500 MG: 500 TABLET, FILM COATED ORAL at 20:47

## 2023-08-11 RX ADMIN — BISACODYL 10 MG: 10 SUPPOSITORY RECTAL at 14:52

## 2023-08-11 RX ADMIN — METOPROLOL TARTRATE 25 MG: 25 TABLET, FILM COATED ORAL at 20:47

## 2023-08-11 RX ADMIN — PANTOPRAZOLE SODIUM 40 MG: 40 TABLET, DELAYED RELEASE ORAL at 06:39

## 2023-08-11 ASSESSMENT — PAIN SCALES - GENERAL
PAINLEVEL_OUTOF10: 0

## 2023-08-11 ASSESSMENT — PAIN SCALES - WONG BAKER
WONGBAKER_NUMERICALRESPONSE: 0
WONGBAKER_NUMERICALRESPONSE: 0

## 2023-08-11 NOTE — PLAN OF CARE
Problem: SLP Adult - Impaired Communication  Goal: By Discharge: Demonstrates communication skills at highest level of function for planned discharge setting. See evaluation for individualized goals. Description: Long term goals (initiated 8/9/23; to be met by 8/23/23)  Patient will:  1. Be oriented x 3 using the white board as a reference, min cues. 2. Recall events of the day using environmental aids, min cues. 3. Follow simple instructions, 90% accuracy; 2 part instructions 60-70% accuracy. 4. Name 7-9 items in divergent naming tasks, supervision. 5. Respond to questions about himself, his environment, and general information in phrases and sentences, 80% accuracy  6. Perform basic problem solving/reasoning tasks, 70-80% accuracy. Short term goals (by 8/16/23)  Patient will:  1. Be oriented x 3 using the white board as a reference, mod cues. 2. Recall events of the day using environmental aids, mod cues. 3. Follow simple instructions, 80% accuracy; 2 part instructions 60% accuracy. 4. Name 5-7 items in divergent naming tasks, min assist.  5. Respond to questions about himself, his environment, and general information in phrases and sentences, 60% accuracy  6. Perform basic problem solving/reasoning tasks, 60% accuracy. Note:   SPEECH-LANGUAGE TREATMENT    Patient: Salvador Angel (78 y.o. male)  Date: 8/11/2023  Diagnosis: Subdural hematoma ([address]) [S06. 5XAA] Subdural hematoma (HCC)      Precautions: Standard  PLOF: As per H&P     ASSESSMENT:  Mr. Sera Elam was much more alert and responsive with his wife present today. Progression toward goals:  []       Improving appropriately and progressing toward goals  [x]       Improving slowly and progressing toward goals  []       Not making progress toward goals and plan of care will be adjusted     PLAN:  Patient continues to benefit from skilled intervention to address the above impairments. Continue treatment per established plan of care.

## 2023-08-11 NOTE — PLAN OF CARE
Problem: Physical Therapy - Adult  Goal: By Discharge: Performs mobility at highest level of function for planned discharge setting. See evaluation for individualized goals. Description: Physical Therapy Short Term Goals  Initiated 2023 and to be accomplished within 7 day(s) [23]  1. Patient will supine to sit, sit to supine, roll right, and roll left  in bed with modified independence. 2.  Patient will transfer from bed to chair and chair to bed with SBA using a RW. 3.  Patient will perform sit to stand with SBA/CGA from bed, w/c, and toilet heights  4. Patient will ambulate with contact guard assist/SBA for 100 feet with RW.   5.  Patient will propel w/c 150 ft with supervision for mobility on unit. Physical Therapy Long Term Goals  Initiated 2023 and to be accomplished within 14 day(s) [23]  1. Patient will supine to sit, sit to supine, roll right, and roll left in bed with independence, without bed rail. 2.  Patient will transfer from bed to chair and chair to bed with supervision using the least restrictive device. 3.  Patient will perform sit to stand with supervision from various surface heights. 4.  Patient will ambulate with SBA for 150 feet with the least restrictive device. 5.  Patient will ascend/descend 1 curb with least restrictive device with SBA/CGA to negotiate curb to sidewalk for entry into home. Outcome: Progressing     PHYSICAL THERAPY TREATMENT    Patient: Missael Crystal (78 y.o. male)  Date: 2023  Diagnosis: Subdural hematoma ([address]) [S06. 5XAA]   Precautions: fall risk  Chart, physical therapy assessment, plan of care and goals were reviewed. Time in: 1045  Time out : 1140    Patient seen for: gait training, txfr training, balance, bed mobility, therapeutic exercises    Pain:  Pt pain was reported as no c/o pre-treatment. Pt pain was reported as no c/o post-treatment.   Intervention: NA    Patient identified with name and : Yes    SUBJECTIVE:

## 2023-08-11 NOTE — PALLIATIVE CARE
Palliative Medicine     Palliative Medicine RN spoke with dietitian services to relay patient/family request for added Chocolate Ensure with lunch tray.        Mary Roldan BSN, RN, [facility]  Palliative Medicine Inpatient RN  Palliative COPE Line: [phone]

## 2023-08-11 NOTE — PLAN OF CARE
Problem: Safety - Adult  Goal: Free from fall injury  Outcome: Progressing     Problem: Skin/Tissue Integrity  Goal: Absence of new skin breakdown  Description: 1. Monitor for areas of redness and/or skin breakdown  2. Assess vascular access sites hourly  3. Every 4-6 hours minimum:  Change oxygen saturation probe site  4. Every 4-6 hours:  If on nasal continuous positive airway pressure, respiratory therapy assess nares and determine need for appliance change or resting period. Outcome: Progressing     Problem: ABCDS Injury Assessment  Goal: Absence of physical injury  Outcome: Progressing     Problem: Nutrition Deficit:  Goal: Optimize nutritional status  Outcome: Progressing     Problem: Pain  Goal: Verbalizes/displays adequate comfort level or baseline comfort level  Outcome: Progressing     Problem: Occupational Therapy - Adult  Goal: By Discharge: Performs self-care activities at highest level of function for planned discharge setting. See evaluation for individualized goals. Description: Occupational Therapy Goals   Long Term Goals  Initiated 8/9/2023 and to be accomplished within 2 week(s) 8/23/2023  1. Pt will perform self-feeding with Dale. 2. Pt will perform grooming with Dale. 3. Pt will perform UB bathing with Dale  4. Pt will perform LB bathing with S/SBA  5. Pt will perform tub/shower transfer with S.  6. Pt will perform UB dressing with S/Setup. 7. Pt will perform LB dressing with S/SBA  8. Pt will perform toileting task with S/SBA  9. Pt will perform toilet transfer with SBA        Short Term Goals   Initiated 2/25/2020 and to be accomplished within 7 day(s) 3/3/2020  1. Pt will perform self-feeding with S   2. Pt will perform grooming with CGA>  3. Pt will perform UB bathing with CGA. 4. Pt will perform LB bathing with CGA  5. Pt will perform tub/shower transfer with CGA. 6. Pt will perform UB dressing with CGA. 7. Pt will perform LB dressing with Yuan.   8. Pt will perform toileting task with CGA  9. Pt will perform toilet transfer with CGA         8/10/2023 1800 by Princess Randy OT  Outcome: Progressing     Problem: Physical Therapy - Adult  Goal: By Discharge: Performs mobility at highest level of function for planned discharge setting. See evaluation for individualized goals. Description: Physical Therapy Short Term Goals  Initiated 8/9/2023 and to be accomplished within 7 day(s) [8/16/23]  1. Patient will supine to sit, sit to supine, roll right, and roll left  in bed with modified independence. 2.  Patient will transfer from bed to chair and chair to bed with SBA using a RW. 3.  Patient will perform sit to stand with SBA/CGA from bed, w/c, and toilet heights  4. Patient will ambulate with contact guard assist/SBA for 100 feet with RW.   5.  Patient will propel w/c 150 ft with supervision for mobility on unit. Physical Therapy Long Term Goals  Initiated 8/9/2023 and to be accomplished within 14 day(s) [8/23/23]  1. Patient will supine to sit, sit to supine, roll right, and roll left in bed with independence, without bed rail. 2.  Patient will transfer from bed to chair and chair to bed with supervision using the least restrictive device. 3.  Patient will perform sit to stand with supervision from various surface heights. 4.  Patient will ambulate with SBA for 150 feet with the least restrictive device. 5.  Patient will ascend/descend 1 curb with least restrictive device with SBA/CGA to negotiate curb to sidewalk for entry into home.   8/10/2023 1623 by Lubna Louis, PTA  Outcome: Progressing

## 2023-08-12 PROCEDURE — 97116 GAIT TRAINING THERAPY: CPT

## 2023-08-12 PROCEDURE — 1180000000 HC REHAB R&B

## 2023-08-12 PROCEDURE — 97112 NEUROMUSCULAR REEDUCATION: CPT

## 2023-08-12 PROCEDURE — 6370000000 HC RX 637 (ALT 250 FOR IP): Performed by: FAMILY MEDICINE

## 2023-08-12 PROCEDURE — 6370000000 HC RX 637 (ALT 250 FOR IP): Performed by: EMERGENCY MEDICINE

## 2023-08-12 PROCEDURE — 97110 THERAPEUTIC EXERCISES: CPT

## 2023-08-12 PROCEDURE — 97530 THERAPEUTIC ACTIVITIES: CPT

## 2023-08-12 RX ORDER — CHOLECALCIFEROL (VITAMIN D3) 125 MCG
5 CAPSULE ORAL
Status: DISCONTINUED | OUTPATIENT
Start: 2023-08-12 | End: 2023-08-19 | Stop reason: HOSPADM

## 2023-08-12 RX ADMIN — LEVETIRACETAM 500 MG: 500 TABLET, FILM COATED ORAL at 21:08

## 2023-08-12 RX ADMIN — MULTIPLE VITAMINS W/ MINERALS TAB 1 TABLET: TAB at 09:19

## 2023-08-12 RX ADMIN — ATORVASTATIN CALCIUM 20 MG: 20 TABLET, FILM COATED ORAL at 21:08

## 2023-08-12 RX ADMIN — PANTOPRAZOLE SODIUM 40 MG: 40 TABLET, DELAYED RELEASE ORAL at 07:07

## 2023-08-12 RX ADMIN — METOPROLOL TARTRATE 25 MG: 25 TABLET, FILM COATED ORAL at 21:08

## 2023-08-12 RX ADMIN — LEVETIRACETAM 500 MG: 500 TABLET, FILM COATED ORAL at 09:19

## 2023-08-12 RX ADMIN — METOPROLOL TARTRATE 25 MG: 25 TABLET, FILM COATED ORAL at 09:19

## 2023-08-12 RX ADMIN — FERROUS SULFATE TAB 325 MG (65 MG ELEMENTAL FE) 325 MG: 325 (65 FE) TAB at 09:23

## 2023-08-12 RX ADMIN — Medication 5 MG: at 21:08

## 2023-08-12 ASSESSMENT — PAIN SCALES - GENERAL
PAINLEVEL_OUTOF10: 0

## 2023-08-12 ASSESSMENT — PAIN SCALES - WONG BAKER
WONGBAKER_NUMERICALRESPONSE: 0

## 2023-08-12 NOTE — PLAN OF CARE
deficit is balance; wife reports right UE is issue    OBJECTIVE DATA SUMMARY:    Objective:   Education: Education Given To: Patient; Family  Education Provided: Equipment;Transfer Training; Fall Prevention Strategies; Safety  Education Method: Verbal  Barriers to Learning: Cognition; Hearing  Education Outcome: Verbalized understanding;Continued education needed  BED/MAT MOBILITY Daily Assessment    Rolling Right Stand by assistance    Rolling Left Stand by assistance    Supine to Sit Stand by assistance    Sit to Supine Stand by assistance      TRANSFERS Daily Assessment    Sit to Stand Contact guard assistance    Transfer Assist Score Contact guard assistance             Comments        Car Transfer      Car Type         GAIT Daily Assessment    Gait Deviations Slow Katty;Decreased step length;Decreased step height;Shuffles    Assistive device Rolling Walker    Ambulation assistance - surface  Contact guard assistance  Level tile    Distance 150ft x 2 with RW; 20 ft x 2 SPC    Comments     Ambulation-uneven surface            SPC using bolster to keep NELIDA wider, cueing for cane lateral and fwd for increase NELIDA    STEPS/STAIRS Daily Assessment     Steps/Stairs ambulated 2  6\"    Assistance Required Minimal assistance    Rail Use Bilateral    Comments Step up/down on first step in gym; need VC to lookup and some diff clearing feet to get on top of stair     Curbs/Ramps    Rolling walker        BALANCE Daily Assessment    Posture      Sitting - Static Good    Sitting - Dynamic Fair;+    Standing - Static Fair    Standing - Dynamic Fair;-    Comments        WHEELCHAIR MOBILITY/MANAGEMENT Daily Assessment   Able to Propel     Assist Level     Curbs/ramps assistance required     Wheelchair management     Comments Not tested     THERAPEUTIC EXERCISES Daily Assessment             EXERCISE   Sets   Reps   Active Active Assist   Passive Self ROM   Comments   Seated march 1 15 [x] [] []  []      Seated toe raise 1 15 [x]  [] []  []      LAQ, 2lb 1 15 [x]  []  []  []      Bicep curl to shld press, 2lb 2 10 [x]  []  []  []      TS extension/posture 1 10 [x]  []  []  []      Step up/down 1 10 [x]  []  []  []      Sit to stand 1 10 []  []  []  []      Gripper, red 1 10, 5 sec []  []  []  []           []  []  []  []           []  []  []  []            []  []  []  []           Neuro Re-Education:  Posture, balance    ASSESSMENT:  Progress with gait, continues to need cueing for step length, NELIDA, and obstacles. Progression toward goals:  []      Improving appropriately and progressing toward goals  [x]      Improving slowly and progressing toward goals  []      Not making progress toward goals and plan of care will be adjusted      PLAN:  Patient continues to benefit from skilled intervention to address the above impairments. Continue treatment per established plan of care. Discharge Recommendations:     Further Equipment Recommendations for Discharge:                      Estimated Discharge Date:TBD    Activity Tolerance:   Fair +  Please refer to the flowsheet for vital signs taken during this treatment.     After treatment:   [] Patient left in no apparent distress in bed  [x] Patient left in no apparent distress sitting up in chair, wife present, nursing notified  [] Patient left in no apparent distress in w/c mobilizing under own power  [] Patient left in no apparent distress dining area  [] Patient left in no apparent distress mobilizing under own power  [] Call bell left within reach  [] Nursing notified  [] Caregiver present  [] Bed alarm activated   [] Chair alarm activated        Martha Booth, PT  8/12/2023

## 2023-08-12 NOTE — PLAN OF CARE
Problem: Safety - Adult  Goal: Free from fall injury  Outcome: Progressing     Problem: Skin/Tissue Integrity  Goal: Absence of new skin breakdown  Description: 1. Monitor for areas of redness and/or skin breakdown  2. Assess vascular access sites hourly  3. Every 4-6 hours minimum:  Change oxygen saturation probe site  4. Every 4-6 hours:  If on nasal continuous positive airway pressure, respiratory therapy assess nares and determine need for appliance change or resting period.   Outcome: Progressing     Problem: ABCDS Injury Assessment  Goal: Absence of physical injury  Outcome: Progressing     Problem: Pain  Goal: Verbalizes/displays adequate comfort level or baseline comfort level  Outcome: Progressing  Flowsheets (Taken 8/11/2023 [id number] by Santos Hess RN)  Verbalizes/displays adequate comfort level or baseline comfort level: Encourage patient to monitor pain and request assistance

## 2023-08-12 NOTE — PROGRESS NOTES
Nutrition Note      Received report from Palliative that pt and family requesting to receive ensure supplement TID; agreed with plan, given that pt continues to have variable meal intake. Order modified this morning. Nutrition Recommendations/Plan:   Modify supplement: increase Ensure Plus to TID (350 kcal, 20 gm protein each)  Continue daily MVI-mineral supplement r/t pt with malnutrition  Continue all other nutrition interventions. Encourage/ monitor po intake of meals and supplements.          Electronically signed by Nathan Sherman RD on 8/11/23 at 8:21 PM EDT    Contact: [phone]

## 2023-08-13 PROCEDURE — 6370000000 HC RX 637 (ALT 250 FOR IP): Performed by: FAMILY MEDICINE

## 2023-08-13 PROCEDURE — 1180000000 HC REHAB R&B

## 2023-08-13 PROCEDURE — 6370000000 HC RX 637 (ALT 250 FOR IP): Performed by: EMERGENCY MEDICINE

## 2023-08-13 RX ADMIN — LEVETIRACETAM 500 MG: 500 TABLET, FILM COATED ORAL at 20:12

## 2023-08-13 RX ADMIN — AMLODIPINE BESYLATE 5 MG: 5 TABLET ORAL at 08:49

## 2023-08-13 RX ADMIN — Medication 5 MG: at 20:12

## 2023-08-13 RX ADMIN — FERROUS SULFATE TAB 325 MG (65 MG ELEMENTAL FE) 325 MG: 325 (65 FE) TAB at 08:50

## 2023-08-13 RX ADMIN — BISACODYL 10 MG: 10 SUPPOSITORY RECTAL at 18:21

## 2023-08-13 RX ADMIN — METOPROLOL TARTRATE 25 MG: 25 TABLET, FILM COATED ORAL at 08:49

## 2023-08-13 RX ADMIN — MULTIPLE VITAMINS W/ MINERALS TAB 1 TABLET: TAB at 08:49

## 2023-08-13 RX ADMIN — ATORVASTATIN CALCIUM 20 MG: 20 TABLET, FILM COATED ORAL at 20:12

## 2023-08-13 RX ADMIN — LEVETIRACETAM 500 MG: 500 TABLET, FILM COATED ORAL at 08:49

## 2023-08-13 RX ADMIN — PANTOPRAZOLE SODIUM 40 MG: 40 TABLET, DELAYED RELEASE ORAL at 06:18

## 2023-08-13 RX ADMIN — METOPROLOL TARTRATE 25 MG: 25 TABLET, FILM COATED ORAL at 20:11

## 2023-08-13 RX ADMIN — SENNOSIDES AND DOCUSATE SODIUM 2 TABLET: 50; 8.6 TABLET ORAL at 10:09

## 2023-08-13 ASSESSMENT — PAIN SCALES - WONG BAKER
WONGBAKER_NUMERICALRESPONSE: 0

## 2023-08-13 ASSESSMENT — PAIN SCALES - GENERAL
PAINLEVEL_OUTOF10: 0

## 2023-08-14 ENCOUNTER — CARE COORDINATION (OUTPATIENT)
Facility: CLINIC | Age: 83
End: 2023-08-14

## 2023-08-14 PROCEDURE — 97110 THERAPEUTIC EXERCISES: CPT

## 2023-08-14 PROCEDURE — 92507 TX SP LANG VOICE COMM INDIV: CPT

## 2023-08-14 PROCEDURE — 97530 THERAPEUTIC ACTIVITIES: CPT

## 2023-08-14 PROCEDURE — 92526 ORAL FUNCTION THERAPY: CPT

## 2023-08-14 PROCEDURE — 97535 SELF CARE MNGMENT TRAINING: CPT

## 2023-08-14 PROCEDURE — 99232 SBSQ HOSP IP/OBS MODERATE 35: CPT | Performed by: EMERGENCY MEDICINE

## 2023-08-14 PROCEDURE — 97150 GROUP THERAPEUTIC PROCEDURES: CPT

## 2023-08-14 PROCEDURE — 97116 GAIT TRAINING THERAPY: CPT

## 2023-08-14 PROCEDURE — 1180000000 HC REHAB R&B

## 2023-08-14 PROCEDURE — 6370000000 HC RX 637 (ALT 250 FOR IP): Performed by: EMERGENCY MEDICINE

## 2023-08-14 PROCEDURE — 6370000000 HC RX 637 (ALT 250 FOR IP): Performed by: FAMILY MEDICINE

## 2023-08-14 RX ADMIN — FERROUS SULFATE TAB 325 MG (65 MG ELEMENTAL FE) 325 MG: 325 (65 FE) TAB at 08:47

## 2023-08-14 RX ADMIN — Medication 5 MG: at 21:09

## 2023-08-14 RX ADMIN — LEVETIRACETAM 500 MG: 500 TABLET, FILM COATED ORAL at 21:09

## 2023-08-14 RX ADMIN — METOPROLOL TARTRATE 25 MG: 25 TABLET, FILM COATED ORAL at 08:47

## 2023-08-14 RX ADMIN — ACETAMINOPHEN 325MG 650 MG: 325 TABLET ORAL at 03:08

## 2023-08-14 RX ADMIN — AMLODIPINE BESYLATE 5 MG: 5 TABLET ORAL at 08:47

## 2023-08-14 RX ADMIN — MULTIPLE VITAMINS W/ MINERALS TAB 1 TABLET: TAB at 08:47

## 2023-08-14 RX ADMIN — PANTOPRAZOLE SODIUM 40 MG: 40 TABLET, DELAYED RELEASE ORAL at 06:37

## 2023-08-14 RX ADMIN — METOPROLOL TARTRATE 25 MG: 25 TABLET, FILM COATED ORAL at 21:09

## 2023-08-14 RX ADMIN — LEVETIRACETAM 500 MG: 500 TABLET, FILM COATED ORAL at 08:47

## 2023-08-14 RX ADMIN — ATORVASTATIN CALCIUM 20 MG: 20 TABLET, FILM COATED ORAL at 21:09

## 2023-08-14 ASSESSMENT — PAIN SCALES - GENERAL
PAINLEVEL_OUTOF10: 0

## 2023-08-14 NOTE — PLAN OF CARE
Problem: Physical Therapy - Adult  Goal: By Discharge: Performs mobility at highest level of function for planned discharge setting. See evaluation for individualized goals. Description: Physical Therapy Short Term Goals  Initiated 8/9/2023 and to be accomplished within 7 day(s) [8/16/23]  1. Patient will supine to sit, sit to supine, roll right, and roll left  in bed with modified independence. 2.  Patient will transfer from bed to chair and chair to bed with SBA using a RW. 3.  Patient will perform sit to stand with SBA/CGA from bed, w/c, and toilet heights  4. Patient will ambulate with contact guard assist/SBA for 100 feet with RW.   5.  Patient will propel w/c 150 ft with supervision for mobility on unit. Physical Therapy Long Term Goals  Initiated 8/9/2023 and to be accomplished within 14 day(s) [8/23/23]  1. Patient will supine to sit, sit to supine, roll right, and roll left in bed with independence, without bed rail. 2.  Patient will transfer from bed to chair and chair to bed with supervision using the least restrictive device. 3.  Patient will perform sit to stand with supervision from various surface heights. 4.  Patient will ambulate with SBA for 150 feet with the least restrictive device. 5.  Patient will ascend/descend 1 curb with least restrictive device with SBA/CGA to negotiate curb to sidewalk for entry into home. Outcome: Progressing     PHYSICAL THERAPY TREATMENT    Patient: Kadeem Rivas (78 y.o. male)  Date: 8/14/2023  Diagnosis: Subdural hematoma ([address]) [S06. 5XAA]   Precautions: fall risk  Chart, physical therapy assessment, plan of care and goals were reviewed. Time in: 1140  Time out : 1240    Patient seen for: gait training, stair training, txfr training, therapeutic exercises  Time in: 1400  Time out: 1433  Pt seen for: group therapy session  Pain:  Pt pain was reported as no c/o pre-treatment. Pt pain was reported as no c/o post-treatment.   Intervention:

## 2023-08-14 NOTE — PROGRESS NOTES
Pt is an 78year old male admitted to ARU. Pt is alert and oriented, alone in the room. Pt states that he lives with his wife and son in a 2 level condo with 3 steps to enter and 10 steps to the second floor. Pt states that his bedroom is on the first floor with a walk in shower. Pt states that prior to admission he was able to self care and denies history with DME, home health, outpatient therapy and SNF. 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 Pt states that he sees Dr Becca Blake as his PCP and fills his medications at [facility] on [address]. Pt states that he has received his COVID vaccine/booster. Pt states that he does not have a POA and that his wife, Iman Vasquez ([phone]), is his NOK contact and helper at [employer]. Pt consents to calling his wife to schedule caregiver education. Pt affirms his insurance as medicare and bcbs fep. Sw reviewed dc planning. Pt states understanding and denies questions. Sw will follow.

## 2023-08-14 NOTE — PALLIATIVE CARE
[address] [facility] Floor: [phone] ([phone])  [facility]: [phone]    Goals of care defined. Palliative team will sign off. Please consult team as needed, if appropriate. Thank you for the Palliative Medicine consult and allowing us to participate in the care of Mr. Donnie Holden.             CODE STATUS -DNR/DNI DDNR form on file       Nilson BELTRAN RN, [address]: [phone]

## 2023-08-14 NOTE — CARE COORDINATION
Care Transitions     Per Chart review, patient continues to reside at the  [facility], Acute inpatient unit. Care Transitions Nurse ( CTN)  will attempt to monitor for discharge follow up for approximately 30 days post hospital discharge ( 8-8-2023).

## 2023-08-15 PROCEDURE — 1180000000 HC REHAB R&B

## 2023-08-15 PROCEDURE — 6370000000 HC RX 637 (ALT 250 FOR IP): Performed by: EMERGENCY MEDICINE

## 2023-08-15 PROCEDURE — 92507 TX SP LANG VOICE COMM INDIV: CPT

## 2023-08-15 PROCEDURE — 97530 THERAPEUTIC ACTIVITIES: CPT

## 2023-08-15 PROCEDURE — 6370000000 HC RX 637 (ALT 250 FOR IP): Performed by: FAMILY MEDICINE

## 2023-08-15 PROCEDURE — 97535 SELF CARE MNGMENT TRAINING: CPT

## 2023-08-15 PROCEDURE — 99232 SBSQ HOSP IP/OBS MODERATE 35: CPT | Performed by: EMERGENCY MEDICINE

## 2023-08-15 PROCEDURE — 97116 GAIT TRAINING THERAPY: CPT

## 2023-08-15 PROCEDURE — 97129 THER IVNTJ 1ST 15 MIN: CPT

## 2023-08-15 RX ADMIN — ALUMINUM HYDROXIDE AND MAGNESIUM HYDROXIDE 30 ML: 200; 200 SUSPENSION ORAL at 17:11

## 2023-08-15 RX ADMIN — PANTOPRAZOLE SODIUM 40 MG: 40 TABLET, DELAYED RELEASE ORAL at 05:54

## 2023-08-15 RX ADMIN — Medication 5 MG: at 20:33

## 2023-08-15 RX ADMIN — MULTIPLE VITAMINS W/ MINERALS TAB 1 TABLET: TAB at 09:24

## 2023-08-15 RX ADMIN — METOPROLOL TARTRATE 25 MG: 25 TABLET, FILM COATED ORAL at 09:24

## 2023-08-15 RX ADMIN — ATORVASTATIN CALCIUM 20 MG: 20 TABLET, FILM COATED ORAL at 20:12

## 2023-08-15 RX ADMIN — LEVETIRACETAM 500 MG: 500 TABLET, FILM COATED ORAL at 09:24

## 2023-08-15 RX ADMIN — BISACODYL 10 MG: 10 SUPPOSITORY RECTAL at 20:12

## 2023-08-15 RX ADMIN — LEVETIRACETAM 500 MG: 500 TABLET, FILM COATED ORAL at 20:12

## 2023-08-15 RX ADMIN — AMLODIPINE BESYLATE 5 MG: 5 TABLET ORAL at 09:24

## 2023-08-15 RX ADMIN — FERROUS SULFATE TAB 325 MG (65 MG ELEMENTAL FE) 325 MG: 325 (65 FE) TAB at 09:24

## 2023-08-15 ASSESSMENT — PAIN SCALES - GENERAL
PAINLEVEL_OUTOF10: 0

## 2023-08-15 ASSESSMENT — PAIN DESCRIPTION - RADICULAR PAIN: RADICULAR_PAIN: ABSENT

## 2023-08-15 NOTE — PROGRESS NOTES
OT WEEKLY PROGRESS NOTE  Patient Name:Roel Martinez. First Treatment Session  Time In: 930  Time Out: 9511    Second Treatment Session  Time In: 5029  Time Out: 1200    Medical Diagnosis:  Subdural hematoma ([address]) [S06. 5XAA] Subdural hematoma (HCC)     Precautions: Fall Risk, Seizure (hard of hearing),  ,  ,  ,  ,  ,  ,      Pain at start of tx:0/10 pain or discomfort. Pain at stop of tx:0/10 pain or discomfort. Patient identified with name and :yes  Subjective: Pts wife reports they have DME (shower bench) at home. TTB not appropriate d/t glass shower doors surrounding tub. Objective: This reporting period, pt has participated in various therex, theract, functional transfer training, self-care retraining, and ongoing pt education in effort to improve overall strength, activity tolerance, standing balance, and overall safety awareness for carryover into functional ADL performance skills req for safe D/C.      COGNITION/PERCEPTION Initial Assessment Weekly Progress Assessment 8/15/2023   Overall orientation status Within Functional Limits  WFL   Orientation level Oriented to person, Oriented to situation, Oriented to time, Oriented to place (looked at board for month and reported today is Tuesday)  A&Ox4   Overall cognitive status WNL  WNL   Cognition comment       Vision - Basic assessmen       Perception       Overall sensation status         EATING Initial Assessment Weekly Progress Assessment 8/15/2023   Functional Level Stand by assistance, Setup Feeding: Supervision;Setup; Adaptive utensils (comment)   Clinical factors Pt setup Feeding Skilled Clinical Factors: Sup with s/u utilizing AE built up handle on utensils for improved grasp     GROOMING Initial Assessment Weekly Progress Assessment 8/15/2023   Functional Level Minimal assistance Grooming: Supervision;Setup   Clinical factors  Pt perseverated on washing face requiring vcs to sequence Grooming Skilled Clinical Factors: Pt completes Clinical Factors: Pts wife present for OT tx session on this date, updated on progress towards goals, current limitations, and DME recommendations. Plan of Care: Please see Care Plan for updated STG/LTGs.    Estimated LOS: TBD  Entered Differentiated Treatment minutes: yes    PIYUSH Sanchez  8/15/2023

## 2023-08-15 NOTE — PLAN OF CARE
identified with name and : Yes    SUBJECTIVE:      Pt upset with night nursing due to long waits with answering call bell and having pt use bed pan rather than toilet. OBJECTIVE DATA SUMMARY:    Objective:   Education: Education Given To: Patient  Education Provided: Mobility Training;Transfer Training; Safety  Education Method: Verbal  Barriers to Learning: Cognition; Hearing  Education Outcome: Verbalized understanding;Continued education needed  Skilled Clinical Factors: Pts wife present for OT tx session on this date, updated on progress towards goals, current limitations, and DME recommendations. BED/MAT MOBILITY Daily Assessment    Rolling Right Modified independent    Rolling Left      Supine to Sit Modified independent on right side of bed. Sit to Supine        TRANSFERS Daily Assessment    Sit to Stand Stand by assistance with v/c for proper hand placement    Transfer Assist Score Stand by assistance             Comments    Pt performed stand step txfr mat table<->w/c with RW and SBA with v/c for safety with backing up to w/c with RW. Car Transfer      Car Type         GAIT Daily Assessment    Gait Deviations Decreased step length;Decreased step height;Deviated path    Assistive device  Rolling walker    Ambulation assistance - surface   SBA  Level tile    Distance [id number]    Comments Pt ambulated to and from gym with RW and SBA with v/c to slow pacing, remain within base of RW and lift head for fwd gaze. Pt ambulated to and from gym in PM with RW and SBA with v/c to slow pacing and attend to right side of environment to prevent collision with obstacles on right. Ambulation-uneven surface  uneven;outdoors  Stand by assistance;Contact guard assistance  Pt ambulated outside in courtyard 140ft and 50ft with RW and SBA/CGA with sitting rest break between trials.  Pt required v/c to slow pace, remain within base of RW and attend to right side to prevent colliding with bushes or off edge of

## 2023-08-15 NOTE — PROGRESS NOTES
[address] [facility]  Team Conference  Date: 8/15/2023  ACTIVE MONITORING OF CO-MORBID CONDITIONS/MANAGEMENT OF NEW MEDICAL ISSUES: Subdural hematoma ([address]) [S06. 5XAA]    **Please refer to patient's electronic medical record to view the care plan and goals**  NURSING Making gains Yes   Skin Care: Skin Integumentary   Skin Color: Ecchymosis (comment)  Skin Condition/Temp: Dry, Warm  Skin Integrity: Ecchymosis, Incision (see LDA)  Location: head  Skin Fold Management: No  Nails: Within Defined Limits    Wound Care:           Pain: Pain Assessment  Pain Assessment: None - Denies Pain (08/15/23 0315)  Pain Level: 0 (08/15/23 0315)  Bashir-Mathis Pain Rating: No hurt (08/13/23 1600)  Patient's Stated Pain Goal: 0 - No pain (08/13/23 1600)  Pain Location: Head;Arm (08/08/23 1657)  Pain Orientation: Left (08/08/23 1657)  Pain Descriptors: Aching (08/08/23 1657)    Bladder/Bowel Urine Assessment  Urinary Status: Voiding  Urinary Incontinence: Absent  Urine Color: Yellow/straw  Urine Appearance: Clear  Urine Odor: No odor Stool Assessment  Incontinence: No  Stool Appearance: Soft, Formed  Stool Color: Green  Stool Amount: Medium  Stool Source: Rectum  Last BM (including prior to admit): 08/15/23   [x]  Continent bladder        [] Incontinent bladder           []Bladder training        []Gaston     [x]  Continent bowel        [] Incontinent bowel              []Bowel training    Education:   [] Stroke Prevention   [] Diabetic Teaching   [x] Fall Risk    []Blood Pressure Management   [] Wound Care/Pressure Injury    [x]Medication Management     []Pain Management           [] Other:    [] Caregiver Education completed:  No   [] Lab value concerns   Yes       Occupational Therapy  Making gains  Yes   Treatment Barriers:  [] Fatigue                   [] Pain                  [] Participation      [x] Cognition             [] Incontinence           [] Precautions      [] Communication/Language   [x] Safety awareness     [] Visual/Perceptual deficits     [x] Other: [place]   Treatment Areas of Focus/Interventions: [x]Coordination     [x]Strength   [x]ADL Training     Uskape Training        []Cognitive Training      []Perceptual Training        []Home Management Training   []Improved UE Function    []Functional Mobility Training    []Toileting schedule      []OOB schedule            []Modalities (E-stim, etc.):   [] NMRE      [x]Safety Awareness    [] other:    [] Caregiver Education completed No      ADL Accessibility Limitations:glass shower doors          Physical Therapy Making gains Yes   Treatment Barriers:  [x] Fatigue                   [] Pain                  [] Participation      [] Incontinence          [] Precautions      [x] Communication/Language  [x]Cognition   [x] Safety awareness  [] Visual Deficits     [x]Balance  [] Other:    Treatment Areas of Focus/Interventions: [x]Balance     []Strength   []Coordination     [x]Transfer Training        []Mobility     []Sitting Balance  [x]Gait Training   [x]Improve safety awareness     []W/C Mobility        [x]Stair Training        []NMRE  []Modalities (E-stim, Lite gait etc.):   [] Caregiver Education completed No      Household Mobility Accessibility Limitations:1 step to enter       Speech Therapy Making gains Yes   Treatment Barriers:  [] Fatigue                   [] Pain                  [] Participation      []Impulsivity               [] Aphasia                  []Attention           [x]Cognition   [x] Dysphagia              [x] Other: communication, memory   Treatment Areas of Focus/Interventions: []Attention   []Orientation   [x]Cognition   []Problem solving        []Language      []Speech  [x]Memory  [x]Swallowing   []Safety/Awareness     []Dysarthria      [] Other:   [] Caregiver Education completed No     Therapy Minutes Density Met: Yes    Therapy Minutes Not met Action/Justification:   [] Pt on medical hold  [] Pt refusing therapy despite

## 2023-08-15 NOTE — PROGRESS NOTES
Comprehensive Nutrition Assessment    Type and Reason for Visit:  Reassess       Nutrition Recommendations/Plan:   Continue supplement: Ensure Plus TID (350 kcal, 20 gm protein each)  Continue daily MVI-mineral supplement r/t pt with malnutrition  Continue all other nutrition interventions. Encourage/ monitor po intake of meals and supplements. Malnutrition Assessment:  Malnutrition Status:  Severe malnutrition (08/08/23 0480)    Context:  Chronic Illness     Findings of the 6 clinical characteristics of malnutrition:  Energy Intake:  75% or less estimated energy requirements for 1 month or longer  Weight Loss:  Greater than 7.5% over 3 months     Body Fat Loss:  Unable to assess     Muscle Mass Loss:  Unable to assess    Fluid Accumulation:  No significant fluid accumulation     Strength:  Not Performed    Nutrition Assessment:    Pt continues to have fair/ variable meal intake. Tolerating po diet. Consuming nutrition supplements. Noted plan for discharge in coming days. Nutrition Related Findings:    BM 8/15. pertinent meds: lipitor, ferrous sulfate, protonix, MVI-mineral, keppra. Pertinent labs reviewed; BMP and Mg last checked on 8/9 Wound Type: None       Current Nutrition Intake & Therapies:    Average Meal Intake: 26-50%, 51-75%, %  Average Supplements Intake: %  ADULT ORAL NUTRITION SUPPLEMENT; Breakfast, Lunch, Dinner; Standard High Calorie/High Protein Oral Supplement  ADULT DIET; Regular    Anthropometric Measures:  Height: 5' 7\" (170.2 cm)  Ideal Body Weight (IBW): 148 lbs (67 kg)    Admission Body Weight: 153 lb 11.2 oz (69.7 kg)  Current Body Weight: 153 lb 11.2 oz (69.7 kg), 103.9 % IBW.  Weight Source: Bed Scale  Current BMI (kg/m2): 24.1  Usual Body Weight: 170 lb (77.1 kg)  % Weight Change (Calculated): -9.6  Weight Adjustment For: No Adjustment  BMI Categories: Normal Weight (BMI 22.0 to 24.9) age over 72    Estimated Daily Nutrient Needs:  Energy Requirements Based On:

## 2023-08-16 PROCEDURE — 1180000000 HC REHAB R&B

## 2023-08-16 PROCEDURE — 97150 GROUP THERAPEUTIC PROCEDURES: CPT

## 2023-08-16 PROCEDURE — 97530 THERAPEUTIC ACTIVITIES: CPT

## 2023-08-16 PROCEDURE — 97116 GAIT TRAINING THERAPY: CPT

## 2023-08-16 PROCEDURE — 97110 THERAPEUTIC EXERCISES: CPT

## 2023-08-16 PROCEDURE — 6370000000 HC RX 637 (ALT 250 FOR IP): Performed by: EMERGENCY MEDICINE

## 2023-08-16 PROCEDURE — 97129 THER IVNTJ 1ST 15 MIN: CPT

## 2023-08-16 PROCEDURE — 97130 THER IVNTJ EA ADDL 15 MIN: CPT

## 2023-08-16 PROCEDURE — 92507 TX SP LANG VOICE COMM INDIV: CPT

## 2023-08-16 PROCEDURE — 99232 SBSQ HOSP IP/OBS MODERATE 35: CPT | Performed by: EMERGENCY MEDICINE

## 2023-08-16 PROCEDURE — 6370000000 HC RX 637 (ALT 250 FOR IP): Performed by: FAMILY MEDICINE

## 2023-08-16 RX ADMIN — AMLODIPINE BESYLATE 5 MG: 5 TABLET ORAL at 08:12

## 2023-08-16 RX ADMIN — METOPROLOL TARTRATE 25 MG: 25 TABLET, FILM COATED ORAL at 21:11

## 2023-08-16 RX ADMIN — METOPROLOL TARTRATE 25 MG: 25 TABLET, FILM COATED ORAL at 08:12

## 2023-08-16 RX ADMIN — MULTIPLE VITAMINS W/ MINERALS TAB 1 TABLET: TAB at 08:12

## 2023-08-16 RX ADMIN — ACETAMINOPHEN 325MG 650 MG: 325 TABLET ORAL at 11:22

## 2023-08-16 RX ADMIN — PANTOPRAZOLE SODIUM 40 MG: 40 TABLET, DELAYED RELEASE ORAL at 06:14

## 2023-08-16 RX ADMIN — LEVETIRACETAM 500 MG: 500 TABLET, FILM COATED ORAL at 08:12

## 2023-08-16 RX ADMIN — FERROUS SULFATE TAB 325 MG (65 MG ELEMENTAL FE) 325 MG: 325 (65 FE) TAB at 08:12

## 2023-08-16 RX ADMIN — LEVETIRACETAM 500 MG: 500 TABLET, FILM COATED ORAL at 21:11

## 2023-08-16 RX ADMIN — Medication 5 MG: at 21:11

## 2023-08-16 RX ADMIN — ATORVASTATIN CALCIUM 20 MG: 20 TABLET, FILM COATED ORAL at 21:11

## 2023-08-16 ASSESSMENT — PAIN SCALES - GENERAL
PAINLEVEL_OUTOF10: 0
PAINLEVEL_OUTOF10: 5
PAINLEVEL_OUTOF10: 0

## 2023-08-16 ASSESSMENT — PAIN DESCRIPTION - RADICULAR PAIN: RADICULAR_PAIN: ABSENT

## 2023-08-16 ASSESSMENT — PAIN SCALES - WONG BAKER
WONGBAKER_NUMERICALRESPONSE: 0

## 2023-08-16 ASSESSMENT — PAIN DESCRIPTION - ORIENTATION: ORIENTATION: POSTERIOR

## 2023-08-16 ASSESSMENT — PAIN - FUNCTIONAL ASSESSMENT: PAIN_FUNCTIONAL_ASSESSMENT: ACTIVITIES ARE NOT PREVENTED

## 2023-08-16 ASSESSMENT — PAIN DESCRIPTION - LOCATION: LOCATION: NECK

## 2023-08-16 ASSESSMENT — PAIN DESCRIPTION - DESCRIPTORS: DESCRIPTORS: ACHING

## 2023-08-16 NOTE — PROGRESS NOTES
TEAM CONFERENCE FOLLOW-UP  Patient: Karo De Jesus (78 y.o. male)  Date: 8/16/2023  Diagnosis: Subdural hematoma ([address]) [S06. 5XAA] Subdural hematoma ([address])      Precautions:      Met with patient to discuss the findings from 8/16/2023 Team Conference.     Patient requested further information and/or had questions:   No        Quentin Mary

## 2023-08-16 NOTE — PLAN OF CARE
Problem: Occupational Therapy - Adult  Goal: By Discharge: Performs self-care activities at highest level of function for planned discharge setting. See evaluation for individualized goals. Description: Occupational Therapy Goals   Long Term Goals  Initiated 2023 and to be accomplished within 2 week(s) 2023  1. Pt will perform self-feeding with Dale. 2. Pt will perform grooming with Dale. 3. Pt will perform UB bathing with Dale  4. Pt will perform LB bathing with S/SBA  5. Pt will perform tub/shower transfer with S.  6. Pt will perform UB dressing with S/Setup. 7. Pt will perform LB dressing with S/SBA  8. Pt will perform toileting task with S/SBA  9. Pt will perform toilet transfer with SBA        Short Term Goals   Initiated 2020 and to be accomplished within 7 day(s) 3/3/2020  1. Pt will perform self-feeding with S   2. Pt will perform grooming with CGA>  3. Pt will perform UB bathing with CGA. 4. Pt will perform LB bathing with CGA  5. Pt will perform tub/shower transfer with CGA. 6. Pt will perform UB dressing with CGA. 7. Pt will perform LB dressing with Yuan. 8. Pt will perform toileting task with CGA  9. Pt will perform toilet transfer with CGA         Outcome: Progressing    Occupational Therapy TREATMENT    Patient: Susannah Meredith.   78 y.o. Patient identified with name and : Yes    Date: 2023    First Tx Session  Time In:  1030  Time Out:  1130    Group Tx Session  Time In: 1130  Time Out: 1200    Diagnosis: Subdural hematoma ([address]) [S06. 5XAA]   Precautions: Restrictions/Precautions: Fall Risk, Seizure (hard of hearing)         Chart, occupational therapy assessment, plan of care, and goals were reviewed. Pain:  Pt reports 6/10 pain or discomfort prior to treatment. Pt reports 5/10 pain or discomfort post treatment. Intervention Provided: Pt reports pain in neck and R shoulder \"from the accident. \" Nurse notified and tylenol admin.        SUBJECTIVE:   Patient and poor self-monitoring skills impacting safety awareness. Patient continues to benefit from skilled intervention to address the above impairments. Progression toward goals:  []          Improving appropriately and progressing toward goals  [x]          Improving slowly and progressing toward goals  []          Not making progress toward goals and plan of care will be adjusted     PLAN:  Continue treatment per established plan of care. Discharge Recommendations:  Home with 24hr Sup/assist, [facility] OT  Further Equipment Recommendations for Discharge:  None     Activity Tolerance:    Fair-    EDUCATION:   Education Given To: Patient; Family  Education Provided: Safety;Transfer Training;Energy Conservation;Mobility Training  Education Method: Verbal;Demonstration  Barriers to Learning: Cognition; Hearing  Education Outcome: Verbalized understanding;Continued education needed    COMMUNICATION:   [] Home safety education was provided and the patient/caregiver indicated understanding. [x] Patient/family have participated as able in goal setting and plan of care. [x] Patient/family agree to work toward stated goals and plan of care. [] Patient understands intent and goals of therapy, but is neutral about his/her participation. [] Patient is unable to participate in goal setting and plan of care. Please refer to the flowsheet for vital signs taken during this treatment. After treatment:   [x]  Patient left in no apparent distress sitting up in chair   []  Patient left in no apparent distress in bed  [x]  Call bell left within reach  [x]  Nursing notified  []  Caregiver present  [x]  W/C alarm activated      Estimated LOS: Est D/C 08/19/2023  Entered Differentiated Treatment minutes:  Yes    WENDIE Daigle

## 2023-08-16 NOTE — PLAN OF CARE
Problem: Physical Therapy - Adult  Goal: By Discharge: Performs mobility at highest level of function for planned discharge setting. See evaluation for individualized goals. Description: Physical Therapy Short Term Goals  Initiated 8/9/2023, re-assessed 8/16/2023 for d/c  [8/19/23]  1. Patient will supine to sit, sit to supine, roll right, and roll left  in bed with modified independence. (MET 8/16/2023)  2. Patient will transfer from bed to chair and chair to bed with SBA using a RW.(MET 8/16/2023)  3. Patient will perform sit to stand with SBA/CGA from bed, w/c, and toilet heights (MET 8/16/2023)  4. Patient will ambulate with contact guard assist/SBA for 100 feet with RW. (MET 8/16/2023)  5. Patient will propel w/c 150 ft with supervision for mobility on unit. (MET 8/16/2023)    Physical Therapy Long Term Goals  Initiated 8/9/2023 and to be accomplished within 14 day(s) [8/23/23]  1. Patient will supine to sit, sit to supine, roll right, and roll left in bed with independence, without bed rail. 2.  Patient will transfer from bed to chair and chair to bed with supervision using the least restrictive device. (SBA 8/16/2023)  3. Patient will perform sit to stand with supervision from various surface heights. (MET 8/16/2023)  4. Patient will ambulate with SBA for 150 feet with the least restrictive device. (MET 8/16/2023)  5. Patient will ascend/descend 1 curb with least restrictive device with SBA/CGA to negotiate curb to sidewalk for entry into home. (MET 8/16/2023)  Outcome: Progressing     PHYSICAL THERAPY WEEKLY PROGRESS NOTE    Patient: Micha Murphy. (78 y.o. male)  Date: 8/16/2023  Diagnosis: Subdural hematoma ([address]) [S06. 5XAA]   Precautions: fall risk  Chart, physical therapy assessment, plan of care and goals were reviewed.       Time in:1335  Time out:1438  Patient seen for: bed mobility, txfr training, gait training, stair training, group therapy session      Pain:  Pt pain was reported as  no surface Contact guard assistance  Level tile Stand by assistance  Level tile     Distance 20 ft x 2 in gym [id number]   Comments vc's for RW management  Pt ambulated 0x912ut with RW and SBA/S. Pt maintained slow, safe pace, requiring only min v/c for upright posture and fwd gaze. Ambulation-uneven surface     140ft and 50ft with RW outside on uneven pavement   Stand by assistance, Contact guard assistance       STEPS/STAIRS Initial Assessment Weekly Progress Assessment 8/16/2023   Steps/Stairs ambulated 2  6\" 12   (in stairwell)   Rail Use Bilateral   Bilateral     Assistance Level Minimal assistance Stand by assistance   Comments    Pt negotiated up and down 12 stairs in stairwell with BHR and SBA, performing step through pattern. Pt required v/c to place feet completely on steps when ascending but pt not following v/c, moving quickly. Curbs/Ramps 6\"  Pt negotiated up and down 6\" platform step with RW and CGA with v/c for proper and safe technique. Group therapy treatment:    Standing balance  Activity   Sets   Reps   Time Spent   Comments   [] Beach ball toss/catch       [] Balloon tapping       [x] Forward/multi-directional reaching with tabletop activity   3 3min, 2min, 3min Pt stood 3 trials with RW to play table top card game to challenge static standing balance and tolerance   [] Bean bag toss to central target       [] Ball tossing into basketball hoop         [x]   Patient appropriate to continue group therapy sessions to promote increased participation in skilled therapy interventions and to provide opportunities for increased social interaction. ASSESSMENT:  Pt has met 5/5 STG and 3/5 LTGs, improving txfrs, gait and balance. Pt continues to demo decreased attention to right side during gait and demo's decreased safety at times attempting to move obstacles with RW opposed to negotiating around obstacles.  Pt being hard of hearing often is a limitation to pt being able to understand or follow

## 2023-08-17 ENCOUNTER — TELEPHONE (OUTPATIENT)
Age: 83
End: 2023-08-17

## 2023-08-17 LAB
ANION GAP SERPL CALC-SCNC: 6 MMOL/L (ref 3–18)
BASOPHILS # BLD: 0.1 K/UL (ref 0–0.1)
BASOPHILS NFR BLD: 1 % (ref 0–2)
BUN SERPL-MCNC: 20 MG/DL (ref 7–18)
BUN/CREAT SERPL: 21 (ref 12–20)
CALCIUM SERPL-MCNC: 9.5 MG/DL (ref 8.5–10.1)
CHLORIDE SERPL-SCNC: 102 MMOL/L (ref 100–111)
CO2 SERPL-SCNC: 27 MMOL/L (ref 21–32)
CREAT SERPL-MCNC: 0.95 MG/DL (ref 0.6–1.3)
DIFFERENTIAL METHOD BLD: ABNORMAL
EOSINOPHIL # BLD: 0.2 K/UL (ref 0–0.4)
EOSINOPHIL NFR BLD: 2 % (ref 0–5)
ERYTHROCYTE [DISTWIDTH] IN BLOOD BY AUTOMATED COUNT: 14.7 % (ref 11.6–14.5)
GLUCOSE SERPL-MCNC: 120 MG/DL (ref 74–99)
HCT VFR BLD AUTO: 31.3 % (ref 36–48)
HGB BLD-MCNC: 10.1 G/DL (ref 13–16)
IMM GRANULOCYTES # BLD AUTO: 0.1 K/UL (ref 0–0.04)
IMM GRANULOCYTES NFR BLD AUTO: 1 % (ref 0–0.5)
LYMPHOCYTES # BLD: 2.2 K/UL (ref 0.9–3.6)
LYMPHOCYTES NFR BLD: 23 % (ref 21–52)
MCH RBC QN AUTO: 29 PG (ref 24–34)
MCHC RBC AUTO-ENTMCNC: 32.3 G/DL (ref 31–37)
MCV RBC AUTO: 89.9 FL (ref 78–100)
MONOCYTES # BLD: 0.9 K/UL (ref 0.05–1.2)
MONOCYTES NFR BLD: 10 % (ref 3–10)
NEUTS SEG # BLD: 6 K/UL (ref 1.8–8)
NEUTS SEG NFR BLD: 63 % (ref 40–73)
NRBC # BLD: 0 K/UL (ref 0–0.01)
NRBC BLD-RTO: 0 PER 100 WBC
PLATELET # BLD AUTO: 486 K/UL (ref 135–420)
PMV BLD AUTO: 9.1 FL (ref 9.2–11.8)
POTASSIUM SERPL-SCNC: 3.5 MMOL/L (ref 3.5–5.5)
RBC # BLD AUTO: 3.48 M/UL (ref 4.35–5.65)
SODIUM SERPL-SCNC: 135 MMOL/L (ref 136–145)
WBC # BLD AUTO: 9.5 K/UL (ref 4.6–13.2)

## 2023-08-17 PROCEDURE — 99232 SBSQ HOSP IP/OBS MODERATE 35: CPT | Performed by: NURSE PRACTITIONER

## 2023-08-17 PROCEDURE — 97110 THERAPEUTIC EXERCISES: CPT

## 2023-08-17 PROCEDURE — 80048 BASIC METABOLIC PNL TOTAL CA: CPT

## 2023-08-17 PROCEDURE — 6370000000 HC RX 637 (ALT 250 FOR IP): Performed by: FAMILY MEDICINE

## 2023-08-17 PROCEDURE — 85025 COMPLETE CBC W/AUTO DIFF WBC: CPT

## 2023-08-17 PROCEDURE — 97530 THERAPEUTIC ACTIVITIES: CPT

## 2023-08-17 PROCEDURE — 1180000000 HC REHAB R&B

## 2023-08-17 PROCEDURE — 97129 THER IVNTJ 1ST 15 MIN: CPT

## 2023-08-17 PROCEDURE — 6370000000 HC RX 637 (ALT 250 FOR IP): Performed by: EMERGENCY MEDICINE

## 2023-08-17 PROCEDURE — 6370000000 HC RX 637 (ALT 250 FOR IP): Performed by: NURSE PRACTITIONER

## 2023-08-17 PROCEDURE — 97150 GROUP THERAPEUTIC PROCEDURES: CPT

## 2023-08-17 PROCEDURE — 97116 GAIT TRAINING THERAPY: CPT

## 2023-08-17 PROCEDURE — 97130 THER IVNTJ EA ADDL 15 MIN: CPT

## 2023-08-17 PROCEDURE — 36415 COLL VENOUS BLD VENIPUNCTURE: CPT

## 2023-08-17 RX ORDER — CALCIUM CARBONATE 500 MG/1
500 TABLET, CHEWABLE ORAL 3 TIMES DAILY PRN
Status: DISCONTINUED | OUTPATIENT
Start: 2023-08-17 | End: 2023-08-19 | Stop reason: HOSPADM

## 2023-08-17 RX ADMIN — AMLODIPINE BESYLATE 5 MG: 5 TABLET ORAL at 08:15

## 2023-08-17 RX ADMIN — METOPROLOL TARTRATE 25 MG: 25 TABLET, FILM COATED ORAL at 08:15

## 2023-08-17 RX ADMIN — LEVETIRACETAM 500 MG: 500 TABLET, FILM COATED ORAL at 21:15

## 2023-08-17 RX ADMIN — FERROUS SULFATE TAB 325 MG (65 MG ELEMENTAL FE) 325 MG: 325 (65 FE) TAB at 08:15

## 2023-08-17 RX ADMIN — LEVETIRACETAM 500 MG: 500 TABLET, FILM COATED ORAL at 08:15

## 2023-08-17 RX ADMIN — ACETAMINOPHEN 325MG 650 MG: 325 TABLET ORAL at 05:37

## 2023-08-17 RX ADMIN — ATORVASTATIN CALCIUM 20 MG: 20 TABLET, FILM COATED ORAL at 21:15

## 2023-08-17 RX ADMIN — PANTOPRAZOLE SODIUM 40 MG: 40 TABLET, DELAYED RELEASE ORAL at 06:25

## 2023-08-17 RX ADMIN — MULTIPLE VITAMINS W/ MINERALS TAB 1 TABLET: TAB at 08:15

## 2023-08-17 RX ADMIN — Medication 5 MG: at 21:15

## 2023-08-17 RX ADMIN — CALCIUM CARBONATE (ANTACID) CHEW TAB 500 MG 500 MG: 500 CHEW TAB at 16:35

## 2023-08-17 ASSESSMENT — PAIN SCALES - WONG BAKER
WONGBAKER_NUMERICALRESPONSE: 0

## 2023-08-17 ASSESSMENT — PAIN SCALES - GENERAL
PAINLEVEL_OUTOF10: 0
PAINLEVEL_OUTOF10: 5

## 2023-08-17 ASSESSMENT — PAIN DESCRIPTION - DESCRIPTORS: DESCRIPTORS: ACHING

## 2023-08-17 ASSESSMENT — PAIN DESCRIPTION - PAIN TYPE: TYPE: CHRONIC PAIN

## 2023-08-17 ASSESSMENT — PAIN DESCRIPTION - FREQUENCY: FREQUENCY: INTERMITTENT

## 2023-08-17 ASSESSMENT — PAIN - FUNCTIONAL ASSESSMENT: PAIN_FUNCTIONAL_ASSESSMENT: ACTIVITIES ARE NOT PREVENTED

## 2023-08-17 ASSESSMENT — PAIN DESCRIPTION - ONSET: ONSET: GRADUAL

## 2023-08-17 ASSESSMENT — PAIN DESCRIPTION - LOCATION: LOCATION: HEAD

## 2023-08-17 ASSESSMENT — PAIN DESCRIPTION - ORIENTATION: ORIENTATION: ANTERIOR

## 2023-08-17 NOTE — TELEPHONE ENCOUNTER
----- Message from Sebastian Whaley sent at 8/17/2023 12:37 PM EDT -----  Subject: Hospital Follow Up    QUESTIONS  What hospital was the Patient Discharged from? [facility]   physical rehab [place]  Date of Discharge? 2023-08-18  Discharge Location? Home  Reason for hospitalization as patient stated? bleeding on the brain   What question does the patient have, if applicable? 1-2 week hospital   follow up appt.  ---------------------------------------------------------------------------  --------------  CALL BACK INFO  What is the best way for the office to contact you? OK to leave message on   voicemail  Preferred Call Back Phone Number? [phone]  ---------------------------------------------------------------------------  --------------  SCRIPT ANSWERS  Relationship to Patient? Covered Entity  Covered Entity Type? Hospital?  Representative Name?  Marlo Pineda

## 2023-08-17 NOTE — DISCHARGE INSTRUCTIONS
Patient armband removed and given to patient to take home. Patient was informed of the privacy risks if armband lost or stolen    DISCHARGE SUMMARY from Nurse    PATIENT INSTRUCTIONS:    After general anesthesia or intravenous sedation, for 24 hours or while taking prescription Narcotics:  Limit your activities  Do not drive and operate hazardous machinery  Do not make important personal or business decisions  Do  not drink alcoholic beverages  If you have not urinated within 8 hours after discharge, please contact your surgeon on call. Report the following to your surgeon:  Excessive pain, swelling, redness or odor of or around the surgical area  Temperature over 100.5  Nausea and vomiting lasting longer than 4 hours or if unable to take medications  Any signs of decreased circulation or nerve impairment to extremity: change in color, persistent  numbness, tingling, coldness or increase pain  Any questions    What to do at Home:  Recommended activity: activity as tolerated    If you experience any of the following symptoms shortness of breath, chest pain, diarrhea, vomiting, severe pain, fever  , please follow up with PCP. *  Please give a list of your current medications to your Primary Care Provider. *  Please update this list whenever your medications are discontinued, doses are      changed, or new medications (including over-the-counter products) are added. *  Please carry medication information at all times in case of emergency situations. These are general instructions for a healthy lifestyle:    No smoking/ No tobacco products/ Avoid exposure to second hand smoke  Surgeon General's Warning:  Quitting smoking now greatly reduces serious risk to your health.     Obesity, smoking, and sedentary lifestyle greatly increases your risk for illness    A healthy diet, regular physical exercise & weight monitoring are important for maintaining a healthy lifestyle    You may be retaining fluid if you kindly contact your Primary Care Physician for refills.     -------------------------------------------------------------------------------------------------------------------

## 2023-08-18 PROCEDURE — 99239 HOSP IP/OBS DSCHRG MGMT >30: CPT | Performed by: NURSE PRACTITIONER

## 2023-08-18 PROCEDURE — 97535 SELF CARE MNGMENT TRAINING: CPT

## 2023-08-18 PROCEDURE — 97530 THERAPEUTIC ACTIVITIES: CPT

## 2023-08-18 PROCEDURE — 97110 THERAPEUTIC EXERCISES: CPT

## 2023-08-18 PROCEDURE — 1180000000 HC REHAB R&B

## 2023-08-18 PROCEDURE — 6370000000 HC RX 637 (ALT 250 FOR IP): Performed by: EMERGENCY MEDICINE

## 2023-08-18 PROCEDURE — 97116 GAIT TRAINING THERAPY: CPT

## 2023-08-18 PROCEDURE — 97129 THER IVNTJ 1ST 15 MIN: CPT

## 2023-08-18 PROCEDURE — 6370000000 HC RX 637 (ALT 250 FOR IP): Performed by: FAMILY MEDICINE

## 2023-08-18 PROCEDURE — 92507 TX SP LANG VOICE COMM INDIV: CPT

## 2023-08-18 RX ORDER — ACETAMINOPHEN 325 MG/1
650 TABLET ORAL EVERY 4 HOURS PRN
Qty: 20 TABLET | Refills: 0
Start: 2023-08-18

## 2023-08-18 RX ORDER — CHOLECALCIFEROL (VITAMIN D3) 125 MCG
5 CAPSULE ORAL
Qty: 10 TABLET | Refills: 0 | Status: SHIPPED | OUTPATIENT
Start: 2023-08-18

## 2023-08-18 RX ORDER — LEVETIRACETAM 500 MG/1
500 TABLET ORAL 2 TIMES DAILY
Qty: 60 TABLET | Refills: 0 | Status: SHIPPED | OUTPATIENT
Start: 2023-08-18

## 2023-08-18 RX ORDER — SENNA AND DOCUSATE SODIUM 50; 8.6 MG/1; MG/1
2 TABLET, FILM COATED ORAL DAILY PRN
Qty: 10 TABLET | Refills: 0 | Status: SHIPPED | OUTPATIENT
Start: 2023-08-18

## 2023-08-18 RX ORDER — ALBUTEROL SULFATE 90 UG/1
2 AEROSOL, METERED RESPIRATORY (INHALATION) EVERY 6 HOURS PRN
Qty: 18 G | Refills: 0 | Status: SHIPPED | OUTPATIENT
Start: 2023-08-18

## 2023-08-18 RX ORDER — AMLODIPINE BESYLATE 5 MG/1
5 TABLET ORAL DAILY
Qty: 30 TABLET | Refills: 0 | Status: SHIPPED | OUTPATIENT
Start: 2023-08-19

## 2023-08-18 RX ORDER — PANTOPRAZOLE SODIUM 40 MG/1
40 TABLET, DELAYED RELEASE ORAL
Qty: 30 TABLET | Refills: 0 | Status: SHIPPED | OUTPATIENT
Start: 2023-08-19

## 2023-08-18 RX ADMIN — MULTIPLE VITAMINS W/ MINERALS TAB 1 TABLET: TAB at 08:33

## 2023-08-18 RX ADMIN — LEVETIRACETAM 500 MG: 500 TABLET, FILM COATED ORAL at 21:25

## 2023-08-18 RX ADMIN — AMLODIPINE BESYLATE 5 MG: 5 TABLET ORAL at 08:33

## 2023-08-18 RX ADMIN — ACETAMINOPHEN 325MG 650 MG: 325 TABLET ORAL at 12:38

## 2023-08-18 RX ADMIN — FERROUS SULFATE TAB 325 MG (65 MG ELEMENTAL FE) 325 MG: 325 (65 FE) TAB at 08:33

## 2023-08-18 RX ADMIN — METOPROLOL TARTRATE 25 MG: 25 TABLET, FILM COATED ORAL at 08:33

## 2023-08-18 RX ADMIN — PANTOPRAZOLE SODIUM 40 MG: 40 TABLET, DELAYED RELEASE ORAL at 06:02

## 2023-08-18 RX ADMIN — METOPROLOL TARTRATE 25 MG: 25 TABLET, FILM COATED ORAL at 21:25

## 2023-08-18 RX ADMIN — LEVETIRACETAM 500 MG: 500 TABLET, FILM COATED ORAL at 08:32

## 2023-08-18 RX ADMIN — ATORVASTATIN CALCIUM 20 MG: 20 TABLET, FILM COATED ORAL at 21:25

## 2023-08-18 RX ADMIN — Medication 5 MG: at 21:25

## 2023-08-18 ASSESSMENT — PAIN SCALES - GENERAL
PAINLEVEL_OUTOF10: 0
PAINLEVEL_OUTOF10: 3
PAINLEVEL_OUTOF10: 0

## 2023-08-18 ASSESSMENT — PAIN SCALES - WONG BAKER
WONGBAKER_NUMERICALRESPONSE: 0
WONGBAKER_NUMERICALRESPONSE: 0

## 2023-08-18 ASSESSMENT — PAIN DESCRIPTION - DESCRIPTORS: DESCRIPTORS: ACHING

## 2023-08-18 ASSESSMENT — PAIN DESCRIPTION - LOCATION: LOCATION: HIP;NECK;BACK

## 2023-08-18 NOTE — PROGRESS NOTES
Prior to discharge, nurse practitioner discussed and went through current and discharge medications in detail with the patient and his wife at the bedside. NP discussed which drugs to discontinue, resume, and continue after discharge. NP explained and answered questions about follow-up care/pcp/specialist appointments, as well as discharge process expectations. The patient expressed his understanding verbally. Patient will be discharged with outpatient PT/OT. NP discussed discharge process with discharge RN.      Monique Koehler NP-BC

## 2023-08-18 NOTE — PROGRESS NOTES
Nutrition Note      Pt continues to have variable meal intake; tolerating diet. Consuming nutrition supplements. BMP last checked on 8/17; labs reviewed. Pertinent meds: lipitor, ferrous sulfate, keppra, protonix, MVI-mineral supplement. Noted plan for discharge tomorrow. BM 8/18. Nutrition goals are being met with meal and nutrition supplement intake. Nutrition Recommendations/Plan:   Continue supplement: Ensure Plus TID (350 kcal, 20 gm protein each)  Continue daily MVI-mineral supplement r/t pt with malnutrition  Continue all other nutrition interventions. Encourage/ monitor po intake of meals and supplements.          Electronically signed by Naty Brandon RD on 8/18/23 at 5:42 PM EDT    Contact: [phone]

## 2023-08-18 NOTE — PLAN OF CARE
Problem: Physical Therapy - Adult  Goal: By Discharge: Performs mobility at highest level of function for planned discharge setting. See evaluation for individualized goals. Description: Physical Therapy Short Term Goals  Initiated 8/9/2023, re-assessed 8/18/2023 for d/c  [8/19/23]  1. Patient will supine to sit, sit to supine, roll right, and roll left  in bed with modified independence. (MET 8/16/2023)  2. Patient will transfer from bed to chair and chair to bed with SBA using a RW.(MET 8/16/2023)  3. Patient will perform sit to stand with SBA/CGA from bed, w/c, and toilet heights (MET 8/16/2023)  4. Patient will ambulate with contact guard assist/SBA for 100 feet with RW. (MET 8/16/2023)  5. Patient will propel w/c 150 ft with supervision for mobility on unit. (MET 8/16/2023)    Physical Therapy Long Term Goals  Initiated 8/9/2023 and to be accomplished within 14 day(s) [8/23/23]  1. Patient will supine to sit, sit to supine, roll right, and roll left in bed with independence, without bed rail. (MET 8/18/2023)  2. Patient will transfer from bed to chair and chair to bed with supervision using the least restrictive device. (SBA 8/16/2023)  3. Patient will perform sit to stand with supervision from various surface heights. (MET 8/16/2023)  4. Patient will ambulate with SBA for 150 feet with the least restrictive device. (MET 8/16/2023)  5. Patient will ascend/descend 1 curb with least restrictive device with SBA/CGA to negotiate curb to sidewalk for entry into home. (MET 8/16/2023)  Outcome: Progressing     PHYSICAL THERAPY DISCHARGE NOTE    Patient: Gen Chadwick. (78 y.o. male)  Date: 8/18/2023  Diagnosis: Subdural hematoma ([address]) [S06. 5XAA]   Precautions:                fall risk  Chart, physical therapy assessment, plan of care and goals were reviewed.   Time in: 1002  Time out: 1032  Pt seen for: family education, gait training, txfr training, stair training  Time in: [phone]  Time out: 1235  Patient

## 2023-08-18 NOTE — DISCHARGE SUMMARY
[address] [facility] PHYSICAL REHABILITATION  [address] [facility], [address] [facility] SUMMARY    Name: Jeancarlos Schofield MRN: [id number]   Age / Sex: 78 y.o. / male CSN: [id number]   YOB: 1940 Length of Stay: 11 days   Admit Date: 8/8/2023 Discharge Date: 8/19/2023       PRIMARY CARE PHYSICIAN: Natalie Trinidad MD      DISCHARGE DIAGNOSES:    Primary Rehabilitation Diagnosis   1. Impaired Mobility and ADLs   2. 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 CONSULTS CALLED:   On license of [facility], [facility]     PROCEDURES DONE: None     BRIEF HISTORY: (from this history and physical completed by Dr. Mario Javier)   This is a 73-year-old male with a past medical history of hypertension and dyslipidemia who was recently admitted to [facility] with a recurrent chronic subdural hematoma. During the hospitalization patient was seen by neurosurgery who recommended conservative management. Patient remained hemodynamically stable. Initially patient was encephalopathic but this has improved towards baseline. PT OT saw the patient and it was felt that patient may benefit from inpatient rehab. For full details regarding hospital course please refer to chart. The patient  was noted to have impaired mobility and ADLs. Patient was felt to be a good candidate for acute inpatient rehabilitation. Upon evaluation by Physical Therapy and Occupational Therapy, the patient was recommended for acute inpatient rehabilitation.  The patient was discharged and was subsequently admitted to the [facility] for intensive rehabilitation to help recover strength, function and mobility in the setting of recurrent nontraumatic acute on chronic subdural hematoma with some right hemiparesis    [address]: Upon admission to the [facility], the patient precautions. TIME SPENT ON DISCHARGE ACTIVITIES: 39 minutes.       Signed:  SHERRI Gaytan CNP    8/18/2023

## 2023-08-18 NOTE — PLAN OF CARE
Problem: Occupational Therapy - Adult  Goal: By Discharge: Performs self-care activities at highest level of function for planned discharge setting. See evaluation for individualized goals. Description: Occupational Therapy Goals   Long Term Goals  Initiated 8/9/2023 and to be accomplished within 2 week(s) (reassessed on 8/18/2023 for discharge)  1. Pt will perform self-feeding with Dale. (Goal not met 8/18/2023)  2. Pt will perform grooming with Dale. (Goal not met 8/18/2023)  3. Pt will perform UB bathing with Dale. (Goal not met 8/18/2023)  4. Pt will perform LB bathing with S/SBA. (Goal met 8/18/2023)  5. Pt will perform tub/shower transfer with S. (Goal not met 8/18/2023)  6. Pt will perform UB dressing with S/Setup. (Goal met 8/18/2023)  7. Pt will perform LB dressing with S/SBA. (Goal not met 8/18/2023)  8. Pt will perform toileting task with S/SBA. (Goal not met 8/18/2023)  9. Pt will perform toilet transfer with SBA. (Goal met 8/18/2023)    Short Term Goals   Initiated 8/9/2023 and to be accomplished within 7 day(s) 8/15/2023; (reassessed on 8/15/2023; 8/18/2023)  1. Pt will perform self-feeding with S. (Goal met 8/15/2023)  2. Pt will perform grooming with CGA. (Goal met 8/15/2023)  3. Pt will perform UB bathing with CGA. (Goal met 8/15/2023)  4. Pt will perform LB bathing with CGA. (Goal met 8/15/2023)  5. Pt will perform tub/shower transfer with CGA. (Progressing 8/15/2023; Goal met 8/18/2023)  6. Pt will perform UB dressing with CGA. (Goal met 8/15/2023)  7. Pt will perform LB dressing with Yuan. (Goal met 8/15/2023)  8. Pt will perform toileting task with CGA. (Goal met 8/15/2023)  9. Pt will perform toilet transfer with CGA. (Goal met 8/15/2023)  Outcome: Progressing   OCCUPATIONAL THERAPY DISCHARGE    Patient: Nile Valdez. (78 y.o. male)  Date: 8/18/2023    First Tx Session  Time BT:1423  Time [id number]  Primary Diagnosis: Subdural hematoma ([address]) [S06. 5XAA] Subdural hematoma (HCC)  Precautions: 8/18/2023   Transfer Technique   Stand step Stand step   Level of Assistance Minimal assistance Level of Asssistance: Stand by assistance   Equipment Wheelchair, Bed Wheelchair;Bed   Comments using 2 hand hold of this therapist hand Stand step transfer performed (SBA) use of bed rail for steadying; verbal cues for hand placement and body positioning. Please see [facility] Interdisciplinary Eval: Coordination/Balance Section for details regarding FIM score description. Activity Tolerance:   Patient Tolerated treatment well     ASSESSMENT:  Patient progressed toward goals having achieved 9/9 STG's (3/9 LTG's met); during his inpatient rehab stay. Therapist reassessed goals based on pt's current functional ability and demonstrated performance. Pt participated in ADL showering using tub transfer bench. UB ADL's performed (set-up/ supv); LB ADL's progressed to (CGA) using AE as needed and increased time. Patient and pt's wife Sydnie Sampson) present for caregiver education and ADL session. Discussed recommendation for tub transfer bench at home; family member verbalizing understanding of information discussed. Recommendation for home health occupational therapy with 24 hr assist.    Patient will benefit from home health occupational therapy to further address decreased (I) with ADL's, decreased strength, coordination, cognition, and endurance impacting patient's functional independence and safety with ADL's and functional mobility. PLAN:  Maximum therapeutic gains met at current level of care and patient will be discharged from occupational therapy at this time.   Rationale for discharge:  [] Goals Achieved  [] [address]  [] Patient not participating in therapy  [x] Other: Pt achieved 9/9 STG's; patient progressing with LTG's  Discharge Recommendations:  home health occupational therapy with 24 hr assist  Further Equipment Recommendations for Discharge:  transfer bench  Estimated LOS: 8/19/2023  IRF TAMARA completed:

## 2023-08-19 VITALS
WEIGHT: 153.7 LBS | OXYGEN SATURATION: 95 % | HEIGHT: 67 IN | BODY MASS INDEX: 24.12 KG/M2 | RESPIRATION RATE: 18 BRPM | HEART RATE: 96 BPM | SYSTOLIC BLOOD PRESSURE: 128 MMHG | DIASTOLIC BLOOD PRESSURE: 68 MMHG | TEMPERATURE: 97.4 F

## 2023-08-19 PROCEDURE — 6370000000 HC RX 637 (ALT 250 FOR IP): Performed by: EMERGENCY MEDICINE

## 2023-08-19 RX ADMIN — AMLODIPINE BESYLATE 5 MG: 5 TABLET ORAL at 07:55

## 2023-08-19 RX ADMIN — FERROUS SULFATE TAB 325 MG (65 MG ELEMENTAL FE) 325 MG: 325 (65 FE) TAB at 07:55

## 2023-08-19 RX ADMIN — LEVETIRACETAM 500 MG: 500 TABLET, FILM COATED ORAL at 07:54

## 2023-08-19 RX ADMIN — PANTOPRAZOLE SODIUM 40 MG: 40 TABLET, DELAYED RELEASE ORAL at 06:41

## 2023-08-19 RX ADMIN — METOPROLOL TARTRATE 25 MG: 25 TABLET, FILM COATED ORAL at 07:55

## 2023-08-19 RX ADMIN — MULTIPLE VITAMINS W/ MINERALS TAB 1 TABLET: TAB at 07:54

## 2023-08-19 ASSESSMENT — PAIN SCALES - WONG BAKER
WONGBAKER_NUMERICALRESPONSE: 0
WONGBAKER_NUMERICALRESPONSE: 0

## 2023-08-19 ASSESSMENT — PAIN SCALES - GENERAL
PAINLEVEL_OUTOF10: 0

## 2023-08-19 NOTE — PLAN OF CARE
Problem: Safety - Adult  Goal: Free from fall injury  8/19/2023 1022 by Sherie Brandon RN  Outcome: Adequate for Discharge  8/18/2023 2047 by Juan Coon RN  Outcome: Progressing  Flowsheets (Taken 8/18/2023 2046)  Free From Fall Injury: Instruct family/caregiver on patient safety     Problem: Skin/Tissue Integrity  Goal: Absence of new skin breakdown  Description: 1. Monitor for areas of redness and/or skin breakdown  2. Assess vascular access sites hourly  3. Every 4-6 hours minimum:  Change oxygen saturation probe site  4. Every 4-6 hours:  If on nasal continuous positive airway pressure, respiratory therapy assess nares and determine need for appliance change or resting period.   8/19/2023 1022 by Sherie Brandon RN  Outcome: Adequate for Discharge  8/18/2023 2047 by Juan Coon RN  Outcome: Progressing     Problem: ABCDS Injury Assessment  Goal: Absence of physical injury  8/19/2023 1022 by Sherie Brandon RN  Outcome: Adequate for Discharge  8/18/2023 2047 by Juan Coon RN  Outcome: Progressing  Flowsheets (Taken 8/18/2023 2046)  Absence of Physical Injury: Implement safety measures based on patient assessment     Problem: Nutrition Deficit:  Goal: Optimize nutritional status  8/19/2023 1022 by Sherie Brandon RN  Outcome: Adequate for Discharge  8/18/2023 2047 by Juan Coon RN  Outcome: Progressing  Flowsheets (Taken 8/18/2023 1746 by Marisol Espinal RD)  Nutrient intake appropriate for improving, restoring, or maintaining nutritional needs: Assess nutritional status and recommend course of action     Problem: Pain  Goal: Verbalizes/displays adequate comfort level or baseline comfort level  8/19/2023 1022 by Sherie Brandon RN  Outcome: Adequate for Discharge  8/18/2023 2047 by Juan Coon RN  Outcome: Progressing  Flowsheets (Taken 8/18/2023 2000)  Verbalizes/displays adequate comfort level or baseline comfort level: Encourage patient to monitor pain and request assistance

## 2023-08-19 NOTE — PLAN OF CARE
Problem: Safety - Adult  Goal: Free from fall injury  Outcome: Progressing  Flowsheets (Taken 8/18/2023 2046)  Free From Fall Injury: Instruct family/caregiver on patient safety     Problem: Skin/Tissue Integrity  Goal: Absence of new skin breakdown  Description: 1. Monitor for areas of redness and/or skin breakdown  2. Assess vascular access sites hourly  3. Every 4-6 hours minimum:  Change oxygen saturation probe site  4. Every 4-6 hours:  If on nasal continuous positive airway pressure, respiratory therapy assess nares and determine need for appliance change or resting period.   Outcome: Progressing     Problem: ABCDS Injury Assessment  Goal: Absence of physical injury  Outcome: Progressing  Flowsheets (Taken 8/18/2023 2046)  Absence of Physical Injury: Implement safety measures based on patient assessment     Problem: Nutrition Deficit:  Goal: Optimize nutritional status  Outcome: Progressing  Flowsheets (Taken 8/18/2023 1746 by Claritza Apple RD)  Nutrient intake appropriate for improving, restoring, or maintaining nutritional needs: Assess nutritional status and recommend course of action     Problem: Pain  Goal: Verbalizes/displays adequate comfort level or baseline comfort level  Outcome: Progressing  Flowsheets (Taken 8/18/2023 2000)  Verbalizes/displays adequate comfort level or baseline comfort level: Encourage patient to monitor pain and request assistance

## 2023-08-21 ENCOUNTER — CARE COORDINATION (OUTPATIENT)
Facility: CLINIC | Age: 83
End: 2023-08-21

## 2023-08-21 ENCOUNTER — TELEPHONE (OUTPATIENT)
Age: 83
End: 2023-08-21

## 2023-08-21 NOTE — CARE COORDINATION
Care Transitions Outreach Attempt    Call within 2 business days of discharge: Yes   Attempted to reach patient for transitions of care follow up. Unable to reach patient. An individual who answered the phone and identified self as patient's spouse answered the phone call and related that patient is not currently available as he has guests visiting. CTN was asked to call back at another time. No patient medical information was discussed during phone outreach call. Patient: Cindy Isaacs. Patient : 1940 MRN: [id number]    Last Discharge [address] [facility]       Date Complaint Diagnosis Description Type Department Provider    23  SDH (subdural hematoma) [facility]) Admission (Discharged) Mane Eaton MD              Was this an external facility discharge? Yes, [id number]     Discharge [facility]     Patient transitioned to [facility] on 2023     Per chart review, patient was discharged to home with outpatient skilled therapies, on 2023. Noted following upcoming appointments from discharge chart review:   [facility] follow up appointment(s):   Future Appointments   Date Time Provider [address]   2023  1:40 PM Naheed Sanchez MD [facility] BS AMB   2023  8:40 AM Raji Greene, GIOVANI [id number] [facility]   2023  9:20 AM Amy Abraham, SANCHEZ [id number] [facility]   2023 10:40 AM Jluis Nelson OT [id number] [facility]   3/18/2024 11:10 AM [facility] NURSE Nish Monet Sched   3/25/2024  9:40 AM JACE Durant [facility] follow up appointment(s):   Per chart review, patient to follow up with Dr. Daniel Sutherland  neurosurgeon 2023.

## 2023-08-22 ENCOUNTER — PATIENT MESSAGE (OUTPATIENT)
Facility: CLINIC | Age: 83
End: 2023-08-22

## 2023-08-22 ENCOUNTER — CARE COORDINATION (OUTPATIENT)
Facility: CLINIC | Age: 83
End: 2023-08-22

## 2023-08-22 NOTE — CARE COORDINATION
Care Transitions Outreach Attempt    Call within 2 business days of discharge: Yes   Attempted to reach patient for transitions of care follow up. Unable to reach patient. Care Transitions Nurse ( CTN) attempted to contact patient via telephone call. There was no response. A voicemail message was left requesting a non-emergency return telephone call. CTN  contact information provided. No patient medical information was left on the voicemail message. Patient: Dakota Jeff Patient : 1940 MRN: [id number]    Last Discharge [address] [facility]       Date Complaint Diagnosis Description Type Department Provider    23  SDH (subdural hematoma) [facility]) Admission (Discharged) Flakita Hernandez MD              Was this an external facility discharge? Yes    2023     Discharge Facility: [facility]      Patient transitioned to [facility] on 2023      Per chart review, patient was discharged to home with outpatient skilled therapies, on 2023. Please verify as needed.       Noted following upcoming appointments from discharge chart review:   [facility] follow up appointment(s):   Future Appointments   Date Time Provider [address]   2023  1:40 PM Na Odonnell MD [facility] BS AMB   2023  8:40 AM GIOVANI Parker [id number] [facility]   2023  9:20 AM Eriberto Desai, SANCHEZ [id number] [facility]   2023 10:40 AM Sigrid Barney OT [id number] [facility]   3/18/2024 11:10 AM [facility] NURSE Harika Gonzalez   3/25/2024  9:40 AM JACE [facility] Carlton Garcia

## 2023-08-22 NOTE — CARE COORDINATION
Care Transitions     CTN was unable to reach patient x2 via phone call for Transitions of Care follow up. An outreach letter was forwarded to patient via the [employer] portal.     Care Transitions resolved at this time. CTN will be happy to try and provide assistance upon receiving contact from patient.

## 2023-08-22 NOTE — TELEPHONE ENCOUNTER
Left message to return call.
Otc [facility] fluid
Pt recently was d/c from hospital and is having constipation ,he is asking if something could be sent to his [address] [place] [address] Street
with patient

## 2023-08-23 ENCOUNTER — OFFICE VISIT (OUTPATIENT)
Age: 83
End: 2023-08-23

## 2023-08-23 VITALS
HEIGHT: 67 IN | BODY MASS INDEX: 22.6 KG/M2 | DIASTOLIC BLOOD PRESSURE: 56 MMHG | RESPIRATION RATE: 16 BRPM | HEART RATE: 79 BPM | OXYGEN SATURATION: 97 % | WEIGHT: 144 LBS | TEMPERATURE: 98.2 F | SYSTOLIC BLOOD PRESSURE: 129 MMHG

## 2023-08-23 DIAGNOSIS — S06.5XAA SDH (SUBDURAL HEMATOMA) (HCC): Primary | ICD-10-CM

## 2023-08-23 DIAGNOSIS — R56.9 SEIZURES (HCC): ICD-10-CM

## 2023-08-23 DIAGNOSIS — I25.10 CORONARY ARTERY DISEASE INVOLVING NATIVE CORONARY ARTERY OF NATIVE HEART WITHOUT ANGINA PECTORIS: ICD-10-CM

## 2023-08-24 PROBLEM — G93.5 BRAIN COMPRESSION (HCC): Status: RESOLVED | Noted: 2023-07-03 | Resolved: 2023-08-24

## 2023-08-24 PROBLEM — Z71.89 GOALS OF CARE, COUNSELING/DISCUSSION: Status: RESOLVED | Noted: 2023-08-10 | Resolved: 2023-08-24

## 2023-08-24 PROBLEM — S06.5XAA SUBDURAL HEMATOMA (HCC): Status: RESOLVED | Noted: 2023-07-02 | Resolved: 2023-08-24

## 2023-08-24 PROBLEM — G93.6 CEREBRAL EDEMA (HCC): Status: RESOLVED | Noted: 2023-07-03 | Resolved: 2023-08-24

## 2023-08-24 PROBLEM — G93.40 ENCEPHALOPATHY: Status: RESOLVED | Noted: 2023-08-01 | Resolved: 2023-08-24

## 2023-08-24 PROBLEM — R41.82 ALTERED MENTAL STATUS: Status: RESOLVED | Noted: 2023-08-01 | Resolved: 2023-08-24

## 2023-08-24 PROBLEM — Z78.9 IMPAIRED MOBILITY AND ADLS: Status: RESOLVED | Noted: 2023-08-11 | Resolved: 2023-08-24

## 2023-08-24 PROBLEM — R53.81 DEBILITY: Status: RESOLVED | Noted: 2023-08-10 | Resolved: 2023-08-24

## 2023-08-24 PROBLEM — Z74.09 IMPAIRED MOBILITY AND ADLS: Status: RESOLVED | Noted: 2023-08-11 | Resolved: 2023-08-24

## 2023-08-24 PROBLEM — E43 SEVERE PROTEIN-CALORIE MALNUTRITION (HCC): Status: RESOLVED | Noted: 2023-08-06 | Resolved: 2023-08-24
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
## 2023-08-26 ENCOUNTER — HOSPITAL ENCOUNTER (EMERGENCY)
Facility: HOSPITAL | Age: 83
Discharge: HOME | End: 2023-08-26
Attending: EMERGENCY MEDICINE
Payer: MEDICARE

## 2023-08-26 ENCOUNTER — APPOINTMENT (OUTPATIENT)
Facility: HOSPITAL | Age: 83
End: 2023-08-26
Payer: MEDICARE

## 2023-08-26 VITALS
WEIGHT: 140 LBS | HEART RATE: 79 BPM | HEIGHT: 67 IN | BODY MASS INDEX: 21.97 KG/M2 | TEMPERATURE: 98.5 F | SYSTOLIC BLOOD PRESSURE: 112 MMHG | DIASTOLIC BLOOD PRESSURE: 59 MMHG | OXYGEN SATURATION: 95 % | RESPIRATION RATE: 18 BRPM

## 2023-08-26 DIAGNOSIS — J18.9 COMMUNITY ACQUIRED PNEUMONIA OF RIGHT LOWER LOBE OF LUNG: Primary | ICD-10-CM

## 2023-08-26 LAB
ALBUMIN SERPL-MCNC: 3.2 G/DL (ref 3.4–5)
ALBUMIN/GLOB SERPL: 0.9 (ref 0.8–1.7)
ALP SERPL-CCNC: 67 U/L (ref 45–117)
ALT SERPL-CCNC: 25 U/L (ref 16–61)
ANION GAP SERPL CALC-SCNC: 6 MMOL/L (ref 3–18)
AST SERPL-CCNC: 14 U/L (ref 10–38)
BASOPHILS # BLD: 0.1 K/UL (ref 0–0.1)
BASOPHILS NFR BLD: 1 % (ref 0–2)
BILIRUB DIRECT SERPL-MCNC: <0.1 MG/DL (ref 0–0.2)
BILIRUB SERPL-MCNC: 0.2 MG/DL (ref 0.2–1)
BUN SERPL-MCNC: 20 MG/DL (ref 7–18)
BUN/CREAT SERPL: 19 (ref 12–20)
CALCIUM SERPL-MCNC: 9 MG/DL (ref 8.5–10.1)
CHLORIDE SERPL-SCNC: 105 MMOL/L (ref 100–111)
CO2 SERPL-SCNC: 25 MMOL/L (ref 21–32)
CREAT SERPL-MCNC: 1.05 MG/DL (ref 0.6–1.3)
DIFFERENTIAL METHOD BLD: ABNORMAL
EOSINOPHIL # BLD: 0.4 K/UL (ref 0–0.4)
EOSINOPHIL NFR BLD: 4 % (ref 0–5)
ERYTHROCYTE [DISTWIDTH] IN BLOOD BY AUTOMATED COUNT: 15 % (ref 11.6–14.5)
GLOBULIN SER CALC-MCNC: 3.4 G/DL (ref 2–4)
GLUCOSE SERPL-MCNC: 115 MG/DL (ref 74–99)
HCT VFR BLD AUTO: 29.4 % (ref 36–48)
HGB BLD-MCNC: 9.5 G/DL (ref 13–16)
IMM GRANULOCYTES # BLD AUTO: 0 K/UL (ref 0–0.04)
IMM GRANULOCYTES NFR BLD AUTO: 0 % (ref 0–0.5)
LYMPHOCYTES # BLD: 2.3 K/UL (ref 0.9–3.6)
LYMPHOCYTES NFR BLD: 30 % (ref 21–52)
MAGNESIUM SERPL-MCNC: 1.9 MG/DL (ref 1.6–2.6)
MCH RBC QN AUTO: 28.4 PG (ref 24–34)
MCHC RBC AUTO-ENTMCNC: 32.3 G/DL (ref 31–37)
MCV RBC AUTO: 88 FL (ref 78–100)
MONOCYTES # BLD: 1 K/UL (ref 0.05–1.2)
MONOCYTES NFR BLD: 13 % (ref 3–10)
NEUTS SEG # BLD: 4.1 K/UL (ref 1.8–8)
NEUTS SEG NFR BLD: 52 % (ref 40–73)
NRBC # BLD: 0 K/UL (ref 0–0.01)
NRBC BLD-RTO: 0 PER 100 WBC
NT PRO BNP: 309 PG/ML (ref 0–1800)
PLATELET # BLD AUTO: 328 K/UL (ref 135–420)
PMV BLD AUTO: 10 FL (ref 9.2–11.8)
POTASSIUM SERPL-SCNC: 4.2 MMOL/L (ref 3.5–5.5)
PROT SERPL-MCNC: 6.6 G/DL (ref 6.4–8.2)
RBC # BLD AUTO: 3.34 M/UL (ref 4.35–5.65)
SODIUM SERPL-SCNC: 136 MMOL/L (ref 136–145)
TROPONIN I SERPL HS-MCNC: 11 NG/L (ref 0–78)
WBC # BLD AUTO: 7.9 K/UL (ref 4.6–13.2)

## 2023-08-26 PROCEDURE — 83880 ASSAY OF NATRIURETIC PEPTIDE: CPT

## 2023-08-26 PROCEDURE — 84484 ASSAY OF TROPONIN QUANT: CPT

## 2023-08-26 PROCEDURE — 71045 X-RAY EXAM CHEST 1 VIEW: CPT

## 2023-08-26 PROCEDURE — 99285 EMERGENCY DEPT VISIT HI MDM: CPT

## 2023-08-26 PROCEDURE — 80048 BASIC METABOLIC PNL TOTAL CA: CPT

## 2023-08-26 PROCEDURE — 93005 ELECTROCARDIOGRAM TRACING: CPT | Performed by: EMERGENCY MEDICINE

## 2023-08-26 PROCEDURE — 85025 COMPLETE CBC W/AUTO DIFF WBC: CPT

## 2023-08-26 PROCEDURE — 83735 ASSAY OF MAGNESIUM: CPT

## 2023-08-26 PROCEDURE — 6370000000 HC RX 637 (ALT 250 FOR IP): Performed by: STUDENT IN AN ORGANIZED HEALTH CARE EDUCATION/TRAINING PROGRAM

## 2023-08-26 PROCEDURE — 80076 HEPATIC FUNCTION PANEL: CPT

## 2023-08-26 PROCEDURE — 96374 THER/PROPH/DIAG INJ IV PUSH: CPT

## 2023-08-26 PROCEDURE — 6360000002 HC RX W HCPCS: Performed by: EMERGENCY MEDICINE

## 2023-08-26 PROCEDURE — 6370000000 HC RX 637 (ALT 250 FOR IP): Performed by: EMERGENCY MEDICINE

## 2023-08-26 RX ORDER — DIPHENHYDRAMINE HYDROCHLORIDE 50 MG/ML
25 INJECTION INTRAMUSCULAR; INTRAVENOUS
Status: COMPLETED | OUTPATIENT
Start: 2023-08-26 | End: 2023-08-26

## 2023-08-26 RX ORDER — OMEPRAZOLE 20 MG/1
CAPSULE, DELAYED RELEASE ORAL
COMMUNITY
Start: 2023-08-02

## 2023-08-26 RX ORDER — AZITHROMYCIN 500 MG/1
500 TABLET, FILM COATED ORAL DAILY
Qty: 2 TABLET | Refills: 0 | Status: SHIPPED | OUTPATIENT
Start: 2023-08-27 | End: 2023-08-29

## 2023-08-26 RX ORDER — IPRATROPIUM BROMIDE AND ALBUTEROL SULFATE 2.5; .5 MG/3ML; MG/3ML
1 SOLUTION RESPIRATORY (INHALATION)
Status: COMPLETED | OUTPATIENT
Start: 2023-08-26 | End: 2023-08-26

## 2023-08-26 RX ORDER — LEVETIRACETAM 100 MG/ML
SOLUTION ORAL
COMMUNITY
Start: 2023-08-21

## 2023-08-26 RX ORDER — BENAZEPRIL HYDROCHLORIDE 10 MG/1
TABLET ORAL
COMMUNITY
Start: 2023-08-02

## 2023-08-26 RX ORDER — METOPROLOL SUCCINATE 25 MG/1
TABLET, EXTENDED RELEASE ORAL
COMMUNITY
Start: 2023-08-02

## 2023-08-26 RX ORDER — CEFDINIR 300 MG/1
300 CAPSULE ORAL ONCE
Status: COMPLETED | OUTPATIENT
Start: 2023-08-26 | End: 2023-08-26

## 2023-08-26 RX ORDER — AZITHROMYCIN 250 MG/1
500 TABLET, FILM COATED ORAL
Status: COMPLETED | OUTPATIENT
Start: 2023-08-26 | End: 2023-08-26

## 2023-08-26 RX ORDER — CEFDINIR 300 MG/1
300 CAPSULE ORAL 2 TIMES DAILY
Qty: 13 CAPSULE | Refills: 0 | Status: SHIPPED | OUTPATIENT
Start: 2023-08-26 | End: 2023-09-02

## 2023-08-26 RX ADMIN — CEFDINIR 300 MG: 300 CAPSULE ORAL at 20:28

## 2023-08-26 RX ADMIN — AZITHROMYCIN MONOHYDRATE 500 MG: 250 TABLET ORAL at 19:52

## 2023-08-26 RX ADMIN — IPRATROPIUM BROMIDE AND ALBUTEROL SULFATE 1 DOSE: .5; 3 SOLUTION RESPIRATORY (INHALATION) at 18:59

## 2023-08-26 RX ADMIN — DIPHENHYDRAMINE HYDROCHLORIDE 25 MG: 50 INJECTION, SOLUTION INTRAMUSCULAR; INTRAVENOUS at 19:13

## 2023-08-26 ASSESSMENT — PAIN - FUNCTIONAL ASSESSMENT: PAIN_FUNCTIONAL_ASSESSMENT: NONE - DENIES PAIN

## 2023-08-26 NOTE — ED NOTES
Patient is on albuterol puff at home. He said he received same medication before at [facility] and he does not have reaction on the said medication.      Alex Loomis RN  08/26/23 [id number]

## 2023-08-26 NOTE — ED NOTES
1910H - patient feels jittery and uneasy during breathing treatment. Informed Dr. Laya Dey. 1913H - medication given. Fall precaution observed; teaching done.      Gerald Dawn RN  08/26/23 1920

## 2023-08-26 NOTE — ED TRIAGE NOTES
Patient to triage c/o SOB that started this evening. Denies CP. He has been using his albuterol inhaler with minimal relief. Reports h/o recent craniotomy, discharged from rehab one week ago today.

## 2023-08-26 NOTE — ED PROVIDER NOTES
EMERGENCY DEPARTMENT HISTORY AND PHYSICAL EXAM      Date: 8/26/2023  Patient Name: Deandra Swift. History of Presenting Illness     Chief Complaint   Patient presents with    Shortness of Breath       Location/Duration/Severity/Modifying factors   Chief Complaint   Patient presents with    Shortness of Breath       HPI:  Deandra Reed is a 78 y.o. male with PMH significant for COPD, recent discharge from rehab facility for subdural hematoma presents with shortness of breath for the past 4 months, increased phlegm production for the last 4 days. Shortness of breath worsens with exertion. Not taking any daily inhalers. Only has the albuterol rescue inhaler as needed. Pt  denies known history of heart failure or other cardiac disease. States he previously had COPD but has not been treated for this with daily inhalers for years.     PCP: Matt Page MD    Current Facility-Administered Medications   Medication Dose Route Frequency Provider Last Rate Last Admin    cefdinir (OMNICEF) capsule 300 mg  300 mg Oral Once [place] Parent, DO         Current Outpatient Medications   Medication Sig Dispense Refill    cefdinir (OMNICEF) 300 MG capsule Take 1 capsule by mouth 2 times daily for 7 days 13 capsule 0    [START ON 8/27/2023] azithromycin (ZITHROMAX) 500 MG tablet Take 1 tablet by mouth daily for 2 days 2 tablet 0    benazepril (LOTENSIN) 10 MG tablet       levETIRAcetam (KEPPRA) 100 MG/ML solution       metoprolol succinate (TOPROL XL) 25 MG extended release tablet       omeprazole (PRILOSEC) 20 MG delayed release capsule       acetaminophen (TYLENOL) 325 MG tablet Take 2 tablets by mouth every 4 hours as needed for Pain or Fever 20 tablet 0    albuterol sulfate HFA (PROVENTIL HFA) 108 (90 Base) MCG/ACT inhaler Inhale 2 puffs into the lungs every 6 hours as needed for Wheezing 18 g 0    levETIRAcetam (KEPPRA) 500 MG tablet Take 1 tablet by mouth 2 times daily 60 tablet 0    metoprolol tartrate condition, I feel the relative likelihood is extremely low. I discussed this uncertainty with the patient and/or family member/caregiver, who understood ED evaluation and treatment and felt comfortable with the outpatient treatment plan. All questions regarding care, test results and follow up were answered. At discharge, pt looked well, nontoxic, no distress, and is good candidate for outpatient follow up. They understand that they should return to the Emergency Department for any new or worsening symptoms. I stressed the importance of follow up for repeat assessment and possibly further evaluation/treatment. Meds Given in ED:  Medications   cefdinir (OMNICEF) capsule 300 mg (has no administration in time range)   ipratropium 0.5 mg-albuterol 2.5 mg (DUONEB) nebulizer solution 1 Dose (1 Dose Inhalation Given 8/26/23 1859)   diphenhydrAMINE (BENADRYL) injection 25 mg (25 mg IntraVENous Given 8/26/23 1913)   azithromycin (ZITHROMAX) tablet 500 mg (500 mg Oral Given 8/26/23 1952)       Final Diagnosis:  1. Community acquired pneumonia of right lower lobe of lung        Disposition:  Destination: Discharge home    Discharge Rx:   New Prescriptions    AZITHROMYCIN (ZITHROMAX) 500 MG TABLET    Take 1 tablet by mouth daily for 2 days    CEFDINIR (OMNICEF) 300 MG CAPSULE    Take 1 capsule by mouth 2 times daily for 7 days         Dictation disclaimer: Please note that this dictation was completed with [employer], the [employer] voice recognition software. Quite often unanticipated grammatical, syntax, homophones, and other interpretive errors are inadvertently transcribed by the computer software. Please disregard these errors. Please excuse any errors that have escaped final proofreading. Milton Devine D.O.   Emergency Physician  [address] [place] [address]  08/26/23 1958

## 2023-08-27 LAB
EKG ATRIAL RATE: 82 BPM
EKG DIAGNOSIS: NORMAL
EKG P AXIS: 59 DEGREES
EKG P-R INTERVAL: 200 MS
EKG Q-T INTERVAL: 378 MS
EKG QRS DURATION: 74 MS
EKG QTC CALCULATION (BAZETT): 441 MS
EKG R AXIS: 29 DEGREES
EKG T AXIS: 59 DEGREES
EKG VENTRICULAR RATE: 82 BPM

## 2023-08-27 PROCEDURE — 93010 ELECTROCARDIOGRAM REPORT: CPT | Performed by: INTERNAL MEDICINE

## 2023-09-15 ENCOUNTER — TELEPHONE (OUTPATIENT)
Age: 83
End: 2023-09-15

## 2023-09-15 RX ORDER — LEVETIRACETAM 500 MG/1
500 TABLET ORAL 2 TIMES DAILY
Qty: 180 TABLET | Refills: 3 | Status: SHIPPED | OUTPATIENT
Start: 2023-09-15

## 2023-09-15 RX ORDER — AMLODIPINE BESYLATE 5 MG/1
5 TABLET ORAL DAILY
Qty: 90 TABLET | Refills: 3 | Status: SHIPPED | OUTPATIENT
Start: 2023-09-15

## 2023-09-19 ENCOUNTER — OFFICE VISIT (OUTPATIENT)
Age: 83
End: 2023-09-19
Payer: MEDICARE

## 2023-09-19 VITALS
HEART RATE: 63 BPM | BODY MASS INDEX: 24.17 KG/M2 | WEIGHT: 154 LBS | HEIGHT: 67 IN | SYSTOLIC BLOOD PRESSURE: 132 MMHG | OXYGEN SATURATION: 99 % | DIASTOLIC BLOOD PRESSURE: 58 MMHG

## 2023-09-19 DIAGNOSIS — I65.23 BILATERAL CAROTID ARTERY STENOSIS: ICD-10-CM

## 2023-09-19 DIAGNOSIS — R06.02 SHORTNESS OF BREATH: ICD-10-CM

## 2023-09-19 DIAGNOSIS — I10 ESSENTIAL (PRIMARY) HYPERTENSION: ICD-10-CM

## 2023-09-19 DIAGNOSIS — Z95.1 S/P CABG X 3: ICD-10-CM

## 2023-09-19 DIAGNOSIS — I25.10 ATHEROSCLEROSIS OF NATIVE CORONARY ARTERY OF NATIVE HEART WITHOUT ANGINA PECTORIS: Primary | ICD-10-CM

## 2023-09-19 PROCEDURE — 99215 OFFICE O/P EST HI 40 MIN: CPT | Performed by: INTERNAL MEDICINE

## 2023-09-19 PROCEDURE — G8427 DOCREV CUR MEDS BY ELIG CLIN: HCPCS | Performed by: INTERNAL MEDICINE

## 2023-09-19 PROCEDURE — 93000 ELECTROCARDIOGRAM COMPLETE: CPT | Performed by: INTERNAL MEDICINE

## 2023-09-19 PROCEDURE — 3074F SYST BP LT 130 MM HG: CPT | Performed by: INTERNAL MEDICINE

## 2023-09-19 PROCEDURE — 1036F TOBACCO NON-USER: CPT | Performed by: INTERNAL MEDICINE

## 2023-09-19 PROCEDURE — 1123F ACP DISCUSS/DSCN MKR DOCD: CPT | Performed by: INTERNAL MEDICINE

## 2023-09-19 PROCEDURE — G8420 CALC BMI NORM PARAMETERS: HCPCS | Performed by: INTERNAL MEDICINE

## 2023-09-19 PROCEDURE — 3078F DIAST BP <80 MM HG: CPT | Performed by: INTERNAL MEDICINE

## 2023-09-19 ASSESSMENT — PATIENT HEALTH QUESTIONNAIRE - PHQ9
2. FEELING DOWN, DEPRESSED OR HOPELESS: 0
SUM OF ALL RESPONSES TO PHQ9 QUESTIONS 1 & 2: 0
SUM OF ALL RESPONSES TO PHQ QUESTIONS 1-9: 0
1. LITTLE INTEREST OR PLEASURE IN DOING THINGS: 0
SUM OF ALL RESPONSES TO PHQ QUESTIONS 1-9: 0

## 2023-09-19 ASSESSMENT — ENCOUNTER SYMPTOMS: SHORTNESS OF BREATH: 1

## 2023-09-19 NOTE — PROGRESS NOTES
Brii Parker. presents today for   Chief Complaint   Patient presents with    Follow-up     6 month f/u     Shortness of Breath     SOB due to COPD        Brii Parker. preferred language for health care discussion is english/other. Is someone accompanying this pt? YES    Is the patient using any DME equipment during OV? no    Depression Screening:  Depression: Not at risk (9/19/2023)    PHQ-2     PHQ-2 Score: 0        Learning Assessment:  Who is the primary learner? Patient    What is the preferred language for health care of the primary learner? ENGLISH    How does the primary learner prefer to learn new concepts? DEMONSTRATION    Answered By patient    Relationship to Learner SELF           Pt currently taking Anticoagulant therapy? no    Pt currently taking Antiplatelet therapy ? no      Coordination of Care:  1. Have you been to the ER, urgent care clinic since your last visit? Hospitalized since your last visit? yes    2. Have you seen or consulted any other health care providers outside of the [address] [place] [address] since your last visit? Include any pap smears or colon screening.  no
Cigarettes     Quit date: 1957     Years since quittin.7    Smokeless tobacco: Never   Substance Use Topics    Alcohol use: No    Drug use: Never       Lab Results   Component Value Date/Time    CHOL 101 2023 07:39 AM    CHOL 127 2022 08:20 AM    HDL 45 2023 07:39 AM        BP Readings from Last 3 Encounters:   23 (!) 132/58   23 (!) 112/59   23 (!) 129/56        Pulse Readings from Last 3 Encounters:   23 63   23 79   23 79       Wt Readings from Last 3 Encounters:   23 154 lb (69.9 kg)   23 140 lb (63.5 kg)   23 144 lb (65.3 kg)         EKG: normal EKG, normal sinus rhythm, unchanged from previous tracings.      Papo De Luna MD   2023

## 2023-09-20 ENCOUNTER — HOSPITAL ENCOUNTER (OUTPATIENT)
Facility: HOSPITAL | Age: 83
Setting detail: RECURRING SERIES
Discharge: HOME | End: 2023-09-23
Payer: MEDICARE

## 2023-09-20 PROCEDURE — 97165 OT EVAL LOW COMPLEX 30 MIN: CPT

## 2023-09-20 PROCEDURE — 92523 SPEECH SOUND LANG COMPREHEN: CPT

## 2023-09-20 PROCEDURE — 97161 PT EVAL LOW COMPLEX 20 MIN: CPT

## 2023-09-20 PROCEDURE — 97112 NEUROMUSCULAR REEDUCATION: CPT

## 2023-09-20 PROCEDURE — 92507 TX SP LANG VOICE COMM INDIV: CPT

## 2023-09-20 PROCEDURE — 97535 SELF CARE MNGMENT TRAINING: CPT

## 2023-09-20 PROCEDURE — 97110 THERAPEUTIC EXERCISES: CPT

## 2023-09-20 NOTE — PROGRESS NOTES
OCCUPATIONAL THERAPY - DAILY TREATMENT NOTE    Patient Name: iNle Valdez. Date: 2023    : 1940  Insurance: Payor: MEDICARE / Plan: MEDICARE PART A AND B / Product Type: *No Product type* /      Patient  verified Yes     Visit #   Current / Total 1 1   Time   In / Out 1040 1105   Total Treatment Time 25   Pain   In / Out 0 0   Subjective Functional Status/Changes: See POC     TREATMENT AREA =  Traumatic subdural hemorrhage with loss of consciousness status unknown, initial encounter [S06. 5XAA]    OBJECTIVE    Eval - 17 mins    Therapeutic Procedures:   Tx Min Billable or 1:1 Min (if diff from Tx Min) Procedure, Rationale, Specifics   8  25364 Self Care/Home Management (timed):  improve patient knowledge and understanding of activity modification and physical therapy expectations, procedures and progression  to increase ability to complete ADLs/IADLs independently  (see flow sheet as applicable)    Education on button hook with demo and practice with shirt                         8  MC BC Totals Reminder: bill using total billable min of TIMED therapeutic procedures (example: do not include dry needle or estim unattended, both untimed codes, in totals to left)  8-22 min = 1 unit; 23-37 min = 2 units; 38-52 min = 3 units; 53-67 min = 4 units; 68-82 min = 5 units   Total Total         Billed concurrently with other treatments Patient Education:  Reviewed HEP     Objective Information/Functional Measures/Assessment    SEE POC           []  See Progress Note/Re certification        Progress toward goals / Updated goals:    See POC     PLAN  []  Continue Plan of Care  []  Upgrade activities as tolerated    [x]  Discharge due to :  Services not warranted   []  Other:      Therapist: Bertin Jang OT    Date: 2023 Time: 9:40 AM     Future Appointments   Date Time Provider [address]   2023 10:40 AM Bertin Jang OT [id number]   2023 11:30 AM [facility] 1 [facility]   10/17/2023
[]WFL          [] Impaired   []WFL          [] Impaired    Safety Awareness []WFL          [] Impaired    Judgment  []WFL         [] Impaired    Express Needs [x]WFL          [] Impaired        Evaluation Complexity:  History:  MEDIUM  Complexity : 1-2 comorbidities / personal factors will impact the outcome/ POC ; Examination:  LOW Complexity : 1-2 Standardized tests and measures addressing body structure, function, activity limitation and / or participation in recreation  ;Presentation:  LOW Complexity : Stable, uncomplicated  ;Clinical Decision Making:  N/A FOTO score = an established functional score where 100 = no disability  Overall Complexity Rating: LOW   Problem List: none   Treatment Plan may include any combination of the following: N/A  Patient / Family readiness to learn indicated by: asking questions, trying to perform skills, interest, return verbalization , and return demonstration   Persons(s) to be included in education: patient (P) and family support person (FSP); list wife  Barriers to Learning/Limitations: sensory deficits-vision/hearing/speech  Measures taken if barriers to learning present: Visual cues  Patient Goal (s): \"Referral\"  Patient Self Reported Health Status: excellent  Rehabilitation Potential: excellent    Short Term Goals: To be accomplished in 1 treatments  Patient to be evaluated by OTR to determine if skilled outpatient OT services are warranted. 9/20/23: Goal met. Frequency / Duration: Patient to be seen 1 times per week for 1 treatments    Patient/ Caregiver education and instruction: Diagnosis, prognosis, activity modification [x]  Plan of care has been reviewed with EMERSON    Certification Period: 9/20/23-12/17/23    Jim Coon OT       9/20/2023       9:36 AM  ===================================================================  I certify that the above Therapy Services are being furnished while the patient is under my care.  I agree with the treatment plan
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
## 2023-09-20 NOTE — PROGRESS NOTES
of yes/no responses to questions [] WNL    [] Impaired [] Mild [] Mod [] Severe Reliability of responses to complex ideation [] WNL    [] Impaired [] Mild [] Mod [] Severe  Auditory retention/processing   [] WNL    [] Impaired [] Mild [] Mod [] Severe  Follow commands [] 1 Step [] 2 Step [] 3 Step [] complex  Objective Information:    Expressive Language  Automatic speech   [] WNL    [] Impaired [] Mild [] Mod [] Severe  Able to identify objects/pictures  [] WNL    [] Impaired [] Mild [] Mod [] Severe  Preservations    [] WNL    [] Impaired [] Mild [] Mod [] Severe  Word retrieval    [] WNL    [] Impaired [] Mild [] Mod [] Severe  Paraphasias   []None[] Literal    [] Phenomic   [] Jargon   [] Semantic  Able to make needs known at level [] Word   [] Phrase   [] Sentence   [] Gesture        [] Unable   Objective Information:    Cognition:  Attention:  [] Alert    [] Drowsy  Orientation:  [] Person   [] Place    [] Time  Attention to task: [] Able to sustain for (min):     [] Able to redirect with cues Min:    Mod:    Max:   Focused Attention:  Memory:  [] WNL    [] Impaired ST   [] Impaired LT   Digit Span:   Sentence Length:   Paragraph Length:  Objective information:    Executive Functions:  Neglect:  [] None    [] Left   [] Right  Self-regulation  [] Appropriate   [] Impulsive   [] Requires verbal cues  Awareness:  [] Poor initiation   [] Disinhibition   [] Constant supervision [] WNL  Problem Solving: [] WNL    [] Impaired [] Mild [] Mod [] Severe  Verbal Problem Solving:  Situational Problem Solving:  Judgement:  Reasoning:  Inferences:  Abstract Language:  Language Organization:    Speech:  Oral Verbal Apraxia: [] None    [] Mild    [] Moderate    [] Severe   Dysarthria:  [] None    [] Mild    [] Moderate    [] Severe   Intelligibility:  [] WNL    [] Words   [] Phrases   [] Sentences   [] Conversation      % Intelligible:    Writing: [] L or [] R [] WNL    [] Impaired @ [] Word [] Sentence []

## 2023-09-26 ENCOUNTER — TELEPHONE (OUTPATIENT)
Age: 83
End: 2023-09-26

## 2023-10-10 ENCOUNTER — HOSPITAL ENCOUNTER (OUTPATIENT)
Facility: HOSPITAL | Age: 83
Setting detail: RECURRING SERIES
Discharge: HOME | End: 2023-10-13
Payer: MEDICARE

## 2023-10-10 PROCEDURE — 97130 THER IVNTJ EA ADDL 15 MIN: CPT

## 2023-10-10 PROCEDURE — 97129 THER IVNTJ 1ST 15 MIN: CPT

## 2023-10-10 NOTE — PROGRESS NOTES
with 80% accuracy given min-modA over to promote independence and reduce frustrations. Current: Not targeted this date    2. Pt will complete delayed recall task (3-4 words, picture retention, simple novel information) following a 10-15 minutes delay using memory comp strategies (ie. WRAP) with 90% acc given Solomon to promote safety, independence, and reduce frustrations  Current: Re teaching of comp strategies \"WRAP\". Pt able to apply x3 words using various techniques. Pt reports things he takes with him when leaving the house include: notepad, HAs, glasses, keys, wallet, and phone. Pt reports using a notepad specifically for work tasks and uses running lists in his phone for shopping    3. Pt will recall 3x speaker/listener hearing strategies and apply them as deemed appropriate in order to increase active listening/comprehension, self advocacy, and reduce frustration with 90% acc give Solomon to improve safety, independence and communication competence.    Current Pt/caregiver educ re comp strategies provided with handout       PLAN  [x]  Continue plan of care  []  Modify Goals/Treatment Plan      []  Discharge due to:  [] Other:    GIOVANI Us 10/10/2023  8:08 AM  MA, CCC-SLP  Speech-Language Pathologist    Future Appointments   Date Time Provider [address]   10/17/2023  8:00 AM GIOVANI UsPB [facility]   10/17/2023  1:30  [address] LAB VISIT [facility] BS AMB   10/24/2023  8:00 AM GIOVANI Us [facility]   10/24/2023 11:20 AM Ashley Rodriguez MD [facility] BS AMB   3/18/2024 11:10 AM [facility] NURSE [facility] Brandi Sched   3/19/2024  8:00 AM Krishna Jones MD [facility] BS AMB   3/25/2024  9:40 AM JACE Payton [facility] Rajni Carranza

## 2023-10-17 ENCOUNTER — HOSPITAL ENCOUNTER (OUTPATIENT)
Facility: HOSPITAL | Age: 83
Setting detail: RECURRING SERIES
Discharge: HOME | End: 2023-10-20
Payer: MEDICARE

## 2023-10-17 PROCEDURE — 97130 THER IVNTJ EA ADDL 15 MIN: CPT

## 2023-10-17 PROCEDURE — 97129 THER IVNTJ 1ST 15 MIN: CPT

## 2023-10-17 NOTE — PROGRESS NOTES
ST DAILY TREATMENT NOTE    Patient Name: Nile Valdez. Date:10/17/2023  : 1940  [x]  Patient  Verified  Payor: Payor: MEDICARE / Plan: MEDICARE PART A AND B / Product Type: *No Product type* /   In time:8:03  Out time:8:42  Total Treatment Time (min): 39  Visit #: 3 of 4  Recert due   Treatment Diagnosis: Cognitive communication deficit [R41.841]    SUBJECTIVE  Pain Level (0-10 scale): 0    Subjective functional status/changes:   [] No changes reported  Pt did not have his hearing aids in today. Pt's wife needed to repeat instructions/questions etc to him when he was not able to understand SLP. OBJECTIVE  Treatment provided includes:  Increase/Improve:  []  Voice Quality [x]  Cognitive Linguistic Skills []  Laryngeal/Pharyngeal Exercises   []  Vocal Loudness []  Reading Comprehension []  Swallowing Skills    []  Vocal Cord Function []  Auditory Comprehension []  Oral Motor Skills   []  Resonance []  Writing Skills [x]  Compensatory strategies    []  Speech Intelligibility []  Expressive Language []  Attention   []  Breath Support/Coord. []  Receptive language []  Memory   []  Articulation []  Safety Awareness [x] Pt /caregiver educ   []  Fluency []  Word Retrieval []        Treatment Provided:  -hearing strategies  -immediate recall  - delayed recall /comp strategies    Patient/Caregiver  Education: [x] Review HEP      HEP/Handouts given:     Pain Level (0-10 scale) post treatment: 0    ASSESSMENT     []   Improving appropriately and progressing toward goals  []   Improving slowly and progressing toward goals  [x]   Approximating goals/maximum potential  [x]   Continues to benefit from skilled therapy to address remaining functional deficits  []   Not progressing toward goals and plan of care will be adjusted    Patient will continue to benefit from skilled therapy to address remaining functional deficits: Cognitive linguistic      Progress towards goals / Updated goals:  1.  Pt will utilize

## 2023-10-17 NOTE — PROGRESS NOTES
[address] [facility] PHYSICAL THERAPY  [address] [place] [address] Janny- Ph: [phone] Fx: [phone]    Continued Plan of Care/ Re-certification for Speech Therapy Services    Current Reporting Period 23 to 10/18/23    Patient name: Mauricio Rivers. 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] Onset Date:2023     Prior Hospitalization: see medical history Provider#: [id number]   Medications: Verified on Patient Summary List    Comorbidities: [place], CAD, COPD, HTN, arthritis, hx of cancer, hx of left shoulder injury with rotator cuff repair  Prior Level of Function:Lives with wife, was in army, worked at [employer], works at [employer], household chores, has handicap accessible housing    Visits from [facility] of Care: 3    Missed Visits: 1    The Plan of Care and following information is based on the patient's current status:  Goal:1. Pt will utilize comp strategies in order to complete immediate memory with 80% accuracy given min-modA over to promote independence and reduce frustrations. Status at last note/certification: At evaluation, pt exhibited minimal cognitive deficits, likely impacted by hearing deficit   Current Status: not metPt 66% acc Solomon increasing to 100% given min-modA. This task was difficult due to pt not having his hearing aids in. -progressing/continue addressing       Goal:2. Pt will complete delayed recall task (3-4 words, picture retention, simple novel information) following a 10-15 minutes delay using memory comp strategies (ie.  WRAP) with 90% acc given Solomon to promote safety, independence, and reduce frustrations  Status at last note/certification:At evaluation, pt exhibited minimal cognitive deficits, likely impacted by hearing deficit   Current Status: not met Pt recalled \"WRAP\" strategies 100% acc MESERET   Delayed recall 3 picture 100%

## 2023-10-18 ENCOUNTER — HOSPITAL ENCOUNTER (OUTPATIENT)
Facility: HOSPITAL | Age: 83
Discharge: HOME | End: 2023-10-21
Payer: MEDICARE

## 2023-10-18 DIAGNOSIS — S06.5XAA SDH (SUBDURAL HEMATOMA) (HCC): ICD-10-CM

## 2023-10-18 LAB
ANION GAP SERPL CALC-SCNC: 0 MMOL/L (ref 3–18)
BUN SERPL-MCNC: 24 MG/DL (ref 7–18)
BUN/CREAT SERPL: 24 (ref 12–20)
CALCIUM SERPL-MCNC: 10.1 MG/DL (ref 8.5–10.1)
CHLORIDE SERPL-SCNC: 108 MMOL/L (ref 100–111)
CO2 SERPL-SCNC: 32 MMOL/L (ref 21–32)
CREAT SERPL-MCNC: 1.02 MG/DL (ref 0.6–1.3)
EST. AVERAGE GLUCOSE BLD GHB EST-MCNC: 103 MG/DL
GLUCOSE SERPL-MCNC: 105 MG/DL (ref 74–99)
HBA1C MFR BLD: 5.2 % (ref 4.2–5.6)
POTASSIUM SERPL-SCNC: 4.3 MMOL/L (ref 3.5–5.5)
SODIUM SERPL-SCNC: 140 MMOL/L (ref 136–145)

## 2023-10-18 PROCEDURE — 80048 BASIC METABOLIC PNL TOTAL CA: CPT

## 2023-10-18 PROCEDURE — 83036 HEMOGLOBIN GLYCOSYLATED A1C: CPT

## 2023-10-18 PROCEDURE — 36415 COLL VENOUS BLD VENIPUNCTURE: CPT
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
## 2023-10-24 ENCOUNTER — HOSPITAL ENCOUNTER (OUTPATIENT)
Facility: HOSPITAL | Age: 83
Setting detail: RECURRING SERIES
Discharge: HOME | End: 2023-10-27
Payer: MEDICARE

## 2023-10-24 ENCOUNTER — OFFICE VISIT (OUTPATIENT)
Age: 83
End: 2023-10-24
Payer: MEDICARE

## 2023-10-24 VITALS
HEIGHT: 67 IN | OXYGEN SATURATION: 98 % | SYSTOLIC BLOOD PRESSURE: 114 MMHG | HEART RATE: 52 BPM | DIASTOLIC BLOOD PRESSURE: 40 MMHG | BODY MASS INDEX: 24.64 KG/M2 | WEIGHT: 157 LBS | TEMPERATURE: 98.2 F | RESPIRATION RATE: 16 BRPM

## 2023-10-24 DIAGNOSIS — I10 PRIMARY HYPERTENSION: ICD-10-CM

## 2023-10-24 DIAGNOSIS — J44.9 CHRONIC OBSTRUCTIVE PULMONARY DISEASE, UNSPECIFIED COPD TYPE (HCC): ICD-10-CM

## 2023-10-24 DIAGNOSIS — K22.70 BARRETT'S ESOPHAGUS WITHOUT DYSPLASIA: ICD-10-CM

## 2023-10-24 DIAGNOSIS — R73.01 IFG (IMPAIRED FASTING GLUCOSE): ICD-10-CM

## 2023-10-24 DIAGNOSIS — S06.5XAA SDH (SUBDURAL HEMATOMA) (HCC): ICD-10-CM

## 2023-10-24 DIAGNOSIS — R56.9 SEIZURES (HCC): ICD-10-CM

## 2023-10-24 DIAGNOSIS — I25.10 CORONARY ARTERY DISEASE INVOLVING NATIVE CORONARY ARTERY OF NATIVE HEART WITHOUT ANGINA PECTORIS: Primary | ICD-10-CM

## 2023-10-24 PROCEDURE — 3078F DIAST BP <80 MM HG: CPT | Performed by: INTERNAL MEDICINE

## 2023-10-24 PROCEDURE — 97129 THER IVNTJ 1ST 15 MIN: CPT

## 2023-10-24 PROCEDURE — 3074F SYST BP LT 130 MM HG: CPT | Performed by: INTERNAL MEDICINE

## 2023-10-24 PROCEDURE — 1036F TOBACCO NON-USER: CPT | Performed by: INTERNAL MEDICINE

## 2023-10-24 PROCEDURE — 97130 THER IVNTJ EA ADDL 15 MIN: CPT

## 2023-10-24 PROCEDURE — 1123F ACP DISCUSS/DSCN MKR DOCD: CPT | Performed by: INTERNAL MEDICINE

## 2023-10-24 PROCEDURE — G8427 DOCREV CUR MEDS BY ELIG CLIN: HCPCS | Performed by: INTERNAL MEDICINE

## 2023-10-24 PROCEDURE — G8483 FLU IMM NO ADMIN DOC REA: HCPCS | Performed by: INTERNAL MEDICINE

## 2023-10-24 PROCEDURE — 99214 OFFICE O/P EST MOD 30 MIN: CPT | Performed by: INTERNAL MEDICINE

## 2023-10-24 PROCEDURE — 3023F SPIROM DOC REV: CPT | Performed by: INTERNAL MEDICINE

## 2023-10-24 PROCEDURE — G8420 CALC BMI NORM PARAMETERS: HCPCS | Performed by: INTERNAL MEDICINE

## 2023-10-24 RX ORDER — FERROUS SULFATE 325(65) MG
325 TABLET ORAL
COMMUNITY

## 2023-10-24 RX ORDER — PANTOPRAZOLE SODIUM 20 MG/1
20 TABLET, DELAYED RELEASE ORAL DAILY
COMMUNITY
End: 2023-11-01 | Stop reason: ALTCHOICE

## 2023-10-24 SDOH — ECONOMIC STABILITY: FOOD INSECURITY: WITHIN THE PAST 12 MONTHS, THE FOOD YOU BOUGHT JUST DIDN'T LAST AND YOU DIDN'T HAVE MONEY TO GET MORE.: NEVER TRUE

## 2023-10-24 SDOH — ECONOMIC STABILITY: INCOME INSECURITY: HOW HARD IS IT FOR YOU TO PAY FOR THE VERY BASICS LIKE FOOD, HOUSING, MEDICAL CARE, AND HEATING?: NOT HARD AT ALL

## 2023-10-24 SDOH — ECONOMIC STABILITY: FOOD INSECURITY: WITHIN THE PAST 12 MONTHS, YOU WORRIED THAT YOUR FOOD WOULD RUN OUT BEFORE YOU GOT MONEY TO BUY MORE.: NEVER TRUE

## 2023-10-24 ASSESSMENT — PATIENT HEALTH QUESTIONNAIRE - PHQ9
SUM OF ALL RESPONSES TO PHQ QUESTIONS 1-9: 0
1. LITTLE INTEREST OR PLEASURE IN DOING THINGS: 0
SUM OF ALL RESPONSES TO PHQ QUESTIONS 1-9: 0
SUM OF ALL RESPONSES TO PHQ QUESTIONS 1-9: 0
SUM OF ALL RESPONSES TO PHQ9 QUESTIONS 1 & 2: 0
2. FEELING DOWN, DEPRESSED OR HOPELESS: 0
SUM OF ALL RESPONSES TO PHQ QUESTIONS 1-9: 0

## 2023-10-24 NOTE — PROGRESS NOTES
ST DAILY TREATMENT NOTE    Patient Name: Carl Romero. Date:10/24/2023  : 1940  [x]  Patient  Verified  Payor: Payor: MEDICARE / Plan: MEDICARE PART A AND B / Product Type: *No Product type* /   In time:8:00  Out time:***  Total Treatment Time (min): ***  Visit #: 4 of 4    Treatment Diagnosis: Cognitive communication deficit [R41.841]    SUBJECTIVE  Pain Level (0-10 scale): 0    Subjective functional status/changes:   [] No changes reported    OBJECTIVE  Treatment provided includes:  Increase/Improve:  []  Voice Quality []  Cognitive Linguistic Skills []  Laryngeal/Pharyngeal Exercises   []  Vocal Loudness []  Reading Comprehension []  Swallowing Skills    []  Vocal Cord Function []  Auditory Comprehension []  Oral Motor Skills   []  Resonance []  Writing Skills []  Compensatory strategies    []  Speech Intelligibility []  Expressive Language []  Attention   []  Breath Support/Coord. []  Receptive language []  Memory   []  Articulation []  Safety Awareness []    []  Fluency []  Word Retrieval []        Treatment Provided:  ***    Patient/Caregiver  Education: [x] Review HEP      HEP/Handouts given: ***    Pain Level (0-10 scale) post treatment: ***    ASSESSMENT   ***    []   Improving appropriately and progressing toward goals  []   Improving slowly and progressing toward goals  []   Approximating goals/maximum potential  []   Continues to benefit from skilled therapy to address remaining functional deficits  []   Not progressing toward goals and plan of care will be adjusted    Patient will continue to benefit from skilled therapy to address remaining functional deficits: ***    Progress towards goals / Updated goals:  1. Pt will utilize comp strategies in order to complete immediate memory with 80% accuracy given min-modA over to promote independence and reduce frustrations. Current: Faces     2. Pt will complete delayed recall task (3-4 words, picture retention, simple novel information) following

## 2023-10-24 NOTE — PROGRESS NOTES
Chief Complaint   Patient presents with    Follow-up         Georgina Rodriguez presents today for   Chief Complaint   Patient presents with    Follow-up             1. \"Have you been to the ER, urgent care clinic since your last visit? Hospitalized since your last visit? \" no    2. \"Have you seen or consulted any other health care providers outside of the [address] [place] [address] since your last visit? \" no     3. For patients aged 43-73: Has the patient had a colonoscopy / FIT/ Cologuard? NA - based on age      If the patient is female:    4. For patients aged 43-66: Has the patient had a mammogram within the past 2 years? NA - based on age or sex      11. For patients aged 21-65: Has the patient had a pap smear?  NA - based on age or sex

## 2023-10-24 NOTE — PROGRESS NOTES
[facility] PHYSICAL [facility]ER[facility]    [address] [place] [address] Janny  [phone] fax    Speech Therapy Daily Note Discharge Summary  Date:10/24/2023  Patient name: Kadeem Rivas Start of Care: 23   Referral source: Suellen Machelle : 1940   Medical/Treatment Diagnosis: Cognitive communication deficit [R41.841] Onset Date:2023     Prior Hospitalization: see medical history Provider#: [id number]   Medications: Verified on Patient Summary List    Comorbidities: [place], CAD, COPD, HTN, arthritis, hx of cancer, hx of left shoulder injury with rotator cuff repair  Prior Level of Function:Lives with wife, was in army, worked at [employer], works at [employer], household chores, has handicap accessible housing    Visits from [place] of Care: 4    Missed Visits: 1    Reporting Period : 23 to 10/24/23    [x]  Patient  Verified  Payor: MEDICARE / Plan: MEDICARE PART A AND B / Product Type: *No Product type* /   In time:8:00  Out time:8:38  Total Treatment Time (min): 38  Visit #: 1 of 4  SUBJECTIVE  Pain Level (0-10 scale): 0  Subjective functional status/changes:   [] No changes reported  Pt was accompanied by his wife. Pt had his hearing aids in this session. OBJECTIVE  Treatment provided includes:  Increase/Improve:  []  Voice Quality [x]  Cognitive Linguistic Skills []  Laryngeal/Pharyngeal Exercises   []  Vocal Loudness []  Reading Comprehension []  Swallowing Skills    []  Vocal Cord Function []  Auditory Comprehension []  Oral Motor Skills   []  Resonance []  Writing Skills [x]  Compensatory strategies    []  Speech Intelligibility []  Expressive Language []  Attention   []  Breath Support/Coord.  []  Receptive language [x]  Memory   []  Articulation []  Safety Awareness [x] Pt/caregiver edu   []  Fluency []  Word Retrieval []        Treatment Provided:  -Immediate recall  -delayed recall  -d/c planning, with handout  Patient/Caregiver

## 2023-11-01 PROBLEM — I65.29 CAROTID STENOSIS: Status: RESOLVED | Noted: 2023-06-07 | Resolved: 2023-11-01

## 2023-11-01 RX ORDER — NITROGLYCERIN 0.4 MG/1
0.4 TABLET SUBLINGUAL EVERY 5 MIN PRN
Qty: 25 TABLET | Refills: 1 | Status: SHIPPED | OUTPATIENT
Start: 2023-11-01

## 2023-11-01 RX ORDER — OMEPRAZOLE 40 MG/1
40 CAPSULE, DELAYED RELEASE ORAL
Qty: 90 CAPSULE | Refills: 1 | Status: SHIPPED | OUTPATIENT
Start: 2023-11-01

## 2023-11-19 RX ORDER — NITROGLYCERIN 0.4 MG/1
TABLET SUBLINGUAL
Qty: 25 TABLET | Refills: 1 | Status: SHIPPED | OUTPATIENT
Start: 2023-11-19

## 2024-02-06 ENCOUNTER — HOSPITAL ENCOUNTER (EMERGENCY)
Facility: HOSPITAL | Age: 84
Discharge: HOME | End: 2024-02-06
Attending: STUDENT IN AN ORGANIZED HEALTH CARE EDUCATION/TRAINING PROGRAM
Payer: MEDICARE

## 2024-02-06 ENCOUNTER — APPOINTMENT (OUTPATIENT)
Facility: HOSPITAL | Age: 84
End: 2024-02-06
Payer: MEDICARE

## 2024-02-06 VITALS
HEIGHT: 67 IN | RESPIRATION RATE: 18 BRPM | DIASTOLIC BLOOD PRESSURE: 57 MMHG | BODY MASS INDEX: 21.97 KG/M2 | TEMPERATURE: 98.1 F | HEART RATE: 58 BPM | SYSTOLIC BLOOD PRESSURE: 144 MMHG | OXYGEN SATURATION: 98 % | WEIGHT: 140 LBS

## 2024-02-06 DIAGNOSIS — R09.81 NASAL CONGESTION: Primary | ICD-10-CM

## 2024-02-06 LAB
ANION GAP SERPL CALC-SCNC: 1 MMOL/L (ref 3–18)
BASOPHILS # BLD: 0.1 K/UL (ref 0–0.1)
BASOPHILS NFR BLD: 1 % (ref 0–2)
BUN SERPL-MCNC: 16 MG/DL (ref 7–18)
BUN/CREAT SERPL: 15 (ref 12–20)
CALCIUM SERPL-MCNC: 9.8 MG/DL (ref 8.5–10.1)
CHLORIDE SERPL-SCNC: 108 MMOL/L (ref 100–111)
CO2 SERPL-SCNC: 29 MMOL/L (ref 21–32)
CREAT SERPL-MCNC: 1.06 MG/DL (ref 0.6–1.3)
DIFFERENTIAL METHOD BLD: ABNORMAL
EOSINOPHIL # BLD: 0.3 K/UL (ref 0–0.4)
EOSINOPHIL NFR BLD: 3 % (ref 0–5)
ERYTHROCYTE [DISTWIDTH] IN BLOOD BY AUTOMATED COUNT: 13.6 % (ref 11.6–14.5)
FLUAV RNA SPEC QL NAA+PROBE: NOT DETECTED
FLUBV RNA SPEC QL NAA+PROBE: NOT DETECTED
GLUCOSE SERPL-MCNC: 107 MG/DL (ref 74–99)
HCT VFR BLD AUTO: 37.2 % (ref 36–48)
HGB BLD-MCNC: 12.4 G/DL (ref 13–16)
IMM GRANULOCYTES # BLD AUTO: 0 K/UL (ref 0–0.04)
IMM GRANULOCYTES NFR BLD AUTO: 0 % (ref 0–0.5)
LYMPHOCYTES # BLD: 2.6 K/UL (ref 0.9–3.6)
LYMPHOCYTES NFR BLD: 25 % (ref 21–52)
MCH RBC QN AUTO: 29.9 PG (ref 24–34)
MCHC RBC AUTO-ENTMCNC: 33.3 G/DL (ref 31–37)
MCV RBC AUTO: 89.6 FL (ref 78–100)
MONOCYTES # BLD: 0.9 K/UL (ref 0.05–1.2)
MONOCYTES NFR BLD: 9 % (ref 3–10)
NEUTS SEG # BLD: 6.2 K/UL (ref 1.8–8)
NEUTS SEG NFR BLD: 61 % (ref 40–73)
NRBC # BLD: 0 K/UL (ref 0–0.01)
NRBC BLD-RTO: 0 PER 100 WBC
PLATELET # BLD AUTO: 306 K/UL (ref 135–420)
PMV BLD AUTO: 9.8 FL (ref 9.2–11.8)
POTASSIUM SERPL-SCNC: 3.9 MMOL/L (ref 3.5–5.5)
RBC # BLD AUTO: 4.15 M/UL (ref 4.35–5.65)
SARS-COV-2 RNA RESP QL NAA+PROBE: NOT DETECTED
SODIUM SERPL-SCNC: 138 MMOL/L (ref 136–145)
WBC # BLD AUTO: 10.2 K/UL (ref 4.6–13.2)

## 2024-02-06 PROCEDURE — 85025 COMPLETE CBC W/AUTO DIFF WBC: CPT

## 2024-02-06 PROCEDURE — 99284 EMERGENCY DEPT VISIT MOD MDM: CPT

## 2024-02-06 PROCEDURE — 87636 SARSCOV2 & INF A&B AMP PRB: CPT

## 2024-02-06 PROCEDURE — 80048 BASIC METABOLIC PNL TOTAL CA: CPT

## 2024-02-06 PROCEDURE — 94761 N-INVAS EAR/PLS OXIMETRY MLT: CPT

## 2024-02-06 PROCEDURE — 71045 X-RAY EXAM CHEST 1 VIEW: CPT

## 2024-02-06 ASSESSMENT — ENCOUNTER SYMPTOMS
TROUBLE SWALLOWING: 0
COUGH: 0
VOMITING: 0
VOICE CHANGE: 0
SINUS PRESSURE: 0
SORE THROAT: 0
SINUS PAIN: 0
RHINORRHEA: 1
DIARRHEA: 0
SHORTNESS OF BREATH: 0
NAUSEA: 0

## 2024-02-06 ASSESSMENT — PAIN - FUNCTIONAL ASSESSMENT: PAIN_FUNCTIONAL_ASSESSMENT: NONE - DENIES PAIN

## 2024-02-06 NOTE — ED PROVIDER NOTES
[facility] EMERGENCY DEPT  EMERGENCY DEPARTMENT ENCOUNTER     Pt Name: Roel Fish Jr.  MRN: [id number]  Birthdate 1940  Date of evaluation: 2/6/2024  Provider: Arcadio Tobar MD  PCP: Oswald Sandoval MD  Note Started: 7:33 AM EST 2/6/24    Chief Complaint  Cough and Fatigue (Pt stated symptoms of SOB, productive cough, and fatigue for over a week. )      History of Present Illness  Roel Fish Jr. is a 83 y.o. male with a history of CAD, mild COPD who presents to the ED with a chief complaint of URI, nasal congestion, cough for the past week.  Patient is here escorting his wife who he believes is having \"worse symptoms than him \"patient reports he is improving and his cough, but does want to get checked out.  Patient reports most of his symptoms are secondary to increased drainage of his nose, causing ear fullness, nasal drip, nasal congestion.  Patient had a negative flu test, up-to-date on COVID, flu, RSV, pneumonia vaccines.    Medical History  Past Medical History:   Diagnosis Date    Allergic rhinitis     Hooker's esophagus     Dr. Cloud 8/17 s/p HALO; 7/22    CAD (coronary artery disease)     Carotid stenosis 05/2023    [facility] DERREK 50-69%, LICA<50% (5/23)    Colon adenoma 08/2017    Dr Cloud    Colon polyps 2007    Dr. Cloud    COPD (chronic obstructive pulmonary disease) (HCC)     mild obst disease on PFTs Dr. Alcantar (2007)    COVID-19 vaccine dose declined 12/2022    boosters    Do not resuscitate 08/2023    ED (erectile dysfunction)     GERD (gastroesophageal reflux disease)     by EGD 2007 Dr. Cloud; EGD 5/14/21 hh and s/p dilation stricture    Hearing loss     Hx of cardiac catheterization 11/2020    Dr Pan; ef 60%, LIMA to LAD patent, SVG to DM1 patent, segment to RPDA occ, prox LAD 95%, Cx 30%, 95% prox RCA, mid RCA 95%, dist RCA 95%, RPDA 95%, s/p stent of all 4 vessels    Hypertension     VIRGIL (obstructive sleep apnea) 2011    CPAP not used    Osteoarthritis of both hands     Prostate

## 2024-02-06 NOTE — ED NOTES
Pt provided AVS and d/c instructions. No questions or concerns at this time. Pt is ambulatory and d/c home in no distress.

## 2024-03-19 ENCOUNTER — OFFICE VISIT (OUTPATIENT)
Age: 84
End: 2024-03-19
Payer: MEDICARE

## 2024-03-19 VITALS
HEART RATE: 52 BPM | BODY MASS INDEX: 26.68 KG/M2 | WEIGHT: 170 LBS | DIASTOLIC BLOOD PRESSURE: 62 MMHG | HEIGHT: 67 IN | SYSTOLIC BLOOD PRESSURE: 120 MMHG | OXYGEN SATURATION: 99 %

## 2024-03-19 DIAGNOSIS — I65.23 OCCLUSION AND STENOSIS OF BILATERAL CAROTID ARTERIES: ICD-10-CM

## 2024-03-19 DIAGNOSIS — I25.10 ATHEROSCLEROSIS OF NATIVE CORONARY ARTERY OF NATIVE HEART WITHOUT ANGINA PECTORIS: Primary | ICD-10-CM

## 2024-03-19 DIAGNOSIS — I65.23 BILATERAL CAROTID ARTERY STENOSIS: ICD-10-CM

## 2024-03-19 DIAGNOSIS — R06.02 SHORTNESS OF BREATH: ICD-10-CM

## 2024-03-19 DIAGNOSIS — I10 ESSENTIAL (PRIMARY) HYPERTENSION: ICD-10-CM

## 2024-03-19 DIAGNOSIS — Z95.1 S/P CABG X 3: ICD-10-CM

## 2024-03-19 DIAGNOSIS — Z95.1 PRESENCE OF AORTOCORONARY BYPASS GRAFT: ICD-10-CM

## 2024-03-19 PROCEDURE — 1036F TOBACCO NON-USER: CPT | Performed by: INTERNAL MEDICINE

## 2024-03-19 PROCEDURE — 3078F DIAST BP <80 MM HG: CPT | Performed by: INTERNAL MEDICINE

## 2024-03-19 PROCEDURE — 1123F ACP DISCUSS/DSCN MKR DOCD: CPT | Performed by: INTERNAL MEDICINE

## 2024-03-19 PROCEDURE — 99214 OFFICE O/P EST MOD 30 MIN: CPT | Performed by: INTERNAL MEDICINE

## 2024-03-19 PROCEDURE — G8427 DOCREV CUR MEDS BY ELIG CLIN: HCPCS | Performed by: INTERNAL MEDICINE

## 2024-03-19 PROCEDURE — G8483 FLU IMM NO ADMIN DOC REA: HCPCS | Performed by: INTERNAL MEDICINE

## 2024-03-19 PROCEDURE — 93000 ELECTROCARDIOGRAM COMPLETE: CPT | Performed by: INTERNAL MEDICINE

## 2024-03-19 PROCEDURE — G8417 CALC BMI ABV UP PARAM F/U: HCPCS | Performed by: INTERNAL MEDICINE

## 2024-03-19 PROCEDURE — 3074F SYST BP LT 130 MM HG: CPT | Performed by: INTERNAL MEDICINE

## 2024-03-19 ASSESSMENT — PATIENT HEALTH QUESTIONNAIRE - PHQ9
SUM OF ALL RESPONSES TO PHQ QUESTIONS 1-9: 0
SUM OF ALL RESPONSES TO PHQ QUESTIONS 1-9: 0
1. LITTLE INTEREST OR PLEASURE IN DOING THINGS: NOT AT ALL
SUM OF ALL RESPONSES TO PHQ QUESTIONS 1-9: 0
SUM OF ALL RESPONSES TO PHQ9 QUESTIONS 1 & 2: 0
SUM OF ALL RESPONSES TO PHQ QUESTIONS 1-9: 0
2. FEELING DOWN, DEPRESSED OR HOPELESS: NOT AT ALL
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
## 2024-04-08 ENCOUNTER — HOSPITAL ENCOUNTER (OUTPATIENT)
Facility: HOSPITAL | Age: 84
Setting detail: SPECIMEN
Discharge: HOME | End: 2024-04-11
Payer: MEDICARE

## 2024-04-08 DIAGNOSIS — R73.01 IFG (IMPAIRED FASTING GLUCOSE): ICD-10-CM

## 2024-04-08 DIAGNOSIS — I25.10 CORONARY ARTERY DISEASE INVOLVING NATIVE CORONARY ARTERY OF NATIVE HEART WITHOUT ANGINA PECTORIS: ICD-10-CM

## 2024-04-08 LAB
ALBUMIN SERPL-MCNC: 4.2 G/DL (ref 3.4–5)
ALBUMIN/GLOB SERPL: 1.4 (ref 0.8–1.7)
ALP SERPL-CCNC: 60 U/L (ref 45–117)
ALT SERPL-CCNC: 26 U/L (ref 16–61)
ANION GAP SERPL CALC-SCNC: 4 MMOL/L (ref 3–18)
AST SERPL-CCNC: 14 U/L (ref 10–38)
BASOPHILS # BLD: 0.1 K/UL (ref 0–0.1)
BASOPHILS NFR BLD: 1 % (ref 0–2)
BILIRUB SERPL-MCNC: 0.4 MG/DL (ref 0.2–1)
BUN SERPL-MCNC: 21 MG/DL (ref 7–18)
BUN/CREAT SERPL: 19 (ref 12–20)
CALCIUM SERPL-MCNC: 9.5 MG/DL (ref 8.5–10.1)
CHLORIDE SERPL-SCNC: 105 MMOL/L (ref 100–111)
CHOLEST SERPL-MCNC: 152 MG/DL
CO2 SERPL-SCNC: 29 MMOL/L (ref 21–32)
CREAT SERPL-MCNC: 1.11 MG/DL (ref 0.6–1.3)
DIFFERENTIAL METHOD BLD: ABNORMAL
EOSINOPHIL # BLD: 0.2 K/UL (ref 0–0.4)
EOSINOPHIL NFR BLD: 2 % (ref 0–5)
ERYTHROCYTE [DISTWIDTH] IN BLOOD BY AUTOMATED COUNT: 14.2 % (ref 11.6–14.5)
GLOBULIN SER CALC-MCNC: 3.1 G/DL (ref 2–4)
GLUCOSE SERPL-MCNC: 96 MG/DL (ref 74–99)
HBA1C MFR BLD: 5.3 % (ref 4.2–5.6)
HCT VFR BLD AUTO: 40.1 % (ref 36–48)
HDLC SERPL-MCNC: 59 MG/DL (ref 40–60)
HDLC SERPL: 2.6 (ref 0–5)
HGB BLD-MCNC: 12.8 G/DL (ref 13–16)
IMM GRANULOCYTES # BLD AUTO: 0 K/UL (ref 0–0.04)
IMM GRANULOCYTES NFR BLD AUTO: 0 % (ref 0–0.5)
LDLC SERPL CALC-MCNC: 80.4 MG/DL (ref 0–100)
LIPID PANEL: NORMAL
LYMPHOCYTES # BLD: 2.9 K/UL (ref 0.9–3.6)
LYMPHOCYTES NFR BLD: 33 % (ref 21–52)
MCH RBC QN AUTO: 29.9 PG (ref 24–34)
MCHC RBC AUTO-ENTMCNC: 31.9 G/DL (ref 31–37)
MCV RBC AUTO: 93.7 FL (ref 78–100)
MONOCYTES # BLD: 0.8 K/UL (ref 0.05–1.2)
MONOCYTES NFR BLD: 10 % (ref 3–10)
NEUTS SEG # BLD: 4.8 K/UL (ref 1.8–8)
NEUTS SEG NFR BLD: 55 % (ref 40–73)
NRBC # BLD: 0 K/UL (ref 0–0.01)
NRBC BLD-RTO: 0 PER 100 WBC
PLATELET # BLD AUTO: 227 K/UL (ref 135–420)
PMV BLD AUTO: 10.4 FL (ref 9.2–11.8)
POTASSIUM SERPL-SCNC: 4.4 MMOL/L (ref 3.5–5.5)
PROT SERPL-MCNC: 7.3 G/DL (ref 6.4–8.2)
RBC # BLD AUTO: 4.28 M/UL (ref 4.35–5.65)
SODIUM SERPL-SCNC: 138 MMOL/L (ref 136–145)
TRIGL SERPL-MCNC: 63 MG/DL
VLDLC SERPL CALC-MCNC: 12.6 MG/DL
WBC # BLD AUTO: 8.7 K/UL (ref 4.6–13.2)

## 2024-04-08 PROCEDURE — 36415 COLL VENOUS BLD VENIPUNCTURE: CPT

## 2024-04-08 PROCEDURE — 85025 COMPLETE CBC W/AUTO DIFF WBC: CPT

## 2024-04-08 PROCEDURE — 83036 HEMOGLOBIN GLYCOSYLATED A1C: CPT

## 2024-04-08 PROCEDURE — 80061 LIPID PANEL: CPT

## 2024-04-08 PROCEDURE — 80053 COMPREHEN METABOLIC PANEL: CPT

## 2024-04-15 ENCOUNTER — OFFICE VISIT (OUTPATIENT)
Age: 84
End: 2024-04-15
Payer: MEDICARE

## 2024-04-15 VITALS
BODY MASS INDEX: 27 KG/M2 | OXYGEN SATURATION: 99 % | HEIGHT: 67 IN | SYSTOLIC BLOOD PRESSURE: 129 MMHG | TEMPERATURE: 98.4 F | WEIGHT: 172 LBS | DIASTOLIC BLOOD PRESSURE: 56 MMHG | HEART RATE: 52 BPM | RESPIRATION RATE: 16 BRPM

## 2024-04-15 DIAGNOSIS — R73.01 IFG (IMPAIRED FASTING GLUCOSE): ICD-10-CM

## 2024-04-15 DIAGNOSIS — J44.9 CHRONIC OBSTRUCTIVE PULMONARY DISEASE, UNSPECIFIED COPD TYPE (HCC): ICD-10-CM

## 2024-04-15 DIAGNOSIS — I65.23 BILATERAL CAROTID ARTERY STENOSIS: ICD-10-CM

## 2024-04-15 DIAGNOSIS — I25.10 CORONARY ARTERY DISEASE INVOLVING NATIVE CORONARY ARTERY OF NATIVE HEART WITHOUT ANGINA PECTORIS: ICD-10-CM

## 2024-04-15 DIAGNOSIS — R60.9 EDEMA, UNSPECIFIED TYPE: ICD-10-CM

## 2024-04-15 DIAGNOSIS — I10 PRIMARY HYPERTENSION: ICD-10-CM

## 2024-04-15 DIAGNOSIS — E04.2 MULTINODULAR GOITER: ICD-10-CM

## 2024-04-15 DIAGNOSIS — D64.9 ANEMIA, UNSPECIFIED TYPE: Primary | ICD-10-CM

## 2024-04-15 PROCEDURE — 3074F SYST BP LT 130 MM HG: CPT | Performed by: INTERNAL MEDICINE

## 2024-04-15 PROCEDURE — 1123F ACP DISCUSS/DSCN MKR DOCD: CPT | Performed by: INTERNAL MEDICINE

## 2024-04-15 PROCEDURE — 3023F SPIROM DOC REV: CPT | Performed by: INTERNAL MEDICINE

## 2024-04-15 PROCEDURE — 3078F DIAST BP <80 MM HG: CPT | Performed by: INTERNAL MEDICINE

## 2024-04-15 PROCEDURE — G2211 COMPLEX E/M VISIT ADD ON: HCPCS | Performed by: INTERNAL MEDICINE

## 2024-04-15 PROCEDURE — 1036F TOBACCO NON-USER: CPT | Performed by: INTERNAL MEDICINE

## 2024-04-15 PROCEDURE — 99214 OFFICE O/P EST MOD 30 MIN: CPT | Performed by: INTERNAL MEDICINE

## 2024-04-15 PROCEDURE — G8427 DOCREV CUR MEDS BY ELIG CLIN: HCPCS | Performed by: INTERNAL MEDICINE

## 2024-04-15 PROCEDURE — G8417 CALC BMI ABV UP PARAM F/U: HCPCS | Performed by: INTERNAL MEDICINE

## 2024-04-15 RX ORDER — TRIAMTERENE AND HYDROCHLOROTHIAZIDE 37.5; 25 MG/1; MG/1
1 CAPSULE ORAL DAILY PRN
Qty: 30 CAPSULE | Refills: 1 | Status: SHIPPED | OUTPATIENT
Start: 2024-04-15

## 2024-04-15 NOTE — PROGRESS NOTES
Roel Fish Jr. presents today for   Chief Complaint   Patient presents with    6 Month Follow-Up     \"Have you been to the ER, urgent care clinic since your last visit?  Hospitalized since your last visit?\"    YES - When: approximately 2 months ago.  Where and Why: [facility] ED, nasal congestion.    “Have you seen or consulted any other health care providers outside of [facility] since your last visit?”    YES - When: approximately 2  weeks ago.  Where and Why: Urology, prostate.           
   Bilirubin, Urine, POC Negative     Ketones, Urine, POC Negative     Specific Gravity, Urine, POC 1.020 1.001 - 1.035    Blood, Urine, POC Negative     pH, Urine, POC 5.5 4.6 - 8.0    Protein, Urine, POC Negative     Urobilinogen, POC 0.2 mg/dL     Nitrite, Urine, POC Negative     Leukocyte Esterase, Urine, POC Negative    Results for orders placed or performed in visit on 03/18/24 (from the past 2160 hour(s))   Prostate Specific Antigen, Total   Result Value Ref Range    PSA <0.03 0 - 4 ng/mL   Results for orders placed or performed during the hospital encounter of 02/06/24 (from the past 2160 hour(s))   COVID-19 & Influenza Combo    Specimen: Nasopharyngeal   Result Value Ref Range    SARS-CoV-2, PCR Not detected NOTD      Rapid Influenza A By PCR Not detected NOTD      Rapid Influenza B By PCR Not detected NOTD     XR CHEST 1 VIEW    Impression    Hypoventilatory changes. No acute cardiopulmonary findings identified.   CBC with Auto Differential   Result Value Ref Range    WBC 10.2 4.6 - 13.2 K/uL    RBC 4.15 (L) 4.35 - 5.65 M/uL    Hemoglobin 12.4 (L) 13.0 - 16.0 g/dL    Hematocrit 37.2 36.0 - 48.0 %    MCV 89.6 78.0 - 100.0 FL    MCH 29.9 24.0 - 34.0 PG    MCHC 33.3 31.0 - 37.0 g/dL    RDW 13.6 11.6 - 14.5 %    Platelets 306 135 - 420 K/uL    MPV 9.8 9.2 - 11.8 FL    Nucleated RBCs 0.0 0  WBC    nRBC 0.00 0.00 - 0.01 K/uL    Neutrophils % 61 40 - 73 %    Lymphocytes % 25 21 - 52 %    Monocytes % 9 3 - 10 %    Eosinophils % 3 0 - 5 %    Basophils % 1 0 - 2 %    Immature Granulocytes % 0 0.0 - 0.5 %    Neutrophils Absolute 6.2 1.8 - 8.0 K/UL    Lymphocytes Absolute 2.6 0.9 - 3.6 K/UL    Monocytes Absolute 0.9 0.05 - 1.2 K/UL    Eosinophils Absolute 0.3 0.0 - 0.4 K/UL    Basophils Absolute 0.1 0.0 - 0.1 K/UL    Immature Granulocytes Absolute 0.0 0.00 - 0.04 K/UL    Differential Type AUTOMATED     Basic Metabolic Panel   Result Value Ref Range    Sodium 138 136 - 145 mmol/L    Potassium 3.9 3.5 - 5.5

## 2024-04-22 RX ORDER — AMLODIPINE BESYLATE 10 MG/1
TABLET ORAL
Qty: 90 TABLET | Refills: 2 | OUTPATIENT
Start: 2024-04-22

## 2024-06-23 RX ORDER — NITROGLYCERIN 0.4 MG/1
TABLET SUBLINGUAL
Qty: 25 TABLET | Refills: 1 | Status: SHIPPED | OUTPATIENT
Start: 2024-06-23

## 2024-06-24 DIAGNOSIS — R60.9 EDEMA, UNSPECIFIED TYPE: ICD-10-CM

## 2024-06-26 RX ORDER — TRIAMTERENE AND HYDROCHLOROTHIAZIDE 37.5; 25 MG/1; MG/1
CAPSULE ORAL
Qty: 90 CAPSULE | Refills: 3 | Status: ON HOLD | OUTPATIENT
Start: 2024-06-26 | End: 2024-06-29 | Stop reason: HOSPADM

## 2024-06-27 ENCOUNTER — HOSPITAL ENCOUNTER (INPATIENT)
Facility: HOSPITAL | Age: 84
LOS: 2 days | Discharge: HOME | End: 2024-06-29
Attending: STUDENT IN AN ORGANIZED HEALTH CARE EDUCATION/TRAINING PROGRAM | Admitting: STUDENT IN AN ORGANIZED HEALTH CARE EDUCATION/TRAINING PROGRAM
Payer: MEDICARE

## 2024-06-27 DIAGNOSIS — N17.9 AKI (ACUTE KIDNEY INJURY) (HCC): Primary | ICD-10-CM

## 2024-06-27 LAB
ALBUMIN SERPL-MCNC: 4.3 G/DL (ref 3.4–5)
ALBUMIN/GLOB SERPL: 1.4 (ref 0.8–1.7)
ALP SERPL-CCNC: 51 U/L (ref 45–117)
ALT SERPL-CCNC: 124 U/L (ref 16–61)
ANION GAP SERPL CALC-SCNC: 8 MMOL/L (ref 3–18)
APPEARANCE UR: CLEAR
AST SERPL-CCNC: 138 U/L (ref 10–38)
BASOPHILS # BLD: 0.1 K/UL (ref 0–0.1)
BASOPHILS NFR BLD: 1 % (ref 0–2)
BILIRUB SERPL-MCNC: 0.5 MG/DL (ref 0.2–1)
BILIRUB UR QL: NEGATIVE
BUN SERPL-MCNC: 64 MG/DL (ref 7–18)
BUN/CREAT SERPL: 16 (ref 12–20)
CALCIUM SERPL-MCNC: 9 MG/DL (ref 8.5–10.1)
CHLORIDE SERPL-SCNC: 110 MMOL/L (ref 100–111)
CK SERPL-CCNC: 230 U/L (ref 39–308)
CO2 SERPL-SCNC: 22 MMOL/L (ref 21–32)
COLOR UR: YELLOW
CREAT SERPL-MCNC: 3.93 MG/DL (ref 0.6–1.3)
DIFFERENTIAL METHOD BLD: ABNORMAL
EOSINOPHIL # BLD: 0.2 K/UL (ref 0–0.4)
EOSINOPHIL NFR BLD: 2 % (ref 0–5)
ERYTHROCYTE [DISTWIDTH] IN BLOOD BY AUTOMATED COUNT: 13.6 % (ref 11.6–14.5)
GLOBULIN SER CALC-MCNC: 3 G/DL (ref 2–4)
GLUCOSE SERPL-MCNC: 117 MG/DL (ref 74–99)
GLUCOSE UR STRIP.AUTO-MCNC: NEGATIVE MG/DL
HCT VFR BLD AUTO: 34.7 % (ref 36–48)
HGB BLD-MCNC: 12.4 G/DL (ref 13–16)
HGB UR QL STRIP: NEGATIVE
IMM GRANULOCYTES # BLD AUTO: 0 K/UL (ref 0–0.04)
IMM GRANULOCYTES NFR BLD AUTO: 0 % (ref 0–0.5)
KETONES UR QL STRIP.AUTO: NEGATIVE MG/DL
LEUKOCYTE ESTERASE UR QL STRIP.AUTO: NEGATIVE
LYMPHOCYTES # BLD: 2.8 K/UL (ref 0.9–3.6)
LYMPHOCYTES NFR BLD: 32 % (ref 21–52)
MAGNESIUM SERPL-MCNC: 1.8 MG/DL (ref 1.6–2.6)
MCH RBC QN AUTO: 31 PG (ref 24–34)
MCHC RBC AUTO-ENTMCNC: 35.7 G/DL (ref 31–37)
MCV RBC AUTO: 86.8 FL (ref 78–100)
MONOCYTES # BLD: 0.9 K/UL (ref 0.05–1.2)
MONOCYTES NFR BLD: 11 % (ref 3–10)
NEUTS SEG # BLD: 4.8 K/UL (ref 1.8–8)
NEUTS SEG NFR BLD: 55 % (ref 40–73)
NITRITE UR QL STRIP.AUTO: NEGATIVE
NRBC # BLD: 0 K/UL (ref 0–0.01)
NRBC BLD-RTO: 0 PER 100 WBC
PH UR STRIP: 5 (ref 5–8)
PLATELET # BLD AUTO: 251 K/UL (ref 135–420)
PMV BLD AUTO: 9.9 FL (ref 9.2–11.8)
POTASSIUM SERPL-SCNC: 4.8 MMOL/L (ref 3.5–5.5)
PROT SERPL-MCNC: 7.3 G/DL (ref 6.4–8.2)
PROT UR STRIP-MCNC: NEGATIVE MG/DL
RBC # BLD AUTO: 4 M/UL (ref 4.35–5.65)
SODIUM SERPL-SCNC: 140 MMOL/L (ref 136–145)
SP GR UR REFRACTOMETRY: 1.01 (ref 1–1.03)
UROBILINOGEN UR QL STRIP.AUTO: 0.2 EU/DL (ref 0.2–1)
WBC # BLD AUTO: 8.8 K/UL (ref 4.6–13.2)

## 2024-06-27 PROCEDURE — 99222 1ST HOSP IP/OBS MODERATE 55: CPT | Performed by: STUDENT IN AN ORGANIZED HEALTH CARE EDUCATION/TRAINING PROGRAM

## 2024-06-27 PROCEDURE — 85025 COMPLETE CBC W/AUTO DIFF WBC: CPT

## 2024-06-27 PROCEDURE — 93005 ELECTROCARDIOGRAM TRACING: CPT | Performed by: STUDENT IN AN ORGANIZED HEALTH CARE EDUCATION/TRAINING PROGRAM

## 2024-06-27 PROCEDURE — 83735 ASSAY OF MAGNESIUM: CPT

## 2024-06-27 PROCEDURE — 2580000003 HC RX 258: Performed by: STUDENT IN AN ORGANIZED HEALTH CARE EDUCATION/TRAINING PROGRAM

## 2024-06-27 PROCEDURE — 93010 ELECTROCARDIOGRAM REPORT: CPT | Performed by: INTERNAL MEDICINE

## 2024-06-27 PROCEDURE — 1100000000 HC RM PRIVATE

## 2024-06-27 PROCEDURE — 80053 COMPREHEN METABOLIC PANEL: CPT

## 2024-06-27 PROCEDURE — 99285 EMERGENCY DEPT VISIT HI MDM: CPT

## 2024-06-27 PROCEDURE — 82550 ASSAY OF CK (CPK): CPT

## 2024-06-27 PROCEDURE — 6360000002 HC RX W HCPCS: Performed by: STUDENT IN AN ORGANIZED HEALTH CARE EDUCATION/TRAINING PROGRAM

## 2024-06-27 PROCEDURE — 81003 URINALYSIS AUTO W/O SCOPE: CPT

## 2024-06-27 PROCEDURE — 51798 US URINE CAPACITY MEASURE: CPT

## 2024-06-27 RX ORDER — HEPARIN SODIUM 5000 [USP'U]/ML
5000 INJECTION, SOLUTION INTRAVENOUS; SUBCUTANEOUS EVERY 8 HOURS SCHEDULED
Status: DISCONTINUED | OUTPATIENT
Start: 2024-06-27 | End: 2024-06-29 | Stop reason: HOSPADM

## 2024-06-27 RX ORDER — POLYETHYLENE GLYCOL 3350 17 G/17G
17 POWDER, FOR SOLUTION ORAL DAILY PRN
Status: DISCONTINUED | OUTPATIENT
Start: 2024-06-27 | End: 2024-06-29 | Stop reason: HOSPADM

## 2024-06-27 RX ORDER — SODIUM CHLORIDE 9 MG/ML
INJECTION, SOLUTION INTRAVENOUS PRN
Status: DISCONTINUED | OUTPATIENT
Start: 2024-06-27 | End: 2024-06-29 | Stop reason: HOSPADM

## 2024-06-27 RX ORDER — 0.9 % SODIUM CHLORIDE 0.9 %
1000 INTRAVENOUS SOLUTION INTRAVENOUS ONCE
Status: COMPLETED | OUTPATIENT
Start: 2024-06-27 | End: 2024-06-27

## 2024-06-27 RX ORDER — SODIUM CHLORIDE 0.9 % (FLUSH) 0.9 %
5-40 SYRINGE (ML) INJECTION PRN
Status: DISCONTINUED | OUTPATIENT
Start: 2024-06-27 | End: 2024-06-29 | Stop reason: HOSPADM

## 2024-06-27 RX ORDER — ONDANSETRON 2 MG/ML
4 INJECTION INTRAMUSCULAR; INTRAVENOUS EVERY 6 HOURS PRN
Status: DISCONTINUED | OUTPATIENT
Start: 2024-06-27 | End: 2024-06-29 | Stop reason: HOSPADM

## 2024-06-27 RX ORDER — SODIUM CHLORIDE 0.9 % (FLUSH) 0.9 %
5-40 SYRINGE (ML) INJECTION EVERY 12 HOURS SCHEDULED
Status: DISCONTINUED | OUTPATIENT
Start: 2024-06-27 | End: 2024-06-29 | Stop reason: HOSPADM

## 2024-06-27 RX ORDER — ONDANSETRON 4 MG/1
4 TABLET, ORALLY DISINTEGRATING ORAL EVERY 8 HOURS PRN
Status: DISCONTINUED | OUTPATIENT
Start: 2024-06-27 | End: 2024-06-29 | Stop reason: HOSPADM

## 2024-06-27 RX ORDER — SODIUM CHLORIDE 9 MG/ML
INJECTION, SOLUTION INTRAVENOUS CONTINUOUS
Status: DISCONTINUED | OUTPATIENT
Start: 2024-06-27 | End: 2024-06-28

## 2024-06-27 RX ADMIN — HEPARIN SODIUM 5000 UNITS: 5000 INJECTION INTRAVENOUS; SUBCUTANEOUS at 22:30

## 2024-06-27 RX ADMIN — SODIUM CHLORIDE 1000 ML: 9 INJECTION, SOLUTION INTRAVENOUS at 16:50

## 2024-06-27 RX ADMIN — SODIUM CHLORIDE, PRESERVATIVE FREE 10 ML: 5 INJECTION INTRAVENOUS at 21:00

## 2024-06-27 ASSESSMENT — PAIN SCALES - GENERAL
PAINLEVEL_OUTOF10: 0
PAINLEVEL_OUTOF10: 0

## 2024-06-27 ASSESSMENT — PAIN - FUNCTIONAL ASSESSMENT: PAIN_FUNCTIONAL_ASSESSMENT: 0-10

## 2024-06-27 NOTE — FLOWSHEET NOTE
06/27/24 1723   Urine Assessment   Bladder Scan Volume (mL) 215 mL   $ Bladder scan $ Yes     Patient states that he can partially feel the urge to void.

## 2024-06-27 NOTE — ED PROVIDER NOTES
Height:           Medical Decision Making  Amount and/or Complexity of Data Reviewed  Labs: ordered.  ECG/medicine tests: ordered.    Risk  Prescription drug management.  Decision regarding hospitalization.      Patient is an 83-year-old male who was sent in for elevated creatinine.  Patient is currently hemodynamically stable and overall appears well.  He does describe symptoms of gastroenteritis.  Does not have any concerning red flag symptoms.  Paperwork from the outside facility was reviewed including the CAT scan read which was unremarkable.  Also shows no signs of kidney or bladder issues.  No signs of hydronephrosis however creatinine was noted to be 8.  The fact the patient was sent home with this does seem like a miss.  Will recheck his creatinine here.  He will be given some fluids.  Bladder scan was performed where patient states that he has to urinate is only at 200 mL.  Therefore no concern for new obstruction.    CMP does show markedly elevated BUN and creatinine up from patient's baseline.  Also shows a slight elevation in ALT and AST.  CMP within normal limits.    Case discussed with Dr. Christianson with nephrology.  Agrees with admission.  Since CT scan was done 3 days ago and patient was having worsening kidney function I do not feel that this needs to be repeated at this time.  Dr. Christianson suggest adding on the CPKs this will be added.  CK within normal limits.  Urine unremarkable.    Findings discussed with the patient.  He will be put on maintenance fluids.    Patient is admitted to the hospitalist service.  Patient is in agreement with the plan to be admitted at this time.  All orders moving forward replacement the admitting team.        CRITICAL CARE TIME   Total Critical Care time was 0 minutes, excluding separately reportable procedures.  There was a high probability of clinically significant/life threatening deterioration in the patient's condition which required my urgent intervention.      CONSULTS:  IP CONSULT TO NEPHROLOGY    PROCEDURES:  Unless otherwise noted below, none     Procedures      FINAL IMPRESSION      1. ANAMIKA (acute kidney injury) ([facility])          DISPOSITION/PLAN   DISPOSITION Admitted 06/27/2024 06:19:57 PM      PATIENT REFERRED TO:  No follow-up provider specified.    DISCHARGE MEDICATIONS:  New Prescriptions    No medications on file     Controlled Substances Monitoring:          No data to display                (Please note that portions of this note were completed with a voice recognition program.  Efforts were made to edit the dictations but occasionally words are mis-transcribed.)    Franny Patterson MD (electronically signed)  Attending Emergency Physician            Franny Patterson MD  06/27/24 8031

## 2024-06-27 NOTE — ED NOTES
Called [facility] spoke to Kymberly.Provided patient information. ETA 9pm    Called [facility] spoke to Katina. Provided patient information. ETA 8pm or sooner

## 2024-06-27 NOTE — H&P
HISTORY & PHYSICAL      Patient: Roel Fish Jr. MRN: [id number]  CSN: [id number]    YOB: 1940  Age: 83 y.o.  Sex: male    DOA: 6/27/2024 LOS:  LOS: 1 day        DOA: 6/27/2024        Assessment/Plan     Principal Problem:    ANAMIKA (acute kidney injury) (HCC)  Active Problems:    Elevated transaminase level  Resolved Problems:    * No resolved hospital problems. *      Patient Active Problem List   Diagnosis    Primary hypertension    S/P CABG x 3    Impotence of organic origin    Hooker's esophagus    Colon polyp    Bilateral carotid artery stenosis    VIRGIL (obstructive sleep apnea)    BPH with obstruction/lower urinary tract symptoms    Overweight (BMI 25.0-29.9)    Cancer of prostate (HCC)    IFG (impaired fasting glucose)    Hyperlipidemia    CAD (coronary artery disease)    Multinodular goiter    Pulmonary nodules    SDH (subdural hematoma) (HCC)    Chronic obstructive pulmonary disease (HCC)    Gastroesophageal reflux disease    Seizures (HCC)    Anemia    ANAMIKA (acute kidney injury) (HCC)    Elevated transaminase level         Plan:  Stage 3 ANAMIKA (KDIGO)  Prerenal ANAMIKA secondary to Viral Gastroenteritis - resolved  - Cr of 8.07 on 6/24 at OSH; 3.93 on admission (Baseline Cr of 1.11 4/8/2024)  - Nephrology consulted by ED  - Cont. NS at 100cc/hr x next 12 hrs  - Repeat AM BMP    Elevated Transaminase Levels  - ALT/AST of 17/19 on 6/24   - Hepatitis Panel  - FIB-4 score w/ repeat CMP/CBC in AM  - Consider DILI from recent flagyl or related to recent viral illness    Chronic/Stable:  Hx of CAD s/p CABG and PCI  HLD  - Cont. ASA 81mg daily  - Cont. Lipitor 20mg daily    HTN  - Cont. Amlodipine 5mg daily  - Cont. Lopressor 25mg BID  - HOLD Triamtere-HCTZ 37.5025mg in setting of ANAMIKA    GERD  - Cont Omeprazole 40mg daiily    COPD  - Cont PRN Home albuterol     VIRGIL  - No Home CPAP reported    Hx of Prostate Cancer  - HOLD Tolteridone     Hx of Subdural hematoma s/p NSGY interention (7/2023)    CODE:  basilar opacities-suspect  atelectasis. No focal consolidation, effusion or pneumothorax. Heart size within  normal limits.     No acute osseous abnormality.     IMPRESSION:     Hypoventilatory changes. No acute cardiopulmonary findings identified.      Fidel Shah MD  6/28/2024, 12:44 AM

## 2024-06-28 ENCOUNTER — APPOINTMENT (OUTPATIENT)
Facility: HOSPITAL | Age: 84
End: 2024-06-28
Payer: MEDICARE

## 2024-06-28 PROBLEM — N17.9 AKI (ACUTE KIDNEY INJURY) (HCC): Status: RESOLVED | Noted: 2024-06-27 | Resolved: 2024-06-28

## 2024-06-28 PROBLEM — R74.01 ELEVATED TRANSAMINASE LEVEL: Status: RESOLVED | Noted: 2024-06-28 | Resolved: 2024-06-28

## 2024-06-28 PROBLEM — N18.2 ACUTE RENAL FAILURE SUPERIMPOSED ON STAGE 2 CHRONIC KIDNEY DISEASE (HCC): Status: ACTIVE | Noted: 2024-06-27

## 2024-06-28 PROBLEM — R74.01 ELEVATED TRANSAMINASE LEVEL: Status: ACTIVE | Noted: 2024-06-28

## 2024-06-28 LAB
-: NORMAL
ALBUMIN SERPL-MCNC: 3.8 G/DL (ref 3.4–5)
ALBUMIN/GLOB SERPL: 1.1 (ref 0.8–1.7)
ALP SERPL-CCNC: 47 U/L (ref 45–117)
ALT SERPL-CCNC: 120 U/L (ref 16–61)
ANION GAP SERPL CALC-SCNC: 7 MMOL/L (ref 3–18)
AST SERPL-CCNC: 87 U/L (ref 10–38)
BASOPHILS # BLD: 0.1 K/UL (ref 0–0.1)
BASOPHILS NFR BLD: 1 % (ref 0–2)
BILIRUB SERPL-MCNC: 0.8 MG/DL (ref 0.2–1)
BUN SERPL-MCNC: 48 MG/DL (ref 7–18)
BUN/CREAT SERPL: 17 (ref 12–20)
CALCIUM SERPL-MCNC: 9.2 MG/DL (ref 8.5–10.1)
CHLORIDE SERPL-SCNC: 111 MMOL/L (ref 100–111)
CO2 SERPL-SCNC: 22 MMOL/L (ref 21–32)
CREAT SERPL-MCNC: 2.86 MG/DL (ref 0.6–1.3)
DIFFERENTIAL METHOD BLD: ABNORMAL
EKG ATRIAL RATE: 64 BPM
EKG DIAGNOSIS: NORMAL
EKG P AXIS: 50 DEGREES
EKG P-R INTERVAL: 222 MS
EKG Q-T INTERVAL: 406 MS
EKG QRS DURATION: 86 MS
EKG QTC CALCULATION (BAZETT): 418 MS
EKG R AXIS: -2 DEGREES
EKG T AXIS: 45 DEGREES
EKG VENTRICULAR RATE: 64 BPM
EOSINOPHIL # BLD: 0.1 K/UL (ref 0–0.4)
EOSINOPHIL NFR BLD: 2 % (ref 0–5)
ERYTHROCYTE [DISTWIDTH] IN BLOOD BY AUTOMATED COUNT: 14 % (ref 11.6–14.5)
GLOBULIN SER CALC-MCNC: 3.5 G/DL (ref 2–4)
GLUCOSE SERPL-MCNC: 161 MG/DL (ref 74–99)
HAV IGM SER QL: NEGATIVE
HBV CORE IGM SER QL: NEGATIVE
HBV SURFACE AG SER QL: <0.1 INDEX
HBV SURFACE AG SER QL: NEGATIVE
HCT VFR BLD AUTO: 35 % (ref 36–48)
HCV AB SER IA-ACNC: <0.02 INDEX
HCV AB SERPL QL IA: NEGATIVE
HEPATITIS C COMMENT: NORMAL
HGB BLD-MCNC: 12.2 G/DL (ref 13–16)
IMM GRANULOCYTES # BLD AUTO: 0 K/UL (ref 0–0.04)
IMM GRANULOCYTES NFR BLD AUTO: 0 % (ref 0–0.5)
LYMPHOCYTES # BLD: 2.2 K/UL (ref 0.9–3.6)
LYMPHOCYTES NFR BLD: 28 % (ref 21–52)
MCH RBC QN AUTO: 31.1 PG (ref 24–34)
MCHC RBC AUTO-ENTMCNC: 34.9 G/DL (ref 31–37)
MCV RBC AUTO: 89.3 FL (ref 78–100)
MONOCYTES # BLD: 0.7 K/UL (ref 0.05–1.2)
MONOCYTES NFR BLD: 9 % (ref 3–10)
NEUTS SEG # BLD: 4.6 K/UL (ref 1.8–8)
NEUTS SEG NFR BLD: 60 % (ref 40–73)
NRBC # BLD: 0 K/UL (ref 0–0.01)
NRBC BLD-RTO: 0 PER 100 WBC
PLATELET # BLD AUTO: 232 K/UL (ref 135–420)
PMV BLD AUTO: 10.5 FL (ref 9.2–11.8)
POTASSIUM SERPL-SCNC: 4.1 MMOL/L (ref 3.5–5.5)
PROT SERPL-MCNC: 7.3 G/DL (ref 6.4–8.2)
RBC # BLD AUTO: 3.92 M/UL (ref 4.35–5.65)
SODIUM SERPL-SCNC: 140 MMOL/L (ref 136–145)
WBC # BLD AUTO: 7.7 K/UL (ref 4.6–13.2)

## 2024-06-28 PROCEDURE — 97165 OT EVAL LOW COMPLEX 30 MIN: CPT

## 2024-06-28 PROCEDURE — 94761 N-INVAS EAR/PLS OXIMETRY MLT: CPT

## 2024-06-28 PROCEDURE — 76770 US EXAM ABDO BACK WALL COMP: CPT

## 2024-06-28 PROCEDURE — 80053 COMPREHEN METABOLIC PANEL: CPT

## 2024-06-28 PROCEDURE — 2580000003 HC RX 258: Performed by: INTERNAL MEDICINE

## 2024-06-28 PROCEDURE — 6360000002 HC RX W HCPCS: Performed by: STUDENT IN AN ORGANIZED HEALTH CARE EDUCATION/TRAINING PROGRAM

## 2024-06-28 PROCEDURE — 80074 ACUTE HEPATITIS PANEL: CPT

## 2024-06-28 PROCEDURE — 2580000003 HC RX 258: Performed by: STUDENT IN AN ORGANIZED HEALTH CARE EDUCATION/TRAINING PROGRAM

## 2024-06-28 PROCEDURE — 99232 SBSQ HOSP IP/OBS MODERATE 35: CPT | Performed by: STUDENT IN AN ORGANIZED HEALTH CARE EDUCATION/TRAINING PROGRAM

## 2024-06-28 PROCEDURE — 6370000000 HC RX 637 (ALT 250 FOR IP): Performed by: STUDENT IN AN ORGANIZED HEALTH CARE EDUCATION/TRAINING PROGRAM

## 2024-06-28 PROCEDURE — 85025 COMPLETE CBC W/AUTO DIFF WBC: CPT

## 2024-06-28 PROCEDURE — 36415 COLL VENOUS BLD VENIPUNCTURE: CPT

## 2024-06-28 PROCEDURE — 1100000000 HC RM PRIVATE

## 2024-06-28 RX ORDER — ALBUTEROL SULFATE 90 UG/1
2 AEROSOL, METERED RESPIRATORY (INHALATION) EVERY 6 HOURS PRN
Status: DISCONTINUED | OUTPATIENT
Start: 2024-06-28 | End: 2024-06-28

## 2024-06-28 RX ORDER — ATORVASTATIN CALCIUM 10 MG/1
20 TABLET, FILM COATED ORAL NIGHTLY
Status: DISCONTINUED | OUTPATIENT
Start: 2024-06-28 | End: 2024-06-29 | Stop reason: HOSPADM

## 2024-06-28 RX ORDER — SODIUM CHLORIDE 9 MG/ML
INJECTION, SOLUTION INTRAVENOUS CONTINUOUS
Status: DISCONTINUED | OUTPATIENT
Start: 2024-06-28 | End: 2024-06-28

## 2024-06-28 RX ORDER — AMLODIPINE BESYLATE 10 MG/1
10 TABLET ORAL DAILY
Status: DISCONTINUED | OUTPATIENT
Start: 2024-06-28 | End: 2024-06-29 | Stop reason: HOSPADM

## 2024-06-28 RX ORDER — METOPROLOL SUCCINATE 25 MG/1
25 TABLET, EXTENDED RELEASE ORAL DAILY
Status: DISCONTINUED | OUTPATIENT
Start: 2024-06-28 | End: 2024-06-29 | Stop reason: HOSPADM

## 2024-06-28 RX ORDER — PANTOPRAZOLE SODIUM 40 MG/1
40 TABLET, DELAYED RELEASE ORAL
Status: DISCONTINUED | OUTPATIENT
Start: 2024-06-28 | End: 2024-06-29 | Stop reason: HOSPADM

## 2024-06-28 RX ORDER — MULTIVITAMIN WITH IRON
1 TABLET ORAL DAILY
Status: DISCONTINUED | OUTPATIENT
Start: 2024-06-28 | End: 2024-06-29 | Stop reason: HOSPADM

## 2024-06-28 RX ORDER — NITROGLYCERIN 0.4 MG/1
0.4 TABLET SUBLINGUAL EVERY 5 MIN PRN
Status: DISCONTINUED | OUTPATIENT
Start: 2024-06-28 | End: 2024-06-29 | Stop reason: HOSPADM

## 2024-06-28 RX ORDER — SODIUM CHLORIDE 9 MG/ML
INJECTION, SOLUTION INTRAVENOUS CONTINUOUS
Status: DISCONTINUED | OUTPATIENT
Start: 2024-06-28 | End: 2024-06-29

## 2024-06-28 RX ORDER — ALBUTEROL SULFATE 2.5 MG/3ML
2.5 SOLUTION RESPIRATORY (INHALATION) EVERY 6 HOURS PRN
Status: DISCONTINUED | OUTPATIENT
Start: 2024-06-28 | End: 2024-06-29 | Stop reason: HOSPADM

## 2024-06-28 RX ORDER — FERROUS SULFATE 325(65) MG
325 TABLET ORAL
Status: DISCONTINUED | OUTPATIENT
Start: 2024-06-28 | End: 2024-06-29 | Stop reason: HOSPADM

## 2024-06-28 RX ADMIN — THERA TABS 1 TABLET: TAB at 09:46

## 2024-06-28 RX ADMIN — FERROUS SULFATE TAB 325 MG (65 MG ELEMENTAL FE) 325 MG: 325 (65 FE) TAB at 09:46

## 2024-06-28 RX ADMIN — HEPARIN SODIUM 5000 UNITS: 5000 INJECTION INTRAVENOUS; SUBCUTANEOUS at 13:42

## 2024-06-28 RX ADMIN — SODIUM CHLORIDE: 9 INJECTION, SOLUTION INTRAVENOUS at 00:37

## 2024-06-28 RX ADMIN — HEPARIN SODIUM 5000 UNITS: 5000 INJECTION INTRAVENOUS; SUBCUTANEOUS at 21:38

## 2024-06-28 RX ADMIN — SODIUM CHLORIDE: 9 INJECTION, SOLUTION INTRAVENOUS at 16:14

## 2024-06-28 RX ADMIN — SODIUM CHLORIDE: 9 INJECTION, SOLUTION INTRAVENOUS at 23:44

## 2024-06-28 RX ADMIN — SODIUM CHLORIDE, PRESERVATIVE FREE 10 ML: 5 INJECTION INTRAVENOUS at 21:38

## 2024-06-28 RX ADMIN — HEPARIN SODIUM 5000 UNITS: 5000 INJECTION INTRAVENOUS; SUBCUTANEOUS at 06:00

## 2024-06-28 RX ADMIN — ATORVASTATIN CALCIUM 20 MG: 10 TABLET, FILM COATED ORAL at 03:27

## 2024-06-28 RX ADMIN — ATORVASTATIN CALCIUM 20 MG: 10 TABLET, FILM COATED ORAL at 21:37

## 2024-06-28 RX ADMIN — SODIUM CHLORIDE: 9 INJECTION, SOLUTION INTRAVENOUS at 02:41

## 2024-06-28 RX ADMIN — SODIUM CHLORIDE, PRESERVATIVE FREE 10 ML: 5 INJECTION INTRAVENOUS at 09:47

## 2024-06-28 RX ADMIN — PANTOPRAZOLE SODIUM 40 MG: 40 TABLET, DELAYED RELEASE ORAL at 09:46

## 2024-06-28 RX ADMIN — METOPROLOL SUCCINATE 25 MG: 25 TABLET, EXTENDED RELEASE ORAL at 09:46

## 2024-06-28 RX ADMIN — AMLODIPINE BESYLATE 10 MG: 10 TABLET ORAL at 09:46

## 2024-06-28 ASSESSMENT — PAIN SCALES - GENERAL
PAINLEVEL_OUTOF10: 0

## 2024-06-28 NOTE — PROGRESS NOTES
PT order received and chart reviewed. Patient declines need for acute PT services. States he is independent with ambulation and functional mobility. Will sign off at this time. Thank you.   Fany Carbone PT, DPT

## 2024-06-28 NOTE — PROGRESS NOTES
4 Eyes Skin Assessment     NAME:  Roel Fish Jr.  YOB: 1940  MEDICAL RECORD NUMBER:  [id number]    The patient is being assessed for  Shift Handoff    I agree that at least one RN has performed a thorough Head to Toe Skin Assessment on the patient. ALL assessment sites listed below have been assessed.      Areas assessed by both nurses:    Sacrum. Buttock, Coccyx, Ischium        Does the Patient have a Wound? No noted wound(s)       Abdirahman Prevention initiated by RN: No  Wound Care Orders initiated by RN: No    Pressure Injury (Stage 3,4, Unstageable, DTI, NWPT, and Complex wounds) if present, place Wound referral order by RN under : No    New Ostomies, if present place, Ostomy referral order under : No     Nurse 1 eSignature: Electronically signed by Ericka Arizmendi LPN on 6/28/24  AM EDT    **SHARE this note so that the co-signing nurse can place an eSignature**    Nurse 2 eSignature: {Esignature:[id number]}

## 2024-06-28 NOTE — PROGRESS NOTES
OCCUPATIONAL THERAPY EVALUATION/DISCHARGE    Patient: Roel Fish Jr. (83 y.o. male)  Date: 6/28/2024  Primary Diagnosis: ANAMIKA (acute kidney injury) ([facility]) [N17.9]  Precautions: General Precautions  PLOF: Pt was independent with self-care and functional mobility.      ASSESSMENT AND RECOMMENDATIONS:  Based on the objective data described below, pt presents with no deficits that impede pt function with ADLs, functional transfers, and functional mobility in preparation for selfcare tasks. Pt left all needs met, call bell in reach and spouse bedside. No c/o abd. pain.      Maximum therapeutic gains met at current level of care and patient will be discharged from occupational therapy at this time.    Further Equipment Recommendations for Discharge: N/A    AMPAC: AM-PAC Inpatient Daily Activity Raw Score: 24    At this time and based on an AM-PAC score, no further OT is recommended upon discharge.  Recommend patient returns to prior setting with prior services.    This [facility] score should be considered in conjunction with interdisciplinary team recommendations to determine the most appropriate discharge setting. Patient's social support, diagnosis, medical stability, and prior level of function should also be taken into consideration.     SUBJECTIVE:   Patient stated \"See these 2 holes in my head? I had occupational therapy before because of that”    OBJECTIVE DATA SUMMARY:     Past Medical History:   Diagnosis Date    Allergic rhinitis     Hooker's esophagus     Dr. Cloud 8/17 s/p HALO; 7/22    CAD (coronary artery disease)     Carotid stenosis 05/2023    [facility] DERREK 50-69%, LICA<50% (5/23)    Colon adenoma 08/2017    Dr Cloud    Colon polyps 2007    Dr. Cloud    COPD (chronic obstructive pulmonary disease) ([facility])     mild obst disease on PFTs Dr. Alcantar (2007)    COVID-19 vaccine dose declined 12/2022    boosters    Do not resuscitate 08/2023    ED (erectile dysfunction)     GERD (gastroesophageal reflux

## 2024-06-28 NOTE — CONSULTS
Consult Note  Consult requested by: David Henry MD   Roel Fish Jr. is a 83 y.o. male White (non-) who is being seen on consult for ANAMIKA  Chief Complaint   Patient presents with    Abdominal Pain    abnormal labs     Admission diagnosis: Acute renal failure superimposed on stage 2 chronic kidney disease (HCC)     HPI:    83 yr old with hx of CAD,COPD,Prostate cancer who recently had URTI sx for which he got abx that led to GI issues including nausea,vomiting,diarrhea. He then followed up with GI who sent the patient to ED for abnormal labs. Of note cr at [facility] about a week ago was 8. When he came to ED his creatinine was 3.93. Received some fluids and this am his cr is 2.86. He feels well and is eager to go home  Past Medical History:   Diagnosis Date    Allergic rhinitis     Hooker's esophagus     Dr. Cloud 8/17 s/p HALO; 7/22    CAD (coronary artery disease)     Carotid stenosis 05/2023    [facility] DERRE[facility] 50-69%, LICA<50% (5/23)    Colon adenoma 08/2017    Dr Cloud    Colon polyps 2007    Dr. Cloud    COPD (chronic obstructive pulmonary disease) ([facility])     mild obst disease on PFTs Dr. Alcantar (2007)    COVID-19 vaccine dose declined 12/2022    boosters    Do not resuscitate 08/2023    ED (erectile dysfunction)     GERD (gastroesophageal reflux disease)     by EGD 2007 Dr. Cloud; EGD 5/14/21 hh and s/p dilation stricture    Hearing loss     Hx of cardiac catheterization 11/2020    Dr Pan; ef 60%, LIMA to LAD patent, SVG to DM1 patent, segment to RPDA occ, prox LAD 95%, Cx 30%, 95% prox RCA, mid RCA 95%, dist RCA 95%, RPDA 95%, s/p stent of all 4 vessels    Hypertension     VIRGIL (obstructive sleep apnea) 2011    CPAP not used    Osteoarthritis of both hands     Prostate cancer ([facility]) 04/2018    Dr Spear stage 2B [id number], Scheller 3+4, P,10, G2; brachytherapy Dr Powell     Prostate cancer ([facility]) 06/2010    Dr. Romero [id number] Mignon 3+3, 1/9 cores up to 10% 1 core; cryo Oct 2012 psa went

## 2024-06-28 NOTE — ED NOTES
TRANSFER - OUT REPORT:    Verbal report given to Blake FISH on Roel Fish Jr.  being transferred to [facility] for routine progression of patient care       Report consisted of patient's Situation, Background, Assessment and   Recommendations(SBAR).     Information from the following report(s) Nurse Handoff Report was reviewed with the receiving nurse.    [place] Fall Assessment:    Presents to emergency department  because of falls (Syncope, seizure, or loss of consciousness): No  Age > 70: No  Altered Mental Status, Intoxication with alcohol or substance confusion (Disorientation, impaired judgment, poor safety awaremess, or inability to follow instructions): No  Impaired Mobility: Ambulates or transfers with assistive devices or assistance; Unable to ambulate or transer.: No  Nursing Judgement: No          Lines:   Peripheral IV 06/27/24 Left Antecubital (Active)   Site Assessment Clean, dry & intact;Dry 06/27/24 1645   Line Status Brisk blood return 06/27/24 1645   Line Care Connections checked and tightened 06/27/24 1645   Phlebitis Assessment No symptoms 06/27/24 1645   Infiltration Assessment 0 06/27/24 1645   Dressing Status Clean, dry & intact;Dry 06/27/24 1645   Dressing Type Transparent 06/27/24 1645       Peripheral IV 06/27/24 Left Antecubital (Active)   Site Assessment Clean, dry & intact;Dry 06/27/24 1647   Line Status Brisk blood return 06/27/24 1647   Line Care Connections checked and tightened 06/27/24 1647   Phlebitis Assessment No symptoms 06/27/24 1647   Infiltration Assessment 0 06/27/24 1647   Dressing Status Clean, dry & intact;Dry 06/27/24 1647   Dressing Type Transparent 06/27/24 1647        Opportunity for questions and clarification was provided.      Patient transported with:  Monitor

## 2024-06-28 NOTE — ACP (ADVANCE CARE PLANNING)
Advance Care Planning   Healthcare Decision Maker:    Primary Decision Maker: Chantal Fish - Spouse - [phone]    Secondary Decision Maker: Elie Fish (lives w/pt) - Child - [phone]    Secondary Decision Maker: Sanchez Fish (lives in [place], [place]) - Child - [phone]    Secondary Decision Maker: Enrrique Lozoya (CVA affecting speech,cognition) - Son-in-Law     conducted an initial consultation and Spiritual Assessment for Roel Fish Jr., who is a 83 y.o.,male. Patient’s Primary Language is: English.   According to the patient’s EMR Sikh Affiliation is: [employer] [place] [employer].     The reason the Patient came to the hospital is:   Patient Active Problem List    Diagnosis Date Noted    Elevated transaminase level 06/28/2024    ANAMIKA (acute kidney injury) ([facility]) 06/27/2024    Anemia 08/11/2023    Seizures ([facility]) 08/10/2023    Chronic obstructive pulmonary disease ([facility]) 06/28/2023    Gastroesophageal reflux disease 06/28/2023    SDH (subdural hematoma) ([facility]) 05/2023    Overweight (BMI 25.0-29.9) 05/24/2021    VIRGIL (obstructive sleep apnea)     CAD (coronary artery disease)     Primary hypertension 04/12/2018    Multinodular goiter 02/16/2017    Pulmonary nodules 02/16/2017    Bilateral carotid artery stenosis 02/10/2015    Hyperlipidemia 08/08/2014    IFG (impaired fasting glucose) 05/05/2014    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   Explored issues of stew, belief, spirituality and Amish/ritual needs while hospitalized.  Listened empathically.  Provided chaplaincy education.  Provided information about Spiritual Care Services.  Offered prayer and assurance of continued prayers  on patients behalf.   Chart reviewed.    The following outcomes were achieved:  Patient shared limited information about both their medical narrative and spiritual journey/beliefs.  Patient processed feeling about current hospitalization.  Patient expressed gratitude for pastoral care visit.    Assessment:  Patient does not have any Denominational/cultural needs that will affect patient’s preferences in health care.  There are no further spiritual or Denominational issues which require Spiritual Care Services interventions at this time.       Plan:  Chaplains will continue to follow and will provide pastoral care on an as needed/requested basis    . Akira Avilez   Board Certified    Spiritual Care   [phone] conducted an initial consultation and Spiritual Assessment for Roel Fish Jr., who is a 83 y.o.,male. Patient’s Primary Language is: English.   According to the patient’s EMR Protestant Affiliation is: [employer] [place] [employer] latter[employer] saints.     The reason the Patient came to the hospital is:   Patient Active Problem List    Diagnosis Date Noted    Elevated transaminase level 06/28/2024    ANAMIKA (acute kidney injury) ([facility]) 06/27/2024    Anemia 08/11/2023    Seizures ([facility]) 08/10/2023    Chronic obstructive pulmonary disease ([facility]) 06/28/2023    Gastroesophageal reflux disease 06/28/2023    SDH (subdural hematoma) (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

## 2024-06-28 NOTE — PROGRESS NOTES
[facility]  Progress Note    Patient: Roel Fish Jr. Age: 83 y.o. : 1940 MR#: [id number] SSN: [id number]  Date: 2024                 DVT Prophylaxis:  []Lovenox  [x]Hep SQ  []SCDs  []Coumadin   []On Heparin gtt []PO anticoagulant    Anticipated discharge: Home tomorrow if renal function continues to improve    Subjective:     The patient is new to me today.  He was seen and examined at the bedside in follow-up for acute on chronic kidney disease among other issues.  Patient said that he feels well overall.  His nausea, vomiting and abdominal pain are resolved.  Patient denied any fever or chills.  He said that he wanted to use the restroom.      Objective:   VS: BP (!) 142/67   Pulse 58   Temp 98.9 °F (37.2 °C) (Oral)   Resp 20   Ht 1.702 m (5' 7\")   Wt 71.7 kg (158 lb)   SpO2 97%   BMI 24.75 kg/m²    Tmax/24hrs: Temp (24hrs), Av °F (36.7 °C), Min:97.2 °F (36.2 °C), Max:98.9 °F (37.2 °C)    Intake/Output Summary (Last 24 hours) at 2024 1812  Last data filed at 2024 1330  Gross per 24 hour   Intake 580 ml   Output --   Net 580 ml        PHYSICAL EXAM  General Appearance: NAD, conversant  HENT: normocephalic/atraumatic, moist mucus membranes  Neck: No JVD, supple  Lungs: CTA with normal respiratory effort  CV: RRR, no m/r/g  Abdomen: soft, non-tender, normal bowel sounds  Extremities: no cyanosis, no peripheral edema  Neuro: No focal deficits, motor/sensory intact  Skin: 2 circumscribed depressions noted on the scalp  Psych: appropriate affect, alert and oriented to person, place and time    Current Facility-Administered Medications   Medication Dose Route Frequency    amLODIPine (NORVASC) tablet 10 mg  10 mg Oral Daily    atorvastatin (LIPITOR) tablet 20 mg  20 mg Oral Nightly    ferrous sulfate (IRON 325) tablet 325 mg  325 mg Oral Daily with breakfast    metoprolol succinate (TOPROL XL) extended release tablet 25 mg  25 mg Oral Daily

## 2024-06-28 NOTE — PROGRESS NOTES
Seen and examined  Continue with fluids  Should be ok for discharge tomorrow from my standpoint  Trend Creatinine  Full consult to follow

## 2024-06-28 NOTE — CARE COORDINATION
CMIA completed with and patient and wife at bedside.     Pt declined Home health if ordered.  Per wife they are fully independent and was on vacation when he got admitted.     Wife to transport patient home.    No cm needs identified at this time.        06/28/24 1317   Service Assessment   Patient Orientation Alert and Oriented;Place;Person;Situation   Cognition Alert   History Provided By Patient;Significant Other   Primary Caregiver Self   Accompanied By/Relationship Spouse   Support Systems Spouse/Significant Other   Patient's Healthcare Decision Maker is: Legal Next of Kin   PCP Verified by CM Yes   Last Visit to PCP Within last 3 months   Prior Functional Level Independent in ADLs/IADLs   Current Functional Level Independent in ADLs/IADLs   Can patient return to prior living arrangement Yes   Ability to make needs known: Good   Family able to assist with home care needs: Yes   Would you like for me to discuss the discharge plan with any other family members/significant others, and if so, who? No   Financial Resources Medicare   Discharge Planning   Type of Residence House   Current Services Prior To Admission None   Potential Assistance Needed N/A   DME Ordered? No   Potential Assistance Purchasing Medications No   Type of Home Care Services None   Patient expects to be discharged to: House   Services At/After Discharge   Transition of Care Consult (CM Consult) N/A   Services At/After Discharge None    Resource Information Provided? No   Mode of Transport at Discharge Other (see comment)  (wife to transport)   Confirm Follow Up Transport Family   Condition of Participation: Discharge Planning   The Plan for Transition of Care is related to the following treatment goals: Home with support from wife

## 2024-06-29 VITALS
HEIGHT: 67 IN | BODY MASS INDEX: 24.8 KG/M2 | TEMPERATURE: 97.8 F | OXYGEN SATURATION: 98 % | HEART RATE: 62 BPM | WEIGHT: 158 LBS | SYSTOLIC BLOOD PRESSURE: 136 MMHG | DIASTOLIC BLOOD PRESSURE: 70 MMHG | RESPIRATION RATE: 18 BRPM

## 2024-06-29 LAB
ALBUMIN SERPL-MCNC: 3.5 G/DL (ref 3.4–5)
ALBUMIN/GLOB SERPL: 1.2 (ref 0.8–1.7)
ALP SERPL-CCNC: 41 U/L (ref 45–117)
ALT SERPL-CCNC: 92 U/L (ref 16–61)
ANION GAP SERPL CALC-SCNC: 6 MMOL/L (ref 3–18)
AST SERPL-CCNC: 51 U/L (ref 10–38)
BILIRUB SERPL-MCNC: 1.2 MG/DL (ref 0.2–1)
BUN SERPL-MCNC: 35 MG/DL (ref 7–18)
BUN/CREAT SERPL: 18 (ref 12–20)
CALCIUM SERPL-MCNC: 9.2 MG/DL (ref 8.5–10.1)
CHLORIDE SERPL-SCNC: 114 MMOL/L (ref 100–111)
CO2 SERPL-SCNC: 23 MMOL/L (ref 21–32)
CREAT SERPL-MCNC: 1.98 MG/DL (ref 0.6–1.3)
ERYTHROCYTE [DISTWIDTH] IN BLOOD BY AUTOMATED COUNT: 13.9 % (ref 11.6–14.5)
GLOBULIN SER CALC-MCNC: 2.9 G/DL (ref 2–4)
GLUCOSE SERPL-MCNC: 93 MG/DL (ref 74–99)
HCT VFR BLD AUTO: 34.5 % (ref 36–48)
HGB BLD-MCNC: 12.1 G/DL (ref 13–16)
MCH RBC QN AUTO: 31.3 PG (ref 24–34)
MCHC RBC AUTO-ENTMCNC: 35.1 G/DL (ref 31–37)
MCV RBC AUTO: 89.1 FL (ref 78–100)
NRBC # BLD: 0 K/UL (ref 0–0.01)
NRBC BLD-RTO: 0 PER 100 WBC
PLATELET # BLD AUTO: 235 K/UL (ref 135–420)
PMV BLD AUTO: 10.5 FL (ref 9.2–11.8)
POTASSIUM SERPL-SCNC: 4.2 MMOL/L (ref 3.5–5.5)
PROT SERPL-MCNC: 6.4 G/DL (ref 6.4–8.2)
RBC # BLD AUTO: 3.87 M/UL (ref 4.35–5.65)
SODIUM SERPL-SCNC: 143 MMOL/L (ref 136–145)
WBC # BLD AUTO: 6.6 K/UL (ref 4.6–13.2)

## 2024-06-29 PROCEDURE — 94761 N-INVAS EAR/PLS OXIMETRY MLT: CPT

## 2024-06-29 PROCEDURE — 6370000000 HC RX 637 (ALT 250 FOR IP): Performed by: STUDENT IN AN ORGANIZED HEALTH CARE EDUCATION/TRAINING PROGRAM

## 2024-06-29 PROCEDURE — 2580000003 HC RX 258: Performed by: STUDENT IN AN ORGANIZED HEALTH CARE EDUCATION/TRAINING PROGRAM

## 2024-06-29 PROCEDURE — 80053 COMPREHEN METABOLIC PANEL: CPT

## 2024-06-29 PROCEDURE — 36415 COLL VENOUS BLD VENIPUNCTURE: CPT

## 2024-06-29 PROCEDURE — 85027 COMPLETE CBC AUTOMATED: CPT

## 2024-06-29 PROCEDURE — 6360000002 HC RX W HCPCS: Performed by: STUDENT IN AN ORGANIZED HEALTH CARE EDUCATION/TRAINING PROGRAM

## 2024-06-29 PROCEDURE — 99239 HOSP IP/OBS DSCHRG MGMT >30: CPT | Performed by: STUDENT IN AN ORGANIZED HEALTH CARE EDUCATION/TRAINING PROGRAM

## 2024-06-29 RX ORDER — LOPERAMIDE HYDROCHLORIDE 2 MG/1
2 CAPSULE ORAL 4 TIMES DAILY PRN
Qty: 20 CAPSULE | Refills: 0 | Status: SHIPPED | OUTPATIENT
Start: 2024-06-29 | End: 2024-07-04

## 2024-06-29 RX ORDER — LOPERAMIDE HYDROCHLORIDE 2 MG/1
2 CAPSULE ORAL 4 TIMES DAILY PRN
Status: DISCONTINUED | OUTPATIENT
Start: 2024-06-29 | End: 2024-06-29 | Stop reason: HOSPADM

## 2024-06-29 RX ORDER — AMLODIPINE BESYLATE 5 MG/1
10 TABLET ORAL DAILY
Qty: 60 TABLET | Refills: 0 | Status: SHIPPED | OUTPATIENT
Start: 2024-06-29 | End: 2024-07-29

## 2024-06-29 RX ADMIN — FERROUS SULFATE TAB 325 MG (65 MG ELEMENTAL FE) 325 MG: 325 (65 FE) TAB at 08:01

## 2024-06-29 RX ADMIN — LOPERAMIDE HYDROCHLORIDE 2 MG: 2 CAPSULE ORAL at 10:33

## 2024-06-29 RX ADMIN — SODIUM CHLORIDE, PRESERVATIVE FREE 10 ML: 5 INJECTION INTRAVENOUS at 08:02

## 2024-06-29 RX ADMIN — THERA TABS 1 TABLET: TAB at 08:01

## 2024-06-29 RX ADMIN — AMLODIPINE BESYLATE 10 MG: 10 TABLET ORAL at 08:01

## 2024-06-29 RX ADMIN — HEPARIN SODIUM 5000 UNITS: 5000 INJECTION INTRAVENOUS; SUBCUTANEOUS at 06:16

## 2024-06-29 RX ADMIN — PANTOPRAZOLE SODIUM 40 MG: 40 TABLET, DELAYED RELEASE ORAL at 06:16

## 2024-06-29 RX ADMIN — SODIUM CHLORIDE: 9 INJECTION, SOLUTION INTRAVENOUS at 06:39

## 2024-06-29 ASSESSMENT — PAIN SCALES - GENERAL
PAINLEVEL_OUTOF10: 0

## 2024-06-29 NOTE — PROGRESS NOTES
4 Eyes Skin Assessment     NAME:  Roel Fish Jr.  YOB: 1940  MEDICAL RECORD NUMBER:  [id number]    The patient is being assessed for  Shift Handoff    I agree that at least one RN has performed a thorough Head to Toe Skin Assessment on the patient. ALL assessment sites listed below have been assessed.      Areas assessed by both nurses:    Head, Face, Ears, Shoulders, Back, Chest, Arms, Elbows, Hands, Sacrum. Buttock, Coccyx, Ischium, Legs. Feet and Heels, Under Medical Devices , and Other ***        Does the Patient have a Wound? No noted wound(s)       Abdirahman Prevention initiated by RN: No  Wound Care Orders initiated by RN: No    Pressure Injury (Stage 3,4, Unstageable, DTI, NWPT, and Complex wounds) if present, place Wound referral order by RN under : No    New Ostomies, if present place, Ostomy referral order under : No     Nurse 1 eSignature: Electronically signed by Tito Reyes RN on 6/29/24 at 6:23 AM EDT    **SHARE this note so that the co-signing nurse can place an eSignature**    Nurse 2 eSignature: {Esignature:[id number]}

## 2024-06-29 NOTE — PLAN OF CARE
Problem: Discharge Planning  Goal: Discharge to home or other facility with appropriate resources  6/28/2024 2253 by Tito Reyes RN  Outcome: Progressing  Flowsheets (Taken 6/28/2024 2000)  Discharge to home or other facility with appropriate resources: Identify barriers to discharge with patient and caregiver  6/28/2024 1021 by Eleazar Randall RN  Outcome: /[facility] Progressing  Flowsheets (Taken 6/28/2024 0827)  Discharge to home or other facility with appropriate resources:   Identify barriers to discharge with patient and caregiver   Arrange for needed discharge resources and transportation as appropriate     Problem: Pain  Goal: Verbalizes/displays adequate comfort level or baseline comfort level  6/28/2024 2253 by Tito Reyes RN  Outcome: Progressing  6/28/2024 1021 by Eleazar Randall RN  Outcome: /[facility] Progressing     Problem: ABCDS Injury Assessment  Goal: Absence of physical injury  6/28/2024 2253 by Tito Reyes RN  Outcome: Progressing  6/28/2024 1021 by Eleazar Randall RN  Outcome: /[facility] Progressing

## 2024-06-29 NOTE — PROGRESS NOTES
RENAL DAILY PROGRESS NOTE    Subjective:       Complaint:    Overnight events noted  no nausea, vomiting, chest pain, short of breath, cough, seizure.   USG kidneys-  Normal renal architecture but increased parenchymal echogenicity, bilaterally,  consistent with medical renal disease. Simple cortical cyst in the mid left  kidney.  Bladder looks normal.    IMPRESSION:   IMPRESSION:   ANAMIKA due to hypovolemia. UA negative.  Hx COPD  Hx CAD  Hx prostate cancer   PLAN:   Stop fluids. Ok to discharge. Will follow up in 2 weeks.  No dyazide on discharge. C/w norvasc,metoprolol on discharge  I And O  Daily weights  D/w .          Current Facility-Administered Medications   Medication Dose Route Frequency    loperamide (IMODIUM) capsule 2 mg  2 mg Oral 4x Daily PRN    amLODIPine (NORVASC) tablet 10 mg  10 mg Oral Daily    atorvastatin (LIPITOR) tablet 20 mg  20 mg Oral Nightly    ferrous sulfate (IRON 325) tablet 325 mg  325 mg Oral Daily with breakfast    metoprolol succinate (TOPROL XL) extended release tablet 25 mg  25 mg Oral Daily    multivitamin 1 tablet  1 tablet Oral Daily    nitroGLYCERIN (NITROSTAT) SL tablet 0.4 mg  0.4 mg SubLINGual Q5 Min PRN    pantoprazole (PROTONIX) tablet 40 mg  40 mg Oral QAM AC    albuterol (PROVENTIL) (2.5 MG/3ML) 0.083% nebulizer solution 2.5 mg  2.5 mg Nebulization Q6H PRN    sodium chloride flush 0.9 % injection 5-40 mL  5-40 mL IntraVENous 2 times per day    sodium chloride flush 0.9 % injection 5-40 mL  5-40 mL IntraVENous PRN    0.9 % sodium chloride infusion   IntraVENous PRN    heparin (porcine) injection 5,000 Units  5,000 Units SubCUTAneous 3 times per day    ondansetron (ZOFRAN-ODT) disintegrating tablet 4 mg  4 mg Oral Q8H PRN    Or    ondansetron (ZOFRAN) injection 4 mg  4 mg IntraVENous Q6H PRN    polyethylene glycol (GLYCOLAX) packet 17 g  17 g Oral Daily PRN       Review of Symptoms: comprehensive ROS negative except above.   Objective:   Patient Vitals

## 2024-06-29 NOTE — DISCHARGE SUMMARY
Hospitalist Discharge Summary      Patient: Roel Fish Jr. MRN: [id number]  CSN: [id number]    YOB: 1940  Age: 83 y.o.  Sex: male    DOA: 6/27/2024 LOS:  LOS: 2 days   Discharge Date:6/29/2024     Admission Diagnoses: ANAMIKA (acute kidney injury) (HCC) [N17.9]    Discharge Diagnoses:    Acute on chronic kidney disease stage II  Transaminitis  Coronary artery disease  Hypertension  History of prostate cancer  COPD without exacerbation    Discharge Condition: Good    Discharge To: Home    CODE STATUS: Prior         PHYSICAL EXAM  Visit Vitals  /70   Pulse 62   Temp 97.8 °F (36.6 °C) (Oral)   Resp 18   Ht 1.702 m (5' 7\")   Wt 71.7 kg (158 lb)   SpO2 98%   BMI 24.75 kg/m²        General Appearance: NAD, conversant  HENT: normocephalic/atraumatic, moist mucus membranes  Neck: No JVD, supple  Lungs: CTA with normal respiratory effort  CV: RRR, no m/r/g  Abdomen: soft, non-tender, normal bowel sounds  Extremities: no cyanosis, no peripheral edema  Neuro: No focal deficits, motor/sensory intact  Skin: 2 circumscribed depressions noted on the scalp  Psych: appropriate affect, alert and oriented to person, place and time                              HPI:    Roel Fish Jr. Is an 84yo M w/ PMHx of CAD, HTN, HLD who presented to [facility] ED on 6/27 by referral from GI physician after abnormal lab results w/ elevated Cr level. Notably patient reports having several days of GI symptoms including N/V, diarrhea last week (WED-SUN) that have since resolved. He had positive sick contact w/ his wife having similar symptoms prior to him.     Was recently evaluated at [facility] facility - [facility] for GI symptoms noted above, at time of evaluation Cr was elevated to 8.07 with lab works otherwise unremarkable. CT chest./abd.pelvis obtained with evidence of mild mesenteric pannivulitisi but otehrwsie only w/ incidental findigns including hiateal hernia, calcified renal artery/SMA plawue, T12 compression, L4/5  this result is different from the original.      Left Ventricle: Normal left ventricular systolic function with a visually estimated EF of 55 - 60%. Left ventricle size is normal. Mildly increased wall thickness. Normal wall motion. Normal diastolic function.    Right Ventricle: Right ventricle size is normal. Normal systolic function. TAPSE is normal.    Aortic Valve: No regurgitation. No stenosis.    Mitral Valve: Mild regurgitation.    Aorta: Normal sized aortic root. Ao Root diameter is 3.5 cm.    Signed by: Yash APODACA MD on 7/22/2022  9:15 AM, Signed by: Unknown Provider Result on 7/22/2022 12:00 AM                 Discharge Medication List as of 6/29/2024  1:11 PM        START taking these medications    Details   loperamide (IMODIUM) 2 MG capsule Take 1 capsule by mouth 4 times daily as needed for Diarrhea, Disp-20 capsule, R-0Normal           CONTINUE these medications which have CHANGED    Details   amLODIPine (NORVASC) 5 MG tablet Take 2 tablets by mouth daily, Disp-60 tablet, R-0Normal           CONTINUE these medications which have NOT CHANGED    Details   nitroGLYCERIN (NITROSTAT) 0.4 MG SL tablet DISSOLVE 1 TAB UNDER TONGUE FOR CHEST PAIN - IF PAIN REMAINS AFTER 5 MIN, CALL 911 AND REPEAT DOSE. MAX 3 TABS IN 15 MINUTES, Disp-25 tablet, R-1Normal      omeprazole (PRILOSEC) 40 MG delayed release capsule Take 1 capsule by mouth every morning (before breakfast), Disp-90 capsule, R-1Normal      ferrous sulfate (IRON 325) 325 (65 Fe) MG tablet Take 1 tablet by mouth daily (with breakfast)Historical Med      metoprolol succinate (TOPROL XL) 25 MG extended release tablet Take 1 tablet by mouth dailyHistorical Med      albuterol sulfate HFA (PROVENTIL HFA) 108 (90 Base) MCG/ACT inhaler Inhale 2 puffs into the lungs every 6 hours as needed for Wheezing, Disp-18 g, R-0Normal      atorvastatin (LIPITOR) 20 MG tablet TAKE ONE TABLET BY MOUTH EVERY NIGHT AT BEDTIME, Disp-90 tablet, R-1Normal      Multiple

## 2024-07-01 ENCOUNTER — CARE COORDINATION (OUTPATIENT)
Facility: CLINIC | Age: 84
End: 2024-07-01

## 2024-07-01 NOTE — CARE COORDINATION
Care Transitions Note    Initial Call - Call within 2 business days of discharge: Yes    Patient Current Location:  Home: [address] [place] [address]   [facility] [address]    Care Transition Nurse contacted the patient by telephone to perform post hospital discharge assessment, verified name and  as identifiers. Provided introduction to self, and explanation of the Care Transition Nurse role.     Patient: Roel Fish Jr.    Patient : 1940   MRN: [id number]    Reason for Admission: Acute renal failure superimposed on stage 2 chronic kidney disease   Discharge Date: 24  RURS: Readmission Risk Score: 16.5      Last Discharge Facility       Date Complaint Diagnosis Description Type Department Provider    24 Abdominal Pain; abnormal labs [facility] (acute kidney injury) (HCC) ED to Hosp-Admission (Discharged) (ADMITTED) [id number] Gu MD; Berry...            Was this an external facility discharge? No    Additional needs identified to be addressed with provider   No needs identified             Method of communication with provider: none.    Care Summary Note:     Patient reports that he is doing okay.   Pt. stated that he just got back from  Office.   Pt. Is reuesting for CTN to call back some other time.   Pt. Kept the conversation short and concluded the call.       Follow Up Appointment:   Discussed follow up appointments. Patient has hospital follow up appointment scheduled within 14 days of discharge.   Future Appointments         Provider Specialty Dept Phone    2024 1:00 PM Oswald Sandoval MD Internal Medicine [phone]    2024 9:30 AM [facility] LAB VISIT Internal Medicine [phone]    2024 10:00 AM Oswald Sandoval MD Internal Medicine [phone]    2024 8:40 AM Scott Pan MD Cardiology [phone]    2024 8:30 AM Tere Weinstein PA Urology [phone]            Care Transition Nurse provided contact information.  Plan for follow-up on next business

## 2024-07-02 ENCOUNTER — CARE COORDINATION (OUTPATIENT)
Facility: CLINIC | Age: 84
End: 2024-07-02

## 2024-07-02 NOTE — CARE COORDINATION
Care Transitions Note    Initial Call - Call within 2 business days of discharge: Yes    Patient Current Location:  work    Care Transition Nurse contacted the patient by telephone to perform post hospital discharge assessment, verified name and  as identifiers. Provided introduction to self, and explanation of the Care Transition Nurse role.     Patient: Roel Fish Jr.    Patient : 1940   MRN: [id number]    Reason for Admission: Acute renal failure superimposed on stage 2 chronic kidney disease   Discharge Date: 24  RURS: Readmission Risk Score: 16.5      Last Discharge Facility       Date Complaint Diagnosis Description Type Department Provider    24 Abdominal Pain; abnormal labs [facility] (acute kidney injury) (HCC) ED to Hosp-Admission (Discharged) (ADMITTED) [id number] Gutierrez MD; [facility]...            Was this an external facility discharge? No    Additional needs identified to be addressed with provider   No needs identified             Method of communication with provider: none.    Care Summary Note:     Pt. Reports that he is doing well.   Pt. States that this is not a good timing to talk as he is currently at work . Pt. States that he get off work at 9pm.   Pt. Prefers for CTN to call back some other time.     Future Appointments         Provider Specialty Dept Phone    2024 1:00 PM Oswald Sandoval MD Internal Medicine [phone]    2024 9:30 AM [facility] LAB VISIT Internal Medicine [phone]    2024 10:00 AM Oswald Sandoval MD Internal Medicine [phone]    2024 8:40 AM Scott Pan MD Cardiology [phone]    2024 8:30 AM Tere Weinstein PA Urology [phone]            Care Transition Nurse provided contact information.  Plan for follow-up call in 6-10 days based on severity of symptoms and risk factors.  Plan for next call:  follow up symptoms, assess for any needs.       Maryellen Lainez RN

## 2024-07-08 ENCOUNTER — HOSPITAL ENCOUNTER (OUTPATIENT)
Facility: HOSPITAL | Age: 84
Discharge: HOME | End: 2024-07-11
Payer: MEDICARE

## 2024-07-08 ENCOUNTER — TELEPHONE (OUTPATIENT)
Facility: CLINIC | Age: 84
End: 2024-07-08

## 2024-07-08 ENCOUNTER — OFFICE VISIT (OUTPATIENT)
Facility: CLINIC | Age: 84
End: 2024-07-08

## 2024-07-08 VITALS
BODY MASS INDEX: 26.36 KG/M2 | HEART RATE: 57 BPM | SYSTOLIC BLOOD PRESSURE: 132 MMHG | WEIGHT: 164 LBS | DIASTOLIC BLOOD PRESSURE: 59 MMHG | OXYGEN SATURATION: 98 % | HEIGHT: 66 IN | RESPIRATION RATE: 16 BRPM | TEMPERATURE: 98.8 F

## 2024-07-08 DIAGNOSIS — N17.9 AKI (ACUTE KIDNEY INJURY) (HCC): Primary | ICD-10-CM

## 2024-07-08 DIAGNOSIS — R19.7 DIARRHEA, UNSPECIFIED TYPE: ICD-10-CM

## 2024-07-08 DIAGNOSIS — I10 PRIMARY HYPERTENSION: ICD-10-CM

## 2024-07-08 DIAGNOSIS — E55.9 AVITAMINOSIS D: ICD-10-CM

## 2024-07-08 DIAGNOSIS — N18.2 CHRONIC KIDNEY DISEASE, STAGE II (MILD): ICD-10-CM

## 2024-07-08 DIAGNOSIS — R74.01 TRANSAMINASEMIA: ICD-10-CM

## 2024-07-08 DIAGNOSIS — K52.9 GASTROENTERITIS: ICD-10-CM

## 2024-07-08 DIAGNOSIS — I10 ESSENTIAL HYPERTENSION, MALIGNANT: ICD-10-CM

## 2024-07-08 LAB
ALBUMIN SERPL-MCNC: 3.9 G/DL (ref 3.4–5)
ANION GAP SERPL CALC-SCNC: 4 MMOL/L (ref 3–18)
APPEARANCE UR: CLEAR
BACTERIA URNS QL MICRO: NEGATIVE /HPF
BILIRUB UR QL: NEGATIVE
BUN SERPL-MCNC: 16 MG/DL (ref 7–18)
BUN/CREAT SERPL: 13 (ref 12–20)
CALCIUM SERPL-MCNC: 9.4 MG/DL (ref 8.5–10.1)
CHLORIDE SERPL-SCNC: 107 MMOL/L (ref 100–111)
CO2 SERPL-SCNC: 28 MMOL/L (ref 21–32)
COLOR UR: YELLOW
CREAT SERPL-MCNC: 1.22 MG/DL (ref 0.6–1.3)
CREAT UR-MCNC: 27 MG/DL (ref 30–125)
EPITH CASTS URNS QL MICRO: NEGATIVE /LPF (ref 0–5)
ERYTHROCYTE [DISTWIDTH] IN BLOOD BY AUTOMATED COUNT: 13.2 % (ref 11.6–14.5)
GLUCOSE SERPL-MCNC: 105 MG/DL (ref 74–99)
GLUCOSE UR STRIP.AUTO-MCNC: NEGATIVE MG/DL
HCT VFR BLD AUTO: 33.9 % (ref 36–48)
HGB BLD-MCNC: 11.4 G/DL (ref 13–16)
HGB UR QL STRIP: NEGATIVE
KETONES UR QL STRIP.AUTO: NEGATIVE MG/DL
LEUKOCYTE ESTERASE UR QL STRIP.AUTO: NEGATIVE
MCH RBC QN AUTO: 30.8 PG (ref 24–34)
MCHC RBC AUTO-ENTMCNC: 33.6 G/DL (ref 31–37)
MCV RBC AUTO: 91.6 FL (ref 78–100)
MICROALBUMIN UR-MCNC: 1.27 MG/DL (ref 0–3)
MICROALBUMIN/CREAT UR-RTO: 47 MG/G (ref 0–30)
NITRITE UR QL STRIP.AUTO: NEGATIVE
NRBC # BLD: 0 K/UL (ref 0–0.01)
NRBC BLD-RTO: 0 PER 100 WBC
PH UR STRIP: 6.5 (ref 5–8)
PHOSPHATE SERPL-MCNC: 2.9 MG/DL (ref 2.5–4.9)
PLATELET # BLD AUTO: 273 K/UL (ref 135–420)
PMV BLD AUTO: 10.5 FL (ref 9.2–11.8)
POTASSIUM SERPL-SCNC: 4.3 MMOL/L (ref 3.5–5.5)
PROT UR STRIP-MCNC: NEGATIVE MG/DL
RBC # BLD AUTO: 3.7 M/UL (ref 4.35–5.65)
RBC #/AREA URNS HPF: NORMAL /HPF (ref 0–5)
SODIUM SERPL-SCNC: 139 MMOL/L (ref 136–145)
SP GR UR REFRACTOMETRY: 1 (ref 1–1.03)
UROBILINOGEN UR QL STRIP.AUTO: 0.2 EU/DL (ref 0.2–1)
WBC # BLD AUTO: 8.4 K/UL (ref 4.6–13.2)
WBC URNS QL MICRO: NORMAL /HPF (ref 0–4)

## 2024-07-08 PROCEDURE — 82043 UR ALBUMIN QUANTITATIVE: CPT

## 2024-07-08 PROCEDURE — 80069 RENAL FUNCTION PANEL: CPT

## 2024-07-08 PROCEDURE — 36415 COLL VENOUS BLD VENIPUNCTURE: CPT

## 2024-07-08 PROCEDURE — 82306 VITAMIN D 25 HYDROXY: CPT

## 2024-07-08 PROCEDURE — 83970 ASSAY OF PARATHORMONE: CPT

## 2024-07-08 PROCEDURE — 82570 ASSAY OF URINE CREATININE: CPT

## 2024-07-08 PROCEDURE — 81001 URINALYSIS AUTO W/SCOPE: CPT

## 2024-07-08 PROCEDURE — 85027 COMPLETE CBC AUTOMATED: CPT

## 2024-07-08 RX ORDER — AMLODIPINE BESYLATE 10 MG/1
10 TABLET ORAL DAILY
Qty: 90 TABLET | Refills: 1 | Status: SHIPPED | OUTPATIENT
Start: 2024-07-08

## 2024-07-08 RX ORDER — DIPHENOXYLATE HYDROCHLORIDE AND ATROPINE SULFATE 2.5; .025 MG/1; MG/1
1 TABLET ORAL 4 TIMES DAILY PRN
Qty: 30 TABLET | Refills: 0 | Status: SHIPPED | OUTPATIENT
Start: 2024-07-08 | End: 2024-07-18

## 2024-07-08 NOTE — TELEPHONE ENCOUNTER
[facility] calling for clarification on   LOMOTIL   Is it for chronic or acute diarrhea

## 2024-07-08 NOTE — TELEPHONE ENCOUNTER
Called [facility] and spoke to pharmacist, per provider patient has chronic diarrhea, pharmacist verbalized understanding.    No further action required.

## 2024-07-09 ENCOUNTER — CARE COORDINATION (OUTPATIENT)
Facility: CLINIC | Age: 84
End: 2024-07-09

## 2024-07-09 LAB
25(OH)D3 SERPL-MCNC: 53.5 NG/ML (ref 30–100)
CALCIUM SERPL-MCNC: 9.2 MG/DL (ref 8.5–10.1)
PTH-INTACT SERPL-MCNC: 37 PG/ML (ref 18.4–88)

## 2024-07-09 NOTE — CARE COORDINATION
Care Transitions Note    Follow Up Call     CTN Attempted to reach patient for transitions of care follow up.  Unable to reach patient. Unable to leave a voice message. Pt. Voice message is full.     Outreach Attempts:   Unable to leave message.  Pt.voicemail box is full.     Care Summary Note:     Noted Pt. Attended PCP appt. Yesterday 7/8/24.     Follow Up Appointment:   Future Appointments         Provider Specialty Dept Phone    8/19/2024 9:30 AM [facility] LAB VISIT Internal Medicine [phone]    8/26/2024 10:00 AM Oswald Sandoval MD Internal Medicine [phone]    9/24/2024 8:40 AM Scott Pan MD Cardiology [phone]    11/11/2024 8:30 AM Tere Weinstein PA Urology [phone]            Plan for follow-up call in 6-10 days based on severity of symptoms and risk factors. Plan for next call:  follow up symptoms, assess for any needs.     Maryellen Lainez RN

## 2024-07-10 NOTE — PROGRESS NOTES
Roel Fish Jr. presents today for   Chief Complaint   Patient presents with    Follow-Up from [facility], 06/27-06/29: [facility]     Accompanied by spouse.    \"Have you been to the ER, urgent care clinic since your last visit?  Hospitalized since your last visit?\"    YES - When: approximately 10 days ago.  Where and Why: [facility], [facility].    “Have you seen or consulted any other health care providers outside of [facility] since your last visit?”    NO           
[facility]    NEUROVASCULAR PROCEDURE N/A 7/3/2023    2 transcath occ/embo intracranial (81016) performed by [facility]    NEUROVASCULAR PROCEDURE N/A 7/3/2023    4 angiography follow up through exist cath (58154) performed by [facility]    NEUROVASCULAR PROCEDURE N/A 7/3/2023    Angiography cerebral intracranial (84356) performed by [facility]    NEUROVASCULAR PROCEDURE N/A 7/3/2023    Angiography cervicocerebral external carotid right (60637) performed by [facility]    NEUROVASCULAR PROCEDURE N/A 7/3/2023    Angiography cervicocerebral external carotid left (90183) performed by [facility]    NEUROVASCULAR PROCEDURE N/A 7/3/2023    Angiography cerebral common carotid right/innominate (35850) performed by [facility]    NEUROVASCULAR PROCEDURE N/A 7/3/2023    Angiography cerebral common carotid left/innominate (72752) performed by [facility]    NEUROVASCULAR PROCEDURE N/A 7/3/2023    Ultrasound guided vascular access (19357) performed by [facility]    NEUROVASCULAR PROCEDURE N/A 7/3/2023    Placement occlusion device post procedure (g0269) & (v2886) performed by [facility]    OTHER SURGICAL HISTORY Left     L shoulder surgery    TONSILLECTOMY      TURP      x2 Dr. Romero    UROLOGICAL SURGERY  10/2012    [facility], Dr. Mg Spear, Cystoscopy and dilation of stricture, cryoablation of prostate     Current Outpatient Medications   Medication Sig    amLODIPine (NORVASC) 10 MG tablet Take 1 tablet by mouth daily    diphenoxylate-atropine (LOMOTIL) 2.5-0.025 MG per tablet Take 1 tablet by mouth 4 times daily as needed for Diarrhea for up to 10 days. Max Daily Amount: 4 tablets

## 2024-07-15 ENCOUNTER — CARE COORDINATION (OUTPATIENT)
Facility: CLINIC | Age: 84
End: 2024-07-15

## 2024-07-15 NOTE — CARE COORDINATION
Care Transitions Note    Follow Up Call       Care Transition Nurse contacted the patient by telephone. Verified name and  as identifiers.    Additional needs identified to be addressed with provider   No needs identified                 Method of communication with provider: none.    Care Summary Note: none noted at this time.     Pt. Reports that he is doing okay.   No new or worsening of symptoms reported by Pt. At this time.   Pt. Reports that he does not have time to talk at this time.   Pt. Kept the conversation short and concluded the call.     Future Appointments         Provider Specialty Dept Phone    2024 9:30 AM [facility] LAB VISIT Internal Medicine [phone]    2024 10:00 AM Oswald Sandoval MD Internal Medicine [phone]    2024 8:40 AM Scott Pan MD Cardiology [phone]    2024 8:30 AM Tere Weinstein PA Urology [phone]            Care Transition Nurse provided contact information.  Plan for follow-up call in 6-10 days based on severity of symptoms and risk factors.  Plan for next call:  follow up symptoms, assess for any needs.       Maryellen Lainez RN

## 2024-07-23 ENCOUNTER — CARE COORDINATION (OUTPATIENT)
Facility: CLINIC | Age: 84
End: 2024-07-23

## 2024-07-23 NOTE — CARE COORDINATION
Care Transitions Note    Follow Up Call       Care Transition Nurse contacted the patient by telephone. Verified name and  as identifiers.    Additional needs identified to be addressed with provider   No needs identified                 Method of communication with provider: none.    Care Summary Note: none noted at this time.     Pt. Reports that he is doing okay.   No new or worsening of symptoms reported by Pt. At this time.   Pt. Reports that he does not have time to talk at this time as he is at work.   Pt. States that CTN can call him around  8 am next time.   Pt. Kept the conversation short and concluded the call.     Future Appointments         Provider Specialty Dept Phone    2024 9:30 AM [facility] LAB VISIT Internal Medicine [phone]    2024 10:00 AM Oswald Sandoval MD Internal Medicine [phone]    2024 8:40 AM Scott Pan MD Cardiology [phone]    2024 8:30 AM Tere Weinstein PA Urology [phone]            Care Transition Nurse provided contact information.  Plan for follow-up call in 6-10 days based on severity of symptoms and risk factors.  Plan for next call:  follow up symptoms, assess for any needs.       Maryellen Lainez RN

## 2024-07-24 RX ORDER — NITROGLYCERIN 0.4 MG/1
TABLET SUBLINGUAL
Qty: 25 TABLET | Refills: 1 | Status: SHIPPED | OUTPATIENT
Start: 2024-07-24

## 2024-07-30 ENCOUNTER — CARE COORDINATION (OUTPATIENT)
Facility: CLINIC | Age: 84
End: 2024-07-30

## 2024-07-30 NOTE — CARE COORDINATION
Care Transitions Note    Final Call     CTN Attempted to reach patient for transitions of care follow up.  Unable to reach patient.  Unable to leave a voice message. Pt. Voicemail box is full.     Outreach Attempts:   Unable to leave message.     Patient graduated from the Care Transitions program on 7/30/24.    Handoff:   Patient was not referred to the ACM team due to unable to contact patient.      Upcoming Appointments:    Future Appointments         Provider Specialty Dept Phone    8/19/2024 9:30 AM [facility] LAB VISIT Internal Medicine [phone]    8/26/2024 10:00 AM Oswald Sandoval MD Internal Medicine [phone]    9/24/2024 8:40 AM Scott Pan MD Cardiology [phone]    11/11/2024 8:30 AM Tere Weinstein PA Urology [phone]            Maryellen Lainez RN

## 2024-08-06 RX ORDER — ATORVASTATIN CALCIUM 20 MG/1
20 TABLET, FILM COATED ORAL NIGHTLY
Qty: 90 TABLET | Refills: 3 | Status: SHIPPED | OUTPATIENT
Start: 2024-08-06

## 2024-08-09 ENCOUNTER — HOSPITAL ENCOUNTER (OUTPATIENT)
Facility: HOSPITAL | Age: 84
End: 2024-08-09
Payer: MEDICARE

## 2024-08-09 ENCOUNTER — TRANSCRIBE ORDERS (OUTPATIENT)
Facility: HOSPITAL | Age: 84
End: 2024-08-09

## 2024-08-09 DIAGNOSIS — N18.30 STAGE 3 CHRONIC KIDNEY DISEASE, UNSPECIFIED WHETHER STAGE 3A OR 3B CKD (HCC): ICD-10-CM

## 2024-08-09 DIAGNOSIS — N18.30 STAGE 3 CHRONIC KIDNEY DISEASE, UNSPECIFIED WHETHER STAGE 3A OR 3B CKD (HCC): Primary | ICD-10-CM

## 2024-08-09 LAB
25(OH)D3 SERPL-MCNC: 49.8 NG/ML (ref 30–100)
ALBUMIN SERPL-MCNC: 4.4 G/DL (ref 3.4–5)
ANION GAP SERPL CALC-SCNC: 4 MMOL/L (ref 3–18)
APPEARANCE UR: CLEAR
BACTERIA URNS QL MICRO: NEGATIVE /HPF
BASOPHILS # BLD: 0.1 K/UL (ref 0–0.1)
BASOPHILS NFR BLD: 1 % (ref 0–2)
BILIRUB UR QL: NEGATIVE
BUN SERPL-MCNC: 16 MG/DL (ref 7–18)
BUN/CREAT SERPL: 15 (ref 12–20)
CALCIUM SERPL-MCNC: 9.5 MG/DL (ref 8.5–10.1)
CALCIUM SERPL-MCNC: 9.9 MG/DL (ref 8.5–10.1)
CHLORIDE SERPL-SCNC: 106 MMOL/L (ref 100–111)
CO2 SERPL-SCNC: 30 MMOL/L (ref 21–32)
COLOR UR: YELLOW
CREAT SERPL-MCNC: 1.06 MG/DL (ref 0.6–1.3)
CREAT UR-MCNC: 52 MG/DL (ref 30–125)
DIFFERENTIAL METHOD BLD: ABNORMAL
EOSINOPHIL # BLD: 0.3 K/UL (ref 0–0.4)
EOSINOPHIL NFR BLD: 4 % (ref 0–5)
EPITH CASTS URNS QL MICRO: NEGATIVE /LPF (ref 0–5)
ERYTHROCYTE [DISTWIDTH] IN BLOOD BY AUTOMATED COUNT: 13.6 % (ref 11.6–14.5)
GLUCOSE SERPL-MCNC: 99 MG/DL (ref 74–99)
GLUCOSE UR STRIP.AUTO-MCNC: NEGATIVE MG/DL
HCT VFR BLD AUTO: 34.5 % (ref 36–48)
HGB BLD-MCNC: 11.6 G/DL (ref 13–16)
HGB UR QL STRIP: NEGATIVE
IMM GRANULOCYTES # BLD AUTO: 0 K/UL (ref 0–0.04)
IMM GRANULOCYTES NFR BLD AUTO: 0 % (ref 0–0.5)
IRON SATN MFR SERPL: 27 % (ref 20–50)
IRON SERPL-MCNC: 65 UG/DL (ref 50–175)
KETONES UR QL STRIP.AUTO: NEGATIVE MG/DL
LEUKOCYTE ESTERASE UR QL STRIP.AUTO: NEGATIVE
LYMPHOCYTES # BLD: 2.2 K/UL (ref 0.9–3.6)
LYMPHOCYTES NFR BLD: 30 % (ref 21–52)
MCH RBC QN AUTO: 31.4 PG (ref 24–34)
MCHC RBC AUTO-ENTMCNC: 33.6 G/DL (ref 31–37)
MCV RBC AUTO: 93.5 FL (ref 78–100)
MICROALBUMIN UR-MCNC: 1.54 MG/DL (ref 0–3)
MICROALBUMIN/CREAT UR-RTO: 30 MG/G (ref 0–30)
MONOCYTES # BLD: 0.7 K/UL (ref 0.05–1.2)
MONOCYTES NFR BLD: 10 % (ref 3–10)
NEUTS SEG # BLD: 3.9 K/UL (ref 1.8–8)
NEUTS SEG NFR BLD: 54 % (ref 40–73)
NITRITE UR QL STRIP.AUTO: NEGATIVE
NRBC # BLD: 0 K/UL (ref 0–0.01)
NRBC BLD-RTO: 0 PER 100 WBC
PH UR STRIP: 5.5 (ref 5–8)
PHOSPHATE SERPL-MCNC: 3.1 MG/DL (ref 2.5–4.9)
PLATELET # BLD AUTO: 240 K/UL (ref 135–420)
PMV BLD AUTO: 10.4 FL (ref 9.2–11.8)
POTASSIUM SERPL-SCNC: 4 MMOL/L (ref 3.5–5.5)
PROT UR STRIP-MCNC: NEGATIVE MG/DL
PTH-INTACT SERPL-MCNC: 34.7 PG/ML (ref 18.4–88)
RBC # BLD AUTO: 3.69 M/UL (ref 4.35–5.65)
RBC #/AREA URNS HPF: NORMAL /HPF (ref 0–5)
SODIUM SERPL-SCNC: 140 MMOL/L (ref 136–145)
SP GR UR REFRACTOMETRY: 1.01 (ref 1–1.03)
TIBC SERPL-MCNC: 243 UG/DL (ref 250–450)
UROBILINOGEN UR QL STRIP.AUTO: 0.2 EU/DL (ref 0.2–1)
WBC # BLD AUTO: 7.3 K/UL (ref 4.6–13.2)
WBC URNS QL MICRO: NORMAL /HPF (ref 0–4)

## 2024-08-09 PROCEDURE — 83550 IRON BINDING TEST: CPT

## 2024-08-09 PROCEDURE — 82043 UR ALBUMIN QUANTITATIVE: CPT

## 2024-08-09 PROCEDURE — 81001 URINALYSIS AUTO W/SCOPE: CPT

## 2024-08-09 PROCEDURE — 84165 PROTEIN E-PHORESIS SERUM: CPT

## 2024-08-09 PROCEDURE — 82306 VITAMIN D 25 HYDROXY: CPT

## 2024-08-09 PROCEDURE — 85025 COMPLETE CBC W/AUTO DIFF WBC: CPT

## 2024-08-09 PROCEDURE — 83540 ASSAY OF IRON: CPT

## 2024-08-09 PROCEDURE — 80069 RENAL FUNCTION PANEL: CPT

## 2024-08-09 PROCEDURE — 36415 COLL VENOUS BLD VENIPUNCTURE: CPT

## 2024-08-09 PROCEDURE — 82570 ASSAY OF URINE CREATININE: CPT

## 2024-08-09 PROCEDURE — 83970 ASSAY OF PARATHORMONE: CPT

## 2024-08-12 LAB
ALBUMIN SERPL ELPH-MCNC: 4.2 G/DL (ref 2.9–4.4)
ALBUMIN/GLOB SERPL: 1.6 (ref 0.7–1.7)
ALPHA1 GLOB SERPL ELPH-MCNC: 0.2 G/DL (ref 0–0.4)
ALPHA2 GLOB SERPL ELPH-MCNC: 0.7 G/DL (ref 0.4–1)
B-GLOBULIN SERPL ELPH-MCNC: 0.9 G/DL (ref 0.7–1.3)
GAMMA GLOB SERPL ELPH-MCNC: 0.9 G/DL (ref 0.4–1.8)
GLOBULIN SER CALC-MCNC: 2.7 G/DL (ref 2.2–3.9)
M PROTEIN SERPL ELPH-MCNC: NORMAL G/DL
PROT SERPL-MCNC: 6.9 G/DL (ref 6–8.5)

## 2024-08-19 ENCOUNTER — HOSPITAL ENCOUNTER (OUTPATIENT)
Facility: HOSPITAL | Age: 84
Setting detail: SPECIMEN
Discharge: HOME | End: 2024-08-22
Payer: MEDICARE

## 2024-08-19 DIAGNOSIS — D64.9 ANEMIA, UNSPECIFIED TYPE: ICD-10-CM

## 2024-08-19 DIAGNOSIS — J44.9 CHRONIC OBSTRUCTIVE PULMONARY DISEASE, UNSPECIFIED COPD TYPE (HCC): ICD-10-CM

## 2024-08-19 LAB
ANION GAP SERPL CALC-SCNC: 6 MMOL/L (ref 3–18)
BASOPHILS # BLD: 0.1 K/UL (ref 0–0.1)
BASOPHILS NFR BLD: 1 % (ref 0–2)
BUN SERPL-MCNC: 14 MG/DL (ref 7–18)
BUN/CREAT SERPL: 12 (ref 12–20)
CALCIUM SERPL-MCNC: 10 MG/DL (ref 8.5–10.1)
CHLORIDE SERPL-SCNC: 108 MMOL/L (ref 100–111)
CO2 SERPL-SCNC: 26 MMOL/L (ref 21–32)
CREAT SERPL-MCNC: 1.13 MG/DL (ref 0.6–1.3)
DIFFERENTIAL METHOD BLD: ABNORMAL
EOSINOPHIL # BLD: 0.3 K/UL (ref 0–0.4)
EOSINOPHIL NFR BLD: 4 % (ref 0–5)
ERYTHROCYTE [DISTWIDTH] IN BLOOD BY AUTOMATED COUNT: 13.5 % (ref 11.6–14.5)
GLUCOSE SERPL-MCNC: 110 MG/DL (ref 74–99)
HCT VFR BLD AUTO: 35.5 % (ref 36–48)
HGB BLD-MCNC: 11.6 G/DL (ref 13–16)
IMM GRANULOCYTES # BLD AUTO: 0 K/UL (ref 0–0.04)
IMM GRANULOCYTES NFR BLD AUTO: 0 % (ref 0–0.5)
LYMPHOCYTES # BLD: 2.3 K/UL (ref 0.9–3.6)
LYMPHOCYTES NFR BLD: 29 % (ref 21–52)
MCH RBC QN AUTO: 30.6 PG (ref 24–34)
MCHC RBC AUTO-ENTMCNC: 32.7 G/DL (ref 31–37)
MCV RBC AUTO: 93.7 FL (ref 78–100)
MONOCYTES # BLD: 0.8 K/UL (ref 0.05–1.2)
MONOCYTES NFR BLD: 10 % (ref 3–10)
NEUTS SEG # BLD: 4.4 K/UL (ref 1.8–8)
NEUTS SEG NFR BLD: 56 % (ref 40–73)
NRBC # BLD: 0 K/UL (ref 0–0.01)
NRBC BLD-RTO: 0 PER 100 WBC
PLATELET # BLD AUTO: 268 K/UL (ref 135–420)
PMV BLD AUTO: 10.5 FL (ref 9.2–11.8)
POTASSIUM SERPL-SCNC: 4.1 MMOL/L (ref 3.5–5.5)
RBC # BLD AUTO: 3.79 M/UL (ref 4.35–5.65)
SODIUM SERPL-SCNC: 140 MMOL/L (ref 136–145)
WBC # BLD AUTO: 7.9 K/UL (ref 4.6–13.2)

## 2024-08-19 PROCEDURE — 85025 COMPLETE CBC W/AUTO DIFF WBC: CPT

## 2024-08-19 PROCEDURE — 80048 BASIC METABOLIC PNL TOTAL CA: CPT

## 2024-08-19 PROCEDURE — 36415 COLL VENOUS BLD VENIPUNCTURE: CPT

## 2024-08-24 NOTE — PROGRESS NOTES
83 y.o. male who presents for evaluation.    He continues to do well and back at work at [employer].  Bp controlled when he checks. Seeing Dr Pan periodically    No gi or gu complaints. He was started on detrol which has helped his urinary complaints.  No bleeding from anywhere; remains on iron supp.  He saw Dr Christensen's NP/VOA for the anemia but no records for review     Denies polyuria, polydipsia, nocturia, vision change.  Not checking sugars at this time.  he now reports that he's been drinking juice daily and averaging 4-5 diet cokes/d!     6/27/2024 6/28/2024 6/29/2024 7/1/2024 7/8/2024 8/26/2024   Vitals         Weight - Scale 158 lb    151 lb  164 lb  167 lb      No sx referable to the thyroid     *LAST MEDICARE WELLNESS EXAM: 8/12/15, 2/22/17, 4/12/18, 5/8/19, 11/17/20, 11/29/21, 12/19/22, 12/19/23    Past Medical History:   Diagnosis Date    Allergic rhinitis     Hooker's esophagus     Dr. Cloud 8/17 s/p HALO; 7/22    CAD (coronary artery disease)     Carotid stenosis 05/2023    [facility] DERREK 50-69%, LICA<50% (5/23)    Colon adenoma 08/2017    Dr Cloud    Colon polyps 2007    Dr. Cloud    COPD (chronic obstructive pulmonary disease) ([facility])     mild obst disease on PFTs Dr. Alcantar (2007)    COVID-19 vaccine dose declined 12/2022    boosters    Do not resuscitate 08/2023    ED (erectile dysfunction)     GERD (gastroesophageal reflux disease)     by EGD 2007 Dr. Cloud; EGD 5/14/21 hh and s/p dilation stricture    Hearing loss     Hx of cardiac catheterization 11/2020    Dr Pan; ef 60%, LIMA to LAD patent, SVG to DM1 patent, segment to RPDA occ, prox LAD 95%, Cx 30%, 95% prox RCA, mid RCA 95%, dist RCA 95%, RPDA 95%, s/p stent of all 4 vessels    Hypertension     VIRGIL (obstructive sleep apnea) 2011    CPAP not used    Osteoarthritis of both hands     Prostate cancer (HCC) 04/2018    Dr Spear stage 2B [id number], [place] 3+4, P,10, G2; brachytherapy Dr Powell     Prostate cancer (HCC) 06/2010    Dr. Romero

## 2024-08-26 ENCOUNTER — OFFICE VISIT (OUTPATIENT)
Facility: CLINIC | Age: 84
End: 2024-08-26
Payer: MEDICARE

## 2024-08-26 VITALS
TEMPERATURE: 98.3 F | HEIGHT: 66 IN | SYSTOLIC BLOOD PRESSURE: 123 MMHG | WEIGHT: 167 LBS | RESPIRATION RATE: 16 BRPM | DIASTOLIC BLOOD PRESSURE: 52 MMHG | OXYGEN SATURATION: 97 % | BODY MASS INDEX: 26.84 KG/M2 | HEART RATE: 49 BPM

## 2024-08-26 DIAGNOSIS — G47.33 OSA (OBSTRUCTIVE SLEEP APNEA): ICD-10-CM

## 2024-08-26 DIAGNOSIS — R73.01 IFG (IMPAIRED FASTING GLUCOSE): ICD-10-CM

## 2024-08-26 DIAGNOSIS — I10 PRIMARY HYPERTENSION: ICD-10-CM

## 2024-08-26 DIAGNOSIS — I25.810 CORONARY ARTERY DISEASE INVOLVING CORONARY BYPASS GRAFT OF NATIVE HEART WITHOUT ANGINA PECTORIS: ICD-10-CM

## 2024-08-26 DIAGNOSIS — J44.9 COPD WITHOUT EXACERBATION (HCC): ICD-10-CM

## 2024-08-26 DIAGNOSIS — E04.2 MULTINODULAR GOITER: ICD-10-CM

## 2024-08-26 DIAGNOSIS — E78.5 HYPERLIPIDEMIA, UNSPECIFIED HYPERLIPIDEMIA TYPE: ICD-10-CM

## 2024-08-26 DIAGNOSIS — D64.9 ANEMIA, UNSPECIFIED TYPE: Primary | ICD-10-CM

## 2024-08-26 DIAGNOSIS — R56.9 SEIZURES (HCC): ICD-10-CM

## 2024-08-26 PROBLEM — S06.5XAA SDH (SUBDURAL HEMATOMA) (HCC): Status: RESOLVED | Noted: 2023-05-01 | Resolved: 2024-08-26

## 2024-08-26 PROCEDURE — G8417 CALC BMI ABV UP PARAM F/U: HCPCS | Performed by: INTERNAL MEDICINE

## 2024-08-26 PROCEDURE — 3078F DIAST BP <80 MM HG: CPT | Performed by: INTERNAL MEDICINE

## 2024-08-26 PROCEDURE — 1123F ACP DISCUSS/DSCN MKR DOCD: CPT | Performed by: INTERNAL MEDICINE

## 2024-08-26 PROCEDURE — G2211 COMPLEX E/M VISIT ADD ON: HCPCS | Performed by: INTERNAL MEDICINE

## 2024-08-26 PROCEDURE — 3074F SYST BP LT 130 MM HG: CPT | Performed by: INTERNAL MEDICINE

## 2024-08-26 PROCEDURE — G8427 DOCREV CUR MEDS BY ELIG CLIN: HCPCS | Performed by: INTERNAL MEDICINE

## 2024-08-26 PROCEDURE — 99214 OFFICE O/P EST MOD 30 MIN: CPT | Performed by: INTERNAL MEDICINE

## 2024-08-26 PROCEDURE — 3023F SPIROM DOC REV: CPT | Performed by: INTERNAL MEDICINE

## 2024-08-26 PROCEDURE — 1036F TOBACCO NON-USER: CPT | Performed by: INTERNAL MEDICINE

## 2024-08-26 NOTE — PROGRESS NOTES
Roel Fish Jr. presents today for   Chief Complaint   Patient presents with    4 Month Follow-Up    Hypertension     Accompanied by spouse.    \"Have you been to the ER, urgent care clinic since your last visit?  Hospitalized since your last visit?\"    NO    “Have you seen or consulted any other health care providers outside of [facility] since your last visit?”    YES - When: approximately 1 months ago.  Where and Why: [facility], office visit.

## 2024-09-24 ENCOUNTER — OFFICE VISIT (OUTPATIENT)
Age: 84
End: 2024-09-24
Payer: MEDICARE

## 2024-09-24 VITALS
HEIGHT: 66 IN | SYSTOLIC BLOOD PRESSURE: 132 MMHG | DIASTOLIC BLOOD PRESSURE: 64 MMHG | BODY MASS INDEX: 27.32 KG/M2 | HEART RATE: 54 BPM | OXYGEN SATURATION: 100 % | WEIGHT: 170 LBS

## 2024-09-24 DIAGNOSIS — Z95.1 S/P CABG X 3: ICD-10-CM

## 2024-09-24 DIAGNOSIS — R06.02 SHORTNESS OF BREATH: ICD-10-CM

## 2024-09-24 DIAGNOSIS — I65.23 BILATERAL CAROTID ARTERY STENOSIS: ICD-10-CM

## 2024-09-24 DIAGNOSIS — I10 ESSENTIAL (PRIMARY) HYPERTENSION: ICD-10-CM

## 2024-09-24 DIAGNOSIS — Z95.1 PRESENCE OF AORTOCORONARY BYPASS GRAFT: ICD-10-CM

## 2024-09-24 DIAGNOSIS — I25.10 ATHEROSCLEROSIS OF NATIVE CORONARY ARTERY OF NATIVE HEART WITHOUT ANGINA PECTORIS: Primary | ICD-10-CM

## 2024-09-24 PROCEDURE — 93000 ELECTROCARDIOGRAM COMPLETE: CPT | Performed by: INTERNAL MEDICINE

## 2024-09-24 PROCEDURE — G8417 CALC BMI ABV UP PARAM F/U: HCPCS | Performed by: INTERNAL MEDICINE

## 2024-09-24 PROCEDURE — 3075F SYST BP GE 130 - 139MM HG: CPT | Performed by: INTERNAL MEDICINE

## 2024-09-24 PROCEDURE — 1036F TOBACCO NON-USER: CPT | Performed by: INTERNAL MEDICINE

## 2024-09-24 PROCEDURE — 99214 OFFICE O/P EST MOD 30 MIN: CPT | Performed by: INTERNAL MEDICINE

## 2024-09-24 PROCEDURE — 1123F ACP DISCUSS/DSCN MKR DOCD: CPT | Performed by: INTERNAL MEDICINE

## 2024-09-24 PROCEDURE — 3078F DIAST BP <80 MM HG: CPT | Performed by: INTERNAL MEDICINE

## 2024-09-24 PROCEDURE — G8427 DOCREV CUR MEDS BY ELIG CLIN: HCPCS | Performed by: INTERNAL MEDICINE

## 2024-09-24 ASSESSMENT — PATIENT HEALTH QUESTIONNAIRE - PHQ9
1. LITTLE INTEREST OR PLEASURE IN DOING THINGS: NOT AT ALL
SUM OF ALL RESPONSES TO PHQ QUESTIONS 1-9: 0
2. FEELING DOWN, DEPRESSED OR HOPELESS: NOT AT ALL
SUM OF ALL RESPONSES TO PHQ QUESTIONS 1-9: 0
SUM OF ALL RESPONSES TO PHQ9 QUESTIONS 1 & 2: 0

## 2024-11-15 RX ORDER — METOPROLOL TARTRATE 25 MG/1
25 TABLET, FILM COATED ORAL 2 TIMES DAILY
Qty: 180 TABLET | Refills: 3 | Status: SHIPPED | OUTPATIENT
Start: 2024-11-15

## 2024-12-30 DIAGNOSIS — I10 PRIMARY HYPERTENSION: ICD-10-CM

## 2024-12-31 RX ORDER — AMLODIPINE BESYLATE 10 MG/1
10 TABLET ORAL DAILY
Qty: 90 TABLET | Refills: 3 | Status: SHIPPED | OUTPATIENT
Start: 2024-12-31

## 2025-02-21 NOTE — PROGRESS NOTES
84 y.o. male who presents for evaluation.  Wife was present for the evaluation    He continues to do well and back at work at [employer].  Bp controlled when he checks. Seeing Dr Pan periodically    No gi or gu complaints. He was started on detrol which has helped his urinary complaints.  No bleeding from anywhere; remains on iron supp.  He saw Dr Christensen's NP/VOA for anemia last summer      Denies polyuria, polydipsia, nocturia, vision change.  Not checking sugars at this time.  he cut out the juices and sodas     7/8/2024 8/26/2024 9/24/2024 11/11/2024 3/10/2025   Vitals        Weight - Scale 164 lb  167 lb  170 lb  170 lb  169 lb    Weight - Scale     156 lb      No sx referable to the thyroid     *LAST MEDICARE WELLNESS EXAM: 8/12/15, 2/22/17, 4/12/18, 5/8/19, 11/17/20, 11/29/21, 12/19/22, 12/19/23, 3/10/25    Past Medical History:   Diagnosis Date    Allergic rhinitis     Anemia     Dr Chritsensen (2024)    Hooker's esophagus     Dr. Cloud 8/17 s/p HALO; 7/22    CAD (coronary artery disease)     Carotid stenosis 05/2023    [facility] DERREK 50-69%, LICA<50% (5/23)    Colon adenoma 08/2017    Dr Cloud    Colon polyps 2007    Dr. Cloud    COPD (chronic obstructive pulmonary disease) ([facility])     mild obst disease on PFTs Dr. Alcantar (2007)    COVID-19 vaccine dose declined 12/2022    boosters    Do not resuscitate 08/2023    ED (erectile dysfunction)     GERD (gastroesophageal reflux disease)     by EGD 2007 Dr. Cloud; EGD 5/14/21 hh and s/p dilation stricture    Hearing loss     Hx of cardiac catheterization 11/2020    Dr Pan; ef 60%, LIMA to LAD patent, SVG to DM1 patent, segment to RPDA occ, prox LAD 95%, Cx 30%, 95% prox RCA, mid RCA 95%, dist RCA 95%, RPDA 95%, s/p stent of all 4 vessels    Hypertension     VIRGIL (obstructive sleep apnea) 2011    CPAP not used    Osteoarthritis of both hands     Prostate cancer (HCC) 04/2018    Dr Spear stage 2B [id number], [place] 3+4, P,10, G2; brachytherapy Dr Powell     Prostate

## 2025-03-03 ENCOUNTER — HOSPITAL ENCOUNTER (OUTPATIENT)
Facility: HOSPITAL | Age: 85
Setting detail: SPECIMEN
Discharge: HOME | End: 2025-03-06
Payer: MEDICARE

## 2025-03-03 DIAGNOSIS — D64.9 ANEMIA, UNSPECIFIED TYPE: ICD-10-CM

## 2025-03-03 DIAGNOSIS — E78.5 HYPERLIPIDEMIA, UNSPECIFIED HYPERLIPIDEMIA TYPE: ICD-10-CM

## 2025-03-03 DIAGNOSIS — R73.01 IFG (IMPAIRED FASTING GLUCOSE): ICD-10-CM

## 2025-03-03 DIAGNOSIS — E04.2 MULTINODULAR GOITER: ICD-10-CM

## 2025-03-03 LAB
ALBUMIN SERPL-MCNC: 4.1 G/DL (ref 3.4–5)
ALBUMIN/GLOB SERPL: 1.4 (ref 0.8–1.7)
ALP SERPL-CCNC: 58 U/L (ref 45–117)
ALT SERPL-CCNC: 22 U/L (ref 16–61)
ANION GAP SERPL CALC-SCNC: 7 MMOL/L (ref 3–18)
AST SERPL-CCNC: 15 U/L (ref 10–38)
BASOPHILS # BLD: 0.1 K/UL (ref 0–0.1)
BASOPHILS NFR BLD: 1 % (ref 0–2)
BILIRUB SERPL-MCNC: 0.6 MG/DL (ref 0.2–1)
BUN SERPL-MCNC: 15 MG/DL (ref 7–18)
BUN/CREAT SERPL: 14 (ref 12–20)
CALCIUM SERPL-MCNC: 9.4 MG/DL (ref 8.5–10.1)
CHLORIDE SERPL-SCNC: 107 MMOL/L (ref 100–111)
CHOLEST SERPL-MCNC: 125 MG/DL
CO2 SERPL-SCNC: 25 MMOL/L (ref 21–32)
CREAT SERPL-MCNC: 1.09 MG/DL (ref 0.6–1.3)
DIFFERENTIAL METHOD BLD: ABNORMAL
EOSINOPHIL # BLD: 0.27 K/UL (ref 0–0.4)
EOSINOPHIL NFR BLD: 2.8 % (ref 0–5)
ERYTHROCYTE [DISTWIDTH] IN BLOOD BY AUTOMATED COUNT: 13.9 % (ref 11.6–14.5)
GLOBULIN SER CALC-MCNC: 2.9 G/DL (ref 2–4)
GLUCOSE SERPL-MCNC: 96 MG/DL (ref 74–99)
HBA1C MFR BLD: 5.4 % (ref 4.2–5.6)
HCT VFR BLD AUTO: 38.3 % (ref 36–48)
HDLC SERPL-MCNC: 56 MG/DL (ref 40–60)
HDLC SERPL: 2.2 (ref 0–5)
HGB BLD-MCNC: 12.5 G/DL (ref 13–16)
IMM GRANULOCYTES # BLD AUTO: 0.04 K/UL (ref 0–0.04)
IMM GRANULOCYTES NFR BLD AUTO: 0.4 % (ref 0–0.5)
LDLC SERPL CALC-MCNC: 47.8 MG/DL (ref 0–100)
LIPID PANEL: NORMAL
LYMPHOCYTES # BLD: 2.89 K/UL (ref 0.9–3.6)
LYMPHOCYTES NFR BLD: 29.6 % (ref 21–52)
MCH RBC QN AUTO: 30.1 PG (ref 24–34)
MCHC RBC AUTO-ENTMCNC: 32.6 G/DL (ref 31–37)
MCV RBC AUTO: 92.3 FL (ref 78–100)
MONOCYTES # BLD: 0.93 K/UL (ref 0.05–1.2)
MONOCYTES NFR BLD: 9.5 % (ref 3–10)
NEUTS SEG # BLD: 5.53 K/UL (ref 1.8–8)
NEUTS SEG NFR BLD: 56.7 % (ref 40–73)
NRBC # BLD: 0 K/UL (ref 0–0.01)
NRBC BLD-RTO: 0 PER 100 WBC
PLATELET # BLD AUTO: 266 K/UL (ref 135–420)
PMV BLD AUTO: 10.8 FL (ref 9.2–11.8)
POTASSIUM SERPL-SCNC: 4 MMOL/L (ref 3.5–5.5)
PROT SERPL-MCNC: 7 G/DL (ref 6.4–8.2)
RBC # BLD AUTO: 4.15 M/UL (ref 4.35–5.65)
SODIUM SERPL-SCNC: 139 MMOL/L (ref 136–145)
T4 FREE SERPL-MCNC: 0.8 NG/DL (ref 0.7–1.5)
TRIGL SERPL-MCNC: 106 MG/DL
TSH SERPL DL<=0.05 MIU/L-ACNC: 3.41 UIU/ML (ref 0.36–3.74)
VLDLC SERPL CALC-MCNC: 21.2 MG/DL
WBC # BLD AUTO: 9.8 K/UL (ref 4.6–13.2)

## 2025-03-03 PROCEDURE — 83036 HEMOGLOBIN GLYCOSYLATED A1C: CPT

## 2025-03-03 PROCEDURE — 84443 ASSAY THYROID STIM HORMONE: CPT

## 2025-03-03 PROCEDURE — 85025 COMPLETE CBC W/AUTO DIFF WBC: CPT

## 2025-03-03 PROCEDURE — 36415 COLL VENOUS BLD VENIPUNCTURE: CPT

## 2025-03-03 PROCEDURE — 80053 COMPREHEN METABOLIC PANEL: CPT

## 2025-03-03 PROCEDURE — 80061 LIPID PANEL: CPT

## 2025-03-03 PROCEDURE — 84439 ASSAY OF FREE THYROXINE: CPT
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
## 2025-03-10 ENCOUNTER — OFFICE VISIT (OUTPATIENT)
Facility: CLINIC | Age: 85
End: 2025-03-10

## 2025-03-10 VITALS
RESPIRATION RATE: 16 BRPM | TEMPERATURE: 98.6 F | OXYGEN SATURATION: 99 % | BODY MASS INDEX: 27.16 KG/M2 | HEIGHT: 66 IN | WEIGHT: 169 LBS | DIASTOLIC BLOOD PRESSURE: 54 MMHG | SYSTOLIC BLOOD PRESSURE: 137 MMHG | HEART RATE: 56 BPM

## 2025-03-10 DIAGNOSIS — R73.01 IFG (IMPAIRED FASTING GLUCOSE): ICD-10-CM

## 2025-03-10 DIAGNOSIS — E04.2 MULTINODULAR GOITER: ICD-10-CM

## 2025-03-10 DIAGNOSIS — I10 PRIMARY HYPERTENSION: ICD-10-CM

## 2025-03-10 DIAGNOSIS — J44.9 COPD WITHOUT EXACERBATION (HCC): ICD-10-CM

## 2025-03-10 DIAGNOSIS — E78.5 HYPERLIPIDEMIA, UNSPECIFIED HYPERLIPIDEMIA TYPE: ICD-10-CM

## 2025-03-10 DIAGNOSIS — D64.9 ANEMIA, UNSPECIFIED TYPE: ICD-10-CM

## 2025-03-10 DIAGNOSIS — Z00.00 MEDICARE ANNUAL WELLNESS VISIT, SUBSEQUENT: Primary | ICD-10-CM

## 2025-03-10 DIAGNOSIS — C61 CANCER OF PROSTATE (HCC): ICD-10-CM

## 2025-03-10 DIAGNOSIS — I25.810 CORONARY ARTERY DISEASE INVOLVING CORONARY BYPASS GRAFT OF NATIVE HEART WITHOUT ANGINA PECTORIS: ICD-10-CM

## 2025-03-10 DIAGNOSIS — K22.70 BARRETT'S ESOPHAGUS WITHOUT DYSPLASIA: ICD-10-CM

## 2025-03-10 PROBLEM — N17.9 ACUTE RENAL FAILURE SUPERIMPOSED ON STAGE 2 CHRONIC KIDNEY DISEASE: Status: RESOLVED | Noted: 2024-06-27 | Resolved: 2025-03-10

## 2025-03-10 PROBLEM — N18.2 ACUTE RENAL FAILURE SUPERIMPOSED ON STAGE 2 CHRONIC KIDNEY DISEASE: Status: RESOLVED | Noted: 2024-06-27 | Resolved: 2025-03-10

## 2025-03-10 PROBLEM — R74.01 TRANSAMINITIS: Status: RESOLVED | Noted: 2024-06-28 | Resolved: 2025-03-10

## 2025-03-10 ASSESSMENT — LIFESTYLE VARIABLES
HOW MANY STANDARD DRINKS CONTAINING ALCOHOL DO YOU HAVE ON A TYPICAL DAY: PATIENT DOES NOT DRINK
HOW OFTEN DO YOU HAVE A DRINK CONTAINING ALCOHOL: NEVER

## 2025-03-10 ASSESSMENT — PATIENT HEALTH QUESTIONNAIRE - PHQ9
1. LITTLE INTEREST OR PLEASURE IN DOING THINGS: NOT AT ALL
SUM OF ALL RESPONSES TO PHQ QUESTIONS 1-9: 0
SUM OF ALL RESPONSES TO PHQ QUESTIONS 1-9: 0
2. FEELING DOWN, DEPRESSED OR HOPELESS: NOT AT ALL
SUM OF ALL RESPONSES TO PHQ QUESTIONS 1-9: 0
SUM OF ALL RESPONSES TO PHQ QUESTIONS 1-9: 0

## 2025-03-10 NOTE — PROGRESS NOTES
Roel Fish Jr. presents today for   Chief Complaint   Patient presents with    Medicare AWV     Accompanied by spouse.    \"Have you been to the ER, urgent care clinic since your last visit?  Hospitalized since your last visit?\"    NO    “Have you seen or consulted any other health care providers outside of [facility] since your last visit?”    NO

## 2025-03-11 NOTE — PROGRESS NOTES
Medicare Annual Wellness Visit    Roel Fish Jr. is here for Medicare AWV    Assessment & Plan   Anemia, unspecified type  -     CBC with Auto Differential; Future  Coronary artery disease involving coronary bypass graft of native heart without angina pectoris  COPD without exacerbation (HCC)  Primary hypertension  Hooker's esophagus without dysplasia  IFG (impaired fasting glucose)  -     Basic Metabolic Panel; Future  -     HEMOGLOBIN A1C W/O EAG; Future  Multinodular goiter  Cancer of prostate (HCC)  Hyperlipidemia, unspecified hyperlipidemia type  Advanced care planning/counseling discussion  Medicare annual wellness visit, subsequent       Return in 6 months (on 9/10/2025).     Subjective       Patient's complete Health Risk Assessment and screening values have been reviewed and are found in Flowsheets. The following problems were reviewed today and where indicated follow up appointments were made and/or referrals ordered.    Positive Risk Factor Screenings with Interventions:              Inactivity:  On average, how many days per week do you engage in moderate to strenuous exercise (like a brisk walk)?: 0 days (!) Abnormal  On average, how many minutes do you engage in exercise at this level?: 0 min  Interventions:  Patient declined any further interventions or treatment    Poor Eating Habits/Diet:  Do you eat balanced/healthy meals regularly?: (!) No  Interventions:  Patient declines any further evaluation or treatment       Vision Screen:  Do you have difficulty driving, watching TV, or doing any of your daily activities because of your eyesight?: No  Have you had an eye exam within the past year?: (!) No  Interventions:   Patient declines any further evaluation or treatment      Advanced Directives:  Do you have a Living Will?: (!) No    Intervention:  see ACP note             Pt is sexually active          Objective   Vitals:    03/10/25 1417 03/10/25 1422   BP: (!) 148/55 (!) 137/54   BP Site: Left

## 2025-03-11 NOTE — PATIENT INSTRUCTIONS
Learning About Being Active as an Older Adult  Why is being active important as you get older?     Being active is one of the best things you can do for your health. And it's never too late to start. Being active--or getting active, if you aren't already--has definite benefits. It can:  Give you more energy,  Keep your mind sharp.  Improve balance to reduce your risk of falls.  Help you manage chronic illness with fewer medicines.  No matter how old you are, how fit you are, or what health problems you have, there is a form of activity that will work for you. And the more physical activity you can do, the better your overall health will be.  What kinds of activity can help you stay healthy?  Being more active will make your daily activities easier. Physical activity includes planned exercise and things you do in daily life. There are four types of activity:  Aerobic.  Doing aerobic activity makes your heart and lungs strong.  Includes walking, dancing, and gardening.  Aim for at least 2½ hours spread throughout the week.  It improves your energy and can help you sleep better.  Muscle-strengthening.  This type of activity can help maintain muscle and strengthen bones.  Includes climbing stairs, using resistance bands, and lifting or carrying heavy loads.  Aim for at least twice a week.  It can help protect the knees and other joints.  Stretching.  Stretching gives you better range of motion in joints and muscles.  Includes upper arm stretches, calf stretches, and gentle yoga.  Aim for at least twice a week, preferably after your muscles are warmed up from other activities.  It can help you function better in daily life.  Balancing.  This helps you stay coordinated and have good posture.  Includes heel-to-toe walking, royal chi, and certain types of yoga.  Aim for at least 3 days a week.  It can reduce your risk of falling.  Even if you have a hard time meeting the recommendations, it's better to be more active

## 2025-03-12 ENCOUNTER — OFFICE VISIT (OUTPATIENT)
Age: 85
End: 2025-03-12
Payer: MEDICARE

## 2025-03-12 VITALS
OXYGEN SATURATION: 98 % | DIASTOLIC BLOOD PRESSURE: 64 MMHG | SYSTOLIC BLOOD PRESSURE: 106 MMHG | HEIGHT: 66 IN | WEIGHT: 170 LBS | HEART RATE: 50 BPM | BODY MASS INDEX: 27.32 KG/M2

## 2025-03-12 DIAGNOSIS — I10 ESSENTIAL (PRIMARY) HYPERTENSION: ICD-10-CM

## 2025-03-12 DIAGNOSIS — I65.23 BILATERAL CAROTID ARTERY STENOSIS: ICD-10-CM

## 2025-03-12 DIAGNOSIS — R06.02 SHORTNESS OF BREATH: ICD-10-CM

## 2025-03-12 DIAGNOSIS — Z95.1 S/P CABG X 3: ICD-10-CM

## 2025-03-12 DIAGNOSIS — I25.10 ATHEROSCLEROSIS OF NATIVE CORONARY ARTERY OF NATIVE HEART WITHOUT ANGINA PECTORIS: Primary | ICD-10-CM

## 2025-03-12 PROCEDURE — 1123F ACP DISCUSS/DSCN MKR DOCD: CPT | Performed by: NURSE PRACTITIONER

## 2025-03-12 PROCEDURE — G8427 DOCREV CUR MEDS BY ELIG CLIN: HCPCS | Performed by: NURSE PRACTITIONER

## 2025-03-12 PROCEDURE — 1159F MED LIST DOCD IN RCRD: CPT | Performed by: NURSE PRACTITIONER

## 2025-03-12 PROCEDURE — 1160F RVW MEDS BY RX/DR IN RCRD: CPT | Performed by: NURSE PRACTITIONER

## 2025-03-12 PROCEDURE — 99213 OFFICE O/P EST LOW 20 MIN: CPT | Performed by: NURSE PRACTITIONER

## 2025-03-12 PROCEDURE — 1036F TOBACCO NON-USER: CPT | Performed by: NURSE PRACTITIONER

## 2025-03-12 PROCEDURE — 1126F AMNT PAIN NOTED NONE PRSNT: CPT | Performed by: NURSE PRACTITIONER

## 2025-03-12 PROCEDURE — 93000 ELECTROCARDIOGRAM COMPLETE: CPT | Performed by: NURSE PRACTITIONER

## 2025-03-12 PROCEDURE — G8417 CALC BMI ABV UP PARAM F/U: HCPCS | Performed by: NURSE PRACTITIONER

## 2025-03-12 ASSESSMENT — PATIENT HEALTH QUESTIONNAIRE - PHQ9
SUM OF ALL RESPONSES TO PHQ QUESTIONS 1-9: 0
1. LITTLE INTEREST OR PLEASURE IN DOING THINGS: NOT AT ALL
SUM OF ALL RESPONSES TO PHQ QUESTIONS 1-9: 0
2. FEELING DOWN, DEPRESSED OR HOPELESS: NOT AT ALL

## 2025-03-12 ASSESSMENT — ENCOUNTER SYMPTOMS
COUGH: 0
BLOOD IN STOOL: 0
ABDOMINAL DISTENTION: 0
NAUSEA: 0
WHEEZING: 0
SHORTNESS OF BREATH: 0
CHEST TIGHTNESS: 0
VOMITING: 0
CONSTIPATION: 0
DIARRHEA: 0

## 2025-03-12 ASSESSMENT — ANXIETY QUESTIONNAIRES
2. NOT BEING ABLE TO STOP OR CONTROL WORRYING: NOT AT ALL
3. WORRYING TOO MUCH ABOUT DIFFERENT THINGS: NOT AT ALL
IF YOU CHECKED OFF ANY PROBLEMS ON THIS QUESTIONNAIRE, HOW DIFFICULT HAVE THESE PROBLEMS MADE IT FOR YOU TO DO YOUR WORK, TAKE CARE OF THINGS AT HOME, OR GET ALONG WITH OTHER PEOPLE: NOT DIFFICULT AT ALL
GAD7 TOTAL SCORE: 0
1. FEELING NERVOUS, ANXIOUS, OR ON EDGE: NOT AT ALL
5. BEING SO RESTLESS THAT IT IS HARD TO SIT STILL: NOT AT ALL
7. FEELING AFRAID AS IF SOMETHING AWFUL MIGHT HAPPEN: NOT AT ALL
6. BECOMING EASILY ANNOYED OR IRRITABLE: NOT AT ALL
4. TROUBLE RELAXING: NOT AT ALL

## 2025-03-12 NOTE — PROGRESS NOTES
Roel Fish Jr. presents today for   Chief Complaint   Patient presents with    Follow-up     6 month        Roel Fish Jr. preferred language for health care discussion is english/other.    Is someone accompanying this pt? no    Is the patient using any DME equipment during OV? no    Depression Screening:  Depression: Not at risk (3/12/2025)    PHQ-2     PHQ-2 Score: 0        Learning Assessment:  Who is the primary learner? Patient    What is the preferred language for health care of the primary learner? ENGLISH    How does the primary learner prefer to learn new concepts? DEMONSTRATION    Answered By patient    Relationship to Learner SELF           Pt currently taking Anticoagulant therapy? no    Pt currently taking Antiplatelet therapy ? no      Coordination of Care:  1. Have you been to the ER, urgent care clinic since your last visit? Hospitalized since your last visit? no    2. Have you seen or consulted any other health care providers outside of the [facility] since your last visit? Include any pap smears or colon screening. no

## 2025-06-13 ENCOUNTER — OFFICE VISIT (OUTPATIENT)
Age: 85
End: 2025-06-13
Payer: MEDICARE

## 2025-06-13 ENCOUNTER — HOSPITAL ENCOUNTER (OUTPATIENT)
Age: 85
Discharge: HOME | End: 2025-06-13
Payer: MEDICARE

## 2025-06-13 ENCOUNTER — HOSPITAL ENCOUNTER (OUTPATIENT)
Age: 85
Discharge: HOME | End: 2025-06-16
Payer: MEDICARE

## 2025-06-13 VITALS — WEIGHT: 148 LBS | BODY MASS INDEX: 23.78 KG/M2 | HEIGHT: 66 IN

## 2025-06-13 DIAGNOSIS — I10 HYPERTENSION, UNSPECIFIED TYPE: ICD-10-CM

## 2025-06-13 DIAGNOSIS — I25.10 CORONARY ARTERY DISEASE, UNSPECIFIED VESSEL OR LESION TYPE, UNSPECIFIED WHETHER ANGINA PRESENT, UNSPECIFIED WHETHER NATIVE OR TRANSPLANTED HEART: ICD-10-CM

## 2025-06-13 DIAGNOSIS — M17.12 OSTEOARTHRITIS OF LEFT KNEE, UNSPECIFIED OSTEOARTHRITIS TYPE: Primary | ICD-10-CM

## 2025-06-13 LAB
ANION GAP SERPL CALC-SCNC: 12 MMOL/L (ref 3–18)
BUN SERPL-MCNC: 15 MG/DL (ref 6–23)
BUN/CREAT SERPL: 14 (ref 12–20)
CALCIUM SERPL-MCNC: 10.2 MG/DL (ref 8.5–10.1)
CHLORIDE SERPL-SCNC: 105 MMOL/L (ref 98–107)
CO2 SERPL-SCNC: 23 MMOL/L (ref 21–32)
CREAT SERPL-MCNC: 1.1 MG/DL (ref 0.6–1.3)
EKG ATRIAL RATE: 47 BPM
EKG DIAGNOSIS: NORMAL
EKG P AXIS: 53 DEGREES
EKG P-R INTERVAL: 238 MS
EKG Q-T INTERVAL: 476 MS
EKG QRS DURATION: 88 MS
EKG QTC CALCULATION (BAZETT): 421 MS
EKG R AXIS: -22 DEGREES
EKG T AXIS: 57 DEGREES
EKG VENTRICULAR RATE: 47 BPM
ERYTHROCYTE [DISTWIDTH] IN BLOOD BY AUTOMATED COUNT: 13.6 % (ref 11.6–14.5)
GLUCOSE SERPL-MCNC: 91 MG/DL (ref 74–108)
HCT VFR BLD AUTO: 36.9 % (ref 36–48)
HGB BLD-MCNC: 12.6 G/DL (ref 13–16)
MCH RBC QN AUTO: 30.5 PG (ref 24–34)
MCHC RBC AUTO-ENTMCNC: 34.1 G/DL (ref 31–37)
MCV RBC AUTO: 89.3 FL (ref 78–100)
NRBC # BLD: 0 K/UL (ref 0–0.01)
NRBC BLD-RTO: 0 PER 100 WBC
PLATELET # BLD AUTO: 242 K/UL (ref 135–420)
PMV BLD AUTO: 10.2 FL (ref 9.2–11.8)
POTASSIUM SERPL-SCNC: 4.8 MMOL/L (ref 3.5–5.5)
RBC # BLD AUTO: 4.13 M/UL (ref 4.35–5.65)
SODIUM SERPL-SCNC: 140 MMOL/L (ref 136–145)
WBC # BLD AUTO: 10 K/UL (ref 4.6–13.2)

## 2025-06-13 PROCEDURE — 1036F TOBACCO NON-USER: CPT | Performed by: ORTHOPAEDIC SURGERY

## 2025-06-13 PROCEDURE — 85027 COMPLETE CBC AUTOMATED: CPT

## 2025-06-13 PROCEDURE — 80048 BASIC METABOLIC PNL TOTAL CA: CPT

## 2025-06-13 PROCEDURE — G8427 DOCREV CUR MEDS BY ELIG CLIN: HCPCS | Performed by: ORTHOPAEDIC SURGERY

## 2025-06-13 PROCEDURE — G8420 CALC BMI NORM PARAMETERS: HCPCS | Performed by: ORTHOPAEDIC SURGERY

## 2025-06-13 PROCEDURE — 1125F AMNT PAIN NOTED PAIN PRSNT: CPT | Performed by: ORTHOPAEDIC SURGERY

## 2025-06-13 PROCEDURE — 93005 ELECTROCARDIOGRAM TRACING: CPT

## 2025-06-13 PROCEDURE — 99204 OFFICE O/P NEW MOD 45 MIN: CPT | Performed by: ORTHOPAEDIC SURGERY

## 2025-06-13 PROCEDURE — 71046 X-RAY EXAM CHEST 2 VIEWS: CPT

## 2025-06-13 PROCEDURE — 1123F ACP DISCUSS/DSCN MKR DOCD: CPT | Performed by: ORTHOPAEDIC SURGERY

## 2025-06-13 PROCEDURE — 36415 COLL VENOUS BLD VENIPUNCTURE: CPT

## 2025-06-13 RX ORDER — OMEPRAZOLE 20 MG/1
20 CAPSULE, DELAYED RELEASE ORAL 2 TIMES DAILY
COMMUNITY
Start: 2025-04-19

## 2025-06-13 NOTE — PROGRESS NOTES
Name: Roel Fish Jr.    : 1940     [facility] [place]  [address]  [address]  [place] [address]  Dept: [phone]  Dept Fax: [phone]   Chief Complaint   Patient presents with    Knee Pain     left     Ht 1.676 m (5' 6\")   Wt 67.1 kg (148 lb)   BMI 23.89 kg/m²    Allergies   Allergen Reactions    Influenza Vaccines Other (See Comments)     \"Gets the flu\"    Influenza Virus Vaccine     Morphine Hallucinations    Albuterol Palpitations     Current Outpatient Medications   Medication Sig Dispense Refill    omeprazole (PRILOSEC) 20 MG delayed release capsule Take 1 capsule by mouth 2 times daily      augmented betamethasone dipropionate (DIPROLENE-AF) 0.05 % cream USE ONE THIN APPLICATION OF CREAM TWICE DAILY TO PSORIASIS PLAQUES (PATIENT TO MIX 50/50 WITH CALCIPOTRIENE)      amLODIPine (NORVASC) 10 MG tablet Take 1 tablet by mouth once daily 90 tablet 3    metoprolol tartrate (LOPRESSOR) 25 MG tablet TAKE 1 TABLET BY MOUTH IN THE MORNING  AND 1  TABLET IN THE EVENING 180 tablet 3    atorvastatin (LIPITOR) 20 MG tablet Take 1 tablet by mouth nightly 90 tablet 3    nitroGLYCERIN (NITROSTAT) 0.4 MG SL tablet DISSOLVE 1 TABLET UNDER TONGUE FOR CHEST PAIN- IF PAIN REMAINS AFTER 5MIN CALL 911& REPEAT DOSE. MAX 3 TABLETS IN 15MIN 25 tablet 1    metoprolol succinate (TOPROL XL) 25 MG extended release tablet Take 1 tablet by mouth daily      albuterol sulfate HFA (PROVENTIL HFA) 108 (90 Base) MCG/ACT inhaler Inhale 2 puffs into the lungs every 6 hours as needed for Wheezing 18 g 0    Multiple Vitamins-Minerals (THERAPEUTIC MULTIVITAMIN-MINERALS) tablet Take 1 tablet by mouth daily       No current facility-administered medications for this visit.       Patient Active Problem List   Diagnosis    Primary hypertension    Impotence of organic origin    Hooker's esophagus    Colon polyp    Bilateral carotid artery stenosis

## 2025-06-13 NOTE — PATIENT INSTRUCTIONS
you start to have pain, rest your knee until your pain gets back to the level that is normal for you. Or cycle for less time or with less effort.  Follow-up care is a key part of your treatment and safety. Be sure to make and go to all appointments, and call your doctor if you are having problems. It's also a good idea to know your test results and keep a list of the medicines you take.  Current as of: July 18, 2023               Content Version: 13.9  © 2006-2023 [employer].   Care instructions adapted under license by [employer]. If you have questions about a medical condition or this instruction, always ask your healthcare professional. [employer] disclaims any warranty or liability for your use of this information.

## 2025-06-14 LAB
BACTERIA SPEC CULT: NORMAL
BACTERIA SPEC CULT: NORMAL
SERVICE CMNT-IMP: NORMAL

## 2025-06-23 ENCOUNTER — TELEPHONE (OUTPATIENT)
Age: 85
End: 2025-06-23

## 2025-06-23 ENCOUNTER — OFFICE VISIT (OUTPATIENT)
Facility: CLINIC | Age: 85
End: 2025-06-23
Payer: MEDICARE

## 2025-06-23 VITALS
BODY MASS INDEX: 26.2 KG/M2 | WEIGHT: 163 LBS | DIASTOLIC BLOOD PRESSURE: 50 MMHG | OXYGEN SATURATION: 97 % | HEIGHT: 66 IN | RESPIRATION RATE: 16 BRPM | TEMPERATURE: 98.5 F | SYSTOLIC BLOOD PRESSURE: 121 MMHG | HEART RATE: 48 BPM

## 2025-06-23 DIAGNOSIS — J44.9 COPD WITHOUT EXACERBATION (HCC): ICD-10-CM

## 2025-06-23 DIAGNOSIS — D64.9 ANEMIA, UNSPECIFIED TYPE: ICD-10-CM

## 2025-06-23 DIAGNOSIS — R73.01 IFG (IMPAIRED FASTING GLUCOSE): ICD-10-CM

## 2025-06-23 DIAGNOSIS — Z01.818 PREOP EXAM FOR INTERNAL MEDICINE: Primary | ICD-10-CM

## 2025-06-23 DIAGNOSIS — I10 PRIMARY HYPERTENSION: ICD-10-CM

## 2025-06-23 DIAGNOSIS — I25.810 CORONARY ARTERY DISEASE INVOLVING CORONARY BYPASS GRAFT OF NATIVE HEART WITHOUT ANGINA PECTORIS: ICD-10-CM

## 2025-06-23 DIAGNOSIS — N40.1 BPH WITH OBSTRUCTION/LOWER URINARY TRACT SYMPTOMS: ICD-10-CM

## 2025-06-23 DIAGNOSIS — N13.8 BPH WITH OBSTRUCTION/LOWER URINARY TRACT SYMPTOMS: ICD-10-CM

## 2025-06-23 PROBLEM — R56.9 SEIZURES (HCC): Status: RESOLVED | Noted: 2023-08-10 | Resolved: 2025-06-23

## 2025-06-23 PROCEDURE — G2211 COMPLEX E/M VISIT ADD ON: HCPCS | Performed by: INTERNAL MEDICINE

## 2025-06-23 PROCEDURE — 1123F ACP DISCUSS/DSCN MKR DOCD: CPT | Performed by: INTERNAL MEDICINE

## 2025-06-23 PROCEDURE — G8427 DOCREV CUR MEDS BY ELIG CLIN: HCPCS | Performed by: INTERNAL MEDICINE

## 2025-06-23 PROCEDURE — G8417 CALC BMI ABV UP PARAM F/U: HCPCS | Performed by: INTERNAL MEDICINE

## 2025-06-23 PROCEDURE — 99214 OFFICE O/P EST MOD 30 MIN: CPT | Performed by: INTERNAL MEDICINE

## 2025-06-23 PROCEDURE — 1036F TOBACCO NON-USER: CPT | Performed by: INTERNAL MEDICINE

## 2025-06-23 PROCEDURE — 3023F SPIROM DOC REV: CPT | Performed by: INTERNAL MEDICINE

## 2025-06-23 PROCEDURE — 1159F MED LIST DOCD IN RCRD: CPT | Performed by: INTERNAL MEDICINE

## 2025-06-23 NOTE — PROGRESS NOTES
Roel Fish Jr. presents today for   Chief Complaint   Patient presents with    Pre-op Exam     Left total knee; Dr. ANITA Christianson: no date, cardiology appointment 06/24/25       \"Have you been to the ER, urgent care clinic since your last visit?  Hospitalized since your last visit?\"    NO    “Have you seen or consulted any other health care providers outside of [facility] since your last visit?”    NO

## 2025-06-23 NOTE — TELEPHONE ENCOUNTER
Received cardiac clearance from [facility] for Left Total Knee Replacement.

## 2025-06-23 NOTE — PROGRESS NOTES
84 y.o. male who presents for evaluation.  Wife was present for the evaluation    He is going to have LEFT jiffy knee surgery by Dr KJ Christianson in the lear future     He continues to do well and works at [employer].  Bp controlled when he checks. Saw NP Amie in March they are going seeing him to clear from cardiology point of view.  No cp, sob, pnd, edema, palpitations    No gi or gu complaints. He was started on detrol which has helped his urinary complaints.       Denies polyuria, polydipsia, nocturia, vision change.  Not checking sugars at this time.     No sx referable to the thyroid     *LAST MEDICARE WELLNESS EXAM: 3/10/25    Past Medical History:   Diagnosis Date    Allergic rhinitis     Anemia     Dr Christensen (2024)    Hooker's esophagus     Dr. Cloud 8/17 s/p HALO; 7/22    CAD (coronary artery disease)     Carotid stenosis 05/2023    GH DERREK 50-69%, LICA<50% (5/23)    Colon adenoma 08/2017    Dr Cloud    Colon polyps 2007    Dr. Cloud    COPD (chronic obstructive pulmonary disease) ([facility])     mild obst disease on PFTs Dr. Alcantar (2007)    COVID-19 vaccine dose declined 12/2022    boosters    Do not resuscitate 08/2023    ED (erectile dysfunction)     GERD (gastroesophageal reflux disease)     by EGD 2007 Dr. Cloud; EGD 5/14/21 hh and s/p dilation stricture    Hearing loss     Heart disease     Hx of cardiac catheterization 11/2020    Dr Pan; ef 60%, LIMA to LAD patent, SVG to DM1 patent, segment to RPDA occ, prox LAD 95%, Cx 30%, 95% prox RCA, mid RCA 95%, dist RCA 95%, RPDA 95%, s/p stent of all 4 vessels    Hypertension     VIRGIL (obstructive sleep apnea) 2011    CPAP not used    Osteoarthritis of both hands     Prostate cancer ([facility]) 04/2018    Dr Spear stage 2B [id number], [place]ignon 3+4, P,10, G2; brachytherapy Dr Powell     Prostate cancer ([facility]) 06/2010    Dr. Romero [id number] [place] 3+3, 1/9 cores up to 10% 1 core; cryo Oct 2012 psa went from 7/1 to talita 1.78    Pulmonary nodule 2014    no

## 2025-06-24 ENCOUNTER — OFFICE VISIT (OUTPATIENT)
Age: 85
End: 2025-06-24
Payer: MEDICARE

## 2025-06-24 VITALS
DIASTOLIC BLOOD PRESSURE: 62 MMHG | BODY MASS INDEX: 26.36 KG/M2 | OXYGEN SATURATION: 99 % | SYSTOLIC BLOOD PRESSURE: 122 MMHG | HEART RATE: 59 BPM | WEIGHT: 164 LBS | HEIGHT: 66 IN

## 2025-06-24 DIAGNOSIS — R06.02 SHORTNESS OF BREATH: ICD-10-CM

## 2025-06-24 DIAGNOSIS — I25.10 ATHEROSCLEROSIS OF NATIVE CORONARY ARTERY OF NATIVE HEART WITHOUT ANGINA PECTORIS: Primary | ICD-10-CM

## 2025-06-24 DIAGNOSIS — I10 ESSENTIAL (PRIMARY) HYPERTENSION: ICD-10-CM

## 2025-06-24 DIAGNOSIS — Z95.1 S/P CABG X 3: ICD-10-CM

## 2025-06-24 PROCEDURE — 1159F MED LIST DOCD IN RCRD: CPT | Performed by: INTERNAL MEDICINE

## 2025-06-24 PROCEDURE — 93000 ELECTROCARDIOGRAM COMPLETE: CPT | Performed by: INTERNAL MEDICINE

## 2025-06-24 PROCEDURE — 1160F RVW MEDS BY RX/DR IN RCRD: CPT | Performed by: INTERNAL MEDICINE

## 2025-06-24 PROCEDURE — G8417 CALC BMI ABV UP PARAM F/U: HCPCS | Performed by: INTERNAL MEDICINE

## 2025-06-24 PROCEDURE — 1036F TOBACCO NON-USER: CPT | Performed by: INTERNAL MEDICINE

## 2025-06-24 PROCEDURE — G8427 DOCREV CUR MEDS BY ELIG CLIN: HCPCS | Performed by: INTERNAL MEDICINE

## 2025-06-24 PROCEDURE — 1126F AMNT PAIN NOTED NONE PRSNT: CPT | Performed by: INTERNAL MEDICINE

## 2025-06-24 PROCEDURE — 99214 OFFICE O/P EST MOD 30 MIN: CPT | Performed by: INTERNAL MEDICINE

## 2025-06-24 PROCEDURE — 1123F ACP DISCUSS/DSCN MKR DOCD: CPT | Performed by: INTERNAL MEDICINE

## 2025-06-24 ASSESSMENT — PATIENT HEALTH QUESTIONNAIRE - PHQ9
SUM OF ALL RESPONSES TO PHQ QUESTIONS 1-9: 0
SUM OF ALL RESPONSES TO PHQ QUESTIONS 1-9: 0
2. FEELING DOWN, DEPRESSED OR HOPELESS: NOT AT ALL
SUM OF ALL RESPONSES TO PHQ QUESTIONS 1-9: 0
1. LITTLE INTEREST OR PLEASURE IN DOING THINGS: NOT AT ALL
SUM OF ALL RESPONSES TO PHQ QUESTIONS 1-9: 0

## 2025-06-24 NOTE — PATIENT INSTRUCTIONS
If you have not heard from the central scheduler to schedule your testing in 48 hours, please call [phone].

## 2025-06-24 NOTE — PROGRESS NOTES
Roel Fish Jr. presents today for   Chief Complaint   Patient presents with    Follow-up       Roel Fish Jr. preferred language for health care discussion is english/other.    Is someone accompanying this pt? no    Is the patient using any DME equipment during OV? no    Depression Screening:  Depression: Not at risk (6/24/2025)    PHQ-2     PHQ-2 Score: 0        Learning Assessment:  Who is the primary learner? Patient    What is the preferred language for health care of the primary learner? ENGLISH    How does the primary learner prefer to learn new concepts? DEMONSTRATION    Answered By patient    Relationship to Learner SELF           Pt currently taking Anticoagulant therapy? no    Pt currently taking Antiplatelet therapy ? no      Coordination of Care:  1. Have you been to the ER, urgent care clinic since your last visit? Hospitalized since your last visit? no    2. Have you seen or consulted any other health care providers outside of the [facility] since your last visit? Include any pap smears or colon screening. no

## 2025-06-24 NOTE — TELEPHONE ENCOUNTER
Verbal order and read back per Scott Pan MD  Patient needs an Echo & Pharm Nuc to be cleared.     Testing ordered ancillary.
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
## 2025-07-21 ENCOUNTER — HOSPITAL ENCOUNTER (OUTPATIENT)
Facility: HOSPITAL | Age: 85
End: 2025-07-21
Attending: INTERNAL MEDICINE
Payer: MEDICARE

## 2025-07-21 ENCOUNTER — HOSPITAL ENCOUNTER (OUTPATIENT)
Facility: HOSPITAL | Age: 85
Discharge: HOME | End: 2025-07-24
Attending: INTERNAL MEDICINE
Payer: MEDICARE

## 2025-07-21 ENCOUNTER — HOSPITAL ENCOUNTER (OUTPATIENT)
Facility: HOSPITAL | Age: 85
Discharge: HOME | End: 2025-07-23
Attending: INTERNAL MEDICINE
Payer: MEDICARE

## 2025-07-21 ENCOUNTER — RESULTS FOLLOW-UP (OUTPATIENT)
Age: 85
End: 2025-07-21

## 2025-07-21 VITALS
BODY MASS INDEX: 26.36 KG/M2 | DIASTOLIC BLOOD PRESSURE: 62 MMHG | SYSTOLIC BLOOD PRESSURE: 122 MMHG | WEIGHT: 164 LBS | HEIGHT: 66 IN

## 2025-07-21 DIAGNOSIS — I25.10 ATHEROSCLEROSIS OF NATIVE CORONARY ARTERY OF NATIVE HEART WITHOUT ANGINA PECTORIS: ICD-10-CM

## 2025-07-21 LAB
ECHO AO ASC DIAM: 3.1 CM
ECHO AO ASCENDING AORTA INDEX: 1.68 CM/M2
ECHO AO ROOT DIAM: 3.3 CM
ECHO AO ROOT INDEX: 1.79 CM/M2
ECHO AV AREA PEAK VELOCITY: 2.7 CM2
ECHO AV AREA VTI: 2.7 CM2
ECHO AV AREA/BSA PEAK VELOCITY: 1.5 CM2/M2
ECHO AV AREA/BSA VTI: 1.5 CM2/M2
ECHO AV MEAN GRADIENT: 2 MMHG
ECHO AV MEAN VELOCITY: 0.8 M/S
ECHO AV PEAK GRADIENT: 4 MMHG
ECHO AV PEAK VELOCITY: 1 M/S
ECHO AV VELOCITY RATIO: 0.9
ECHO AV VTI: 24.7 CM
ECHO BSA: 1.86 M2
ECHO EST RA PRESSURE: 3 MMHG
ECHO LA VOL A-L A2C: 40 ML (ref 18–58)
ECHO LA VOL A-L A4C: 38 ML (ref 18–58)
ECHO LA VOL BP: 36 ML (ref 18–58)
ECHO LA VOL MOD A2C: 37 ML (ref 18–58)
ECHO LA VOL MOD A4C: 35 ML (ref 18–58)
ECHO LA VOL/BSA BIPLANE: 20 ML/M2 (ref 16–34)
ECHO LA VOLUME AREA LENGTH: 39 ML
ECHO LA VOLUME INDEX A-L A2C: 22 ML/M2 (ref 16–34)
ECHO LA VOLUME INDEX A-L A4C: 21 ML/M2 (ref 16–34)
ECHO LA VOLUME INDEX AREA LENGTH: 21 ML/M2 (ref 16–34)
ECHO LA VOLUME INDEX MOD A2C: 20 ML/M2 (ref 16–34)
ECHO LA VOLUME INDEX MOD A4C: 19 ML/M2 (ref 16–34)
ECHO LV E' LATERAL VELOCITY: 9.83 CM/S
ECHO LV E' SEPTAL VELOCITY: 5.82 CM/S
ECHO LV EDV A2C: 72 ML
ECHO LV EDV A4C: 65 ML
ECHO LV EDV BP: 70 ML (ref 67–155)
ECHO LV EDV INDEX A4C: 35 ML/M2
ECHO LV EDV INDEX BP: 38 ML/M2
ECHO LV EDV NDEX A2C: 39 ML/M2
ECHO LV EF PHYSICIAN: 55 %
ECHO LV EJECTION FRACTION A2C: 58 %
ECHO LV EJECTION FRACTION A4C: 66 %
ECHO LV EJECTION FRACTION BIPLANE: 61 % (ref 55–100)
ECHO LV ESV A2C: 30 ML
ECHO LV ESV A4C: 22 ML
ECHO LV ESV BP: 27 ML (ref 22–58)
ECHO LV ESV INDEX A2C: 16 ML/M2
ECHO LV ESV INDEX A4C: 12 ML/M2
ECHO LV ESV INDEX BP: 15 ML/M2
ECHO LV FRACTIONAL SHORTENING: 30 % (ref 28–44)
ECHO LV INTERNAL DIMENSION DIASTOLE INDEX: 2.55 CM/M2
ECHO LV INTERNAL DIMENSION DIASTOLIC: 4.7 CM (ref 4.2–5.9)
ECHO LV INTERNAL DIMENSION SYSTOLIC INDEX: 1.79 CM/M2
ECHO LV INTERNAL DIMENSION SYSTOLIC: 3.3 CM
ECHO LV IVSD: 0.9 CM (ref 0.6–1)
ECHO LV MASS 2D: 142.7 G (ref 88–224)
ECHO LV MASS INDEX 2D: 77.6 G/M2 (ref 49–115)
ECHO LV POSTERIOR WALL DIASTOLIC: 0.9 CM (ref 0.6–1)
ECHO LV RELATIVE WALL THICKNESS RATIO: 0.38
ECHO LVOT AREA: 3.1 CM2
ECHO LVOT AV VTI INDEX: 0.87
ECHO LVOT DIAM: 2 CM
ECHO LVOT MEAN GRADIENT: 2 MMHG
ECHO LVOT PEAK GRADIENT: 3 MMHG
ECHO LVOT PEAK VELOCITY: 0.9 M/S
ECHO LVOT STROKE VOLUME INDEX: 36.9 ML/M2
ECHO LVOT SV: 67.8 ML
ECHO LVOT VTI: 21.6 CM
ECHO MV A VELOCITY: 0.65 M/S
ECHO MV E DECELERATION TIME (DT): 343.8 MS
ECHO MV E VELOCITY: 0.43 M/S
ECHO MV E/A RATIO: 0.66
ECHO MV E/E' LATERAL: 4.37
ECHO MV E/E' RATIO (AVERAGED): 5.88
ECHO MV E/E' SEPTAL: 7.39
ECHO PV MAX VELOCITY: 1.5 M/S
ECHO PV PEAK GRADIENT: 9 MMHG
ECHO RA VOLUME: 40 ML
ECHO RA VOLUME: 41 ML
ECHO RIGHT VENTRICULAR SYSTOLIC PRESSURE (RVSP): 23 MMHG
ECHO RV BASAL DIMENSION: 3.8 CM
ECHO RV FREE WALL PEAK S': 11.8 CM/S
ECHO RV MID DIMENSION: 2.6 CM
ECHO RV TAPSE: 1.9 CM (ref 1.7–?)
ECHO TV REGURGITANT MAX VELOCITY: 2.26 M/S
ECHO TV REGURGITANT PEAK GRADIENT: 20 MMHG

## 2025-07-21 PROCEDURE — 93306 TTE W/DOPPLER COMPLETE: CPT

## 2025-07-21 PROCEDURE — 93306 TTE W/DOPPLER COMPLETE: CPT | Performed by: INTERNAL MEDICINE

## 2025-07-21 RX ORDER — REGADENOSON 0.08 MG/ML
0.4 INJECTION, SOLUTION INTRAVENOUS
Status: DISCONTINUED | OUTPATIENT
Start: 2025-07-21 | End: 2025-07-23

## 2025-07-23 ENCOUNTER — HOSPITAL ENCOUNTER (OUTPATIENT)
Facility: HOSPITAL | Age: 85
Discharge: HOME | End: 2025-07-26
Attending: INTERNAL MEDICINE
Payer: MEDICARE

## 2025-07-23 ENCOUNTER — HOSPITAL ENCOUNTER (OUTPATIENT)
Facility: HOSPITAL | Age: 85
Discharge: HOME | End: 2025-07-25
Attending: INTERNAL MEDICINE
Payer: MEDICARE

## 2025-07-23 VITALS
HEIGHT: 66 IN | BODY MASS INDEX: 26.36 KG/M2 | WEIGHT: 164 LBS | HEART RATE: 54 BPM | SYSTOLIC BLOOD PRESSURE: 154 MMHG | DIASTOLIC BLOOD PRESSURE: 60 MMHG

## 2025-07-23 PROCEDURE — 6360000002 HC RX W HCPCS: Performed by: INTERNAL MEDICINE

## 2025-07-23 PROCEDURE — A9502 TC99M TETROFOSMIN: HCPCS | Performed by: INTERNAL MEDICINE

## 2025-07-23 PROCEDURE — 3430000000 HC RX DIAGNOSTIC RADIOPHARMACEUTICAL: Performed by: INTERNAL MEDICINE

## 2025-07-23 PROCEDURE — 93017 CV STRESS TEST TRACING ONLY: CPT

## 2025-07-23 RX ORDER — REGADENOSON 0.08 MG/ML
0.4 INJECTION, SOLUTION INTRAVENOUS
Status: COMPLETED | OUTPATIENT
Start: 2025-07-23 | End: 2025-07-23

## 2025-07-23 RX ADMIN — TETROFOSMIN 11 MILLICURIE: 1.38 INJECTION, POWDER, LYOPHILIZED, FOR SOLUTION INTRAVENOUS at 06:50

## 2025-07-23 RX ADMIN — REGADENOSON 0.4 MG: 0.08 INJECTION, SOLUTION INTRAVENOUS at 08:19

## 2025-07-23 RX ADMIN — TETROFOSMIN 33 MILLICURIE: 1.38 INJECTION, POWDER, LYOPHILIZED, FOR SOLUTION INTRAVENOUS at 08:19

## 2025-07-24 LAB
ECHO BSA: 1.86 M2
NUC STRESS EJECTION FRACTION: 62 %
STRESS BASELINE DIAS BP: 60 MMHG
STRESS BASELINE HR: 54 BPM
STRESS BASELINE SYS BP: 154 MMHG
STRESS ESTIMATED WORKLOAD: 1 METS
STRESS PEAK DIAS BP: 60 MMHG
STRESS PEAK SYS BP: 154 MMHG
STRESS PERCENT HR ACHIEVED: 60 %
STRESS POST PEAK HR: 81 BPM
STRESS RATE PRESSURE PRODUCT: NORMAL BPM*MMHG
STRESS TARGET HR: 134 BPM
TID: 1.15

## 2025-07-30 NOTE — TELEPHONE ENCOUNTER
Verbal order and read back per Scott Pan MD  From a cardiac standpoint he is low risk for Left Total Knee Replacement.     Faxed to [facility].

## 2025-07-31 DIAGNOSIS — M17.12 PRIMARY OSTEOARTHRITIS OF LEFT KNEE: Primary | ICD-10-CM

## 2025-07-31 NOTE — TELEPHONE ENCOUNTER
----- Message from Dr. Scott Pan MD sent at 7/29/2025  2:50 PM EDT -----  His echocardiogram shows normal LV function.  No significant valve disease noted.  ----- Message -----  From: Keegan Woody LPN  Sent: 7/21/2025  11:59 AM EDT  To: Scott Pan MD    Per your last office note:     He has plans to have left knee replacement/surgery done by Dr. NAITA Christianson.  With his known history of bypass surgery, would proceed with coronary risk stratification with echocardiogram and pharmacologic nuclear scan.  If both of these tests are without significant changes, he should be able to tolerate left knee surgery from coronary standpoint.

## 2025-07-31 NOTE — TELEPHONE ENCOUNTER
----- Message from Dr. Scott Pan MD sent at 7/29/2025  2:51 PM EDT -----  His nuclear exercise stress test shows normal heart function and normal perfusion.  ----- Message -----  From: Keegan Woody LPN  Sent: 7/24/2025   2:54 PM EDT  To: Scott Pan MD    Per your last office note:     He has plans to have left knee replacement/surgery done by Dr. ANITA Christianson.  With his known history of bypass surgery, would proceed with coronary risk stratification with echocardiogram and pharmacologic nuclear scan.  If both of these tests are without significant changes, he should be able to tolerate left knee surgery from coronary standpoint.

## 2025-07-31 NOTE — TELEPHONE ENCOUNTER
Called the patient regarding test results. I was unable to leave a voicemail as the mailbox is full. I will make the 2nd attempt sometime today to establish contact.

## 2025-07-31 NOTE — TELEPHONE ENCOUNTER
Successfully was contacted by the patient.    Verbal order and read back per Scott Pan MD  His echocardiogram shows normal LV function. No significant valve disease noted.    His nuclear exercise stress test shows normal heart function and normal perfusion.     He verbally acknowledged understanding of his test results and expressed satisfaction. He had no further questions. Patient was also informed that he is cleared for surgery.

## 2025-09-03 DIAGNOSIS — M17.12 OSTEOARTHRITIS OF LEFT KNEE, UNSPECIFIED OSTEOARTHRITIS TYPE: Primary | ICD-10-CM

## (undated) DEVICE — ENDOSCOPY PUMP TUBING/ CAP SET: Brand: ERBE

## (undated) DEVICE — AIRLIFE™ NASAL OXYGEN CANNULA CURVED, FLARED TIP WITH 14 FOOT (4.3 M) CRUSH-RESISTANT TUBING, OVER-THE-EAR STYLE: Brand: AIRLIFE™

## (undated) DEVICE — MEDI-VAC SUCTION HIGH CAPACITY: Brand: CARDINAL HEALTH

## (undated) DEVICE — SOLUTION IRRIG 1000ML H2O STRL BLT

## (undated) DEVICE — FLUFF AND POLYMER UNDERPAD,EXTRA HEAVY: Brand: WINGS

## (undated) DEVICE — AIRLIFE™ NASAL OXYGEN CANNULA CURVED, NONFLARED TIP WITH 14 FOOT (4.3 M) CRUSH-RESISTANT TUBING, OVER-THE-EAR STYLE: Brand: AIRLIFE™

## (undated) DEVICE — DUETTE, MULTI-BAND MUCOSECTOMY: Brand: DUETTE

## (undated) DEVICE — FLEX ADVANTAGE 3000CC: Brand: FLEX ADVANTAGE

## (undated) DEVICE — CATHETER SUCT TR FL TIP 14FR W/ O CTRL

## (undated) DEVICE — SYRINGE MED 3ML NDL 22GA L1IN PLAS N CTRL LUERLOCK TIP REG

## (undated) DEVICE — FORCEPS BX L240CM JAW DIA2.8MM L CAP W/ NDL MIC MESH TOOTH

## (undated) DEVICE — REM POLYHESIVE ADULT PATIENT RETURN ELECTRODE: Brand: VALLEYLAB

## (undated) DEVICE — SYRINGE MED 25GA 3ML L5/8IN SUBQ PLAS W/ DETACH NDL SFTY

## (undated) DEVICE — CANNULA ORIG TL CLR W FOAM CUSHIONS AND 14FT SUPL TB 3 CHN

## (undated) DEVICE — Device

## (undated) DEVICE — 360 EXPRESS RFA BALLOON CATHETER: Brand: BARRX

## (undated) DEVICE — GAUZE,SPONGE,4"X4",16PLY,STRL,LF,10/TRAY: Brand: MEDLINE

## (undated) DEVICE — CATH IV SAFE STR 22GX1IN BLU -- PROTECTIV PLUS

## (undated) DEVICE — SOL IRR STRL H2O 1000ML BTL --

## (undated) DEVICE — REUSABLE MARKED SPRING TIP GUIDEWIRE, 210 CM X 1.86 MM: Brand: CONMED

## (undated) DEVICE — MEDI-VAC NON-CONDUCTIVE SUCTION TUBING: Brand: CARDINAL HEALTH

## (undated) DEVICE — TRAP SPEC COLL POLYP POLYSTYR --

## (undated) DEVICE — ESOPHAGEAL BALLOON DILATATION CATHETER: Brand: CRE FIXED WIRE

## (undated) DEVICE — ANGIOGRAPHY KIT CUST VASC

## (undated) DEVICE — SYR 50ML SLIP TIP NSAF LF STRL --

## (undated) DEVICE — FLEX ADVANTAGE 1500CC: Brand: FLEX ADVANTAGE

## (undated) DEVICE — XIENCE SIERRA™ EVEROLIMUS ELUTING CORONARY STENT SYSTEM 2.75 MM X 15 MM / RAPID-EXCHANGE
Type: IMPLANTABLE DEVICE | Status: NON-FUNCTIONAL
Brand: XIENCE SIERRA™

## (undated) DEVICE — BASIN EMSIS 16OZ GRAPHITE PLAS KID SHP MOLD GRAD FOR ORAL

## (undated) DEVICE — COPILOT KIT INCLUDES BLEEDBACK CONTROL VALVE 20/30 INDEFLATOR INFLATION DEVICE 30 ATM 20 CC / GUIDE WIRE INTRODUCER / TORQUE DEVICE: Brand: INDEFLATOR

## (undated) DEVICE — BASIN EMESIS 500CC ROSE 250/CS 60/PLT: Brand: MEDEGEN MEDICAL PRODUCTS, LLC

## (undated) DEVICE — BITE BLOCK ENDOSCP UNIV AD 6 TO 9.4 MM

## (undated) DEVICE — 90 RFA FOCAL CATHETER,80 APPLICATIONS: Brand: BARRX

## (undated) DEVICE — CANNULA NSL AD TBNG L14FT STD PVC O2 CRV CONN NONFLARED NSL

## (undated) DEVICE — CATHETER GUID 6FR L100CM GRN PTFE JR4 TRUELUMEN HYBRID

## (undated) DEVICE — PACK PROCEDURE SURG VASC CATH 161 MMC LF

## (undated) DEVICE — DEVICE INFL 60ML 12ATM CONVENIENT LOK REL HNDL HI PRSS FLX

## (undated) DEVICE — (D)GLOVE EXAM LG NITRL NS -- DISC BY MFR NO SUB

## (undated) DEVICE — CATHETER ABLAT L160CM DIA4MM BPLR ELECTRD W13XL20MM FOC

## (undated) DEVICE — INTRODUCER SHTH 6FR CANN L11CM DIL TIP 35MM GRN TUNGSTEN

## (undated) DEVICE — SYR 20ML LL STRL LF --

## (undated) DEVICE — TREK CORONARY DILATATION CATHETER 2.50 MM X 12 MM / RAPID-EXCHANGE: Brand: TREK

## (undated) DEVICE — STERILE POLYISOPRENE POWDER-FREE SURGICAL GLOVES: Brand: PROTEXIS

## (undated) DEVICE — HI-TORQUE BALANCE GUIDE WIRE W/HYDROCOAT .014 STRAIGHT TIP 3.0 CM X 190 CM: Brand: HI-TORQUE BALANCE

## (undated) DEVICE — GAUZE SPONGES,16 PLY: Brand: CURITY

## (undated) DEVICE — GOWN ISOL IMPERV UNIV, DISP, OPEN BACK, BLUE --

## (undated) DEVICE — SYR 10ML LUER LOK 1/5ML GRAD --

## (undated) DEVICE — SET FLD ADMIN 3 W STPCOCK FIX FEM L BOR 1IN

## (undated) DEVICE — (D)SYR 10ML 1/5ML GRAD NSAF -- PKGING CHANGE USE ITEM 338027

## (undated) DEVICE — CATHETER ANGIO IMA 038 AD 6 FRX100 CM SUPER TORQUE +

## (undated) DEVICE — ANGIO-SEAL VIP VASCULAR CLOSURE DEVICE: Brand: ANGIO-SEAL

## (undated) DEVICE — PROCEDURE KIT FLUID MGMT 10 FR CUST MAINFOLD

## (undated) DEVICE — FCPS RAD JAW 4LC 240CM W/NDL -- BX/20 RADIAL JAW 4

## (undated) DEVICE — YANKAUER,SMOOTH HANDLE,HIGH CAPACITY: Brand: MEDLINE INDUSTRIES, INC.

## (undated) DEVICE — SYRINGE MED 3ML NDL 22GA L1 1/2IN REG BVL SFGLDE

## (undated) DEVICE — CATHETER ENDOSCP L135CM W7.5XL15.7MM DIA2.8MM FLX RFA

## (undated) DEVICE — SNARE POLYP M W27MMXL240CM OVL STIFF DISP CAPTIVATOR

## (undated) DEVICE — XIENCE SIERRA™ EVEROLIMUS ELUTING CORONARY STENT SYSTEM 3.50 MM X 15 MM / RAPID-EXCHANGE
Type: IMPLANTABLE DEVICE | Status: NON-FUNCTIONAL
Brand: XIENCE SIERRA™

## (undated) DEVICE — CATHETER DIAG AD L100CM DIA6FR STD JUDKINS L 4 POLYUR COR

## (undated) DEVICE — PRESSURE MONITORING SET: Brand: TRUWAVE

## (undated) DEVICE — LINER SUCT CANSTR 3000CC PLAS SFT PRE ASSEMB W/OUT TBNG W/

## (undated) DEVICE — CAP ENDOSCP CLN M DIA9.8-11.1MM ES CAUT ABLATED TISS REM

## (undated) DEVICE — (D)GLOVE SURG TRIFLX 7.5 PWD L -- DISC BY MFR USE ITEM 302993

## (undated) DEVICE — GUIDEWIRE ENDOSCP L230CM DIA0.038IN DEPTH MRK STR FLX DST